# Patient Record
Sex: MALE | Race: ASIAN | NOT HISPANIC OR LATINO | Employment: FULL TIME | URBAN - METROPOLITAN AREA
[De-identification: names, ages, dates, MRNs, and addresses within clinical notes are randomized per-mention and may not be internally consistent; named-entity substitution may affect disease eponyms.]

---

## 2017-02-02 ENCOUNTER — OFFICE VISIT - HEALTHEAST (OUTPATIENT)
Dept: ALLERGY | Facility: CLINIC | Age: 27
End: 2017-02-02

## 2017-02-02 DIAGNOSIS — L50.8 CHRONIC URTICARIA: ICD-10-CM

## 2017-02-03 LAB — ANA SER QL: 0.8 U

## 2017-02-06 ENCOUNTER — COMMUNICATION - HEALTHEAST (OUTPATIENT)
Dept: ALLERGY | Facility: CLINIC | Age: 27
End: 2017-02-06

## 2020-08-12 ENCOUNTER — OFFICE VISIT - HEALTHEAST (OUTPATIENT)
Dept: FAMILY MEDICINE | Facility: CLINIC | Age: 30
End: 2020-08-12

## 2020-08-12 DIAGNOSIS — Z00.00 HEALTHCARE MAINTENANCE: ICD-10-CM

## 2020-08-12 DIAGNOSIS — Z71.6 ENCOUNTER FOR SMOKING CESSATION COUNSELING: ICD-10-CM

## 2020-08-12 LAB
CHOLEST SERPL-MCNC: 207 MG/DL
FASTING STATUS PATIENT QL REPORTED: NO
FASTING STATUS PATIENT QL REPORTED: NO
GLUCOSE BLD-MCNC: 90 MG/DL (ref 74–125)
HDLC SERPL-MCNC: 61 MG/DL

## 2020-08-12 ASSESSMENT — MIFFLIN-ST. JEOR: SCORE: 1501.29

## 2020-08-13 ENCOUNTER — COMMUNICATION - HEALTHEAST (OUTPATIENT)
Dept: FAMILY MEDICINE | Facility: CLINIC | Age: 30
End: 2020-08-13

## 2020-10-09 ENCOUNTER — OFFICE VISIT - HEALTHEAST (OUTPATIENT)
Dept: FAMILY MEDICINE | Facility: CLINIC | Age: 30
End: 2020-10-09

## 2020-10-09 DIAGNOSIS — L72.3 SEBACEOUS CYST: ICD-10-CM

## 2020-11-17 ENCOUNTER — OFFICE VISIT - HEALTHEAST (OUTPATIENT)
Dept: FAMILY MEDICINE | Facility: CLINIC | Age: 30
End: 2020-11-17

## 2020-11-17 DIAGNOSIS — R41.840 INATTENTION: ICD-10-CM

## 2020-11-17 ASSESSMENT — PATIENT HEALTH QUESTIONNAIRE - PHQ9: SUM OF ALL RESPONSES TO PHQ QUESTIONS 1-9: 8

## 2020-12-04 ENCOUNTER — OFFICE VISIT - HEALTHEAST (OUTPATIENT)
Dept: FAMILY MEDICINE | Facility: CLINIC | Age: 30
End: 2020-12-04

## 2020-12-04 DIAGNOSIS — Z71.6 ENCOUNTER FOR SMOKING CESSATION COUNSELING: ICD-10-CM

## 2020-12-04 DIAGNOSIS — R41.840 INATTENTION: ICD-10-CM

## 2020-12-04 DIAGNOSIS — J30.2 SEASONAL ALLERGIC RHINITIS, UNSPECIFIED TRIGGER: ICD-10-CM

## 2020-12-23 ENCOUNTER — OFFICE VISIT - HEALTHEAST (OUTPATIENT)
Dept: FAMILY MEDICINE | Facility: CLINIC | Age: 30
End: 2020-12-23

## 2020-12-23 DIAGNOSIS — R41.840 INATTENTION: ICD-10-CM

## 2020-12-23 DIAGNOSIS — F41.9 ANXIETY: ICD-10-CM

## 2020-12-23 DIAGNOSIS — Z00.00 HEALTHCARE MAINTENANCE: ICD-10-CM

## 2020-12-23 DIAGNOSIS — Z71.6 ENCOUNTER FOR SMOKING CESSATION COUNSELING: ICD-10-CM

## 2020-12-23 DIAGNOSIS — F32.A DEPRESSION, UNSPECIFIED DEPRESSION TYPE: ICD-10-CM

## 2020-12-23 ASSESSMENT — ANXIETY QUESTIONNAIRES
4. TROUBLE RELAXING: NEARLY EVERY DAY
3. WORRYING TOO MUCH ABOUT DIFFERENT THINGS: MORE THAN HALF THE DAYS
IF YOU CHECKED OFF ANY PROBLEMS ON THIS QUESTIONNAIRE, HOW DIFFICULT HAVE THESE PROBLEMS MADE IT FOR YOU TO DO YOUR WORK, TAKE CARE OF THINGS AT HOME, OR GET ALONG WITH OTHER PEOPLE: VERY DIFFICULT
GAD7 TOTAL SCORE: 14
5. BEING SO RESTLESS THAT IT IS HARD TO SIT STILL: MORE THAN HALF THE DAYS
2. NOT BEING ABLE TO STOP OR CONTROL WORRYING: NEARLY EVERY DAY
1. FEELING NERVOUS, ANXIOUS, OR ON EDGE: MORE THAN HALF THE DAYS
6. BECOMING EASILY ANNOYED OR IRRITABLE: SEVERAL DAYS
7. FEELING AFRAID AS IF SOMETHING AWFUL MIGHT HAPPEN: SEVERAL DAYS

## 2020-12-23 ASSESSMENT — PATIENT HEALTH QUESTIONNAIRE - PHQ9: SUM OF ALL RESPONSES TO PHQ QUESTIONS 1-9: 22

## 2020-12-23 ASSESSMENT — MIFFLIN-ST. JEOR: SCORE: 1534.85

## 2021-01-14 ENCOUNTER — COMMUNICATION - HEALTHEAST (OUTPATIENT)
Dept: FAMILY MEDICINE | Facility: CLINIC | Age: 31
End: 2021-01-14

## 2021-01-14 ENCOUNTER — OFFICE VISIT - HEALTHEAST (OUTPATIENT)
Dept: FAMILY MEDICINE | Facility: CLINIC | Age: 31
End: 2021-01-14

## 2021-01-14 DIAGNOSIS — G47.00 INSOMNIA, UNSPECIFIED TYPE: ICD-10-CM

## 2021-01-14 DIAGNOSIS — L64.9 MALE PATTERN BALDNESS: ICD-10-CM

## 2021-01-14 DIAGNOSIS — R35.0 URINARY FREQUENCY: ICD-10-CM

## 2021-01-14 DIAGNOSIS — J30.2 SEASONAL ALLERGIC RHINITIS, UNSPECIFIED TRIGGER: ICD-10-CM

## 2021-01-14 DIAGNOSIS — R41.840 INATTENTION: ICD-10-CM

## 2021-01-14 ASSESSMENT — MIFFLIN-ST. JEOR: SCORE: 1584.74

## 2021-02-08 ENCOUNTER — COMMUNICATION - HEALTHEAST (OUTPATIENT)
Dept: FAMILY MEDICINE | Facility: CLINIC | Age: 31
End: 2021-02-08

## 2021-02-08 DIAGNOSIS — G47.00 INSOMNIA, UNSPECIFIED TYPE: ICD-10-CM

## 2021-02-17 ENCOUNTER — OFFICE VISIT - HEALTHEAST (OUTPATIENT)
Dept: BEHAVIORAL HEALTH | Facility: CLINIC | Age: 31
End: 2021-02-17

## 2021-02-17 DIAGNOSIS — F98.8 ADD (ATTENTION DEFICIT DISORDER) WITHOUT HYPERACTIVITY: ICD-10-CM

## 2021-02-17 ASSESSMENT — ANXIETY QUESTIONNAIRES
1. FEELING NERVOUS, ANXIOUS, OR ON EDGE: MORE THAN HALF THE DAYS
2. NOT BEING ABLE TO STOP OR CONTROL WORRYING: NEARLY EVERY DAY
6. BECOMING EASILY ANNOYED OR IRRITABLE: MORE THAN HALF THE DAYS
3. WORRYING TOO MUCH ABOUT DIFFERENT THINGS: NEARLY EVERY DAY
GAD7 TOTAL SCORE: 19
IF YOU CHECKED OFF ANY PROBLEMS ON THIS QUESTIONNAIRE, HOW DIFFICULT HAVE THESE PROBLEMS MADE IT FOR YOU TO DO YOUR WORK, TAKE CARE OF THINGS AT HOME, OR GET ALONG WITH OTHER PEOPLE: VERY DIFFICULT
5. BEING SO RESTLESS THAT IT IS HARD TO SIT STILL: NEARLY EVERY DAY
4. TROUBLE RELAXING: NEARLY EVERY DAY
7. FEELING AFRAID AS IF SOMETHING AWFUL MIGHT HAPPEN: NEARLY EVERY DAY

## 2021-02-17 ASSESSMENT — PATIENT HEALTH QUESTIONNAIRE - PHQ9: SUM OF ALL RESPONSES TO PHQ QUESTIONS 1-9: 20

## 2021-03-01 ENCOUNTER — COMMUNICATION - HEALTHEAST (OUTPATIENT)
Dept: BEHAVIORAL HEALTH | Facility: CLINIC | Age: 31
End: 2021-03-01

## 2021-03-02 ENCOUNTER — COMMUNICATION - HEALTHEAST (OUTPATIENT)
Dept: BEHAVIORAL HEALTH | Facility: CLINIC | Age: 31
End: 2021-03-02

## 2021-03-03 ENCOUNTER — OFFICE VISIT - HEALTHEAST (OUTPATIENT)
Dept: BEHAVIORAL HEALTH | Facility: CLINIC | Age: 31
End: 2021-03-03

## 2021-03-03 DIAGNOSIS — R41.840 INATTENTION: ICD-10-CM

## 2021-03-03 DIAGNOSIS — F33.1 MODERATE EPISODE OF RECURRENT MAJOR DEPRESSIVE DISORDER (H): ICD-10-CM

## 2021-03-03 DIAGNOSIS — G47.00 INSOMNIA, UNSPECIFIED TYPE: ICD-10-CM

## 2021-03-03 DIAGNOSIS — Z71.6 ENCOUNTER FOR SMOKING CESSATION COUNSELING: ICD-10-CM

## 2021-03-09 ENCOUNTER — COMMUNICATION - HEALTHEAST (OUTPATIENT)
Dept: SCHEDULING | Facility: CLINIC | Age: 31
End: 2021-03-09

## 2021-03-10 ENCOUNTER — OFFICE VISIT - HEALTHEAST (OUTPATIENT)
Dept: FAMILY MEDICINE | Facility: CLINIC | Age: 31
End: 2021-03-10

## 2021-03-10 DIAGNOSIS — J30.9 ALLERGIC RHINITIS, UNSPECIFIED SEASONALITY, UNSPECIFIED TRIGGER: ICD-10-CM

## 2021-03-10 DIAGNOSIS — R35.0 URINE FREQUENCY: ICD-10-CM

## 2021-03-10 DIAGNOSIS — M54.9 BACK PAIN: ICD-10-CM

## 2021-03-10 DIAGNOSIS — F39 MOOD DISORDER (H): ICD-10-CM

## 2021-03-10 LAB
ALBUMIN UR-MCNC: NEGATIVE G/DL
APPEARANCE UR: CLEAR
BILIRUB UR QL STRIP: NEGATIVE
CHOLEST SERPL-MCNC: 263 MG/DL
COLOR UR AUTO: YELLOW
FASTING STATUS PATIENT QL REPORTED: YES
FASTING STATUS PATIENT QL REPORTED: YES
GLUCOSE BLD-MCNC: 99 MG/DL (ref 70–99)
GLUCOSE UR STRIP-MCNC: NEGATIVE MG/DL
HDLC SERPL-MCNC: 76 MG/DL
HGB UR QL STRIP: NEGATIVE
HIV 1+2 AB+HIV1 P24 AG SERPL QL IA: NEGATIVE
KETONES UR STRIP-MCNC: NEGATIVE MG/DL
LEUKOCYTE ESTERASE UR QL STRIP: NEGATIVE
NITRATE UR QL: NEGATIVE
PH UR STRIP: 6 [PH] (ref 5–8)
SP GR UR STRIP: 1.02 (ref 1–1.03)
UROBILINOGEN UR STRIP-ACNC: NORMAL

## 2021-03-10 RX ORDER — CETIRIZINE HYDROCHLORIDE 10 MG/1
10 TABLET ORAL DAILY
Qty: 30 TABLET | Refills: 2 | Status: SHIPPED | OUTPATIENT
Start: 2021-03-10 | End: 2021-11-04

## 2021-03-11 ENCOUNTER — COMMUNICATION - HEALTHEAST (OUTPATIENT)
Dept: FAMILY MEDICINE | Facility: CLINIC | Age: 31
End: 2021-03-11

## 2021-03-11 LAB
BACTERIA SPEC CULT: NO GROWTH
HCV AB SERPL QL IA: NEGATIVE

## 2021-03-25 ENCOUNTER — COMMUNICATION - HEALTHEAST (OUTPATIENT)
Dept: SCHEDULING | Facility: CLINIC | Age: 31
End: 2021-03-25

## 2021-03-25 DIAGNOSIS — Z71.6 ENCOUNTER FOR SMOKING CESSATION COUNSELING: ICD-10-CM

## 2021-03-28 ENCOUNTER — COMMUNICATION - HEALTHEAST (OUTPATIENT)
Dept: BEHAVIORAL HEALTH | Facility: CLINIC | Age: 31
End: 2021-03-28

## 2021-03-28 DIAGNOSIS — R41.840 INATTENTION: ICD-10-CM

## 2021-03-28 DIAGNOSIS — F33.1 MODERATE EPISODE OF RECURRENT MAJOR DEPRESSIVE DISORDER (H): ICD-10-CM

## 2021-03-28 DIAGNOSIS — G47.00 INSOMNIA, UNSPECIFIED TYPE: ICD-10-CM

## 2021-03-31 ENCOUNTER — OFFICE VISIT - HEALTHEAST (OUTPATIENT)
Dept: BEHAVIORAL HEALTH | Facility: CLINIC | Age: 31
End: 2021-03-31

## 2021-03-31 ENCOUNTER — COMMUNICATION - HEALTHEAST (OUTPATIENT)
Dept: BEHAVIORAL HEALTH | Facility: CLINIC | Age: 31
End: 2021-03-31

## 2021-03-31 DIAGNOSIS — R41.840 INATTENTION: ICD-10-CM

## 2021-03-31 DIAGNOSIS — G47.00 INSOMNIA, UNSPECIFIED TYPE: ICD-10-CM

## 2021-03-31 DIAGNOSIS — F33.1 MODERATE EPISODE OF RECURRENT MAJOR DEPRESSIVE DISORDER (H): ICD-10-CM

## 2021-04-05 ENCOUNTER — OFFICE VISIT - HEALTHEAST (OUTPATIENT)
Dept: BEHAVIORAL HEALTH | Facility: CLINIC | Age: 31
End: 2021-04-05

## 2021-04-05 ENCOUNTER — COMMUNICATION - HEALTHEAST (OUTPATIENT)
Dept: FAMILY MEDICINE | Facility: CLINIC | Age: 31
End: 2021-04-05

## 2021-04-05 ENCOUNTER — COMMUNICATION - HEALTHEAST (OUTPATIENT)
Dept: BEHAVIORAL HEALTH | Facility: CLINIC | Age: 31
End: 2021-04-05

## 2021-04-05 DIAGNOSIS — G47.00 INSOMNIA, UNSPECIFIED TYPE: ICD-10-CM

## 2021-04-05 DIAGNOSIS — F90.0 ATTENTION DEFICIT HYPERACTIVITY DISORDER (ADHD), PREDOMINANTLY INATTENTIVE TYPE: ICD-10-CM

## 2021-04-05 ASSESSMENT — ANXIETY QUESTIONNAIRES
2. NOT BEING ABLE TO STOP OR CONTROL WORRYING: NEARLY EVERY DAY
7. FEELING AFRAID AS IF SOMETHING AWFUL MIGHT HAPPEN: NEARLY EVERY DAY
6. BECOMING EASILY ANNOYED OR IRRITABLE: MORE THAN HALF THE DAYS
4. TROUBLE RELAXING: NEARLY EVERY DAY
5. BEING SO RESTLESS THAT IT IS HARD TO SIT STILL: NEARLY EVERY DAY
3. WORRYING TOO MUCH ABOUT DIFFERENT THINGS: NEARLY EVERY DAY
1. FEELING NERVOUS, ANXIOUS, OR ON EDGE: MORE THAN HALF THE DAYS
GAD7 TOTAL SCORE: 19
IF YOU CHECKED OFF ANY PROBLEMS ON THIS QUESTIONNAIRE, HOW DIFFICULT HAVE THESE PROBLEMS MADE IT FOR YOU TO DO YOUR WORK, TAKE CARE OF THINGS AT HOME, OR GET ALONG WITH OTHER PEOPLE: VERY DIFFICULT

## 2021-04-05 ASSESSMENT — PATIENT HEALTH QUESTIONNAIRE - PHQ9: SUM OF ALL RESPONSES TO PHQ QUESTIONS 1-9: 19

## 2021-04-07 ENCOUNTER — COMMUNICATION - HEALTHEAST (OUTPATIENT)
Dept: FAMILY MEDICINE | Facility: CLINIC | Age: 31
End: 2021-04-07

## 2021-04-07 DIAGNOSIS — J30.9 ALLERGIC RHINITIS, UNSPECIFIED SEASONALITY, UNSPECIFIED TRIGGER: ICD-10-CM

## 2021-04-07 RX ORDER — TRAZODONE HYDROCHLORIDE 50 MG/1
50 TABLET, FILM COATED ORAL
Qty: 30 TABLET | Refills: 0 | Status: SHIPPED | OUTPATIENT
Start: 2021-04-07 | End: 2021-09-28

## 2021-04-19 ENCOUNTER — OFFICE VISIT - HEALTHEAST (OUTPATIENT)
Dept: BEHAVIORAL HEALTH | Facility: CLINIC | Age: 31
End: 2021-04-19

## 2021-04-19 DIAGNOSIS — F33.1 MODERATE EPISODE OF RECURRENT MAJOR DEPRESSIVE DISORDER (H): ICD-10-CM

## 2021-04-19 DIAGNOSIS — R41.840 INATTENTION: ICD-10-CM

## 2021-04-19 DIAGNOSIS — F90.0 ATTENTION DEFICIT HYPERACTIVITY DISORDER (ADHD), PREDOMINANTLY INATTENTIVE TYPE: ICD-10-CM

## 2021-04-19 RX ORDER — BUPROPION HYDROCHLORIDE 450 MG/1
450 TABLET, FILM COATED, EXTENDED RELEASE ORAL EVERY MORNING
Qty: 300 TABLET | Refills: 0 | Status: SHIPPED | OUTPATIENT
Start: 2021-04-19 | End: 2021-08-19

## 2021-04-23 ENCOUNTER — COMMUNICATION - HEALTHEAST (OUTPATIENT)
Dept: FAMILY MEDICINE | Facility: CLINIC | Age: 31
End: 2021-04-23

## 2021-04-23 DIAGNOSIS — L64.9 MALE PATTERN BALDNESS: ICD-10-CM

## 2021-04-23 RX ORDER — FINASTERIDE 5 MG/1
TABLET, FILM COATED ORAL
Qty: 23 TABLET | Refills: 1 | Status: SHIPPED | OUTPATIENT
Start: 2021-04-23 | End: 2021-10-13

## 2021-04-25 ENCOUNTER — COMMUNICATION - HEALTHEAST (OUTPATIENT)
Dept: BEHAVIORAL HEALTH | Facility: CLINIC | Age: 31
End: 2021-04-25

## 2021-04-25 DIAGNOSIS — R41.840 INATTENTION: ICD-10-CM

## 2021-04-25 DIAGNOSIS — F33.1 MODERATE EPISODE OF RECURRENT MAJOR DEPRESSIVE DISORDER (H): ICD-10-CM

## 2021-04-26 RX ORDER — CLONIDINE HYDROCHLORIDE 0.1 MG/1
TABLET ORAL
Qty: 30 TABLET | Refills: 0 | Status: SHIPPED | OUTPATIENT
Start: 2021-04-26 | End: 2021-06-16

## 2021-04-30 ENCOUNTER — COMMUNICATION - HEALTHEAST (OUTPATIENT)
Dept: BEHAVIORAL HEALTH | Facility: CLINIC | Age: 31
End: 2021-04-30

## 2021-04-30 DIAGNOSIS — R41.840 INATTENTION: ICD-10-CM

## 2021-05-05 ENCOUNTER — COMMUNICATION - HEALTHEAST (OUTPATIENT)
Dept: BEHAVIORAL HEALTH | Facility: CLINIC | Age: 31
End: 2021-05-05

## 2021-05-05 ENCOUNTER — OFFICE VISIT - HEALTHEAST (OUTPATIENT)
Dept: BEHAVIORAL HEALTH | Facility: CLINIC | Age: 31
End: 2021-05-05

## 2021-05-05 DIAGNOSIS — F39 MOOD DISORDER (H): ICD-10-CM

## 2021-05-05 DIAGNOSIS — R41.840 INATTENTION: ICD-10-CM

## 2021-05-05 DIAGNOSIS — F90.0 ATTENTION DEFICIT HYPERACTIVITY DISORDER (ADHD), PREDOMINANTLY INATTENTIVE TYPE: ICD-10-CM

## 2021-05-17 ENCOUNTER — OFFICE VISIT - HEALTHEAST (OUTPATIENT)
Dept: BEHAVIORAL HEALTH | Facility: CLINIC | Age: 31
End: 2021-05-17

## 2021-05-17 DIAGNOSIS — F98.8 ATTENTION DEFICIT DISORDER (ADD) WITHOUT HYPERACTIVITY: ICD-10-CM

## 2021-05-19 ENCOUNTER — OFFICE VISIT - HEALTHEAST (OUTPATIENT)
Dept: BEHAVIORAL HEALTH | Facility: CLINIC | Age: 31
End: 2021-05-19

## 2021-05-19 ENCOUNTER — AMBULATORY - HEALTHEAST (OUTPATIENT)
Dept: BEHAVIORAL HEALTH | Facility: CLINIC | Age: 31
End: 2021-05-19

## 2021-05-19 DIAGNOSIS — F90.0 ATTENTION DEFICIT HYPERACTIVITY DISORDER (ADHD), PREDOMINANTLY INATTENTIVE TYPE: ICD-10-CM

## 2021-05-19 DIAGNOSIS — F39 MOOD DISORDER (H): ICD-10-CM

## 2021-05-27 ASSESSMENT — PATIENT HEALTH QUESTIONNAIRE - PHQ9
SUM OF ALL RESPONSES TO PHQ QUESTIONS 1-9: 20
SUM OF ALL RESPONSES TO PHQ QUESTIONS 1-9: 19
SUM OF ALL RESPONSES TO PHQ QUESTIONS 1-9: 8
SUM OF ALL RESPONSES TO PHQ QUESTIONS 1-9: 22

## 2021-05-28 ASSESSMENT — ANXIETY QUESTIONNAIRES
GAD7 TOTAL SCORE: 19
GAD7 TOTAL SCORE: 14
GAD7 TOTAL SCORE: 19

## 2021-05-29 ENCOUNTER — RECORDS - HEALTHEAST (OUTPATIENT)
Dept: ADMINISTRATIVE | Facility: CLINIC | Age: 31
End: 2021-05-29

## 2021-05-30 ENCOUNTER — RECORDS - HEALTHEAST (OUTPATIENT)
Dept: ADMINISTRATIVE | Facility: CLINIC | Age: 31
End: 2021-05-30

## 2021-06-04 ENCOUNTER — AMBULATORY - HEALTHEAST (OUTPATIENT)
Dept: NURSING | Facility: CLINIC | Age: 31
End: 2021-06-04

## 2021-06-04 VITALS
HEIGHT: 63 IN | WEIGHT: 140 LBS | BODY MASS INDEX: 24.8 KG/M2 | SYSTOLIC BLOOD PRESSURE: 115 MMHG | HEART RATE: 80 BPM | DIASTOLIC BLOOD PRESSURE: 76 MMHG | RESPIRATION RATE: 16 BRPM

## 2021-06-04 VITALS
HEART RATE: 83 BPM | TEMPERATURE: 99.2 F | RESPIRATION RATE: 16 BRPM | DIASTOLIC BLOOD PRESSURE: 57 MMHG | BODY MASS INDEX: 24.5 KG/M2 | SYSTOLIC BLOOD PRESSURE: 118 MMHG | WEIGHT: 140 LBS

## 2021-06-05 VITALS
HEART RATE: 98 BPM | SYSTOLIC BLOOD PRESSURE: 116 MMHG | DIASTOLIC BLOOD PRESSURE: 72 MMHG | WEIGHT: 147 LBS | BODY MASS INDEX: 25.1 KG/M2 | RESPIRATION RATE: 16 BRPM | HEIGHT: 64 IN

## 2021-06-05 VITALS
HEIGHT: 64 IN | HEART RATE: 99 BPM | TEMPERATURE: 98 F | SYSTOLIC BLOOD PRESSURE: 123 MMHG | DIASTOLIC BLOOD PRESSURE: 75 MMHG | BODY MASS INDEX: 26.98 KG/M2 | WEIGHT: 158 LBS | RESPIRATION RATE: 18 BRPM

## 2021-06-05 VITALS
HEART RATE: 79 BPM | WEIGHT: 160 LBS | DIASTOLIC BLOOD PRESSURE: 76 MMHG | BODY MASS INDEX: 27.9 KG/M2 | SYSTOLIC BLOOD PRESSURE: 118 MMHG

## 2021-06-08 ENCOUNTER — COMMUNICATION - HEALTHEAST (OUTPATIENT)
Dept: BEHAVIORAL HEALTH | Facility: CLINIC | Age: 31
End: 2021-06-08

## 2021-06-08 DIAGNOSIS — F98.8 ATTENTION DEFICIT DISORDER (ADD) WITHOUT HYPERACTIVITY: ICD-10-CM

## 2021-06-08 NOTE — PROGRESS NOTES
Assessment:   Chronic recurrent autoimmune urticaria. I do not feel that there is a specific allergy trigger.   Plan:   Discussed the etiology and prognosis of chronic recurrent urticaria.   Allegra daily.  If worsening, Cetirizine 10 mg a.m. and p.m. If needed a noon dose can be given.   Benadryl 1-2 tablets every 6 hours on an as-needed basis.   Discussed common triggering factors such as heat, Nsaids, pressure, alcohol, stress etc...  Follow-up in 1-2 weeks in allergy clinic if hives are not well-controlled.  Screening lab tests:  ALICIA, TSH, ESR, CBC, tryptase  ____________________________________________________________________________                                                             Link is here for chronic rash.  He he describes urticarial rashes with raised itchy centers.  He describes linear hives with scratching.  These rashes come and go in less than 24 hours with no resultant bruising.  No associated wheezing, vomiting, diarrhea, angioedema, symptoms of hypotension.  He's had this rash for at least 2 years.  He uses Allegra daily.  While on Allegra he has minimal symptoms.  He has stopped that over the past 4 days and now is having daily hives again.  He identifies nighttime and showering as triggers.  If hives seem to be better when he is working and they seem to be worse in the evening at home.  No food association.  No medication association.  No cold association.  Contact pressure can bring it out.  No illness or stress association.    Review of symptoms:  as above otherwise negative.    Past medical history: No other chronic medical conditions noted.    Allergies: No known allergies to medications, latex , foods or hymenoptera venom.    Family History:  No known member of the family with allergy or asthma.  Patient's father with a history of skin problems.  He is using topical medications regularly.    Social history: Currently has lived in the same apartment for 6 months.  It has a  basement.  No visible water seepage or mold.  No pets in the home.  Patient was a previous cigarette smoker stopping 3 years ago.    Medications: None    Physical Exam:  General:  Alert and oriented.  Eyes:  Sclera clear.  Ears: TMs translucent grey with bony landmarks visible. Nose: Pale, boggy mucosal membranes.  Throat: Pink, mosit.  No lesions.  Neck: Supple.  No lymphadenopathy.  Lungs: CTA.  CV: Regular rate and rhythm. Extremities: Well perfused.  No clubbing or cyanosis. Skin: Diffuse urticarial lesions.    This transcription uses voice recognition software, which may contain typographical errors.  45 min spent in direct contact with the patient.  More than 50% in counseling and coordination of care.

## 2021-06-09 RX ORDER — ATOMOXETINE 40 MG/1
CAPSULE ORAL
Qty: 60 CAPSULE | Refills: 0 | Status: SHIPPED | OUTPATIENT
Start: 2021-06-09 | End: 2021-08-19 | Stop reason: DRUGHIGH

## 2021-06-10 NOTE — PROGRESS NOTES
I spent over 15 minutes spent, greater than 50% of this counseling regarding following issues:    1. Encounter for smoking cessation counseling  Sent in planning stage of quitting.  Previously quit with her insulin, requesting this again.  Discussed potential side effects of this medication.  Patient agreed it would be a good idea.  Will set a quit date shortly after starting medication  - varenicline (CHANTIX STARTING MONTH BOX) 0.5 mg (11)- 1 mg (42) tablet; one 0.5mg tablet  mouth daily  for 3 days, then one 0.5mg tablet bid for 3 days, then one 1mg tab two times a day indef  Dispense: 60 tablet; Refill: 2    2. Healthcare maintenance  STD screening through [facility]  - Glucose  - Cholesterol, Total  - HDL Cholesterol    Pt presented for:  Chief Complaint   Patient presents with     stop smoking     Quit before, started smoking again during early COVID-19 pandemic.  Wishes to quit can be an example to the rest of his family who continues to smoke

## 2021-06-12 NOTE — PROGRESS NOTES
ASSESSMENT/PLAN:  1. Sebaceous cyst  lidocaine 10 mg/mL (1 %) injection 3 mL       This is a 29 yo male with a lump posterior to left ear lobe.  He has tried to kaufman this with a needle at home - and notes only bloody discharge.  Now, this fluctuant lump has a bluish hue (likely due to blood).  After discussion with the patient, he agreed to have I&D of this lesion.  See procedure note.  I was able to extract thick sebaceous material.  Discussed further wound care with warm packing and direct pressure to allow for further drainage of this wound.      Return in about 2 weeks (around 10/23/2020) for if not getting better.  Administrations This Visit     lidocaine 10 mg/mL (1 %) injection 3 mL     Admin Date  10/09/2020 Action  Given by Other Dose  3 mL Route  Other Administered By  Nidhi Lora MA                There are no discontinued medications.  There are no Patient Instructions on file for this visit.    Chief Complaint:  Chief Complaint   Patient presents with     Mass     behind left ear     discuss test results       HPI:   Nikolay Lora is a 30 y.o. male c/o  Has lump behind left ear lobe  X months   Getting bigger and more tender recently  A few days ago, poked at it with a needle          PMH:   There are no active problems to display for this patient.    No past medical history on file.  No past surgical history on file.  Social History     Socioeconomic History     Marital status: Single     Spouse name: Not on file     Number of children: Not on file     Years of education: Not on file     Highest education level: Not on file   Occupational History     Not on file   Social Needs     Financial resource strain: Not on file     Food insecurity     Worry: Not on file     Inability: Not on file     Transportation needs     Medical: Not on file     Non-medical: Not on file   Tobacco Use     Smoking status: Former Smoker     Smokeless tobacco: Never Used   Substance and Sexual Activity     Alcohol use: Not on  file     Drug use: Not on file     Sexual activity: Not on file   Lifestyle     Physical activity     Days per week: Not on file     Minutes per session: Not on file     Stress: Not on file   Relationships     Social connections     Talks on phone: Not on file     Gets together: Not on file     Attends Gnosticism service: Not on file     Active member of club or organization: Not on file     Attends meetings of clubs or organizations: Not on file     Relationship status: Not on file     Intimate partner violence     Fear of current or ex partner: Not on file     Emotionally abused: Not on file     Physically abused: Not on file     Forced sexual activity: Not on file   Other Topics Concern     Not on file   Social History Narrative     Not on file     No family history on file.    Meds:    Current Outpatient Medications:      fexofenadine (ALLEGRA) 180 MG tablet, Take 180 mg by mouth daily., Disp: , Rfl:      varenicline (CHANTIX STARTING MONTH BOX) 0.5 mg (11)- 1 mg (42) tablet, one 0.5mg tablet  mouth daily  for 3 days, then one 0.5mg tablet bid for 3 days, then one 1mg tab two times a day indef, Disp: 60 tablet, Rfl: 2    Allergies:  No Known Allergies    ROS:  Pertinent positives as noted in HPI; otherwise 12 point ROS negative.      Physical Exam:  EXAM:  /57 (Patient Site: Right Arm, Patient Position: Sitting, Cuff Size: Adult Regular)   Pulse 83   Temp 99.2  F (37.3  C) (Tympanic)   Resp 16   Wt 140 lb (63.5 kg)   BMI 24.50 kg/m     Gen:  NAD, appears well, well-hydrated  HEENT:  TMs nl, oropharynx benign, nasal mucosa nl, conjunctiva clear  Neck:  Supple, no adenopathy, no thyromegaly, no carotid bruits, no JVD  Lungs:  Clear to auscultation bilaterally  Cor:  RRR no murmur  Abd:  Soft, nontender, BS+, no masses, no guarding or rebound, no HSM  Extr:  Neg.  Neuro:  No asymmetry  Skin:  Warm/dry, fluctuant lump measuring 5 mm below left ear lobe        Procedure:  I&D of skin - sebaceous cyst -  posterior to left ear lobe    Indication:  Growing cyst , increasing tenderness    Consent:  Verbal    Procedure:  Cyst identified by palpation; skin cleaned x 3 with Betadine  1% Lidocaine injected ,, < 0.2 ml  With good anesthetic effect, #11 blade was used to make a stab incision in central location  Thick sebaceous material was extracted from the wound -   Pressure allowed for hemostasis    Wound covered with antibiotic ointment and a bandaid.   Wound care discussed with patient.    Blood loss < 0.1 ml

## 2021-06-13 NOTE — PROGRESS NOTES
"Nikolay Lora is a 30 y.o. male who is being evaluated via a billable video visit.      The patient has been notified of following:     \"This video visit will be conducted via a call between you and your physician/provider. We have found that certain health care needs can be provided without the need for an in-person physical exam.  This service lets us provide the care you need with a video conversation.  If a prescription is necessary we can send it directly to your pharmacy.  If lab work is needed we can place an order for that and you can then stop by our lab to have the test done at a later time.    Video visits are billed at different rates depending on your insurance coverage. Please reach out to your insurance provider with any questions.    If during the course of the call the physician/provider feels a video visit is not appropriate, you will not be charged for this service.\"    Patient has given verbal consent to a Video visit? Yes  How would you like to obtain your AVS? AVS Preference: Mail a copy.  If dropped by the video visit, the video invitation should be sent to: Text to cell phone: [phone]   Will anyone else be joining your video visit? No        Video Start Time: 11:24 AM  Trouble focusing  Work cited- drinks lots of coffee- but doesn't work well in the end- gets anxious, tremor and urinates all the time  Still makes mistakes/ has trouble organizing    Denies mental health issues- except self esteem  Began in school- from [place] age 7  Lots or recommendations for assessment / special needs- never followied  Not sure about rec for add eval    Does well socially  Smoker- on chantix and trying to quit    PHQ=8- 3 points for tired    1. Inattention  AMB REFERRAL TO MENTAL HEALTH AND ADDICTION  - Adult (18+); Assessment and Testing; ADHD; [facility] [phone]; We will contact you to schedule the appointment or please call with any questions; External Referral "     Suspect ADD  Discussed treatment options  Begin with eval  Then F/u in office with me 2-4 weeks later                        Video-Visit Details    Type of service:  Video Visit    Video End Time (time video stopped): 11:40 AM  Originating Location (pt. Location): Home    Distant Location (provider location):  [facility]     Platform used for Video Visit: Allan Ybarra MD

## 2021-06-13 NOTE — PROGRESS NOTES
"Nikolay Lora is a 30 y.o. male who is being evaluated via a billable video visit.      The patient has been notified of following:     \"This video visit will be conducted via a call between you and your physician/provider. We have found that certain health care needs can be provided without the need for an in-person physical exam.  This service lets us provide the care you need with a video conversation.  If a prescription is necessary we can send it directly to your pharmacy.  If lab work is needed we can place an order for that and you can then stop by our lab to have the test done at a later time.    Video visits are billed at different rates depending on your insurance coverage. Please reach out to your insurance provider with any questions.    If during the course of the call the physician/provider feels a video visit is not appropriate, you will not be charged for this service.\"    Patient has given verbal consent to a Video visit? Yes  How would you like to obtain your AVS? AVS Preference: MyChart.  If dropped by the video visit, the video invitation should be sent to: Text to cell phone: [phone]   Will anyone else be joining your video visit? No        Video Start Time: 8:05 AM    1. Inattention  No one has contacted him re referral for ADD assessment- will try to arrange and follow-up  In the Meantime, will use bupropion for dual purpose of smoking cessation and possibly helping with inattention.  - buPROPion (WELLBUTRIN SR) 150 MG 12 hr tablet; Take one pill daily for 3 days.  Then 1 pill twice daily for 3 months.  Set quit date in about 2 week.  Dispense: 60 tablet; Refill: 2    2. Encounter for smoking cessation counseling  Discussed quit plan  Discussed setting a quit date. Patient quit for four years in the past using Chantix. Could go back to this.  - buPROPion (WELLBUTRIN SR) 150 MG 12 hr tablet; Take one pill daily for 3 days.  Then 1 pill twice daily for 3 months.  Set quit date in about 2 week. "  Dispense: 60 tablet; Refill: 2    3. Seasonal allergic rhinitis, unspecified trigger    - fexofenadine (ALLEGRA) 180 MG tablet; Take 1 tablet (180 mg total) by mouth daily.  Dispense: 30 tablet; Refill: 3    Video-Visit Details    Type of service:  Video Visit    Video End Time (time video stopped): 8:29 AM  Originating Location (pt. Location): Home    Distant Location (provider location):  [facility]     Platform used for Video Visit: Doximity    Note this visit was completed on phone due to poor audio connections  Mich Ybarra MD

## 2021-06-14 ENCOUNTER — OFFICE VISIT - HEALTHEAST (OUTPATIENT)
Dept: BEHAVIORAL HEALTH | Facility: CLINIC | Age: 31
End: 2021-06-14

## 2021-06-14 ENCOUNTER — AMBULATORY - HEALTHEAST (OUTPATIENT)
Dept: LAB | Facility: HOSPITAL | Age: 31
End: 2021-06-14

## 2021-06-14 DIAGNOSIS — R53.83 FATIGUE, UNSPECIFIED TYPE: ICD-10-CM

## 2021-06-14 DIAGNOSIS — R41.840 INATTENTION: ICD-10-CM

## 2021-06-14 LAB — TSH SERPL DL<=0.005 MIU/L-ACNC: 0.92 UIU/ML (ref 0.3–5)

## 2021-06-14 NOTE — PATIENT INSTRUCTIONS - HE
I had originally made your evaluation in January for inattention/ADD.  I now also think you deserve therapy for depression and anxiety.  But lets keep the appointment on January 14 and see what the psychologist thinks and recommends.    I want to start bupropion/Wellbutrin, a medication that can help some with depression and smoking and inattention.  It is not my first choice for depression and inattention but might help a little bit with both.    I made a referral to psychiatry to help with choice of the right medication.    Want to encourage you that I think we are going to find something that helps but it might take a little bit of time to figure out who is the right team of people to see.

## 2021-06-14 NOTE — PROGRESS NOTES
I spent over 30 minutes spent, greater than 50% was counseling regarding following issues:  1. Insomnia, unspecified type  Discussed at length.  Patient requested Xanax by name.  Had tried melatonin, NyQuil, other antihistamines.  Discussed sleep hygiene, trial trazodone, following up with psychiatrist in mid February  - traZODone (DESYREL) 50 MG tablet; Take 1 tablet (50 mg total) by mouth at bedtime.  Dispense: 30 tablet; Refill: 1    2. Inattention  Recent diagnostic assessment with therapist, results pending.  Has appointment with psychiatry in mid February.  Decided not to continue to take bupropion after reviewing literature on it, talking to friends.  Request Adderall by name-has tried friends before..  Defer to psychiatry  3. Male pattern baldness  Discussed potential side effects, mild problem at this point but early age.  Prescribed finasteride  - finasteride (PROSCAR) 5 mg tablet; Take 1/4 tab po qd  Dispense: 15 tablet; Refill: 2    4. Seasonal allergic rhinitis, unspecified trigger  Renewal  - fexofenadine (ALLEGRA) 180 MG tablet; Take 1 tablet (180 mg total) by mouth daily.  Dispense: 30 tablet; Refill: 3    Pt presented for:  Chief Complaint   Patient presents with     Follow-up     Alopecia     discuss posible ADHA       Patient here for follow-up.  See previous note.  Diagnostics assessment underway.  Has appointment to follow-up with therapist also appointment next month with psychiatrist.    Difficulty sleeping.  In bed about 9 hours, difficulty falling asleep, up and down through the night, exercising regularly.  Thinks he has pretty good sleep hygiene.  Hold off on caffeine after noon.    Frequent urination affecting job.  Did not mention this above.  Has discussed this in the past.  Drinks a lot of coffee, urinates a lot, needs to get up to use the restroom.

## 2021-06-15 ENCOUNTER — COMMUNICATION - HEALTHEAST (OUTPATIENT)
Dept: BEHAVIORAL HEALTH | Facility: CLINIC | Age: 31
End: 2021-06-15

## 2021-06-15 DIAGNOSIS — R41.840 INATTENTION: ICD-10-CM

## 2021-06-15 LAB — 25(OH)D3 SERPL-MCNC: 46.5 NG/ML (ref 30–80)

## 2021-06-15 NOTE — TELEPHONE ENCOUNTER
Patient would like to email Dr. Moura some test results he just got back before their appointment on 3/3.

## 2021-06-15 NOTE — PATIENT INSTRUCTIONS - HE
Continue medications as prescribed  Have your pharmacy contact us for a refill if you are running low on medications (We may ask you to come into clinic to get a refill from the nurse  No Alcohol or drug use  No driving if sedated  Call the clinic with any questions or concerns   Reach out for help if you feel like hurting yourself or others ([facility] [phone]: [address], [address] or [facility] Suicide Hotline [phone] , call 911 or go to nearest Emergency room    Follow up as directed, for your appointments, per your After Visit Summary Form.

## 2021-06-15 NOTE — PROGRESS NOTES
This video/telephone visit will be conducted via a call between you and your physician/provider. We have found that certain health care needs can be provided without the need for an in-person physical exam. This service lets us provide the care you need with a video /telephone conversation. If a prescription is necessary we can send it directly to your pharmacy. If lab work is needed we can place an order for that and you can then stop by our lab to have the test done at a later time.    Just as we bill insurance for in-person visits, we also bill insurance for video/telephone visits. If you have questions about your insurance coverage, we recommend that you speak with your insurance company.    Patient has given verbal consent for video/Telephone visit? Yes   Patient would like the video visit invitation sent by: Lavonne bell dropped [phone]  BRY/JAGRUTI HERNANDEZ CMA   AVS-Mail today please   Referral from Dr. Mich Ybarra   Inattention, Anxiety and Depression   Not able to find results from ADHD testing done with [facility] in [facility] last week results are not ready yet per Pt.   Patient verified allergies, medications and pharmacy via phone. PHQ: 20; LICO: 19 and Mood-Current: 7 done verbally with writer. Patient states he is ready for visit.

## 2021-06-15 NOTE — TELEPHONE ENCOUNTER
Patient was able to send his test result to [facility] for appt with provider tomorrow.

## 2021-06-15 NOTE — PROGRESS NOTES
________________________________________  Medications Phoned  to Pharmacy [] yes [x]no  Name of Pharmacist:  List Medications, including dose, quantity and instructions    Medications ordered this visit were e-scribed.  Verified by order class [x] yes  [] no  Bupropion 300 mg  Trazodone 50 mg    Medication changes or discontinuations were communicated to patient's pharmacy: [] yes  [x] no    Dictation completed at time of chart check: [x] yes  [] no    I have checked the documentation for today s encounters and the above information has been reviewed and completed.

## 2021-06-15 NOTE — TELEPHONE ENCOUNTER
Reason for Call:  Other call back      Detailed comments: asking for call back ASAP Re: test results  He is having difficulty doing this through My cart     Phone Number Patient can be reached at: Home number on file [phone] (home)    Best Time: ASAP     Can we leave a detailed message on this number?: Yes    Call taken on 3/2/2021 at 2:17 PM by Philip Andersen

## 2021-06-15 NOTE — PROGRESS NOTES
Subjective:    Nikolay Lora is a 30 y.o. male who presents with several concerns:    1.  Back pain.  He has pain in the mid right back.  It started about 4 to 5 days ago.  Off and on, at times very sharp.  Tylenol does seem to help, but he says at times the pain is sharp and he wants to make sure there is nothing more serious going on.  He says today it actually feels a lot better than it did yesterday.  No dysuria.  No known falls or injuries prior to pain starting.  No constipation.  No fevers.  He does feel like he has had some sweating at nighttime over the past few nights.    2.  Change in allergy medicine.  He is taking Allegra.  He finds the pill too large to swallow.  He has tried Claritin in the past.  He is wondering if there are any other options for him.    3.  Urinary frequency.  This is been a problem for him for a long time.  He drinks 3 to 5 cups of coffee a day.  He has to urinate frequently at work.  He is supposed to be at his desk or answering phones.  He is concerned that his work will get angry that he has to go to the bathroom so often.  This was discussed with his PCP on 1/14/2021.  I reviewed that note today.  His PCP also wrote him a note for work covering this concern.  Patient is still concerned about this.  I asked him if work told him that he needed more documentation, patient said no.  He is just wondering if maybe they will need this in the future.    4.  Mental health medication.  He says he was diagnosed with ADHD and is being treated with Wellbutrin.  I reviewed his last mental health note with MELLISSA Moura from 3/3/2021.  He has been taking Wellbutrin, prescribed by this provider.  He is very concerned today because he feels like Wellbutrin is not helping.  He also thinks that the Wellbutrin was not the appropriate medicine to be prescribed for his symptoms.  He says he has an appointment with mental health provider at the end of this month.  He wants me to prescribe him a new mental  health medication today.    Patient Active Problem List   Diagnosis     Allergic rhinitis     Chronic dermatitis     Hemorrhoids     Pityriasis versicolor     Pruritus ani     Verruca vulgaris       Current Outpatient Medications:      buPROPion (WELLBUTRIN XL) 300 MG 24 hr tablet, Take 1 tablet (300 mg total) by mouth daily., Disp: 30 tablet, Rfl: 0     finasteride (PROSCAR) 5 mg tablet, Take 1/4 tab po qd, Disp: 15 tablet, Rfl: 2     traZODone (DESYREL) 50 MG tablet, Take 1 tablet (50 mg total) by mouth at bedtime as needed for sleep., Disp: 30 tablet, Rfl: 0     varenicline (CHANTIX STARTING MONTH BOX) 0.5 mg (11)- 1 mg (42) tablet, one 0.5mg tablet  mouth daily  for 3 days, then one 0.5mg tablet bid for 3 days, then one 1mg tab two times a day indef, Disp: 60 tablet, Rfl: 2     cetirizine (ZYRTEC) 10 MG tablet, Take 1 tablet (10 mg total) by mouth daily., Disp: 30 tablet, Rfl: 2     Objective:   Allergies:  Patient has no known allergies.    Vitals:  Vitals:    03/10/21 0741   BP: 118/76   Patient Site: Left Arm   Patient Position: Sitting   Cuff Size: Adult Regular   Pulse: 79   Weight: 160 lb (72.6 kg)     Body mass index is 27.9 kg/m .    Vital signs reviewed.  General: Patient is alert and oriented x 3, in no apparent distress, appropriately groomed with normal affect  Cardiac: regular rate and rhythm, no murmurs  Pulmonary: lungs clear to auscultation bilaterally, no crackles, rales, rhonchi, or wheezing noted  Musculoskeletal: Mild pain in right mid back, no significant pain with direct palpation there, skin above the affected area is healthy and normal    Results for orders placed or performed in visit on 03/10/21   Urinalysis   Result Value Ref Range    Color, UA Yellow Colorless, Yellow, Straw, Light Yellow    Clarity, UA Clear Clear    Glucose, UA Negative Negative    Bilirubin, UA Negative Negative    Ketones, UA Negative Negative    Specific Gravity, UA 1.020 1.005 - 1.030    Blood, UA Negative  Negative    pH, UA 6.0 5.0 - 8.0    Protein, UA Negative Negative    Urobilinogen, UA 0.2 E.U./dL 0.2 E.U./dL, 1.0 E.U./dL    Nitrite, UA Negative Negative    Leukocytes, UA Negative Negative   Urine culture pending.    Assessment and Plan:   1. Back pain  Differential includes most likely musculoskeletal strain, less likely urine or kidney issue.  Exam grossly normal today.  No red flags on exam or history.  Urinalysis normal.  I will follow-up with urine culture.  We discussed symptomatic treatment for muscle pain.  He will monitor his symptoms and follow-up with PCP as needed.  - Urinalysis    2. Urine frequency  This is apparently an ongoing problem for him.  He seems to be concerned that even though Dr. Ybarra wrote him a note for work, that eventually work may become dissatisfied with this.  Referral made to urology for further investigation and follow-up.  I also discussed decreasing his caffeine intake.  - Culture, Urine  - Ambulatory referral to Urology    3. Allergic rhinitis, unspecified seasonality, unspecified trigger  He feels Allegra pill is too large.  We discussed other options including liquid Allegra medicine versus Claritin or Zyrtec.  He has tried Claritin before.  Prescription sent for Zyrtec.  Follow-up with PCP as needed.  - cetirizine (ZYRTEC) 10 MG tablet; Take 1 tablet (10 mg total) by mouth daily.  Dispense: 30 tablet; Refill: 2    4. Mood disorder (H)  Has been following with one of our mental health providers.  I reviewed their last note today.  Patient is upset because he was prescribed Wellbutrin by this provider.  He feels not only is Wellbutrin not working, but it was not the appropriate medicine for his symptoms.  He says he has an appointment with mental health provider at the end of the month.  He wanted me to prescribe him a new medication today.  I reviewed with him that he needs to follow-up with his mental health provider for these concerns.    If he feels Wellbutrin is  making him feel worse, he should stop it now.  But either way, I encouraged him to call the mental health provider to let them know what his concerns are.    40 minutes spent on the date of the encounter doing chart review, history and exam, and documentation.    This dictation uses voice recognition software, which may contain typographical errors.

## 2021-06-15 NOTE — PROGRESS NOTES
Nikolay Lora is a 30 y.o. male who is being evaluated via a billable video visit.      How would you like to obtain your AVS? Mail a copy.  If dropped from the video visit, the video invitation should be resent by: Send to e-mail at: [email or web address]  Will anyone else be joining your video visit? No      Video Start Time: 09: 30 AM       Video-Visit Details    Type of service:  Video Visit    Video End Time (time video stopped): 10: 10 AM   Originating Location (pt. Location): Home    Distant Location (provider location):  [facility]     Platform used for Video Visit: [employer]          Psychiatric  Out- Patient  Follow Up Progress Note  Date of visit:         Discussion of Care and Treatment Recommendations:   This is a 30 y.o. male with past medical history significant of depression anxiety, ADD and possibly personality disorder.  Patient presents to the clinic today for medication management       Last visit  02/17/2021 Recommendation at last visit .  1.  No changes will be made to medications today.  Await findings from diagnostic assessment after which we will discuss treatment plan with patient  2.  Return to the clinic in approximately 2 weeks call in between visits any questions or concerns  3.  Patient to call clinic where he had his diagnostic assessment completed and asked them to fax findings to our clinic     Patient and I reviewed diagnosis and treatment plan and patient agrees with following recommendations:  Ongoing education given regarding diagnostic and treatment options with adequate verbalization of understanding.  Plan   1- Start Wellbutrin X 300 mg daily - MDD/Inttentivenss  Changes Trazone 50 mg to pRN   2. RTC : 4 weeks anabel in between visit with nay questions or concerns            DIagnoses:     ADHD-inattentive type   Persistent Depressive Disorder with Anxious Distress   Features of Schizotypal Personality Disorder                "Chief Complaint / Subjective:    Chief complaint: \" I am still depressed and anxious\"     History of Present Illness:  Results of recent diagnostic assessment indicate that patient struggles with ADHD-inattentive type ,Persistent Depressive Disorder with Anxious Distress  Ad has Features of Schizotypal Personality Disorder .  Today patient continues to report depression including lack of initiative, feelings of sadness isolation which are triggered mainly by the pandemic but have always been present even though the pandemic.  He also reports lack of focus at work.  He also continues to report increased anxiety, racing thoughts and restlessness.  Patient has been on a trial of Wellbutrin 150 mg daily.  He did take it inconsistently but reports that initially when he started taking it he was feeling less depressed and more focused.  Today we discussed treatment for ADD major depressive disorder and anxiety.  We also discussed that symptoms of the aforementioned illnesses may overlap that sometimes managing 1 symptoms may decrease symptoms of the other.  We also discussed treatment options for ADD to include stimulants which have the risk of dependency tolerance and withdrawal.  We also discussed the stimulants may also further exacerbate his anxiety..  Since is tried Wellbutrin in the past my first trial attempt will be to resume Wellbutrin extended release at 300 mg and if this does not work we will try Strattera before attempting stimulants.  I also did recommend that patient uses trazodone as needed as he was complaining of feeling fatigued in the morning as trazodone may cause some grogginess in the morning.    Patient denies all other psychiatric issues and offers no other concerns.  He denies suicidal homicidal ideation states he feels safe verbally contracts for safety and is consenting to this treatment plan.      Mental Status Examination:   Appearance: unable to assess  Orientation: Patient alert and " "oriented to person, place, time, and situation  Reliability:  Patient appears to be an adequate historian.    Behavior: unable to assess  Speech: Speech is spontaneous and coherent, with a normal rate, rhythm and tone.    Language:There are no difficulties with expressive or receptive language as observed throughout the interview.    Mood: Described as \"ok\".    Affect: unable to assess  Judgement: Able to make basic decision regarding safety.  Insight: Good awareness of physical and mental health conditions and aware of needs around care for these.  Gait and station: unable to assess  Thought process: Logical   Thought content: No evidence of delusions or paranoia.    Hallucinations : No evidence of any hallucination  Thought content: No evidence of delusions or paranoia.   Suicidal /Homical Ideations:  No thoughts of self harm or suicide. No thoughts of harming others.  Associations: Connected  Fund of knowledge: Average  Attention / Concentration: Able to remain focused during the interview with minimal distractibility or need for redirection.  Short Term Memory: Grossly intact as evidence by client recalling themes and ideas discussed.  Long Term Memory: Intact  Motor Status: unable to asse    Drug/treatment history and current pattern of use:   Cigarettes : 3-5 cigarettes per day   Hx of alcohol abuse - 2 years ago   Alcohol : once every other week - last alcoholic drink - 2 week  Denies use any other mood altering substances     Medication changes: See Above   Medication adherence: compliant  Medication side effects: absent  Information about medications: Side effects, benefits and alternative treatments discussed and patient agrees .    Psychotherapy: Supportive therapy day-to-day living    Education: Diet, exercise, abstinence from drugs and alcohol, patient will not drive if sedated and medications or  under influence of any substance    Lab Results:   Personally reviewed and discussed with the " patient    Lab Results   Component Value Date    WBC 9.5 02/02/2017    HGB 15.9 02/02/2017    HCT 47.0 02/02/2017     02/02/2017    CHOL 207 (H) 08/12/2020    HDL 61 08/12/2020    TSH 2.00 02/02/2017       Vital signs:  There were no vitals taken for this visit.  Telemedicine visit-no vital signs completed  Allergies: Patient has no known allergies.         Medications:     Current Outpatient Medications on File Prior to Visit   Medication Sig Dispense Refill     fexofenadine (ALLEGRA) 180 MG tablet Take 1 tablet (180 mg total) by mouth daily. 30 tablet 3     finasteride (PROSCAR) 5 mg tablet Take 1/4 tab po qd 15 tablet 2     traZODone (DESYREL) 50 MG tablet Take 1 tablet (50 mg total) by mouth at bedtime. 30 tablet 1     varenicline (CHANTIX STARTING MONTH BOX) 0.5 mg (11)- 1 mg (42) tablet one 0.5mg tablet  mouth daily  for 3 days, then one 0.5mg tablet bid for 3 days, then one 1mg tab two times a day indef 60 tablet 2     No current facility-administered medications on file prior to visit.                     Review of Systems:      ROS:    Subjective Data Only- Tele-Health Visit    10 point ROS was negative except for the items listed in HPI.      Coordination of Care:   More than 30 minutes spent on this visit  with more than 50% of time spent on coordination of care including: Educating patient about diagnosis, prognosis, side effects and benefits of medications, diet, exercise.  Time also spent providing supportive therapy regarding above issues.  Time spent also reviewing diagnostic assessment and discussed medications benefits and side effects profile in detail    This note was created using a dictation system. All typing errors or contextual distortion is unintentional and software inherent.

## 2021-06-15 NOTE — PROGRESS NOTES
This video/telephone visit will be conducted via a call between you and your physician/provider. We have found that certain health care needs can be provided without the need for an in-person physical exam. This service lets us provide the care you need with a video /telephone conversation. If a prescription is necessary we can send it directly to your pharmacy. If lab work is needed we can place an order for that and you can then stop by our lab to have the test done at a later time.    Just as we bill insurance for in-person visits, we also bill insurance for video/telephone visits. If you have questions about your insurance coverage, we recommend that you speak with your insurance company.    Patient has given verbal consent for video/Telephone visit? Yes  Patient would like the video visit invitation sent by: Lavonne, if connection issues, please call:  [phone]   BRY/JAGRUTI DEL CID CMA    Patient verified allergies, medications and pharmacy via phone. Patient states he is ready for visit.

## 2021-06-15 NOTE — PROGRESS NOTES
Nikolay Lora is a 30 y.o. male who is being evaluated via a billable video visit.      How would you like to obtain your AVS? Mail a copy.  If dropped from the video visit, the video invitation should be resent by: Send to e-mail at: [email or web address]  Will anyone else be joining your video visit? No      Video Start Time: 10: 00 Am         Date of Service:  2021    Name:  Nikolay Lora  :  1990  MRN:  [id number]    HPI:   Nikolay Lora is a 30 y.o. male referred by his primary care provider to establish psychiatric care for medication management of possible ADD, anxiety/depression.    Patient recently met with primary care provider in the last couple months with overlapping symptoms of depression anxiety and ADD.  PCP did refer patient for an ADHD diagnostic assessment.  Patient has completed the assessment and results are pending.  Not able to find results from ADHD testing done with [facility] in [facility] last week results are not ready yet per Pt.- Per pt diagnostic assesement finding still pending .  Patient will call and request that findings be faxed to our clinic.    Past psychiatric medications include  None per patient statement  Current psychiatric medications include  Prescribed bupropion for smoking cessation/attention and depression- Takes 150 Mg XR taken inconsisently for less than 2 months     Trazodone 50 mg at bedtime: Prescribed as needed  for sleep . Taken for less than one month -           Current psychiatric symptoms include  Inattentiveness--PCP referred patient for ADHD testing  Depression : Fatigue drinks plenty of coffee to stay awanke , hoplesness , Makes horrible and reckless decision , still lives art home,   Sleep: at least 10 hours of sleep each night   SI/HI  - Occasional fleeting thought, no plan of intent to act . Currently denies any SI/HI     Anxiety : Works out to relax - 1.5 hours           Psychiatric History:  Current psychiatrist: Denies   Current  "psychotherapist: Yes in college - 10 years ago .  Current : Denies   Hospitalizations: Denies   Suicide attempts: Denies .  Electroconvulsive therapy: Denies   Judicial commitments: Denies   OCD: Hx of gambling   Eduarda/hypomania: Denies   PTSD: Denies   Hallucinations : Denies   Eating disorder:Denies   ADHD: Hx of inattentiveness   Personality disorder including history of oppositional defiant disorder or conduct disorder in childhood: Denies        Decision Making Capacity   Patient has the capacity to make independent decisions regarding medical and psychiatric care.    Chemical use History:             Cigarettes : 3-5 cigarettes per day   Hx of alcohol abuse - 2 years ago   Alcohol : once every other week - last alcoholic drink - 2 week  Denies use any other mood altering substances       Past Medical History:              No past medical history on file.    No past surgical history on file.     Family Psychiatric History:      Mental illness: Unknown   Addiction:  Denies   Suicide:Denies       Social History:       Marital Status : Single   Number of children: None   Current living circumstances: Lives with Parents   Current sources of financial support: works full time as a  for [employer]     Obstetric History:  Last menstrual period: N/A  Pregnancy history:N/A     History:  Denied  service.    Access to weapons  Denies access to weapons.            Trauma & Abuse History:  Major accidents and injuries:Hx of MCA and sustained concussion .  Concussion or traumatic brain injury: x of MCA and sustained concussion .  Abuse:  Denies     Spiritual History:  Sources of hope, meaning, comfort, strength, peace and love: \" Family , mum \"   Part of an organized Hinduism:    Birth & Development History:  City and state of birth:  Born in [facility] in as a refugee - [place] to [place] in [place]. Moved to [place] - 2001  Highest education achieved: Bachelors degree     Legal History:  DWI : " Had a DUI in 2020   Probation/parole status: yes - unsupervised probation        [place] Prescription Monitoring Program:  No worrisome pharmacy activity.  Not indicated for this patient.    Medications:         Current Outpatient Medications on File Prior to Visit   Medication Sig Dispense Refill     fexofenadine (ALLEGRA) 180 MG tablet Take 1 tablet (180 mg total) by mouth daily. 30 tablet 3     finasteride (PROSCAR) 5 mg tablet Take 1/4 tab po qd 15 tablet 2     traZODone (DESYREL) 50 MG tablet Take 1 tablet (50 mg total) by mouth at bedtime. 30 tablet 1     varenicline (CHANTIX STARTING MONTH BOX) 0.5 mg (11)- 1 mg (42) tablet one 0.5mg tablet  mouth daily  for 3 days, then one 0.5mg tablet bid for 3 days, then one 1mg tab two times a day indef 60 tablet 2     No current facility-administered medications on file prior to visit.            Lab Results:   Personally reviewed and discussed with the patient    Lab Results   Component Value Date    WBC 9.5 02/02/2017    HGB 15.9 02/02/2017    HCT 47.0 02/02/2017     02/02/2017    CHOL 207 (H) 08/12/2020    HDL 61 08/12/2020    TSH 2.00 02/02/2017     No results found for: PHENYTOIN, PHENOBARB, VALPROATE, CBMZ        Vital signs:  There were no vitals taken for this visit.    Allergies:   Patient has no known allergies.        Associated Clinical Documents:       Notes reviewed in EPIC and [facility] including: medication reconciliation, progress notes, recent labs, PMH, and OSH records.    ROS:       10 point ROS was negative except for the items listed in HPI.  No Medication s/e's                MSE:        Appearance: unable to assess  Orientation: Patient alert and oriented to person, place, time, and situation  Reliability:  Patient appears to be an adequate historian.    Behavior: unable to assess  Speech: Speech is spontaneous and coherent, with a normal rate, rhythm and tone.    Language:There are no difficulties with expressive or receptive language as  "observed throughout the interview.    Mood: Described as \"ok\".    Affect: unable to assess  Judgement: Able to make basic decision regarding safety.  Insight: Good awareness of physical and mental health conditions and aware of needs around care for these.  Gait and station: unable to assess  Thought process: Logical   Thought content: No evidence of delusions or paranoia.    Hallucinations : No evidence of any hallucination  Thought content: No evidence of delusions or paranoia.   Suicidal /Homical Ideations:  No thoughts of self harm or suicide. No thoughts of harming others.  Associations: Connected  Fund of knowledge: Average  Attention / Concentration: Able to remain focused during the interview with minimal distractibility or need for redirection.  Short Term Memory: Grossly intact as evidence by client recalling themes and ideas discussed.  Long Term Memory: Intact  Motor Status: unable to asse            Clinical Outcome Measures:  1. PHQ-9: Total score 20  functional impairment.  2. MDQ: Total score 7  functional impairment.  3. LICO-7: Total score 19  functional impairment.    Impression:        R/O Depression  R/O Anxiety   R/O ADD-diagnostic assessment has already been completed , awaiting results to be sent to us for review  Hx of Gambling - stopped gambling jan 1st 2019       Plan:         Patient and I reviewed diagnosis and treatment plan.   Reviewed risks/benefits of medication with patient.  Ongoing education given regarding diagnostic and treatment options with adequate verbalization of understanding  Patient agrees with following recommendations:    1.  No changes will be made to medications today.  Await findings from diagnostic assessment after which we will discuss treatment plan with patient  2.  Return to the clinic in approximately 2 weeks call in between visits any questions or concerns  3.  Patient to call clinic where he had his diagnostic assessment completed and asked them to fax " findings to our clinic      Total Time:      More than 60 Minutes spent on this visit with >50% time spent on  dscussing and educating patient about diagnosis, treatment options, risks, benefits ,side effects of medications and instructions for follow up.  Time also spent on reviewing  Past  EHR from the providers  For better transition of care    This dictation was completed with speech recognition software and there may be unintended word substitutions.          Video-Visit Details    Type of service:  Video Visit    Video End Time (time video stopped): 11: 26 PM   Originating Location (pt. Location): Home    Distant Location (provider location):  [facility] MENT[facility] HE[facility] & [facility]DI[facility]     Platform used for Video Visit: [employer]

## 2021-06-15 NOTE — TELEPHONE ENCOUNTER
Message left for patient regarding his request to email lab result to provider before tomorrow's appt. Informed pt that providers don't have email but that he can use [employer] to send results to the providers. Encouraged him to call back if he has any questions.

## 2021-06-15 NOTE — PATIENT INSTRUCTIONS - HE
Continue medications as prescribed  Have your pharmacy contact us for a refill if you are running low on medications (We may ask you to come into clinic to get a refill from the nurse  No Alcohol or drug use  No driving if sedated  Call the clinic with any questions or concerns   Reach out for help if you feel like hurting yourself or others ([facility] [phone]: [address] [facility], [address] or [facility] Suicide Hotline [phone] , call 911 or go to nearest Emergency room    Follow up as directed, for your appointments, per your After Visit Summary Form.

## 2021-06-15 NOTE — TELEPHONE ENCOUNTER
Triage Call:    -Patient is calling stating he has been having back pain.  -Back pain started 2-3 days ago.   -Denies any injury.  -Pain is located on the R. Side, middle back.   -Moderate back pain present that is constant per patient statement.   -Patient has not taken any pain medicine for this yet.  -Per protocol, recommendations are for patient to schedule visit with PCP within 3 days. Patient was transferred to , which call dropped. RN tried calling patient back, but no answer. RN left non detailed voicemail for patient to call back and speak with  per protocol, recommendation.     Leny Paul RN, BSN Nurse Triage Advisor 1:09 PM 3/9/2021     Reason for Disposition    MODERATE back pain (e.g., interferes with normal activities) and present > 3 days    Additional Information    Negative: Passed out (i.e., fainted, collapsed and was not responding)    Negative: Shock suspected (e.g., cold/pale/clammy skin, too weak to stand, low BP, rapid pulse)    Negative: Sounds like a life-threatening emergency to the triager    Negative: Major injury to the back (e.g., MVA, fall > 10 feet or 3 meters, penetrating injury, etc.)    Negative: Pain in the upper back over the ribs (rib cage) that radiates (travels) into the chest    Negative: Pain in the upper back over the ribs (rib cage) and worsened by coughing (or clearly increases with breathing)    Negative: SEVERE back pain of sudden onset and age > 60    Negative: SEVERE abdominal pain (e.g., excruciating)    Negative: Abdominal pain and age > 60    Negative: Unable to urinate (or only a few drops) and bladder feels very full    Negative: Loss of bladder or bowel control (urine or bowel incontinence; wetting self, leaking stool) of new onset    Negative: Numbness (loss of sensation) in groin or rectal area    Negative: Pain radiates into groin, scrotum    Negative: Blood in urine (red, pink, or tea-colored)    Negative: Vomiting and pain over  lower ribs of back (i.e., flank - kidney area)    Negative: Weakness of a leg or foot (e.g., unable to bear weight, dragging foot)    Negative: Patient sounds very sick or weak to the triager    Negative: Fever > 100.5 F (38.1 C) and flank pain    Negative: Pain or burning with passing urine (urination)    Negative: Numbness in an arm or hand (i.e., loss of sensation) and upper back pain    Negative: Numbness in a leg or foot (i.e., loss of sensation)    Negative: SEVERE back pain (e.g., excruciating, unable to do any normal activities) and not improved after pain medicine and CARE ADVICE    Negative: High-risk adult (e.g., history of cancer, history of HIV, or history of IV drug abuse)    Negative: Painful rash with multiple small blisters grouped together (i.e., dermatomal distribution or 'band' or 'stripe')    Negative: Pain radiates into the thigh or further down the leg, and in both legs    Negative: Age > 50 and no history of prior similar back pain    Protocols used: BACK PAIN-A-[place]    COVID 19 Nurse Triage Plan/Patient Instructions    Please be aware that novel coronavirus (COVID-19) may be circulating in the community. If you develop symptoms such as fever, cough, or SOB or if you have concerns about the presence of another infection including coronavirus (COVID-19), please contact your health care provider or visit  [email or web address].    Disposition/Instructions    In-Person Visit with provider recommended. Reference Visit Selection Guide.    Thank you for taking steps to prevent the spread of this virus.  o Limit your contact with others.  o Wear a simple mask to cover your cough.  o Wash your hands well and often.    Resources    [facility]: About COVID-19: [email or web address]    CDC: What to Do If You're Sick: www.cdc.gov/coronavirus/2019-ncov/about/steps-when-sick.html    CDC: Ending Home Isolation: www.cdc.gov/coronavirus/2019-ncov/hcp/disposition-in-home-patients.html      CDC: Caring for Someone: www.cdc.gov/coronavirus/2019-ncov/if-you-are-sick/care-for-someone.html     [facility]: Interim Guidance for Hospital Discharge to Home: www.health.[facility].[place]/diseases/coronavirus/hcp/hospdischarge.pdf    [facility] clinical trials (COVID-19 research studies): clinicalaffairs.[facility]-clinical-trials     Below are the COVID-19 hotlines at the [place] Department of Health ([facility]). Interpreters are available.   o For health questions: Call 061-858-9672 or 1-358.763.5244 (7 a.m. to 7 p.m.)  o For questions about schools and childcare: Call 048-380-8466 or 1-762.992.3203 (7 a.m. to 7 p.m.)

## 2021-06-16 PROBLEM — F39 MOOD DISORDER (H): Status: ACTIVE | Noted: 2021-03-10

## 2021-06-16 PROBLEM — K64.9 HEMORRHOIDS: Status: ACTIVE | Noted: 2017-06-02

## 2021-06-16 PROBLEM — L29.0 PRURITUS ANI: Status: ACTIVE | Noted: 2018-04-18

## 2021-06-16 NOTE — PROGRESS NOTES
"Mental Health Visit Note    Patient: Nikolay Lora    : 1990 MRN: [id number]    2021    Start time: 1800    Stop Time: 185   Session # 2    Session Type: Patient is presenting for an Individual session.    Nikolay Lora is a 30 y.o. male (ong) who is being seen for a Psychotherapy follow-up visit..  Patient indicated that he is not certain what to do next in regard to the management of his ADHD symptoms and how that impacts his anxiety and depression.  He is hoping to learn skills and obtain tools for better management of the symptoms.  And is not certain what to do now in regard to learning skills and tools for management of those symptoms.  Currently, patient works at a [employer] center 5 days/week and is having difficulty staying focused, alert and engaged in his job.  His lifelong help is to become a  although, he is uncertain if that is possible because of ongoing issues with focusing, concentration, organization and mood irregularities.    Telemedicine Visit: The patient's condition can be safely assessed and treated via synchronous audio and visual telemedicine encounter.      Reason for Telemedicine Visit: Patient has requested telehealth visit    Originating Site (Patient Location): Patient's home    Distant Site (Provider Location): Provider Remote Setting- Home Office    Consent:  The patient/guardian has verbally consented to: the potential risks and benefits of telemedicine (video visit) versus in person care; bill my insurance or make self-payment for services provided; and responsibility for payment of non-covered services.     Mode of Communication:  Video Conference via [employer]    As the provider I attest to compliance with applicable laws and regulations related to telemedicine.    Those present for this visit     Follow up Patient indicated that it has been very difficult for him functioning doing his job particularly in the afternoon hours being that he is \"feeling more tired and " "slowed down.\"  Patient stated, \"this is emotionally for me because I know   I can do better.\"     New symptoms or complaints: Slowed speech, increased fatigue, trouble concentrating, easily distracted and sad.     Functional Impairment:   Personal: 4  Family: 2  Work: 4  Social:4          ASSESSMENT: Current Emotional / Mental Status (status of significant symptoms):              Risk status (Self / Other harm or suicidal ideation)              Patient any personal safety issues              Patient denies current or recent suicidal ideation or behaviors.              Patient denies current or recent homicidal ideation or behaviors.              Patient denies current or recent self injurious behavior or ideation.              Patient denies other safety concerns.              Patient denies any risk issues              Patient indicated family is concerned involved              Recommended that patient call 911 or go to the local ED should there be a change in any of these risk factors.                Attitude:                                   Cooperative               Orientation:                             Oriented x3              Speech                                    Clear              Rate / Production:                   Slow  Normal               Volume:                                    Soft               Mood:                                       Sad               Thought Content:                     Clear               Thought Form:                         Coherent  Logical               Insight:                                      Good               Affect                                        Flat    Patient's impression of their current status: Patient stated, \"it bothers me every day to sit here for an 8-hour shift I feel so trapped.\"    Therapist impression of patients current state: During today's visit we discussed those circumstances and ways in which he manage and/or improve his symptoms " in order to function at the level he wants to achieve of his current job.  He is in agreement to contact his HR department at his employment in order to explore the process of applying for official accommodations.  Patient appears to have difficulty expressing himself then noted previously.  He denies any suicidal thoughts or ideations although, appears sad and defeated.     Type of psychotherapeutic technique provided: Insight oriented and Solution-focused ADHD education    Progress toward short term goals: Not yet established    Review of long term goals: Not yet established     Diagnosis:  1.  ADHD inattentive  2.  Mood disorder (H)    Plan and Follow up:   1.  Writer will contact providers updating them regarding patient's current symptoms and feasibility of patient requesting                  accommodations through his employer  2.   Patient will begin educate himself regarding the tools that can be helpful in managing his ADHD symptoms  3.   Develop therapeutic relationship  4.  Patient will continue current medication regime per NP  5.  Complete DA  6.  Next appointment in 1 week.    Discharge Criteria/Planning: Patient will continue with follow-up until therapy can be discontinued without return of signs and symptoms.      I have reviewed the note as documented above.  This accurately captures the substance of my conversation with the patient.  As the provider I attest to compliance with applicable laws and regulations related to telemedicine.  Performed and documented by   Amy Montano [facility]  4/19/21

## 2021-06-16 NOTE — PATIENT INSTRUCTIONS - HE
Continue medications as prescribed  Have your pharmacy contact us for a refill if you are running low on medications (We may ask you to come into clinic to get a refill from the nurse  No Alcohol or drug use  No driving if sedated  Call the clinic with any questions or concerns   Reach out for help if you feel like hurting yourself or others ([facility] [phone]: [address] [facility], [address] or [facility] Suicide Hotline [phone] , call 911 or go to nearest Emergency room    Follow up as directed, for your appointments, per your After Visit Summary Form.

## 2021-06-16 NOTE — PROGRESS NOTES
This video/telephone visit will be conducted via a call between you and your physician/provider. We have found that certain health care needs can be provided without the need for an in-person physical exam. This service lets us provide the care you need with a video /telephone conversation. If a prescription is necessary we can send it directly to your pharmacy. If lab work is needed we can place an order for that and you can then stop by our lab to have the test done at a later time.    Just as we bill insurance for in-person visits, we also bill insurance for video/telephone visits. If you have questions about your insurance coverage, we recommend that you speak with your insurance company.    Patient has given verbal consent for video/Telephone visit? Yes  Patient would like telephone visit, please call:  [phone]  BRY/JAGRUTI DEL CID CMA    Patient verified allergies, medications and pharmacy via phone. Patient states he is ready for visit.

## 2021-06-16 NOTE — PROGRESS NOTES
This video/telephone visit will be conducted via a call between you and your physician/provider. We have found that certain health care needs can be provided without the need for an in-person physical exam. This service lets us provide the care you need with a video /telephone conversation. If a prescription is necessary we can send it directly to your pharmacy. If lab work is needed we can place an order for that and you can then stop by our lab to have the test done at a later time.    Just as we bill insurance for in-person visits, we also bill insurance for video/telephone visits. If you have questions about your insurance coverage, we recommend that you speak with your insurance company.    Patient has given verbal consent for video/Telephone visit? Yes  Patient would like telephone visit, please call:  [phone]  BRY/JAGRUTI DEL CID CMA    Patient verified allergies, medications and pharmacy via phone.  Patient states he is ready for visit.

## 2021-06-16 NOTE — PROGRESS NOTES
"Nikolay Lora is a 30 y.o. male who is being evaluated via a billable telephone visit.      The patient has been notified of following:     \"This telephone visit will be conducted via a call between you and your physician/provider. We have found that certain health care needs can be provided without the need for a physical exam.  This service lets us provide the care you need with a short phone conversation.  If a prescription is necessary we can send it directly to your pharmacy.  If lab work is needed we can place an order for that and you can then stop by our lab to have the test done at a later time.    Telephone visits are billed at different rates depending on your insurance coverage. During this emergency period, for some insurers they may be billed the same as an in-person visit.  Please reach out to your insurance provider with any questions.    If during the course of the call the physician/provider feels a telephone visit is not appropriate, you will not be charged for this service.\"    Patient has given verbal consent to a Telephone visit? Yes        Patient would like to receive their AVS by AVS Preference: Mail a copy.    Psychiatric  Out patient Follow Up Progress Note  Date of visit:3/31/2021         Discussion of Care and Treatment Recommendations:   This is a 30 y.o. male with past medical history significant of depression anxiety, ADD and possibly personality disorder.  Patient presents to the clinic today for medication management          Last visit 03/03/2021 Recommendation at last visit .  1- Start Wellbutrin X 300 mg daily - MDD/Inttentivenss  Changes Trazone 50 mg to pRN   2. RTC : 4 weeks anabel in between visit with nay questions or concerns   Patient and I reviewed diagnosis and treatment plan and patient agrees with following recommendations:  Ongoing education given regarding diagnostic and treatment options with adequate verbalization of understanding.  Plan   1- Continue Wellbutrin X 300 mg " daily - MDD/Inttentivenss,  Trazone 50 mg to pRN  Start Clonidine 0.1 mg daily - ADD   2. RTC : 2 weeks anabel in between visit with nay questions or concerns             DIagnoses:     ADHD-inattentive type   Persistent Depressive Disorder with Anxious Distress   Features of Schizotypal Personality Disorder     Patient Active Problem List   Diagnosis     Allergic rhinitis     Chronic dermatitis     Hemorrhoids     Pityriasis versicolor     Pruritus ani     Verruca vulgaris     Mood disorder (H)             Chief Complaint / Subjective:    Chief complaint: Inattentiveness, lack of energy, depression, anxiety       History of Present Illness:   Per patient's statement : He continues to endorse inattentiveness, lack of energy and fatigue, depression and anxiety.  During our last visit Wellbutrin was increased to 300 mg.  Target was to address depression and inattentiveness both of ADD.  Patient continues.  Complains of inattentiveness and continued anxiety.  Is requesting Something to work with energy level.  He endorses some depression and anxiety denies suicidal homicidal ideations.  Denies psychoses denies all other psychiatric issues .  Today we discussed his current symptoms and medication management.  We did discuss that Wellbutrin and also ADD treatment agents have potential to trigger anxiety.  He is also taking cetrizine every morning for allergies.  We discussed that this may be making him more tired during the day.  To address inattentiveness we discussed adding clonidine 0.1 mg daily with potential to titrate up in the next 2 weeks.  Medications and benefits side effects profile extensively discussed he endorsed understanding and is consenting to this plan.      Mental Status Examination:     Appearance: unable to assess  Orientation: Patient alert and oriented to person, place, time, and situation  Reliability:  Patient appears to be an adequate historian.    Behavior: unable to assess  Speech: Speech is  "spontaneous and coherent, with a normal rate, rhythm and tone.    Language:There are no difficulties with expressive or receptive language as observed throughout the interview.    Mood: Described as \"ok\".    Affect: unable to assess  Judgement: Able to make basic decision regarding safety.  Insight: Good awareness of physical and mental health conditions and aware of needs around care for these.  Gait and station: unable to assess  Thought process: Logical   Hallucinations : No evidence of any hallucination  Thought content: No evidence of delusions or paranoia.   Suicidal /Homical Ideations:  No thoughts of self harm or suicide. No thoughts of harming others.  Associations: Connected  Fund of knowledge: Average  Attention / Concentration: Able to remain focused during the interview with minimal distractibility or need for redirection.  Short Term Memory: Grossly intact as evidence by client recalling themes and ideas discussed.  Long Term Memory: Intact  Motor Status: unable to asse      Drug/treatment history and current pattern of use:   Cigarettes : 3-5 cigarettes per day   Hx of alcohol abuse - 2 years ago   Alcohol : once every other week - last alcoholic drink - 2 week  Denies use any other mood altering substances     Medication changes: See Above   Medication adherence: compliant  Medication side effects: absent  Information about medications: Side effects, benefits and alternative treatments discussed and patient agrees .    Psychotherapy: Supportive therapy day-to-day living    Education: Diet, exercise, abstinence from drugs and alcohol, patient will not drive if sedated and medications or  under influence of any substance    Lab Results:   Personally reviewed and discussed with the patient    Lab Results   Component Value Date    WBC 9.5 02/02/2017    HGB 15.9 02/02/2017    HCT 47.0 02/02/2017     02/02/2017    CHOL 263 (H) 03/10/2021    HDL 76 03/10/2021    TSH 2.00 02/02/2017       Vital " signs:  There were no vitals taken for this visit.  Unable to assess telephone visit  Allergies: Patient has no known allergies.           Review of Systems:      ROS:  Subjective data only - Tele-Health  Visit   10 point ROS was negative except for the items listed in HPI-              Medications:     Current Outpatient Medications on File Prior to Visit   Medication Sig Dispense Refill     buPROPion (WELLBUTRIN XL) 300 MG 24 hr tablet Take 1 tablet (300 mg total) by mouth daily. 30 tablet 0     cetirizine (ZYRTEC) 10 MG tablet Take 1 tablet (10 mg total) by mouth daily. 30 tablet 2     finasteride (PROSCAR) 5 mg tablet Take 1/4 tab po qd 15 tablet 2     traZODone (DESYREL) 50 MG tablet Take 1 tablet (50 mg total) by mouth at bedtime as needed for sleep. 30 tablet 0     varenicline (CHANTIX STARTING MONTH BOX) 0.5 mg (11)- 1 mg (42) tablet one 0.5mg tablet  mouth daily  for 3 days, then one 0.5mg tablet bid for 3 days, then one 1mg tab two times a day indef 60 tablet 2     No current facility-administered medications on file prior to visit.                Coordination of Care:   More than 30 minutes spent on this visit  with more than 50% of time spent on coordination of care including: Educating patient about diagnosis, prognosis, side effects and benefits of medications, diet, exercise.  Time also spent providing supportive therapy regarding above issues.    This note was created using a dictation system. All typing errors or contextual distortion is unintentional and software inherent.    Phone call duration: 23 minutes    Beryl Moura NP     Clear bilaterally, pupils equal, round and reactive to light.

## 2021-06-16 NOTE — PROGRESS NOTES
________________________________________  Medications Phoned  to Pharmacy [] yes [x]no  Name of Pharmacist:  List Medications, including dose, quantity and instructions    Medications ordered this visit were e-scribed.  Verified by order class [x] yes  [] no  Bupropion 450 mg  Clonidine 0.1 mg    Medication changes or discontinuations were communicated to patient's pharmacy: [] yes  [x] no    Dictation completed at time of chart check: [x] yes  [] no    I have checked the documentation for today s encounters and the above information has been reviewed and completed.

## 2021-06-16 NOTE — PROGRESS NOTES
"Nikolay Lora is a 30 y.o. male who is being evaluated via a billable telephone visit.      The patient has been notified of following:     \"This telephone visit will be conducted via a call between you and your physician/provider. We have found that certain health care needs can be provided without the need for a physical exam.  This service lets us provide the care you need with a short phone conversation.  If a prescription is necessary we can send it directly to your pharmacy.  If lab work is needed we can place an order for that and you can then stop by our lab to have the test done at a later time.    Telephone visits are billed at different rates depending on your insurance coverage. During this emergency period, for some insurers they may be billed the same as an in-person visit.  Please reach out to your insurance provider with any questions.    If during the course of the call the physician/provider feels a telephone visit is not appropriate, you will not be charged for this service.\"    Patient has given verbal consent to a Telephone visit? Yes        Patient would like to receive their AVS by AVS Preference: Mail a copy.  Psychiatric  Out patient Follow Up Progress Note  Date of visit:4/19/2021         Discussion of Care and Treatment Recommendations:   This is a 30 y.o. male with past medical history significant of depression anxiety, ADD and possibly personality disorder.  Patient presents to the clinic today for medication management       Last visit  03/31/2021Recommendation at last visit   1- Continue Wellbutrin X 300 mg daily - MDD/Inttentivenss,  Trazone 50 mg to pRN  Start Clonidine 0.1 mg daily - ADD   2. RTC : 2 weeks anabel in between visit with nay questions or concerns      Patient and I reviewed diagnosis and treatment plan and patient agrees with following recommendations:  Ongoing education given regarding diagnostic and treatment options with adequate verbalization of understanding.  Plan   1- " Increase  Wellbutrin to 450 mg daily - MDD/Inttentivenss,    Increase Clonidine 0.2mg daily - ADD , Continue Trazone 50 mg to pRN  2. RTC : 2 weeks anabel in between visit with nay questions or concerns             DIagnoses:     ADHD-inattentive type   Persistent Depressive Disorder with Anxious Distress   Features of Schizotypal Personality Disorder     Patient Active Problem List   Diagnosis     Allergic rhinitis     Chronic dermatitis     Hemorrhoids     Pityriasis versicolor     Pruritus ani     Verruca vulgaris     Mood disorder (H)             Chief Complaint / Subjective:    Chief complaint: I am extremely tired during the day making it hard for me to focus    History of Present Illness:  Per patient's statement : Patient reports that he has been feeling generally tired during the day wondering whether medication continues to make him tired.  We had this discussion during the last visit.  Patient does take medications for allergies with the potential for making drowsy.  We did discuss the Wellbutrin most likely would make him more anxious and drowsy.  He also continues to complain of inattentiveness and is looking for medication that can help him for a week.  He continues to insist that the current medications are making him more sleepy during the day.  We discussed symptoms of ADD which include more inattentiveness rather than drowsiness and sleepiness.  We did discuss switching the time he takes his allergy medication to late in the evening or at bedtime but he insists that he needs it during the day.  We also discussed that the symptoms he was reporting including additional interest or pleasure in doing things, feeling depressed tired and overeating as symptoms of depression which needed to be treated however he feels that his problem is mainly ADD.  We discussed that symptoms of depression ADD and anxiety may overlap each other and it is important to treat symptoms of depression at the same time.    Mental  "Status Examination:     Appearance: unable to assess  Orientation: Patient alert and oriented to person, place, time, and situation  Reliability:  Patient appears to be an adequate historian.    Behavior: unable to assess  Speech: Speech is spontaneous and coherent, with a normal rate, rhythm and tone.    Language:There are no difficulties with expressive or receptive language as observed throughout the interview.    Mood: Described as \"ok\".    Affect: unable to assess  Judgement: Able to make basic decision regarding safety.  Insight: Good awareness of physical and mental health conditions and aware of needs around care for these.  Gait and station: unable to assess  Thought process: Logical   Hallucinations : No evidence of any hallucination  Thought content: No evidence of delusions or paranoia.   Suicidal /Homical Ideations:  No thoughts of self harm or suicide. No thoughts of harming others.  Associations: Connected  Fund of knowledge: Average  Attention / Concentration: Able to remain focused during the interview with minimal distractibility or need for redirection.  Short Term Memory: Grossly intact as evidence by client recalling themes and ideas discussed.  Long Term Memory: Intact  Motor Status: unable to asse      Drug/treatment history and current pattern of use:   Denies  Medication changes: See Above   Medication adherence: compliant  Medication side effects: absent  Information about medications: Side effects, benefits and alternative treatments discussed and patient agrees .    Psychotherapy: Supportive therapy day-to-day living    Education: Diet, exercise, abstinence from drugs and alcohol, patient will not drive if sedated and medications or  under influence of any substance    Lab Results:   Personally reviewed and discussed with the patient    Lab Results   Component Value Date    WBC 9.5 02/02/2017    HGB 15.9 02/02/2017    HCT 47.0 02/02/2017     02/02/2017    CHOL 263 (H) 03/10/2021    " HDL 76 03/10/2021    TSH 2.00 02/02/2017       Vital signs:  There were no vitals taken for this visit.  Unable to assess telephone visit  Allergies: Patient has no known allergies.           Review of Systems:      ROS:  Subjective data only - Tele-Health  Visit   10 point ROS was negative except for the items listed in HPI-              Medications:       Current Outpatient Medications on File Prior to Visit   Medication Sig Dispense Refill     buPROPion (WELLBUTRIN XL) 300 MG 24 hr tablet Take 1 tablet (300 mg total) by mouth daily. 30 tablet 0     cetirizine (ZYRTEC) 10 MG tablet Take 1 tablet (10 mg total) by mouth daily. 30 tablet 2     cloNIDine HCL (CATAPRES) 0.1 MG tablet Take 1 tablet (0.1 mg total) by mouth daily. 30 tablet 0     finasteride (PROSCAR) 5 mg tablet Take 1/4 tab po qd 15 tablet 2     traZODone (DESYREL) 50 MG tablet Take 1 tablet (50 mg total) by mouth at bedtime as needed for sleep. 30 tablet 0     varenicline (CHANTIX STARTING MONTH BOX) 0.5 mg (11)- 1 mg (42) tablet one 0.5mg tablet  mouth daily  for 3 days, then one 0.5mg tablet bid for 3 days, then one 1mg tab two times a day indef 60 tablet 2     No current facility-administered medications on file prior to visit.              Coordination of Care:   More than 30 minutes spent on this visit  with more than 50% of time spent on coordination of care including: Educating patient about diagnosis, prognosis, side effects and benefits of medications, diet, exercise.  Time also spent providing supportive therapy regarding above issues.    This note was created using a dictation system. All typing errors or contextual distortion is unintentional and software inherent.  Phone call duration: 30 minutes    Beryl Moura NP

## 2021-06-16 NOTE — PATIENT INSTRUCTIONS - HE
Continue medications as prescribed  Have your pharmacy contact us for a refill if you are running low on medications (We may ask you to come into clinic to get a refill from the nurse  No Alcohol or drug use  No driving if sedated  Call the clinic with any questions or concerns   Reach out for help if you feel like hurting yourself or others ([facility] [phone]: [address] [facility], [address] or [facility] Suicide Hotline [phone] , call 911 or go to nearest Emergency room    Follow up as directed, for your appointments, per your After Visit Summary Form.

## 2021-06-16 NOTE — TELEPHONE ENCOUNTER
Pt called wondering if his appointment today was still going to happen. Pt ended up wanting to cancel the appointment today and scheduled again for April 19th at 8am. Pt is hoping for a sooner appointment if Beryl Moura can accommodate that.

## 2021-06-16 NOTE — PROGRESS NOTES
________________________________________  Medications Phoned  to Pharmacy [] yes [x]no  Name of Pharmacist:  List Medications, including dose, quantity and instructions    Medications ordered this visit were e-scribed.  Verified by order class [x] yes  [] no  Bupropion 300 mg  Trazodone 50 mg  Clonidine 0.1 mg    Medication changes or discontinuations were communicated to patient's pharmacy: [] yes  [x] no    Dictation completed at time of chart check: [x] yes  [] no    I have checked the documentation for today s encounters and the above information has been reviewed and completed.

## 2021-06-16 NOTE — TELEPHONE ENCOUNTER
Requested Prescriptions     Pending Prescriptions Disp Refills     traZODone (DESYREL) 50 MG tablet 30 tablet 0     Sig: Take 1 tablet (50 mg total) by mouth at bedtime as needed for sleep.   Per [facility] Must be 90 days for insurance TO COVER.

## 2021-06-16 NOTE — TELEPHONE ENCOUNTER
RN cannot approve Refill Request    RN can NOT refill this medication med is not covered by policy/route to provider. Last office visit: Visit date not found Last Physical: Visit date not found Last MTM visit: Visit date not found Last visit same specialty: Visit date not found.  Next visit within 3 mo: Visit date not found  Next physical within 3 mo: Visit date not found      Arabella Portillo, [facility] Triage/Med Refill 3/25/2021    Requested Prescriptions   Pending Prescriptions Disp Refills     varenicline (CHANTIX STARTING MONTH BOX) 0.5 mg (11)- 1 mg (42) tablet 60 tablet 2     Sig: one 0.5mg tablet  mouth daily  for 3 days, then one 0.5mg tablet bid for 3 days, then one 1mg tab two times a day indef       There is no refill protocol information for this order

## 2021-06-16 NOTE — PROGRESS NOTES
Mental Health Visit Note    Patient: Nikolay Lora    : 1990 MRN: [id number]    2021    Start time: 1100    Stop Time: 1200   Session # 1    Session Type: Patient is presenting for an Individual session.    Nikolay Lora is a 30 y.o. male (ong) who is being seen today for initial visit per referral from Dr. Mich Ybarra.  Patient indicated that he was recently diagnosed with ADHD  (Inattentive) by Rene De La Torre MA (Licensed Psychologist) who completed a neurological assessment on 21.  Which consisted of the Wechsler, MMPI-3, TOMAL-2 and the ADHD diagnostic criteria rating scale.  Patient indicated that he is not certain what to do next in regard to the management of his ADHD symptoms and how that impacts his anxiety and depression.  He is hoping to learn skills and obtain tools for better management of the symptoms.  And is not certain what to do now in regard to learning skills and tools for management of those symptoms.  Currently, patient works at a [employer] center 5 days/week and is having difficulty staying focused, alert and engaged in his job.  His lifelong help is to become a  although, he is uncertain if that is possible because of ongoing issues with focusing, concentration, organization and mood irregularities.      Telemedicine Visit: The patient's condition can be safely assessed and treated via synchronous audio and visual telemedicine encounter.      Reason for Telemedicine Visit: Patient has requested telehealth visit    Originating Site (Patient Location): Patient's home    Distant Site (Provider Location): Provider Remote Setting- Home Office    Consent:  The patient/guardian has verbally consented to: the potential risks and benefits of telemedicine (video visit) versus in person care; bill my insurance or make self-payment for services provided; and responsibility for payment of non-covered services.     Mode of Communication:  Video Conference via [employer]    As the provider I  attest to compliance with applicable laws and regulations related to telemedicine.    Those present for this visit patient and therapist  PHQ 9 scoring 19 indicating severe depression  LICO-7 score 19 indicating severe anxiety  C- SSRS did not indicate any past or current suicidal ideations or intentions    New symptoms or complaints: Little interest and pleasure in doing things, feeling down depressed, trouble falling asleep, feeling tired and having little energy, overeating, feeling bad about self, trouble concentrating on things, moving or speaking more slowly that other people could have noticed, feeling nervous and anxious and on edge, not being able to control worry, worrying too much about different things, trouble relaxing, being restless hard to sit still, becoming easily annoyed, afraid as if something awful might happen, difficulty focusing, difficulty following through, self-esteem issues, and guilt.    Functional Impairment:   Personal: 4  Family: 2  Work: 4  Social:4          ASSESSMENT: Current Emotional / Mental Status (status of significant symptoms):              Risk status (Self / Other harm or suicidal ideation)              Patient safety issues              Patient denies current or recent suicidal ideation or behaviors.              Patient denies current or recent homicidal ideation or behaviors.              Patient denies current or recent self injurious behavior or ideation.              Patient denies other safety concerns.              Patient denies any risk issues              Patient indicated that he has friends that are supportive and involved               Recommended that patient call 911 or go to the local ED should there be a change in any of these risk factors.                Attitude:                                   Cooperative               Orientation:                             Oriented x3              Speech                                    clear                       "    Rate / Production:       Normal/ Responsive Normal                           Volume:                       Normal               Mood:                                      Sad               Thought Content:                    Clear               Thought Form:                        Coherent  Logical               Insight:                                     Good       Patient's impression of their current status: Patient stated, \"I am just tired of making such bad decisions in my life and I need to make some changes.\"    Therapist impression of patients current state: The patient presented for an initial session with provider.  Patient is a 30-year-old male of ong culture who was referred by Dr. Mich Ybarra to engage in individual psychotherapy to address symptoms of depression, anxiety , ADHD symptoms and psychosocial stressors.  Patient appeared cooperative and open-minded throughout the intake and easily engaged in dialogue.  Therapist and patient verbally reviewed consent to privacy policy.  Patient reported understanding and provided verbal consent.  The patient has not engaged in outpatient psychotherapy services in the community so there is no diagnostic assessment on file available.  The patient completed the following questionnaires today PHQ-9, LICO-7 and C-SS RS.  No concerns about patient safety or the safety of others per assessment today.  Therapist will review patient's further history, mail out intake form and follow-up in order to complete a standard diagnostic assessment.  Goals for treatment will be established after the second or third follow-up session with this provider.    Type of psychotherapeutic technique provided: Insight oriented and Solution-focused ADHD education and support    Progress toward short term goals: Not yet established    Review of long term goals: Not yet established     Diagnosis:  ADHD (inattentive)    Plan and Follow up:   1.  Patient will track times that he is " having more difficulty concentrating and focusing and discuss that with the NP at his next visit.  2.  Patient will begin educating himself regarding the tools that can help manage his ADHD symptoms.  3.  Begin developing a therapeutic relationship  4.  Continue current medication regime per NP  5.  Next appointment on 4/19/2021    Discharge Criteria/Planning: Client has chronic symptoms and ongoing therapy for maintenance stability recommended.    I have reviewed the note as documented above.  This accurately captures the substance of my conversation with the patient.  As the provider I attest to compliance with applicable laws and regulations related to telemedicine.  Performed and documented by  Amy Montano [facility]  4/5/21

## 2021-06-17 NOTE — TELEPHONE ENCOUNTER
Date of Last Office Visit: 4/19/21  Date of Next Office Visit: 5/3/21  No shows since last visit: none  Cancellations since last visit: none    Medication requested: clonidine 0.1mg Date last ordered: 4/19/21 Qty: 30 Refills: 0     Lapse in medication adherence greater than 5 days?: no  If yes, call patient and gather details: n/a  Medication refill request verified as identical to current order?: yes  Result of Last DAM, VPA, Li+ Level, CBC, or Carbamazepine Level (at or since last visit): N/A    []Medication refilled per  Medication Refill in Ambulatory Care  policy.  [x]Medication unable to be refilled by RN due to criteria not met as indicated below:    []Eligibility - not seen in the last year   []Supervision - no future appointment   []Compliance - no shows, cancellations or lapse in therapy   []Verification - order discrepancy   []Controlled medication   []Medication not included in policy   []90-day supply request   [x]Other - requesting early

## 2021-06-17 NOTE — PROGRESS NOTES
"Nikolay Lora is a 30 y.o. male who is being evaluated via a billable telephone visit.      The patient has been notified of following:     \"This telephone visit will be conducted via a call between you and your physician/provider. We have found that certain health care needs can be provided without the need for a physical exam.  This service lets us provide the care you need with a short phone conversation.  If a prescription is necessary we can send it directly to your pharmacy.  If lab work is needed we can place an order for that and you can then stop by our lab to have the test done at a later time.    Telephone visits are billed at different rates depending on your insurance coverage. During this emergency period, for some insurers they may be billed the same as an in-person visit.  Please reach out to your insurance provider with any questions.    If during the course of the call the physician/provider feels a telephone visit is not appropriate, you will not be charged for this service.\"    Patient has given verbal consent to a Telephone visit? Yes        Patient would like to receive their AVS by AVS Preference: Mail a copy.    Psychiatric  Out patient Follow Up Progress Note  Date of visit:5/17/2021         Discussion of Care and Treatment Recommendations:   This is a 30 y.o. male with referred by his primary care provider to establish psychiatric care for medication management of possible ADD, anxiety/depression      Last visit  03/03/2021 Recommendation at last visit .  - Continue Wellbutrin X 300 mg daily - MDD/Inttentivenss,  Trazone 50 mg to pRN  Start Clonidine 0.1 mg daily - ADD   2. RTC : 2 weeks anabel in between visit with nay questions or concerns   Patient and I reviewed diagnosis and treatment plan and patient agrees with following recommendations:  Ongoing education given regarding diagnostic and treatment options with adequate verbalization of understanding.  Plan   1- Continue Wellbutrin X 300 mg " daily - MDD/Inttentivenss,  Trazodone 50 mg to pRN   Clonidine 0.2 mg daily - ADD   Start  Strattera 40 mg  for 3 days then increase dose to 40 two times a day - ADD  2. RTC : 4 weeks anabel in between visit with any  questions or concerns          DIagnoses:      ADHD-inattentive type   Persistent Depressive Disorder with Anxious Distress   Features of Schizotypal Personality Disorder       Patient Active Problem List   Diagnosis     Allergic rhinitis     Chronic dermatitis     Hemorrhoids     Pityriasis versicolor     Pruritus ani     Verruca vulgaris     Mood disorder (H)             Chief Complaint / Subjective:    Chief complaint: Fatigue , inattentiveness     History of Present Illness:   Per patient's statement : Continues to endorse fatigue and inattentiveness.still smoking and using coffee throughout the day.  Stated that he stopped using his allergy medications in the morning and has switched to bedtime.  Utilizing trazodone only as needed at bedtime.  Currently on Wellbutrin 300 mg and clonidine 0.2 mg to target inattentiveness.  In addition today we discussed adding Strattera at 40 mg daily x3 days and then increasing the dose to 80 mg to target inattentiveness.  We discussed that medication may take time to taking therefore patient should continue utilizing nonpharmacological strategies including psychotherapy and also work on weaning off smoking as it may also be triggering inattentiveness and poor focus.  Patient also endorses some anxiety -we discussed potentially medications with ADD may induce some anxiety.  I have asked potential benefits and side effects profile of Strattera were discussed in detail patient endorsed understanding.  We also discussed that potentially would need to wean him off clonidine but would have him continue taking Wellbutrin to target depression and smoking cessation.  Mental Status Examination:     Appearance: unable to assess  Orientation: Patient alert and oriented to  "person, place, time, and situation  Reliability:  Patient appears to be an adequate historian.    Behavior: unable to assess  Speech: Speech is spontaneous and coherent, with a normal rate, rhythm and tone.    Language:There are no difficulties with expressive or receptive language as observed throughout the interview.    Mood: Described as \"ok\".    Affect: unable to assess  Judgement: Able to make basic decision regarding safety.  Insight: Good awareness of physical and mental health conditions and aware of needs around care for these.  Gait and station: unable to assess  Thought process: Logical   Hallucinations : No evidence of any hallucination  Thought content: No evidence of delusions or paranoia.   Suicidal /Homical Ideations:  No thoughts of self harm or suicide. No thoughts of harming others.  Associations: Connected  Fund of knowledge: Average  Attention / Concentration: Able to remain focused during the interview with minimal distractibility or need for redirection.  Short Term Memory: Grossly intact as evidence by client recalling themes and ideas discussed.  Long Term Memory: Intact  Motor Status: unable to asse      Drug/treatment history and current pattern of use:   Cigarettes : Smoking more frequently more than 5 cigarettes/day   Hx of alcohol abuse - 2 years ago   Alcohol : once every other week - last alcoholic drink - 1  week  Denies use any other mood altering substances     Medication changes: See Above   Medication adherence: compliant  Medication side effects: absent  Information about medications: Side effects, benefits and alternative treatments discussed and patient agrees .    Psychotherapy: Supportive therapy day-to-day living    Education: Diet, exercise, abstinence from drugs and alcohol, patient will not drive if sedated and medications or  under influence of any substance    Lab Results:   Personally reviewed and discussed with the patient    Lab Results   Component Value Date    WBC " 9.5 02/02/2017    HGB 15.9 02/02/2017    HCT 47.0 02/02/2017     02/02/2017    CHOL 263 (H) 03/10/2021    HDL 76 03/10/2021    TSH 2.00 02/02/2017       Vital signs:  There were no vitals taken for this visit.  Unable to assess telephone visit  Allergies: Patient has no known allergies.           Review of Systems:      ROS:  Subjective data only - Tele-Health  Visit   10 point ROS was negative except for the items listed in HPI-              Medications:     Current Outpatient Medications on File Prior to Visit   Medication Sig Dispense Refill     buPROPion 450 mg Tb24 Take 450 mg by mouth every morning. 300 tablet 0     cetirizine (ZYRTEC) 10 MG tablet Take 1 tablet (10 mg total) by mouth daily. 30 tablet 2     cloNIDine HCL (CATAPRES) 0.1 MG tablet TAKE 1 TABLET (0.1 MG TOTAL) BY MOUTH DAILY. 30 tablet 0     finasteride (PROSCAR) 5 mg tablet TAKE 1/4 TABLET BY MOUTH EVERY DAY 23 tablet 1     traZODone (DESYREL) 50 MG tablet Take 1 tablet (50 mg total) by mouth at bedtime as needed for sleep. 30 tablet 0     varenicline (CHANTIX STARTING MONTH BOX) 0.5 mg (11)- 1 mg (42) tablet one 0.5mg tablet  mouth daily  for 3 days, then one 0.5mg tablet bid for 3 days, then one 1mg tab two times a day indef 60 tablet 2     No current facility-administered medications on file prior to visit.                Coordination of Care:   More than 30 minutes spent on this visit  with more than 50% of time spent on coordination of care including: Educating patient about diagnosis, prognosis, side effects and benefits of medications, diet, exercise.  Time also spent providing supportive therapy regarding above issues.    This note was created using a dictation system. All typing errors or contextual distortion is unintentional and software inherent.  Start Time : 12: 30 pm  End time : 1: 15 pm        Beryl Moura NP

## 2021-06-17 NOTE — PROGRESS NOTES
Outpatient Mental Health Psychotherapy Treatment Plan    Name:  Nikolay Lora  :  1990  MRN:  [id number]    Treatment Plan:  Initial Treatment Plan  Date Treatment Plan Initiated:  21  Treatment Plan: Updated Treatment Plan R  Intake/initial treatment plan date: 21  Benefit and risks and alternatives have been discussed: yes    Plan:      ? Depression    Goal:     Alleviate depressed mood and develop healthy cognitive patterns and beliefs about self in the world that led to the depressive symptoms.   Strategies:    ?[x] Decrease social isolation     [x] Increase involvement in meaningful activities     ?[] Discuss sleep hygiene     ?[x] Explore thoughts and expectations about self and others     ?[x] Process grief (loss of significant person, independence, role,  etc.)     ?[x] Assess for suicide risk     ?[x] Implement physical activity routine (with physician approval)     [x] Consider introduction of bibliotherapy and/or videos     [x] Continue compliance with medical treatment plan (or explore barriers)    Degree to which this is a problem (1-4): 4  Degree to which goal is met (1-4)1  Date of Review:21     ?  Cognitive impairments  Goal:  Increase use of compensatory strategies from letter to better manage ADHD symptoms.  Strategies: ? [x]  Continue current medication regime      ?[x]  Explore barriers to use of compensatory strategies     ? [x] consider obtaining accommodations from employer     ? [x] Explore factors which may exacerbate cognitive difficulties      ?     Education regarding the management of ADHD symptomology and the comorbidity of anxiety and                                                         depression    Degree to which this is a problem (1-4): 4  Degree to which goal is met (1-4)1  Date of Review:21    PHQ-9 scoring 19 indicating moderately severe depression  LICO-7 scoring 19 indicating moderately severe anxiety   ?        Functional Impairment: 1=Not at  all/Rarely  2=Some days  3=Most Days  4=Every Day   Personal: 4  Family: 1  Social: 3  Work: 4    Diagnosis:  ADHD inattentive  Mood disorder  (H)    Strengths: Strong work ethic  Limitations: Lack of support  Cultural Considerations: Stigma of any mental health issues  Family involvement no, per patient request    Anticipated intensity of services:  Every 3-4 weeks    Estimated duration of treatment:  4-6 months  Necessity for frequency: This frequency is needed to establish therapeutic goals and for continuity of care in order to monitor progress.   Necessity for treatment: To address cognitive, behavioral, and/or emotional barriers in order to work toward goals and to improve quality of life.   Is this treatment appropriate with minimal intrusion/restrictions:     Persons responsible for this plan:  ? [x] Patient ? [] Provider ? [] Other: __________________      Provider: Amy Montano LICSW  Date:  5/19/2021

## 2021-06-17 NOTE — PROGRESS NOTES
[facility]     STANDARD ADULT PSYCHOTHERAPY DIAGNOSTIC ASSESSMENT          Evaluator Name:  Amy Montano LICSW    PATIENT'S NAME: Nikolay Lora    Completed 2 point identification verification: Patient verbally provided full name and date of birth.    PREFERRED NAME: Nikolay  PREFERRED PRONOUNS:       MRN:   [id number]  :   1990   ACCT. NUMBER: [id number]  DATE OF SERVICE: 2021  START TIME: 0800  END TIME: 0852  PREFERRED PHONE: [phone]  May we leave a program related message: Yes    Telemedicine Visit: The patient's condition can be safely assessed and treated via synchronous audio and visual telemedicine encounter.     Reason for Telemedicine Visit: Patient has requested telehealth visit    Originating Site (Patient Location): Patient's home    Distant Site (Provider Location): Provider Remote Setting- Home Office    Consent:  The patient/guardian has verbally consented to: the potential risks and benefits of telemedicine (video visit) versus in person care; bill my insurance or make self-payment for services provided; and responsibility for payment of non-covered services.     Mode of Communication:  Video Conference via [employer]    As the provider I attest to compliance with applicable laws and regulations related to telemedicine.    Identifying Information:  Patient is a 30 y.o., Hmong.  The pronoun use throughout this assessment reflects the patient's chosen pronoun.  Patient was referred for an assessment by primary care provider.  Patient attended the session alone.     Chief Complaint:   Nikolay Lora is a 30 y.o. male (Hmong) who is being seen today for initial visit per referral from Dr. Mich Ybarra.  Patient indicated that he was recently diagnosed with ADHD  (Inattentive) by Rene De La Torre MA (Licensed Psychologist) who completed a neurological assessment on 21.  Which consisted of the Wechsler, MMPI-3, TOMAL-2 and the ADHD diagnostic criteria rating scale.  Patient  indicated that he is not certain what to do next in regard to the management of his ADHD symptoms and how that impacts his anxiety and depression.  He is hoping to learn skills and obtain tools for better management of the symptoms.  And is not certain what to do now in regard to learning skills and tools for management of those symptoms.  Currently, patient works at a [employer] center 5 days/week and is having difficulty staying focused, alert and engaged in his job.  His lifelong help is to become a  although, he is uncertain if that is possible because of ongoing issues with focusing, concentration, organization and mood irregularities.  Patient reports fatigue, indicated he has a hard time processing information difficulty with follow-through and concentration.      Does the client have any condition that is currently presenting as a potential to harm themselves or others (severe withdrawal, serious medical condition, severe emotional/behavioral problem)? No.  Proceed with assessment.    New symptoms or complaints: Little interest and pleasure in doing things, feeling down depressed, trouble falling asleep, feeling tired and having little energy, overeating, feeling bad about self, trouble concentrating on things, moving or speaking more slowly that other people could have noticed, feeling nervous and anxious and on edge, not being able to control worry, worrying too much about different things, trouble relaxing, being restless hard to sit still, becoming easily annoyed, afraid as if something awful might happen, difficulty focusing, difficulty following through, self-esteem issues, and guilt.     Social/Family History:  Patient reported he grew up in a refugee camp in [place].  Parents had 10 children.  Patient came to the [place] when he was approximately 4 to 5 years old.  Patient currently lives with mother and 2 siblings.  Patient's split up a few years ago.       Cultural influences and impact on  patient's life structure, values, norms, and healthcare: Patient indicated that in his culture mental health issues were not readily endorsed or supported. It is a common practice to seek assistance for physical complaints rather than for mental health issues. Contextual influences on patient's health include: These factors will be addressed in the Preliminary Treatment plan.  Patient identified their preferred language to be English, Hmong. Patient reported they does not need the assistance of an  or other support involved in therapy.     Patient reported had no significant delays in developmental tasks.   Patient's highest education level was some college. Patient identified the following learning problems: attention and concentration.  Modifications will not be used to assist communication in therapy. Patient reports they are  able to understand written materials.    Patient reported the following relationship history   Patient's current relationship status is single.   Patient identified their sexual orientation as heterosexual.  Patient reported having zero child(maria fernanda).     They live with mother and siblings and they report that housing is stable. Patient identified mother as part of their support system.  Patient identified the quality of these relationships as fair.      Patient is currently employed full time and reports they are not able to function appropriately at work..  Patient reports their finances are obtained through employment .  Patient does identify finances as a current stressor.      Patient reported that they have not been involved with the legal system.    PHQ 9 scoring 19 indicating severe depression  LICO-7 score 19 indicating severe anxiety  C- SSRS did not indicate any past or current suicidal ideations or intentions     Functional Impairment:   Personal: 4  Family: 2  Work: 4  Social:4       Patient's Strengths and Limitations:  Patient identified the following strengths or  resources that will help them succeed in treatment: family support and work ethic. Things that may interfere with the patient's success in treatment include: lack of social support.   _______________________________________________  Personal and Family Medical History:   Patient does not report a family history of mental health concerns.  Patient reports family history is not on file..    Patient does report Mental Health Diagnosis and/or Treatment.  Patient Patient reported the following previous diagnoses which include(s): ADHD and Depression.  Patient reported symptoms began as far back as he can remember.   Patient was recently evaluated by Alberto De La Torre MA PhD  [facility].  Patient has not had any psychiatric hospitalizations.    Patient has not had a physical exam to rule out medical causes for current symptoms.  Date of last physical exam was within the past year. Symptoms have developed since last physical exam and client was encouraged to follow up with PCP.. The patient has a non-[facility] Primary Care Provider. Their PCP is Dr. Mich Ybarra.  Patient denies any issues with pain. Patient does not report a history of head injury / trauma / cognitive impairment.      Patient reports current meds as:   Current Outpatient Medications   Medication Sig Dispense Refill     buPROPion 450 mg Tb24 Take 450 mg by mouth every morning. 300 tablet 0     cetirizine (ZYRTEC) 10 MG tablet Take 1 tablet (10 mg total) by mouth daily. 30 tablet 2     cloNIDine HCL (CATAPRES) 0.1 MG tablet TAKE 1 TABLET (0.1 MG TOTAL) BY MOUTH DAILY. 30 tablet 0     finasteride (PROSCAR) 5 mg tablet TAKE 1/4 TABLET BY MOUTH EVERY DAY 23 tablet 1     traZODone (DESYREL) 50 MG tablet Take 1 tablet (50 mg total) by mouth at bedtime as needed for sleep. 30 tablet 0     varenicline (CHANTIX STARTING MONTH BOX) 0.5 mg (11)- 1 mg (42) tablet one 0.5mg tablet  mouth daily  for 3 days, then one 0.5mg tablet bid for 3 days, then  one 1mg tab two times a day indef 60 tablet 2     No current facility-administered medications for this visit.        Medication Adherence:  Patient reports taking prescribed medications as prescribed.    Patient Allergies:  No Known Allergies    Medical History:  No past medical history on file.      Current Mental Status Exam:   Appearance:  Appropriate    Eye Contact:  Good   Psychomotor:  Normal       Gait / station:  no problem  Attitude / Demeanor: Cooperative   Speech      Rate / Production: Normal/ Responsive      Volume:  Soft  volume      Language:  English  Mood:   Depressed   Affect:   Flat    Thought Content: Clear   Thought Process: Coherent       Associations: No loosening of associations  Insight:   Good   Judgment:  Intact   Orientation:  All  Attention/concentration: Good    Rating Scales:    PHQ9:    PHQ-9 SCORE 12/23/2020 2/17/2021 4/5/2021   PHQ-9 Total Score 22 20 19   ;    GAD7:    LICO-7 Total Score 12/23/2020 2/17/2021 4/5/2021   LICO-7 Total Score 14 19 19     CGI:     First:Considering your total clinical experience with this particular patient population, how severe are the patient's symptoms at this time?: 5 - Markedly ill (4/19/2021  9:00 PM)  ;    Most recent:Compared to the patient's condition at the START of treatment, this patient's condition is:: 5 - Minimally worse (4/19/2021  9:00 PM)      Substance Use:  No substance use    Significant Losses / Trauma / Abuse / Neglect Issues:   Patient did not serve in the .  There are indications or report of significant loss, trauma, abuse or neglect issues related to: Resided in a refugee camp  Concerns for possible neglect are not present.     Safety Assessment:   Current Safety Concerns:  [place] Suicide Severity Rating Scale (Lifetime/Recent)  [place] Suicide Severity Rating (Lifetime/Recent) 4/5/2021   1. Wish to be Dead (Lifetime) No   1. Wish to be Dead (Past Month) No   2. Non-Specific Active Suicidal Thoughts (Lifetime) No    2. Non-Specific Active Suicidal Thoughts (Past Month) No   3. Active Suicidal Ideation with any Methods (Not Plan) Without Intent to Act (Lifetime) No   3. Active Sucidal Ideation with any Methods (Not Plan) Without Intent to Act (Past Month) No   4. Active Suicidal Ideation with Some Intent to Act, Without Specific Plan (Lifetime) No   4. Active Suicidal Ideation with Some Intent to Act, Without Specific Plan (Past Month) No   5. Active Suicidal Ideation with Specific Plan and Intent (Lifetime) No   5. Active Suicidal Ideation with Specific Plan and Intent (Past Month) No     Patient denies current homicidal ideation and behaviors.  Patient denies current self-injurious ideation and behaviors.    Patient denied risk behaviors associated with substance use.  Patient denies any high risk behaviors associated with mental health symptoms.  Patient reports the following current concerns for their personal safety: None.  Patient reports there are firearms in the house. None     History of Safety Concerns:  Patient denied a history of homicidal ideation.    Patient denied a history of personal safety concerns.    Patient denied a history of assaultive behaviors.   Patient denied a history of assaultive behaviors.     Patient denied a history of risk behaviors associated with substance use.  Patient denies any history of high risk behaviors associated with mental health symptoms.  Patient reports the following protective factors: Family and friends    Risk Plan:  See Preliminary Treatment Plan for Safety and Risk Management Plan    Review of Symptoms per patient report:  Depression: Change in sleep, Change in energy level, Difficulties concentrating, Change in appetite, Feelings of hopelessness, Irritability, Feeling sad, down, or depressed and Withdrawn  Eduarda:  No Symptoms  Psychosis: No Symptoms  Anxiety: Excessive worry, Social anxiety, Sleep disturbance, Ruminations, Poor concentration and Irritability  Panic:  No  symptoms  Post Traumatic Stress Disorder:  Experienced traumatic event Residing in a refugee camp   Eating Disorder: Binging  ADD / ADHD:  Inattentive, Difficulties listening, Poor task completion, Poor organizational skills, Distractibility, Forgetful, Interrupts, Impulsive and Restlessness/fidgety  Conduct Disorder: No symptoms  Autism Spectrum Disorder: No symptoms  Obsessive Compulsive Disorder: No Symptoms    Patient reports the following compulsive behaviors and treatment history: Gambling - has not had treatment..    No longer gambles    Functional Status:  Patient reports the following functional impairments: academic performance, money management, social interactions and work / vocational responsibilities.     WHODAS: No flowsheet data found.    Clinical Summary:   Nikolay Lora is a 30 y.o. male (Hmong) who is being seen today for initial visit per referral from Dr. Mich Ybarra.  Patient indicated that he was recently diagnosed with ADHD  (Inattentive) by Rene De La Torre MA (Licensed Psychologist) who completed a neurological assessment on 2-22-21.  Which consisted of the Wechsler, MMPI-3, TOMAL-2 and the ADHD diagnostic criteria rating scale.  Patient indicated that he is not certain what to do next in regard to the management of his ADHD symptoms and how that impacts his anxiety and depression.  He is hoping to learn skills and obtain tools for better management of the symptoms.  And is not certain what to do now in regard to learning skills and tools for management of those symptoms.  Currently, patient works at a [employer] center 5 days/week and is having difficulty staying focused, alert and engaged in his job.  His lifelong help is to become a  although, he is uncertain if that is possible because of ongoing issues with focusing, concentration, organization and mood irregularities.  Patient reports fatigue, indicated he has a hard time processing information difficulty with follow-through and  concentration.      New symptoms or complaints: Little interest and pleasure in doing things, feeling down depressed, trouble falling asleep, feeling tired and having little energy, overeating, feeling bad about self, trouble concentrating on things, moving or speaking more slowly that other people could have noticed, feeling nervous and anxious and on edge, not being able to control worry, worrying too much about different things, trouble relaxing, being restless hard to sit still, becoming easily annoyed, afraid as if something awful might happen, difficulty focusing, difficulty following through, self-esteem issues, and guilt.     Patient continues to have medications managed by Beryl Moura CNP.  Patient is hoping to gain skills in order to manage ADHD symptoms anxiety and depression.  He continues to experience difficulty focusing, concentrating follow-through, difficulty sitting still for long periods of time and focusing. Patient plans on discussing these continued concerns with Beryl Moura CNP as well as, with his PCP.  Patient also willing to consider obtaining accommodations through his employer in order for him to be more efficient and successful at his job.     Diagnosis  1.  ADHD (inattentive)  2.  Mood disorder    Recommendations:     1. Plan for Safety and Risk Management:Recommended that patient call 911 or go to the local ED should there be a change in any of these risk factors..  Report to child / adult protection services was NA.     2. Patient's identified mehul / Orthodoxy / spiritual influences will be incorporated into care by patient  and ethnic concerns will be incorporated into care by Therapist.     3. Initial Treatment will focus on: ADHD symptomology, accommodations, anxiety and depression     4.  ADHD education and symptom management    5. Resources/Service Plan:       services are not indicated.     Modifications to assist communication are not indicated.     Additional  disability accommodations are not indicated.      6. Collaboration / coordination of treatment will be initiated with the following support professionals: primary care physician.      7. Records were reviewed at time of assessment.       Information in this assessment was obtained from the medical record and provided by patient who is a good historian.   Patient will have open access to their mental health medical record..    8. Next Scheduled Appointment: 5/19/21        Eval type:  Mental Health    Staff Name/Credentials:  Amy JANSEN 5/5/2021

## 2021-06-17 NOTE — PROGRESS NOTES
Mental Health Visit Note    Patient: Nikolay Lora    : 1990 MRN: [id number]    2021    Start time: 0800    Stop Time: 08   Session # 4    Session Type: Patient is presenting for an Individual session.    Session Type: Patient is presenting for an Individual session.     Nikolay Lora is a 30 y.o. male (Hmong) who is being seen for a Psychotherapy follow-up visit..  Patient indicated that he is not certain what to do next in regard to the management of his ADHD symptoms and how that impacts his anxiety and depression.  He is hoping to learn skills and obtain tools for better management of the symptoms.  And is not certain what to do now in regard to learning skills and tools for management of those symptoms.  Currently, patient works at a [employer] center 5 days/week and is having difficulty staying focused, alert and engaged in his job.  His lifelong help is to become a  although, he is uncertain if that is possible because of ongoing issues with focusing, concentration, organization and mood irregularities.     Telemedicine Visit: The patient's condition can be safely assessed and treated via synchronous audio and visual telemedicine encounter.       Reason for Telemedicine Visit: Patient has requested telehealth visit     Originating Site (Patient Location): Patient's home     Distant Site (Provider Location): Provider Remote Setting- Home Office     Consent:  The patient/guardian has verbally consented to: the potential risks and benefits of telemedicine (video visit) versus in person care; bill my insurance or make self-payment for services provided; and responsibility for payment of non-covered services.      Mode of Communication:  Video Conference via [employer]     As the provider I attest to compliance with applicable laws and regulations related to telemedicine.     Those present for this visit (Patient and Therapist)     Follow up  Patient indicated that he has started Strattera this week per  "NP.  He reports taking his Wellbutrin clonidine and allergy medication at night.  Continues to smoke cigarettes and drink 3 to 4 cups of coffee per day although, he reports he is trying to decrease both of those habits.  Patient indicated he is feeling frustrated but knows is going to take a bit to get things figured out.    New symptoms or complaints:  Feeling nauseous, fatigue, difficulty staying focused.     Functional Impairment:   Personal: 4  Family: 2  Work: 4  Social:4           ASSESSMENT: Current Emotional / Mental Status (status of significant symptoms):              Risk status (Self / Other harm or suicidal ideation)              Patient any personal safety issues              Patient denies current or recent suicidal ideation or behaviors.              Patient denies current or recent homicidal ideation or behaviors.              Patient denies current or recent self injurious behavior or ideation.              Patient denies other safety concerns.              Patient denies any risk issues              Patient indicated family is concerned involved              Recommended that patient call 911 or go to the local ED should there be a change in any of these risk factors.                 Attitude:                                   Cooperative               Orientation:                             Oriented x3              Speech                                    Clear              Rate / Production:                   Slow  Normal               Volume:                                    Soft               Mood:                                       Sad               Thought Content:                     Clear               Thought Form:                         Coherent  Logical               Insight:                                      Good               Affect                                        Flat     Patient's impression of their current status: Patient stated, \"I am just feeling so frustrated " "upon trying to stay hopeful     Therapist impression of patients current state: During today's visit we discussed those circumstances and ways in which he manage and/or improve his symptoms in order to function at the level he wants to achieve of his current job. He denies any suicidal thoughts or ideations.  Indicated that he is feeling nauseous after taking the Strattera but was told these are common side effects at least initially.  He has not yet contacted his employer for any accommodation request at this time.  Patient stated, \"I don't want them to think that I'm not willing to work hard because I am.\"  Discussed the concept of accommodations as it relates to job performance.  In regard to fatigue diet (high-protein) and exercise were encouraged as well as, decreasing cigarette and caffeine consumption.  Patient will consider contacting his PCP regarding any other metabolic issues that are contributing to his fatigue and/or ongoing depression.     Type of psychotherapeutic technique provided: Insight oriented and Solution-focused ADHD education     Progress toward short term goals: Not yet established     Review of long term goals: Not yet established      Diagnosis:  1.  ADHD inattentive  2.  Mood disorder (H)     Plan and Follow up:   1.    Patient will consider contacting his employer regarding accommodation request  2.    Patient will attempt to decrease and/or eliminate smoking and decrease caffeine level.  3.    Patient will contact PCP to discuss his ongoing fatigue and whether or not this is related to any         metabolic reasons  ie. Thyroid, Vit. D.   4.   Patient will begin educate himself regarding the tools that can be helpful in managing his ADHD symptoms  5.   Develop therapeutic relationship  6.  Patient will continue current medication regime per NP  7.  Next appointment on  6/24/21.     Discharge Criteria/Planning: Patient will continue with follow-up until therapy can be discontinued " without return of signs and symptoms.       I have reviewed the note as documented above.  This accurately captures the substance of my conversation with the patient.  As the provider I attest to compliance with applicable laws and regulations related to telemedicine    Amy Montano [facility]  5/19/21

## 2021-06-17 NOTE — TELEPHONE ENCOUNTER
RF request for Clonidine 0.2 mg every day , 90 day supply received from [facility] via fax.    Reviewed EMR.   Last evaluated on 4/19/21 and his dose was increased to 0.2 mg daily. On 4/26/21Clonidine was filled again but for only 0.1 mg every day.   Please review and correct if appropriate.

## 2021-06-17 NOTE — PATIENT INSTRUCTIONS - HE
Continue medications as prescribed    1- Continue Wellbutrin X 300 mg daily - MDD/Inttentivenss,  Trazodone 50 mg to pRN   Clonidine 0.2 mg daily - ADD   Start  Strattera 40 mg  for 3 days then increase dose to 40 two times a day - ADD  2. RTC : 4 weeks anabel in between visit with any  questions or concerns    Have your pharmacy contact us for a refill if you are running low on medications (We may ask you to come into clinic to get a refill from the nurse  No Alcohol or drug use  No driving if sedated  Call the clinic with any questions or concerns   Reach out for help if you feel like hurting yourself or others ([facility] [phone]: [address] [facility], [address] or [facility] Suicide Hotline [phone] , call 911 or go to nearest Emergency room    Follow up as directed, for your appointments, per your After Visit Summary Form.

## 2021-06-20 NOTE — LETTER
Letter by Mich Ybarra MD at      Author: Mich Ybarra MD Service: -- Author Type: --    Filed:  Encounter Date: 8/13/2020 Status: (Other)         Nikolay Lora  [address] [place] [address]             August 13, 2020         Dear Mr. Lora,    Below are the results from your recent visit:    Resulted Orders   Glucose   Result Value Ref Range    Glucose 90 74 - 125 mg/dL    Patient Fasting > 8hrs? No     Narrative    Fasting Glucose reference range is 70-99 mg/dL per  American Diabetes Association (ADA) guidelines.   Cholesterol, Total   Result Value Ref Range    Cholesterol 207 (H) <=199 mg/dL   HDL Cholesterol   Result Value Ref Range    HDL Cholesterol 61 >=40 mg/dL    Patient Fasting > 8hrs? No        Link, the results of your recent blood test are normal.     .  Your total cholesterol is high but the fraction of that that is good cholesterol is also very high.    Please call with questions or contact us using [employer].    Sincerely,        Electronically signed by Mich Ybarra MD

## 2021-06-21 NOTE — LETTER
Letter by Mich Ybarra MD at      Author: Mich Ybarra MD Service: -- Author Type: --    Filed:  Encounter Date: 1/14/2021 Status: (Other)       1/14/2021  Nikolay Lora      To Whom It May Concern,    Nikolay Lora  who is an established patient at our clinic suffers from overactive bladder and daytime frequency of urination.  He should have access and be allowed to use the bathroom at [facility] during the day at work.    Feel free to contact me with questions.    Sincerely,          Mich Ybarra MD

## 2021-06-21 NOTE — LETTER
Letter by Marlen Bonilla PA-C at      Author: Marlen Bonilla PA-C Service: -- Author Type: --    Filed:  Encounter Date: 3/10/2021 Status: (Other)         March 10, 2021     Patient: Nikolay Lora   YOB: 1990   Date of Visit: 3/10/2021       To Whom it May Concern:    Nikolay Lora was seen in my clinic on 3/10/2021.  Please excuse him from work on 3/9/21 and 3/10/21.    If you have any questions or concerns, please don't hesitate to call.    Sincerely,         Electronically signed by Marlen Bonilla PA-C

## 2021-06-25 ENCOUNTER — AMBULATORY - HEALTHEAST (OUTPATIENT)
Dept: NURSING | Facility: CLINIC | Age: 31
End: 2021-06-25

## 2021-06-25 NOTE — TELEPHONE ENCOUNTER
Date of Last Office Visit: 5/17/2021  Date of Next Office Visit: 6/14/2021  No shows since last visit: none  Cancellations since last visit: none    Medication requested: Atomoxetine 40 mg capsule Date last ordered: 5/17/2021 Qty: 60 Refills: 0     Review of MN ?: n/a    Lapse in medication adherence greater than 5 days?: no  If yes, call patient and gather details: no  Medication refill request verified as identical to current order?: yes  Result of Last DAM, VPA, Li+ Level, CBC, or Carbamazepine Level (at or since last visit): N/A    []Medication refilled per  Medication Refill in Ambulatory Care  policy.  [x]Medication unable to be refilled by RN due to criteria not met as indicated below:    []Eligibility - not seen in the last year   []Supervision - no future appointment   []Compliance - no shows, cancellations or lapse in therapy   []Verification - order discrepancy   []Controlled medication   [x]Medication not included in policy   []90-day supply request   []Other

## 2021-06-25 NOTE — TELEPHONE ENCOUNTER
Date of Last Office Visit: 6/14/2021  Date of Next Office Visit: 7/8/2021  No shows since last visit: none  Cancellations since last visit: none    Medication requested: Clonidine 0.1 mg tablet Date last ordered: 4/26/2021 Qty: 30 Refills: 0     Review of [place] ?: no  info found    Lapse in medication adherence greater than 5 days?: no  If yes, call patient and gather details: no  Medication refill request verified as identical to current order?: yes  Result of Last DAM, VPA, Li+ Level, CBC, or Carbamazepine Level (at or since last visit): N/A    []Medication refilled per  Medication Refill in Ambulatory Care  policy.  [x]Medication unable to be refilled by RN due to criteria not met as indicated below:    []Eligibility - not seen in the last year   []Supervision - no future appointment   []Compliance - no shows, cancellations or lapse in therapy   []Verification - order discrepancy   [x]Controlled medication   [x]Medication not included in policy   []90-day supply request   []Other

## 2021-06-26 ENCOUNTER — COMMUNICATION - HEALTHEAST (OUTPATIENT)
Dept: BEHAVIORAL HEALTH | Facility: CLINIC | Age: 31
End: 2021-06-26

## 2021-06-26 DIAGNOSIS — R41.840 INATTENTION: ICD-10-CM

## 2021-06-26 NOTE — PROGRESS NOTES
Nikolay Lora is a 30 y.o. male who is being evaluated via a billable video visit.      How would you like to obtain your AVS? Mail a copy.  If dropped from the video visit, the video invitation should be resent by: Send to e-mail at: [email or web address]  Will anyone else be joining your video visit? No        .  Psychiatric  Out- Patient  Follow Up Progress Note  Date of visit:6/14/2021           Discussion of Care and Treatment Recommendations:   This is a 30 y.o. male with his primary care provider to establish psychiatric care for medication management of possible ADD, anxiety/depression      Last visit 05/17/2021  Recommendation at last visit .  1- Continue Wellbutrin X 300 mg daily - MDD/Inttentivenss,  Trazodone 50 mg to pRN   Clonidine 0.2 mg daily - ADD   Start  Strattera 40 mg  for 3 days then increase dose to 40 two times a day - ADD  2. RTC : 4 weeks anabel in between visit with any  questions or concerns   Patient and I reviewed diagnosis and treatment plan and patient agrees with following recommendations:  Ongoing education given regarding diagnostic and treatment options with adequate verbalization of understanding.  Plan   1- Continue Wellbutrin X 300 mg daily - MDD/Inttentivenss,  Trazodone 50 mg to pRN   Clonidine 0.2 mg daily - ADD   Start  Strattera 40 mg  for 3 days then increase dose to 40 two times a day - ADD  2. RTC : 4 weeks anabel in between visit with any  questions or concerns   5- Excessive day time  Fatigue/sleepiness - Labs - Vit D , TSH , ambulatory referral to sleep medicine           DIagnoses:     ADHD-inattentive type   Persistent Depressive Disorder with Anxious Distress   Features of Schizotypal Personality Disorder     Patient Active Problem List   Diagnosis     Allergic rhinitis     Chronic dermatitis     Hemorrhoids     Pityriasis versicolor     Pruritus ani     Verruca vulgaris     Mood disorder (H)             Chief Complaint / Subjective:    Chief complaint: Extreme fatigue,  "day time sleepiness     History of Present Illness:     Patient statement-today he is complaining of extreme fatigue and sleepiness during the day.  Currently on a regimen of clonidine Wellbutrin and Strattera for ADD.  He reports being unable to focus at his job due to the extreme fatigue and sleepiness during the day.  We did discuss checking vitamin D and TSH levels as deficiency in either work could result into extreme fatigue during the day.  We also discussed a referral to sleep medicine to help us rule out whether he has narcolepsy or with any underlying sleep disorder going on.  He is agreeable to both recommendations aforementioned above.  He denies side effects from current medications.  At this point I will hold off making any medication adjustments pending sleep study and lab results.  I did call patient later and informed him of the referral to sleep medicine.  Patient is agreeable to this plan of care.  He will return to the clinic in approximately 4 weeks hopefully by this time a sleep study will have taken place.    Mental Status Examination:   Appearance: unable to assess  Orientation: Patient alert and oriented to person, place, time, and situation  Reliability:  Patient appears to be an adequate historian.    Behavior: unable to assess  Speech: Speech is spontaneous and coherent, with a normal rate, rhythm and tone.    Language:There are no difficulties with expressive or receptive language as observed throughout the interview.    Mood: Described as \"ok\".    Affect: unable to assess  Judgement: Able to make basic decision regarding safety.  Insight: Good awareness of physical and mental health conditions and aware of needs around care for these.  Gait and station: unable to assess  Thought process: Logical   Thought content: No evidence of delusions or paranoia.    Hallucinations : No evidence of any hallucination  Thought content: No evidence of delusions or paranoia.   Suicidal /Homical " Ideations:  No thoughts of self harm or suicide. No thoughts of harming others.  Associations: Connected  Fund of knowledge: Average  Attention / Concentration: Able to remain focused during the interview with minimal distractibility or need for redirection.  Short Term Memory: Grossly intact as evidence by client recalling themes and ideas discussed.  Long Term Memory: Intact  Motor Status: unable to asse    Drug/treatment history and current pattern of use:   Cigarettes : Smoking more frequently more than 5 cigarettes/day   Hx of alcohol abuse - 2 years ago   Alcohol : once every other week - last alcoholic drink - 1  week  Denies use any other mood altering substances     Medication changes: See Above   Medication adherence: compliant  Medication side effects: absent  Information about medications: Side effects, benefits and alternative treatments discussed and patient agrees .    Psychotherapy: Supportive therapy day-to-day living    Education: Diet, exercise, abstinence from drugs and alcohol, patient will not drive if sedated and medications or  under influence of any substance    Lab Results:   Personally reviewed and discussed with the patient    Lab Results   Component Value Date    WBC 9.5 02/02/2017    HGB 15.9 02/02/2017    HCT 47.0 02/02/2017     02/02/2017    CHOL 263 (H) 03/10/2021    HDL 76 03/10/2021    TSH 2.00 02/02/2017       Vital signs:  There were no vitals taken for this visit.  Telemedicine visit-no vital signs completed  Allergies: Patient has no known allergies.         Medications:     Current Outpatient Medications on File Prior to Visit   Medication Sig Dispense Refill     atomoxetine (STRATTERA) 40 MG capsule TAKE 1 CAPSULE BY MOUTH DAILT FOR 3 DAYS. THEN INCREASE TO 1 CAPSULE TWICE A DAY 60 capsule 0     buPROPion 450 mg Tb24 Take 450 mg by mouth every morning. 300 tablet 0     cloNIDine HCL (CATAPRES) 0.1 MG tablet TAKE 1 TABLET (0.1 MG TOTAL) BY MOUTH DAILY. 30 tablet 0      finasteride (PROSCAR) 5 mg tablet TAKE 1/4 TABLET BY MOUTH EVERY DAY 23 tablet 1     varenicline (CHANTIX STARTING MONTH BOX) 0.5 mg (11)- 1 mg (42) tablet one 0.5mg tablet  mouth daily  for 3 days, then one 0.5mg tablet bid for 3 days, then one 1mg tab two times a day indef 60 tablet 2     cetirizine (ZYRTEC) 10 MG tablet Take 1 tablet (10 mg total) by mouth daily. 30 tablet 2     traZODone (DESYREL) 50 MG tablet Take 1 tablet (50 mg total) by mouth at bedtime as needed for sleep. 30 tablet 0     No current facility-administered medications on file prior to visit.                   Review of Systems:      ROS:    Subjective Data Only- Tele-Health Visit    10 point ROS was negative except for the items listed in HPI.      Coordination of Care:   More than 30 minutes spent on this visit  with more than 50% of time spent on coordination of care including: Educating patient about diagnosis, prognosis, side effects and benefits of medications, diet, exercise.  Time also spent providing supportive therapy regarding above issues.    This note was created using a dictation system. All typing errors or contextual distortion is unintentional and software inherent.  Start Time   End time :             Video-Visit Details    Type of service:  Video Visit    Originating Location (pt. Location): Home    Distant Location (provider location):  [facility]     Platform used for Video Visit: Astech

## 2021-06-26 NOTE — PATIENT INSTRUCTIONS - HE
Continue medications as prescribed  Have your pharmacy contact us for a refill if you are running low on medications (We may ask you to come into clinic to get a refill from the nurse  No Alcohol or drug use  No driving if sedated  Call the clinic with any questions or concerns   Reach out for help if you feel like hurting yourself or others ([facility] [phone]: [address] [place], [address] or [facility] Suicide Hotline [phone] , call 911 or go to nearest Emergency room    Follow up as directed, for your appointments, per your After Visit Summary Form.

## 2021-06-26 NOTE — PROGRESS NOTES
This video/telephone visit will be conducted via a call between you and your physician/provider. We have found that certain health care needs can be provided without the need for an in-person physical exam. This service lets us provide the care you need with a video /telephone conversation. If a prescription is necessary we can send it directly to your pharmacy. If lab work is needed we can place an order for that and you can then stop by a lab to have the test done at a later time.    Just as we bill insurance for in-person visits, we also bill insurance for video/telephone visits. If you have questions about your insurance coverage, we recommend that you speak with your insurance company.    Patient has given verbal consent for video visit? Yes  Patient would like the video visit invitation sent by: Lavonne if dropped [phone]  BRY/BRY Borja-Lavonne   Pt is unsure if he needs refills at this time.   Patient verified allergies, medications and pharmacy via phone.  Patient states he is ready for visit.  MN  to be reviewed by provider.

## 2021-06-27 ENCOUNTER — HEALTH MAINTENANCE LETTER (OUTPATIENT)
Age: 31
End: 2021-06-27

## 2021-06-28 ENCOUNTER — COMMUNICATION - HEALTHEAST (OUTPATIENT)
Dept: BEHAVIORAL HEALTH | Facility: CLINIC | Age: 31
End: 2021-06-28

## 2021-06-28 DIAGNOSIS — F33.1 MODERATE EPISODE OF RECURRENT MAJOR DEPRESSIVE DISORDER (H): ICD-10-CM

## 2021-06-28 DIAGNOSIS — R41.840 INATTENTION: ICD-10-CM

## 2021-06-28 RX ORDER — CLONIDINE HYDROCHLORIDE 0.1 MG/1
TABLET ORAL
Qty: 30 TABLET | Refills: 0 | Status: SHIPPED | OUTPATIENT
Start: 2021-06-28 | End: 2021-07-12

## 2021-06-28 RX ORDER — BUPROPION HYDROCHLORIDE 150 MG/1
150 TABLET, EXTENDED RELEASE ORAL DAILY
Qty: 90 TABLET | Refills: 0 | Status: SHIPPED | OUTPATIENT
Start: 2021-06-28 | End: 2021-12-14

## 2021-06-28 RX ORDER — BUPROPION HYDROCHLORIDE 300 MG/1
300 TABLET ORAL DAILY
Qty: 90 TABLET | Refills: 0 | Status: SHIPPED | OUTPATIENT
Start: 2021-06-28 | End: 2021-12-14

## 2021-06-30 NOTE — PROGRESS NOTES
Progress Notes by Mich Ybarra MD at 12/23/2020 10:20 AM     Author: Mich Ybarra MD Service: -- Author Type: Physician    Filed: 12/23/2020  7:46 PM Encounter Date: 12/23/2020 Status: Signed    : Mihc Ybarra MD (Physician)       I spent over  25 minutes spent, greater than 50% was counseling regarding following issues:  1. Inattention    - buPROPion (WELLBUTRIN SR) 150 MG 12 hr tablet; Take one pill daily for 3 days.  Then 1 pill twice daily for 3 months.  Set quit date in about 2 week.  Dispense: 60 tablet; Refill: 2    2. Depression, unspecified depression type    - Ambulatory referral to Psychiatry    3. Anxiety      4. Encounter for smoking cessation counseling    - buPROPion (WELLBUTRIN SR) 150 MG 12 hr tablet; Take one pill daily for 3 days.  Then 1 pill twice daily for 3 months.  Set quit date in about 2 week.  Dispense: 60 tablet; Refill: 2  ------------------------------------------  Patient has had 2 virtual visits for inattention and smoking cessation.  The action taken with these has been prescription of bupropion, apparently changed to Chantix as patient did not receive bupropion and referral for ADD evaluation with psychology.    Patient has this appointment on the 14th    It became clear today that he has a lot going on  Admitted to depressed mood and anxiety, longstanding.  Feels exhausted by trying to do the right thing for his family which has high expectations and often failing.  Had a DUI earlier this year  Has had gambling problems, now resolved  Smokes cigarettes    PHQ 9= 22    5 to 9: mild   10 to 14: moderate   15 to 19: moderately severe   ?20: severe     End patient scores a 2 on question 9.  Discussed suicidal ideation and is never had a plan and indicates that he would never hurt himself as this would hurt his parents.  Sounds like it has been more than completely passive however.  Assures me that he is safe.  Patient does not have guns in the home,  has never had a plan.    LICO 7= 14    Continues to drink alcohol.  Drinks more heavily when he is out with friends so has stopped doing this, drinks a small amount frequently on his own.  Denies other drugs except tobacco.  Would like to quit smoking.    ----------------------------------------  Given the fact that he already has an appointment on January 14 with a nonprescriber  -We will follow through with this diagnostic assessment  -Prescribed bupropion for smoking cessation/attention and depression  Discussed potential side effects of this medication.  Referral to psychiatry, thinking that this will be a fair ways out and I am challenged to determine what the best mix of medication to treat attention issues, anxiety, depression and addiction are.  That of course unless bupropion is effective.  Asked him to follow-up with me in about 3 weeks which will be around January 14      5. Healthcare maintenance  Inadequate time to go ahead with routine physical the patient had planned.  Labs ordered, will consider next steps with follow-up  - HIV Antigen/Antibody Screening Cascade  - Hepatitis C Antibody (Anti-HCV)  - Cholesterol, Total  - HDL Cholesterol  - Glucose    Pt presented for:  Chief Complaint   Patient presents with   ? Establish Care       See discussion above

## 2021-07-05 ENCOUNTER — COMMUNICATION - HEALTHEAST (OUTPATIENT)
Dept: BEHAVIORAL HEALTH | Facility: CLINIC | Age: 31
End: 2021-07-05

## 2021-07-05 DIAGNOSIS — F98.8 ATTENTION DEFICIT DISORDER (ADD) WITHOUT HYPERACTIVITY: ICD-10-CM

## 2021-07-05 NOTE — TELEPHONE ENCOUNTER
Telephone Encounter by Sara Avalos LPN at 7/5/2021 12:44 PM     Author: Sara Avalos LPN Service: -- Author Type: Licensed Nurse    Filed: 7/5/2021 12:49 PM Encounter Date: 7/5/2021 Status: Signed    : Sara Avalos LPN (Licensed Nurse)       Date of Last Office Visit: 6/14/21  Date of Next Office Visit: 7/8/21  No shows since last visit: 0  Cancellations since last visit: 0    Medication requested: strattera 40 mg Date last ordered: 6/9/21 Qty: 60 Refills: o     atomoxetine (STRATTERA) 40 MG capsule 60 capsule 0 6/9/2021  No   Sig: TAKE 1 CAPSULE BY MOUTH DAILT FOR 3 DAYS. THEN INCREASE TO 1 CAPSULE TWICE A DAY         Review of [place] ?: NA      Lapse in medication adherence greater than 5 days?: no  If yes, call patient and gather details: NA  Medication refill request verified as identical to current order?: yes except asking for a 90 day supply  Result of Last DAM, VPA, Li+ Level, CBC, or Carbamazepine Level (at or since last visit): See labs from 6/14/21    []Medication refilled per Medication Refill in Ambulatory Care policy.  [x]Medication unable to be refilled by RN due to criteria not met as indicated below:    []Eligibility - not seen in the last year   []Supervision - no future appointment   []Compliance - no shows, cancellations or lapse in therapy   []Verification - order discrepancy   []Controlled medication   []Medication not included in policy   [x]90-day supply request   [x]Other : Should have enough meds until next appt.  LPN is processing request

## 2021-07-07 NOTE — TELEPHONE ENCOUNTER
Received fax from pharmacy, alternative requested for bupropion 450mg tablets. Pharmacist said that insurance will not cover this but will cover a 300mg tablet with a 150mg tablet.

## 2021-07-07 NOTE — TELEPHONE ENCOUNTER
Date of Last Office Visit: 6/14/21  Date of Next Office Visit: 7/8/21  No shows since last visit: none  Cancellations since last visit: none    Medication requested: clonidine 0.1mg Date last ordered: 6/16/21 Qty: 30 Refills: 0     Lapse in medication adherence greater than 5 days?: no  If yes, call patient and gather details:   Medication refill request verified as identical to current order?: yes  Result of Last DAM, VPA, Li+ Level, CBC, or Carbamazepine Level (at or since last visit): N/A    []Medication refilled per  Medication Refill in Ambulatory Care  policy.  [x]Medication unable to be refilled by RN due to criteria not met as indicated below:    []Eligibility - not seen in the last year   []Supervision - no future appointment   []Compliance - no shows, cancellations or lapse in therapy   []Verification - order discrepancy   []Controlled medication   []Medication not included in policy   []90-day supply request   [x]Other - patient given a 2 week supply, unsure if provider will continue

## 2021-07-08 ENCOUNTER — VIRTUAL VISIT (OUTPATIENT)
Dept: SLEEP MEDICINE | Facility: CLINIC | Age: 31
End: 2021-07-08
Payer: COMMERCIAL

## 2021-07-08 VITALS — HEIGHT: 65 IN | BODY MASS INDEX: 27.49 KG/M2 | WEIGHT: 165 LBS

## 2021-07-08 DIAGNOSIS — F39 MOOD DISORDER (H): ICD-10-CM

## 2021-07-08 DIAGNOSIS — G47.19 EXCESSIVE DAYTIME SLEEPINESS: Primary | ICD-10-CM

## 2021-07-08 PROCEDURE — 99205 OFFICE O/P NEW HI 60 MIN: CPT | Mod: GT | Performed by: PHYSICIAN ASSISTANT

## 2021-07-08 ASSESSMENT — MIFFLIN-ST. JEOR: SCORE: 1640.32

## 2021-07-08 NOTE — PROGRESS NOTES
Nikolay is a 30 year old who is being evaluated via a billable video visit.      How would you like to obtain your AVS? MyChart  If the video visit is dropped, the invitation should be resent by: Text to cell phone: [phone]  Will anyone else be joining your video visit? No    Gianna Stock CMA  Does patient have any form of state insurance? no    Do you have wifi? yes  Do you have a smart phone? yes  Can you download an nicole on your phone comfortably with out assistance? yes  Can you watch a Yorn video? yes    Video Start Time: 3:06PM     [facility]   Outpatient Sleep Medicine Consultation  Jul 8, 2021       Name: Nikolay Lora MRN# [id number]   Age: 30 year old YOB: 1990     Date of Consultation: Jul 8, 2021   Consultation is requested by: No referring provider defined for this encounter. No ref. provider found  Primary care provider: Mich Ybarra         Assessment and Plan:   1. Excessive daytime sleepiness  2. Mood disorder (H)    Patient presents to clinic today for evaluation of excessive daytime sleepiness. His [place] Sleepiness Scale score is significantly elevated today at 22/24 today. He describes what sound like sleep attacks during the day though does not have any history of cataplexy, hallucinations, or sleep paralysis that is generally associated with narcolepsy. He does not endorse any sleep disordered breathing symptoms. Denies any abnormal movements in his sleep. Given his significant sleepiness we have agreed to pursue hypersomnolence work-up to evaluate for central cause of sleepiness.  This will entail 2 weeks of actigraphy followed by PSG and MSLT. Urine tox screen to be completed on day of PSG.   - Comprehensive Sleep Study; Future  - Drug abuse screen 8 urine (UR); Future    Discussed that in order for us to get valid testing results, we would ideally need patient to stop all REM suppressing medications - this would include his Wellburtin, Strattera,  "and trazodone (though isn't currently taking trazodone). He does not think this will be a problem - \"I feel the same whether I take them or not\". I asked that he first consult his psychiatrist/prescribing provider before discontinuation of these medications and ask them if this is safe to taper off of for our testing period. He will let me know if there is any problems with discontinuation for testing.     Given his significant sleepiness patient was counseled on the importance of driving while alert, to pull over if drowsy, or nap before getting into the vehicle if sleepy. He knows he should only drive if fully alert.     He will follow-up 1-2 weeks following testing above to review results, or sooner as needed.        Chief Complaint / Reason for Sleep Consult:     Chief Complaint   Patient presents with     Sleep Problem     Extreme fatigue, hard time focusing          History of Present Illness:     Nikolay Lora is a 30 year old male with anxiety, depression, and ADD who presents to the clinic for evaluation of \"extreme fatigue\".     Reports inattentive symptoms and daytime sleepiness (he calls it fatigue) has been present for \"practically my whole life\". Has significant difficulty staying awake during the day despite adequate sleep time at night. His sleep schedule includes bedtime between 9-10:00PM and wakes for the day at 7:00AM. Same on all days of the week. Falls asleep in 5-10 minutes, denies any difficulty falling asleep. Typically sleeps through the night without waking but may wake once to use the restroom if he drinks water too close to bedtime. Has trazodone prescribed for sleep but no longer taking, feels that it \"carries over\" into the day and worsens his sleepiness.     He had a total Riley Sleepiness Scale of 22 (07/08/21 1300), with scores of 10 or higher indicating abnormal levels of sleepiness. He works in a call center, currently working from home which allows him to take daily naps. Reports " "he will take a 5-10 minute nap every couple hours during the day. When he was working at the office would go to the restroom to nap in the stall for a few minutes. Napping is a little bit refreshing, states \"I will be good for the next hour\". Without his planned daytime short naps he will inadvertently doze.     No hypnagogic/hypnopompic hallucinations. No sleep paralysis. No cataplexy.    Denies any history of MVA or falling asleep while driving but does admit that he becomes very sleepy behind the wheel at times. If driving >30 minutes will have to pull over to nap in car. Patient was counseled on the importance of driving while alert, to pull over if drowsy, or nap before getting into the vehicle if sleepy.    Denies snoring. No witnessed apneas, but no current bed partner, never been told in the past. Denies gasp/choking episodes. Rare/occasional nocturia.  Denies nocturnal GERD. Denies morning headaches. Denies morning dry mouth.     Patient denies any abnormal movements or behaviors in their sleep. States he has been told he is a \"really peaceful\" sleeper. Denies typical restless legs syndrome symptoms. Denies nocturnal leg movements. No dream enactment behavior. No somniloquy.  No somnambulism.  No recurring/frequent nightmares. Does report bruxism.     SCALES       SLEEP APNEA: Stopbang score  2/8       INSOMNIA:  Insomnia severity score: 15/28   absence of insomnia (0-7); sub-threshold insomnia (8-14); moderate insomnia (15-21); and severe insomnia (22-28)       SLEEPINESS: [place] sleepiness scale: 22/24 [normal < 11]          Medications:     Current Outpatient Medications   Medication Sig     atomoxetine (STRATTERA) 40 MG capsule [ATOMOXETINE (STRATTERA) 40 MG CAPSULE] TAKE 1 CAPSULE BY MOUTH DAILT FOR 3 DAYS. THEN INCREASE TO 1 CAPSULE TWICE A DAY     buPROPion 450 mg Tb24 [BUPROPION 450 MG TB24] Take 450 mg by mouth every morning.     cetirizine (ZYRTEC) 10 MG tablet [CETIRIZINE (ZYRTEC) 10 MG TABLET] " "Take 1 tablet (10 mg total) by mouth daily.     finasteride (PROSCAR) 5 mg tablet [FINASTERIDE (PROSCAR) 5 MG TABLET] TAKE 1/4 TABLET BY MOUTH EVERY DAY     varenicline (CHANTIX STARTING MONTH BOX) 0.5 mg (11)- 1 mg (42) tablet [VARENICLINE (CHANTIX STARTING MONTH BOX) 0.5 MG (11)- 1 MG (42) TABLET] one 0.5mg tablet  mouth daily  for 3 days, then one 0.5mg tablet bid for 3 days, then one 1mg tab two times a day indef     traZODone (DESYREL) 50 MG tablet [TRAZODONE (DESYREL) 50 MG TABLET] Take 1 tablet (50 mg total) by mouth at bedtime as needed for sleep. (Patient not taking: Reported on 7/8/2021)     No current facility-administered medications for this visit.              Allergies:     No Known Allergies         Past Medical History:     Patient Active Problem List   Diagnosis     Allergic rhinitis     Chronic dermatitis     Hemorrhoids     Pityriasis versicolor     Pruritus ani     Verruca vulgaris     Mood disorder (H)          Past Surgical History:    Previous upper airway surgery: denies   No past surgical history on file.         Social History:     Social History     Tobacco Use     Smoking status: Current Every Day Smoker     Smokeless tobacco: Never Used     Tobacco comment: Cutting down, using Chantix   Substance Use Topics     Alcohol use: Not on file     Chemical History:  Alcohol use: Recent DUI, now drinking once every 2 weeks   Tobacco use: Currently in process of quitting, 2 cig/day   Illicit substances: Denies  Caffeine intake: Drinks 3-5 cups/day of coffee. Last caffeine intake is usually before 5:30PM         Family History:     No family history on file.     Sleep Family Hx: Patient denies any known family history of sleep apnea, restless legs syndrome, narcolepsy or other sleep disorders.          Physical Examination:   Ht 1.651 m (5' 5\")   Wt 74.8 kg (165 lb)   BMI 27.46 kg/m    General appearance: Awake, alert, cooperative. Well groomed. Sitting comfortably in chair. In no apparent " distress.  HEENT: Head: Normocephalic, atraumatic. Eyes:Conjunctiva clear. Sclera normal. Nose: External appearance without deformity.   Neck: No visible thyroid enlargement.   Cardiovascular: No JVD  Pulmonary:  Able to speak easily in full sentences. No cough or wheeze.   Skin:  No rashes or significant lesions on visible skin.   Neurologic: Alert, oriented x3.   Psychiatric: Mood euthymic. Affect congruent with full range and intensity.          Data: All pertinent previous laboratory data reviewed     No results found for: PH, PHARTERIAL, PO2, [id number], SAT, PCO2, HCO3, BASEEXCESS, KAYLEE, BEB  No results found for: TSH  No results found for: GLC  No results found for: HGB  No results found for: BUN, CR  No results found for: AST, ALT, GGT, ALKPHOS, BILITOTAL, BILICONJ, BILIDIRECT, JB  No results found for: UAMP, UBARB, BENZODIAZEUR, UCANN, UCOC, OPIT, UPCP      Copy to: Mich Ybarra PA-C  Jul 8, 2021     [facility]  [address] [place] [address]     [facility]  [address] [place] [address]    Chart documentation was completed, in part, with [employer] voice-recognition software. Even though reviewed, some grammatical, spelling, and word errors may remain.    Video-Visit Details    Type of service:  Video Visit    Video End Time:3:37PM    Originating Location (pt. Location): Home    Distant Location (provider location):  [facility]     Platform used for Video Visit: Campaign Monitor   60 minutes spent on day of encounter doing chart review, history and exam, documentation, and further activities as noted above

## 2021-07-08 NOTE — PATIENT INSTRUCTIONS
Your BMI is Body mass index is 27.46 kg/m .  Weight management is a personal decision.  If you are interested in exploring weight loss strategies, the following discussion covers the approaches that may be successful. Body mass index (BMI) is one way to tell whether you are at a healthy weight, overweight, or obese. It measures your weight in relation to your height.  A BMI of 18.5 to 24.9 is in the healthy range. A person with a BMI of 25 to 29.9 is considered overweight, and someone with a BMI of 30 or greater is considered obese. More than two-thirds of American adults are considered overweight or obese.  Being overweight or obese increases the risk for further weight gain. Excess weight may lead to heart disease and diabetes.  Creating and following plans for healthy eating and physical activity may help you improve your health.  Weight control is part of healthy lifestyle and includes exercise, emotional health, and healthy eating habits. Careful eating habits lifelong are the mainstay of weight control. Though there are significant health benefits from weight loss, long-term weight loss with diet alone may be very difficult to achieve- studies show long-term success with dietary management in less than 10% of people. Attaining a healthy weight may be especially difficult to achieve in those with severe obesity. In some cases, medications, devices and surgical management might be considered.  What can you do?  If you are overweight or obese and are interested in methods for weight loss, you should discuss this with your provider.     Consider reducing daily calorie intake by 500 calories.     Keep a food journal.     Avoiding skipping meals, consider cutting portions instead.    Diet combined with exercise helps maintain muscle while optimizing fat loss. Strength training is particularly important for building and maintaining muscle mass. Exercise helps reduce stress, increase energy, and improves fitness.  Increasing exercise without diet control, however, may not burn enough calories to loose weight.       Start walking three days a week 10-20 minutes at a time    Work towards walking thirty minutes five days a week     Eventually, increase the speed of your walking for 1-2 minutes at time    In addition, we recommend that you review healthy lifestyles and methods for weight loss available through the National Institutes of Health patient information sites:  http://win.niddk.nih.gov/publications/index.htm    And look into health and wellness programs that may be available through your health insurance provider, employer, local community center, or carmen club.    Weight management plan: Patient was referred to their PCP to discuss a diet and exercise plan.

## 2021-07-09 ENCOUNTER — TELEPHONE (OUTPATIENT)
Dept: SLEEP MEDICINE | Facility: CLINIC | Age: 31
End: 2021-07-09

## 2021-07-09 ENCOUNTER — COMMUNICATION - HEALTHEAST (OUTPATIENT)
Dept: SCHEDULING | Facility: CLINIC | Age: 31
End: 2021-07-09

## 2021-07-09 NOTE — TELEPHONE ENCOUNTER
Telephone Encounter by Barb Hargrove at 7/9/2021  8:57 AM     Author: Barb Hargrove Service: -- Author Type: Patient Access    Filed: 7/9/2021  8:59 AM Encounter Date: 7/9/2021 Status: Signed    : Barb Hargrove (Patient Access)       Reason for call:  Other   Patient called regarding (reason for call): call back  Additional comments: Patient is calling to inform behavioral health provider Beryl Moura that he will be off his medication for 1-2 weeks prior to his sleep study. Sleep study is not scheduled at this time. Once that is scheduled, he will determine the exact timeframe he will be off the Strattera and Bupropion medications.     Phone number to reach patient:  Home number on file [phone] (home)    Best Time:  Any time    Can we leave a detailed message on this number?  YES    Travel screening: Not Applicable

## 2021-07-09 NOTE — TELEPHONE ENCOUNTER
Reason for Call:  Other appointment    Detailed comments: patient calling to schedule sleep study.      Phone Number Patient can be reached at: Cell number on file:    Telephone Information:   Mobile [phone]       Best Time: any    Can we leave a detailed message on this number? YES    Call taken on 7/9/2021 at 10:26 AM by Nicole Akins

## 2021-07-12 DIAGNOSIS — F98.8 ATTENTION DEFICIT DISORDER (ADD) WITHOUT HYPERACTIVITY: ICD-10-CM

## 2021-07-12 DIAGNOSIS — R41.840 INATTENTION: ICD-10-CM

## 2021-07-12 NOTE — TELEPHONE ENCOUNTER
Date of Last Office Visit: 7/8/21  Date of Next Office Visit: 8/19/21  No shows since last visit: 0  Cancellations since last visit: 0    Medication requested: Clonidine 0.2 mg/day Date last ordered: 6/28/21 Qty: 30 Refills: 0     cloNIDine HCL (CATAPRES) 0.1 MG tablet 30 tablet 0 6/28/2021  No   Sig: [CLONIDINE HCL (CATAPRES) 0.1 MG TABLET] TAKE 2 TABLETS (0.2 MG TOTAL) BY MOUTH DAILY.         Review of [place] ?: NA      Lapse in medication adherence greater than 5 days?: No  If yes, call patient and gather details: NA  Medication refill request verified as identical to current order?: Yes except asking for a 90 day supply instead of 15   Result of Last DAM, VPA, Li+ Level, CBC, or Carbamazepine Level (at or since last visit): N/A    []Medication refilled per  Medication Refill in Ambulatory Care  policy.  [x]Medication unable to be refilled by RN due to criteria not met as indicated below:    []Eligibility - not seen in the last year   []Supervision - no future appointment   []Compliance - no shows, cancellations or lapse in therapy   []Verification - order discrepancy   []Controlled medication   []Medication not included in policy   [x]90-day supply request   [x]Other: LPN is processing request

## 2021-07-13 RX ORDER — ATOMOXETINE 40 MG/1
40 CAPSULE ORAL 2 TIMES DAILY
COMMUNITY
Start: 2021-07-08 | End: 2021-08-19

## 2021-07-13 NOTE — TELEPHONE ENCOUNTER
Alternative request for Strattera: ins only covers 90 day supply. Please send new request if appropriate    See last prescription below    atomoxetine (STRATTERA) 40 MG capsule 60 capsule 0 7/8/2021  No   Sig: TAKE 1 CAPSULE BY MOUTH DAILT FOR 3 DAYS. THEN INCREASE TO 1 CAPSULE TWICE A DAY   Sent to pharmacy as: atomoxetine 40 mg capsule (STRATTERA)

## 2021-07-13 NOTE — TELEPHONE ENCOUNTER
PT REQUESTS NEW RX: PT WOULD LIKE TO REACH OUT TO THE INS TO DO A PA FOR BUPROPION  MG     Currently prescribed as :  Bupropion ( Wellbutrin  mg) PO every day and Bupropion ( Wellbutrin XL ) 300 mg PO every day    Please replace if appropriate

## 2021-07-14 ENCOUNTER — TELEPHONE (OUTPATIENT)
Dept: SLEEP MEDICINE | Facility: CLINIC | Age: 31
End: 2021-07-14

## 2021-07-14 RX ORDER — CLONIDINE HYDROCHLORIDE 0.1 MG/1
TABLET ORAL
Qty: 60 TABLET | Refills: 0 | Status: SHIPPED | OUTPATIENT
Start: 2021-07-14 | End: 2021-08-19 | Stop reason: ALTCHOICE

## 2021-07-14 RX ORDER — ATOMOXETINE 40 MG/1
CAPSULE ORAL
Qty: 180 CAPSULE | Refills: 0 | Status: SHIPPED | OUTPATIENT
Start: 2021-07-14 | End: 2021-12-14

## 2021-07-14 NOTE — TELEPHONE ENCOUNTER
Left a message for the patient to call back and let us know what location he prefers to have his study at, so we can start the process to get him scheduled.

## 2021-07-14 NOTE — TELEPHONE ENCOUNTER
Reason for Call:  Other appointment    Detailed comments: patient needs to [facility].  Needs ASAP or he will lose his job.  Please contact him today to schedule.  Thank you.    Phone Number Patient can be reached at: Home number on file [phone] (home)    Best Time: any    Can we leave a detailed message on this number? YES    Call taken on 7/14/2021 at 8:07 AM by Zena Nguyen

## 2021-07-14 NOTE — TELEPHONE ENCOUNTER
Another request for Clonidine and Wellbutrin  mg.  Given to the  to initiate PA for Wellbutrin  mg

## 2021-07-15 NOTE — TELEPHONE ENCOUNTER
Patient had called back stating he would like to do HST. He does not care when he has to  the equipment - He would like a date and time scheduled asap and would like someone to [facility] to let him know what day and time he needs to get everything.    Tel: [phone]

## 2021-07-16 ENCOUNTER — TELEPHONE (OUTPATIENT)
Dept: SLEEP MEDICINE | Facility: CLINIC | Age: 31
End: 2021-07-16

## 2021-07-16 NOTE — PROGRESS NOTES
Patient's information has been sent to the [facility] to schedule hypersomnia workup. Appointment note sent to psychiatrist:   Beryl Moura NP (Attending)   [phone] (Work)   [phone] (Fax)   [address] [place] [id number]   Nurse Practitioner    Kimmie Teixeira MA

## 2021-07-16 NOTE — TELEPHONE ENCOUNTER
Reason for call:  Other   Patient called regarding (reason for call): call back  Additional comments: Please call patient for sleep study scheduling. Patient prefers before 9am, over lunch, or after 2pm    Phone number to reach patient:  Home number on file [phone] (home)    Best Time:  Any time, but Patient prefers before 9am, over lunch, or after 2pm    Can we leave a detailed message on this number?  YES    Travel screening: Not Applicable

## 2021-07-28 ENCOUNTER — VIRTUAL VISIT (OUTPATIENT)
Dept: BEHAVIORAL HEALTH | Facility: CLINIC | Age: 31
End: 2021-07-28
Payer: COMMERCIAL

## 2021-07-28 DIAGNOSIS — F90.0 ATTENTION DEFICIT HYPERACTIVITY DISORDER (ADHD), PREDOMINANTLY INATTENTIVE TYPE: Primary | ICD-10-CM

## 2021-07-28 PROCEDURE — 90834 PSYTX W PT 45 MINUTES: CPT | Mod: 95 | Performed by: SOCIAL WORKER

## 2021-07-29 NOTE — PROGRESS NOTES
Progress Note    Patient Name: Nikolay Lora  Date: 7/28/21         Service Type: Individual      Session Start Time: 1200 Session End Time: 1252     Session Length: 52    Session #: 6    Attendees: Client attended alone    Service Modality:  Video Visit:      Provider verified identity through the following two step process.  Patient provided:  Patient is known previously to provider    Telemedicine Visit: The patient's condition can be safely assessed and treated via synchronous audio and visual telemedicine encounter.      Reason for Telemedicine Visit: Patient has requested telehealth visit    Originating Site (Patient Location): Patient's home    Distant Site (Provider Location): Provider Remote Setting- Home Office    Consent:  The patient/guardian has verbally consented to: the potential risks and benefits of telemedicine (video visit) versus in person care; bill my insurance or make self-payment for services provided; and responsibility for payment of non-covered services.     Patient would like the video invitation sent by:  My Chart    Mode of Communication:  Video Conference via Patient Conversation Media    As the provider I attest to compliance with applicable laws and regulations related to telemedicine.     Treatment Plan Last Reviewed: 5/19/21  PHQ-9 / LICO-7 :  LICO-7 SCORE 12/23/2020 2/17/2021 4/5/2021   Total Score 14 19 19       PHQ 12/23/2020 2/17/2021 4/5/2021   PHQ-9 Total Score 22 20 19   Q9: Thoughts of better off dead/self-harm past 2 weeks More than half the days Several days Not at all     Initial reason for visit  Nikolay Lora is a 30 y.o. male (ong) who is being seen for a Psychotherapy follow-up visit..  Patient indicated that he is not certain what to do next in regard to the management of his ADHD symptoms and how that impacts his anxiety and depression.  He is hoping to learn skills and obtain tools for better management of the symptoms.  And is not certain what  to do now in regard to learning skills and tools for management of those symptoms.  Currently, patient works at a [employer] center 5 days/week and is having difficulty staying focused, alert and engaged in his job.  His lifelong help is to become a  although, he is uncertain if that is possible because of ongoing issues with focusing, concentration, organization and mood irregularities.     DATA  Interactive Complexity: No  Crisis: No       Progress Since Last Session (Related to Symptoms / Goals / Homework):   Symptoms: Continues to feel fatigue, difficulty staying focused, difficulty concentrating anxiety and feeling overwhelmed.      Episode of Care Goals: Satisfactory progress - PREPARATION (Decided to change - considering how); Intervened by negotiating a change plan and determining options / strategies for behavior change, identifying triggers, exploring social supports, and working towards setting a date to begin behavior change     Current / Ongoing Stressors and Concerns:   Ongoing stressors with managing the current workload and needing to take more intermittent breaks throughout the day rather than a strict  scheduled time away from the computer.       Treatment Objective(s) Addressed in This Session:  During today's session the job accommodation form from his place of employment was completed.      Intervention:   CBT, solution focused and ADD education strategies and techniques        ASSESSMENT: Current Emotional / Mental Status (status of significant symptoms):   Risk status (Self / Other harm or suicidal ideation)              Patient any personal safety issues              Patient denies current or recent suicidal ideation or behaviors.              Patient denies current or recent homicidal ideation or behaviors.              Patient denies current or recent self injurious behavior or ideation.              Patient denies other safety concerns.              Patient denies any risk  issues              Patient indicated family is concerned involved              Recommended that patient call 911 or go to the local ED should there be a change in any of these risk factors.                 Attitude:                                   Cooperative               Orientation:                             Oriented x3              Speech                                    Clear              Rate / Production:                   Slow  Normal               Volume:                                    Soft               Mood:                                       Sad               Thought Content:                     Clear               Thought Form:                         Coherent  Logical               Insight:                                      Good               Affect                                         Pleasent                  Medication Review:   No changes to current psychiatric medication(s)     Medication Compliance:   Yes     Changes in Health Issues:   None reported     Chemical Use Review:   Substance Use: Chemical use reviewed, no active concerns identified      Tobacco Use: No current tobacco use.      Diagnosis:  1.  ADHD inattentive  2.  Mood disorder (H)    Collateral Reports Completed:   Routed note to PCP    PLAN: (Patient Tasks / Therapist Tasks / Other)  1.    Patient will consider contacting his employer regarding accommodations if         needed once he returns back into the office setting.   2.    Patient will attempt to decrease and/or eliminate smoking and decrease              caffeine level.  3.    Patient will begin educate himself regarding the tools that can be helpful in         managing his ADHD symptoms.  5.    Develop therapeutic relationship  6.    Patient will continue current medication regime per NP  7.   Next appointment in 3 weeks.        Amy Montano [facility]  7/28/21                                                          ______________________________________________________________________  Outpatient Mental Health Psychotherapy Treatment Plan     Name:  Nikolay Lora  :  1990  MRN:  [id number]     Treatment Plan:  Initial Treatment Plan  Date Treatment Plan Initiated:  21  Treatment Plan: Updated Treatment Plan R  Intake/initial treatment plan date: 21  Benefit and risks and alternatives have been discussed: yes     Plan:                 ? Depression                 Goal:       Alleviate depressed mood and develop healthy cognitive patterns and beliefs about self in the world that led to the depressive symptoms.              Strategies:       ?[x]? Decrease social isolation                                      [x]? Increase involvement in meaningful activities                                      ?[]? Discuss sleep hygiene                                      ?[x]? Explore thoughts and expectations about self and others                                      ?[x]? Process grief (loss of significant person, independence, role,  etc.)                                      ?[x]? Assess for suicide risk                                      ?[x]? Implement physical activity routine (with physician approval)                                      [x]? Consider introduction of bibliotherapy and/or videos                                      [x]? Continue compliance with medical treatment plan (or explore barriers)     Degree to which this is a problem (1-4): 4  Degree to which goal is met (1-4)1  Date of Review:21                                      ?  Cognitive impairments  Goal:    Increase use of compensatory strategies from letter to better manage ADHD symptoms.  Strategies:       ? [x]?  Continue current medication regime                                       ?[x]?  Explore barriers to use of compensatory strategies                                      ? [x]? consider obtaining accommodations from employer                                       ? [x]? Explore factors which may exacerbate cognitive difficulties                                       ?     Education regarding the management of ADHD symptomology and the comorbidity of anxiety and                                                         depression     Degree to which this is a problem (1-4): 4  Degree to which goal is met (1-4)1  Date of Review:8/19/21     PHQ-9 scoring 19 indicating moderately severe depression  LICO-7 scoring 19 indicating moderately severe anxiety                              ?          Functional Impairment: 1=Not at all/Rarely  2=Some days  3=Most Days  4=Every Day   Personal: 4  Family: 1  Social: 3  Work: 4     Diagnosis:  ADHD inattentive  Mood disorder  (H)     Strengths: Strong work ethic  Limitations: Lack of support  Cultural Considerations: Stigma of any mental health issues  Family involvement no, per patient request     Anticipated intensity of services:  Every 3-4 weeks    Estimated duration of treatment:  4-6 months  Necessity for frequency: This frequency is needed to establish therapeutic goals and for continuity of care in order to monitor progress.   Necessity for treatment: To address cognitive, behavioral, and/or emotional barriers in order to work toward goals and to improve quality of life.   Is this treatment appropriate with minimal intrusion/restrictions:      Persons responsible for this plan:  ? [x]? Patient       ? []? Provider     ? []? Other: __________________        Provider: Amy Montano LICSW  Date:  5/19/21

## 2021-08-12 ENCOUNTER — APPOINTMENT (OUTPATIENT)
Dept: LAB | Facility: CLINIC | Age: 31
End: 2021-08-12
Payer: COMMERCIAL

## 2021-08-12 ENCOUNTER — OFFICE VISIT (OUTPATIENT)
Dept: ALLERGY | Facility: CLINIC | Age: 31
End: 2021-08-12
Payer: COMMERCIAL

## 2021-08-12 VITALS — OXYGEN SATURATION: 99 % | WEIGHT: 178.8 LBS | HEART RATE: 78 BPM | BODY MASS INDEX: 29.79 KG/M2 | HEIGHT: 65 IN

## 2021-08-12 DIAGNOSIS — J30.1 SEASONAL ALLERGIC RHINITIS DUE TO POLLEN: ICD-10-CM

## 2021-08-12 DIAGNOSIS — L50.1 CHRONIC IDIOPATHIC URTICARIA: Primary | ICD-10-CM

## 2021-08-12 LAB
ALT SERPL W P-5'-P-CCNC: 44 U/L (ref 0–45)
AST SERPL W P-5'-P-CCNC: 32 U/L (ref 0–40)
BASOPHILS # BLD AUTO: 0 10E3/UL (ref 0–0.2)
BASOPHILS NFR BLD AUTO: 0 %
CREAT SERPL-MCNC: 0.91 MG/DL (ref 0.7–1.3)
EOSINOPHIL # BLD AUTO: 0.1 10E3/UL (ref 0–0.7)
EOSINOPHIL NFR BLD AUTO: 1 %
ERYTHROCYTE [DISTWIDTH] IN BLOOD BY AUTOMATED COUNT: 11.4 % (ref 10–15)
GFR SERPL CREATININE-BSD FRML MDRD: >90 ML/MIN/1.73M2
HCT VFR BLD AUTO: 45.6 % (ref 40–53)
HGB BLD-MCNC: 15.5 G/DL (ref 13.3–17.7)
IMM GRANULOCYTES # BLD: 0 10E3/UL
IMM GRANULOCYTES NFR BLD: 0 %
LYMPHOCYTES # BLD AUTO: 2.6 10E3/UL (ref 0.8–5.3)
LYMPHOCYTES NFR BLD AUTO: 33 %
MCH RBC QN AUTO: 30.6 PG (ref 26.5–33)
MCHC RBC AUTO-ENTMCNC: 34 G/DL (ref 31.5–36.5)
MCV RBC AUTO: 90 FL (ref 78–100)
MONOCYTES # BLD AUTO: 0.7 10E3/UL (ref 0–1.3)
MONOCYTES NFR BLD AUTO: 9 %
NEUTROPHILS # BLD AUTO: 4.4 10E3/UL (ref 1.6–8.3)
NEUTROPHILS NFR BLD AUTO: 57 %
PLATELET # BLD AUTO: 256 10E3/UL (ref 150–450)
RBC # BLD AUTO: 5.06 10E6/UL (ref 4.4–5.9)
WBC # BLD AUTO: 7.8 10E3/UL (ref 4–11)

## 2021-08-12 PROCEDURE — 82565 ASSAY OF CREATININE: CPT | Performed by: ALLERGY & IMMUNOLOGY

## 2021-08-12 PROCEDURE — 82785 ASSAY OF IGE: CPT | Performed by: ALLERGY & IMMUNOLOGY

## 2021-08-12 PROCEDURE — 86038 ANTINUCLEAR ANTIBODIES: CPT | Performed by: ALLERGY & IMMUNOLOGY

## 2021-08-12 PROCEDURE — 84450 TRANSFERASE (AST) (SGOT): CPT | Performed by: ALLERGY & IMMUNOLOGY

## 2021-08-12 PROCEDURE — 99204 OFFICE O/P NEW MOD 45 MIN: CPT | Performed by: ALLERGY & IMMUNOLOGY

## 2021-08-12 PROCEDURE — 83520 IMMUNOASSAY QUANT NOS NONAB: CPT | Performed by: ALLERGY & IMMUNOLOGY

## 2021-08-12 PROCEDURE — 36415 COLL VENOUS BLD VENIPUNCTURE: CPT | Performed by: ALLERGY & IMMUNOLOGY

## 2021-08-12 PROCEDURE — 83516 IMMUNOASSAY NONANTIBODY: CPT | Performed by: ALLERGY & IMMUNOLOGY

## 2021-08-12 PROCEDURE — 85025 COMPLETE CBC W/AUTO DIFF WBC: CPT | Performed by: ALLERGY & IMMUNOLOGY

## 2021-08-12 PROCEDURE — 86003 ALLG SPEC IGE CRUDE XTRC EA: CPT | Performed by: ALLERGY & IMMUNOLOGY

## 2021-08-12 PROCEDURE — 84460 ALANINE AMINO (ALT) (SGPT): CPT | Performed by: ALLERGY & IMMUNOLOGY

## 2021-08-12 ASSESSMENT — MIFFLIN-ST. JEOR: SCORE: 1697.91

## 2021-08-12 NOTE — PATIENT INSTRUCTIONS
Allegra (Fexofenadine) 180 mg 2 tabs twice daily     Contact next week     If not improving, Pepcid and Xolair

## 2021-08-12 NOTE — PROGRESS NOTES
"        Subjective       HPI chief complaint: Hives      History of present illness: This is a pleasant 31-year-old gentleman I was asked to see for evaluation by Dr. Ybarra in regards to hives.  Patient states he has had this his whole life.  He shows me pictures today that show large urticaria on his arms.  He states he has them daily unless he takes an allergy medication.  He reports the allergy medication does somewhat control his symptoms but sometimes he has to take it twice a day.  It does make him rather sleepy.  He is currently on Zyrtec and being evaluated for sleep disorder.  He believes that Claritin and Allegra also cause him to be sleepy.  He states that he saw an allergist 4 years ago.  I do have this record to review.  He had a systemic work-up that was negative at that time.  He does note symptoms of itchy eyes sneezing and congestion and wonders if he also has environmental allergies.  No bruising or hives.  No belly pain no joint pain.  He does not take any over-the-counter supplements except for vitamins.  He does not use any nonsteroidal anti-inflammatory drugs.  He states at nighttime makes them worse.  The symptoms worsen after showers as well.    Past medical history: Mood disorder    Social history: No changes at home, non-smoker    Family history: Sister with chronic urticaria treated with Xolair        Review of Systems   Constitutional, HEENT, cardiovascular, pulmonary, gi and gu systems are negative, except as otherwise noted.      Objective    Pulse 78   Ht 1.651 m (5' 5\")   Wt 81.1 kg (178 lb 12.8 oz)   SpO2 99%   BMI 29.75 kg/m    Body mass index is 29.75 kg/m .  Physical Exam     Gen: Pleasant male not in acute distress  HEENT: Eyes no erythema of the bulbar or palpebral conjunctiva, no edema. Ears: No deformities or lesions. Nose: No congestion, Mouth: Throat clear,   Neck: No masses lesions or swelling  Respiratory: No coughing with breathing, no retractions  Lymph: No visible " supraclavicular or cervical lymphadenopathy  Skin: No rashes or lesions  Psych: Alert and oriented times 3    Impression report and plan:  1.  Chronic urticaria    I recommended doing a systemic work-up including a CBC with differential, AST, ALT, creatinine.  Recently had a TSH and vitamin D which were normal.  Check a TSH, ALICIA and a celiac disease panel.  Recommended increasing Allegra to 2 tablets twice a day.  If not improving next week, add Pepcid.  He is not a good candidate for montelukast given his mood disorder.  One of the risk and benefits of Xolair.  I think we are headed in that direction.  Urticaria activity score today is 42.  I will contact patient once testing returns.    2.  Allergic rhinitis    Explained to patient that chronic urticaria is not typically caused by a specific allergen.  I will check [place] respiratory disease panel given his allergic rhinitis symptoms.

## 2021-08-12 NOTE — LETTER
"    8/12/2021         RE: Nikolay Lora  [address] [place] [address]        Dear Colleague,    Thank you for referring your patient, Nikolay Lora, to the [facility]. Please see a copy of my visit note below.            Subjective       HPI chief complaint: Hives      History of present illness: This is a pleasant 31-year-old gentleman I was asked to see for evaluation by Dr. Ybarra in regards to hives.  Patient states he has had this his whole life.  He shows me pictures today that show large urticaria on his arms.  He states he has them daily unless he takes an allergy medication.  He reports the allergy medication does somewhat control his symptoms but sometimes he has to take it twice a day.  It does make him rather sleepy.  He is currently on Zyrtec and being evaluated for sleep disorder.  He believes that Claritin and Allegra also cause him to be sleepy.  He states that he saw an allergist 4 years ago.  I do have this record to review.  He had a systemic work-up that was negative at that time.  He does note symptoms of itchy eyes sneezing and congestion and wonders if he also has environmental allergies.  No bruising or hives.  No belly pain no joint pain.  He does not take any over-the-counter supplements except for vitamins.  He does not use any nonsteroidal anti-inflammatory drugs.  He states at nighttime makes them worse.  The symptoms worsen after showers as well.    Past medical history: Mood disorder    Social history: No changes at home, non-smoker    Family history: Sister with chronic urticaria treated with Xolair        Review of Systems   Constitutional, HEENT, cardiovascular, pulmonary, gi and gu systems are negative, except as otherwise noted.      Objective    Pulse 78   Ht 1.651 m (5' 5\")   Wt 81.1 kg (178 lb 12.8 oz)   SpO2 99%   BMI 29.75 kg/m    Body mass index is 29.75 kg/m .  Physical Exam     Gen: Pleasant male not in acute distress  HEENT: Eyes no erythema of " the bulbar or palpebral conjunctiva, no edema. Ears: No deformities or lesions. Nose: No congestion, Mouth: Throat clear,   Neck: No masses lesions or swelling  Respiratory: No coughing with breathing, no retractions  Lymph: No visible supraclavicular or cervical lymphadenopathy  Skin: No rashes or lesions  Psych: Alert and oriented times 3    Impression report and plan:  1.  Chronic urticaria    I recommended doing a systemic work-up including a CBC with differential, AST, ALT, creatinine.  Recently had a TSH and vitamin D which were normal.  Check a TSH, ALICIA and a celiac disease panel.  Recommended increasing Allegra to 2 tablets twice a day.  If not improving next week, add Pepcid.  He is not a good candidate for montelukast given his mood disorder.  One of the risk and benefits of Xolair.  I think we are headed in that direction.  Urticaria activity score today is 42.  I will contact patient once testing returns.    2.  Allergic rhinitis    Explained to patient that chronic urticaria is not typically caused by a specific allergen.  I will check [place] respiratory disease panel given his allergic rhinitis symptoms.      Again, thank you for allowing me to participate in the care of your patient.        Sincerely,        Mayte CARTER MD

## 2021-08-16 LAB
A ALTERNATA IGE QN: <0.1 KU(A)/L
A FUMIGATUS IGE QN: <0.1 KU(A)/L
ANA SER QL IF: NEGATIVE
BERMUDA GRASS IGE QN: 0.16 KU(A)/L
C HERBARUM IGE QN: <0.1 KU(A)/L
CAT DANDER IGG QN: 2.33 KU(A)/L
CEDAR IGE QN: 0.4 KU(A)/L
COMMON RAGWEED IGE QN: 0.92 KU(A)/L
COTTONWOOD IGE QN: 0.2 KU(A)/L
D FARINAE IGE QN: 7.58 KU(A)/L
D PTERONYSS IGE QN: 14.1 KU(A)/L
DOG DANDER+EPITH IGE QN: 0.53 KU(A)/L
IGE SERPL-ACNC: 1249 KU/L (ref 0–114)
MAPLE IGE QN: 0.13 KU(A)/L
MARSH ELDER IGE QN: 0.35 KU(A)/L
MOUSE URINE PROT IGE QN: <0.1 KU(A)/L
NETTLE IGE QN: 0.23 KU(A)/L
P NOTATUM IGE QN: <0.1 KU(A)/L
ROACH IGE QN: 2.62 KU(A)/L
SALTWORT IGE QN: 0.43 KU(A)/L
SILVER BIRCH IGE QN: <0.1 KU(A)/L
TIMOTHY IGE QN: 0.15 KU(A)/L
TTG IGA SER-ACNC: 0.6 U/ML
TTG IGG SER-ACNC: 0.9 U/ML
WHITE ASH IGE QN: 0.29 KU(A)/L
WHITE ELM IGE QN: 0.12 KU(A)/L
WHITE MULBERRY IGE QN: <0.1 KU(A)/L
WHITE OAK IGE QN: 0.16 KU(A)/L

## 2021-08-17 LAB — TRYPTASE SERPL-MCNC: 1.8 UG/L

## 2021-08-19 ENCOUNTER — VIRTUAL VISIT (OUTPATIENT)
Dept: BEHAVIORAL HEALTH | Facility: CLINIC | Age: 31
End: 2021-08-19
Payer: COMMERCIAL

## 2021-08-19 DIAGNOSIS — F98.8 ATTENTION DEFICIT DISORDER (ADD) WITHOUT HYPERACTIVITY: ICD-10-CM

## 2021-08-19 PROCEDURE — 99214 OFFICE O/P EST MOD 30 MIN: CPT | Mod: 95 | Performed by: NURSE PRACTITIONER

## 2021-08-19 RX ORDER — ATOMOXETINE 40 MG/1
CAPSULE ORAL
Qty: 180 CAPSULE | Refills: 0 | Status: CANCELLED | OUTPATIENT
Start: 2021-08-19

## 2021-08-19 NOTE — PATIENT INSTRUCTIONS
Continue medications as prescribed  Have your pharmacy contact us for a refill if you are running low on medications (We may ask you to come into clinic to get a refill from the nurse  No Alcohol or drug use  No driving if sedated  Call the clinic with any questions or concerns   Reach out for help if you feel like hurting yourself or others ([facility] [phone]: [address], [address] or [facility] Suicide Hotline [phone] , call 911 or go to nearest Emergency room    Follow up as directed, for your appointments, per your After Visit Summary Form.

## 2021-08-19 NOTE — PROGRESS NOTES
Tavares short would you like to obtain your AVS? Mail a copy  If the video visit is dropped, the invitation should be resent by: Send to e-mail at: [email or web address]  Will anyone else be joining your video visit? No            Psychiatric  Out- Patient  Follow Up Progress Note  Date of visit:8/19/2021           Discussion of Care and Treatment Recommendations:   This is a 31 year old male with history of anxiety and depression, excessive daytime sleepiness and fatigue and possible ADD.  Patient presents to the clinic today for follow-up appointment for medication management.         Last visit  7/08/2021  Recommendation at last visit .  1-  MDD/ADDContinue Wellbutrin X 300 mg daily - MDD/Inttentivenss,  Trazodone 50 mg to pRN   Clonidine 0.2 mg daily - ADD   ADD: Continue  Strattera 40 mg  BID  2. RTC : 6 weeks anabel in between visit with any  questions or concerns   5- Excessive day time  Fatigue/sleepiness - Labs - Vit D , TSH- All WNL -  Pt has an appointment with sleep medicine today   Patient and I reviewed diagnosis and treatment plan and patient agrees with following recommendations:  Ongoing education given regarding diagnostic and treatment options with adequate verbalization of understanding.  Plan   1-okay to stop Strattera without titrating down, start weaning of Wellbutrin and clonidine by taking every other day for 5 days then stop-sleep medicine recommendations before patient goes in for sleep study.  2. RTC : 8 weeks weeks anabel in between visit with any  questions or concerns            DIagnoses:   ADHD-inattentive type   Persistent Depressive Disorder with Anxious Distress   Features of Schizotypal Personality Disorder   Excessive daytime sleepiness and  fatigue     Patient Active Problem List   Diagnosis     Allergic rhinitis     Chronic dermatitis     Hemorrhoids     Pityriasis versicolor     Pruritus ani     Verruca vulgaris     Mood disorder (H)             Chief Complaint / Subjective:    Chief  "complaint:  Extreme Fatigue, excessive daytime sleepiness      History of Present Illness:   Per patient statement-he had a consultation with sleep medicine and has a sleep study scheduled on 9/13/2021.  It was recommended that he weans off all his medications in the next 2 weeks before his sleep study.  I I reviewed sleep study consultation note.  We did discuss weaning off strategy for his medications patient endorsed understanding.  No tapering down of Strattera really recommended so he can stop that that he would need to wean off Wellbutrin and clonidine in the next 1 week.  Patient return to the clinic in approximately 8 weeks for follow-up appointment after he has had his sleep study and consultation after his sleep study with recommendation from his sleep medicine.  Mental Status Examination:   Appearance: unable to assess  Orientation: Patient alert and oriented to person, place, time, and situation  Reliability:  Patient appears to be an adequate historian.    Behavior: unable to assess  Speech: Speech is spontaneous and coherent, with a normal rate, rhythm and tone.    Language:There are no difficulties with expressive or receptive language as observed throughout the interview.    Mood: Described as \"ok\".    Affect: unable to assess  Judgement: Able to make basic decision regarding safety.  Insight: Good awareness of physical and mental health conditions and aware of needs around care for these.  Gait and station: unable to assess  Thought process: Logical   Thought content: No evidence of delusions or paranoia.    Hallucinations : No evidence of any hallucination  Thought content: No evidence of delusions or paranoia.   Suicidal /Homical Ideations:  No thoughts of self harm or suicide. No thoughts of harming others.  Associations: Connected  Fund of knowledge: Average  Attention / Concentration: Able to remain focused during the interview with minimal distractibility or need for redirection.  Short Term " Memory: Grossly intact as evidence by client recalling themes and ideas discussed.  Long Term Memory: Intact  Motor Status: unable to asse    Drug/treatment history and current pattern of use:   Cigarettes : Smoking more frequently more than 5 cigarettes/day   Hx of alcohol abuse - 2 years ago   Alcohol : once every other week - last alcoholic drink - 1  week  Denies use any other mood altering substances     Medication changes: See Above   Medication adherence: compliant  Medication side effects: absent  Information about medications: Side effects, benefits and alternative treatments discussed and patient agrees .    Psychotherapy: Supportive therapy day-to-day living    Education: Diet, exercise, abstinence from drugs and alcohol, patient will not drive if sedated and medications or  under influence of any substance    Lab Results:   Personally reviewed and discussed with the patient    Lab Results   Component Value Date    WBC 7.8 08/12/2021    HGB 15.5 08/12/2021    HCT 45.6 08/12/2021     08/12/2021    CHOL 263 (H) 03/10/2021    HDL 76 03/10/2021    ALT 44 08/12/2021    AST 32 08/12/2021    TSH 0.92 06/14/2021       Vital signs:  There were no vitals taken for this visit.  Telemedicine visit-no vital signs completed  Allergies: Patient has no known allergies.         Medications:         [START ON 8/26/2021] omalizumab  300 mg Subcutaneous Q28 Days           Medication adherence: Reviewed risk/benefits of medication , Patient able to verbalize understanding of side effects and Patient verbally consents to taking medications           Review of Systems:      ROS:    Subjective Data Only- Tele-Health Visit    10 point ROS was negative except for the items listed in HPI.      Coordination of Care:   More than 30 minutes spent on this visit  with more than 50% of time spent on coordination of care including: Educating patient about diagnosis, prognosis, side effects and benefits of medications, diet,  exercise.  Time also spent providing supportive therapy regarding above issues.      Video-Visit Details    Type of service:  Video Visit    Originating Location (pt. Location): Home    Distant Location (provider location):  [facility]     Platform used for Video Visit: [employer]      This note was created using a dictation system. All typing errors or contextual distortion is unintentional and software inherent.  Start Time : 0800  End time : 0830

## 2021-08-19 NOTE — PROGRESS NOTES
This video/telephone visit will be conducted via a call between you and your physician/provider. We have found that certain health care needs can be provided without the need for an in-person physical exam. This service lets us provide the care you need with a video /telephone conversation. If a prescription is necessary we can send it directly to your pharmacy. If lab work is needed we can place an order for that and you can then stop by our lab to have the test done at a later time.    Just as we bill insurance for in-person visits, we also bill insurance for video/telephone visits. If you have questions about your insurance coverage, we recommend that you speak with your insurance company.    Patient has given verbal consent for video/Telephone visit? Yes   Patient would like the video visit invitation sent by: Lavonne if connection issue: [phone]  BRY/JAGRUTI BONNER-L[place]onne   Pt would like to discuss his upcoming sleep study and the suggestions/request of the provider to stop his Wellbutrin during the 2 weeks period of the ongoing study.     Patient verified allergies, medications and pharmacy via phone. Patient states he is ready for visit.  [place]  to be reviewed by provider.

## 2021-08-24 ENCOUNTER — TELEPHONE (OUTPATIENT)
Dept: ALLERGY | Facility: CLINIC | Age: 31
End: 2021-08-24

## 2021-08-24 NOTE — TELEPHONE ENCOUNTER
Denied please please see this information under media tab in patients chart as to why it was denied,

## 2021-08-31 ENCOUNTER — OFFICE VISIT (OUTPATIENT)
Dept: SLEEP MEDICINE | Facility: CLINIC | Age: 31
End: 2021-08-31
Payer: COMMERCIAL

## 2021-08-31 ENCOUNTER — TELEPHONE (OUTPATIENT)
Dept: ALLERGY | Facility: CLINIC | Age: 31
End: 2021-08-31

## 2021-08-31 DIAGNOSIS — G47.19 EXCESSIVE DAYTIME SLEEPINESS: Primary | ICD-10-CM

## 2021-08-31 NOTE — TELEPHONE ENCOUNTER
Spoke to patient and his hives are not better. He has been using double dose of antihistamines and they are not getting better. He is quite miserable.    ----- Message from Angelina Mendosa RN sent at 8/24/2021  1:37 PM CDT -----  Regarding: RE: Xolair  Called patient and LVM     ANGELINA MENDOSA RN   ----- Message -----  From: Mayte Bullock MD  Sent: 8/24/2021   1:21 PM CDT  To: Allergy Care Support Pool  Subject: FW: Xolair                                       Can we check with patient and see how his hives are doing before I write this letter? If hives still present and not controlled on antihistamines, let me know  ----- Message -----  From: Sharon Barron  Sent: 8/24/2021  10:16 AM CDT  To: Mayte Bullock MD, Karlos Vences, #  Subject: FW: Xolair                                       Ok so we no longer look under referral to find the information about approval?    Or this is just specific to this patient?     Adding dr bullock to this message thread as well.   ----- Message -----  From: Karlos Vences  Sent: 8/24/2021  10:12 AM CDT  To: Fannie Abbasi  Subject: Xolair                                           Hello,    PA was denied for patient's Xolair injection.  Denial is on the patient's media tab which shows why the PA was denied.  If the provider wishes to appeal, please have the provider write a letter of medical necessity and our team can submit an appeal.    Thank you,    Karlos Rangel

## 2021-09-13 ENCOUNTER — THERAPY VISIT (OUTPATIENT)
Dept: SLEEP MEDICINE | Facility: CLINIC | Age: 31
End: 2021-09-13
Payer: COMMERCIAL

## 2021-09-13 DIAGNOSIS — G47.19 EXCESSIVE DAYTIME SLEEPINESS: ICD-10-CM

## 2021-09-13 DIAGNOSIS — F39 MOOD DISORDER (H): ICD-10-CM

## 2021-09-13 PROCEDURE — 95810 POLYSOM 6/> YRS 4/> PARAM: CPT | Performed by: PSYCHIATRY & NEUROLOGY

## 2021-09-14 ENCOUNTER — APPOINTMENT (OUTPATIENT)
Dept: LAB | Facility: CLINIC | Age: 31
End: 2021-09-14
Payer: COMMERCIAL

## 2021-09-14 ENCOUNTER — THERAPY VISIT (OUTPATIENT)
Dept: SLEEP MEDICINE | Facility: CLINIC | Age: 31
End: 2021-09-14
Payer: COMMERCIAL

## 2021-09-14 ENCOUNTER — DOCUMENTATION ONLY (OUTPATIENT)
Dept: SLEEP MEDICINE | Facility: CLINIC | Age: 31
End: 2021-09-14
Payer: COMMERCIAL

## 2021-09-14 DIAGNOSIS — G47.19 EXCESSIVE DAYTIME SLEEPINESS: ICD-10-CM

## 2021-09-14 DIAGNOSIS — F39 MOOD DISORDER (H): ICD-10-CM

## 2021-09-14 PROCEDURE — 80307 DRUG TEST PRSMV CHEM ANLYZR: CPT

## 2021-09-14 PROCEDURE — 80320 DRUG SCREEN QUANTALCOHOLS: CPT

## 2021-09-14 PROCEDURE — 95805 MULTIPLE SLEEP LATENCY TEST: CPT | Performed by: PSYCHIATRY & NEUROLOGY

## 2021-09-14 NOTE — PROGRESS NOTES
Four nap MSLT was performed.    Nap 1: Pt was not tired and id not feel that he slept.  Nap 2: Pt was drowsy and thought that he, maybe, slept.  Nap 3: Pt was sleepy and felt that he slept and dreamt.  Nap 4: Pt was tired and felt that he did not sleep.

## 2021-09-14 NOTE — PATIENT INSTRUCTIONS
[facility] [place] [facility]    1. Your sleep study will be reviewed by a sleep physician within the next few days.     2. Please follow up in the sleep clinic as scheduled, or, make an appointment with your sleep provider to be seen within two weeks to discuss the results of the sleep study.    3. If you have any questions or problems with your treatment plan, please contact your sleep clinic provider at [phone] to further manage your condition.    4. Please review your attached medication list, and, at your follow-up appointment advise your sleep clinic provider about any changes.    5. Go to [email or web address] for more information about your sleep problems.    Tarsha Cobb, RPSGT  September 14, 2021

## 2021-09-14 NOTE — PROCEDURES
" SLEEP STUDY INTERPRETATION  DIAGNOSTIC POLYSOMNOGRAPHY REPORT      Patient: JOEY MOSS  YOB: 1990  Study Date: 9/13/2021  MRN: [id number]  Referring Provider: Self  Ordering Provider: VANNESSA Stokes Amber    Indications for Polysomnography: The patient is a 31 year old Male who is 5' 5\" and weighs 165.0 lbs. His BMI is 27.5, [place] sleepiness scale 22 and neck circumference is 40.5 cm. Relevant medical history includes excessive daytime sleepiness. A diagnostic polysomnogram was performed to evaluate for sleep apnea/PLMS/prior to proceeding to MSLT.    Polysomnogram Data: A full night polysomnogram recorded the standard physiologic parameters including EEG, EOG, EMG, ECG, nasal and oral airflow. Respiratory parameters of chest and abdominal movements were recorded with respiratory inductance plethysmography. Oxygen saturation was recorded by pulse oximetry. Hypopnea scoring rule used: 1B 4%.    Sleep Architecture: Sleep fragmentation  The total recording time of the polysomnogram was 539.5 minutes. The total sleep time was 449.0 minutes. Sleep latency was 12.0 minutes. REM latency was 268.5 minutes. Arousal index was 23.7 arousals per hour. Sleep efficiency was 83.2%. Wake after sleep onset was 77.0 minutes. The patient spent 10.9% of total sleep time in Stage N1, 46.2% in Stage N2, 26.6% in Stage N3, and 16.3% in REM. Time in REM supine was 73.0 minutes.    Respiration: The patient did not demonstrate clinically significant sleep disordered breathing.  Of note, this was a good study that included REM supine.      Events ? The polysomnogram revealed a presence of 2 obstructive, 4 central, and - mixed apneas resulting in an apnea index of 0.8 events per hour. There were 7 obstructive hypopneas and 1 central hypopneas resulting in an obstructive hypopnea index of 0.9 and central hypopnea index of 0.1 events per hour. The combined apnea/hypopnea index was 1.9 events per hour (central apnea/hypopnea " index was 0.7 events per hour). The REM AHI was 7.4 events per hour. The supine AHI was 2.2 events per hour. The RERA index was 3.5 events per hour.  The RDI was 5.3 events per hour.    Snoring - was reported as moderate-loud.    Respiratory rate and pattern - was notable for normal respiratory rate and pattern.    Sustained Sleep Associated Hypoventilation - Transcutaneous carbon dioxide monitoring was not used.    Sleep Associated Hypoxemia - (Greater than 5 minutes O2 sat at or below 88%) was not present. Baseline oxygen saturation was 95.3%. Lowest oxygen saturation was 88.8%. Time spent less than or equal to 88% was 0 minutes. Time spent less than or equal to 89% was 0.1 minutes.    Movement Activity:     Periodic Limb Activity - There were 9 PLMs during the entire study. The PLM index was 1.2 movements per hour. The PLM Arousal Index was - per hour.    REM EMG Activity - Excessive muscle activity was not present.    Nocturnal Behavior - Abnormal sleep related behaviors were not.    Bruxism - None apparent.    Cardiac Summary: Sinus, intermittent tachycardia  The average pulse rate was 70.9 bpm. The minimum pulse rate was 53.0 bpm while the maximum pulse rate was 109.0 bpm.      Assessment:     The patient did not demonstrate clinically significant sleep disordered breathing.  Of note, this was a good study that included REM supine.      Recommendations:    Proceed to MSLT.  Of note this study did demonstrate sleep fragmentation but it did not demonstrate sleep onset REM nor REM motor activity.      Advice regarding the risks of drowsy driving.    Suggest optimizing sleep schedule and avoiding sleep deprivation.    Diagnostic Codes: F51.11, G47.9      Luis Santillan MD 9-

## 2021-09-15 LAB
AMPHETAMINES UR QL SCN: NORMAL
BARBITURATES UR QL: NORMAL
BENZODIAZ UR QL: NORMAL
CANNABINOIDS UR QL SCN: NORMAL
COCAINE UR QL: NORMAL
ETHANOL UR QL SCN: NORMAL
OPIATES UR QL SCN: NORMAL
PCP UR QL SCN: NORMAL

## 2021-09-27 LAB — SLPCOMP: NORMAL

## 2021-09-27 NOTE — PROCEDURES
" MSLT REPORT          Patient Name: JOEY MOSS Study Date: 9/14/2021   YOB: 1990 Study Type: MSLT   Age:  31 year MRN: [id number]   Sex: Male Interp Physician: jonathan   BMI:  27.5 Ordering Physician: Megha Stokes Amber   Height: 5' 5\" Referring Physician: -   Weight: 165.0 lbs Recording Tech: JAMI Borden   [place]: - Neck Size (cm): - Scoring Tech: JAMI Borden   Nap Summary     Nap 1 Nap 2 Nap 3 Nap 4 Nap 5 Average   Lights Off 08:03:30 AM 10:00:31 AM 12:00:00 PM 01:58:01 PM - -   Lights On 08:33:30 AM 10:17:30 AM 12:18:00 PM 02:19:00 PM - -   Time In Bed 30.0 17.0 18.0 21.0 - 21.5   Sleep Time 12.0 6.0 13.0 4.5 - 8.9   Sleep Efficiency 40.0% 35.3% 72.2% 21.4% - 41.3%   Sleep Onset 08:18:30 AM 10:02:30 AM 12:04:00 PM 02:04:00 PM - -   Sleep Latency 15.0 2.0 4.0 6.0 - 6.8   REM Onset - - - - - -   REM Sleep Onset Latency - - - - - -         Recording / Sleep Tech Comments    This MSLT/MWT was/was not recorded after overnight polysomnography -- Lights On at 06:36 (TRT = 539.5min, TST = 449 min).  Patient was monitored all day and demonstrated compliance with all technologist s instructions.  Sleep onset detected in study all periods.  SOREMPs were not observed.  Actiwatch was collected and downloaded.  Random UA was collected.    Physician Interpretation  This study demonstrate short sleep latencies suggestive of hypersomnia.  Did not demonstrate sleep onset REM periods.  Clincical correlation required.  If deemed appropriate could consider stimulant therapy for hypersomnia along with optimizing the duration and circadian timing of sleep.      Diagnostic Code: G47.30      Luis Santillan MD 9-27-21    "

## 2021-09-27 NOTE — PROGRESS NOTES
Nikolay is a 31 year old who is being evaluated via a billable video visit.       How would you like to obtain your AVS? MyChart  If the video visit is dropped, the invitation should be resent by: Send to e-mail at: [email or web address]  Will anyone else be joining your video visit? No     Viviana Kwan MA    Video-Visit Details     Type of service:  Video Visit    Video Start Time: 9:03AM    Video End Time:9:31AM    Originating Location (pt. Location): Home     Distant Location (provider location):  [facility]      Platform used for Video Visit: [facility] [place]   Outpatient Sleep Medicine  Sep 28, 2021       Name: Nikolay Lora MRN# [id number]   Age: 31 year old YOB: 1990            Assessment and Plan:   1. Idiopathic hypersomnia  During this visit, we reviewed his hypersomnolence testing including 2 weeks of actigraphy, PSG, MSLT, and urine tox screen results. Two weeks of actigraphy and sleep logs leading up to sleep study showed sufficient sleep time with bedtime ranging between 9-11:00 PM and wake time between 6:35-7:30 AM. Of note, actigraphy estimated average total sleep time at night to being 6.2 hours and average total time in bed 9 hours/night but sleep logs report 8-9 hours sleep per night.    Summarized PSG results: Sleep architecture revealed all sleep stages present with sleep latency 12 minutes. REM latency prolonged at 268.5 minutes.  There was snoring but no evidence of sleep disordered breathing (AHI 1.9).  No sleep associated hypoxemia.  No abnormal movement activity.  Cardiac monitoring revealed sinus rhythm with intermittent tachycardia.     MSLT the following day showed average sleep latency was short at 6.8 minutes. Fell asleep in 4/4 nap opportunities. No sleep onset REM periods. Urine drug screen was negative, drawn 9/14/2021.     Testing is non-diagnostic of narcolepsy given no SOREMPs but does meet criteria for  idiopathic hypersomnolence. Discussed treating with stimulant therapy and patient agreeable. Prescription sent to pharmacy for modafinil 200mg to be taken once daily in the morning. May consider increasing dose or changing to alternative stimulant based on response. Will plan to follow-up in 1 month to see how he is doing on the medication. Ideally will schedule this for in person visit to sign controlled substance agreement if continuing with stimulant therapy.        Chief Complaint      Chief Complaint   Patient presents with     Study Results          History of Present Illness:   Nikolay Lora is a 31 year old male who presents to the clinic for results of recent sleep testing completed on 9/13/2021 and 9/14/2021. Relevant medical history includes excessive daytime sleepiness. A diagnostic polysomnogram was performed to evaluate for sleep apnea/PLMS/prior to proceeding to MSLT. MSLT was performed to assess for pathologic sleepiness.     Reviewed results of sleep studies with patient as follows:  PSG:   Sleep Architecture: Sleep fragmentation  The total recording time of the polysomnogram was 539.5 minutes. The total sleep time was 449.0 minutes. Sleep latency was 12.0 minutes. REM latency was 268.5 minutes. Arousal index was 23.7 arousals per hour. Sleep efficiency was 83.2%. Wake after sleep onset was 77.0 minutes. The patient spent 10.9% of total sleep time in Stage N1, 46.2% in Stage N2, 26.6% in Stage N3, and 16.3% in REM. Time in REM supine was 73.0 minutes.     Respiration: The patient did not demonstrate clinically significant sleep disordered breathing.  Of note, this was a good study that included REM supine.      Events ? The polysomnogram revealed a presence of 2 obstructive, 4 central, and - mixed apneas resulting in an apnea index of 0.8 events per hour. There were 7 obstructive hypopneas and 1 central hypopneas resulting in an obstructive hypopnea index of 0.9 and central hypopnea index of 0.1 events  per hour. The combined apnea/hypopnea index was 1.9 events per hour (central apnea/hypopnea index was 0.7 events per hour). The REM AHI was 7.4 events per hour. The supine AHI was 2.2 events per hour. The RERA index was 3.5 events per hour.  The RDI was 5.3 events per hour.    Snoring - was reported as moderate-loud.    Respiratory rate and pattern - was notable for normal respiratory rate and pattern.    Sustained Sleep Associated Hypoventilation - Transcutaneous carbon dioxide monitoring was not used.    Sleep Associated Hypoxemia - (Greater than 5 minutes O2 sat at or below 88%) was not present. Baseline oxygen saturation was 95.3%. Lowest oxygen saturation was 88.8%. Time spent less than or equal to 88% was 0 minutes. Time spent less than or equal to 89% was 0.1 minutes.     Movement Activity:     Periodic Limb Activity - There were 9 PLMs during the entire study. The PLM index was 1.2 movements per hour. The PLM Arousal Index was - per hour.    REM EMG Activity - Excessive muscle activity was not present.    Nocturnal Behavior - Abnormal sleep related behaviors were not.    Bruxism - None apparent.     Cardiac Summary: Sinus, intermittent tachycardia  The average pulse rate was 70.9 bpm. The minimum pulse rate was 53.0 bpm while the maximum pulse rate was 109.0 bpm.      MSLT:   Nap Summary     Nap 1 Nap 2 Nap 3 Nap 4 Nap 5 Average   Lights Off 08:03:30 AM 10:00:31 AM 12:00:00 PM 01:58:01 PM - -   Lights On 08:33:30 AM 10:17:30 AM 12:18:00 PM 02:19:00 PM - -   Time In Bed 30.0 17.0 18.0 21.0 - 21.5   Sleep Time 12.0 6.0 13.0 4.5 - 8.9   Sleep Efficiency 40.0% 35.3% 72.2% 21.4% - 41.3%   Sleep Onset 08:18:30 AM 10:02:30 AM 12:04:00 PM 02:04:00 PM - -   Sleep Latency 15.0 2.0 4.0 6.0 - 6.8   REM Onset - - - - - -   REM Sleep Onset Latency - - - - - -           Physician Interpretation  This study demonstrate short sleep latencies suggestive of hypersomnia.  Did not demonstrate sleep onset REM periods.  Clincical  correlation required.  If deemed appropriate could consider stimulant therapy for hypersomnia along with optimizing the duration and circadian timing of sleep.      Past medical/surgical history, family history, social history, medications and allergies were reviewed.           Physical Examination:   There were no vitals taken for this visit.  General appearance: Awake, alert, cooperative. Well groomed. Sitting comfortably in chair. In no apparent distress.  HEENT: Head: Normocephalic, atraumatic. Eyes:Conjunctiva clear. Sclera normal. Nose: External appearance without deformity.   Pulmonary:  Able to speak easily in full sentences. No cough or wheeze.   Skin:  No rashes or significant lesions on visible skin.   Neurologic: Alert, oriented x3.   Psychiatric: Mood euthymic. Affect congruent with full range and intensity.      CC:  Mich Ybarra PA-C  Sep 28, 2021     [facility]  [address] [place] [address]     [facility] [place]  [address] [address]    Chart documentation was completed, in part, with [employer] voice-recognition software. Even though reviewed, some grammatical, spelling, and word errors may remain.    42 minutes spent on day of encounter doing chart review, history and exam, documentation, and further activities as noted above

## 2021-09-28 ENCOUNTER — VIRTUAL VISIT (OUTPATIENT)
Dept: SLEEP MEDICINE | Facility: CLINIC | Age: 31
End: 2021-09-28
Payer: COMMERCIAL

## 2021-09-28 VITALS — BODY MASS INDEX: 27.49 KG/M2 | HEIGHT: 65 IN | WEIGHT: 165 LBS

## 2021-09-28 DIAGNOSIS — G47.11 IDIOPATHIC HYPERSOMNIA: Primary | ICD-10-CM

## 2021-09-28 PROCEDURE — 99215 OFFICE O/P EST HI 40 MIN: CPT | Mod: GT | Performed by: PHYSICIAN ASSISTANT

## 2021-09-28 RX ORDER — MODAFINIL 200 MG/1
200 TABLET ORAL DAILY
Qty: 30 TABLET | Refills: 0 | Status: SHIPPED | OUTPATIENT
Start: 2021-09-28 | End: 2021-10-11

## 2021-09-28 ASSESSMENT — MIFFLIN-ST. JEOR: SCORE: 1635.32

## 2021-09-28 NOTE — PROGRESS NOTES
"Nikolay is a 31 year old who is being evaluated via a billable video visit.      How would you like to obtain your AVS? MyChart  If the video visit is dropped, the invitation should be resent by: Send to e-mail at: [email or web address]  Will anyone else be joining your video visit? No  {If patient encounters technical issues they should call [phone] :[id number]}    Video Start Time: {video visit start/end time for provider to select:152948}  Video-Visit Details    Type of service:  Video Visit    Video End Time:{video visit start/end time for provider to select:152948}    Originating Location (pt. Location): {video visit patient location:214022::\"Home\"}    Distant Location (provider location):  [facility]     Platform used for Video Visit: {Virtual Visit Platforms:859014::\"GlySens\"}    "

## 2021-10-01 DIAGNOSIS — F33.1 MODERATE EPISODE OF RECURRENT MAJOR DEPRESSIVE DISORDER (H): ICD-10-CM

## 2021-10-01 DIAGNOSIS — R41.840 INATTENTION: ICD-10-CM

## 2021-10-01 NOTE — TELEPHONE ENCOUNTER
Date of Last Office Visit: 8/19/21  Date of Next Office Visit: 10/6/21  No shows since last visit: none  Cancellations since last visit: none    Medication requested: bupropion 150mg Date last ordered: 6/28/21 Qty: 90 Refills: 0     Lapse in medication adherence greater than 5 days?: no  If yes, call patient and gather details:   Medication refill request verified as identical to current order?: Unsure, last visit note says patient is weaning from Bupropion  Result of Last DAM, VPA, Li+ Level, CBC, or Carbamazepine Level (at or since last visit): N/A    Last visit treatment plan:   Plan   1-okay to stop Strattera without titrating down, start weaning of Wellbutrin and clonidine by taking every other day for 5 days then stop-sleep medicine recommendations before patient goes in for sleep study.  2. RTC : 8 weeks weeks anabel in between visit with any  questions or concerns     []Medication refilled per  Medication Refill in Ambulatory Care  policy.  [x]Medication unable to be refilled by RN due to criteria not met as indicated below:    []Eligibility - not seen in the last year   []Supervision - no future appointment   []Compliance - no shows, cancellations or lapse in therapy   [x]Verification - order discrepancy   []Controlled medication   []Medication not included in policy   []90-day supply request   [x]Other - patient weaning from bupropion per last visit note

## 2021-10-04 RX ORDER — BUPROPION HYDROCHLORIDE 300 MG/1
TABLET ORAL
Qty: 90 TABLET | Refills: 0 | OUTPATIENT
Start: 2021-10-04

## 2021-10-05 ENCOUNTER — TELEPHONE (OUTPATIENT)
Dept: SLEEP MEDICINE | Facility: CLINIC | Age: 31
End: 2021-10-05

## 2021-10-05 NOTE — TELEPHONE ENCOUNTER
Reason for Call:  Other appointment    Detailed comments: patient is currently scheduled with Barbara OTOOLE at  [facility] on December 7.  Patient was suggested by provider to be seen around October 28 for a 1 month f/u.  Document needed to sign.  Patient wondering if needs appointment or can provider sign without appointment?  Please contact patient.  Needs wait list as well.  Thank you.    Phone Number Patient can be reached at: Home number on file [phone] (home)    Best Time: any    Can we leave a detailed message on this number? YES    Call taken on 10/5/2021 at 1:07 PM by Zena Nguyen

## 2021-10-06 ENCOUNTER — VIRTUAL VISIT (OUTPATIENT)
Dept: BEHAVIORAL HEALTH | Facility: CLINIC | Age: 31
End: 2021-10-06
Payer: COMMERCIAL

## 2021-10-06 ENCOUNTER — TELEPHONE (OUTPATIENT)
Dept: SLEEP MEDICINE | Facility: CLINIC | Age: 31
End: 2021-10-06

## 2021-10-06 DIAGNOSIS — F39 MOOD DISORDER (H): ICD-10-CM

## 2021-10-06 DIAGNOSIS — G47.10 HYPERSOMNIA: Primary | ICD-10-CM

## 2021-10-06 DIAGNOSIS — G47.11 IDIOPATHIC HYPERSOMNIA: Primary | ICD-10-CM

## 2021-10-06 DIAGNOSIS — G47.19 EXCESSIVE DAYTIME SLEEPINESS: ICD-10-CM

## 2021-10-06 PROCEDURE — 99214 OFFICE O/P EST MOD 30 MIN: CPT | Mod: TEL | Performed by: NURSE PRACTITIONER

## 2021-10-06 NOTE — PROGRESS NOTES
"  The patient has been notified of following:      \"This telephone visit will be conducted via a call between you and your physician/provider. We have found that certain health care needs can be provided without the need for a physical exam.  This service lets us provide the care you need with a short phone conversation.  If a prescription is necessary we can send it directly to your pharmacy.  If lab work is needed we can place an order for that and you can then stop by our lab to have the test done at a later time.     Telephone visits are billed at different rates depending on your insurance coverage. During this emergency period, for some insurers they may be billed the same as an in-person visit.  Please reach out to your insurance provider with any questions.     If during the course of the call the physician/provider feels a telephone visit is not appropriate, you will not be charged for this service.\"     Patient has given verbal consent to a Telephone visit? Yes        Patient would like to receive their AVS by AVS P/reference: Mail a copy          Psychiatric  Out patient Follow Up Progress Note  Date of visit:10/6/2021         Discussion of Care and Treatment Recommendations:   This is a 31 year old male with a history of anxiety and depression, excessive daytime sleepiness and fatigue and possible ADD.  Patient presents to the clinic today for follow-up appointment for medication management.     Modafinil prescribed  By sleep medicine     Last visit  09/30/2021 Recommendation at last visit .  1-okay to stop Strattera without titrating down, start weaning of Wellbutrin and clonidine by taking every other day for 5 days then stop-sleep medicine recommendations before patient goes in for sleep study.  2. RTC : 8 weeks weeks anabel in between visit with any  questions or concerns         Patient and I reviewed diagnosis and treatment plan and patient agrees with following recommendations:  Ongoing education " given regarding diagnostic and treatment options with adequate verbalization of understanding.  Plan   1-Sleep Issues : Defer to sleep medicine Last appointment was 9/28/2021  next appointment is 12/07/2021  2- RTC - 4 months or sooner if needed -patient major issues currently is sleeping and is managing medication patient is not on any psychiatric medication at this time         DIagnoses:     Hypersomnia    Patient Active Problem List   Diagnosis     Allergic rhinitis     Chronic dermatitis     Hemorrhoids     Pityriasis versicolor     Pruritus ani     Verruca vulgaris     Mood disorder (H)             Chief Complaint / Subjective:    Chief complaint: Hypersomnia and fatigue    History of Present Illness:   Per patient's statement : Patient did meet have a sleep study which did not completely sleep apnea or narcolepsy.  Next with sleep medicine on 9/28/2021 and was started on modafinil 200 only.  He is feeling frustrated because he is still sleeping poorly during the day appointment for her support.  I did review sleep medicine's note and there were recommendations to potentially increase the change treatment if things did not improve.  I did let patient know on this and recommended that he call sleep medicine to address his sleep issues.  Currently depression or anxiety and not a major issue for patient and he is currently not on any medications.  We will therefore defer sleep issues to sleep medicine.  We agreed that patient should return to the clinic in approximately 4 months or sooner if needed if any psychiatric issues to be addressed.  For right now focus is to address excessive daytime sleepiness and be able to focus at work patient denies all other psychiatric issues offers no other concerns.  Mental Status Examination:     Appearance: unable to assess  Orientation: Patient alert and oriented to person, place, time, and situation  Reliability:  Patient appears to be an adequate historian.    Behavior:  "calm and coperative   Speech: Speech is spontaneous and coherent, with a normal rate, rhythm and tone.    Language:There are no difficulties with expressive or receptive language as observed throughout the interview.    Mood: Described as \"ok\".    Affect: unable to assess  Judgement: Able to make basic decision regarding safety.  Insight: Good awareness of physical and mental health conditions and aware of needs around care for these.  Gait and station: unable to assess  Thought process: Logical   Hallucinations : No evidence of any hallucination  Thought content: No evidence of delusions or paranoia.   Suicidal /Homical Ideations:  No thoughts of self harm or suicide. No thoughts of harming others.  Associations: Connected  Fund of knowledge: Average  Attention / Concentration: Able to remain focused during the interview with minimal distractibility or need for redirection.  Short Term Memory: Grossly intact as evidence by client recalling themes and ideas discussed.  Long Term Memory: Intact  Motor Status: unable to asses      Drug/treatment history and current pattern of use:   Drug of choice  Age of first use: **  Date of last use: **  Blackouts: **  Seizures/DTs/hallucinations: **  : ; **  Relapse/Triggers : **    Medication changes: See Above   Medication adherence: compliant  Medication side effects: absent  Information about medications: Side effects, benefits and alternative treatments discussed and patient agrees .    Psychotherapy: Supportive therapy day-to-day living    Education: Diet, exercise, abstinence from drugs and alcohol, patient will not drive if sedated and medications or  under influence of any substance    Lab Results:   Personally reviewed and discussed with the patient    Lab Results   Component Value Date    WBC 7.8 08/12/2021    HGB 15.5 08/12/2021    HCT 45.6 08/12/2021     08/12/2021    CHOL 263 (H) 03/10/2021    HDL 76 03/10/2021    ALT 44 08/12/2021    AST 32 08/12/2021    TSH " 0.92 06/14/2021       Vital signs:  There were no vitals taken for this visit.  Unable to assess telephone visit  Allergies: Patient has no known allergies.           Review of Systems:      ROS:  Subjective data only - Tele-Health  Visit   10 point ROS was negative except for the items listed in HPI-              Medications:       omalizumab  300 mg Subcutaneous Q28 Days     Current Outpatient Medications   Medication     atomoxetine (STRATTERA) 40 MG capsule     buPROPion (WELLBUTRIN SR) 150 MG 12 hr tablet     buPROPion (WELLBUTRIN XL) 300 MG 24 hr tablet     cetirizine (ZYRTEC) 10 MG tablet     finasteride (PROSCAR) 5 mg tablet     modafinil (PROVIGIL) 200 MG tablet     triamcinolone (KENALOG) 0.1 % cream     varenicline (CHANTIX STARTING MONTH BOX) 0.5 mg (11)- 1 mg (42) tablet     Current Facility-Administered Medications   Medication     omalizumab (XOLAIR) injection 300 mg         Medication adherence: Reviewed risk/benefits of medication , Patient able to verbalize understanding of side effects and Patient verbally consents to taking medications    Coordination of Care:   More than 30 minutes spent on this visit  with more than 50% of time spent on coordination of care including: Educating patient about diagnosis, prognosis, side effects and benefits of medications, diet, exercise.  Time also spent providing supportive therapy regarding above issues.    This note was created using a dictation system. All typing errors or contextual distortion is unintentional and software inherent.  Start Time : 0830  End time : 0900

## 2021-10-06 NOTE — TELEPHONE ENCOUNTER
Reach out to patient to see what kind of form that he needed. Patient states he needed the controlled substance agreement. Form e-mail to patient at [email or web address] with instruction to fax back.       Farzana Larsen [facility]

## 2021-10-06 NOTE — TELEPHONE ENCOUNTER
Finished submitting PA with diagnosis code G47.11 from provider below      Central Prior Authorization Team   Phone: [phone]    PA Initiation    Medication: rec'd faxed prior auth medication request MODAFINIL 200 mg tabs  Insurance Company: [employer] - Phone [phone] Fax [phone]  [id number] [place], [place] [facility] [address]  Filling Pharmacy Phone: [phone]  Filling Pharmacy Fax:    Start Date: 10/6/2021

## 2021-10-06 NOTE — PATIENT INSTRUCTIONS
Continue medications as prescribed  Have your pharmacy contact us for a refill if you are running low on medications (We may ask you to come into clinic to get a refill from the nurse  No Alcohol or drug use  No driving if sedated  Call the clinic with any questions or concerns   Reach out for help if you feel like hurting yourself or others ([facility] [phone]: [address], [address] or [facility] Suicide Hotline [phone] , call 911 or go to nearest Emergency room    Follow up as directed, for your appointments, per your After Visit Summary Form.

## 2021-10-06 NOTE — TELEPHONE ENCOUNTER
Please advise Rx does not have diagnosis listed.  Chart notes state idiopathic hypersomnia although this diagnosis could be G47.10-G47.19.     Please provide diagnosis with ICD-10 code for PA team to initiate request for Modafinil.

## 2021-10-06 NOTE — PROGRESS NOTES
This video/telephone visit will be conducted via a call between you and your physician/provider. We have found that certain health care needs can be provided without the need for an in-person physical exam. This service lets us provide the care you need with a video /telephone conversation. If a prescription is necessary we can send it directly to your pharmacy. If lab work is needed we can place an order for that and you can then stop by our lab to have the test done at a later time.    Just as we bill insurance for in-person visits, we also bill insurance for video/telephone visits. If you have questions about your insurance coverage, we recommend that you speak with your insurance company.    Patient has given verbal consent for Telephone visit? Yes   Patient would like telephone visit please call: [phone]  BRY/JAGRUTI MOSSS-Lavonne   Pt has not restarted his medications after completing the sleep study. Pt would like to discuss today.   Patient verified allergies, medications and pharmacy via phone.  Patient states he is ready for visit.  [place]  to be reviewed by provider.

## 2021-10-06 NOTE — TELEPHONE ENCOUNTER
Prior Authorization Specialty Medication Request    Medication/Dose: rec'd faxed prior auth medication request MODAFINIL 200 mg tabs  [facility] pharmacy [place],    Third party information:  RAMIREZ KERN [id number]  CARDHOLDER ID:  [id number]  GROUP NUMBER:  [id number]  RELATIONSHIP:  [id number]  TP HELP DESK #.  [phone]    ICD code (if different than what is on RX):    Previously Tried and Failed:   Important Lab Values: Rationale:     Insurance Name: [employer]   Insurance ID: [id number]  Insurance Phone Number:   Pharmacy Information (if different than what is on RX)  Name: [facility]  Phone:  [phone]    ROUTED TO PA MEDICATION TEAM, ABIGAIL ZELAYA PA-C, FOR REVIEW.

## 2021-10-07 RX ORDER — ARMODAFINIL 150 MG/1
150 TABLET ORAL EVERY MORNING
Qty: 30 TABLET | Refills: 0 | Status: SHIPPED | OUTPATIENT
Start: 2021-10-07 | End: 2021-10-11

## 2021-10-07 NOTE — TELEPHONE ENCOUNTER
Modafinil denied by insurance, prescription written for armodafinil instead to see coverage. If not covered will plan to switch to Adderall/Ritalin. Barbara Stokes PA-C on 10/7/2021 at 4:30 PM

## 2021-10-07 NOTE — TELEPHONE ENCOUNTER
PRIOR AUTHORIZATION DENIED    Medication: rec'd faxed prior auth medication request MODAFINIL 200 mg tabs    Denial Date: 10/7/2021    Denial Rational: Needs diagnosis of narcolepsy confirmed by sleep test, shift work sleep disorder, KARLA, or MS related fatigue        Appeal Information: This medication was denied. If physician would like to appeal because patient has contraindication or allergy to covered medication please write letter of medical necessity and route back to PA team to initiate.  If no further action is needed please close encounter thank you.

## 2021-10-08 RX ORDER — ARMODAFINIL 150 MG/1
150 TABLET ORAL EVERY MORNING
Qty: 30 TABLET | Refills: 0 | Status: CANCELLED | OUTPATIENT
Start: 2021-10-08

## 2021-10-08 NOTE — TELEPHONE ENCOUNTER
armodafinil (NUVIGIL) 150 MG TABS tablet  150 mg, EVERY MORNING 0 ordered  EditCancel Reorder       Summary: Take 1 tablet (150 mg) by mouth every morning, Disp-30 tablet, R-0, E-Prescribe     Dose, Route, Frequency: 150 mg, Oral, EVERY MORNING    Start: 10/7/2021    Ord/Sold: 10/7/2021 (O)      Report    Adh:     Taking:     Long-term:       Pharmacy: [id number] [place], [place] - [address] Dose History         Patient Sig: Take 1 tablet (150 mg) by mouth every morning       Ordered on: 10/7/2021       Authorized by: ABIGAIL ZELAYA       Dispense: 30 tablet       Prior Authorization: Pending        Medication needs Prior Auth.   Sent to XIANG Teixeira MA

## 2021-10-11 RX ORDER — DEXTROAMPHETAMINE SACCHARATE, AMPHETAMINE ASPARTATE MONOHYDRATE, DEXTROAMPHETAMINE SULFATE AND AMPHETAMINE SULFATE 2.5; 2.5; 2.5; 2.5 MG/1; MG/1; MG/1; MG/1
10 CAPSULE, EXTENDED RELEASE ORAL DAILY
Qty: 30 CAPSULE | Refills: 0 | Status: SHIPPED | OUTPATIENT
Start: 2021-10-11 | End: 2021-10-21 | Stop reason: DRUGHIGH

## 2021-10-11 NOTE — PROGRESS NOTES
Both modafinil and armodafinil not covered by patient's insurance. Called patient to discuss these insurance coverage issues and we agreed to change to Adderall XR 10mg. New prescription sent to pharmacy today. Will follow-up in December as planned but encouraged him to keep me updated how he is doing in meantime.   Barbara Stokes PA-C on 10/11/2021 at 5:31 PM

## 2021-10-12 ENCOUNTER — TELEPHONE (OUTPATIENT)
Dept: ALLERGY | Facility: CLINIC | Age: 31
End: 2021-10-12

## 2021-10-12 NOTE — TELEPHONE ENCOUNTER
PA Initiation    Medication: Xolair 150mg syringes (self-adm) - pending   Insurance Company: [facility] [employer] - Phone [phone] Fax [phone]  [facility] [place] [address]  Filling Pharmacy Phone: [phone]  Filling Pharmacy Fax: [phone]  Start Date: 10/12/2021

## 2021-10-13 DIAGNOSIS — L64.9 MALE PATTERN BALDNESS: ICD-10-CM

## 2021-10-13 NOTE — TELEPHONE ENCOUNTER
Prior Authorization Approval    Authorization Effective Date: 10/12/2021  Authorization Expiration Date: 4/12/2022  Medication: Xolair 150mg syringes (self-adm) - approved  Approved Dose/Quantity: 2 syringes for 28 days  Reference #: [id number]   Insurance Company: [facility] [employer] - Phone [phone] Fax [phone]  Expected CoPay: not covered at this location      CoPay Card Available: No Comment:  PMAP secondary insurance - will try to get approved. If not approved will enroll  Foundation Assistance Needed: No  Which Pharmacy is filling the prescription (Not needed for infusion/clinic administered): [facility]  Pharmacy Notified: Yes  Patient Notified: Yes

## 2021-10-14 RX ORDER — FINASTERIDE 5 MG/1
5 TABLET, FILM COATED ORAL DAILY
Qty: 90 TABLET | Refills: 2 | Status: ON HOLD | OUTPATIENT
Start: 2021-10-14 | End: 2023-04-11

## 2021-10-14 NOTE — TELEPHONE ENCOUNTER
"Last Written Prescription Date:  4/23/21  Last Fill Quantity: 23,  # refills: 1   Last office visit provider:  6/23/21     Requested Prescriptions   Pending Prescriptions Disp Refills     finasteride (PROSCAR) 5 MG tablet 23 tablet 1     Sig: Take 1 tablet (5 mg) by mouth daily       BPH Agents Passed - 10/13/2021  2:42 PM        Passed - Recent (12 mo) or future (30 days) visit within the authorizing provider's department     Patient has had an office visit with the authorizing provider or a provider within the authorizing providers department within the previous 12 mos or has a future within next 30 days. See \"Patient Info\" tab in inbasket, or \"Choose Columns\" in Meds & Orders section of the refill encounter.              Passed - Medication is active on med list        Passed - Patient is 18 years of age or older             Jen Catalan RN 10/14/21 11:03 AM  "

## 2021-10-17 ENCOUNTER — HEALTH MAINTENANCE LETTER (OUTPATIENT)
Age: 31
End: 2021-10-17

## 2021-11-02 ENCOUNTER — MYC MEDICAL ADVICE (OUTPATIENT)
Dept: SLEEP MEDICINE | Facility: CLINIC | Age: 31
End: 2021-11-02

## 2021-11-02 ENCOUNTER — TELEPHONE (OUTPATIENT)
Dept: SLEEP MEDICINE | Facility: CLINIC | Age: 31
End: 2021-11-02

## 2021-11-02 DIAGNOSIS — J30.9 ALLERGIC RHINITIS, UNSPECIFIED SEASONALITY, UNSPECIFIED TRIGGER: ICD-10-CM

## 2021-11-02 NOTE — TELEPHONE ENCOUNTER
Reason for call:  Other   Patient called regarding (reason for call): Medication update  Additional comments: Pt wanted to let provider know that with the current dosage of medication,  he is still feeling tired  Phone number to reach patient:  Cell number on file:    Telephone Information:   Mobile [phone]       Best Time:  all    Can we leave a detailed message on this number?  YES    Travel screening: Not Applicable

## 2021-11-03 RX ORDER — DEXTROAMPHETAMINE SACCHARATE, AMPHETAMINE ASPARTATE MONOHYDRATE, DEXTROAMPHETAMINE SULFATE AND AMPHETAMINE SULFATE 5; 5; 5; 5 MG/1; MG/1; MG/1; MG/1
20 CAPSULE, EXTENDED RELEASE ORAL DAILY
Qty: 30 CAPSULE | Refills: 0 | Status: SHIPPED | OUTPATIENT
Start: 2021-11-03 | End: 2021-12-02

## 2021-11-04 RX ORDER — CETIRIZINE HYDROCHLORIDE 10 MG/1
10 TABLET ORAL DAILY
Qty: 30 TABLET | Refills: 11 | Status: SHIPPED | OUTPATIENT
Start: 2021-11-04 | End: 2022-05-17

## 2021-11-04 NOTE — TELEPHONE ENCOUNTER
"Routing refill request to provider for review/approval because:  A break in medication    Last Written Prescription Date:  3/10/2021  Last Fill Quantity: 30,  # refills: 2   Last office visit provider:  6/23/2021 Dr. Ybarra     cetirizine (ZYRTEC) 10 MG tablet 30 tablet 2 3/10/2021  No   Sig - Route: Take 1 tablet (10 mg total) by mouth daily. - Oral   Sent to pharmacy as: cetirizine 10 mg tablet (ZyrTEC)   E-Prescribing Status: Receipt confirmed by pharmacy (3/10/2021  8:21 AM CST         Requested Prescriptions   Pending Prescriptions Disp Refills     cetirizine (ZYRTEC) 10 MG tablet 30 tablet 2     Sig: Take 1 tablet (10 mg) by mouth daily       Antihistamines Protocol Passed - 11/2/2021  2:52 PM        Passed - Patient is 3-64 years of age     Apply weight-based dosing for peds patients age 3 - 12 years of age.    Forward request to provider for patients under the age of 3 or over the age of 64.          Passed - Recent (12 mo) or future (30 days) visit within the authorizing provider's specialty     Patient has had an office visit with the authorizing provider or a provider within the authorizing providers department within the previous 12 mos or has a future within next 30 days. See \"Patient Info\" tab in inbasket, or \"Choose Columns\" in Meds & Orders section of the refill encounter.              Passed - Medication is active on med list             Paulina Rinaldi RN 11/03/21 11:00 PM  "

## 2021-11-08 ENCOUNTER — VIRTUAL VISIT (OUTPATIENT)
Dept: BEHAVIORAL HEALTH | Facility: CLINIC | Age: 31
End: 2021-11-08
Payer: COMMERCIAL

## 2021-11-08 DIAGNOSIS — F90.0 ATTENTION DEFICIT HYPERACTIVITY DISORDER (ADHD), PREDOMINANTLY INATTENTIVE TYPE: Primary | ICD-10-CM

## 2021-11-08 PROCEDURE — 90834 PSYTX W PT 45 MINUTES: CPT | Mod: 95 | Performed by: SOCIAL WORKER

## 2021-11-17 ENCOUNTER — VIRTUAL VISIT (OUTPATIENT)
Dept: SLEEP MEDICINE | Facility: CLINIC | Age: 31
End: 2021-11-17
Payer: COMMERCIAL

## 2021-11-17 DIAGNOSIS — G47.11 IDIOPATHIC HYPERSOMNIA: Primary | ICD-10-CM

## 2021-11-17 PROCEDURE — 99214 OFFICE O/P EST MOD 30 MIN: CPT | Mod: GT | Performed by: PHYSICIAN ASSISTANT

## 2021-11-17 RX ORDER — DEXTROAMPHETAMINE SACCHARATE, AMPHETAMINE ASPARTATE, DEXTROAMPHETAMINE SULFATE AND AMPHETAMINE SULFATE 5; 5; 5; 5 MG/1; MG/1; MG/1; MG/1
20 TABLET ORAL
Qty: 30 TABLET | Refills: 0 | Status: SHIPPED | OUTPATIENT
Start: 2021-11-17 | End: 2021-12-07

## 2021-11-17 NOTE — PROGRESS NOTES
Nikolay is a 31 year old who is being evaluated via a billable video visit.      How would you like to obtain your AVS? MyChart  If the video visit is dropped, the invitation should be resent by: Text to cell phone: [phone]  Will anyone else be joining your video visit? No      Video-Visit Details    Type of service:  Video Visit    Video Start Time: 9:35AM    Video End Time:9:54AM    Originating Location (pt. Location): Home    Distant Location (provider location):  [facility]     Platform used for Video Visit: Well      [facility] [place]   Outpatient Sleep Medicine  Nov 17, 2021       Name: Nikolay Lora MRN# [id number]   Age: 31 year old YOB: 1990            Assessment and Plan:   1. Idiopathic hypersomnia  Patient presents to clinic today for follow-up of his idiopathic hypersomnolence treated with Adderall XR 20 mg daily in the morning.  Patient continues to struggle significantly with daytime sleepiness at his current dose.  Tolerating Adderall well without side effects just is simply not lasting long enough, feels it wears off around lunchtime.  We discussed options today including increasing his morning XR dose versus split dosing with additional immediate release tablet at lunchtime versus changing to alternative stimulant medication such as Concerta to see if a different formulation would be more beneficial.  After discussion of options we agreed to keep morning dose of 20 mg XR the same but will add an additional 20 mg of IR at lunchtime to help him get through the day.  Patient was somewhat upset today that I did not increase his morning dose to 40mg XR in addition to the 20mg IR tablet in afternoon, but discussed that 60mg per day is maximum dose and better to increase in stepwise fashion to avoid side effects and may not require maximum dose for management. He was agreeable to 20mg BID dosing as above but we can discuss further increase if  needed pending progress. Also discussed importance of daily exercise to help with his energy levels and suspect mental health is playing a role in residual sleepiness as well given that he admits to sedentary monotonous job. He will continue to work with his therapist.     Follow-up on December 7th for medication check as previously scheduled.        Chief Complaint      Chief Complaint   Patient presents with     RECHECK     Follow up medication            History of Present Illness:   Nikolay Lora is a 31 year old male who presents to the clinic for follow-up of his idiopathic hypersomnolence treated with Adderall.     Summary of hypersomnolence work-up findings:   Two weeks of actigraphy and sleep logs leading up to sleep study showed sufficient sleep time with bedtime ranging between 9-11:00 PM and wake time between 6:35-7:30 AM. Of note, actigraphy estimated average total sleep time at night to being 6.2 hours and average total time in bed 9 hours/night but sleep logs report 8-9 hours sleep per night.     PSG results: Sleep architecture revealed all sleep stages present with sleep latency 12 minutes. REM latency prolonged at 268.5 minutes.  There was snoring but no evidence of sleep disordered breathing (AHI 1.9).  No sleep associated hypoxemia.  No abnormal movement activity.  Cardiac monitoring revealed sinus rhythm with intermittent tachycardia.     MSLT the following day showed average sleep latency was short at 6.8 minutes. Fell asleep in 4/4 nap opportunities. No sleep onset REM periods. Urine drug screen was negative, drawn 9/14/2021.      Initially planned to treat with modafinil but not covered by insurance     Prescribed modafinil/armodafinil but not covered bu insurance. Initially started on Adderall XR 10mg, later increased to 15mg and again to 20mg. Returns to clinic today on Adderall 20mg XR in morning to discuss progress. Patient reports that the Adderall is initially helpful but wears off by  "11-12:00PM. Reports significant sleepiness after lunch. Patient states \"after a few hours I am just still very tired and I am having a hard time with my ADHD symptoms and it affecting my work\".     Sleep schedule includes bedtime of 10:00PM and wakes at 7:30AM. Falls asleep quickly and denies any nighttime awakenings. Takes his Adderall at 7:45AM and starts work at 9:00AM.     Still takes short naps during the day. Gets two 15 minute breaks and will often nap then and also takes one 5 minute break every hour which he will sometimes nap in. Does feel that these short naps are helpful.     Denies any side effects from the Adderall such as headache, palpitations, diaphoresis, mood changes etc.     Does not drink caffeine since starting the Adderall because it makes him feel jittery and increased frequency of urination.     [place] Sleepiness Scale is still very elevated today at 23.    Past medical/surgical history, family history, social history, medications and allergies were reviewed.         Physical Examination:   There were no vitals taken for this visit.  General appearance: Awake, alert, cooperative. Well groomed. Sitting comfortably in chair. In no apparent distress.  Pulmonary:  Able to speak easily in full sentences. No cough or wheeze.   Neurologic: Alert, oriented x3.   Psychiatric: Mood euthymic. Affect congruent with full range and intensity.      Barbara Stokes PA-C  Nov 17, 2021     [facility] [place]  [address]  [address] , [place] [address]     [facility] [place]  [address], [place] [address]    38 minutes spent on day of encounter doing chart review, history and exam, documentation, and further activities as noted above    "

## 2021-11-18 ENCOUNTER — TELEPHONE (OUTPATIENT)
Dept: SLEEP MEDICINE | Facility: CLINIC | Age: 31
End: 2021-11-18
Payer: COMMERCIAL

## 2021-11-18 NOTE — TELEPHONE ENCOUNTER
Chart reviewed and patient should have picked up the XR 20 mg on 11/3 only short acting would have been needed. [facility].     Adderall Xr 20mg picked up on 11/03/2021  Adderall 20mg picked up on 11/17/2021    Pharmacy also stated patient thought addition Adderall Xr would be sent.     Reviewed provider note and this was not the plan and patient was informed by provider am dosing will stay the same and not increase to 40 mg.     Called patient to inform him of this, VM left asking for call back. I also replied to his [place] message.     Sheree Bailey CMA on 11/18/2021 at 11:33 AM

## 2021-11-18 NOTE — TELEPHONE ENCOUNTER
Reason for Call:  Other prescription    Detailed comments: Patient would like a call back regarding medication clarification - states that at appointment yesterday provider had discussed that he will be prescribed adderal XR + the adderral that was sent in yesterday    Phone Number Patient can be reached at: Home number on file [phone] (home)    Best Time: Anytime    Can we leave a detailed message on this number? YES    Call taken on 11/18/2021 at 8:09 AM by Nilam Christine

## 2021-11-23 ENCOUNTER — TELEPHONE (OUTPATIENT)
Dept: ALLERGY | Facility: CLINIC | Age: 31
End: 2021-11-23

## 2021-11-23 NOTE — TELEPHONE ENCOUNTER
Dr. Bullock   This person called and is requesting a call back:    Name Of Person Who Called: Link     Why Did The Person Call: patient states he has been approved for Xolair and wants to know what his next step would be     Best Phone Number To Call Back: [phone]    Okay To Leave A Detailed Voicemail? Yes     Thank you.

## 2021-12-01 ENCOUNTER — VIRTUAL VISIT (OUTPATIENT)
Dept: BEHAVIORAL HEALTH | Facility: CLINIC | Age: 31
End: 2021-12-01
Payer: COMMERCIAL

## 2021-12-01 DIAGNOSIS — F90.0 ATTENTION DEFICIT HYPERACTIVITY DISORDER (ADHD), PREDOMINANTLY INATTENTIVE TYPE: Primary | ICD-10-CM

## 2021-12-01 PROCEDURE — 90834 PSYTX W PT 45 MINUTES: CPT | Mod: 95 | Performed by: SOCIAL WORKER

## 2021-12-01 ASSESSMENT — ANXIETY QUESTIONNAIRES
6. BECOMING EASILY ANNOYED OR IRRITABLE: SEVERAL DAYS
2. NOT BEING ABLE TO STOP OR CONTROL WORRYING: NEARLY EVERY DAY
7. FEELING AFRAID AS IF SOMETHING AWFUL MIGHT HAPPEN: NEARLY EVERY DAY
IF YOU CHECKED OFF ANY PROBLEMS ON THIS QUESTIONNAIRE, HOW DIFFICULT HAVE THESE PROBLEMS MADE IT FOR YOU TO DO YOUR WORK, TAKE CARE OF THINGS AT HOME, OR GET ALONG WITH OTHER PEOPLE: VERY DIFFICULT
1. FEELING NERVOUS, ANXIOUS, OR ON EDGE: NEARLY EVERY DAY
4. TROUBLE RELAXING: NEARLY EVERY DAY
5. BEING SO RESTLESS THAT IT IS HARD TO SIT STILL: NEARLY EVERY DAY
GAD7 TOTAL SCORE: 19
3. WORRYING TOO MUCH ABOUT DIFFERENT THINGS: NEARLY EVERY DAY

## 2021-12-01 NOTE — PROGRESS NOTES
Progress Note    Patient Name: Nikolay Lora  Date: 12/1/21         Service Type: Individual      Session Start Time: 0900  Session End Time: 0952     Session Length: 52    Session #: 8    Attendees: Client attended alone    Service Modality:  Video Visit:      Provider verified identity through the following two step process.  Patient provided:  Patient is known previously to provider    Telemedicine Visit: The patient's condition can be safely assessed and treated via synchronous audio and visual telemedicine encounter.      Reason for Telemedicine Visit: Patient has requested telehealth visit    Originating Site (Patient Location): Patient's home    Distant Site (Provider Location): Provider Remote Setting- Home Office    Consent:  The patient/guardian has verbally consented to: the potential risks and benefits of telemedicine (video visit) versus in person care; bill my insurance or make self-payment for services provided; and responsibility for payment of non-covered services.     Patient would like the video invitation sent by:  Send to e-mail at: [email or web address]    Mode of Communication:  Video Conference via Amwell    As the provider I attest to compliance with applicable laws and regulations related to telemedicine.     Treatment Plan Last Reviewed: 11/8/21  PHQ-9 / LICO-7 :   LICO-7 SCORE 12/23/2020 2/17/2021 4/5/2021   Total Score 14 19 19     PHQ 2/17/2021 4/5/2021 12/1/2021   PHQ-9 Total Score 20 19 20   Q9: Thoughts of better off dead/self-harm past 2 weeks Several days Not at all Not at all     PHQ-9 given on 12-1--1-21 scoring 20 indicating severe depression  LICO-7  given on 12-1-21 scoring 19 indicating moderate to severe anxiety    DATA  Interactive Complexity:     Crisis: No       Progress Since Last Session (Related to Symptoms / Goals / Homework):   Symptoms: Patient indicated that he continues to have difficulties staying awake during the day.   "Patient stated, \"I don't think the medication is really working.\"  Denies any issues falling asleep at night.. Patient state,d he is feeling more depressed and anxious.Dry mouth.  Increased appetite. Patient stated, my mother thinks I might have a tapeworm because I am eating every hour.\"     Homework: Achieved / completed to satisfaction Patient indicated he met with sleep specialist nurse practitioner discussed medication regime.      Episode of Care Goals: Minimal progress - CONTEMPLATION (Considering change and yet undecided); Intervened by assessing the negative and positive thinking (ambivalence) about behavior change     Current / Ongoing Stressors and Concerns:   Patient stated, that he worries about possibly losing his job if he cannot maintain focus and energy during the day. He continues to utilize the job accommodations.  consisting of taking small breaks throughout the day.  Believes that he needs to do very well in his current job in order to seek any other type of position within the organization.  Patient does not feel that the mundaness or high volume of calls (approx. 150-200 per day) he handles has anything to do with his tiredness throughout the day.      Treatment Objective(s) Addressed in This Session:   During this session discussion was held regarding continuity of care related to medication management of his co-ocurring (comorbidity) (ADHD, Depression and Anxiety) Discussed communicating with his providers regarding plan going forward with his concern about fatigue as he feels is related to his medication.          Intervention:     CBT, solution focused and ADD education strategies and techniques        ASSESSMENT: Current Emotional / Mental Status (status of significant symptoms):   Risk status (Self / Other harm or suicidal ideation)   Patient denies current fears or concerns for personal safety.   Patient denies current or recent suicidal ideation or behaviors.   Patient denies current " or recent homicidal ideation or behaviors.   Patient denies current or recent self injurious behavior or ideation.   Patient denies other safety concerns.   Patient reports there has been no change in risk factors since their last session.     Patient reports there has been no change in protective factors since their last session.     Recommended that patient call 911 or go to the local ED should there be a change in any of these risk factors.     Appearance:   Appropriate    Eye Contact:   Good    Psychomotor Behavior: Normal    Attitude:   Cooperative    Orientation:   All   Speech    Rate / Production: Normal/ Responsive Normal     Volume:  Normal    Mood:    Sad    Affect:    Blunted    Thought Content:  Clear    Thought Form:  Coherent  Logical    Insight:    Good  and Fair      Medication Review:   As prescribed by Barbara OTOOLE currently on Adderall 20 mg XR and the Adderall 20 mg tablet daily.  Patient indicated he takes Adderall 20 mg XR approximately  7:30- 8:00 AM and the Adderall 20 mg tablet approximately 1:00 PM                        As prescribed by Beryl Moura NP currently taking 150 mg of Wellbutrin SR.Patient indicated he takes that medication before bedtime. Patient indicated that he is not taking  the  Strattera until he has his follow-up appointment with Beryl Moura NP     Medication Compliance:   Yes     Changes in Health Issues:   Yes patient indicates that he is hungry all the time and has dry mouth     Chemical Use Review:   Substance Use: Chemical use reviewed, no active concerns identified      Tobacco Use: No current tobacco use.      Diagnosis:  1.  ADHD inattentive  2.  Mood disorder (H)       Collateral Reports Completed:   Routed note to PCP    PLAN: (Patient Tasks / Therapist Tasks / Other)  1.   Patient will continue utilizing work accommodations as permitted  2.   Patient will begin educate himself regarding the tools that can be helpful in  managing his ADHD  "symptoms.  3    Patient will continue current medication regime until he meets with his providers  4 .  Next appointment in 2 weeks.        Amy Montano LICSW  12/1/21                                                                 ______________________________________________________________________    Treatment Plan     Patient's Name: Nikolay Lora                        YOB: 1990     Date: 11/8/21     DSM5 Diagnoses: Attention-Deficit/Hyperactivity Disorder  314.00 (F90.0) Predominantly inattentive presentation  Psychosocial / Contextual Factors: Work stress, working remote     Referral / Collaboration:  Referral to another professional/service is not indicated at this time..     Anticipated number of session or this episode of care: 10-15        MeasurableTreatment Goal(s) related to diagnosis / functional impairment(s)  Goal 1: Patient will increase his complete compensatory strategies to better manage ADHD symptoms    I will know I've met my goal when .  I feel more control of my life.\"     Objective #A (Patient Action)                          Patient will:  Continue current medication regime  Implement physical activity and routine  Continue educating self regarding ADHD symptom management  Status: New - Date: 11/8/21     Intervention(s)  Therapist will assist in developing an understanding of ADHD symptoms and how to manage common stressors                    Goal 2: Patient will establish the ability to effectively channel impulses.    I will know I've met my goal when \"I am not feeling as stresses.\"        Objective # A (Patient Action)    1. Develop compensatory strategies               2. Explore barriers to use of compensatory strategies               3. ADHD education               4. Explore factors which may exacerbate cognitive difficulties               5. Identify ways that ADHD causes difficulty in getting along with others.  Status New- Date(s): 11/8/21                 Patient has " reviewed and agreed to the above     Amy Montano [facility]  11/8/21

## 2021-12-02 RX ORDER — DEXTROAMPHETAMINE SACCHARATE, AMPHETAMINE ASPARTATE MONOHYDRATE, DEXTROAMPHETAMINE SULFATE AND AMPHETAMINE SULFATE 5; 5; 5; 5 MG/1; MG/1; MG/1; MG/1
20 CAPSULE, EXTENDED RELEASE ORAL DAILY
Qty: 30 CAPSULE | Refills: 0 | Status: SHIPPED | OUTPATIENT
Start: 2021-12-02 | End: 2021-12-07

## 2021-12-02 ASSESSMENT — PATIENT HEALTH QUESTIONNAIRE - PHQ9: SUM OF ALL RESPONSES TO PHQ QUESTIONS 1-9: 20

## 2021-12-02 NOTE — TELEPHONE ENCOUNTER
Pending Prescriptions:                       Disp   Refills    amphetamine-dextroamphetamine (ADDERALL X*30 cap*0            Sig: Take 1 capsule (20 mg) by mouth daily        Last Written Prescription Date:  11/03/2021  Last Fill Quantity: 30,   # refills: 0  Last Office Visit: 11/17/2021  Future Office visit:        Routing refill request to provider for review/approval because:  Drug not on the [facility] or [facility] refill protocol or controlled substance

## 2021-12-02 NOTE — TELEPHONE ENCOUNTER
Reason for Call:  Other prescription    Detailed comments: patient called and is out of medication Amphetamine that Barbara OTOOLE at  [facility] prescribes.  Would like to pickup today at [facility].  Please contact patient.  Thank you.    Phone Number Patient can be reached at: Home number on file [phone] (home)    Best Time: any    Can we leave a detailed message on this number? YES    Call taken on 12/2/2021 at 8:16 AM by Zena Nguyen

## 2021-12-03 ASSESSMENT — SLEEP AND FATIGUE QUESTIONNAIRES
HOW LIKELY ARE YOU TO NOD OFF OR FALL ASLEEP WHILE SITTING QUIETLY AFTER LUNCH WITHOUT ALCOHOL: HIGH CHANCE OF DOZING
HOW LIKELY ARE YOU TO NOD OFF OR FALL ASLEEP IN A CAR, WHILE STOPPED FOR A FEW MINUTES IN TRAFFIC: HIGH CHANCE OF DOZING
HOW LIKELY ARE YOU TO NOD OFF OR FALL ASLEEP WHILE LYING DOWN TO REST IN THE AFTERNOON WHEN CIRCUMSTANCES PERMIT: HIGH CHANCE OF DOZING
HOW LIKELY ARE YOU TO NOD OFF OR FALL ASLEEP WHILE SITTING INACTIVE IN A PUBLIC PLACE: HIGH CHANCE OF DOZING
HOW LIKELY ARE YOU TO NOD OFF OR FALL ASLEEP WHILE WATCHING TV: HIGH CHANCE OF DOZING
HOW LIKELY ARE YOU TO NOD OFF OR FALL ASLEEP WHILE SITTING AND READING: HIGH CHANCE OF DOZING
HOW LIKELY ARE YOU TO NOD OFF OR FALL ASLEEP WHILE SITTING AND TALKING TO SOMEONE: MODERATE CHANCE OF DOZING
HOW LIKELY ARE YOU TO NOD OFF OR FALL ASLEEP WHEN YOU ARE A PASSENGER IN A CAR FOR AN HOUR WITHOUT A BREAK: HIGH CHANCE OF DOZING

## 2021-12-07 ENCOUNTER — VIRTUAL VISIT (OUTPATIENT)
Dept: SLEEP MEDICINE | Facility: CLINIC | Age: 31
End: 2021-12-07
Payer: COMMERCIAL

## 2021-12-07 DIAGNOSIS — G47.11 IDIOPATHIC HYPERSOMNIA: Primary | ICD-10-CM

## 2021-12-07 PROCEDURE — 99214 OFFICE O/P EST MOD 30 MIN: CPT | Mod: GT | Performed by: PHYSICIAN ASSISTANT

## 2021-12-07 RX ORDER — METHYLPHENIDATE HYDROCHLORIDE 36 MG/1
36 TABLET ORAL EVERY MORNING
Qty: 30 TABLET | Refills: 0 | Status: SHIPPED | OUTPATIENT
Start: 2021-12-07 | End: 2021-12-14

## 2021-12-07 NOTE — PROGRESS NOTES
"Nikolay is a 31 year old who is being evaluated via a billable video visit.      How would you like to obtain your AVS? MyChart  If the video visit is dropped, the invitation should be resent by: Send to e-mail at: [email or web address]  Will anyone else be joining your video visit? No      Video-Visit Details    Type of service:  Video Visit    Video Start Time: 3:36PM    Video End Time:3:54PM    Originating Location (pt. Location): Home    Distant Location (provider location):  [facility]     Platform used for Video Visit: [facility] [place]   Outpatient Sleep Medicine  Dec 7, 2021       Name: Nikolay Lora MRN# [id number]   Age: 31 year old YOB: 1990          Assessment and Plan:   1. Idiopathic hypersomnia  Returns to clinic today with no improvement in his sleepiness level despite treatment with Adderall 20mg XR in AM and Adderall 20mg IR at lunchtime. Reports that stimulant therapy thus far has benefited him \"zero\" which is atypical. I suspect his mental health is playing a much larger role in his daytime energy level than he cares to admit. Most recent PHQ9 on 12/1/21 was 20 indicating severe depression and GAD7 was 19 indicating moderate to severe anxiety. Reports he has follow-up soon with his psychiatrist and continues to follow therapist.     Since Adderall is not helpful will discontinue. Instead we agreed to change to Concerta to see if different formulation of stimulant may be more beneficial for him. New prescription to start Concerta 36mg CR sent to pharmacy today. Will plan to see him back in another month on new medication to check progress. We discussed the option of patient getting a second option possibly with one of my physician colleagues if continues to be non-responsive to my stimulant therapy plan of care.        Chief Complaint      Chief Complaint   Patient presents with     Video Visit     return visit            History of " "Present Illness:   Nikolay Lora is a 31 year old male who presents to the clinic for follow-up of his idiopathic hypersomnolence treated with Adderall.      Summary of hypersomnolence work-up findings:   Two weeks of actigraphy and sleep logs leading up to sleep study showed sufficient sleep time with bedtime ranging between 9-11:00 PM and wake time between 6:35-7:30 AM. Of note, actigraphy estimated average total sleep time at night to being 6.2 hours and average total time in bed 9 hours/night but sleep logs report 8-9 hours sleep per night.     PSG results: Sleep architecture revealed all sleep stages present with sleep latency 12 minutes. REM latency prolonged at 268.5 minutes.  There was snoring but no evidence of sleep disordered breathing (AHI 1.9).  No sleep associated hypoxemia.  No abnormal movement activity.  Cardiac monitoring revealed sinus rhythm with intermittent tachycardia.     MSLT the following day showed average sleep latency was short at 6.8 minutes. Fell asleep in 4/4 nap opportunities. No sleep onset REM periods. Urine drug screen was negative, drawn 9/14/2021.      First prescribed modafinil and armodafinil but neither covered by insurance. Switched to Adderall. Initially started on Adderall XR 10mg, later increased to 15mg and again to 20mg. At his last visit we increased dose to include Adderall XR 20mg in AM with additional 20mg IR in afternoon as the Adderall felt initially helpful but felt that it wore off by lunchtime.     Returns to clinic today to discuss progress on current 40mg dose. He is unhappy with his current management. States \"it's not doing anything\". Very concerned about his functioning at work even with his short scheduled breaks/napping. No change in sleep schedule since last visit. No side effects from increased dose. His [place] Sleepiness Scale remains unchanged and is still very elevated at 23.     Past medical/surgical history, family history, social history, " medications and allergies were reviewed.           Physical Examination:   There were no vitals taken for this visit.  General appearance: Awake, alert, cooperative. Well groomed. Sitting comfortably in chair. In no apparent distress.  HEENT: Head: Normocephalic, atraumatic. Eyes:Conjunctiva clear. Sclera normal. Nose: External appearance without deformity.   Pulmonary:  Able to speak easily in full sentences. No cough or wheeze.   Skin:  No rashes or significant lesions on visible skin.   Neurologic: Alert, oriented x3.   Psychiatric: Mood euthymic. Affect congruent with full range and intensity.      Barbara Stokes PA-C  Dec 7, 2021     [facility]  [address]rd [address]  [address] , [place] [address]     [facility]  [address] [address]    35 minutes spent on day of encounter doing chart review, history and exam, documentation, and further activities as noted above     7

## 2021-12-09 ENCOUNTER — VIRTUAL VISIT (OUTPATIENT)
Dept: BEHAVIORAL HEALTH | Facility: CLINIC | Age: 31
End: 2021-12-09
Payer: COMMERCIAL

## 2021-12-09 DIAGNOSIS — F90.0 ATTENTION DEFICIT HYPERACTIVITY DISORDER (ADHD), PREDOMINANTLY INATTENTIVE TYPE: Primary | ICD-10-CM

## 2021-12-09 PROCEDURE — 90834 PSYTX W PT 45 MINUTES: CPT | Mod: 95 | Performed by: SOCIAL WORKER

## 2021-12-09 ASSESSMENT — ANXIETY QUESTIONNAIRES
5. BEING SO RESTLESS THAT IT IS HARD TO SIT STILL: NEARLY EVERY DAY
1. FEELING NERVOUS, ANXIOUS, OR ON EDGE: NEARLY EVERY DAY
6. BECOMING EASILY ANNOYED OR IRRITABLE: MORE THAN HALF THE DAYS
4. TROUBLE RELAXING: NEARLY EVERY DAY
3. WORRYING TOO MUCH ABOUT DIFFERENT THINGS: NEARLY EVERY DAY
GAD7 TOTAL SCORE: 19
7. FEELING AFRAID AS IF SOMETHING AWFUL MIGHT HAPPEN: MORE THAN HALF THE DAYS
7. FEELING AFRAID AS IF SOMETHING AWFUL MIGHT HAPPEN: MORE THAN HALF THE DAYS
2. NOT BEING ABLE TO STOP OR CONTROL WORRYING: NEARLY EVERY DAY
GAD7 TOTAL SCORE: 19
GAD7 TOTAL SCORE: 19

## 2021-12-09 NOTE — PROGRESS NOTES
Progress Note    Patient Name: Nikolay Lora  Date: 12/9/21         Service Type: Individual      Session Start Time: 1400  Session End Time: 1452      Session Length: 52    Session #: 9    Attendees: Client attended alone    Service Modality:  Video Visit:      Provider verified identity through the following two step process.  Patient provided:  Patient is known previously to provider    Telemedicine Visit: The patient's condition can be safely assessed and treated via synchronous audio and visual telemedicine encounter.      Reason for Telemedicine Visit: Patient has requested telehealth visit    Originating Site (Patient Location): Patient's home    Distant Site (Provider Location): Provider Remote Setting- Home Office    Consent:  The patient/guardian has verbally consented to: the potential risks and benefits of telemedicine (video visit) versus in person care; bill my insurance or make self-payment for services provided; and responsibility for payment of non-covered services.     Patient would like the video invitation sent by:  Send to e-mail at: [email or web address]    Mode of Communication:  Video Conference via Amwell    As the provider I attest to compliance with applicable laws and regulations related to telemedicine.     Treatment Plan Last Reviewed:   PHQ-9 / LICO-7 :   LICO-7 SCORE 4/5/2021 12/9/2021 12/9/2021   Total Score - - 19 (severe anxiety)   Total Score 19 19 19     PHQ 2/17/2021 4/5/2021 12/1/2021   PHQ-9 Total Score 20 19 20   Q9: Thoughts of better off dead/self-harm past 2 weeks Several days Not at all Not at all     DATA  Interactive Complexity: No  Crisis: No       Progress Since Last Session (Related to Symptoms / Goals / Homework):   Symptoms: Patient indicated that his symptoms of fatigue continue.  He stated, that he started Concerta today although being that he was too tired he did not work today.    Homework: Partially  "completed      Episode of Care Goals: Minimal progress - CONTEMPLATION (Considering change and yet undecided); Intervened by assessing the negative and positive thinking (ambivalence) about behavior change     Current / Ongoing Stressors and Concerns:  Patient continues to believe that his perpetual fatigue is interfering with his ability to do his job effectively. Patient stated, \"I think I might have some other problems that are causing my difficulties and  maybe I have a learning disability or autism?\"  Patient plans on further discussion with his primary MD at his visit next week     Treatment Objective(s) Addressed in This Session:   Discussed patient's ongoing concerns as well as, providing education with respect to ADHD and his comorbidities of anxiety and depressed mood.  Continue working collaboratively with his other healthcare providers as it relates to the findings on the patient's Psychological evaluation on 3/2/2021 administered by Alberto De La Torre MA ([facility]) whereas, it addresses the possible targets for treatment based on obsessive compulsive thoughts and behaviors and poor impulse control.       Intervention:   Reviewed psych eval results from 3-2-21.              Solution focused strategies and ADHD education  Provided patient with medication log      ASSESSMENT: Current Emotional / Mental Status (status of significant symptoms):      Risk status (Self / Other harm or suicidal ideation)              Patient denies current fears or concerns for personal safety.              Patient denies current or recent suicidal ideation or behaviors.              Patient denies current or recent homicidal ideation or behaviors.              Patient denies current or recent self injurious behavior or ideation.              Patient denies other safety concerns.              Patient reports there has been no change in risk factors since their last session.                Patient reports there " has been no change in protective factors since their last session.                Recommended that patient call 911 or go to the local ED should there be a change in any of these risk factors.                Appearance:   Appropriate    Eye Contact:   Good    Psychomotor Behavior: Normal    Attitude:   Cooperative    Orientation:   All   Speech    Rate / Production: Normal/ Responsive Normal     Volume:  Normal    Mood:    Sad    Affect:    Flat    Thought Content:  Clear    Thought Form:  Coherent  Logical    Insight:    Good  and Fair      Medication Review:   Changes to psychiatric medications, see updated Medication List in EPIC.      Medication Compliance:   Yes     Changes in Health Issues:   None reported     Chemical Use Review:   Substance Use: Chemical use reviewed, no active concerns identified      Tobacco Use: No current tobacco use.      Diagnosis:  1.  ADHD inattentive  2.  Mood disorder (H)       Collateral Reports Completed:   Routed note to PCP    PLAN: (Patient Tasks / Therapist Tasks / Other)  1.   Patient will continue utilizing work accommodations as permitted  2.   Patient will begin educate himself regarding the tools that can be helpful in  managing his ADHD symptoms.  3    Patient will continue current medication regime recently  prescribed and track progress through tracking log  4 .  Next appointment in 2 weeks.      Leandra Montano [facility]     12/9/21                                                                    ______________________________________________________________________    Treatment Plan     Patient's Name: Nikolay Lora                        YOB: 1990     Date: 11/8/21     DSM5 Diagnoses: Attention-Deficit/Hyperactivity Disorder  314.00 (F90.0) Predominantly inattentive presentation  Psychosocial / Contextual Factors: Work stress, working remote     Referral / Collaboration:  Referral to another professional/service is not indicated at this  "time..     Anticipated number of session or this episode of care: 10-15        MeasurableTreatment Goal(s) related to diagnosis / functional impairment(s)  Goal 1: Patient will increase his complete compensatory strategies to better manage ADHD symptoms    I will know I've met my goal when .  I feel more control of my life.\"     Objective #A (Patient Action)                          Patient will:  Continue current medication regime  Implement physical activity and routine  Continue educating self regarding ADHD symptom management  Status: New - Date: 11/8/21     Intervention(s)  Therapist will assist in developing an understanding of ADHD symptoms and how to manage common stressors                    Goal 2: Patient will establish the ability to effectively channel impulses.    I will know I've met my goal when \"I am not feeling as stresses.\"        Objective # A (Patient Action)    1. Develop compensatory strategies               2. Explore barriers to use of compensatory strategies               3. ADHD education               4. Explore factors which may exacerbate cognitive difficulties               5. Identify ways that ADHD causes difficulty in getting along with others.  Status New- Date(s): 11/8/21                 Patient has reviewed and agreed to the above     Amy JANSEN  11/8/21        "

## 2021-12-10 ASSESSMENT — ANXIETY QUESTIONNAIRES: GAD7 TOTAL SCORE: 19

## 2021-12-13 ENCOUNTER — TELEPHONE (OUTPATIENT)
Dept: BEHAVIORAL HEALTH | Facility: HOSPITAL | Age: 31
End: 2021-12-13
Payer: COMMERCIAL

## 2021-12-13 NOTE — TELEPHONE ENCOUNTER
"This RN called pt and explored concerns, educated on TC services, explored if he has enough medications until February. He reported he's unsure and \"there's a few moving parts with a few different providers\" and was unclear of the purpose of the call. He declined to answer question about whether he needs refills. Wtr explored if he would be able to call pharmacy when he needs refills. He reported he would communicate privately to his provider.    Pt agreed to be seen in the TC if they're able to collaborate w/ MELLISSA Moura. Pt reported he would schedule a video appt tomorrow at 12:30pm.    This RN routed to [facility] for scheduling.    Luisana Lopez RN on 12/13/2021 at 3:04 PM      "

## 2021-12-13 NOTE — TELEPHONE ENCOUNTER
Reason for Call:  Other appointment    Detailed comments: Pt called requesting to see Dr. Moura sooner than his scheduled appointment in February. He stated that he recently just got out of the hospital, does not think he can wait that long to see the provider.     Phone Number Patient can be reached at: Home number on file [phone] (home)    Best Time: ASAP    Can we leave a detailed message on this number? YES    Call taken on 12/13/2021 at 1:49 PM by Bentley Merritt

## 2021-12-14 ENCOUNTER — DOCUMENTATION ONLY (OUTPATIENT)
Dept: BEHAVIORAL HEALTH | Facility: CLINIC | Age: 31
End: 2021-12-14

## 2021-12-14 ENCOUNTER — VIRTUAL VISIT (OUTPATIENT)
Dept: BEHAVIORAL HEALTH | Facility: CLINIC | Age: 31
End: 2021-12-14
Attending: NURSE PRACTITIONER
Payer: COMMERCIAL

## 2021-12-14 ENCOUNTER — OFFICE VISIT (OUTPATIENT)
Dept: FAMILY MEDICINE | Facility: CLINIC | Age: 31
End: 2021-12-14
Payer: COMMERCIAL

## 2021-12-14 VITALS
WEIGHT: 173 LBS | HEART RATE: 86 BPM | SYSTOLIC BLOOD PRESSURE: 145 MMHG | RESPIRATION RATE: 20 BRPM | BODY MASS INDEX: 28.79 KG/M2 | DIASTOLIC BLOOD PRESSURE: 88 MMHG

## 2021-12-14 DIAGNOSIS — F90.0 ATTENTION DEFICIT HYPERACTIVITY DISORDER (ADHD), PREDOMINANTLY INATTENTIVE TYPE: ICD-10-CM

## 2021-12-14 DIAGNOSIS — F34.1 PERSISTENT DEPRESSIVE DISORDER WITH ANXIOUS DISTRESS, CURRENTLY MODERATE: Primary | ICD-10-CM

## 2021-12-14 DIAGNOSIS — F29 PSYCHOSIS, UNSPECIFIED PSYCHOSIS TYPE (H): Primary | ICD-10-CM

## 2021-12-14 PROCEDURE — 91300 PR COVID VAC PFIZER DIL RECON 30 MCG/0.3 ML IM: CPT | Performed by: FAMILY MEDICINE

## 2021-12-14 PROCEDURE — 99213 OFFICE O/P EST LOW 20 MIN: CPT | Mod: 25 | Performed by: FAMILY MEDICINE

## 2021-12-14 PROCEDURE — 90686 IIV4 VACC NO PRSV 0.5 ML IM: CPT | Performed by: FAMILY MEDICINE

## 2021-12-14 PROCEDURE — 99205 OFFICE O/P NEW HI 60 MIN: CPT | Mod: GT | Performed by: NURSE PRACTITIONER

## 2021-12-14 PROCEDURE — 90471 IMMUNIZATION ADMIN: CPT | Performed by: FAMILY MEDICINE

## 2021-12-14 PROCEDURE — 0004A PR COVID VAC PFIZER DIL RECON 30 MCG/0.3 ML IM: CPT | Performed by: FAMILY MEDICINE

## 2021-12-14 RX ORDER — DULOXETIN HYDROCHLORIDE 20 MG/1
40 CAPSULE, DELAYED RELEASE ORAL DAILY
Qty: 60 CAPSULE | Refills: 0 | Status: SHIPPED | OUTPATIENT
Start: 2021-12-14 | End: 2022-01-11

## 2021-12-14 ASSESSMENT — ANXIETY QUESTIONNAIRES
6. BECOMING EASILY ANNOYED OR IRRITABLE: MORE THAN HALF THE DAYS
5. BEING SO RESTLESS THAT IT IS HARD TO SIT STILL: NEARLY EVERY DAY
4. TROUBLE RELAXING: NEARLY EVERY DAY
IF YOU CHECKED OFF ANY PROBLEMS ON THIS QUESTIONNAIRE, HOW DIFFICULT HAVE THESE PROBLEMS MADE IT FOR YOU TO DO YOUR WORK, TAKE CARE OF THINGS AT HOME, OR GET ALONG WITH OTHER PEOPLE: EXTREMELY DIFFICULT
2. NOT BEING ABLE TO STOP OR CONTROL WORRYING: NEARLY EVERY DAY
GAD7 TOTAL SCORE: 20
3. WORRYING TOO MUCH ABOUT DIFFERENT THINGS: NEARLY EVERY DAY
7. FEELING AFRAID AS IF SOMETHING AWFUL MIGHT HAPPEN: NEARLY EVERY DAY
1. FEELING NERVOUS, ANXIOUS, OR ON EDGE: NEARLY EVERY DAY

## 2021-12-14 ASSESSMENT — PATIENT HEALTH QUESTIONNAIRE - PHQ9: SUM OF ALL RESPONSES TO PHQ QUESTIONS 1-9: 22
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
## 2021-12-14 NOTE — LETTER
December 14, 2021      Nikolay Lora  [address] [place] [address]        Dear Nikolay and family,    Concerta (and other stimulants including adderral)  should be discontinued at this time as they are causing significant problems with your executive function and emotional/behavioral regulation.          Sincerely,        Mich Ybarra MD

## 2021-12-14 NOTE — PROGRESS NOTES
Patient was scheduled for a 5:00 PM follow-up therapy appointment on 12/13/2021.  Provider attempted to contact patient via [employer] and by phone.  Message left on patient's voicemail in regard to missed appointment and the option to connect with provider prior to 6:00 or to reschedule at his convenience.  Message was also sent via [employer].    Amy FRAZIER  12/13/21

## 2021-12-14 NOTE — Clinical Note
Vamsi HERNANDEZ.    Please let me know if there are any questions related to the progress notes.    Sincerely,    Cortney Serrano

## 2021-12-14 NOTE — PATIENT INSTRUCTIONS
Start:  Cymbalta 20 mg for 7 days then 40 mg (2 caps) daily thereafter    Continue:  Concerta 36 mg in AM: prescribed by sleep medicine

## 2021-12-14 NOTE — PROGRESS NOTES
" This video/telephone visit will be conducted virtually between you and your provider. We have found that certain health care needs can be provided without the need for an in-person physical exam. This service lets us provide the care you need with a video /telephone conversation. If a prescription is necessary we can send it directly to your pharmacy. If lab work is needed we can place an order for that and you can then stop by our lab to have the test done at a later time.\"   Just as we bill insurance for in-person visits, we also bill insurance for video/telephone visits. If you have questions about your insurance coverage, we recommend that you speak with your insurance company.      Patient has given verbal consent for video/Telephone visit? yes    Patient would like the video visit invitation sent by: [employer]    Patient verified allergies, medications and pharmacy via phone. Patient states they are ready for visit.    Luisana Lopez RN on December 14, 2021 at 12:25 PM     Mental Health &Addiction (MH&A)Transition Clinic (TC):     Provides Patient Support While Waiting to Access Programmatic and Outpatient MH&A Care and Provides [facility] Crisis Assessment Services     NURSING INTAKE  ____________________________________________________    \"The Transition Clinic is a temporary psychiatry service that helps to bridge the time to your next appointment. It is not intended to be a long-term service and you are expected to attend your scheduled appointment with your next provider.\"  [x] Patient verbalized understanding    If you need support between appointments, please call [phone] and let them know you're seen in the Transition Clinic.    General-     Most pressing MH needs at this time: recently started working w/ new provider, is waiting to see if there are interactions, reports \"my life is a mess\"       Any physical health conditions or diagnoses we should be aware of or that are impacting you: n/a "          Medications-     Injectable medications currently prescribed: no   If yes, do you need an appointment for the next injection: n/a    Any Controlled Substances that you are prescribed: yes  Concerta  Wellbutrin 450mg       Primary care provider: Mich Ybarra MD        LICO-7 scores:  TODAY: 20  LICO-7 SCORE 12/9/2021 12/9/2021   Total Score - 19 (severe anxiety)   Total Score 19 19       PHQ-9 scores: TODAY: 22  PHQ-9 SCORE 4/5/2021 12/1/2021   PHQ-9 Total Score 19 20           Anything the provider should be aware of for today's appointment: pt reports anxiety & depression are the worst, that he can't think straight, declined to do med-rec    New (awaiting) Mental health provider or next programming: established w/ MELLISSA Moura    Date of scheduled apt: 02/02/22   Days until this apt: ~51        Luisana Lopez RN on December 14, 2021 at 12:25 PM

## 2021-12-15 ENCOUNTER — TELEPHONE (OUTPATIENT)
Dept: SLEEP MEDICINE | Facility: CLINIC | Age: 31
End: 2021-12-15
Payer: COMMERCIAL

## 2021-12-15 ASSESSMENT — ANXIETY QUESTIONNAIRES: GAD7 TOTAL SCORE: 20

## 2021-12-15 NOTE — PROGRESS NOTES
Late entry:  Writer was not aware of patient's recent hospitalization at [facility] due to the fact that information was not in the chart on 12/13/2021  the day that the patient was scheduled for a follow-up visit.  Plan: Follow-up with patient to reschedule appointment.    Amy Montano LICSW  12/14/21

## 2021-12-15 NOTE — TELEPHONE ENCOUNTER
Reason for Call:  Other call back    Detailed comments: pt calling because he discontinue  a medication, but pt was unable to give me name of medication.     Phone Number Patient can be reached at: Cell number on file:    Telephone Information:   Mobile [phone]       Best Time: any    Can we leave a detailed message on this number? YES    Call taken on 12/15/2021 at 9:53 AM by Nicole Akins

## 2021-12-15 NOTE — PROGRESS NOTES
MH&A TC   NURSING Post-Appointment Chart-check:    Correct pharmacy verified with patient and updated in chart? [x] yes []no    Charge captured ? [x] yes  [] no    Medications ordered this visit were e-scribed.  Verified by order class [x] yes  [] no    List Medications:  DULoxetine (CYMBALTA) 20 MG   E-Prescribing Status: Receipt confirmed by pharmacy (12/14/2021  1:42 PM CST)    Medication changes or discontinuations were communicated to patient's pharmacy: [] yes  [x] no    UA collected [] yes  [] no  [x] n/a-virtual     Future appointment was made: [x] yes  [] no  [] n/a   1/4/2022    Dictation completed at time of chart check: [x] yes  [] no    I have checked the documentation for today s encounters and the above information has been reviewed and completed.      Luisana Lopez RN on December 15, 2021 at 11:20 AM

## 2021-12-15 NOTE — ASSESSMENT & PLAN NOTE
Restarted Concerta after discharge 3 days ago, has had increasing irritability, disorganized thoughts.  We will hold Concerta  Virtual visit earlier today with psychiatric nurse practitioner  Cymbalta started at that time  I think that is okay.  Possible neuropsychological testing in the future?    Inter[employer] messages sent to providers regarding patient care.
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
## 2021-12-16 ENCOUNTER — TELEPHONE (OUTPATIENT)
Dept: FAMILY MEDICINE | Facility: CLINIC | Age: 31
End: 2021-12-16
Payer: COMMERCIAL

## 2021-12-16 ENCOUNTER — NURSE TRIAGE (OUTPATIENT)
Dept: NURSING | Facility: CLINIC | Age: 31
End: 2021-12-16
Payer: COMMERCIAL

## 2021-12-16 ENCOUNTER — TELEPHONE (OUTPATIENT)
Dept: ALLERGY | Facility: CLINIC | Age: 31
End: 2021-12-16
Payer: COMMERCIAL

## 2021-12-16 DIAGNOSIS — F17.200 SMOKING: Primary | ICD-10-CM

## 2021-12-16 RX ORDER — NICOTINE 21 MG/24HR
1 PATCH, TRANSDERMAL 24 HOURS TRANSDERMAL EVERY 24 HOURS
Qty: 28 PATCH | Refills: 3 | Status: SHIPPED | OUTPATIENT
Start: 2021-12-16 | End: 2022-07-11

## 2021-12-16 RX ORDER — NICOTINE 21 MG/24HR
1 PATCH, TRANSDERMAL 24 HOURS TRANSDERMAL EVERY 24 HOURS
Status: CANCELLED
Start: 2021-12-16

## 2021-12-16 RX ORDER — ACETAMINOPHEN 325 MG/1
325-650 TABLET ORAL
COMMUNITY
End: 2022-02-17

## 2021-12-16 NOTE — TELEPHONE ENCOUNTER
Closing encounter, as there is already a Nurse Triage encounter addressing this issue/concern.    Joe Harris, KIERRAN, RN   [facility]

## 2021-12-16 NOTE — TELEPHONE ENCOUNTER
Reason for Call:  Other prescription    Detailed comments: saw dr coy  pt forgot to ask for a patch to help him to quit smoking. Does he need to be seen again or is  willing to send RX to pharmacy?Zander uses [facility] in [place]  Phone Number Patient can be reached at: Home number on file [phone] (home)    Best Time: anytime  Can we leave a detailed message on this number? YES    Call taken on 12/16/2021 at 8:18 AM by Tita Foster

## 2021-12-16 NOTE — TELEPHONE ENCOUNTER
RN spoke to pt regarding Dr. Ybarra's message below. Per pt, he is currently smoking every hour.     Pt states when he was hospitalized, he thinks they were giving him the max strength dosage for the nicotine patch.    Pt states he smokes about half a pack of cigarettes a day. This is equivalent to 10 cigarettes a day, since a pack of cigarettes has 20 in there.    Routing back to PCP to review and advise.    RANDALL Collier, RN   [facility]

## 2021-12-16 NOTE — TELEPHONE ENCOUNTER
Patient wants to talk to staff about plan of action for allergy shots.  He also made appt with Dr. Bullock for virtual appt on 2-1.  Wants to talk to staff today.

## 2021-12-16 NOTE — TELEPHONE ENCOUNTER
Please ask how much he is smoking each day in order to determine strength of the patches and I will plan on calling them in

## 2021-12-16 NOTE — TELEPHONE ENCOUNTER
See triage message below.    Patient requesting nicotine patch.  Pended medication and sent to PCP, Dr Ybarra for review / plan    Marta Brown RN  [facility]      See message below from triage RN  Pt states he was on 72 hr hold at [facility] over the weekend -- brought there by paramedics -- -and got nicotine patch there and it helped  Pt wants to quit smoking and Chantix not working     Pt requesting prescription for nicotine patches -- [facility] pharmacy in [place]     Pt states he already called this AM -- and wants this requests to be high priority     Pt declined to be triaged for cough and sore throat and rash symptoms

## 2021-12-16 NOTE — TELEPHONE ENCOUNTER
Please ask how much he is smoking each day in order to determine strength of the patches and I will plan on calling them in         Documentation       Marta Brown RN Kleven, Thomas, MD 51 minutes ago (10:14 AM)     LL    Patient requesting nicotine patch.   Pended medication and sent to PCP, Dr Ybarra for review / plan    Routing comment      Marta Brown RN 56 minutes ago (10:09 AM)     LL       See triage message below.     Patient requesting nicotine patch.  Pended medication and sent to PCP, Dr Ybarra for review / plan     Marta Brown RN  [facility]        See message below from triage RN  Pt states he was on 72 hr hold at [facility] over the weekend -- brought there by paramedics -- -and got nicotine patch there and it helped  Pt wants to quit smoking and Chantix not working      Pt requesting prescription for nicotine patches -- [facility] pharmacy in [place]      Pt states he already called this AM -- and wants this requests to be high priority      Pt declined to be triaged for cough and sore throat and rash symptoms             Documentation       Nica Servin RN Kle[facility] Team [place] 1 hour ago (10:01 AM)     LYDIA    RN triage -- action needed     Routing comment      Nica Servin RN 1 hour ago (10:01 AM)     LYDIA       RN triage   Call from pt   Pt states he has chronic cough / not worse  -- and now sore throat -- pt attributes to smoking -- pt states he also has rash   Pt states he was on 72 hr hold at [facility] over the weekend -- brought there by paramedics -- -and got nicotine patch there and it helped  Pt wants to quit smoking and Chantix not working      Pt requesting prescription for nicotine patches -- [facility] pharmacy in [place]      Pt states he already called this AM -- and wants this requests to be high priority      Pt declined to be triaged for cough and sore throat and rash symptoms      Please advise      BELGICA Avery  Triage Nurse Advisor     COVID  19 Nurse Triage Plan/Patient Instructions     Please be aware that novel coronavirus (COVID-19) may be circulating in the community. If you develop symptoms such as fever, cough, or SOB or if you have concerns about the presence of another infection including coronavirus (COVID-19), please contact your health care provider or visit [email or web address].      Disposition/Instructions     Home care recommended. Follow home care protocol based instructions.     Thank you for taking steps to prevent the spread of this virus.    Limit your contact with others.    Wear a simple mask to cover your cough.    Wash your hands well and often.     Resources    [facility]: About COVID-19: [email or web address]    CDC: What to Do If You're Sick: www.cdc.gov/coronavirus/2019-ncov/about/steps-when-sick.html    CDC: Ending Home Isolation: www.cdc.gov/coronavirus/2019-ncov/hcp/disposition-in-home-patients.html     CDC: Caring for Someone: www.cdc.gov/coronavirus/2019-ncov/if-you-are-sick/care-for-someone.html     [facility]: Interim Guidance for Hospital Discharge to Home: www.health.[facility].[place]/diseases/coronavirus/hcp/hospdischarge.pdf    [facility] clinical trials (COVID-19 research studies): clinicalaffairs.[facility]-clinical-trials     Below are the COVID-19 hotlines at the [place] Department of [facility] ([facility]). Interpreters are available.     For health questions: Call 916-966-7739 or 1-403.677.9947 (7 a.m. to 7 p.m.)    For questions about schools and childcare: Call 411-537-5419 or 1-708.996.6136 (7 a.m. to 7 p.m.)                              Reason for Disposition    Caller requesting a NON-URGENT new prescription or refill and triager unable to refill per department policy    Protocols used: MEDICATION QUESTION CALL-A-[place]                 Documentation      Nikolay Lora [phone]  Nica Servin RN 1 hour ago (9:48 AM)

## 2021-12-16 NOTE — TELEPHONE ENCOUNTER
RN triage   Call from pt   Pt states he has chronic cough / not worse  -- and now sore throat -- pt attributes to smoking -- pt states he also has rash   Pt states he was on 72 hr hold at [facility] over the weekend -- brought there by paramedics -- -and got nicotine patch there and it helped  Pt wants to quit smoking and Chantix not working     Pt requesting prescription for nicotine patches -- [facility] pharmacy in [place]     Pt states he already called this AM -- and wants this requests to be high priority     Pt declined to be triaged for cough and sore throat and rash symptoms     Please advise     Nica Servin RN  [facility]  Triage Nurse Advisor    COVID 19 Nurse Triage Plan/Patient Instructions    Please be aware that novel coronavirus (COVID-19) may be circulating in the community. If you develop symptoms such as fever, cough, or SOB or if you have concerns about the presence of another infection including coronavirus (COVID-19), please contact your health care provider or visit [email or web address].     Disposition/Instructions    Home care recommended. Follow home care protocol based instructions.    Thank you for taking steps to prevent the spread of this virus.  o Limit your contact with others.  o Wear a simple mask to cover your cough.  o Wash your hands well and often.    Resources    [facility]: About COVID-19: [email or web address]    CDC: What to Do If You're Sick: www.cdc.gov/coronavirus/2019-ncov/about/steps-when-sick.html    CDC: Ending Home Isolation: www.cdc.gov/coronavirus/2019-ncov/hcp/disposition-in-home-patients.html     CDC: Caring for Someone: www.cdc.gov/coronavirus/2019-ncov/if-you-are-sick/care-for-someone.html     [facility]: Interim Guidance for Hospital Discharge to Home: www.health.[facility].mn.us/diseases/coronavirus/hcp/hospdischarge.pdf    [facility] clinical trials (COVID-19 research studies): clinicalaffairs.[facility]/umn-clinical-trials     Below are the  COVID-19 hotlines at the [facility] of Health ([facility]). Interpreters are available.   o For health questions: Call 383-165-5436 or 1-774.329.7715 (7 a.m. to 7 p.m.)  o For questions about schools and childcare: Call 261-514-9854 or 1-939.776.6488 (7 a.m. to 7 p.m.)                     Reason for Disposition    Caller requesting a NON-URGENT new prescription or refill and triager unable to refill per department policy    Protocols used: MEDICATION QUESTION CALL-A-OH

## 2021-12-17 NOTE — TELEPHONE ENCOUNTER
This patient called regarding the appeal from his insurance according to him it's been approve.   Thank you.

## 2021-12-17 NOTE — TELEPHONE ENCOUNTER
Spoke with patient he is very upset and wanting to come in for xolair Im seeing an at home approval for him but not clinic. Fannie notified us that a bouchra is back in on Monday and will be taking care of this.

## 2021-12-19 RX ORDER — NICOTINE 21 MG/24HR
1 PATCH, TRANSDERMAL 24 HOURS TRANSDERMAL EVERY 24 HOURS
OUTPATIENT
Start: 2021-12-19

## 2021-12-19 NOTE — TELEPHONE ENCOUNTER
Refilled 12/16/21 with 3 refills.    Outpatient Medication Detail     Disp Refills Start End GILL   nicotine (NICODERM CQ) 21 MG/24HR 24 hr patch 28 patch 3 12/16/2021  --   Sig - Route: Place 1 patch onto the skin every 24 hours - Transdermal   Sent to pharmacy as: Nicotine 21 MG/24HR Transdermal Patch 24 Hour (NICODERM CQ)   Class: E-Prescribe   Order: [id number]   E-Prescribing Status: Receipt confirmed by pharmacy (12/16/2021  2:36 PM CST)

## 2021-12-20 NOTE — TELEPHONE ENCOUNTER
Reached Nikolay today to discuss his progress and concerns. Agreed that he should not re-start the Concerta. May be best to avoid stimulant therapy all together going forward since could contribute to psychosis and not obviously helpful. I do not have a good plan for treatment going forward at this time, but will plan to discuss his case at an upcoming case conference with my colleagues that occur Thursday mornings. Unfortunately the next one might be in January after the holidays, but will keep him updated on their ideas for treatment after I get a chance to discuss with the group. I did get to discuss his case briefly with Dr. Morse already and after our discussion agreed that mental health optimization is best next step. Nikolay states that he has follow-up scheduled with a new psychiatry provider and also planned follow-up with his PCP. Of note, voiced more concern today about possible autism diagnosis than sleepiness concern.  Barbara Stokes PA-C on 12/20/2021 at 5:58 PM

## 2021-12-21 ENCOUNTER — VIRTUAL VISIT (OUTPATIENT)
Dept: BEHAVIORAL HEALTH | Facility: CLINIC | Age: 31
End: 2021-12-21
Payer: COMMERCIAL

## 2021-12-21 DIAGNOSIS — F90.0 ATTENTION DEFICIT HYPERACTIVITY DISORDER (ADHD), PREDOMINANTLY INATTENTIVE TYPE: Primary | ICD-10-CM

## 2021-12-21 PROCEDURE — 90834 PSYTX W PT 45 MINUTES: CPT | Mod: 95 | Performed by: SOCIAL WORKER
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
## 2021-12-23 ENCOUNTER — MYC MEDICAL ADVICE (OUTPATIENT)
Dept: FAMILY MEDICINE | Facility: CLINIC | Age: 31
End: 2021-12-23
Payer: COMMERCIAL

## 2021-12-23 NOTE — TELEPHONE ENCOUNTER
Patient called to let provider know of the ARACELY signed and for provider to look out for correspondence from [place].

## 2021-12-28 ENCOUNTER — OFFICE VISIT (OUTPATIENT)
Dept: ALLERGY | Facility: CLINIC | Age: 31
End: 2021-12-28
Payer: COMMERCIAL

## 2021-12-28 ENCOUNTER — TELEPHONE (OUTPATIENT)
Dept: ALLERGY | Facility: CLINIC | Age: 31
End: 2021-12-28

## 2021-12-28 ENCOUNTER — TELEPHONE (OUTPATIENT)
Dept: FAMILY MEDICINE | Facility: CLINIC | Age: 31
End: 2021-12-28

## 2021-12-28 VITALS — OXYGEN SATURATION: 99 % | HEART RATE: 95 BPM | RESPIRATION RATE: 16 BRPM

## 2021-12-28 DIAGNOSIS — F29 PSYCHOSIS, UNSPECIFIED PSYCHOSIS TYPE (H): Primary | ICD-10-CM

## 2021-12-28 DIAGNOSIS — L50.1 CHRONIC IDIOPATHIC URTICARIA: Primary | ICD-10-CM

## 2021-12-28 DIAGNOSIS — F39 MOOD DISORDER (H): ICD-10-CM

## 2021-12-28 LAB
ALBUMIN SERPL-MCNC: 3.9 G/DL (ref 3.5–5)
ALP SERPL-CCNC: 91 U/L (ref 45–120)
ALT SERPL W P-5'-P-CCNC: 40 U/L (ref 0–45)
ANION GAP SERPL CALCULATED.3IONS-SCNC: 9 MMOL/L (ref 5–18)
AST SERPL W P-5'-P-CCNC: 34 U/L (ref 0–40)
BASOPHILS # BLD AUTO: 0 10E3/UL (ref 0–0.2)
BASOPHILS NFR BLD AUTO: 0 %
BILIRUB SERPL-MCNC: 0.4 MG/DL (ref 0–1)
BUN SERPL-MCNC: 12 MG/DL (ref 8–22)
CALCIUM SERPL-MCNC: 9.4 MG/DL (ref 8.5–10.5)
CHLORIDE BLD-SCNC: 105 MMOL/L (ref 98–107)
CO2 SERPL-SCNC: 27 MMOL/L (ref 22–31)
CREAT SERPL-MCNC: 0.85 MG/DL (ref 0.7–1.3)
EOSINOPHIL # BLD AUTO: 0.1 10E3/UL (ref 0–0.7)
EOSINOPHIL NFR BLD AUTO: 2 %
ERYTHROCYTE [DISTWIDTH] IN BLOOD BY AUTOMATED COUNT: 11.8 % (ref 10–15)
GFR SERPL CREATININE-BSD FRML MDRD: >90 ML/MIN/1.73M2
GLUCOSE BLD-MCNC: 102 MG/DL (ref 70–125)
HCT VFR BLD AUTO: 47.6 % (ref 40–53)
HGB BLD-MCNC: 16.3 G/DL (ref 13.3–17.7)
IMM GRANULOCYTES # BLD: 0 10E3/UL
IMM GRANULOCYTES NFR BLD: 0 %
LYMPHOCYTES # BLD AUTO: 2.4 10E3/UL (ref 0.8–5.3)
LYMPHOCYTES NFR BLD AUTO: 37 %
MCH RBC QN AUTO: 30.9 PG (ref 26.5–33)
MCHC RBC AUTO-ENTMCNC: 34.2 G/DL (ref 31.5–36.5)
MCV RBC AUTO: 90 FL (ref 78–100)
MONOCYTES # BLD AUTO: 0.6 10E3/UL (ref 0–1.3)
MONOCYTES NFR BLD AUTO: 9 %
NEUTROPHILS # BLD AUTO: 3.5 10E3/UL (ref 1.6–8.3)
NEUTROPHILS NFR BLD AUTO: 52 %
PLATELET # BLD AUTO: 289 10E3/UL (ref 150–450)
POTASSIUM BLD-SCNC: 3.8 MMOL/L (ref 3.5–5)
PROT SERPL-MCNC: 7.2 G/DL (ref 6–8)
RBC # BLD AUTO: 5.28 10E6/UL (ref 4.4–5.9)
SODIUM SERPL-SCNC: 141 MMOL/L (ref 136–145)
WBC # BLD AUTO: 6.6 10E3/UL (ref 4–11)

## 2021-12-28 PROCEDURE — 85025 COMPLETE CBC W/AUTO DIFF WBC: CPT | Performed by: ALLERGY & IMMUNOLOGY

## 2021-12-28 PROCEDURE — 99000 SPECIMEN HANDLING OFFICE-LAB: CPT | Performed by: ALLERGY & IMMUNOLOGY

## 2021-12-28 PROCEDURE — 36415 COLL VENOUS BLD VENIPUNCTURE: CPT | Performed by: ALLERGY & IMMUNOLOGY

## 2021-12-28 PROCEDURE — 99214 OFFICE O/P EST MOD 30 MIN: CPT | Performed by: ALLERGY & IMMUNOLOGY

## 2021-12-28 PROCEDURE — 82955 ASSAY OF G6PD ENZYME: CPT | Mod: 90 | Performed by: ALLERGY & IMMUNOLOGY

## 2021-12-28 PROCEDURE — 80053 COMPREHEN METABOLIC PANEL: CPT | Performed by: ALLERGY & IMMUNOLOGY

## 2021-12-28 RX ORDER — COLCHICINE 0.6 MG/1
TABLET ORAL
Qty: 60 TABLET | Refills: 0 | Status: SHIPPED | OUTPATIENT
Start: 2021-12-28 | End: 2022-02-17

## 2021-12-28 RX ORDER — HYDROCORTISONE 2.5 %
CREAM (GRAM) TOPICAL 2 TIMES DAILY
Qty: 30 G | Refills: 1 | Status: SHIPPED | OUTPATIENT
Start: 2021-12-28 | End: 2022-07-11

## 2021-12-28 NOTE — PROGRESS NOTES
Subjective       HPI     Chief complaint: Follow-up hives    History of present illness: This is a pleasant 31-year-old gentleman I saw in August for hives.  We have been struggling to get his Xolair approved with his insurance.  It was approved as an outpatient but not as an inpatient medication.  He has never received Xolair before and requires receiving at least 3 Xolair doses in the office prior to receiving the Xolair at home.  He reports the hives continue on a regular basis especially if something brushes up against his skin.  He has pictures that show large urticaria, dermatographia as well.  He has been using some hydrocortisone cream and thinks that that does help.  He like to discuss other options.  He tried high-dose antihistamines as well as Pepcid.  He is not a candidate for montelukast as he has a history of psychiatric disease.  He has noted a few bruises with the hives but thinks it secondary to itching.    Review of Systems   Constitutional, HEENT, cardiovascular, pulmonary, gi and gu systems are negative, except as otherwise noted.      Objective    Pulse 95   Resp 16   SpO2 99%   There is no height or weight on file to calculate BMI.  Physical Exam      Gen: Pleasant male not in acute distress  HEENT: Eyes no erythema of the bulbar or palpebral conjunctiva, no edema.   Skin: No hives but a bruise on his chest  Psych: Alert and oriented times 3    Impression report and plan:  1.  Chronic dermatographia urticaria    Systemic work-up previously was negative except for a total IgE that was greater than 1000.  He does have some allergic rhinitis symptoms.  I would like to check a CBC with differential, CMP and G6PD as I talked with him about other options for control of his chronic hives given that we had difficulty with his insurance company.  I do think Xolair is the least side effect option for him, however, going forward.  He has tried prednisone previously but this does affect his  psychiatric disease.  I went over the risk and benefits of trying colchicine as well as dapsone.  We agreed to start with colchicine.  Consider derm referral for biopsy if bruising continues He will take this for the next 2 to 4 weeks as we continue to work on approval for his Xolair.  I would like him to follow in a month.

## 2021-12-28 NOTE — TELEPHONE ENCOUNTER
Reason for Call:  Other     Detailed comments: patient sister is calling asking for a refferal to see a mental health therapist, she said her brother has anxiety and depression and in the last month has had 2 episodes of phycosis  Can we call and let him know if the refferal is in  hed like to see cristino chavarria in [facility] on [address]    Phone Number Patient can be reached at: Other phone number:  [phone]    Best Time: asap    Can we leave a detailed message on this number? YES    Call taken on 12/28/2021 at 1:43 PM by Zara Forte

## 2021-12-28 NOTE — LETTER
12/28/2021         RE: Nikolay Lora  [address] [place] [address]        Dear Colleague,    Thank you for referring your patient, Nikolay Lora, to the [facility]. Please see a copy of my visit note below.          Subjective       HPI     Chief complaint: Follow-up hives    History of present illness: This is a pleasant 31-year-old gentleman I saw in August for hives.  We have been struggling to get his Xolair approved with his insurance.  It was approved as an outpatient but not as an inpatient medication.  He has never received Xolair before and requires receiving at least 3 Xolair doses in the office prior to receiving the Xolair at home.  He reports the hives continue on a regular basis especially if something brushes up against his skin.  He has pictures that show large urticaria, dermatographia as well.  He has been using some hydrocortisone cream and thinks that that does help.  He like to discuss other options.  He tried high-dose antihistamines as well as Pepcid.  He is not a candidate for montelukast as he has a history of psychiatric disease.  He has noted a few bruises with the hives but thinks it secondary to itching.    Review of Systems   Constitutional, HEENT, cardiovascular, pulmonary, gi and gu systems are negative, except as otherwise noted.      Objective    Pulse 95   Resp 16   SpO2 99%   There is no height or weight on file to calculate BMI.  Physical Exam      Gen: Pleasant male not in acute distress  HEENT: Eyes no erythema of the bulbar or palpebral conjunctiva, no edema.   Skin: No hives but a bruise on his chest  Psych: Alert and oriented times 3    Impression report and plan:  1.  Chronic dermatographia urticaria    Systemic work-up previously was negative except for a total IgE that was greater than 1000.  He does have some allergic rhinitis symptoms.  I would like to check a CBC with differential, CMP and G6PD as I talked with him about other  options for control of his chronic hives given that we had difficulty with his insurance company.  I do think Xolair is the least side effect option for him, however, going forward.  He has tried prednisone previously but this does affect his psychiatric disease.  I went over the risk and benefits of trying colchicine as well as dapsone.  We agreed to start with colchicine.  He will take this for the next 2 to 4 weeks as we continue to work on approval for his Xolair.  I would like him to follow in a month.      Again, thank you for allowing me to participate in the care of your patient.        Sincerely,        Mayte CARTER MD

## 2021-12-28 NOTE — TELEPHONE ENCOUNTER
I messaged Fannie Dacosta to ask for clarification on this. Will get back to patient as soon as we hear from Fannie.    ----- Message from Mayte Bullock MD sent at 12/28/2021  9:59 AM CST -----  Just an FYI, I submitted a letter of medical necessity for this patient.  Can we check with Fannie Dacosta weekly on this?

## 2021-12-28 NOTE — TELEPHONE ENCOUNTER
He is already seeing a therapist and the psychiatric nurse practitioner  I do not think that there is a release to talk to her.  I spoke with his brother in the office when he was here.  She should talk with other family members as well as the patient.

## 2021-12-28 NOTE — TELEPHONE ENCOUNTER
Chart reviewed. Please review findings below.     The psychotic mental health concerns were addressed by Dr. Ybarra in-clinic this month. Provider noted the potential for future neuropsychiatric testing and behavioral health referral.     CMA queued and pended order for provider to review.

## 2021-12-29 ENCOUNTER — TELEPHONE (OUTPATIENT)
Dept: ALLERGY | Facility: CLINIC | Age: 31
End: 2021-12-29

## 2021-12-29 ENCOUNTER — VIRTUAL VISIT (OUTPATIENT)
Dept: BEHAVIORAL HEALTH | Facility: CLINIC | Age: 31
End: 2021-12-29
Payer: COMMERCIAL

## 2021-12-29 DIAGNOSIS — F90.0 ATTENTION DEFICIT HYPERACTIVITY DISORDER (ADHD), PREDOMINANTLY INATTENTIVE TYPE: Primary | ICD-10-CM

## 2021-12-29 PROCEDURE — 90785 PSYTX COMPLEX INTERACTIVE: CPT | Mod: GT | Performed by: SOCIAL WORKER

## 2021-12-29 PROCEDURE — 90837 PSYTX W PT 60 MINUTES: CPT | Mod: GT | Performed by: SOCIAL WORKER

## 2021-12-29 PROCEDURE — 99207 PR CDG-CODE INCORRECT PER BILLING BASED ON TIME: CPT | Performed by: SOCIAL WORKER

## 2021-12-29 NOTE — TELEPHONE ENCOUNTER
Dr. Bullock   This person called and is requesting a call back:    Name Of Person Who Called: Nikolay     Why Did The Person Call: patient wants call back about about insurance requirement     Best Phone Number To Call Back:     Okay To Leave A Detailed Voicemail?     Thank you.

## 2021-12-30 ENCOUNTER — VIRTUAL VISIT (OUTPATIENT)
Dept: BEHAVIORAL HEALTH | Facility: CLINIC | Age: 31
End: 2021-12-30
Attending: NURSE PRACTITIONER
Payer: COMMERCIAL

## 2021-12-30 ENCOUNTER — TELEPHONE (OUTPATIENT)
Dept: BEHAVIORAL HEALTH | Facility: CLINIC | Age: 31
End: 2021-12-30
Payer: COMMERCIAL

## 2021-12-30 DIAGNOSIS — R41.89 COGNITIVE CHANGES: Primary | ICD-10-CM

## 2021-12-30 DIAGNOSIS — F39 MOOD DISORDER (H): ICD-10-CM

## 2021-12-30 LAB — G6PD RBC-CCNT: 8.9 U/G HB

## 2021-12-30 PROCEDURE — 99215 OFFICE O/P EST HI 40 MIN: CPT | Mod: TEL | Performed by: NURSE PRACTITIONER

## 2021-12-30 RX ORDER — ARIPIPRAZOLE 2 MG/1
2 TABLET ORAL DAILY
Qty: 30 TABLET | Refills: 1 | Status: SHIPPED | OUTPATIENT
Start: 2021-12-30 | End: 2022-02-23

## 2021-12-30 NOTE — PROGRESS NOTES
This video/telephone visit will be conducted via a call between you and your physician/provider. We have found that certain health care needs can be provided without the need for an in-person physical exam. This service lets us provide the care you need with a video /telephone conversation. If a prescription is necessary we can send it directly to your pharmacy. If lab work is needed we can place an order for that and you can then stop by our lab to have the test done at a later time.    Just as we bill insurance for in-person visits, we also bill insurance for video/telephone visits. If you have questions about your insurance coverage, we recommend that you speak with your insurance company.    Patient has given verbal consent for video/Telephone visit? Yes    Patient would like video visit, please connect : [id number], if no connection text [phone].  Reason for visit: Medication follow up    Patient verified allergies, medications and pharmacy via verbally by telephone    Patient states he  is ready for visit.  Medication refill is pended for review and approval by provider.  MN  to be reviewed by provider.       Zena Verma CMA December 30, 2021 10:07 AM
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
## 2021-12-30 NOTE — PATIENT INSTRUCTIONS
Continue medications as prescribed  Have your pharmacy contact us for a refill if you are running low on medications (We may ask you to come into clinic to get a refill from the nurse  No Alcohol or drug use  No driving if sedated  Call the clinic with any questions or concerns   Reach out for help if you feel like hurting yourself or others ([facility] [phone]: [address] [facility], [address] or [facility] Suicide Hotline [phone] , call 911 or go to nearest Emergency room    Follow up as directed, for your appointments, per your After Visit Summary Form.

## 2021-12-30 NOTE — TELEPHONE ENCOUNTER
OBC consulted w/ this RN about rescheduling pt for sooner (today) w/ long-term provider, MELLISSA Moura (from 2/2/22). This RN oriented that TC return appt (scheduled 1/4/22) can be cancelled if pt is able to be seen sooner by long-term provider. OBC rescheduled pt for today. OBC indicated pt did not want TC appt cancelled until after they saw MELLISSA Moura today. This RN reviewed TC services are dc'ed when a pt is able to access long-term providers. Pt was informed of this. OBC will plan to cancel TC appt after NP Zeny appt today.    Routing to TC provider as FYI. Pt will be dc'ed from TC services to long-term provider for care.    Luisana Lopez RN on 12/30/2021 at 9:39 AM

## 2021-12-30 NOTE — PROGRESS NOTES
Medications Phoned  to Pharmacy [] yes [x]no  Name of Pharmacist:  List Medications, including dose, quantity and instructions     Medications ordered this visit were e-scribed.  Verified by order class [x] yes  [] no  Abilify 2mg    Medication changes or discontinuations were communicated to patient's pharmacy: [] yes  [x] no     Dictation completed at time of chart check: [x] yes  [] no     I have checked the documentation for today s encounters and the above information has been reviewed and completed.        Zena Verma, BRY December 30, 2021 2:34 PM

## 2022-01-03 ENCOUNTER — TELEPHONE (OUTPATIENT)
Dept: SLEEP MEDICINE | Facility: CLINIC | Age: 32
End: 2022-01-03
Payer: COMMERCIAL

## 2022-01-03 NOTE — TELEPHONE ENCOUNTER
Patient was calling to notify sleep center that the paper work he sent via [employer] is just an FYI and that there is nothing he needs from his sleep provider at this time. Patient will keep the appointment he has with Barbara Stokes scheduled on 1/13/2022, 1/14/2022 visit with Bennett Goltz has been cancelled.   Kimmie Teixeira MA

## 2022-01-03 NOTE — TELEPHONE ENCOUNTER
Reason for Call:  Other call back    Detailed comments: pt would like a call back regarding a form his sent asia and to get a sooner appt.    Phone Number Patient can be reached at: Cell number on file:    Telephone Information:   Mobile [phone]       Best Time: any    Can we leave a detailed message on this number? YES    Call taken on 1/3/2022 at 8:15 AM by Nicole Akins

## 2022-01-04 DIAGNOSIS — L50.1 CHRONIC IDIOPATHIC URTICARIA: Primary | ICD-10-CM

## 2022-01-04 RX ORDER — EPINEPHRINE 0.3 MG/.3ML
INJECTION SUBCUTANEOUS
Qty: 2 EACH | Refills: 0 | Status: SHIPPED | OUTPATIENT
Start: 2022-01-04 | End: 2024-05-14

## 2022-01-05 ENCOUNTER — VIRTUAL VISIT (OUTPATIENT)
Dept: BEHAVIORAL HEALTH | Facility: CLINIC | Age: 32
End: 2022-01-05
Payer: COMMERCIAL

## 2022-01-05 DIAGNOSIS — F90.0 ATTENTION DEFICIT HYPERACTIVITY DISORDER (ADHD), PREDOMINANTLY INATTENTIVE TYPE: Primary | ICD-10-CM

## 2022-01-05 PROCEDURE — 90834 PSYTX W PT 45 MINUTES: CPT | Mod: 95 | Performed by: SOCIAL WORKER

## 2022-01-05 NOTE — PROGRESS NOTES
Progress Note    Patient Name: Nikolay Lora  Date: 1/5/21         Service Type: Individual      Session Start Time: 0900  Session End Time: 0952     Session Length: 52    Session #: 12    Attendees: Patient and therapist and patient sister    Service Modality:  Video Visit:      Provider verified identity through the following two step process.  Patient provided:  Patient is known previously to provider    Telemedicine Visit: The patient's condition can be safely assessed and treated via synchronous audio and visual telemedicine encounter.      Reason for Telemedicine Visit: Patient has requested telehealth visit    Originating Site (Patient Location): Patient's home    Distant Site (Provider Location): Provider Remote Setting- Home Office    Consent:  The patient/guardian has verbally consented to: the potential risks and benefits of telemedicine (video visit) versus in person care; bill my insurance or make self-payment for services provided; and responsibility for payment of non-covered services.     Patient would like the video invitation sent by:  Send to e-mail at: [email or web address]    Mode of Communication:  Video Conference via [employer]    As the provider I attest to compliance with applicable laws and regulations related to telemedicine.     Treatment Plan Last Reviewed: 11/8/21  PHQ-9 / LICO-7 :   LICO-7 SCORE 12/9/2021 12/9/2021 12/14/2021   Total Score - 19 (severe anxiety) -   Total Score 19 19 20       PHQ 4/5/2021 12/1/2021 12/14/2021   PHQ-9 Total Score 19 20 22   Q9: Thoughts of better off dead/self-harm past 2 weeks Not at all Not at all Not at all     DATA  Interactive Complexity: No  Crisis: No       Progress Since Last Session (Related to Symptoms / Goals / Homework):   Symptoms: Worsening.  States that he continues to have problems with his memory, fatigue and impulsivity.  Patient is in the process of applying for short-term disability through his  "employer. Patient continues to pursue his request for additional neuropsych testing.              Homework: Partially completed. Patient indicated he is in the process of requesting a referral for further neuropsychometric testing from   Beryl Moura CNP or at the [facility] for Psych and wellness [phone].  Reviewed with them the difference between the role of a Psychologist versus a Psychiatrist.      Episode of Care Goals: Minimal progress - PRECONTEMPLATION (Not seeing need for change); Intervened by educating the patient about the effects of current behavior on health.  Evoked information about reasons to continue behavior, express concern / recommendations, and explored any change talk      Current / Ongoing Stressors and Concerns:  Patient stated, that he is feeling very frustrated. Difficulty expressing what it is that he is ultimately wanting to achieve through additional Neuropsych testing, although continues to believe he has undiagnosed Autism. Patients sister stated, \"Link, you don't have Autism.\"         Treatment Objective(s) Addressed in This Session:    Referenced again they Psychological evaluation report given to patient on 2/22/2021 by Alberto De La Torre MA at [facility] (in media section of chart) including the WAIS-IV, VERONICA Callahan-2 in the MMPI-3 and summarized the diagnosis and treatment plan.  Would also recommend that patient consider re-contacting [facility] to discuss the need for either further clarification of diagnosis or need for additional testing.  Sensitivity to patient expressing his sexual identity during today's session. Patient and family cognizant of the stigma and challenges associated within their culture and the options available to patient in regard to patient working with a provider with that specialty.                 Intervention:               Thought processing techniques and strategies               Deep breathing " strategies and techniques               CBT         Impulse Control Methods              Daily strategies to reduce intrusive thoughts                ASSESSMENT: Current Emotional / Mental Status (status of significant symptoms):              Risk status (Self / Other harm or suicidal ideation)              Patient denies current fears or concerns for personal safety.              Patient denies current or recent suicidal ideation or behaviors.              Patient denies current or recent homicidal ideation or behaviors.              Patient denies current or recent self injurious behavior or ideation.              Patient denies other safety concerns.              Patient reports there has been no change in risk factors since their last session.                Patient reports there has been no change in protective factors since their last session.                Recommended that patient call 911 or go to the local ED should there be a change in any of these risk factors.                 Appearance:                            Appropriate               Eye Contact:                           Good               Psychomotor Behavior:          Normal               Attitude:                                   Cooperative               Orientation:                             All              Speech                          Rate / Production:       Normal/ Responsive Normal                           Volume:                       Normal               Mood:                                      Anxious               Affect:                                      Appropriate               Thought Content:                    Clear               Thought Form:                        Coherent  Logical               Insight:                                     Fair                  Medication Review:              No changes to current psychiatric medication(s)                 Medication Compliance:              Yes                  Changes in Health Issues:              None reported                 Chemical Use Review:              Substance Use: Chemical use reviewed, no active concerns identified                   Tobacco Use: No current tobacco use.       Diagnosis:  Attention deficit disorder predominantly inattentive type     Collateral Reports Completed:              Routed note to PCP     PLAN: (Patient Tasks / Therapist Tasks / Other)  1.   Patient to discuss plan with Beryl Moura CNP at his appointment on 1/6/2022  2.  Would also recommend that patient consider re-contacting [facility] to discuss the need for either further                  clarification of diagnosis or need for additional testing.  3.   Patient will begin educate himself regarding the tools that can be helpful in  managing his ADHD symptoms.  4    Patient will continue current medication regime   5.   We will consult with PhD regarding treatment options and/or transfer of care to PhD in order to fully treat all aspects of patient's care needs.  6.   Next appointment in 1 week.          Amy Montano [facility]  1/5/22                                                        ______________________________________________________________________     Treatment Plan     Patient's Name: Nikolay Lora                        YOB: 1990     Date: 11/8/21     DSM5 Diagnoses: Attention-Deficit/Hyperactivity Disorder  314.00 (F90.0) Predominantly inattentive presentation  Psychosocial / Contextual Factors: Work stress, working remote     Referral / Collaboration:  Referral to another professional/service is not indicated at this time..     Anticipated number of session or this episode of care: 10-15        MeasurableTreatment Goal(s) related to diagnosis / functional impairment(s)  Goal 1: Patient will increase his complete compensatory strategies to better manage ADHD symptoms    I will know I've met my goal when .  I feel more control of my  "life.\"     Objective #A (Patient Action)                          Patient will:  Continue current medication regime  Implement physical activity and routine  Continue educating self regarding ADHD symptom management  Status: New - Date: 11/8/21     Intervention(s)  Therapist will assist in developing an understanding of ADHD symptoms and how to manage common stressors                    Goal 2: Patient will establish the ability to effectively channel impulses.    I will know I've met my goal when \"I am not feeling as stresses.\"        Objective # A (Patient Action)    1. Develop compensatory strategies               2. Explore barriers to use of compensatory strategies               3. ADHD education               4. Explore factors which may exacerbate cognitive difficulties               5. Identify ways that ADHD causes difficulty in getting along with others.  Status New- Date(s): 11/8/21                 Patient has reviewed and agreed to the above     Amy JANSEN  11/8/21           "

## 2022-01-06 ENCOUNTER — VIRTUAL VISIT (OUTPATIENT)
Dept: BEHAVIORAL HEALTH | Facility: CLINIC | Age: 32
End: 2022-01-06
Payer: COMMERCIAL

## 2022-01-06 ENCOUNTER — ALLIED HEALTH/NURSE VISIT (OUTPATIENT)
Dept: ALLERGY | Facility: CLINIC | Age: 32
End: 2022-01-06
Payer: COMMERCIAL

## 2022-01-06 DIAGNOSIS — F41.1 GENERALIZED ANXIETY DISORDER: Primary | ICD-10-CM

## 2022-01-06 DIAGNOSIS — L50.1 CHRONIC IDIOPATHIC URTICARIA: Primary | ICD-10-CM

## 2022-01-06 DIAGNOSIS — F33.1 MODERATE EPISODE OF RECURRENT MAJOR DEPRESSIVE DISORDER (H): ICD-10-CM

## 2022-01-06 DIAGNOSIS — F90.0 ATTENTION DEFICIT HYPERACTIVITY DISORDER (ADHD), PREDOMINANTLY INATTENTIVE TYPE: ICD-10-CM

## 2022-01-06 PROCEDURE — 96372 THER/PROPH/DIAG INJ SC/IM: CPT | Performed by: ALLERGY & IMMUNOLOGY

## 2022-01-06 PROCEDURE — 99215 OFFICE O/P EST HI 40 MIN: CPT | Mod: GT | Performed by: NURSE PRACTITIONER

## 2022-01-06 PROCEDURE — 99417 PROLNG OP E/M EACH 15 MIN: CPT | Mod: GT | Performed by: NURSE PRACTITIONER

## 2022-01-06 PROCEDURE — 99207 PR DROP WITH A PROCEDURE: CPT
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
## 2022-01-06 NOTE — PROGRESS NOTES
This video/telephone visit will be conducted via a call between you and your physician/provider. We have found that certain health care needs can be provided without the need for an in-person physical exam. This service lets us provide the care you need with a video /telephone conversation. If a prescription is necessary we can send it directly to your pharmacy. If lab work is needed we can place an order for that and you can then stop by our lab to have the test done at a later time.    Just as we bill insurance for in-person visits, we also bill insurance for video/telephone visits. If you have questions about your insurance coverage, we recommend that you speak with your insurance company.    Patient has given verbal consent for video/Telephone visit? Yes     Patient would like video visit, please connect: Send SMS invite:  [phone]  Reason for visit: 2 wk follow up after starting Abilify 2 mg   AVS-MyChart  Pt is not having a good day. Did not share details with writer.  Patient verified allergies, medications and pharmacy via telephone verbally with writer.   Per chart Cymbalta was increased to 40 mg daily   Patient states he  is ready for visit.  MN  to be reviewed by provider.   Breonna Pimentel January 6, 2022 1:39 PM

## 2022-01-06 NOTE — PROGRESS NOTES
Medications Phoned  to Pharmacy [] yes [x]no  Name of Pharmacist:  List Medications, including dose, quantity and instructions     Medications ordered this visit were e-scribed.  Verified by order class [] yes  [x] no    Medication changes or discontinuations were communicated to patient's pharmacy: [] yes  [x] no     Dictation completed at time of chart check: [x] yes  [] no     I have checked the documentation for today s encounters and the above information has been reviewed and completed.        Breonna Pimentel January 6, 2022 3:43 PM

## 2022-01-06 NOTE — PATIENT INSTRUCTIONS
Continue medications as prescribed  Have your pharmacy contact us for a refill if you are running low on medications (We may ask you to come into clinic to get a refill from the nurse  No Alcohol or drug use  No driving if sedated  Call the clinic with any questions or concerns   Reach out for help if you feel like hurting yourself or others ([facility] [phone]: [address] [facility], [address] or [facility] Suicide Hotline [phone] , call 911 or go to nearest Emergency room    Follow up as directed, for your appointments, per your After Visit Summary Form.

## 2022-01-10 ENCOUNTER — TELEPHONE (OUTPATIENT)
Dept: ALLERGY | Facility: CLINIC | Age: 32
End: 2022-01-10
Payer: COMMERCIAL

## 2022-01-11 DIAGNOSIS — F34.1 PERSISTENT DEPRESSIVE DISORDER WITH ANXIOUS DISTRESS, CURRENTLY MODERATE: ICD-10-CM

## 2022-01-11 NOTE — TELEPHONE ENCOUNTER
Date of Last Office Visit: 1/6/22  Date of Next Office Visit: 2/23/21  No shows since last visit: 0  Cancellations since last visit: 2/2/22    Medication requested: Cymbalta 20 mg Date last ordered: 12/14/21 Qty: 60 Refills: 0    DULoxetine (CYMBALTA) 20 MG capsule 60 capsule 0 12/14/2021  --   Sig - Route: Take 2 capsules (40 mg) by mouth daily - Oral   Sent to pharmacy as: DULoxetine HCl 20 MG Oral Capsule Delayed Release Particles (CYMBALTA)   Class: E-Prescribe          Review of MN ?: NA      Lapse in medication adherence greater than 5 days?: No  If yes, call patient and gather details: NA  Medication refill request verified as identical to current order?: yes  Result of Last DAM, VPA, Li+ Level, CBC, or Carbamazepine Level (at or since last visit): See labs from 12/28/21    Last visit treatment plan:   Plan   1. Ambulatory referral to neuropsychology - cognitive  changes  , R/O developmental disorders   2. MD : Continue Duloxetine 40 mg    3. MDD/ Mood disorder :Continue Abilify 2 mg daily   4. Continue with psychotherapy   5. RTC : 8 weeks call in between visit with any  questions or concerns      []Medication refilled per  Medication Refill in Ambulatory Care  policy.  [x]Medication unable to be refilled by RN due to criteria not met as indicated below:    []Eligibility - not seen in the last year   []Supervision - no future appointment   [x]Compliance - no shows, cancellations or lapse in therapy   []Verification - order discrepancy   []Controlled medication   [x]Medication not included in policy   []90-day supply request   [x]Other : LPN is processing request

## 2022-01-12 RX ORDER — DULOXETIN HYDROCHLORIDE 20 MG/1
40 CAPSULE, DELAYED RELEASE ORAL DAILY
Qty: 60 CAPSULE | Refills: 1 | Status: SHIPPED | OUTPATIENT
Start: 2022-01-12 | End: 2022-02-23

## 2022-01-13 ENCOUNTER — VIRTUAL VISIT (OUTPATIENT)
Dept: SLEEP MEDICINE | Facility: CLINIC | Age: 32
End: 2022-01-13
Payer: COMMERCIAL

## 2022-01-13 VITALS — WEIGHT: 160 LBS | HEIGHT: 65 IN | BODY MASS INDEX: 26.66 KG/M2

## 2022-01-13 DIAGNOSIS — F33.9 RECURRENT MAJOR DEPRESSION RESISTANT TO TREATMENT (H): ICD-10-CM

## 2022-01-13 DIAGNOSIS — G47.11 IDIOPATHIC HYPERSOMNIA: Primary | ICD-10-CM

## 2022-01-13 PROCEDURE — 99215 OFFICE O/P EST HI 40 MIN: CPT | Mod: GT | Performed by: PHYSICIAN ASSISTANT

## 2022-01-13 ASSESSMENT — MIFFLIN-ST. JEOR: SCORE: 1612.64

## 2022-01-13 ASSESSMENT — SLEEP AND FATIGUE QUESTIONNAIRES
HOW LIKELY ARE YOU TO NOD OFF OR FALL ASLEEP WHILE SITTING INACTIVE IN A PUBLIC PLACE: HIGH CHANCE OF DOZING
HOW LIKELY ARE YOU TO NOD OFF OR FALL ASLEEP WHEN YOU ARE A PASSENGER IN A CAR FOR AN HOUR WITHOUT A BREAK: HIGH CHANCE OF DOZING
HOW LIKELY ARE YOU TO NOD OFF OR FALL ASLEEP WHILE WATCHING TV: HIGH CHANCE OF DOZING
HOW LIKELY ARE YOU TO NOD OFF OR FALL ASLEEP IN A CAR, WHILE STOPPED FOR A FEW MINUTES IN TRAFFIC: HIGH CHANCE OF DOZING
HOW LIKELY ARE YOU TO NOD OFF OR FALL ASLEEP WHILE SITTING QUIETLY AFTER LUNCH WITHOUT ALCOHOL: HIGH CHANCE OF DOZING
HOW LIKELY ARE YOU TO NOD OFF OR FALL ASLEEP WHILE LYING DOWN TO REST IN THE AFTERNOON WHEN CIRCUMSTANCES PERMIT: HIGH CHANCE OF DOZING
HOW LIKELY ARE YOU TO NOD OFF OR FALL ASLEEP WHILE SITTING AND TALKING TO SOMEONE: SLIGHT CHANCE OF DOZING
HOW LIKELY ARE YOU TO NOD OFF OR FALL ASLEEP WHILE SITTING AND READING: HIGH CHANCE OF DOZING

## 2022-01-13 ASSESSMENT — PAIN SCALES - GENERAL: PAINLEVEL: SEVERE PAIN (6)

## 2022-01-13 NOTE — PATIENT INSTRUCTIONS
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
## 2022-01-18 ENCOUNTER — OFFICE VISIT (OUTPATIENT)
Dept: FAMILY MEDICINE | Facility: CLINIC | Age: 32
End: 2022-01-18
Payer: COMMERCIAL

## 2022-01-18 VITALS
WEIGHT: 183 LBS | RESPIRATION RATE: 14 BRPM | DIASTOLIC BLOOD PRESSURE: 81 MMHG | SYSTOLIC BLOOD PRESSURE: 126 MMHG | HEART RATE: 99 BPM | BODY MASS INDEX: 30.45 KG/M2

## 2022-01-18 DIAGNOSIS — F17.200 SMOKING: ICD-10-CM

## 2022-01-18 DIAGNOSIS — Z00.00 HEALTHCARE MAINTENANCE: ICD-10-CM

## 2022-01-18 DIAGNOSIS — L85.8 KERATOSIS PILARIS: ICD-10-CM

## 2022-01-18 DIAGNOSIS — L30.9 CHRONIC DERMATITIS: ICD-10-CM

## 2022-01-18 DIAGNOSIS — F29 PSYCHOSIS, UNSPECIFIED PSYCHOSIS TYPE (H): ICD-10-CM

## 2022-01-18 DIAGNOSIS — R21 RASH: Primary | ICD-10-CM

## 2022-01-18 PROBLEM — Z71.6 ENCOUNTER FOR SMOKING CESSATION COUNSELING: Status: ACTIVE | Noted: 2022-01-18

## 2022-01-18 PROCEDURE — 87389 HIV-1 AG W/HIV-1&-2 AB AG IA: CPT | Performed by: FAMILY MEDICINE

## 2022-01-18 PROCEDURE — 86780 TREPONEMA PALLIDUM: CPT | Performed by: FAMILY MEDICINE

## 2022-01-18 PROCEDURE — 99214 OFFICE O/P EST MOD 30 MIN: CPT | Performed by: FAMILY MEDICINE

## 2022-01-18 PROCEDURE — 87591 N.GONORRHOEAE DNA AMP PROB: CPT | Performed by: FAMILY MEDICINE

## 2022-01-18 PROCEDURE — 86803 HEPATITIS C AB TEST: CPT | Performed by: FAMILY MEDICINE

## 2022-01-18 PROCEDURE — 87491 CHLMYD TRACH DNA AMP PROBE: CPT | Performed by: FAMILY MEDICINE

## 2022-01-18 PROCEDURE — 36415 COLL VENOUS BLD VENIPUNCTURE: CPT | Performed by: FAMILY MEDICINE

## 2022-01-18 RX ORDER — TRIAMCINOLONE ACETONIDE 1 MG/G
CREAM TOPICAL 2 TIMES DAILY
Qty: 45 G | Refills: 1 | Status: ON HOLD | OUTPATIENT
Start: 2022-01-18 | End: 2023-04-11

## 2022-01-18 RX ORDER — AMMONIUM LACTATE 12 G/100G
LOTION TOPICAL 2 TIMES DAILY
Qty: 225 ML | Refills: 1 | Status: ON HOLD | OUTPATIENT
Start: 2022-01-18 | End: 2023-04-11

## 2022-01-18 ASSESSMENT — PATIENT HEALTH QUESTIONNAIRE - PHQ9
5. POOR APPETITE OR OVEREATING: NEARLY EVERY DAY
SUM OF ALL RESPONSES TO PHQ QUESTIONS 1-9: 16

## 2022-01-18 ASSESSMENT — ANXIETY QUESTIONNAIRES
2. NOT BEING ABLE TO STOP OR CONTROL WORRYING: NEARLY EVERY DAY
3. WORRYING TOO MUCH ABOUT DIFFERENT THINGS: NEARLY EVERY DAY
1. FEELING NERVOUS, ANXIOUS, OR ON EDGE: NEARLY EVERY DAY
7. FEELING AFRAID AS IF SOMETHING AWFUL MIGHT HAPPEN: NEARLY EVERY DAY
GAD7 TOTAL SCORE: 21
5. BEING SO RESTLESS THAT IT IS HARD TO SIT STILL: NEARLY EVERY DAY
IF YOU CHECKED OFF ANY PROBLEMS ON THIS QUESTIONNAIRE, HOW DIFFICULT HAVE THESE PROBLEMS MADE IT FOR YOU TO DO YOUR WORK, TAKE CARE OF THINGS AT HOME, OR GET ALONG WITH OTHER PEOPLE: VERY DIFFICULT
6. BECOMING EASILY ANNOYED OR IRRITABLE: NEARLY EVERY DAY

## 2022-01-19 ENCOUNTER — VIRTUAL VISIT (OUTPATIENT)
Dept: BEHAVIORAL HEALTH | Facility: CLINIC | Age: 32
End: 2022-01-19
Payer: COMMERCIAL

## 2022-01-19 DIAGNOSIS — F90.0 ATTENTION DEFICIT HYPERACTIVITY DISORDER (ADHD), PREDOMINANTLY INATTENTIVE TYPE: Primary | ICD-10-CM

## 2022-01-19 DIAGNOSIS — F41.1 GENERALIZED ANXIETY DISORDER: ICD-10-CM

## 2022-01-19 LAB — HIV 1+2 AB+HIV1 P24 AG SERPL QL IA: NEGATIVE

## 2022-01-19 PROCEDURE — 90785 PSYTX COMPLEX INTERACTIVE: CPT | Mod: GT | Performed by: SOCIAL WORKER

## 2022-01-19 PROCEDURE — 90834 PSYTX W PT 45 MINUTES: CPT | Mod: GT | Performed by: SOCIAL WORKER

## 2022-01-19 ASSESSMENT — PATIENT HEALTH QUESTIONNAIRE - PHQ9
SUM OF ALL RESPONSES TO PHQ QUESTIONS 1-9: 24
SUM OF ALL RESPONSES TO PHQ QUESTIONS 1-9: 24
10. IF YOU CHECKED OFF ANY PROBLEMS, HOW DIFFICULT HAVE THESE PROBLEMS MADE IT FOR YOU TO DO YOUR WORK, TAKE CARE OF THINGS AT HOME, OR GET ALONG WITH OTHER PEOPLE: EXTREMELY DIFFICULT

## 2022-01-19 ASSESSMENT — ANXIETY QUESTIONNAIRES: GAD7 TOTAL SCORE: 21

## 2022-01-19 NOTE — ASSESSMENT & PLAN NOTE
Eczematous dry rash, forearms  Background of very dry skin.  Attempted to convey that first step in treating eczema is skin moisturizing.  I think this was somewhat lost on patient.  Prescribed Eucerin  Prescribed triamcinolone
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
## 2022-01-19 NOTE — ASSESSMENT & PLAN NOTE
Extensive time discussing work disability form.  Wants to make sure her other providers get a say in this as well as me.  I indicated that I certainly support this.  Reviewed what I wrote which was that he should be disabled through 3/31/2022 and then reevaluated.
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
## 2022-01-19 NOTE — ASSESSMENT & PLAN NOTE
"Tried Chantix which helped for a while-then \"stopped working\"  Previously on bupropion 1 something else.  He suggested Commit lozenges.  Will prescribe  "

## 2022-01-20 LAB
C TRACH DNA SPEC QL PROBE+SIG AMP: NEGATIVE
HCV AB SERPL QL IA: NEGATIVE
N GONORRHOEA DNA SPEC QL NAA+PROBE: NEGATIVE
T PALLIDUM AB SER QL: NONREACTIVE

## 2022-01-20 ASSESSMENT — PATIENT HEALTH QUESTIONNAIRE - PHQ9: SUM OF ALL RESPONSES TO PHQ QUESTIONS 1-9: 24

## 2022-01-24 ENCOUNTER — HOSPITAL ENCOUNTER (EMERGENCY)
Facility: HOSPITAL | Age: 32
Discharge: HOME | End: 2022-01-24
Attending: EMERGENCY MEDICINE | Admitting: EMERGENCY MEDICINE
Payer: COMMERCIAL

## 2022-01-24 ENCOUNTER — VIRTUAL VISIT (OUTPATIENT)
Dept: SLEEP MEDICINE | Facility: CLINIC | Age: 32
End: 2022-01-24
Payer: COMMERCIAL

## 2022-01-24 VITALS
RESPIRATION RATE: 18 BRPM | OXYGEN SATURATION: 97 % | DIASTOLIC BLOOD PRESSURE: 89 MMHG | TEMPERATURE: 98.8 F | HEART RATE: 94 BPM | HEIGHT: 65 IN | SYSTOLIC BLOOD PRESSURE: 131 MMHG | WEIGHT: 183 LBS | BODY MASS INDEX: 30.49 KG/M2

## 2022-01-24 VITALS — WEIGHT: 183 LBS | HEIGHT: 65 IN | BODY MASS INDEX: 30.49 KG/M2

## 2022-01-24 DIAGNOSIS — U07.1 COVID-19: ICD-10-CM

## 2022-01-24 DIAGNOSIS — F32.89 OTHER DEPRESSION: Primary | ICD-10-CM

## 2022-01-24 PROCEDURE — 99282 EMERGENCY DEPT VISIT SF MDM: CPT

## 2022-01-24 PROCEDURE — 99281 EMR DPT VST MAYX REQ PHY/QHP: CPT

## 2022-01-24 PROCEDURE — 99214 OFFICE O/P EST MOD 30 MIN: CPT | Mod: GT | Performed by: PSYCHIATRY & NEUROLOGY

## 2022-01-24 ASSESSMENT — SLEEP AND FATIGUE QUESTIONNAIRES
HOW LIKELY ARE YOU TO NOD OFF OR FALL ASLEEP WHILE SITTING QUIETLY AFTER LUNCH WITHOUT ALCOHOL: HIGH CHANCE OF DOZING
HOW LIKELY ARE YOU TO NOD OFF OR FALL ASLEEP WHILE SITTING AND TALKING TO SOMEONE: HIGH CHANCE OF DOZING
HOW LIKELY ARE YOU TO NOD OFF OR FALL ASLEEP WHILE LYING DOWN TO REST IN THE AFTERNOON WHEN CIRCUMSTANCES PERMIT: HIGH CHANCE OF DOZING
HOW LIKELY ARE YOU TO NOD OFF OR FALL ASLEEP IN A CAR, WHILE STOPPED FOR A FEW MINUTES IN TRAFFIC: HIGH CHANCE OF DOZING
HOW LIKELY ARE YOU TO NOD OFF OR FALL ASLEEP WHILE WATCHING TV: HIGH CHANCE OF DOZING
HOW LIKELY ARE YOU TO NOD OFF OR FALL ASLEEP WHILE SITTING INACTIVE IN A PUBLIC PLACE: HIGH CHANCE OF DOZING
HOW LIKELY ARE YOU TO NOD OFF OR FALL ASLEEP WHILE SITTING AND READING: HIGH CHANCE OF DOZING
HOW LIKELY ARE YOU TO NOD OFF OR FALL ASLEEP WHEN YOU ARE A PASSENGER IN A CAR FOR AN HOUR WITHOUT A BREAK: HIGH CHANCE OF DOZING

## 2022-01-24 ASSESSMENT — MIFFLIN-ST. JEOR
SCORE: 1716.96
SCORE: 1716.96

## 2022-01-24 NOTE — ED TRIAGE NOTES
Patient was tested for covid last week and positive. Does have a cough. Patient would like to be re tested

## 2022-01-24 NOTE — NURSING NOTE
Pt stated allergies & meds were reviewed on Jan 18, 2022 and no changes.    Pt stated he is a current smoker and trying to quit. Currently using white mint but hurts his mouth.    Pt stated he is experiencing physical pain through his whole body.    Pt asked that Dr. Stokes and Dr. Santillan be notified that pt's referral for TMS was denied as they require 4 different types of stimulant medications.    Pt stated he has daytime excessive sleepiness.

## 2022-01-24 NOTE — ED PROVIDER NOTES
EMERGENCY DEPARTMENT ENCOUNTER      NAME: Nikolay Lora  AGE: 31 year old male  YOB: 1990  MRN: [id number]  EVALUATION DATE & TIME: No admission date for patient encounter.    PCP: Mich Ybarra    ED PROVIDER: Prakash Loco M.D.      Chief Complaint   Patient presents with     Covid Concern       FINAL IMPRESSION:  1. COVID-19        ED COURSE & MEDICAL DECISION MAKIN year old male presents to the Emergency Department for evaluation of concerns related to COVID-19.  He has a positive test from 10 days ago.  Has been dealing with some intermittent symptoms.  Says he would like to get retested to see if he is cleared to return to clinic and other activities in public.  Patient is afebrile and vitally stable here.  He appears well.  No other concerns at this time.  Discussed our labs policy against retesting Covid patients within 90 days.  Since he is having symptoms which are essentially resolved, I think that once he has no further upper respiratory symptoms or fever he should be stable to return to activities in public with appropriate usual precautions.  With this reassurance, patient was comfortable with the plan to discharge home.    At the conclusion of the encounter I discussed the results of all of the tests and the disposition. The questions were answered. The patient or family acknowledged understanding and was agreeable with the care plan.     1:30 PM I introduced myself to the patient, performed my physical exam, and discussed plan for ED workup.  1:51 PM Patient to be discharged by ED RN.      MEDICATIONS GIVEN IN THE EMERGENCY:  Medications - No data to display    NEW PRESCRIPTIONS STARTED AT TODAY'S ER VISIT  Discharge Medication List as of 2022  1:59 PM             =================================================================    HPI    Patient information was obtained from: Patient    Use of : N/A       Nikolay Lora is a 31 year old male with a pertinent  history of COVID positive (1/14/22) who presents to this ED by private car for evaluation of COVID concern.    The patient tested positive for COVID 10 days ago on a saliva PCR test. he currently lives with his mother who tested positive the same day he did. He experienced symptoms including fever, chills, and body aches. His symptoms have since resolved other than occasional chills. He has some upcoming medical appointments scheduled and is presenting for a repeat COVID test. Denies chest pain, shortness of breath, and any other complaints at this time.    REVIEW OF SYSTEMS   All systems reviewed and negative except as noted in HPI.    PAST MEDICAL HISTORY:  No past medical history on file.    PAST SURGICAL HISTORY:  No past surgical history on file.        CURRENT MEDICATIONS:    Current Facility-Administered Medications   Medication     omalizumab (XOLAIR) injection 300 mg     Current Outpatient Medications   Medication     acetaminophen (TYLENOL) 325 MG tablet     ammonium lactate (LAC-HYDRIN) 12 % external lotion     ARIPiprazole (ABILIFY) 2 MG tablet     cetirizine (ZYRTEC) 10 MG tablet     cholecalciferol 25 MCG (1000 UT) TABS     colchicine (COLCYRS) 0.6 MG tablet     DULoxetine (CYMBALTA) 20 MG capsule     EPINEPHrine (ANY BX GENERIC EQUIV) 0.3 MG/0.3ML injection 2-pack     finasteride (PROSCAR) 5 MG tablet     hydrocortisone 2.5 % cream     nicotine (NICODERM CQ) 21 MG/24HR 24 hr patch     nicotine (NICORETTE) 4 MG lozenge     Skin Protectants, Misc. (EUCERIN) cream     triamcinolone (KENALOG) 0.1 % external cream     varenicline (CHANTIX STARTING MONTH BOX) 0.5 mg (11)- 1 mg (42) tablet         ALLERGIES:  No Known Allergies    FAMILY HISTORY:  No family history on file.    SOCIAL HISTORY:   Social History     Socioeconomic History     Marital status: Single     Spouse name: Not on file     Number of children: Not on file     Years of education: Not on file     Highest education level: Not on file  "  Occupational History     Not on file   Tobacco Use     Smoking status: Current Some Day Smoker     Smokeless tobacco: Never Used     Tobacco comment: 1-2 cigs per day   Substance and Sexual Activity     Alcohol use: Yes     Drug use: Not Currently     Sexual activity: Not on file   Other Topics Concern     Parent/sibling w/ CABG, MI or angioplasty before 65F 55M? Not Asked   Social History Narrative     Not on file     Social Determinants of Health     Financial Resource Strain: Not on file   Food Insecurity: Not on file   Transportation Needs: Not on file   Physical Activity: Not on file   Stress: Not on file   Social Connections: Not on file   Intimate Partner Violence: Not on file   Housing Stability: Not on file       VITALS:  /89   Pulse 94   Temp 98.8  F (37.1  C)   Resp 18   Ht 1.651 m (5' 5\")   Wt 83 kg (183 lb)   SpO2 97%   BMI 30.45 kg/m      PHYSICAL EXAM    Constitutional: Well developed, Well nourished, NAD.  HENT: Normocephalic, Atraumatic. Neck Supple.  Eyes: EOMI, Conjunctiva normal.  Respiratory: Breathing comfortably on room air. Speaks full sentences easily. Lungs clear to ascultation.  Cardiovascular: Normal heart rate, Regular rhythm. No peripheral edema.  Abdomen: Soft  Musculoskeletal: Good range of motion in all major joints. No major deformities noted.  Integument: Warm, Dry.  Neurologic: Alert & awake, Normal motor function, Normal sensory function, No focal deficits noted.   Psychiatric: Cooperative. Affect appropriate.          I, Mikey Vargas, am serving as a scribe to document services personally performed by Dr. Prakash Loco, based on my observation and the provider's statements to me. I, Prakash Loco MD attest that Mikey Vargas is acting in a scribe capacity, has observed my performance of the services and has documented them in accordance with my direction.    Prakash Loco M.D.  Emergency Medicine  [facility] EMERGENCY " DEPARTMENT  [address] [place] [id number]  [phone]  Dept: [phone]     Prakash Loco MD  01/24/22 1535

## 2022-01-24 NOTE — DISCHARGE INSTRUCTIONS
You were seen today in the emergency department at [facility] due to concerns related to COVID-19 illness.  Your evaluation is stable and reassuring.  At this point our lab criteria would not indicate retesting you for COVID-19 since you were +10 days ago and would likely still have a positive result regardless of symptoms or transmissibility.  Once you are having no further respiratory problems or fever and have been asymptomatic for 24 hours, you can return to usual activities in public.  This includes doctor visits as needed.

## 2022-01-24 NOTE — PATIENT INSTRUCTIONS
Your BMI is Body mass index is 30.45 kg/m .  Weight management is a personal decision.  If you are interested in exploring weight loss strategies, the following discussion covers the approaches that may be successful. Body mass index (BMI) is one way to tell whether you are at a healthy weight, overweight, or obese. It measures your weight in relation to your height.  A BMI of 18.5 to 24.9 is in the healthy range. A person with a BMI of 25 to 29.9 is considered overweight, and someone with a BMI of 30 or greater is considered obese. More than two-thirds of American adults are considered overweight or obese.  Being overweight or obese increases the risk for further weight gain. Excess weight may lead to heart disease and diabetes.  Creating and following plans for healthy eating and physical activity may help you improve your health.  Weight control is part of healthy lifestyle and includes exercise, emotional health, and healthy eating habits. Careful eating habits lifelong are the mainstay of weight control. Though there are significant health benefits from weight loss, long-term weight loss with diet alone may be very difficult to achieve- studies show long-term success with dietary management in less than 10% of people. Attaining a healthy weight may be especially difficult to achieve in those with severe obesity. In some cases, medications, devices and surgical management might be considered.  What can you do?  If you are overweight or obese and are interested in methods for weight loss, you should discuss this with your provider.     Consider reducing daily calorie intake by 500 calories.     Keep a food journal.     Avoiding skipping meals, consider cutting portions instead.    Diet combined with exercise helps maintain muscle while optimizing fat loss. Strength training is particularly important for building and maintaining muscle mass. Exercise helps reduce stress, increase energy, and improves fitness.  Increasing exercise without diet control, however, may not burn enough calories to loose weight.       Start walking three days a week 10-20 minutes at a time    Work towards walking thirty minutes five days a week     Eventually, increase the speed of your walking for 1-2 minutes at time    And look into health and wellness programs that may be available through your health insurance provider, employer, local community center, or [employer]men club.

## 2022-01-25 ENCOUNTER — OFFICE VISIT (OUTPATIENT)
Dept: ALLERGY | Facility: CLINIC | Age: 32
End: 2022-01-25
Payer: COMMERCIAL

## 2022-01-25 ENCOUNTER — NURSE TRIAGE (OUTPATIENT)
Dept: NURSING | Facility: CLINIC | Age: 32
End: 2022-01-25
Payer: COMMERCIAL

## 2022-01-25 VITALS — OXYGEN SATURATION: 97 % | HEART RATE: 95 BPM | RESPIRATION RATE: 16 BRPM

## 2022-01-25 DIAGNOSIS — J30.1 NON-SEASONAL ALLERGIC RHINITIS DUE TO POLLEN: ICD-10-CM

## 2022-01-25 DIAGNOSIS — L50.1 CHRONIC IDIOPATHIC URTICARIA: Primary | ICD-10-CM

## 2022-01-25 PROCEDURE — 99214 OFFICE O/P EST MOD 30 MIN: CPT | Performed by: ALLERGY & IMMUNOLOGY

## 2022-01-25 NOTE — PROGRESS NOTES
Subjective       HPI     Chief complaint: Follow-up hives, allergies    History of present illness: This is a 31-year-old gentleman recently started on Xolair for chronic urticaria.  He received his first injection on January 6.  Reports he still having a lot of itching hands and feet and has several questions regarding allergies which is why he wanted to be seen today.  He is wondering if he can start allergy shots.  He had specific IgE testing positive for multiple aeroallergens previously and notes a lot of sneezing and nasal congestion.  He also has itchy eyes and itchy hands and feet.  He is worried he might have food allergy causing the symptoms but no immediate hives, swelling or shortness of breath after eating foods except for shrimp.  He states that shellfish sometimes he notices mouth feels funny.  He states this happens with spicy food sometimes as well but he has never had to present to the emergency room for an allergic reaction to food.  He states he has not noted much improvement from Xolair but has only received 1 dose and it was only about 2 weeks ago.    Review of Systems         Objective    Pulse 95   Resp 16   SpO2 97%   There is no height or weight on file to calculate BMI.  Physical Exam      Gen: Pleasant male not in acute distress  HEENT: Eyes no erythema of the bulbar or palpebral conjunctiva, no edema.   Skin: No rashes or lesions  Psych: Alert and oriented times 3      Impression report and plan:  1.  Chronic urticaria    I stated that we do need to give him a few doses to see if Xolair will improve his symptoms.  He should continue on 300 mg monthly of Xolair for now.  He has a current epinephrine device.  He is not due for Xolair today and he was scheduled to come back in February and received this dose.  Stop colchicine.    2.  Concern for food allergy    He is very worried about food allergy.  History is not consistent for this so I stated it is unlikely he needs further  testing.  He could have oral allergy syndrome with shrimp.  I agreed to have him tested for shellfish but stated I would binh a few more months to do this.  He must be off antihistamines for 5 days prior to do skin testing.  I stated we do not do phone testing with our clinical history.    3.  Allergic rhinitis    We could consider allergy shots in the future, however, I need his hives to be under better control.  I stated that he could not have active urticaria while receiving allergy shots.  I did recommend Flonase nasal spray regularly plus Astelin as needed.  Recommend using Zyrtec up to 2 tablets twice daily.    I would like to have him follow in 3 months at that time consider skin testing.      Time spent with patient, chart review and documentation, 30 minutes on date of service.

## 2022-01-25 NOTE — PATIENT INSTRUCTIONS
Zyrtec up to two tabs twice daily     Fluticasone 2 sprays each nostril daily --aim out and back    Astelin 2 sprays each nostril twice daily --aim out and back    Xolair up to 3 months---retest at that time

## 2022-01-25 NOTE — TELEPHONE ENCOUNTER
Sister Farheen calling consent to communicate is on file.    Reporting patient tested positive for COVID 19 10 days ago.   (See ED visit 1/24/22).    Farheen is requesting to know if patient can go to allergy appointment today as scheduled?   Patient is not present. Farheen attempted to contact patient x 2 and no answer.     Reviewed COVID 19 positive isolation information below.  Triage not completed as patient is not present.    How can I protect others?    If you have symptoms: stay home and away from others (self-isolate) until:    You've had no fever--and no medicine that reduces fever--for 1 full day (24 hours). And       Your other symptoms have gotten better. For example, your cough or breathing has improved. And     At least 10 days have passed since your symptoms started. (If you've been told by a doctor that you have a weak immune system, wait 20 days.)     Farheen requesting triage nurse attempt to call patient after 830 a.m. today.    Reviewed with caller option for patient to call clinic directly.     832 a.m. attempted to reach patient at /no answer.       Additional Information    Negative: Nursing judgment    Negative: Nursing judgment    Negative: Nursing judgment    Negative: Nursing judgment    Information only question and nurse able to answer    Protocols used: INFORMATION ONLY CALL - NO TRIAGE-A-[place]

## 2022-01-25 NOTE — LETTER
1/25/2022         RE: Nikolay Lora  [address] [place] [address]        Dear Colleague,    Thank you for referring your patient, Nikolay Lora, to the [facility]. Please see a copy of my visit note below.        Subjective       HPI     Chief complaint: Follow-up hives, allergies    History of present illness: This is a 31-year-old gentleman recently started on Xolair for chronic urticaria.  He received his first injection on January 6.  Reports he still having a lot of itching hands and feet and has several questions regarding allergies which is why he wanted to be seen today.  He is wondering if he can start allergy shots.  He had specific IgE testing positive for multiple aeroallergens previously and notes a lot of sneezing and nasal congestion.  He also has itchy eyes and itchy hands and feet.  He is worried he might have food allergy causing the symptoms but no immediate hives, swelling or shortness of breath after eating foods except for shrimp.  He states that shellfish sometimes he notices mouth feels funny.  He states this happens with spicy food sometimes as well but he has never had to present to the emergency room for an allergic reaction to food.  He states he has not noted much improvement from Xolair but has only received 1 dose and it was only about 2 weeks ago.    Review of Systems         Objective    Pulse 95   Resp 16   SpO2 97%   There is no height or weight on file to calculate BMI.  Physical Exam      Gen: Pleasant male not in acute distress  HEENT: Eyes no erythema of the bulbar or palpebral conjunctiva, no edema.   Skin: No rashes or lesions  Psych: Alert and oriented times 3      Impression report and plan:  1.  Chronic urticaria    I stated that we do need to give him a few doses to see if Xolair will improve his symptoms.  He should continue on 300 mg monthly of Xolair for now.  He has a current epinephrine device.  He is not due for Xolair today and he  was scheduled to come back in February and received this dose.  Stop colchicine.    2.  Concern for food allergy    He is very worried about food allergy.  History is not consistent for this so I stated it is unlikely he needs further testing.  He could have oral allergy syndrome with shrimp.  I agreed to have him tested for shellfish but stated I would binh a few more months to do this.  He must be off antihistamines for 5 days prior to do skin testing.  I stated we do not do phone testing with our clinical history.    3.  Allergic rhinitis    We could consider allergy shots in the future, however, I need his hives to be under better control.  I stated that he could not have active urticaria while receiving allergy shots.  I did recommend Flonase nasal spray regularly plus Astelin as needed.  Recommend using Zyrtec up to 2 tablets twice daily.    I would like to have him follow in 3 months at that time consider skin testing.      Time spent with patient, chart review and documentation, 30 minutes on date of service.          Again, thank you for allowing me to participate in the care of your patient.        Sincerely,        Mayte CARTER MD

## 2022-01-26 ENCOUNTER — VIRTUAL VISIT (OUTPATIENT)
Dept: BEHAVIORAL HEALTH | Facility: CLINIC | Age: 32
End: 2022-01-26
Payer: COMMERCIAL

## 2022-01-26 DIAGNOSIS — F90.0 ATTENTION DEFICIT HYPERACTIVITY DISORDER (ADHD), PREDOMINANTLY INATTENTIVE TYPE: Primary | ICD-10-CM

## 2022-01-26 PROCEDURE — 90834 PSYTX W PT 45 MINUTES: CPT | Mod: 95 | Performed by: SOCIAL WORKER

## 2022-01-26 NOTE — PROGRESS NOTES
"                                           Progress Note    Patient Name: Nikolay Lora  Date: 1/26/22         Service Type: Individual      Session Start Time: 1100  Session End Time: 1152     Session Length: 52    Session #: 14    Attendees: Patient and sister     Service Modality:  Video Visit:      Provider verified identity through the following two step process.  Patient provided:  Patient is known previously to provider    Telemedicine Visit: The patient's condition can be safely assessed and treated via synchronous audio and visual telemedicine encounter.      Reason for Telemedicine Visit: Patient has requested telehealth visit    Originating Site (Patient Location): Patient's home    Distant Site (Provider Location): Provider Remote Setting- Home Office    Consent:  The patient/guardian has verbally consented to: the potential risks and benefits of telemedicine (video visit) versus in person care; bill my insurance or make self-payment for services provided; and responsibility for payment of non-covered services.     Patient would like the video invitation sent by:  Send to e-mail at: [email or web address]    Mode of Communication:  Video Conference via Amwell    As the provider I attest to compliance with applicable laws and regulations related to telemedicine.     Treatment Plan Last Reviewed: 11/8/21  PHQ-9 / LICO-7 :   LICO-7 SCORE 12/9/2021 12/14/2021 1/18/2022   Total Score 19 (severe anxiety) - -   Total Score 19 20 21       PHQ 12/14/2021 1/18/2022 1/19/2022   PHQ-9 Total Score 22 16 24   Q9: Thoughts of better off dead/self-harm past 2 weeks Not at all Not at all Not at all     DATA  Interactive Complexity: No  Crisis: No       Progress Since Last Session (Related to Symptoms / Goals / Homework):   Symptoms: Worsening per patient report. Patient stated, \"I am being sent from one provider to the next I do not know what this is happening?\" Reports continued fatigue, \"my body hurts all over I am in " "pain, and I cannot go back to work this way.\"    Homework: Did not complete      Episode of Care Goals: Minimal progress - RELAPSE (Returned to unhealthy behavior); Intervened by reassessing readiness to change and identifying appropriate stage.  Identified reasons for relapse, successes, and change talk     Current / Ongoing Stressors and Concerns:   Continues to be out on short-term disability. Indicated that he is very frustrated that it is taking so long to figure out his current issues.                             Treatment Objective(s) Addressed in This Session:   Patient stated, that he isn't feeling as though his concerns are being addressed in regards to why he continues to feel the way he is feeling and why his medications are not working.  During today's session discussion was held addressing how his impulsiveness and impatience appears to be hindering his progress particularly when he decides to contact various providers/agencies expecting that they can provide him with the answers to his questions quicker. Patient agreed that he would refrain from impulsively contacting other providers/agencies without first having conversation with his sister.                              Intervention:   Thought processing techniques and strategies               Deep breathing strategies and techniques               CBT        Impulse Control Methods             Daily strategies to reduce intrusive thoughts                  ASSESSMENT: Current Emotional / Mental Status (status of significant symptoms):   Risk status (Self / Other harm or suicidal ideation)   Patient denies current fears or concerns for personal safety.   Patient denies current or recent suicidal ideation or behaviors.   Patient denies current or recent homicidal ideation or behaviors.   Patient denies current or recent self injurious behavior or ideation.   Patient denies other safety concerns.   Patient reports there has been no change in risk factors " "since their last session.     Patient reports there has been no change in protective factors since their last session.     Recommended that patient call 911 or go to the local ED should there be a change in any of these risk factors.     Appearance:   Appropriate    Eye Contact:   Good    Psychomotor Behavior: Normal    Attitude:   Cooperative    Orientation:   All   Speech    Rate / Production: Normal/ Responsive Normal     Volume:  Normal    Mood:    Anxious  Irritable    Affect:    Appropriate    Thought Content:  Clear    Thought Form:  Coherent  Tangential    Insight:    Fair      Medication Review:   No changes to current psychiatric medication(s)     Medication Compliance:   Yes,  Patient stated, \"I am taking my medication although I do not believe that it is helping me at all.\"     Changes in Health Issues:   Yes: Pain, Associated Psychological Distress and complaints regarding physical pain.      Chemical Use Review:   Substance Use: Chemical use reviewed, no active concerns identified      Tobacco Use: No current tobacco use.      Diagnosis:  Attention deficit disorder predominantly inattentive type     Collateral Reports Completed:              Routed note to PCP       PLAN: (Patient Tasks / Therapist Tasks / Other)  1. Patient sister will explore the need for patient to go to the appointment at [facility] scheduled for Friday 01/28/2022      Which was set up through [facility] office.     2. Patient's sister will contact [facility] in regard to continuing the process of setting up an appointment for patient to be seen by Dr. Yancey Psychiatry.    3. Agreement made between patient and sister that patient will not make any other appointments with any other providers unless he consults with his sister first.       Amy Montano LICSW  1/26/22                                  ______________________________________________________________________  Treatment Plan     Patient's " "Name: Nikolay Lora                        YOB: 1990     Date: 11/8/21     DSM5 Diagnoses: Attention-Deficit/Hyperactivity Disorder  314.00 (F90.0) Predominantly inattentive presentation  Psychosocial / Contextual Factors: Work stress, working remote     Referral / Collaboration:  Referral to another professional/service is not indicated at this time..     Anticipated number of session or this episode of care: 10-15        MeasurableTreatment Goal(s) related to diagnosis / functional impairment(s)  Goal 1: Patient will increase his complete compensatory strategies to better manage ADHD symptoms    I will know I've met my goal when .  I feel more control of my life.\"     Objective #A (Patient Action)                          Patient will:  Continue current medication regime  Implement physical activity and routine  Continue educating self regarding ADHD symptom management  Status: New - Date: 11/8/21     Intervention(s)  Therapist will assist in developing an understanding of ADHD symptoms and how to manage common stressors                    Goal 2: Patient will establish the ability to effectively channel impulses.    I will know I've met my goal when \"I am not feeling as stresses.\"        Objective # A (Patient Action)    1. Develop compensatory strategies               2. Explore barriers to use of compensatory strategies               3. ADHD education               4. Explore factors which may exacerbate cognitive difficulties               5. Identify ways that ADHD causes difficulty in getting along with others.  Status New- Date(s): 11/8/21                 Patient has reviewed and agreed to the above     Amy JANSEN  11/8/21                     "

## 2022-02-02 ENCOUNTER — TELEPHONE (OUTPATIENT)
Dept: ALLERGY | Facility: CLINIC | Age: 32
End: 2022-02-02
Payer: COMMERCIAL

## 2022-02-02 NOTE — TELEPHONE ENCOUNTER
Dr. Bullock   This person called and is requesting a call back:    Name Of Person Who Called: Nikolay    Why Did The Person Call: that last appt he though he would get prescription medication, [facility] has no order     Best Phone Number To Call Back: [phone]    Okay To Leave A Detailed Voicemail? yes    Thank you.

## 2022-02-03 ENCOUNTER — DOCUMENTATION ONLY (OUTPATIENT)
Dept: BEHAVIORAL HEALTH | Facility: CLINIC | Age: 32
End: 2022-02-03
Payer: COMMERCIAL

## 2022-02-03 DIAGNOSIS — J30.1 SEASONAL ALLERGIC RHINITIS DUE TO POLLEN: Primary | ICD-10-CM

## 2022-02-03 RX ORDER — FLUTICASONE PROPIONATE 50 MCG
2 SPRAY, SUSPENSION (ML) NASAL DAILY
Qty: 16 G | Refills: 1 | Status: SHIPPED | OUTPATIENT
Start: 2022-02-03 | End: 2022-03-07

## 2022-02-03 RX ORDER — AZELASTINE 1 MG/ML
2 SPRAY, METERED NASAL 2 TIMES DAILY
Qty: 30 ML | Refills: 1 | Status: SHIPPED | OUTPATIENT
Start: 2022-02-03 | End: 2022-03-04

## 2022-02-03 NOTE — PROGRESS NOTES
Patient was scheduled for a 10:00 follow-up appointment.  Did not respond via Amwell or by telephone.  Message left for patient to reschedule at his convenience.    Amy Montano [facility]  2/3/22

## 2022-02-04 ENCOUNTER — ALLIED HEALTH/NURSE VISIT (OUTPATIENT)
Dept: ALLERGY | Facility: CLINIC | Age: 32
End: 2022-02-04
Payer: COMMERCIAL

## 2022-02-04 DIAGNOSIS — J30.1 SEASONAL ALLERGIC RHINITIS DUE TO POLLEN: ICD-10-CM

## 2022-02-04 DIAGNOSIS — L50.1 CHRONIC IDIOPATHIC URTICARIA: Primary | ICD-10-CM

## 2022-02-04 PROCEDURE — 99207 PR DROP WITH A PROCEDURE: CPT

## 2022-02-04 PROCEDURE — 96372 THER/PROPH/DIAG INJ SC/IM: CPT | Performed by: ALLERGY & IMMUNOLOGY

## 2022-02-04 NOTE — NURSING NOTE
Clinic Administered Medication Documentation    Administrations This Visit     omalizumab (XOLAIR) injection 300 mg     Admin Date  02/04/2022 Action  Given Dose  300 mg Route  Subcutaneous Site   Administered By  Angelina Diaz RN    Ordering Provider: Mayte Bullock MD    Patient Supplied?: No    Comments: Larm 150mg  R arm 150mg

## 2022-02-07 ENCOUNTER — VIRTUAL VISIT (OUTPATIENT)
Dept: BEHAVIORAL HEALTH | Facility: CLINIC | Age: 32
End: 2022-02-07
Payer: COMMERCIAL

## 2022-02-07 DIAGNOSIS — F90.0 ATTENTION DEFICIT HYPERACTIVITY DISORDER (ADHD), PREDOMINANTLY INATTENTIVE TYPE: Primary | ICD-10-CM

## 2022-02-07 PROCEDURE — 90834 PSYTX W PT 45 MINUTES: CPT | Mod: 95 | Performed by: SOCIAL WORKER
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
## 2022-02-09 ENCOUNTER — VIRTUAL VISIT (OUTPATIENT)
Dept: BEHAVIORAL HEALTH | Facility: CLINIC | Age: 32
End: 2022-02-09
Payer: COMMERCIAL

## 2022-02-09 DIAGNOSIS — F90.0 ATTENTION DEFICIT HYPERACTIVITY DISORDER (ADHD), PREDOMINANTLY INATTENTIVE TYPE: Primary | ICD-10-CM

## 2022-02-09 PROCEDURE — 90834 PSYTX W PT 45 MINUTES: CPT | Mod: 95 | Performed by: SOCIAL WORKER

## 2022-02-17 ENCOUNTER — VIRTUAL VISIT (OUTPATIENT)
Dept: BEHAVIORAL HEALTH | Facility: CLINIC | Age: 32
End: 2022-02-17
Payer: COMMERCIAL

## 2022-02-17 ENCOUNTER — OFFICE VISIT (OUTPATIENT)
Dept: FAMILY MEDICINE | Facility: CLINIC | Age: 32
End: 2022-02-17
Payer: COMMERCIAL

## 2022-02-17 VITALS
OXYGEN SATURATION: 97 % | HEART RATE: 91 BPM | HEIGHT: 63 IN | TEMPERATURE: 98.5 F | DIASTOLIC BLOOD PRESSURE: 72 MMHG | SYSTOLIC BLOOD PRESSURE: 114 MMHG | BODY MASS INDEX: 31.1 KG/M2 | WEIGHT: 175.5 LBS

## 2022-02-17 DIAGNOSIS — F39 MOOD DISORDER (H): ICD-10-CM

## 2022-02-17 DIAGNOSIS — F29 PSYCHOSIS, UNSPECIFIED PSYCHOSIS TYPE (H): ICD-10-CM

## 2022-02-17 DIAGNOSIS — Z71.6 ENCOUNTER FOR SMOKING CESSATION COUNSELING: ICD-10-CM

## 2022-02-17 DIAGNOSIS — F90.0 ATTENTION DEFICIT HYPERACTIVITY DISORDER (ADHD), PREDOMINANTLY INATTENTIVE TYPE: Primary | ICD-10-CM

## 2022-02-17 DIAGNOSIS — F10.20 ALCOHOL USE DISORDER, MODERATE, DEPENDENCE (H): ICD-10-CM

## 2022-02-17 DIAGNOSIS — Z76.0 ENCOUNTER FOR MEDICATION REFILL: Primary | ICD-10-CM

## 2022-02-17 DIAGNOSIS — K29.50 CHRONIC GASTRITIS WITHOUT BLEEDING, UNSPECIFIED GASTRITIS TYPE: ICD-10-CM

## 2022-02-17 DIAGNOSIS — K29.50 CHRONIC GASTRITIS WITHOUT BLEEDING, UNSPECIFIED GASTRITIS TYPE: Primary | ICD-10-CM

## 2022-02-17 PROCEDURE — 90834 PSYTX W PT 45 MINUTES: CPT | Mod: 95 | Performed by: SOCIAL WORKER

## 2022-02-17 PROCEDURE — 99214 OFFICE O/P EST MOD 30 MIN: CPT | Performed by: STUDENT IN AN ORGANIZED HEALTH CARE EDUCATION/TRAINING PROGRAM

## 2022-02-17 ASSESSMENT — PATIENT HEALTH QUESTIONNAIRE - PHQ9
SUM OF ALL RESPONSES TO PHQ QUESTIONS 1-9: 22
10. IF YOU CHECKED OFF ANY PROBLEMS, HOW DIFFICULT HAVE THESE PROBLEMS MADE IT FOR YOU TO DO YOUR WORK, TAKE CARE OF THINGS AT HOME, OR GET ALONG WITH OTHER PEOPLE: EXTREMELY DIFFICULT
SUM OF ALL RESPONSES TO PHQ QUESTIONS 1-9: 22
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
## 2022-02-18 ASSESSMENT — PATIENT HEALTH QUESTIONNAIRE - PHQ9: SUM OF ALL RESPONSES TO PHQ QUESTIONS 1-9: 22

## 2022-02-20 NOTE — PROGRESS NOTES
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 Patient's identified mehul / Roman Catholic / spiritual influences will be incorporated into care by patient  and ethnic concerns will be incorporated into care by Therapist.      3.  [facility]                4.  ADHD education and symptom management     5. Resources/Service Plan:                             services are not indicated.                 Modifications to assist communication are not indicated.                 Additional disability accommodations are not indicated.                 6. Collaboration / coordination of treatment will be initiated with the following support professionals: primary care physician.      7. Records were reviewed at time of assessment.       Information in this assessment was obtained from the medical record and provided by patient who is a good historian.   Patient will have open access to their mental health medical record..     8. Next Scheduled Appointment: In 1 week      9.  [facility] mental health case management services     Eval type:  Mental Health     Staff Name/Credentials:  Amy JANSEN           2/17/22

## 2022-02-21 NOTE — TELEPHONE ENCOUNTER
"Routing refill request to provider for review/approval because:  Please see note from pharmacy    Last Written Prescription Date:  2/17/22  Last Fill Quantity: 400ml,  # refills: 1   Last office visit provider:  2/17/22     Requested Prescriptions   Pending Prescriptions Disp Refills     omeprazole (PRILOSEC) 20 MG DR capsule [Pharmacy Med Name: OMEPRAZOLE DR 20 MG CAPSULE]  0       PPI Protocol Passed - 2/17/2022  5:52 PM        Passed - Not on Clopidogrel (unless Pantoprazole ordered)        Passed - No diagnosis of osteoporosis on record        Passed - Recent (12 mo) or future (30 days) visit within the authorizing provider's specialty     Patient has had an office visit with the authorizing provider or a provider within the authorizing providers department within the previous 12 mos or has a future within next 30 days. See \"Patient Info\" tab in inbasket, or \"Choose Columns\" in Meds & Orders section of the refill encounter.              Passed - Medication is active on med list        Passed - Patient is age 18 or older             Isabel Johnson RN 02/21/22 6:57 AM  "

## 2022-02-22 ENCOUNTER — TELEPHONE (OUTPATIENT)
Dept: FAMILY MEDICINE | Facility: CLINIC | Age: 32
End: 2022-02-22
Payer: COMMERCIAL

## 2022-02-22 DIAGNOSIS — K29.50 CHRONIC GASTRITIS WITHOUT BLEEDING, UNSPECIFIED GASTRITIS TYPE: Primary | ICD-10-CM

## 2022-02-22 RX ORDER — OMEPRAZOLE 20 MG/1
20 TABLET, DELAYED RELEASE ORAL DAILY
Qty: 30 TABLET | Refills: 1 | Status: SHIPPED | OUTPATIENT
Start: 2022-02-22 | End: 2022-07-11

## 2022-02-22 NOTE — TELEPHONE ENCOUNTER
Reason for Call: prescription not covered    Detailed comments: Recvd call from pt/Nikolay, he was seen by Dr Prosper aRngel and was prescribed nausea medication. Nikolay recvd a call from the pharmacy and the nausea medication liquid form is not covered by his insurance and would like something else called in.    Any questions, pls reach out to precious/Nikolay at [phone]    Phone Number Patient can be reached at: Home number on file [phone] (home)    Best Time: anytime    Can we leave a detailed message on this number? YES    Call taken on 2/22/2022 at 8:52 AM by Barbara Wallace

## 2022-02-22 NOTE — TELEPHONE ENCOUNTER
Reached out to pt/Nikolay, informed him that Dr Prosper Rangel has sent over a pill form nausea medication to his pharmacy.    Nikolay was thankful and did not have any questions.

## 2022-02-22 NOTE — TELEPHONE ENCOUNTER
Please contact patient.  I think there is a reason that the provider ordered a liquid rather than capsules so I do not think I should just changed to capsules.  Can you contact patient and confirm this.  May just need to do prior authorization for the liquid.

## 2022-02-23 ENCOUNTER — VIRTUAL VISIT (OUTPATIENT)
Dept: BEHAVIORAL HEALTH | Facility: CLINIC | Age: 32
End: 2022-02-23
Payer: COMMERCIAL

## 2022-02-23 DIAGNOSIS — F34.1 PERSISTENT DEPRESSIVE DISORDER WITH ANXIOUS DISTRESS, CURRENTLY MODERATE: ICD-10-CM

## 2022-02-23 DIAGNOSIS — F39 MOOD DISORDER (H): ICD-10-CM

## 2022-02-23 PROCEDURE — 99214 OFFICE O/P EST MOD 30 MIN: CPT | Mod: GT | Performed by: NURSE PRACTITIONER

## 2022-02-23 RX ORDER — ARIPIPRAZOLE 2 MG/1
2 TABLET ORAL DAILY
Qty: 30 TABLET | Refills: 1 | Status: SHIPPED | OUTPATIENT
Start: 2022-02-23 | End: 2022-03-21

## 2022-02-23 RX ORDER — DULOXETIN HYDROCHLORIDE 20 MG/1
40 CAPSULE, DELAYED RELEASE ORAL DAILY
Qty: 60 CAPSULE | Refills: 1 | Status: SHIPPED | OUTPATIENT
Start: 2022-02-23 | End: 2022-07-11

## 2022-02-23 NOTE — PROGRESS NOTES
This video/telephone visit will be conducted via a call between you and your physician/provider. We have found that certain health care needs can be provided without the need for an in-person physical exam. This service lets us provide the care you need with a video /telephone conversation. If a prescription is necessary we can send it directly to your pharmacy. If lab work is needed we can place an order for that and you can then stop by our lab to have the test done at a later time.    Just as we bill insurance for in-person visits, we also bill insurance for video/telephone visits. If you have questions about your insurance coverage, we recommend that you speak with your insurance company.    Patient has given verbal consent for video/Telephone visit? Yes    Patient would like video visit, please connect : Text link to [phone]  Reason for visit: Medication   Patient verified allergies, medications and pharmacy via telephone.     LICO-7 scores:    LICO-7 SCORE 12/14/2021 1/18/2022   Total Score - -   Total Score 20 21       PHQ-9 scores:   PHQ-9 SCORE 1/19/2022 2/17/2022   PHQ-9 Total Score MyChart 24 (Severe depression) 22 (Severe depression)   PHQ-9 Total Score 24 22     Patient states he  is ready for visit.  Medication refill is pended for review and approval by provider.  MN  to be reviewed by provider.     Zena Verma CMA February 23, 2022 7:47 AM

## 2022-02-23 NOTE — PATIENT INSTRUCTIONS
Continue medications as prescribed  Have your pharmacy contact us for a refill if you are running low on medications (We may ask you to come into clinic to get a refill from the nurse  No Alcohol or drug use  No driving if sedated  Call the clinic with any questions or concerns   Reach out for help if you feel like hurting yourself or others ([facility] [phone]: [address] [facility] [facility], [address] or [facility] Suicide Hotline [phone] , call 911 or go to nearest Emergency room    Follow up as directed, for your appointments, per your After Visit Summary Form.
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
## 2022-02-24 ENCOUNTER — VIRTUAL VISIT (OUTPATIENT)
Dept: BEHAVIORAL HEALTH | Facility: CLINIC | Age: 32
End: 2022-02-24
Payer: COMMERCIAL

## 2022-02-24 DIAGNOSIS — F90.0 ATTENTION DEFICIT HYPERACTIVITY DISORDER (ADHD), PREDOMINANTLY INATTENTIVE TYPE: Primary | ICD-10-CM

## 2022-02-24 DIAGNOSIS — F39 MOOD DISORDER (H): ICD-10-CM

## 2022-02-24 PROCEDURE — 90834 PSYTX W PT 45 MINUTES: CPT | Mod: 95 | Performed by: SOCIAL WORKER

## 2022-02-24 NOTE — PROGRESS NOTES
"                                          Progress Note    Patient Name: Nikolay Lora  Date: 2/24/22         Service Type: Individual      Session Start Time: 0800  Session End Time: 0852     Session Length: 52    Session #: 18    Attendees: Patient and sister    Service Modality:  Video Visit:      Provider verified identity through the following two step process.  Patient provided:  Patient is known previously to provider    Telemedicine Visit: The patient's condition can be safely assessed and treated via synchronous audio and visual telemedicine encounter.      Reason for Telemedicine Visit: Patient has requested telehealth visit    Originating Site (Patient Location): Patient's home    Distant Site (Provider Location): Provider Remote Setting- Home Office    Consent:  The patient/guardian has verbally consented to: the potential risks and benefits of telemedicine (video visit) versus in person care; bill my insurance or make self-payment for services provided; and responsibility for payment of non-covered services.     Patient would like the video invitation sent by:  Send to e-mail at: [email or web address]    Mode of Communication:  Video Conference via [employer]well    As the provider I attest to compliance with applicable laws and regulations related to telemedicine.    DATA  Interactive Complexity: No  Crisis: No        Progress Since Last Session (Related to Symptoms / Goals / Homework):   Symptoms: Worsening patient stated, \"I do not know what is wrong with me why can't that be figured out?\"  Continues to feel frustrated, anxious, impulsive, hyper focused, binge drinking.     Homework: Partially completed      Episode of Care Goals: No improvement - CONTEMPLATION (Considering change and yet undecided); Intervened by assessing the negative and positive thinking (ambivalence) about behavior change     Current / Ongoing Stressors and Concerns:   Complaints regarding stomach issues, emotional irregularity, " "anxiety, depressed mood, intermittent crying, impulsiveness, hyper focusing.     Treatment Objective(s) Addressed in This Session:   Patient indicated that he continues to pursue the need for additional psychological testing \"to find out what is really wrong.\"  Used to be hyper focused on needing additional psychological testing.  Stating, \"nothing was really done.\"  Having difficulty waiting to meet with Dr. Pimentel (psychiatrist) through [facility] on March 9.  Meeting with [facility] team on 2/25/2022 regarding additional support that can assist with his mental health issues.                 Intervention:               Thought processing techniques and strategies               Deep breathing strategies and techniques               CBT        Impulse Control Methods                         Daily strategies to reduce intrusive thoughts                 .  LICO-7 SCORE 12/9/2021 12/14/2021 1/18/2022   Total Score 19 (severe anxiety) - -   Total Score 19 20 21       PHQ 1/18/2022 1/19/2022 2/17/2022   PHQ-9 Total Score 16 24 22   Q9: Thoughts of better off dead/self-harm past 2 weeks Not at all Not at all Not at all        ASSESSMENT: Current Emotional / Mental Status (status of significant symptoms):   Risk status (Self / Other harm or suicidal ideation)   Patient denies current fears or concerns for personal safety.   Patient denies current or recent suicidal ideation or behaviors.   Patient denies current or recent homicidal ideation or behaviors.   Patient denies current or recent self injurious behavior or ideation.   Patient denies other safety concerns.   Patient reports there has been no change in risk factors since their last session.     Patient reports there has been no change in protective factors since their last session.     Recommended that patient call 911 or go to the local ED should there be a change in any of these risk factors.     Appearance:   Appropriate    Eye Contact:   Good " "   Psychomotor Behavior: Normal    Attitude:   Cooperative    Orientation:   All   Speech    Rate / Production: Normal/ Responsive Normal     Volume:  Normal    Mood:    Anxious    Affect:    Appropriate    Thought Content:  Clear    Thought Form:  Tangential    Insight:    Good  and Fair      Medication Review:   No changes to current psychiatric medication(s) denies any allergic reactions to medications.       Patient reports current meds as:        Nicotine  Polacrilex       Prilosec       Chantix       Flonase       Cymbalta       Abilify        Medication Compliance:   Inconsistent compliance.  Patient indicated yes although patient sister questions his consistency of medication usage.                   Changes in Health Issues:   Complaints of stomach issues                 Chemical Use Review:   Substance Use: Continues to binge drink.  Patient stated \"that is the only time I can get my emotions out\"       Tobacco Use: Yes, increase.  Patient reports frequency of use Daily. Provided encouragement to quit     Diagnosis:    1. Attention deficit disorder predominantly inattentive type  2. Mood Disorder (H)  3. Alcohol use disorder, moderate, dependence (H)     Collateral Reports Completed:   Routed note to PCP    PLAN: (Patient Tasks / Therapist Tasks / Other)               1. Abstain from alcohol usage               2. Continue current medication regime per PCP                      3  Patient has an appointment with Dr. Darian Pimentel psychiatrist from primary care on March 9               4. Continued agreement made between patient and sister that patient will not make any other appointments with any other providers                   unless he consults with his sister first.  .            5  Maintain current medication regime per PCP               6. Awareness of emergency contact information and continue communication with crisis stabilization team.  .            7.  Psychiatric stabilization team will be " "meeting with patient tomorrow regarding additional support services        Amy Montano LICSW                                                         ______________________________________________________________________    Treatment Plan     Patient's Name: Nikolay Lora                        YOB: 1990     Date: 11/8/21     DSM5 Diagnoses: Attention-Deficit/Hyperactivity Disorder  314.00 (F90.0) Predominantly inattentive presentation  Psychosocial / Contextual Factors: Work stress, working remote     Referral / Collaboration:  Referral to another professional/service is not indicated at this time..     Anticipated number of session or this episode of care: 10-15        MeasurableTreatment Goal(s) related to diagnosis / functional impairment(s)  Goal 1: Patient will increase his complete compensatory strategies to better manage ADHD symptoms    I will know I've met my goal when .  I feel more control of my life.\"     Objective #A (Patient Action)                          Patient will:  Continue current medication regime  Implement physical activity and routine  Continue educating self regarding ADHD symptom management  Status: New - Date: 11/8/21     Intervention(s)  Therapist will assist in developing an understanding of ADHD symptoms and how to manage common stressors                    Goal 2: Patient will establish the ability to effectively channel impulses.    I will know I've met my goal when \"I am not feeling as stresses.\"        Objective # A (Patient Action)    1. Develop compensatory strategies               2. Explore barriers to use of compensatory strategies               3. ADHD education               4. Explore factors which may exacerbate cognitive difficulties               5. Identify ways that ADHD causes difficulty in getting along with others.  Status New- Date(s): 11/8/21                 Patient has reviewed and agreed to the above     Amy FRAZIER  11/8/21     "

## 2022-02-24 NOTE — TELEPHONE ENCOUNTER
Per pt, he often get sore throat from smoking and it's difficult for pt to swallow tablet. Pt is ok to try out the tablet first and go from there as pt is aware that his insurance does not covered the liquid or caps. Pt will address with  at his 3/8/22 visit if he's ok to swallow tablet or not. I advise pt that omeprazole tab is order by Dr. Rangel on 2/22/22 and sent to his pharmacy to be filled, pt to call pharmacy to  med. Pt understand and will do so. Completing task.

## 2022-03-03 ENCOUNTER — VIRTUAL VISIT (OUTPATIENT)
Dept: BEHAVIORAL HEALTH | Facility: CLINIC | Age: 32
End: 2022-03-03
Payer: COMMERCIAL

## 2022-03-03 DIAGNOSIS — F39 MOOD DISORDER (H): Primary | ICD-10-CM

## 2022-03-03 PROCEDURE — 99215 OFFICE O/P EST HI 40 MIN: CPT | Mod: TEL | Performed by: NURSE PRACTITIONER

## 2022-03-03 ASSESSMENT — PATIENT HEALTH QUESTIONNAIRE - PHQ9
SUM OF ALL RESPONSES TO PHQ QUESTIONS 1-9: 24
10. IF YOU CHECKED OFF ANY PROBLEMS, HOW DIFFICULT HAVE THESE PROBLEMS MADE IT FOR YOU TO DO YOUR WORK, TAKE CARE OF THINGS AT HOME, OR GET ALONG WITH OTHER PEOPLE: EXTREMELY DIFFICULT
SUM OF ALL RESPONSES TO PHQ QUESTIONS 1-9: 24

## 2022-03-03 NOTE — PROGRESS NOTES
This video/telephone visit will be conducted via a call between you and your physician/provider. We have found that certain health care needs can be provided without the need for an in-person physical exam. This service lets us provide the care you need with a video /telephone conversation. If a prescription is necessary we can send it directly to your pharmacy. If lab work is needed we can place an order for that and you can then stop by our lab to have the test done at a later time.    Just as we bill insurance for in-person visits, we also bill insurance for video/telephone visits. If you have questions about your insurance coverage, we recommend that you speak with your insurance company.    Patient has given verbal consent for video/Telephone visit? Yes    Patient would like video visit, please connect : TEXT LINK to [phone]  Reason for visit: Medication follow up  Patient verified allergies, medications and pharmacy via telephone.     LICO-7 scores:    LICO-7 SCORE 12/14/2021 1/18/2022   Total Score - -   Total Score 20 21       PHQ-9 scores:   PHQ-9 SCORE 2/17/2022 3/3/2022   PHQ-9 Total Score MyChart 22 (Severe depression) 24 (Severe depression)   PHQ-9 Total Score 22 24     No refills needed at this time.  Patient states he  is ready for visit.    Zena Verma CMA March 3, 2022 8:26 AM

## 2022-03-03 NOTE — PATIENT INSTRUCTIONS
Continue medications as prescribed  Have your pharmacy contact us for a refill if you are running low on medications (We may ask you to come into clinic to get a refill from the nurse  No Alcohol or drug use  No driving if sedated  Call the clinic with any questions or concerns   Reach out for help if you feel like hurting yourself or others ([facility] [phone]: [address], [address] or [facility] Suicide Hotline [phone] , call 911 or go to nearest Emergency room    Follow up as directed, for your appointments, per your After Visit Summary Form.
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
## 2022-03-04 ENCOUNTER — ALLIED HEALTH/NURSE VISIT (OUTPATIENT)
Dept: ALLERGY | Facility: CLINIC | Age: 32
End: 2022-03-04
Payer: COMMERCIAL

## 2022-03-04 DIAGNOSIS — J30.1 SEASONAL ALLERGIC RHINITIS DUE TO POLLEN: ICD-10-CM

## 2022-03-04 DIAGNOSIS — L50.1 CHRONIC IDIOPATHIC URTICARIA: Primary | ICD-10-CM

## 2022-03-04 PROCEDURE — 99207 PR DROP WITH A PROCEDURE: CPT

## 2022-03-04 PROCEDURE — 96372 THER/PROPH/DIAG INJ SC/IM: CPT | Performed by: ALLERGY & IMMUNOLOGY

## 2022-03-04 RX ORDER — AZELASTINE 1 MG/ML
2 SPRAY, METERED NASAL 2 TIMES DAILY
Qty: 30 ML | Refills: 5 | Status: ON HOLD | OUTPATIENT
Start: 2022-03-04 | End: 2023-04-11

## 2022-03-04 ASSESSMENT — PATIENT HEALTH QUESTIONNAIRE - PHQ9: SUM OF ALL RESPONSES TO PHQ QUESTIONS 1-9: 24

## 2022-03-07 RX ORDER — FLUTICASONE PROPIONATE 50 MCG
SPRAY, SUSPENSION (ML) NASAL
Qty: 16 ML | Refills: 3 | Status: ON HOLD | OUTPATIENT
Start: 2022-03-07 | End: 2023-04-11

## 2022-03-09 ENCOUNTER — DOCUMENTATION ONLY (OUTPATIENT)
Dept: LAB | Facility: CLINIC | Age: 32
End: 2022-03-09
Payer: COMMERCIAL

## 2022-03-09 NOTE — TELEPHONE ENCOUNTER
Not really sure what he needs.  He is seeing me later this week.  I will consider orders at that time

## 2022-03-09 NOTE — PROGRESS NOTES
Nikolay Lora has an upcoming lab appointment:    Future Appointments   Date Time Provider Department Center          3/24/2022 10:20 AM Mich Ybarra MD [facility]   3/24/2022 11:00 AM [facility] LAB [facility]                   Patient is scheduled for the following lab(s): Annual labs     There is no order available. Please review and place either future orders or HMPO (Review of Health Maintenance Protocol Orders), as appropriate.    Health Maintenance Due   Topic     ANNUAL REVIEW OF HM ORDERS      Min Vences

## 2022-03-14 NOTE — PROGRESS NOTES
Clinic Administered Medication Documentation    Administrations This Visit     omalizumab (XOLAIR) injection 300 mg     Admin Date  03/04/2022 Action  Given Dose  300 mg Route  Subcutaneous Site  Left Arm Administered By  Vilma Dorman    Ordering Provider: Mayte Bullock MD    Patient Supplied?: No    Comments: right arm

## 2022-03-15 ENCOUNTER — TELEPHONE (OUTPATIENT)
Dept: BEHAVIORAL HEALTH | Facility: CLINIC | Age: 32
End: 2022-03-15
Payer: COMMERCIAL

## 2022-03-15 DIAGNOSIS — Z71.6 ENCOUNTER FOR SMOKING CESSATION COUNSELING: ICD-10-CM

## 2022-03-15 RX ORDER — VARENICLINE TARTRATE 0.5 MG/1
TABLET, FILM COATED ORAL
Qty: 53 TABLET | Refills: 0 | Status: SHIPPED | OUTPATIENT
Start: 2022-03-15 | End: 2022-04-07

## 2022-03-15 NOTE — TELEPHONE ENCOUNTER
First attempt at reaching patient. Unable to leave message due to mailbox being full.     Cari Crow  Transition Clinic Coordinator  Date and Time: 03/15/22 12:40 PM      ----- Message from Anusha King sent at 3/15/2022 10:50 AM CDT -----  Transition Clinic Referral   [place] Only   Limited [place] Availability    Type of Referral:      X       Therapy    _____Therapy & Medication (Psychiatry next level of care appointment needs to be scheduled)  _____Medication Only (Psychiatry next level of care appointment needs to be scheduled)  _____Diagnostic Assessment Only      Referring Provider Name: Pt request    Clinician completing the assessment. Pt request    Referring Provider: Pt request    If known, referring provider contact name: Pt request    Reason for Transition Clinic Referral: Pt sees ODALYS GOLDEN for therapy, but called stating that he needs to see someone urgently and asked to meet with someone until he can get in to see her next week. Says he doesn't need to go to the ER, but does need to talk with someone. Pt was aware of the transition clinic.    Next Level of Care Patient Will Be Transitioned To: Ongoing therapy appt on 3/23/22 and F/U scheduled today.    Start Date for Next Level of Care Therapy (Required): Ongoing therapy  Provider: ODALYS GOLDEN  Location : Virtual    Start Date for Next Level of Care Medication (Required): Ongoing therapy  Provider: ODALYS GOLDEN  Location : Virtual   Psychiatry cannot see patients who do not have active medical insurance    What Would Be Helpful from the Transition Clinic: see reason for referral     Needs: NO    Does Patient Have Access to Technology: YES    Patient E-mail Address: [email or web address]    Current Patient Phone Number: [phone]    Clinician Gender Preference (if applicable): NO    Anusha King

## 2022-03-16 ENCOUNTER — TELEPHONE (OUTPATIENT)
Dept: BEHAVIORAL HEALTH | Facility: CLINIC | Age: 32
End: 2022-03-16
Payer: COMMERCIAL

## 2022-03-16 NOTE — TELEPHONE ENCOUNTER
First attempt at reaching patient. Unable to leave message due to mailbox being full. [employer] message also sent to schedule TC therapy.    Ashley Tannerrera  03/16/22  836  ----- Message from Anusha King sent at 3/15/2022 10:50 AM CDT -----  Transition Clinic Referral   [place] Only   Limited [place] Availability    Type of Referral:      X       Therapy    _____Therapy & Medication (Psychiatry next level of care appointment needs to be scheduled)  _____Medication Only (Psychiatry next level of care appointment needs to be scheduled)  _____Diagnostic Assessment Only      Referring Provider Name: Pt request    Clinician completing the assessment. Pt request    Referring Provider: Pt request    If known, referring provider contact name: Pt request    Reason for Transition Clinic Referral: Pt sees ODALYS GOLDEN for therapy, but called stating that he needs to see someone urgently and asked to meet with someone until he can get in to see her next week. Says he doesn't need to go to the ER, but does need to talk with someone. Pt was aware of the transition clinic.    Next Level of Care Patient Will Be Transitioned To: Ongoing therapy appt on 3/23/22 and F/U scheduled today.    Start Date for Next Level of Care Therapy (Required): Ongoing therapy  Provider: ODALYS GOLDEN  Location : Virtual    Start Date for Next Level of Care Medication (Required): Ongoing therapy  Provider: ODALYS GOLDEN  Location : Virtual  TC Psychiatry cannot see patients who do not have active medical insurance    What Would Be Helpful from the Transition Clinic: see reason for referral     Needs: NO    Does Patient Have Access to Technology: YES    Patient E-mail Address: [email or web address]    Current Patient Phone Number: [phone]    Clinician Gender Preference (if applicable): NO    Anusha King

## 2022-03-17 ENCOUNTER — TELEPHONE (OUTPATIENT)
Dept: BEHAVIORAL HEALTH | Facility: CLINIC | Age: 32
End: 2022-03-17
Payer: COMMERCIAL

## 2022-03-17 NOTE — TELEPHONE ENCOUNTER
Third attempt at reaching patient. Unable to leave message. Coordinator will joaquin referral as complete and will inform referral source that no appointments were scheduled with patient due to no contact.     Cari Crow  Transition Clinic Coordinator  Date and Time: 03/17/22 7:48 AM

## 2022-03-17 NOTE — TELEPHONE ENCOUNTER
----- Message from Anusha King sent at 3/15/2022 10:50 AM CDT -----  Transition Clinic Referral   [place] Only   Limited [place] Availability    Type of Referral:      X       Therapy    _____Therapy & Medication (Psychiatry next level of care appointment needs to be scheduled)  _____Medication Only (Psychiatry next level of care appointment needs to be scheduled)  _____Diagnostic Assessment Only      Referring Provider Name: Pt request    Clinician completing the assessment. Pt request    Referring Provider: Pt request    If known, referring provider contact name: Pt request    Reason for Transition Clinic Referral: Pt sees ODALYS GOLDEN for therapy, but called stating that he needs to see someone urgently and asked to meet with someone until he can get in to see her next week. Says he doesn't need to go to the ER, but does need to talk with someone. Pt was aware of the transition clinic.    Next Level of Care Patient Will Be Transitioned To: Ongoing therapy appt on 3/23/22 and F/U scheduled today.    Start Date for Next Level of Care Therapy (Required): Ongoing therapy  Provider: ODALYS GOLDEN  Location : Virtual    Start Date for Next Level of Care Medication (Required): Ongoing therapy  Provider: ODALYS GOLDEN  Location : Virtual   Psychiatry cannot see patients who do not have active medical insurance    What Would Be Helpful from the Transition Clinic: see reason for referral     Needs: NO    Does Patient Have Access to Technology: YES    Patient E-mail Address: [email or web address]    Current Patient Phone Number: [phone]    Clinician Gender Preference (if applicable): NO    Anusha King      12/13/2021  =================  Colonoscopy--none--get at 52 yo or if have bowel changes  Mammogram--9/25/21  Pap smear--had a hysterectomy and oopherectomy in 7/2017  Bone density--8/3/20    Immunizations--covid vaccine Y     Blood pressure --125/90   Check labs    Diagnosed 2017 --mammogram diagnosed--sees Dr Tyson--lumpectomy and chemo and xrt and herceptin for a year   No family history   brca negative    Dry eyes and sees an eye doctor --dr delgado's office   Headaches started in the last 1-2 years ago --had bell's palsy July 2019 and the headaches started about in the fall 2019 and worse in 2020 and started to get imaging  Had spinal surgery in 2020 also   Spinal tap and then blood patch  Had an MRI of the brain and neck and showed intracranial hypotension so did the blood patch     Her neurologist left --Dr Moon--he sees her through zoom and wants her to see a neurologist at [facility] in February 2022--dr hollis and to see dr eason in 5/2022    Headaches are starting to come back--uses advil and tylenol combined product--now is alternating tylenol and advil --has no problems with lights or noise, no loss of vision or aura or blurry vision --she gets nausea occ --gets pain in the back of the neck --feels better if lays down and ices her neck    Right side    Skin lesions--under the arms and skin tags --Dr Gina Dillig--Dr Nabeel Hernández-Dermatology---[phone]   gradual onset

## 2022-03-20 DIAGNOSIS — Z76.0 ENCOUNTER FOR MEDICATION REFILL: ICD-10-CM

## 2022-03-20 DIAGNOSIS — K29.50 CHRONIC GASTRITIS WITHOUT BLEEDING, UNSPECIFIED GASTRITIS TYPE: ICD-10-CM

## 2022-03-21 RX ORDER — LANOLIN ALCOHOL/MO/W.PET/CERES
CREAM (GRAM) TOPICAL
COMMUNITY
Start: 2022-01-18 | End: 2022-03-24

## 2022-03-23 ENCOUNTER — VIRTUAL VISIT (OUTPATIENT)
Dept: BEHAVIORAL HEALTH | Facility: CLINIC | Age: 32
End: 2022-03-23
Payer: COMMERCIAL

## 2022-03-23 DIAGNOSIS — F39 MOOD DISORDER (H): ICD-10-CM

## 2022-03-23 DIAGNOSIS — F90.0 ATTENTION DEFICIT HYPERACTIVITY DISORDER (ADHD), PREDOMINANTLY INATTENTIVE TYPE: Primary | ICD-10-CM

## 2022-03-23 PROCEDURE — 90834 PSYTX W PT 45 MINUTES: CPT | Mod: GT | Performed by: SOCIAL WORKER

## 2022-03-23 NOTE — PROGRESS NOTES
"[facility]                                     Progress Note  Patient Name: Nikolay Lora  Date: 3/23/22         Service Type: Individual      Session Start Time: 1100  Session End Time: 1152     Session Length: 52    Session #: 19    Attendees: Patient and sister    Service Modality:  Video Visit:      Provider verified identity through the following two step process.  Patient provided:  Patient is known previously to provider    Telemedicine Visit: The patient's condition can be safely assessed and treated via synchronous audio and visual telemedicine encounter.      Reason for Telemedicine Visit: Patient has requested telehealth visit    Originating Site (Patient Location): Patient's home    Distant Site (Provider Location): Provider Remote Setting- Home Office    Consent:  The patient/guardian has verbally consented to: the potential risks and benefits of telemedicine (video visit) versus in person care; bill my insurance or make self-payment for services provided; and responsibility for payment of non-covered services.     Patient would like the video invitation sent by:  Send to e-mail at: [email or web address]    Mode of Communication:  Video Conference via Amwell    As the provider I attest to compliance with applicable laws and regulations related to telemedicine.    DATA  Interactive Complexity: No  Crisis: No        Progress Since Last Session (Related to Symptoms / Goals / Homework):   Symptoms: worsening. Indicated he is feeling fatigue, smoking minimum 10 cigarettes/day, patient stated, \"I am eating so much.\"  States he is not doing well worries why they cannot figure out what is wrong with him.  Indicates that he is feeling overwhelmed.    Homework: Did not complete      Episode of Care Goals: Minimal progress - RELAPSE (Returned to unhealthy behavior); Intervened by reassessing readiness to change and identifying appropriate stage.  Identified reasons for relapse, successes, and " "change talk     Current / Ongoing Stressors and Concerns:   Continues to be on short-term disability leave, his mother has recently moved out of the home for the time being.  Patient sister indicated that their mother is not able to handle the current stressors in the home.  Patient worries about finances, states that he is \"eating too much, not exercising as much as he should, not sleeping, continual worry, impulsive.    Follow-up  1.    Patient stated, that he saw Dr. Pimentel (psychiatrist) through [facility].  Patient sister was not              able to attend that session with him.  Patient indicated that he has a follow-up appointment although,          it needs to send over to Dr. Pimentel office for review before making any  Recommendations.  2.      [facility] will reestablish care and case       management after April 1.   3.     Patient indicated he had an initial visit with [facility] which he believes was to R/o  Autism             (per patient's request)       Treatment Objective(s) Addressed in This Session:   During today's session patient was very tense gentle and improved to be difficult to give him focused on specifics.  Continue to make reference to the fact that he believes that none of the medication he has been prescribed is working although, both his sister and writer attempted to point out that the vast majority of the time he impulsively made decisions to go off of certain medications  because he was expecting the medication to do something was different than what was occurring.  Would not recommend that patient yet return back to work full-time until mental health is stable.     Intervention:    Thought processing techniques and strategies               Deep breathing strategies and techniques               CBT        Impulse Control Methods                         Daily strategies to reduce intrusive thoughts                 .Assessments completed prior to " "visit:  The following assessments were completed by patient for this visit:  PHQ9:   PHQ-9 SCORE 4/5/2021 12/1/2021 12/14/2021 1/18/2022 1/19/2022 2/17/2022 3/3/2022   PHQ-9 Total Score MyChart - - - - 24 (Severe depression) 22 (Severe depression) 24 (Severe depression)   PHQ-9 Total Score 19 20 22 16 24 22 24     GAD7:   LICO-7 SCORE 12/23/2020 2/17/2021 4/5/2021 12/9/2021 12/9/2021 12/14/2021 1/18/2022   Total Score - - - - 19 (severe anxiety) - -   Total Score 14 19 19 19 19 20 21     PROMIS 10-Global Health (only subscores and total score):   PROMIS-10 Scores Only 12/9/2021 12/9/2021   Global Mental Health Score 5 5   Global Physical Health Score 8 8   PROMIS TOTAL - SUBSCORES 13 13         ASSESSMENT: Current Emotional / Mental Status (status of significant symptoms):   Risk status (Self / Other harm or suicidal ideation)   Patient denies current fears or concerns for personal safety.   Patient denies current or recent suicidal ideation or behaviors.   Patient denies current or recent homicidal ideation or behaviors.   Patient denies current or recent self injurious behavior or ideation.   Patient denies other safety concerns.   Patient reports there has been no change in risk factors since their last session.     Patient reports there has been no change in protective factors since their last session.     Recommended that patient call 911 or go to the local ED should there be a change in any of these risk factors.     Appearance:   Appropriate    Eye Contact:   Good    Psychomotor Behavior: Restless    Attitude:   Cooperative    Orientation:   All   Speech    Rate / Production: Pressured     Volume:  Normal    Mood:    Anxious    Affect:    Appropriate    Thought Content:  Rumination    Thought Form:  Obsessive  Tangential    Insight:    Fair      Medication Review:   No changes to current psychiatric medication(s)     Medication Compliance:  Periodic compliance. Patient stated, \"nothing seems to be working they " "just cannot figure out what is wrong I am feeling so frustrated.\"     Changes in Health Issues:   Indicated that he has stomach issues, fatigue and indicates heis eating all of       Chemical Use Review:  Substance Use: Yes.  Patient indicated that he is using alcohol every other weekend.     Tobacco Use: Yes.  Patient indicated that he is smoking approximately 10 cigarettes/day.  As well as, using either the Nicorette gum or the loss injures    Diagnosis:  1. Attention deficit disorder predominantly inattentive type  2. Mood Disorder (H)  3. Alcohol use disorder, moderate, dependence (H)       Collateral Reports Completed:   Routed note to PCP    PLAN: (Patient Tasks / Therapist Tasks / Other)  1. Abstain from alcohol usage               2. Continue current medication regime per PCP                                 3  Patient has a follow-up appointment with Dr. Darian Pimentel psychiatrist from [facility].                      Patient's sister to attend appointment with patient.                          4. Continued agreement made between patient and sister that patient will not make any other                        appointments with any other providers                   unless he consults with his sister first.  .            5  Maintain current medication regime per PCP               6. Awareness of emergency contact information and continue communication with crisis                              stabilization team.  .            7. [facility] stabilization team will be meeting with patient tomorrow regarding additional                    support services               8. Would not recommend that patient return back to work at this time until mental health is                           stable.      [facility] Montano [facility]  3/23/22                                                         ______________________________________________________________________    Individual Treatment Plan    Patient's Name: Nikolay " "Geno  YOB: 1990    Date of Creation 11/18/21  Date Treatment Plan Last Reviewed/Revised: 3/23/22    DSM5 Diagnoses: 1. Attention deficit disorder predominantly inattentive type  2. Mood Disorder (H)  3. Alcohol use disorder, moderate, dependence (H)     Psychosocial / Contextual Factors: On short-term disability, mother moved out of the home.   PROMIS (reviewed every 90 days):   PROMIS-10 Scores Only 12/9/2021 12/9/2021   Global Mental Health Score 5 5   Global Physical Health Score 8 8   PROMIS TOTAL - SUBSCORES 13 13     LICO-7 SCORE 12/9/2021 12/14/2021 1/18/2022   Total Score 19 (severe anxiety) - -   Total Score 19 20 21     PHQ 2/17/2022 3/3/2022 3/24/2022   PHQ-9 Total Score 22 24 22   Q9: Thoughts of better off dead/self-harm past 2 weeks Not at all Not at all Not at all     Referral / Collaboration:  In the process of obtaining psych eval    Anticipated number of session for this episode of care: 20-25  Anticipation frequency of session: Weekly  Anticipated Duration of each session: 38-52 minutes  Treatment plan will be reviewed in 90 days or when goals have been changed.       MeasurableTreatment Goal(s) related to diagnosis / functional impairment(s)  Goal 1: Patient will increase his complete compensatory strategies to better manage ADHD symptoms    I will know I've met my goal when .  I feel more control of my life.\"     Objective #A (Patient Action)                          Patient will:  Continue current medication regime  Implement physical activity and routine  Continue educating self regarding ADHD symptom management  Status: Updated- Date: 3/23/22     Intervention(s)  Therapist will assist in developing an understanding of ADHD symptoms and how to manage common stressors                    Goal 2: Patient will establish the ability to effectively channel impulses.    I will know I've met my goal when \"I am not feeling as stresses.\"        Objective # A (Patient Action)    1. " Develop compensatory strategies               2. Explore barriers to use of compensatory strategies               3. ADHD education               4. Explore factors which may exacerbate cognitive difficulties               5. Identify ways that ADHD causes difficulty in getting along with others.  Status continued 3/23/22                Patient has reviewed and agreed to the above     Amy JANSEN  3/23/22

## 2022-03-24 ENCOUNTER — OFFICE VISIT (OUTPATIENT)
Dept: FAMILY MEDICINE | Facility: CLINIC | Age: 32
End: 2022-03-24
Payer: COMMERCIAL

## 2022-03-24 VITALS
OXYGEN SATURATION: 98 % | RESPIRATION RATE: 18 BRPM | SYSTOLIC BLOOD PRESSURE: 110 MMHG | DIASTOLIC BLOOD PRESSURE: 74 MMHG | BODY MASS INDEX: 29.71 KG/M2 | WEIGHT: 174 LBS | HEART RATE: 96 BPM | TEMPERATURE: 99 F | HEIGHT: 64 IN

## 2022-03-24 DIAGNOSIS — R21 RASH: ICD-10-CM

## 2022-03-24 DIAGNOSIS — E78.5 HYPERLIPIDEMIA, UNSPECIFIED HYPERLIPIDEMIA TYPE: Primary | ICD-10-CM

## 2022-03-24 DIAGNOSIS — Z00.00 HEALTHCARE MAINTENANCE: ICD-10-CM

## 2022-03-24 DIAGNOSIS — F17.200 SMOKING: ICD-10-CM

## 2022-03-24 DIAGNOSIS — F39 MOOD DISORDER (H): ICD-10-CM

## 2022-03-24 DIAGNOSIS — R46.89 COMPULSIVE BEHAVIORS: ICD-10-CM

## 2022-03-24 LAB
CHOLEST SERPL-MCNC: 222 MG/DL
FASTING STATUS PATIENT QL REPORTED: NO
GLUCOSE BLD-MCNC: 88 MG/DL (ref 60–99)
HDLC SERPL-MCNC: 67 MG/DL
LDLC SERPL CALC-MCNC: 86 MG/DL
TRIGL SERPL-MCNC: 343 MG/DL

## 2022-03-24 PROCEDURE — 80061 LIPID PANEL: CPT | Performed by: FAMILY MEDICINE

## 2022-03-24 PROCEDURE — 99395 PREV VISIT EST AGE 18-39: CPT | Performed by: FAMILY MEDICINE

## 2022-03-24 PROCEDURE — 82947 ASSAY GLUCOSE BLOOD QUANT: CPT | Performed by: FAMILY MEDICINE

## 2022-03-24 PROCEDURE — 36415 COLL VENOUS BLD VENIPUNCTURE: CPT | Performed by: FAMILY MEDICINE

## 2022-03-24 PROCEDURE — 99213 OFFICE O/P EST LOW 20 MIN: CPT | Mod: 25 | Performed by: FAMILY MEDICINE

## 2022-03-24 RX ORDER — LANOLIN ALCOHOL/MO/W.PET/CERES
CREAM (GRAM) TOPICAL
Qty: 454 G | Refills: 1 | Status: ON HOLD | OUTPATIENT
Start: 2022-03-24 | End: 2023-04-11

## 2022-03-24 ASSESSMENT — PATIENT HEALTH QUESTIONNAIRE - PHQ9
10. IF YOU CHECKED OFF ANY PROBLEMS, HOW DIFFICULT HAVE THESE PROBLEMS MADE IT FOR YOU TO DO YOUR WORK, TAKE CARE OF THINGS AT HOME, OR GET ALONG WITH OTHER PEOPLE: EXTREMELY DIFFICULT
SUM OF ALL RESPONSES TO PHQ QUESTIONS 1-9: 22
SUM OF ALL RESPONSES TO PHQ QUESTIONS 1-9: 22

## 2022-03-24 NOTE — ASSESSMENT & PLAN NOTE
Patient with background of dry skin, compulsive handwashing with chronic redness.  Using triamcinolone and barrier ointment.  Seems to be helping some.  Needs refill on barrier which was given today  Patient is using moisturizing soap.  Advised cooler water, less frequent handwashing if possible.

## 2022-03-24 NOTE — ASSESSMENT & PLAN NOTE
Follows with Kathleen Montano, -therapist in our system  Currently [facility]-but may be changing to [facility][note 3]-has seen them once  Possibly as had first neuropsychological evaluation    Temporary disability.  Is up at the end of the month.  I do not think he is ready to go back to work at this point and I would think all caregivers agree.  Apparently  is going to contact on this matter
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
## 2022-03-25 ASSESSMENT — PATIENT HEALTH QUESTIONNAIRE - PHQ9: SUM OF ALL RESPONSES TO PHQ QUESTIONS 1-9: 22

## 2022-03-30 ENCOUNTER — VIRTUAL VISIT (OUTPATIENT)
Dept: BEHAVIORAL HEALTH | Facility: CLINIC | Age: 32
End: 2022-03-30
Payer: COMMERCIAL

## 2022-03-30 DIAGNOSIS — F39 MOOD DISORDER (H): ICD-10-CM

## 2022-03-30 DIAGNOSIS — F90.0 ATTENTION DEFICIT HYPERACTIVITY DISORDER (ADHD), PREDOMINANTLY INATTENTIVE TYPE: Primary | ICD-10-CM

## 2022-03-30 PROCEDURE — 90834 PSYTX W PT 45 MINUTES: CPT | Mod: GT | Performed by: SOCIAL WORKER

## 2022-03-30 NOTE — PROGRESS NOTES
"[facility]                                     Progress Note    Patient Name: Nikolay Lora  Date: 3/30/22         Service Type: Individual      Session Start Time: 1100  Session End Time: 1152     Session Length: 52    Session #: 20    Attendees:Patient and sister     Service Modality:  Video Visit:      Provider verified identity through the following two step process.  Patient provided:  Patient is known previously to provider    Telemedicine Visit: The patient's condition can be safely assessed and treated via synchronous audio and visual telemedicine encounter.      Reason for Telemedicine Visit: Patient has requested telehealth visit    Originating Site (Patient Location): Patient's home    Distant Site (Provider Location): Provider Remote Setting- Home Office    Consent:  The patient/guardian has verbally consented to: the potential risks and benefits of telemedicine (video visit) versus in person care; bill my insurance or make self-payment for services provided; and responsibility for payment of non-covered services.     Patient would like the video invitation sent by:  Send to e-mail at: [email or web address]    Mode of Communication:  Video Conference via Amwell    As the provider I attest to compliance with applicable laws and regulations related to telemedicine.    DATA  Interactive Complexity: No  Crisis: No        Progress Since Last Session (Related to Symptoms / Goals / Homework):   Symptoms: Patient believes that he is not getting better. Patient stated, \"I need to get my mental carmen under control.\"     Homework: Did not complete      Episode of Care Goals: Minimal progress - PREPARATION (Decided to change - considering how); Intervened by negotiating a change plan and determining options / strategies for behavior change, identifying triggers, exploring social supports, and working towards setting a date to begin behavior change     Current / Ongoing Stressors and " "Concerns:   Pt. stated, I need to have my mental health issues dealt with.\" Feeling anxious, can't sleep, increased worry, impatience, impulsive.  Patient remains out on short-term disability at this time.     Treatment Objective(s) Addressed in This Session:   Pt. Indicated that he was initially seen by Dr. Pimentel but stated, that he cancelled he follow-up appointment because he wanted to talk to his [employer] CM although, they closed the case until pt. agrees that he is willing to accept help. Pt. is very tangential today. One topic after another. Initially had agreed to have sister be present at all appt. and that he would not make any mental health appointments with any providers without his sister's input although, pt. stated, \"I am 31 yrs old and I can make my own decisions.\" \"I am going to wait to hear from [facility] and seek treatment from them and not continue with seeing you right now.\"  Writer recommended that patient partake in a day treatment program although, patient has declined at this time.       Intervention:   Thought processing techniques and strategies               Deep breathing strategies and techniques               CBT        Impulse Control Methods                         Daily strategies to reduce intrusive thoughts                   Assessments completed prior to visit:  The following assessments were completed by patient for this visit:  PHQ9:   PHQ-9 SCORE 12/1/2021 12/14/2021 1/18/2022 1/19/2022 2/17/2022 3/3/2022 3/24/2022   PHQ-9 Total Score MyChart - - - 24 (Severe depression) 22 (Severe depression) 24 (Severe depression) 22 (Severe depression)   PHQ-9 Total Score 20 22 16 24 22 24 22     GAD7:   LICO-7 SCORE 12/23/2020 2/17/2021 4/5/2021 12/9/2021 12/9/2021 12/14/2021 1/18/2022   Total Score - - - - 19 (severe anxiety) - -   Total Score 14 19 19 19 19 20 21     PROMIS 10-Global Health (only subscores and total score):   PROMIS-10 Scores Only 12/9/2021 12/9/2021   Global Mental " Health Score 5 5   Global Physical Health Score 8 8   PROMIS TOTAL - SUBSCORES 13 13         ASSESSMENT: Current Emotional / Mental Status (status of significant symptoms):   Risk status (Self / Other harm or suicidal ideation)   Patient denies current fears or concerns for personal safety.   Patient denies current or recent suicidal ideation or behaviors.   Patient denies current or recent homicidal ideation or behaviors.   Patient denies current or recent self injurious behavior or ideation.   Patient denies other safety concerns.   Patient reports there has been no change in risk factors since their last session.     Patient reports there has been no change in protective factors since their last session.     Recommended that patient call 911 or go to the local ED should there be a change in any of these risk factors.     Appearance:   Appropriate    Eye Contact:   Good    Psychomotor Behavior: Normal  Restless    Attitude:   Cooperative    Orientation:   All   Speech    Rate / Production: Talkative Normal     Volume:  Normal    Mood:    Anxious    Affect:    Appropriate    Thought Content:  Clear  Rumination    Thought Form:  Coherent  Tangential    Insight:    Fair      Medication Review:   No changes to current psychiatric medication(s)     Medication Compliance:   Inconsistant     Changes in Health Issues:   Indicated that he is urinating frequently, difficulty sleeping, always hungry.     Chemical Use Review:   Substance Use: Yes.m patient indicated that he is using alcohol every other weekend     Tobacco Use: Continues to smoke minimum 10 cigarettes/day    Diagnosis:  1. Attention deficit disorder predominantly inattentive type  2. Mood Disorder (H)  3. Alcohol use disorder, moderate, dependence (H)       Collateral Reports Completed:   Routed note to PCP    PLAN: (Patient Tasks / Therapist Tasks / Other)  1. Patient has decided to proceed with obtaining his therapy through [facility] at this time.   2.   Patient indicated he is following up with Dr. Darian Pimentel psychiatrist from primary care.  Patient uncertain regarding the date and time.  3.  Patient indicated he will contact [facility] if he feels the need to do so.  4.  We will continue to be followed by his PCP with respect to his short-term disability updates.  5.  Patient aware of resources available to him if suicidal thoughts or intentions occur.    Amy Montano LICSW  3/30/22                                                       ______________________________________________________________________       [facility]                                       Nikolay Lora     SAFETY PLAN:  Step 1: Warning signs / cues (Thoughts, images, mood, situation, behavior) that a crisis may be developing:    Thoughts: Patient indicated no particular thoughts    Images: flashbacks    Thinking Processes: racing thoughts    Mood: Anxiety    Behaviors: using alcohol and isolating    Situations: relationship problems   Step 2: Coping strategies - Things I can do to take my mind off of my problems without contacting another person (relaxation technique, physical activity):    Distress Tolerance Strategies: Call family    Physical Activities: Exercise    Focus on helpful thoughts:  remind myself of what is important to me: Family  Step 3: People and social settings that provide distraction:   Name: Cammy Lora  Phone: [phone]     gym    Step 4: Remind myself of people and things that are important to me and worth living for: My family  Step 5: When I am in crisis, I can ask these people to help me use my safety plan:   Name: Cammy Lora  Phone: [phone]  Step 6: Making the environment safe:   Step 7: Professionals or agencies I can contact during a crisis:    Suicide Prevention Lifeline: 1-243-459-MRXD (1718)    Crisis Text Line Service (available 24 hours a day, 7 days a week): Text [place] to 856346  Local Crisis Services: A crisis  connection    Call 911 or go to my nearest emergency department.   I helped develop this safety plan and agree to use it when needed.  I have been given a copy of this plan.      Client signature _verbal authorization from patient ________________________________________________________________  Today s date:  3/30/2022  Completed by Provider Name/ Credentials:  Amy Montano LIC  March 30, 2022  Adapted from Safety Plan Template 2008 Rufina Chambers and Philip De La Garza is reprinted with the express permission of the authors.  No portion of the Safety Plan Template may be reproduced without the express, written permission.  You can contact the authors at [email or web address] or [email or web address].          Individual Treatment Plan     Patient's Name: Nikolay Lora                        YOB: 1990     Date of Creation 11/18/21  Date Treatment Plan Last Reviewed/Revised: 3/23/22     DSM5 Diagnoses: 1. Attention deficit disorder predominantly inattentive type  2. Mood Disorder (H)  3. Alcohol use disorder, moderate, dependence (H)     Psychosocial / Contextual Factors: On short-term disability, mother moved out of the home.   PROMIS (reviewed every 90 days):   PROMIS-10 Scores Only 12/9/2021 12/9/2021   Global Mental Health Score 5 5   Global Physical Health Score 8 8   PROMIS TOTAL - SUBSCORES 13 13      LICO-7 SCORE 12/9/2021 12/14/2021 1/18/2022   Total Score 19 (severe anxiety) - -   Total Score 19 20 21      PHQ 2/17/2022 3/3/2022 3/24/2022   PHQ-9 Total Score 22 24 22   Q9: Thoughts of better off dead/self-harm past 2 weeks Not at all Not at all Not at all      Referral / Collaboration:  In the process of obtaining psych eval     Anticipated number of session for this episode of care: 20-25  Anticipation frequency of session: Weekly  Anticipated Duration of each session: 38-52 minutes  Treatment plan will be reviewed in 90 days or when goals have been changed.         MeasurableTreatment  "Goal(s) related to diagnosis / functional impairment(s)  Goal 1: Patient will increase his complete compensatory strategies to better manage ADHD symptoms    I will know I've met my goal when .  I feel more control of my life.\"     Objective #A (Patient Action)                          Patient will:  Continue current medication regime  Implement physical activity and routine  Continue educating self regarding ADHD symptom management  Status: Updated- Date: 3/23/22     Intervention(s)  Therapist will assist in developing an understanding of ADHD symptoms and how to manage common stressors                    Goal 2: Patient will establish the ability to effectively channel impulses.    I will know I've met my goal when \"I am not feeling as stresses.\"        Objective # A (Patient Action)    1. Develop compensatory strategies               2. Explore barriers to use of compensatory strategies               3. ADHD education               4. Explore factors which may exacerbate cognitive difficulties               5. Identify ways that ADHD causes difficulty in getting along with others.  Status continued 3/23/22                Patient has reviewed and agreed to the above     Amy Montano [facility]  3/23/22    "

## 2022-04-01 ENCOUNTER — OFFICE VISIT (OUTPATIENT)
Dept: ALLERGY | Facility: CLINIC | Age: 32
End: 2022-04-01
Payer: COMMERCIAL

## 2022-04-01 VITALS — WEIGHT: 174 LBS | RESPIRATION RATE: 16 BRPM | HEIGHT: 64 IN | BODY MASS INDEX: 29.71 KG/M2

## 2022-04-01 DIAGNOSIS — L50.1 CHRONIC IDIOPATHIC URTICARIA: Primary | ICD-10-CM

## 2022-04-01 DIAGNOSIS — J30.1 SEASONAL ALLERGIC RHINITIS DUE TO POLLEN: ICD-10-CM

## 2022-04-01 PROCEDURE — 96372 THER/PROPH/DIAG INJ SC/IM: CPT | Performed by: ALLERGY & IMMUNOLOGY

## 2022-04-01 PROCEDURE — 99214 OFFICE O/P EST MOD 30 MIN: CPT | Mod: 25 | Performed by: ALLERGY & IMMUNOLOGY

## 2022-04-01 NOTE — PROGRESS NOTES
Clinic Administered Medication Documentation    Administrations This Visit     omalizumab (XOLAIR) injection 300 mg     Admin Date  04/01/2022 Action  Given Dose  300 mg Route  Subcutaneous Site   Administered By  Sharon Barron    Ordering Provider: Mayte Bullock MD    NDC: 54627-187-36    Lot#: [id number]    : "[employer]    Patient Supplied?: No    Comments: left arm 150 right arm 150 total 300 mg given

## 2022-04-01 NOTE — PROGRESS NOTES
"      Subjective     Chief complaint:  Xolair, hives follow-up    HPI     History of present illness: This is a pleasant 31-year-old gentleman with a history of allergic rhinitis and chronic urticaria here today for follow-up visit.  He is receiving his fourth dose of Xolair 300 mg monthly today.  He had no difficulty with his Xolair shots and he has a current epinephrine device.  He would like to talk about his environmental allergies.  Previous specific IgE testing was positive for multiple aeroallergens but he did have elevated total IgE.  He would like to consider allergy shots.  He continues to have nasal congestion and drainage.  He tried Astelin nasal spray previously.  Reports his older shots do seem to be controlling his hives but he still has to use cetirizine as he still becomes itchy and this does seem to help with the Xolair.  The hives are much more manageable since being on Xolair.    Review of Systems         Objective    Resp 16   Ht 1.63 m (5' 4.17\")   Wt 78.9 kg (174 lb)   BMI 29.71 kg/m    Body mass index is 29.71 kg/m .  Physical Exam      Gen: Pleasant male not in acute distress  HEENT: Eyes no erythema of the bulbar or palpebral conjunctiva, no edema.   Skin: No rashes or lesions  Psych: Alert and oriented times 3      Impression report and plan:  1.  Chronic urticaria   2.  Allergic rhinitis    Continue Xolair 300 mg monthly.  Continue to carry epinephrine device on the day of his Xolair shot.  For his environmental allergens, I recommend skin testing prior to his seventh injection.  He must be on antihistamines for 5 days.  If this is difficult, we talked about 10 mg 1 dose prednisone daily.  Could consider allergy no therapy but I did state that we do not know the efficacy of allergy shots while on Xolair.  I stated this would treat his allergic rhinitis and not his chronic urticaria.    Time spent with patient, chart review and documentation, 30 minutes on date of service.    "

## 2022-04-01 NOTE — PATIENT INSTRUCTIONS
Xolair 300 mg monthly    With July injection, we will do skin testing---set up early June, late May--off cetirizine for 5 days prior    If difficult to stop cetirizine, low prednisone 10 mg     ?Allergy shots for nasal congestion, itchy eyes, sneezing    Xolair for hives

## 2022-04-01 NOTE — LETTER
"    4/1/2022         RE: Nikolay Lora  [address] [place] [address]        Dear Colleague,    Thank you for referring your patient, Nikolay Lora, to the [facility]. Please see a copy of my visit note below.    Clinic Administered Medication Documentation    Administrations This Visit     omalizumab (XOLAIR) injection 300 mg     Admin Date  04/01/2022 Action  Given Dose  300 mg Route  Subcutaneous Site   Administered By  Sharon Barron    Ordering Provider: Mayte Bullock MD    NDC: 35805-365-42    Lot#: [id number]    : [employer]    Patient Supplied?: No    Comments: left arm 150 right arm 150 total 300 mg given                          Subjective     Chief complaint:  Xolair, hives follow-up    HPI     History of present illness: This is a pleasant 31-year-old gentleman with a history of allergic rhinitis and chronic urticaria here today for follow-up visit.  He is receiving his fourth dose of Xolair 300 mg monthly today.  He had no difficulty with his Xolair shots and he has a current epinephrine device.  He would like to talk about his environmental allergies.  Previous specific IgE testing was positive for multiple aeroallergens but he did have elevated total IgE.  He would like to consider allergy shots.  He continues to have nasal congestion and drainage.  He tried Astelin nasal spray previously.  Reports his older shots do seem to be controlling his hives but he still has to use cetirizine as he still becomes itchy and this does seem to help with the Xolair.  The hives are much more manageable since being on Xolair.    Review of Systems         Objective    Resp 16   Ht 1.63 m (5' 4.17\")   Wt 78.9 kg (174 lb)   BMI 29.71 kg/m    Body mass index is 29.71 kg/m .  Physical Exam      Gen: Pleasant male not in acute distress  HEENT: Eyes no erythema of the bulbar or palpebral conjunctiva, no edema.   Skin: No rashes or lesions  Psych: Alert and oriented times " 3      Impression report and plan:  1.  Chronic urticaria   2.  Allergic rhinitis    Continue Xolair 300 mg monthly.  Continue to carry epinephrine device on the day of his Xolair shot.  For his environmental allergens, I recommend skin testing prior to his seventh injection.  He must be on antihistamines for 5 days.  If this is difficult, we talked about 10 mg 1 dose prednisone daily.  Could consider allergy no therapy but I did state that we do not know the efficacy of allergy shots while on Xolair.  I stated this would treat his allergic rhinitis and not his chronic urticaria.    Time spent with patient, chart review and documentation, 30 minutes on date of service.        Again, thank you for allowing me to participate in the care of your patient.        Sincerely,        Mayte CARTER MD

## 2022-04-06 DIAGNOSIS — Z71.6 ENCOUNTER FOR SMOKING CESSATION COUNSELING: ICD-10-CM

## 2022-04-07 RX ORDER — VARENICLINE TARTRATE 0.5 MG/1
TABLET, FILM COATED ORAL
Qty: 53 TABLET | Refills: 0 | Status: SHIPPED | OUTPATIENT
Start: 2022-04-07 | End: 2022-05-10

## 2022-04-07 NOTE — TELEPHONE ENCOUNTER
"Routing refill request to provider for review/approval because:  Provider input to determine length of treatment.     Last Written Prescription Date:  3/15/22  Last Fill Quantity: 53,  # refills: 0   Last office visit provider:  3/24/22    Requested Prescriptions   Pending Prescriptions Disp Refills     varenicline (CHANTIX) 0.5 MG tablet [Pharmacy Med Name: VARENICLINE 0.5 MG TABLET] 53 tablet 0     Sig: TAKE 1 TABLET BY MOUTH DAILY FOR 3 DAYS THEN 1 TABLET 2X/DAY X 4 DAYS THEN 2 TABLETS TWICE DAILY       Partial Cholinergic Nicotinic Agonist Agents Passed - 4/6/2022  1:31 PM        Passed - Blood pressure under 140/90 in past 12 months     BP Readings from Last 3 Encounters:   03/24/22 110/74   02/17/22 114/72   01/24/22 131/89                 Passed - Recent (12 mo) or future (30 days) visit within the authorizing provider's specialty     Patient has had an office visit with the authorizing provider or a provider within the authorizing providers department within the previous 12 mos or has a future within next 30 days. See \"Patient Info\" tab in inbasket, or \"Choose Columns\" in Meds & Orders section of the refill encounter.              Passed - Medication is active on med list        Passed - Patient is 18 years of age or older             Leny Paul RN 04/07/22 3:09 PM  "

## 2022-04-13 DIAGNOSIS — F39 MOOD DISORDER (H): ICD-10-CM

## 2022-04-13 NOTE — TELEPHONE ENCOUNTER
Behavioral Access, please schedule this patient for a future visit with  Zeny . Patient is requesting a refill.     Needs to schedule prior to sending this to provider    Date of Last Office Visit: 3/3/22 RTN in 8 weeks  Date of Next Office Visit: None. Scheduling attempting to contact pt  No shows since last visit: 0  Cancellations since last visit: 0    Medication requested: Abilify 2 mg Date last ordered: 3/21/22 Qty: 30 Refills: 0     ARIPiprazole (ABILIFY) 2 MG tablet 30 tablet 0 3/21/2022  No   Sig: TAKE 1 TABLET BY MOUTH EVERY DAY   Sent to pharmacy as: ARIPiprazole 2 MG Oral Tablet (ABILIFY)   Class: E-Prescribe         Review of [place] ?: NA      Lapse in medication adherence greater than 5 days?: no  If yes, call patient and gather details: NA  Medication refill request verified as identical to current order?: yes  Result of Last DAM, VPA, Li+ Level, CBC, or Carbamazepine Level (at or since last visit): see labs from March    Last visit treatment plan:     Last visit  02/23/2022Recommendation at last visit .  1. Ambulatory referral to neuropsychology - cognitive  changes  , R/O developmental disorders - Per statement - his appointment os today   2. MD : Continue Duloxetine 40 mg   3. MDD/ Mood disorder :Continue Abilify 2 mg daily   4. Continue with psychotherapy   5. RTC : 8 weeks call in between visit with any  questions or concerns   6. Consider Neurology referral      Patient and I reviewed diagnosis and treatment plan and patient agrees with following recommendations:  Ongoing education given regarding diagnostic and treatment options with adequate verbalization of understanding.  Plan   Hypersomnia- Primary issues    Persistent Depressive Disorder with Anxious Distress   Features of Schizotypal Personality Disorder   R/O mood disorder         []Medication refilled per  Medication Refill in Ambulatory Care  policy.  [x]Medication unable to be refilled by RN due to criteria not met as indicated below:     []Eligibility - not seen in the last year   [x]Supervision - no future appointment   []Compliance - no shows, cancellations or lapse in therapy   []Verification - order discrepancy   []Controlled medication   [x]Medication not included in policy   []90-day supply request   [x]Other: LPN is processing request

## 2022-04-14 ENCOUNTER — OFFICE VISIT (OUTPATIENT)
Dept: FAMILY MEDICINE | Facility: CLINIC | Age: 32
End: 2022-04-14
Payer: COMMERCIAL

## 2022-04-14 ENCOUNTER — TELEPHONE (OUTPATIENT)
Dept: FAMILY MEDICINE | Facility: CLINIC | Age: 32
End: 2022-04-14

## 2022-04-14 VITALS
DIASTOLIC BLOOD PRESSURE: 72 MMHG | WEIGHT: 173.31 LBS | SYSTOLIC BLOOD PRESSURE: 110 MMHG | HEIGHT: 65 IN | RESPIRATION RATE: 20 BRPM | BODY MASS INDEX: 28.87 KG/M2 | OXYGEN SATURATION: 100 % | HEART RATE: 79 BPM

## 2022-04-14 DIAGNOSIS — F39 MOOD DISORDER (H): ICD-10-CM

## 2022-04-14 DIAGNOSIS — F29 PSYCHOSIS, UNSPECIFIED PSYCHOSIS TYPE (H): Primary | ICD-10-CM

## 2022-04-14 PROCEDURE — 99213 OFFICE O/P EST LOW 20 MIN: CPT | Performed by: FAMILY MEDICINE

## 2022-04-14 RX ORDER — ARIPIPRAZOLE 2 MG/1
TABLET ORAL
Qty: 30 TABLET | Refills: 0 | OUTPATIENT
Start: 2022-04-14

## 2022-04-14 NOTE — PROGRESS NOTES
I spent over 15 minutes spent, greater than 50% of this counseling regarding following issues:    1. Psychosis, unspecified psychosis type (H)  Currently has appointments with 2 different mental health providers.  Advised to choose 1.  Apparently this will be [facility].  Needs to get records transferred to them, discussed this process    Finally, needs disability papers filled out.  Has short-term disability, I believe through the [facility], through June.  I filled out insurance papers through the end of March.  Currently there are more data being sent to me.  Offered to fill Kasi.      Patient indicates that he really has not had a lot in terms of treatment to this point but appears to be doing somewhat better.

## 2022-04-14 NOTE — TELEPHONE ENCOUNTER
Pt now has appt scheduled and should have enough medication to get him to appt with Beryl Moura on 4/18/2022. Medication refill request refused and will be addressed during appt on 4/18.

## 2022-04-14 NOTE — TELEPHONE ENCOUNTER
Reason for Call:  Form, our goal is to have forms completed with 72 hours, however, some forms may require a visit or additional information.    Type of letter, form or note:  Per Pt, not sure if it's FMLA but it is for Medical leave, from [employer].    Who is the form from?: [employer] (if other please explain)    Where did the form come from: form was faxed in    What clinic location was the form placed at?: [facility]    Where the form was placed: N/A    What number is listed as a contact on the form?:        Additional comments: Per pt, was just faxed over, please complete and fax back ASAP.     Call taken on 4/14/2022 at 4:03 PM by Marcia Diana

## 2022-04-15 NOTE — TELEPHONE ENCOUNTER
Called and left detailed message letting pt know we have not received the form and to have the form faxed to [phone]

## 2022-05-03 ENCOUNTER — ALLIED HEALTH/NURSE VISIT (OUTPATIENT)
Dept: ALLERGY | Facility: CLINIC | Age: 32
End: 2022-05-03
Payer: COMMERCIAL

## 2022-05-03 DIAGNOSIS — L50.1 CHRONIC IDIOPATHIC URTICARIA: Primary | ICD-10-CM

## 2022-05-03 PROCEDURE — 96372 THER/PROPH/DIAG INJ SC/IM: CPT | Performed by: ALLERGY & IMMUNOLOGY

## 2022-05-06 DIAGNOSIS — Z71.6 ENCOUNTER FOR SMOKING CESSATION COUNSELING: ICD-10-CM

## 2022-05-09 NOTE — TELEPHONE ENCOUNTER
"Routing refill request to provider for review/approval because:  Please clarify SIG for longer-term dosing    Last Written Prescription Date:  4/7/22  Last Fill Quantity: 53,  # refills: 0   Last office visit provider:  4/14/22     Requested Prescriptions   Pending Prescriptions Disp Refills     varenicline (CHANTIX) 0.5 MG tablet [Pharmacy Med Name: VARENICLINE 0.5 MG TABLET] 53 tablet 0     Sig: TAKE 1 TABLET BY MOUTH DAILY FOR 3 DAYS THEN 1 TABLET 2X/DAY X 4 DAYS THEN 2 TABLETS TWICE DAILY       Partial Cholinergic Nicotinic Agonist Agents Passed - 5/9/2022  1:45 PM        Passed - Blood pressure under 140/90 in past 12 months     BP Readings from Last 3 Encounters:   04/14/22 110/72   03/24/22 110/74   02/17/22 114/72                 Passed - Recent (12 mo) or future (30 days) visit within the authorizing provider's specialty     Patient has had an office visit with the authorizing provider or a provider within the authorizing providers department within the previous 12 mos or has a future within next 30 days. See \"Patient Info\" tab in inbasket, or \"Choose Columns\" in Meds & Orders section of the refill encounter.              Passed - Medication is active on med list        Passed - Patient is 18 years of age or older             Babatunde Cantu RN 05/09/22 1:45 PM    "

## 2022-05-10 RX ORDER — VARENICLINE TARTRATE 0.5 MG/1
TABLET, FILM COATED ORAL
Qty: 53 TABLET | Refills: 0 | Status: SHIPPED | OUTPATIENT
Start: 2022-05-10 | End: 2022-07-11

## 2022-05-12 DIAGNOSIS — J30.9 ALLERGIC RHINITIS, UNSPECIFIED SEASONALITY, UNSPECIFIED TRIGGER: ICD-10-CM

## 2022-05-12 RX ORDER — SERTRALINE HYDROCHLORIDE 150 MG/1
150 CAPSULE ORAL DAILY
Status: ON HOLD | COMMUNITY
Start: 2022-05-06 | End: 2023-04-11

## 2022-05-12 NOTE — TELEPHONE ENCOUNTER
Reason for Call:  Medication or medication refill: cetirizine (ZYRTEC) 10 MG tablet    Do you use a [facility]?  Name of the pharmacy and phone number for the current request[facility]    Name of the medication requested: cetirizine (ZYRTEC) 10 MG tablet    Other request: Patient needs refill of cetirizine (ZYRTEC) 10 MG tablet. Patient called pharmacy and there is no refills left. Patient is also wondering the status of his disability form. Patient stated that staff would know what he is referring too.  Please reach out to patient to update,    Can we leave a detailed message on this number? YES    Phone number patient can be reached at: Home number on file [phone] (home)    Best Time: any    Call taken on 5/12/2022 at 8:04 AM by Susy Murphy

## 2022-05-12 NOTE — TELEPHONE ENCOUNTER
Called and informed patient that we have not received his form from [employer]. Gave him fax number [phone] to have this form faxed to us.

## 2022-05-14 NOTE — TELEPHONE ENCOUNTER
"Routing refill request to provider for review/approval because:  Drug not on the [facility] refill protocol     Last Written Prescription Date:  11/4/21  Last Fill Quantity: 30,  # refills: 11   Last office visit provider:  4/14/22     Requested Prescriptions   Pending Prescriptions Disp Refills     cetirizine (ZYRTEC) 10 MG tablet 30 tablet 11     Sig: Take 1 tablet (10 mg) by mouth daily       Antihistamines Protocol Passed - 5/12/2022  8:31 AM        Passed - Patient is 3-64 years of age     Apply weight-based dosing for peds patients age 3 - 12 years of age.    Forward request to provider for patients under the age of 3 or over the age of 64.          Passed - Recent (12 mo) or future (30 days) visit within the authorizing provider's specialty     Patient has had an office visit with the authorizing provider or a provider within the authorizing providers department within the previous 12 mos or has a future within next 30 days. See \"Patient Info\" tab in inbasket, or \"Choose Columns\" in Meds & Orders section of the refill encounter.              Passed - Medication is active on med list         Signed Prescriptions Disp Refills    sertraline (ZOLOFT) 50 MG tablet       Sig: TAKE 1/2 TAB BY MOUTH EVERY MORNING FOR 1 WEEK, INCREASE TO 1 TAB DAILY, THEN 2 TAB EVERY MORNING       There is no refill protocol information for this order          Ashley Kramer 05/14/22 1:52 PM  "

## 2022-05-17 RX ORDER — CETIRIZINE HYDROCHLORIDE 10 MG/1
10 TABLET ORAL DAILY
Qty: 30 TABLET | Refills: 11 | Status: ON HOLD | OUTPATIENT
Start: 2022-05-17 | End: 2023-04-11

## 2022-06-07 ENCOUNTER — ALLIED HEALTH/NURSE VISIT (OUTPATIENT)
Dept: ALLERGY | Facility: CLINIC | Age: 32
End: 2022-06-07
Payer: COMMERCIAL

## 2022-06-07 DIAGNOSIS — L50.1 CHRONIC IDIOPATHIC URTICARIA: Primary | ICD-10-CM

## 2022-06-07 PROCEDURE — 99207 PR DROP WITH A PROCEDURE: CPT

## 2022-06-07 PROCEDURE — 96372 THER/PROPH/DIAG INJ SC/IM: CPT | Performed by: ALLERGY & IMMUNOLOGY

## 2022-06-07 NOTE — PROGRESS NOTES
Clinic Administered Medication Documentation    Administrations This Visit     omalizumab (XOLAIR) injection 300 mg     Admin Date  06/07/2022 Action  Given Dose  300 mg Route  Subcutaneous Site   Administered By  Neville Chavez RN    Ordering Provider: Mayte Bullock MD    Patient Supplied?: No    Comments: 150mg left arm  150mg right arm                Neville Chavez RN

## 2022-06-23 ENCOUNTER — TELEPHONE (OUTPATIENT)
Dept: FAMILY MEDICINE | Facility: CLINIC | Age: 32
End: 2022-06-23

## 2022-06-23 NOTE — TELEPHONE ENCOUNTER
It seems like it might be best to see him in the office to discuss all of this?  I  I would prefer that at [facility] due to the [place] if his absence continues to be due to mental health-I am willing to consider doing this if they refuse  I currently do not know of any reason to see an endocrinologist.  I sure could see him in the office and discuss it.

## 2022-06-23 NOTE — TELEPHONE ENCOUNTER
Reason for Call: Request for an order or referral:    Order or referral being requested: Pt calling to say new therapist recommended he see an endocrinologist.  He already called [facility] and need a referral.     Date needed:   as soon as possible    Has the patient been seen by the PCP for this problem? unknown    Additional comments: also wishes to schedule a video visit  To discuss FMLA.  Papers were sent.  Pt is no longer being terminated.  Needs to file long term disability. Pt also now with [facility] to managing pt therapy, and meds.  Pt just wants PCP to be aware and on all same page with medical leave.      Pt scheduled on 6/28/22 at 5 pm virtual.  Thanks      Phone number Patient can be reached at:  Home number on file [phone] (home)    Best Time:  anytime    Can we leave a detailed message on this number?  YES    Call taken on 6/23/2022 at 2:37 PM by Autumn Manjarrez CMA, TC

## 2022-06-24 NOTE — TELEPHONE ENCOUNTER
"Attempted to call pt unable to leave voicemail#1 due to mailbox full.  \" Okay to relay message\"    "

## 2022-06-28 ENCOUNTER — VIRTUAL VISIT (OUTPATIENT)
Dept: FAMILY MEDICINE | Facility: CLINIC | Age: 32
End: 2022-06-28
Payer: COMMERCIAL

## 2022-06-28 ENCOUNTER — TELEPHONE (OUTPATIENT)
Dept: BEHAVIORAL HEALTH | Facility: CLINIC | Age: 32
End: 2022-06-28

## 2022-06-28 DIAGNOSIS — R63.5 WEIGHT GAIN: Primary | ICD-10-CM

## 2022-06-28 DIAGNOSIS — F29 PSYCHOSIS, UNSPECIFIED PSYCHOSIS TYPE (H): ICD-10-CM

## 2022-06-28 PROCEDURE — 99213 OFFICE O/P EST LOW 20 MIN: CPT | Mod: 95 | Performed by: FAMILY MEDICINE

## 2022-06-28 NOTE — TELEPHONE ENCOUNTER
RECEIVED REQUEST FOR FUCTIONAL STATUS EVALUATION AND MEDICAL RECORDS.  FAXED MED REC REQUEST TO MEDICAL RECORDS.  EMAILED CORRESPONDENCE TO PROVIDER AND PLACED IN CLINIC MAILBOX    7-19-22 RECEIVED SECOND REQUEST - EMAIL TO PROVIDER AND PLACE IN CLINIC  MAILBOX

## 2022-06-28 NOTE — PROGRESS NOTES
This is a video visit conducted through [place] started at 515 and ended at 525    Patient is to concerns today were getting a endocrinology referral as was recommended by his therapist.  Patient indicates that he is often very hungry-and he has been gaining weight lately.  He reports that he spoke with his therapist who recommended seeing an endocrinologist.  He is asking me for a referral.    Second, he needed some clarification is around work release.  Was under the impression that he had lost his job but then somehow my previous work release came through.  This is due to  .  He plans to work with his new mental health provider through [facility]    1. Weight gain  I asked to see him in person to have further discussion about this and have some clarity about what his therapist recommends.  I discussed that there are a few endocrine conditions that can cause weight gain such as hypothyroidism and Cushing syndrome.  Probably deserves evaluation for hyperthyroidism and does not seem to have Cushing syndrome and it seems more likely that medication may be causing weight gain.  We will discuss this further Op[facility] in person follow-up    2. Psychosis, unspecified psychosis type (H)  Admits to doing a little bit better, seeing [facility].  They will be managing his current work release.                  Answers for HPI/ROS submitted by the patient on 2022  If you checked off any problems, how difficult have these problems made it for you to do your work, take care of things at home, or get along with other people?: Extremely difficult  PHQ9 TOTAL SCORE: 24

## 2022-06-29 NOTE — TELEPHONE ENCOUNTER
Unable to LVM due to VM being full, if the pt calls back please have him schedule an appt with Dr. Ybarra to discuss referral and FMLA #2

## 2022-06-29 NOTE — TELEPHONE ENCOUNTER
Pt called back and I relayed message as indicated below. I have scheduled an appointment for the pt to see provider on 7/20/2022. Pt verbalized understanding and had no further questions or concerns at the moment.

## 2022-07-11 ENCOUNTER — OFFICE VISIT (OUTPATIENT)
Dept: ALLERGY | Facility: CLINIC | Age: 32
End: 2022-07-11
Payer: COMMERCIAL

## 2022-07-11 VITALS
HEART RATE: 67 BPM | RESPIRATION RATE: 16 BRPM | WEIGHT: 173 LBS | OXYGEN SATURATION: 97 % | HEIGHT: 65 IN | BODY MASS INDEX: 28.82 KG/M2

## 2022-07-11 DIAGNOSIS — L50.1 CHRONIC IDIOPATHIC URTICARIA: ICD-10-CM

## 2022-07-11 DIAGNOSIS — J30.89 ALLERGIC RHINITIS DUE TO MOLD: ICD-10-CM

## 2022-07-11 DIAGNOSIS — J30.89 ALLERGIC RHINITIS DUE TO DUST MITE: Primary | ICD-10-CM

## 2022-07-11 DIAGNOSIS — J30.81 ALLERGIC RHINITIS DUE TO ANIMALS: ICD-10-CM

## 2022-07-11 DIAGNOSIS — J30.1 SEASONAL ALLERGIC RHINITIS DUE TO POLLEN: ICD-10-CM

## 2022-07-11 PROCEDURE — 99214 OFFICE O/P EST MOD 30 MIN: CPT | Mod: 25 | Performed by: ALLERGY & IMMUNOLOGY

## 2022-07-11 PROCEDURE — 96372 THER/PROPH/DIAG INJ SC/IM: CPT | Performed by: ALLERGY & IMMUNOLOGY

## 2022-07-11 PROCEDURE — 95004 PERQ TESTS W/ALRGNC XTRCS: CPT | Performed by: ALLERGY & IMMUNOLOGY

## 2022-07-11 NOTE — PROGRESS NOTES
Clinic Administered Medication Documentation    Administrations This Visit     omalizumab (XOLAIR) injection 300 mg     Admin Date  2022 Action  Given Dose  300 mg Route  Subcutaneous Site   Administered By  Sharon Barron    Ordering Provider: Mayte Bullock MD    NDC: 92691-181-98    Lot#: [id number]    : MedTest DX    Patient Supplied?: No    Comments: left arm 150 right arm 150 total given 300                Patient had epi pen  2023

## 2022-07-11 NOTE — LETTER
"    2022         RE: Nikolay Lora  [address] [place] [address]        Dear Colleague,    Thank you for referring your patient, Nikolay Lora, to the [facility]. Please see a copy of my visit note below.    Clinic Administered Medication Documentation    Administrations This Visit     omalizumab (XOLAIR) injection 300 mg     Admin Date  2022 Action  Given Dose  300 mg Route  Subcutaneous Site   Administered By  Sharon Barron    Ordering Provider: Mayte Bullock MD    NDC: 41044-967-58    Lot#: [id number]    : [employer]    Patient Supplied?: No    Comments: left arm 150 right arm 150 total given 300                Patient had epi pen  2023           Drake Link is a 31 year old, presenting for the following health issues:  RECHECK (Follow up xolair, skin testing for environmental allergies) and Imm/Inj      HPI     Chief complaint: Follow-up chronic hives and allergies    History of present illness: This is a pleasant 31-year-old gentleman here today for evaluation of allergies.  He also has a history of chronic urticaria and has been receiving Xolair 300 mg monthly.  He notes some breakthrough symptoms a week prior to his allergy shot but with using antihistamine seems to adequately control symptoms.  He has been using a nasal spray, Flonase.  This has helped with his allergic rhinitis but he would like to consider allergy shots.  We talked about previously that efficacy of allergy shots is unknown while on Xolair but he would like to proceed.  He has a lot of nasal congestion and drainage.  He is allergic to animals and would like to have a dog in the future.    Review of Systems         Objective    Pulse 67   Resp 16   Ht 1.651 m (5' 5\")   Wt 78.5 kg (173 lb)   SpO2 97%   BMI 28.79 kg/m    Body mass index is 28.79 kg/m .  Physical Exam        Gen: Pleasant male not in acute distress  HEENT: Eyes no erythema of the bulbar or " palpebral conjunctiva, no edema.   Skin: No rashes or lesions  Psych: Alert and appropriate for age    31 percutaneous test taken today environmental skin test panel.  Positive histamine control with positive to tree pollen, dust mites, grass pollen, cockroach, penicillium, cat and dog.    Impression report and plan:  1.  Chronic urticaria  Continue Xolair.  If he is noting many breakthrough symptoms can consider 150 mg of Xolair every 2 weeks.  He has current epinephrine device.  Follow in 6 months.    2.  Allergic rhinitis    Reviewed environmental control.  I went over the risks and benefits of allergy shots.  I stated risks include hives, swelling, shortness of breath.  I did state that one in 2.5 million shot administrations can result in death.  I stated they must wait in the office for 30 minutes following the shot and carry an epinephrine device on the day of the shot.  I stated that shots are effective in about 90% of patients.  I stated that they should check with the insurance company prior to proceeding.  They understand the risks and benefits and agreed to proceed.  Both consent forms were scanned into the record and signed.    .  ..      Again, thank you for allowing me to participate in the care of your patient.        Sincerely,        Mayte CARTER MD

## 2022-07-11 NOTE — LETTER
ANAPHYLAXIS ALLERGY PLAN    Name: Nikolay Lora      :  1990    Allergy to:      Weight: 173 lbs 0 oz           Asthma:  No      Do not depend on antihistamines or inhalers (bronchodilators) to treat a severe reaction; USE EPINEPHRINE      MEDICATIONS/DOSES  Epinephrine:    Epinephrine dose:  0.3 mg IM  Antihistamine:  Zyrtec (Cetirizine)  Antihistamine dose:  10 mg        ANAPHYLAXIS ALLERGY PLAN (Page 2)  Patient:  Nikolay Lora  :  1990         Electronically signed on 2022 by:  Mayte CARTER MD  Parent/Guardian Authorization Signature:  ___________________________ Date:    FORM PROVIDED [employer] EDUC[employer] (FARE) ([email or web address]) 2017

## 2022-07-15 ENCOUNTER — TELEPHONE (OUTPATIENT)
Dept: ALLERGY | Facility: CLINIC | Age: 32
End: 2022-07-15

## 2022-07-15 NOTE — TELEPHONE ENCOUNTER
mailbox full, sending mychart message too.  Needs 6 shot only and then Follow-up with Dr Bullock. Do not start shot appts before August 25th.

## 2022-07-20 ENCOUNTER — OFFICE VISIT (OUTPATIENT)
Dept: FAMILY MEDICINE | Facility: CLINIC | Age: 32
End: 2022-07-20
Payer: COMMERCIAL

## 2022-07-20 VITALS
HEART RATE: 68 BPM | DIASTOLIC BLOOD PRESSURE: 79 MMHG | WEIGHT: 165 LBS | RESPIRATION RATE: 25 BRPM | BODY MASS INDEX: 27.46 KG/M2 | SYSTOLIC BLOOD PRESSURE: 112 MMHG

## 2022-07-20 DIAGNOSIS — F39 MOOD DISORDER (H): ICD-10-CM

## 2022-07-20 DIAGNOSIS — R63.5 WEIGHT GAIN: Primary | ICD-10-CM

## 2022-07-20 LAB — TSH SERPL DL<=0.005 MIU/L-ACNC: 1.89 UIU/ML (ref 0.3–4.2)

## 2022-07-20 PROCEDURE — 90677 PCV20 VACCINE IM: CPT | Performed by: FAMILY MEDICINE

## 2022-07-20 PROCEDURE — 90471 IMMUNIZATION ADMIN: CPT | Performed by: FAMILY MEDICINE

## 2022-07-20 PROCEDURE — 90746 HEPB VACCINE 3 DOSE ADULT IM: CPT | Performed by: FAMILY MEDICINE

## 2022-07-20 PROCEDURE — 36415 COLL VENOUS BLD VENIPUNCTURE: CPT | Performed by: FAMILY MEDICINE

## 2022-07-20 PROCEDURE — 90632 HEPA VACCINE ADULT IM: CPT | Performed by: FAMILY MEDICINE

## 2022-07-20 PROCEDURE — 84443 ASSAY THYROID STIM HORMONE: CPT | Performed by: FAMILY MEDICINE

## 2022-07-20 PROCEDURE — 90472 IMMUNIZATION ADMIN EACH ADD: CPT | Performed by: FAMILY MEDICINE

## 2022-07-20 PROCEDURE — 99214 OFFICE O/P EST MOD 30 MIN: CPT | Mod: 25 | Performed by: FAMILY MEDICINE
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
## 2022-07-22 ENCOUNTER — TELEPHONE (OUTPATIENT)
Dept: ALLERGY | Facility: CLINIC | Age: 32
End: 2022-07-22

## 2022-07-22 NOTE — TELEPHONE ENCOUNTER
Spoke with patient and let him know that xolair and allergy shots need to be 24 hours apart. We went ahead and made his allergy shot appointments over the phone. First appointment will be 8/26

## 2022-07-22 NOTE — TELEPHONE ENCOUNTER
Pt will need to be called, shots and Xolair. they have to be 24 hours apart, can not do same day.

## 2022-07-22 NOTE — TELEPHONE ENCOUNTER
Name Of Person Who Called: Link relationship (patient)    Reason for call: medication question can Xolair and allergy shots be given at same time     Best Phone Number To Call Back: Home number on file [phone] (home)    Okay To Leave A Detailed Voicemail? Yes

## 2022-07-26 ENCOUNTER — MEDICAL CORRESPONDENCE (OUTPATIENT)
Dept: HEALTH INFORMATION MANAGEMENT | Facility: CLINIC | Age: 32
End: 2022-07-26

## 2022-07-28 DIAGNOSIS — J30.89 ALLERGIC RHINITIS DUE TO DUST MITE: Primary | ICD-10-CM

## 2022-07-28 DIAGNOSIS — J30.89 ALLERGIC RHINITIS DUE TO MOLD: ICD-10-CM

## 2022-07-28 DIAGNOSIS — J30.1 SEASONAL ALLERGIC RHINITIS DUE TO POLLEN: ICD-10-CM

## 2022-07-28 DIAGNOSIS — J30.81 ALLERGIC RHINITIS DUE TO ANIMALS: ICD-10-CM

## 2022-07-28 PROCEDURE — 95165 ANTIGEN THERAPY SERVICES: CPT | Performed by: ALLERGY & IMMUNOLOGY

## 2022-07-28 NOTE — PROGRESS NOTES
MM/DFM/DPM/CR/AP DOG  1:1000 V/V EXP 9/26/2022  1:100 V/V EXP 11/26/2022  1:10 V/V EXP 7/26/2023  1:1 V/V EXP 7/26/2023    CHARGED 30 UNITS  CHECKED BY LS

## 2022-07-29 DIAGNOSIS — J30.89 ALLERGIC RHINITIS DUE TO MOLD: ICD-10-CM

## 2022-07-29 DIAGNOSIS — J30.89 ALLERGIC RHINITIS DUE TO DUST MITE: Primary | ICD-10-CM

## 2022-07-29 DIAGNOSIS — J30.1 SEASONAL ALLERGIC RHINITIS DUE TO POLLEN: ICD-10-CM

## 2022-07-29 DIAGNOSIS — J30.81 ALLERGIC RHINITIS DUE TO ANIMALS: ICD-10-CM

## 2022-07-29 PROCEDURE — 95165 ANTIGEN THERAPY SERVICES: CPT | Performed by: ALLERGY & IMMUNOLOGY

## 2022-07-29 NOTE — PROGRESS NOTES
GRASS/CAT  1:1000 V/V EXP 9/26/2022  1:100 V/V EXP 11/26/2022  1:10 V/V EXP 7/26/2023  1:1 V/V EXP 7/26/2023    CHECKED BY LS  CHARGED 30 UNITS

## 2022-08-01 ENCOUNTER — DOCUMENTATION ONLY (OUTPATIENT)
Dept: BEHAVIORAL HEALTH | Facility: CLINIC | Age: 32
End: 2022-08-01

## 2022-08-01 DIAGNOSIS — J30.89 ALLERGIC RHINITIS DUE TO MOLD: ICD-10-CM

## 2022-08-01 DIAGNOSIS — J30.1 SEASONAL ALLERGIC RHINITIS DUE TO POLLEN: ICD-10-CM

## 2022-08-01 DIAGNOSIS — J30.89 ALLERGIC RHINITIS DUE TO DUST MITE: Primary | ICD-10-CM

## 2022-08-01 DIAGNOSIS — J30.81 ALLERGIC RHINITIS DUE TO ANIMALS: ICD-10-CM

## 2022-08-01 PROCEDURE — 95165 ANTIGEN THERAPY SERVICES: CPT | Performed by: ALLERGY & IMMUNOLOGY

## 2022-08-01 NOTE — PROGRESS NOTES
TREES  1:1000 V/V EXP 9/28/2022  1:100 V/V EXP 11/28/2022  1:10 V/V EXP 7/28/2023  1:1 V/V EXP 7/28/2023    CHECKED BY LS  CHARGED 30 UNITS

## 2022-08-01 NOTE — PROGRESS NOTES
Discharge Summary  Multiple Sessions    Client Name: Nikolay Lora MRN#: [id number] YOB: 1990      Intake / Discharge Date: Patient was initially seen on 4/5/2021 last seen on 3/30/2022.  Provider had been out on medical leave upon return on 6/2/2022 message sent to patient to reschedule at his convenience.  Writer has not heard back from patient.  at this time chart will be closed unless patient request to again be seen.      DSM5 Diagnoses:  Diagnoses: Attention deficit disorder predominantly inattentive type  Psychosocial & Contextual Factors: Stress at work within family  Presenting Concern:    The patient presented for an initial session with provider.  Patient is a 30-year-old male of Hmong culture who was referred by Dr. Mich Ybarra to engage in individual psychotherapy to address symptoms of depression, anxiety , ADHD symptoms and psychosocial stressors.  Patient appeared cooperative and open-minded throughout the intake and easily engaged in dialogue.  Therapist and patient verbally reviewed consent to privacy policy.  Patient reported understanding and provided verbal consent.  The patient has not engaged in outpatient psychotherapy services in the community so there is no diagnostic assessment on file available.  The patient completed the following questionnaires today PHQ-9, LICO-7 and C-SS RS.  No concerns about patient safety or the safety of others per assessment today.  Therapist will review patient's further history, mail out intake form and follow-up in order to complete a standard diagnostic assessment.  Goals for treatment will be established after the second or third follow-up session with this provider.    Reason for Discharge:  Was seen for 20 session.   Referred to [facility] per pt. Request.      Disposition at Time of Last Encounter:   Comments: Continued on short-term disability through his employer.  Patient reported continued difficulties with sleeping,  eyes hungry, urinating frequently and mood irregularity.       Risk Management:   Client has had a history of suicidal ideation: Suicidal thoughts with no specific intentions or plans.  Safety plan had been implemented.        Referred To:  Breann JANSEN  8/1/2022

## 2022-08-18 ENCOUNTER — TELEPHONE (OUTPATIENT)
Dept: ALLERGY | Facility: CLINIC | Age: 32
End: 2022-08-18

## 2022-08-18 DIAGNOSIS — L50.1 CHRONIC IDIOPATHIC URTICARIA: Primary | ICD-10-CM

## 2022-08-18 NOTE — TELEPHONE ENCOUNTER
Name Of Person Who Called: Nikolay relationship (patient)    Reason for call: medication question question about missed Xioliar    Best Phone Number To Call Back: Cell number on file:    Telephone Information:   Mobile [phone]       Okay To Leave A Detailed Voicemail? Yes         Consent 1/Introductory Paragraph: The rationale for Mohs was explained to the patient and consent was obtained. The risks, benefits and alternatives to therapy were discussed in detail. Specifically, the risks of infection, scarring, bleeding, prolonged wound healing, incomplete removal, allergy to anesthesia, nerve injury and recurrence were addressed. Prior to the procedure, the treatment site was clearly identified and confirmed by the patient. All components of Universal Protocol/PAUSE Rule completed.

## 2022-08-19 NOTE — TELEPHONE ENCOUNTER
Called and mailbox is full, couldn't leave a message. We will fit him in for Xolair as he just missed his appt.

## 2022-10-01 ENCOUNTER — MEDICAL CORRESPONDENCE (OUTPATIENT)
Dept: HEALTH INFORMATION MANAGEMENT | Facility: CLINIC | Age: 32
End: 2022-10-01

## 2022-10-02 ENCOUNTER — HEALTH MAINTENANCE LETTER (OUTPATIENT)
Age: 32
End: 2022-10-02

## 2022-10-17 ENCOUNTER — TELEPHONE (OUTPATIENT)
Dept: ALLERGY | Facility: CLINIC | Age: 32
End: 2022-10-17

## 2022-10-17 NOTE — TELEPHONE ENCOUNTER
Called and couldn't leave a message as the mailbox is full. Sent mychart to ask him to CB and schedule      Name Of Person Who Called: Link relationship (patient)    Reason for call: general allergy question transportation has really been an issue wants to discuss rescheduling     Best Phone Number To Call Back: Home number on file [phone] (home)    Okay To Leave A Detailed Voicemail? Yes

## 2022-11-14 NOTE — PROGRESS NOTES
Clinic Administered Medication Documentation    Administrations This Visit     omalizumab (XOLAIR) injection 300 mg     Admin Date  05/03/2022 Action  Given Dose  300 mg Route  Subcutaneous Site   Administered By  Barber Link MA    Ordering Provider: Mayte Bullock MD    NDC: 37600-057-89    Lot#: [id number]    : [employer]    Patient Supplied?: No    Comments: left arm 150 mg   Right arm 150 mg   total 300 mg Xolair                Pt had epi 08/2023     Detail Level: Zone Render In Strict Bullet Format?: No

## 2022-12-13 NOTE — PROGRESS NOTES
Progress Note    Patient Name: Nikolay Lora  Date: 11/8/21         Service Type: Individual      Session Start Time: 1600  Session End Time: 1652     Session Length: 52  Session #: 7    Attendees: Client attended alone    Service Modality:  Video Visit:      Provider verified identity through the following two step process.  Patient provided:  Patient is known previously to provider    Telemedicine Visit: The patient's condition can be safely assessed and treated via synchronous audio and visual telemedicine encounter.      Reason for Telemedicine Visit: Patient has requested telehealth visit    Originating Site (Patient Location): Patient's home    Distant Site (Provider Location): Provider Remote Setting- Home Office    Consent:  The patient/guardian has verbally consented to: the potential risks and benefits of telemedicine (video visit) versus in person care; bill my insurance or make self-payment for services provided; and responsibility for payment of non-covered services.     Patient would like the video invitation sent by:  Send to e-mail at: [email or web address]    Mode of Communication:  Video Conference via [employer]well    As the provider I attest to compliance with applicable laws and regulations related to telemedicine.     Treatment Plan Last Reviewed: Updated today  PHQ-9 / LICO-7 :   LICO-7 SCORE 12/23/2020 2/17/2021 4/5/2021   Total Score 14 19 19       PHQ 12/23/2020 2/17/2021 4/5/2021   PHQ-9 Total Score 22 20 19   Q9: Thoughts of better off dead/self-harm past 2 weeks More than half the days Several days Not at all     DATA  Interactive Complexity: No  Crisis: No       Progress Since Last Session (Related to Symptoms / Goals / Homework):   Symptoms:some change.  Currently, nonstimulant medication.  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    Homework: Completed in  session      Episode of Care Goals: Satisfactory progress - PREPARATION (Decided to change - considering how); Intervened by negotiating a change plan and determining options / strategies for behavior change, identifying triggers, exploring social supports, and working towards setting a date to begin behavior change     Current / Ongoing Stressors and Concerns:   Ongoing concerns regarding sleep hygiene.  Patient wonders if he is needing increased dose of stimulant medication in order to maintain focus concentration to stay awake   while working.  At this time patient is concerned that he needs to have better control over his daily tiredness before exploring a different type of job or furthering his education.     Treatment Objective(s) Addressed in This Session:   ADHD symptoms: Although, his employer has made accommodations for intermittent breaks throughout his work day, explored  other factors that could be impacting his continued difficulty maintaining focus and feeling tired during that time.  The possibility that the job could be mundane and could have difficulty maintaining interest as well as, the possibility that the current Adderall dose may not be effective throughout the afternoon.  He will be following up with Barbara Stokes PA-C who prescribed the stimulant medication.  It was recommended the patient consult with his pharmacist regarding any potential concerns related to his current medication as usage of caffeine, anti-histamines and citric juices.                 Intervention:   CBT, solution focused and ADD education strategies and techniques                 ASSESSMENT: Current Emotional / Mental Status (status of significant symptoms):   Risk status (Self / Other harm or suicidal ideation)   Patient denies current fears or concerns for personal safety.   Patient denies current or recent suicidal ideation or behaviors.   Patient denies current or recent homicidal ideation or behaviors.   Patient  denies current or recent self injurious behavior or ideation.   Patient denies other safety concerns.   Patient reports there has been no change in risk factors since their last session.     Patient reports there has been no change in protective factors since their last session.     Recommended that patient call 911 or go to the local ED should there be a change in any of these risk factors.     Appearance:   Appropriate    Eye Contact:   Good    Psychomotor Behavior: Normal    Attitude:   Cooperative    Orientation:   All   Speech    Rate / Production: Normal/ Responsive Normal     Volume:  Normal    Mood:    Normal Looking tired   Affect:    Appropriate    Thought Content:  Clear    Thought Form:  Coherent  Logical    Insight:    Good  and Fair      Medication Review:   Changes to psychiatric medications, see updated Medication List in EPIC.  Adderall 20 mg extended release daily       Medication Compliance:   Yes     Changes in Health Issues:   None reported     Chemical Use Review:   Substance Use: Chemical use reviewed, no active concerns identified      Tobacco Use: No current tobacco use.      Diagnosis:  1.ADHD inattentive  2.  Mood disorder (H)    Collateral Reports Completed:   Routed note to PCP    PLAN: (Patient Tasks / Therapist Tasks / Other)  1.    Patient will continue utilizing work accommodations as permitted  2.    Patient will attempt to decrease and/or eliminate smoking and decrease caffeine level.  3.    Patient will begin educate himself regarding the tools that can be helpful in  managing his ADHD symptoms.  5.    Patient will continue current medication regime per NP plans on follow-up visit next month.  6 .   Next appointment in 2 weeks.      Amy FRAZIERSW  11/8/21                                                         ______________________________________________________________________    Treatment Plan    Patient's Name: Nikolay Lora  YOB: 1990    Date:  "11/8/21    DSM5 Diagnoses: Attention-Deficit/Hyperactivity Disorder  314.00 (F90.0) Predominantly inattentive presentation  Psychosocial / Contextual Factors: Work stress, working remote    Referral / Collaboration:  Referral to another professional/service is not indicated at this time..    Anticipated number of session or this episode of care: 10-15      MeasurableTreatment Goal(s) related to diagnosis / functional impairment(s)  Goal 1: Patient will increase his complete compensatory strategies to better manage ADHD symptoms    I will know I've met my goal when .  I feel more control of my life.\"    Objective #A (Patient Action)    Patient will:  Continue current medication regime  Implement physical activity and routine  Continue educating self regarding ADHD symptom management  Status: New - Date: 11/8/21    Intervention(s)  Therapist will assist in developing an understanding of ADHD symptoms and how to manage common stressors                   Goal 2: Patient will establish the ability to effectively channel impulses.    I will know I've met my goal when \"I am not feeling as stresses.\"        Objective # A (Patient Action)    1. Develop compensatory strategies               2. Explore barriers to use of compensatory strategies               3. ADHD education               4. Explore factors which may exacerbate cognitive difficulties               5. Identify ways that ADHD causes difficulty in getting along with others.  Status New- Date(s): 11/8/21                Patient has reviewed and agreed to the above    Amy JANSEN  11/8/21    " 4

## 2023-01-26 ENCOUNTER — HOSPITAL ENCOUNTER (EMERGENCY)
Facility: HOSPITAL | Age: 33
Discharge: PSYCHIATRIC OR SUBSTANCE USE FACILITY | End: 2023-01-27
Attending: EMERGENCY MEDICINE | Admitting: EMERGENCY MEDICINE
Payer: COMMERCIAL

## 2023-01-26 ENCOUNTER — TELEPHONE (OUTPATIENT)
Dept: BEHAVIORAL HEALTH | Facility: CLINIC | Age: 33
End: 2023-01-26

## 2023-01-26 DIAGNOSIS — R45.851 SUICIDAL IDEATION: ICD-10-CM

## 2023-01-26 LAB — SARS-COV-2 RNA RESP QL NAA+PROBE: NEGATIVE

## 2023-01-26 PROCEDURE — U0005 INFEC AGEN DETEC AMPLI PROBE: HCPCS | Performed by: EMERGENCY MEDICINE

## 2023-01-26 PROCEDURE — 99285 EMERGENCY DEPT VISIT HI MDM: CPT | Mod: 25

## 2023-01-26 PROCEDURE — 250N000013 HC RX MED GY IP 250 OP 250 PS 637: Performed by: EMERGENCY MEDICINE

## 2023-01-26 PROCEDURE — C9803 HOPD COVID-19 SPEC COLLECT: HCPCS

## 2023-01-26 PROCEDURE — 90791 PSYCH DIAGNOSTIC EVALUATION: CPT

## 2023-01-26 PROCEDURE — 93005 ELECTROCARDIOGRAM TRACING: CPT | Performed by: EMERGENCY MEDICINE

## 2023-01-26 RX ORDER — LORAZEPAM 0.5 MG/1
1 TABLET ORAL ONCE
Status: COMPLETED | OUTPATIENT
Start: 2023-01-26 | End: 2023-01-26

## 2023-01-26 RX ADMIN — LORAZEPAM 1 MG: 0.5 TABLET ORAL at 18:58

## 2023-01-26 ASSESSMENT — COLUMBIA-SUICIDE SEVERITY RATING SCALE - C-SSRS
4. HAVE YOU HAD THESE THOUGHTS AND HAD SOME INTENTION OF ACTING ON THEM?: YES
4. HAVE YOU HAD THESE THOUGHTS AND HAD SOME INTENTION OF ACTING ON THEM?: NO
ATTEMPT LIFETIME: NO
2. HAVE YOU ACTUALLY HAD ANY THOUGHTS OF KILLING YOURSELF?: NO
1. HAVE YOU WISHED YOU WERE DEAD OR WISHED YOU COULD GO TO SLEEP AND NOT WAKE UP?: NO
5. HAVE YOU STARTED TO WORK OUT OR WORKED OUT THE DETAILS OF HOW TO KILL YOURSELF? DO YOU INTEND TO CARRY OUT THIS PLAN?: NO
TOTAL  NUMBER OF INTERRUPTED ATTEMPTS LIFETIME: NO
3. HAVE YOU BEEN THINKING ABOUT HOW YOU MIGHT KILL YOURSELF?: NO
6. HAVE YOU EVER DONE ANYTHING, STARTED TO DO ANYTHING, OR PREPARED TO DO ANYTHING TO END YOUR LIFE?: NO
5. HAVE YOU STARTED TO WORK OUT OR WORKED OUT THE DETAILS OF HOW TO KILL YOURSELF? DO YOU INTEND TO CARRY OUT THIS PLAN?: NO
REASONS FOR IDEATION LIFETIME: DOES NOT APPLY
REASONS FOR IDEATION PAST MONTH: EQUALLY TO GET ATTENTION, REVENGE, OR A REACTION FROM OTHERS AND TO END/STOP THE PAIN
TOTAL  NUMBER OF ABORTED OR SELF INTERRUPTED ATTEMPTS LIFETIME: NO
2. HAVE YOU ACTUALLY HAD ANY THOUGHTS OF KILLING YOURSELF?: YES

## 2023-01-26 ASSESSMENT — ACTIVITIES OF DAILY LIVING (ADL)
ADLS_ACUITY_SCORE: 35

## 2023-01-26 NOTE — ED NOTES
ED Provider In Triage Note  [facility]  Encounter Date: Jan 26, 2023    Chief Complaint   Patient presents with     Suicidal       Brief HPI:   Nikolay Lora is a 32 year old male presenting to the Emergency Department with a chief complaint of suicidal  Admitted last night from [facility]  Meth abuse  Last night did meth    Brief Physical Exam:  There were no vitals taken for this visit.  General: Non-toxic appearing  HEENT: Atraumatic  Resp: No respiratory distress  Abdomen: Non-peritoneal  Neuro: Alert, oriented, answers questions appropriately  Psych: Behavior appropriate    Plan to shot himself with cross bow  Or walk into traffic    Plan Initiated in Triage:  No orders of the defined types were placed in this encounter.      PIT Dispo:   Take directly back to main ED    Carol Moseley MD on 1/26/2023 at 3:35 PM    Patient was evaluated by the Physician in Triage due to a limitation of available rooms in the Emergency Department. A plan of care was discussed based on the information obtained on the initial evaluation and patient was consuled to return back to the Emergency Department lobby after this initial evalutaiton until results were obtained or a room became available in the Emergency Department. Patient was counseled not to leave prior to receiving the results of their workup.     Carol Moseley MD  [facility] EMERGENCY DEPARTMENT  [address] [place] [address] [id number] [phone]     Carol Moseley MD  01/26/23 8320

## 2023-01-26 NOTE — ED PROVIDER NOTES
EMERGENCY DEPARTMENT ENCOUnter      NAME: Nikolay Lora  AGE: 32 year old male  YOB: 1990  MRN: [id number]  EVALUATION DATE & TIME: 2023  3:58 PM    PCP: Mich Ybarra    ED PROVIDER: Simone Epps DO      Chief Complaint   Patient presents with     Suicidal         FINAL IMPRESSION:  1. Suicidal ideation          ED COURSE & MEDICAL DECISION MAKIN:00 PM I introduced myself to the patient, obtained patient history, performed a physical exam, and discussed plan for ED workup including potential diagnostic laboratory/imaging studies and interventions. Patient is in ED-7.   8:30 PM I spoke with DEC assessment regarding patient.       The patient presented to the emergency department today stating that he.his struggling without his mental health meds and states that if he does not get his medications or more methamphetamine, he will kill himself.  He stated several times that he wanted to go to [place], buy a gun, and shoot himself.  He was assessed by the DEC  who feels that he should be kept for inpatient psychiatric placement which I agree with.  The patient is currently voluntary but holdable.  He will be signed out to the oncoming provider pending psychiatric bed placement.      Medical Decision Making    History:    Supplemental history from: Documented in chart, if applicable    External Record(s) reviewed: Documented in chart, if applicable.    Work Up:    Chart documentation includes differential considered and any EKGs or imaging independently interpreted by provider, where specified.    In additional to work up documented, I considered the following work up: Documented in chart, if applicable.    External consultation:    Discussion of management with another provider: Documented in chart, if applicable    Complicating factors:    Care impacted by chronic illness: Hyperlipidemia and Mental Health    Care affected by social determinants of health: Alcohol Abuse and/or  "Recreational Drug Use    Disposition considerations: Admit.        At the conclusion of the encounter I discussed the results of all of the tests and the disposition. The questions were answered. The patient or family acknowledged understanding and was agreeable with the care plan.         MEDICATIONS GIVEN IN THE EMERGENCY:  Medications   LORazepam (ATIVAN) tablet 1 mg (1 mg Oral Given 1/26/23 4634)         =================================================================    HPI        Nikolay Lora is a 32 year old male with a pertinent history of compulsive behaviors, psychosis, mood disorder, hyperlipidemia, and self reported anxiety, depression and ADHD who presents to this ED via walk in for evaluation of suicidal.     The patient was recently kicked out of his outpatient rehab facility [facility]  after relapsing and testing positive for fentanyl a few days ago. He reports that he hasn't felt the same since and started having suicidal ideations this morning. He says that he wants to \"buy a gun and shoot myself in the head in [place]\" if he doesn't receive any mental health help and medications. He also reports that he just wants to \"feel something\" and says that \"my anxiety and depression are killing me\". He notes that he's \"homeless again\" as well. He endorses mid chest pain and says that his \"lungs hurt\". He also endorses shortness of breath and reports that he feels like he's \"suffocating from the inside out\". He's been treated with adderall and methylphenidate for his ADHD in the past. His  is Daisy from [facility].     REVIEW OF SYSTEMS     Constitutional:  Denies fever or chills  HENT:  Denies sore throat   Respiratory:  Denies cough. Positive for shortness of breath.   Cardiovascular:  Denies palpitations. Positive for chest pain.   GI:  Denies abdominal pain, nausea, or vomiting  Musculoskeletal:  Denies any new extremity pain   Skin:  Denies rash   Neurologic:  Denies " "headache, focal weakness or sensory changes    Psych: Positive for suicidal ideation.   All other systems reviewed and are negative      PAST MEDICAL HISTORY:  History reviewed. No pertinent past medical history.    PAST SURGICAL HISTORY:  History reviewed. No pertinent surgical history.        CURRENT MEDICATIONS:    ammonium lactate (LAC-HYDRIN) 12 % external lotion  azelastine (ASTELIN) 0.1 % nasal spray  cetirizine (ZYRTEC) 10 MG tablet  Emollient ([employer] MOISTURIZING EXTRA DRY) CREA  EPINEPHrine (ANY BX GENERIC EQUIV) 0.3 MG/0.3ML injection 2-pack  finasteride (PROSCAR) 5 MG tablet  fluticasone (FLONASE) 50 MCG/ACT nasal spray  nicotine (NICORETTE) 4 MG lozenge  ORDER FOR ALLERGEN IMMUNOTHERAPY  sertraline (ZOLOFT) 50 MG tablet  Skin Protectants, Misc. (EUCERIN) cream  triamcinolone (KENALOG) 0.1 % external cream        ALLERGIES:  No Known Allergies    FAMILY HISTORY:  No family history on file.    SOCIAL HISTORY:   Social History     Socioeconomic History     Marital status: Single     Spouse name: None     Number of children: None     Years of education: None     Highest education level: None   Tobacco Use     Smoking status: Former     Smokeless tobacco: Never     Tobacco comments:     1-2 cigs per day   Substance and Sexual Activity     Alcohol use: Yes     Drug use: Not Currently       VITALS:  Patient Vitals for the past 24 hrs:   BP Temp Temp src Pulse Resp SpO2 Height Weight   01/26/23 1929 (!) 140/93 -- -- 88 -- 98 % -- --   01/26/23 1541 (!) 153/92 98.6  F (37  C) Oral 79 18 96 % 1.613 m (5' 3.5\") 81.6 kg (180 lb)       PHYSICAL EXAM    VITAL SIGNS: BP (!) 140/93   Pulse 88   Temp 98.6  F (37  C) (Oral)   Resp 18   Ht 1.613 m (5' 3.5\")   Wt 81.6 kg (180 lb)   SpO2 98%   BMI 31.39 kg/m     Constitutional:  Well developed, Well nourished  HENT:  Normocephalic, Atraumatic, Oropharynx moist, Nose normal.   Neck:  Normal range of motion, Supple, No stridor.   Eyes:  EOMI, Conjunctiva normal, No " discharge.   Respiratory:  Breathing comfortably, No respiratory distress,    Cardiovascular:  Normal pulses   Musculoskeletal:  No edema or cyanosis, no deformities  Integument:  Dry, No erythema, No rash.  Neurologic:  Alert & oriented x 3, No obvious focal deficits noted.   Psychiatric:  Affect normal, Judgment normal, Mood normal.       LAB:  All pertinent labs reviewed and interpreted.  Results for orders placed or performed during the hospital encounter of 01/26/23   Asymptomatic COVID-19 Virus (Coronavirus) by PCR Nasopharyngeal    Specimen: Nasopharyngeal; Swab   Result Value Ref Range    SARS CoV2 PCR Negative Negative         I, Veena Harris, am serving as a scribe to document services personally performed by Dr. Epps based on my observation and the provider's statements to me. I, Simone Epps, DO attest that Veena Harris is acting in a scribe capacity, has observed my performance of the services and has documented them in accordance with my direction.    Simone Epps DO  Emergency Medicine  [facility] EMERGENCY DEPARTMENT  [facility] [facility] [id number] [phone]  Dept: [phone]     Simone Epps MD  01/26/23 7564

## 2023-01-26 NOTE — ED NOTES
Pt frequently coming to door saying there has been a misunderstanding and he wants to leave.   Pt redirected and explained that we are awaiting DEC assessment.

## 2023-01-26 NOTE — ED TRIAGE NOTES
"Presents to triage with staff members of rehab facility [facility] for c/o SI that started recently.  Has a plan: walk into front of semi truck or use a gun, \"it's rodolfo catastrophic.\"  Chest pain for the past 45 days..  \"lungs are inflamed.\"  Wants to kill self because of withdrawal from meth, last usage was last night.     [facility].       Triage Assessment     Row Name 01/26/23 1535       Triage Assessment (Adult)    Airway WDL WDL       Respiratory WDL    Respiratory WDL WDL       Skin Circulation/Temperature WDL    Skin Circulation/Temperature WDL WDL              "

## 2023-01-26 NOTE — ED NOTES
"Pt went to the bathroom, no urine sample given.     Pt asked this writer \"Do you have any crystal meth for me? I would like to put it in my rectum\". Explained to patient he is in the hospital and we are trying to help him.   "

## 2023-01-27 ENCOUNTER — HOSPITAL ENCOUNTER (INPATIENT)
Facility: CLINIC | Age: 33
LOS: 75 days | Discharge: PSYCHIATRIC OR SUBSTANCE USE FACILITY | End: 2023-04-12
Attending: PSYCHIATRY & NEUROLOGY | Admitting: PSYCHIATRY & NEUROLOGY
Payer: COMMERCIAL

## 2023-01-27 ENCOUNTER — TELEPHONE (OUTPATIENT)
Dept: BEHAVIORAL HEALTH | Facility: CLINIC | Age: 33
End: 2023-01-27
Payer: COMMERCIAL

## 2023-01-27 VITALS
WEIGHT: 180 LBS | BODY MASS INDEX: 30.73 KG/M2 | OXYGEN SATURATION: 94 % | RESPIRATION RATE: 16 BRPM | HEIGHT: 64 IN | SYSTOLIC BLOOD PRESSURE: 118 MMHG | TEMPERATURE: 98.2 F | HEART RATE: 81 BPM | DIASTOLIC BLOOD PRESSURE: 74 MMHG

## 2023-01-27 DIAGNOSIS — I10 HYPERTENSION, UNSPECIFIED TYPE: ICD-10-CM

## 2023-01-27 DIAGNOSIS — F17.200 NICOTINE USE DISORDER: ICD-10-CM

## 2023-01-27 DIAGNOSIS — L30.9 CHRONIC DERMATITIS: ICD-10-CM

## 2023-01-27 DIAGNOSIS — E11.8 TYPE 2 DIABETES MELLITUS WITH UNSPECIFIED COMPLICATIONS (H): ICD-10-CM

## 2023-01-27 DIAGNOSIS — L85.3 DRY SKIN: ICD-10-CM

## 2023-01-27 DIAGNOSIS — F25.1 SCHIZOAFFECTIVE DISORDER, DEPRESSIVE TYPE (H): ICD-10-CM

## 2023-01-27 DIAGNOSIS — J30.1 ALLERGIC RHINITIS DUE TO POLLEN, UNSPECIFIED SEASONALITY: ICD-10-CM

## 2023-01-27 DIAGNOSIS — R12 HEARTBURN: Primary | ICD-10-CM

## 2023-01-27 DIAGNOSIS — F51.05 INSOMNIA DUE TO OTHER MENTAL DISORDER: ICD-10-CM

## 2023-01-27 DIAGNOSIS — R14.2 FLATULENCE, ERUCTATION AND GAS PAIN: ICD-10-CM

## 2023-01-27 DIAGNOSIS — K59.09 OTHER CONSTIPATION: ICD-10-CM

## 2023-01-27 DIAGNOSIS — R14.1 FLATULENCE, ERUCTATION AND GAS PAIN: ICD-10-CM

## 2023-01-27 DIAGNOSIS — F99 INSOMNIA DUE TO OTHER MENTAL DISORDER: ICD-10-CM

## 2023-01-27 DIAGNOSIS — F41.9 ANXIETY: ICD-10-CM

## 2023-01-27 DIAGNOSIS — F29 PSYCHOSIS, UNSPECIFIED PSYCHOSIS TYPE (H): ICD-10-CM

## 2023-01-27 DIAGNOSIS — R14.3 FLATULENCE, ERUCTATION AND GAS PAIN: ICD-10-CM

## 2023-01-27 DIAGNOSIS — J30.1 SEASONAL ALLERGIC RHINITIS DUE TO POLLEN: ICD-10-CM

## 2023-01-27 LAB
AMPHETAMINES UR QL SCN: NORMAL
BARBITURATES UR QL SCN: NORMAL
BENZODIAZ UR QL SCN: NORMAL
BZE UR QL SCN: NORMAL
CANNABINOIDS UR QL SCN: NORMAL
OPIATES UR QL SCN: NORMAL
PCP QUAL URINE (ROCHE): NORMAL

## 2023-01-27 PROCEDURE — 80307 DRUG TEST PRSMV CHEM ANLYZR: CPT | Performed by: EMERGENCY MEDICINE

## 2023-01-27 PROCEDURE — 250N000013 HC RX MED GY IP 250 OP 250 PS 637: Performed by: PSYCHIATRY & NEUROLOGY

## 2023-01-27 PROCEDURE — 250N000013 HC RX MED GY IP 250 OP 250 PS 637: Performed by: EMERGENCY MEDICINE

## 2023-01-27 PROCEDURE — 124N000002 HC R&B MH UMMC

## 2023-01-27 RX ORDER — NICOTINE 21 MG/24HR
1 PATCH, TRANSDERMAL 24 HOURS TRANSDERMAL DAILY
Status: DISCONTINUED | OUTPATIENT
Start: 2023-01-27 | End: 2023-04-12 | Stop reason: HOSPADM

## 2023-01-27 RX ORDER — CLONIDINE HYDROCHLORIDE 0.1 MG/1
0.1 TABLET ORAL EVERY MORNING
Status: DISCONTINUED | OUTPATIENT
Start: 2023-01-27 | End: 2023-01-27 | Stop reason: HOSPADM

## 2023-01-27 RX ORDER — OLANZAPINE 10 MG/1
10 TABLET ORAL 3 TIMES DAILY PRN
Status: DISCONTINUED | OUTPATIENT
Start: 2023-01-27 | End: 2023-04-12 | Stop reason: HOSPADM

## 2023-01-27 RX ORDER — HYDROXYZINE PAMOATE 100 MG
100 CAPSULE ORAL 3 TIMES DAILY
Status: ON HOLD | COMMUNITY
End: 2023-04-11

## 2023-01-27 RX ORDER — ACETAMINOPHEN 325 MG/1
650 TABLET ORAL EVERY 4 HOURS PRN
Status: DISCONTINUED | OUTPATIENT
Start: 2023-01-27 | End: 2023-04-12 | Stop reason: HOSPADM

## 2023-01-27 RX ORDER — TRAZODONE HYDROCHLORIDE 50 MG/1
50 TABLET, FILM COATED ORAL
Status: DISCONTINUED | OUTPATIENT
Start: 2023-01-27 | End: 2023-04-12 | Stop reason: HOSPADM

## 2023-01-27 RX ORDER — FINASTERIDE 5 MG/1
5 TABLET, FILM COATED ORAL DAILY
Status: DISCONTINUED | OUTPATIENT
Start: 2023-01-28 | End: 2023-02-01

## 2023-01-27 RX ORDER — NICOTINE 21 MG/24HR
1 PATCH, TRANSDERMAL 24 HOURS TRANSDERMAL ONCE
Status: DISCONTINUED | OUTPATIENT
Start: 2023-01-27 | End: 2023-01-27 | Stop reason: HOSPADM

## 2023-01-27 RX ORDER — HYDROXYZINE HYDROCHLORIDE 50 MG/1
100 TABLET, FILM COATED ORAL 3 TIMES DAILY PRN
Status: DISCONTINUED | OUTPATIENT
Start: 2023-01-27 | End: 2023-02-15

## 2023-01-27 RX ORDER — TRIAMCINOLONE ACETONIDE 1 MG/G
CREAM TOPICAL 2 TIMES DAILY PRN
Status: DISCONTINUED | OUTPATIENT
Start: 2023-01-27 | End: 2023-04-12 | Stop reason: HOSPADM

## 2023-01-27 RX ORDER — CETIRIZINE HYDROCHLORIDE 5 MG/1
10 TABLET ORAL DAILY PRN
Status: DISCONTINUED | OUTPATIENT
Start: 2023-01-27 | End: 2023-02-06

## 2023-01-27 RX ORDER — AMMONIUM LACTATE 12 G/100G
LOTION TOPICAL DAILY PRN
Status: DISCONTINUED | OUTPATIENT
Start: 2023-01-27 | End: 2023-04-12 | Stop reason: HOSPADM

## 2023-01-27 RX ORDER — HYDROXYZINE HYDROCHLORIDE 50 MG/1
100 TABLET, FILM COATED ORAL 3 TIMES DAILY
Status: DISCONTINUED | OUTPATIENT
Start: 2023-01-27 | End: 2023-01-27 | Stop reason: HOSPADM

## 2023-01-27 RX ORDER — OLANZAPINE 10 MG/2ML
10 INJECTION, POWDER, FOR SOLUTION INTRAMUSCULAR 3 TIMES DAILY PRN
Status: DISCONTINUED | OUTPATIENT
Start: 2023-01-27 | End: 2023-04-12 | Stop reason: HOSPADM

## 2023-01-27 RX ORDER — CLONIDINE HYDROCHLORIDE 0.2 MG/1
0.2 TABLET ORAL EVERY EVENING
Status: ON HOLD | COMMUNITY
End: 2023-04-11

## 2023-01-27 RX ORDER — FLUTICASONE PROPIONATE 50 MCG
2 SPRAY, SUSPENSION (ML) NASAL DAILY PRN
Status: DISCONTINUED | OUTPATIENT
Start: 2023-01-27 | End: 2023-02-06

## 2023-01-27 RX ORDER — LORAZEPAM 0.5 MG/1
0.5 TABLET ORAL ONCE
Status: COMPLETED | OUTPATIENT
Start: 2023-01-27 | End: 2023-01-27

## 2023-01-27 RX ORDER — CLONIDINE HYDROCHLORIDE 0.1 MG/1
0.1 TABLET ORAL EVERY MORNING
Status: ON HOLD | COMMUNITY
End: 2023-04-11

## 2023-01-27 RX ORDER — GABAPENTIN 300 MG/1
300 CAPSULE ORAL 3 TIMES DAILY
Status: ON HOLD | COMMUNITY
End: 2023-04-11

## 2023-01-27 RX ORDER — GABAPENTIN 300 MG/1
300 CAPSULE ORAL 3 TIMES DAILY
Status: DISCONTINUED | OUTPATIENT
Start: 2023-01-27 | End: 2023-04-12 | Stop reason: HOSPADM

## 2023-01-27 RX ORDER — CLONIDINE HYDROCHLORIDE 0.1 MG/1
0.1 TABLET ORAL EVERY MORNING
Status: DISCONTINUED | OUTPATIENT
Start: 2023-01-28 | End: 2023-02-08

## 2023-01-27 RX ORDER — CLONIDINE HYDROCHLORIDE 0.1 MG/1
0.2 TABLET ORAL EVERY EVENING
Status: DISCONTINUED | OUTPATIENT
Start: 2023-01-27 | End: 2023-01-27 | Stop reason: HOSPADM

## 2023-01-27 RX ORDER — MAGNESIUM HYDROXIDE/ALUMINUM HYDROXICE/SIMETHICONE 120; 1200; 1200 MG/30ML; MG/30ML; MG/30ML
30 SUSPENSION ORAL EVERY 4 HOURS PRN
Status: DISCONTINUED | OUTPATIENT
Start: 2023-01-27 | End: 2023-04-12 | Stop reason: HOSPADM

## 2023-01-27 RX ORDER — AZELASTINE 1 MG/ML
2 SPRAY, METERED NASAL 2 TIMES DAILY PRN
Status: DISCONTINUED | OUTPATIENT
Start: 2023-01-27 | End: 2023-04-12 | Stop reason: HOSPADM

## 2023-01-27 RX ORDER — EPINEPHRINE 0.3 MG/.3ML
0.3 INJECTION SUBCUTANEOUS DAILY PRN
Status: DISCONTINUED | OUTPATIENT
Start: 2023-01-27 | End: 2023-02-20

## 2023-01-27 RX ORDER — CLONIDINE HYDROCHLORIDE 0.2 MG/1
0.2 TABLET ORAL EVERY EVENING
Status: DISCONTINUED | OUTPATIENT
Start: 2023-01-27 | End: 2023-02-03

## 2023-01-27 RX ORDER — AMOXICILLIN 250 MG
1 CAPSULE ORAL 2 TIMES DAILY PRN
Status: DISCONTINUED | OUTPATIENT
Start: 2023-01-27 | End: 2023-02-14

## 2023-01-27 RX ORDER — GABAPENTIN 300 MG/1
300 CAPSULE ORAL 3 TIMES DAILY
Status: DISCONTINUED | OUTPATIENT
Start: 2023-01-27 | End: 2023-01-27 | Stop reason: HOSPADM

## 2023-01-27 RX ADMIN — GABAPENTIN 300 MG: 300 CAPSULE ORAL at 12:46

## 2023-01-27 RX ADMIN — HYDROXYZINE HYDROCHLORIDE 100 MG: 50 TABLET ORAL at 12:47

## 2023-01-27 RX ADMIN — CLONIDINE HYDROCHLORIDE 0.2 MG: 0.2 TABLET ORAL at 21:36

## 2023-01-27 RX ADMIN — CLONIDINE HYDROCHLORIDE 0.1 MG: 0.1 TABLET ORAL at 12:47

## 2023-01-27 RX ADMIN — SERTRALINE HYDROCHLORIDE 150 MG: 50 TABLET ORAL at 12:47

## 2023-01-27 RX ADMIN — GABAPENTIN 300 MG: 300 CAPSULE ORAL at 21:36

## 2023-01-27 RX ADMIN — NICOTINE 1 PATCH: 21 PATCH, EXTENDED RELEASE TRANSDERMAL at 09:07

## 2023-01-27 RX ADMIN — LORAZEPAM 0.5 MG: 0.5 TABLET ORAL at 09:07

## 2023-01-27 ASSESSMENT — ACTIVITIES OF DAILY LIVING (ADL)
DRESS: INDEPENDENT
ADLS_ACUITY_SCORE: 28
ADLS_ACUITY_SCORE: 35
LAUNDRY: UNABLE TO COMPLETE
ADLS_ACUITY_SCORE: 35
ADLS_ACUITY_SCORE: 45
ORAL_HYGIENE: INDEPENDENT
ADLS_ACUITY_SCORE: 35
HYGIENE/GROOMING: INDEPENDENT
ADLS_ACUITY_SCORE: 28

## 2023-01-27 ASSESSMENT — LIFESTYLE VARIABLES
AUDIT-C TOTAL SCORE: 0
SKIP TO QUESTIONS 9-10: 1

## 2023-01-27 ASSESSMENT — PATIENT HEALTH QUESTIONNAIRE - PHQ9: SUM OF ALL RESPONSES TO PHQ9 QUESTIONS 1 & 2: 4

## 2023-01-27 NOTE — ED NOTES
..Nioklay Lora  January 26, 2023  Plan of Care Hand-off Note     Patient Care Path: Inpatient Mental Health    Plan for Care:         Nikolay arrived at the ED with suicidal thoughts and a plan.  He has recently tested positive for amphetamines and Fentanyl and do to this was kicked out of sober housing.  At ED patient is perseverating about another resident in sober housing.  His thoughts and speech are disorganized.  Patient has had 2 other hospitalizations for psychosis     Critical Safety Issues: Patient reported to hospital staff that he was going to kill himself with use of gun or by jumping in front of at truck.     Overview:  This patient is a child/adolescent: No    This patient has additional special visitor precautions: No    Legal Status: 72 HH expiring TBD    Contacts:   No contacts listed, n/a: Contact n/a    Psychiatry Consult:  Psychiatry Consult not requested because patient needs admission    Updated RN regarding plan of care.    Denise Still, FREDDIESW

## 2023-01-27 NOTE — TELEPHONE ENCOUNTER
Potential bed avail on 12   1022am - CRN on 12 will review for appropriateness   1136am - CRN on 12 reports they reviewed and reports the pt is appropriate for the unit, intake can present to Kevin   1137am - Kevin paged   1225pm - Kevin called, inquiring about his legal status, pt has attempted to leave a couple times. Intake has documented that pt is on a 72 HH. No 72 HH signed in the chart at this time   1231pm - Intake called the ED, spoke to the RN, reports the pt is voluntary and that the pt wants to get help   1232pm - Kevin paged   101pm - Kevin called, requests to speak w the provider in the ED to ensure the pt is on board with the plan to admit voluntarily. Doc to doc initiated w Dr Story in the [facility] ED. Dr Story will place the pt on a 72 HH. Kevin reports the pt can be placed in queue before signed in chart. Intake to pass off to nursing staff that hold needs to be signed in the chart before admission    R: 12/Kevin     Pt placed in queue   110pm - unit charge notified, ready for report 230pm   114pm - ED notified     [facility] ED # [phone]  Station 12 # [phone]

## 2023-01-27 NOTE — ED NOTES
Patient completed call  with DEC . Patient served with 72 hour hold, Patient rights given to patient. Hold served at 14:45 on Jan 27, and will go until Feb 1 at 14:45.

## 2023-01-27 NOTE — TELEPHONE ENCOUNTER
No appropriate beds are currently available within the  system. Bed search update @ 12AM:       [facility]: @ cap per website. Per Radha @ 00:04 they do not have beds available  [facility]: Per Marylou @ 00:05 they are at capacity and won t have an update until 4PM  [facility]: @ cap per website. Per Atu @ 00:07 they are full but suggests calling back later this AM  [facility]: @ cap per website. Per [facility] @ 00:07 they are at full capacity  [facility]: @ cap per website  [facility]: @ cap per website  [facility]: Posting 2 beds. Mixed unit/Low acuity only.  [facility]: @ cap per website  Critical access hospital: Does not review after 10PM.    [facility]: @ cap per website  [facility]: @ cap per website  [facility]: Posting 2 beds. Called @ 00:38; however, call was not able to go through. Called again @ 01:31 and encountered same issue as previous call.   [facility]: @ cap per website  [facility]: Posting 6 beds. Low acuity only. Must have the cognitive ability to do programming. No aggressive or violent behavior or recent HX in the last 2 yrs. MH must be primary.  [facility]: @ cap per website  [facility]: @ cap per website. Low acuity, Neg Covid. Per call @ 02:39 they are on [facility]: @ cap per website. Covid neg. Voluntary only. Mixed unit. No aggressive or violent behavior. No registered sex offenders. Per Gianna @ 02:40 they are full   [facility]: Posting 3 beds. Mixed unit. Per Lis @ 02:41 they do not have an appropriate bed available d/t psychosis    Pt remains on work list until appropriate placement is available

## 2023-01-27 NOTE — ED NOTES
"Triage & Transition Services, Extended Care     Therapy Progress Note    Patient: Nikolay goes by \"Nikolay,\" uses he/him pronouns  Date of Service: January 27, 2023  Site of Service: [facility] ED  Patient was seen virtually ([employer] cart or other teleconferencing device).     Presenting problem:   Nikolay is followed related to Placement delay: 23+ hours at the time of this note. Please see initial DEC/[facility] Crisis Assessment completed by Denise Still on 1/26/22 for complete assessment information. Notable concerns include Suicidal thoughts with plan and intent.     Individuals Present: Link & Jere Last    Session start: 1:51 PM  Session end: 2:19 PM  Session duration in minutes: 28  Session number: 1  Anticipated number of sessions or this episode of care: 1-4  CPT utilized: 87442 - Psychotherapy (with patient) - 30 (16-37*) min    Current Presentation:   Patient and writer met virtually. Throughout the session patient is laying in bed and staring at the ceiling. Patient does state feeling confused and hopeless in regards to his recent relapse. Additionally patient endorses the only reason he is alive is that he did not have money to buy a gun to shoot himself in the head. He is vague in reference to how long he has had suicidal ideation, and focuses instead on feelings of isolation, and his plan for suicide. Patient appears despondent and it is unclear if he is responding to internal stimuli or not. Patient is able to state what skills he could use to feel better, but appears overwhelmed when writer asks if he uses these skills. He does report recently going from [facility] to a sober living, to [facility], to [facility] then to the hospital, but appears quiet disorganized when discussing recent placements. Patient additionally reports he feels \"like someone else\" since using meth recently.     Mental Status Exam:   Appearance: awake, alert  Behavior: Cooperative, Reduced eye contact and Guarded  Mood: " Apathetic  Affect: intensity is blunted  Speech: slowed  Psychomotor Behavior: Underactive  Thought Process:  illogical and circumstantial  Associations: no loose associations  Thought Content: plan for suicide present and patient appears to be responding to internal stimuli  Insight: limited  Judgement: variable  Oriented to: time, person, and place  Attention Span and Concentration: fair  Recent and Remote Memory: limited    Diagnosis:   296.30 (F33.9) Major Depressive Disorder, Recurrent Episode, Unspecified _  Substance-Related & Addictive Disorders Stimulant Use Disorder:  The ICD-10-CM code indicates the comorbid presence of a moderate or severe substance use disorder, which must be present in ordre to apply the code for substance wtihdrawal x, Specify current severity:  Moderate  304.40 (F15.20) Moderate, Amphetamine type substance - by history   300.00 (F41.9) Unspecified Anxiety Disorder - by history     Therapeutic Intervention(s):   Provided active listening, unconditional positive regard, and validation. Coached on coping techniques/relaxation skills to help improve distress tolerance and managing intense emotions.    Treatment Objective(s) Addressed:   The focus of this session was on rapport building, orienting the patient to therapy and assessing safety.     Progress Towards Goals:   Patient reports worsening symptoms. Patient openly discusses his mental health at times, but reports difficulty engaging in skills    General Recommendations:   Continue to monitor for harm. Consider: Use clear and concise directions, too many words can be overwhelming    Plan:   Inpatient Mental Health: Patient continues to endorse suicidal ideation with a plan and continues to express hopelessness.      Plan for Care reviewed with Assigned Medical Provider? Yes. Provider, BELGICA Reynolds, response: kayla Last   Licensed Mental Health Professional (LMHP), [facility]  [phone]

## 2023-01-27 NOTE — TELEPHONE ENCOUNTER
S: [facility] ED , DEC  Denise calling at 8:38pm  about a 32 year old/Male presenting with SI and psychosis.         B: Pt arrived via Self . Presenting problem, stressors: Pt made a SI threat to shoot himself.  He has been showing signs of psychosis.  He has significant hx of drug and alcohol abuse.  He was most recently in tx and moved into sober housing over a month ago.  He says he is 86 days clean.  However, tested positive for fentanyl, amphetamine, and cannabis.  Pt said that maybe his sister slipped to them to him.  Pt is very tangential but does not make any sense.  He has some obsessive thought of a particular person, who lives at a sober housing.  He kept going back to the person, saying he wants to be her sponsor, be in a relationship, etc. The person does not feel the same about him.  Pt is persevering about the topic. Pt appears to be responding to internal stimuli.  He denies it to .  He also stated he is hearing and seeing things.    He reports he has not been showering.  He has not been sleeping.  He smokes a lot.  Told staff his lungs hurt.    He is asking staff for meth; it s the only thing that makes him feel good.  He has a therapist and is prescribed hydroxyzine, clonidine, gabapentin.  Pt does not have them with him. Pt made some suicidal statements.  Does not appear to be taking his meds.    Pt has no chronic medical illness.  Pt ambulates independently.  Does own ADLS.   [facility]HH.  He seems anxious.  Does not stop talking.  No aggression.  Calm for now.     Pt affect in ED: Anxious   Pt Dx: Major Depressive Disorder  Previous IPMH hx? Yes    Pt endorses SI with a plan to shoot himself.   Hx of suicide attempt? No -unknown  Pt denies SIB  Pt denies HI   Pt denies hallucinations .     Hx of aggression/violence, sexual offences, legal concerns, or Epic care plan? describe: No  Current concerns for aggression this visit? No  Does pt have a history of Civil Commitment? No  Is Pt  their own guardian? Yes    Pt is prescribed medication. Is patient medication compliant? No  Pt endorses OP services: Therapist  CD concerns: Actively using/consuming Fentanyl, amphetamine, and cannabis.   Acute or chronic medical concerns: No  Does Pt present with specific needs, assistive devices, or exclusionary criteria? None      Pt is ambulatory  Pt is able to perform ADLs independently      A: Pt to be reviewed for Replaced by [facility] admission. Pt is Voluntary  Preferred placement: [facility]    COVID:Negative  Utox: Ordered, not collected.  CMP: Not ordered, intake to request lab  CBC: Not ordered, intake to request lab  HCG: N/A    R: Patient cleared and ready for behavioral bed placement: Yes  Pt placed on IP worklist? Yes    No approp beds.

## 2023-01-27 NOTE — PHARMACY-ADMISSION MEDICATION HISTORY
Pharmacy Note - Admission Medication History    Pertinent Provider Information:      ______________________________________________________________________    Prior To Admission (PTA) med list completed and updated in EMR.       Current Facility-Administered Medications for the 1/26/23 encounter (Hospital Encounter)   Medication     omalizumab (XOLAIR) injection 300 mg     omalizumab (XOLAIR) injection 300 mg     PTA Med List   Medication Sig Last Dose     ammonium lactate (LAC-HYDRIN) 12 % external lotion Apply topically 2 times daily (Patient taking differently: Apply topically daily as needed for dry skin) More than a month     azelastine (ASTELIN) 0.1 % nasal spray SPRAY 2 SPRAYS INTO BOTH NOSTRILS 2 TIMES DAILY (Patient taking differently: Spray 2 sprays into both nostrils 2 times daily as needed for allergies or rhinitis) Past Month     cetirizine (ZYRTEC) 10 MG tablet Take 1 tablet (10 mg) by mouth daily (Patient taking differently: Take 10 mg by mouth daily as needed for allergies) Past Month     cloNIDine (CATAPRES) 0.1 MG tablet Take 0.1 mg by mouth every morning      cloNIDine (CATAPRES) 0.2 MG tablet Take 0.2 mg by mouth every evening      Emollient ([employer] MOISTURIZING EXTRA DRY) CREA Apply to skin at liberty prn dryness (Patient taking differently: Apply topically daily as needed (dryness) Apply to skin at liberty prn dryness) 1/26/2023     EPINEPHrine (ANY BX GENERIC EQUIV) 0.3 MG/0.3ML injection 2-pack Inject into outer thigh for allergic reaction      finasteride (PROSCAR) 5 MG tablet Take 1 tablet (5 mg) by mouth daily (Patient taking differently: Take 1 mg by mouth daily) 1/26/2023     fluticasone (FLONASE) 50 MCG/ACT nasal spray INSTILL 2 SPRAYS INTO BOTH NOSTRILS DAILY (Patient taking differently: Spray 2 sprays into both nostrils daily as needed for rhinitis or allergies)      gabapentin (NEURONTIN) 300 MG capsule Take 300 mg by mouth 3 times daily 1/26/2023     hydrOXYzine (VISTARIL) 100 MG  capsule Take 100 mg by mouth 3 times daily      nicotine (NICORETTE) 4 MG lozenge Place 1 lozenge (4 mg) inside cheek every hour as needed for smoking cessation Past Week     ORDER FOR ALLERGEN IMMUNOTHERAPY Vaccine A MOLD/ INDOORALLERGENS/ RAGWEED  MM Mold Mix Alternaria, Aspergilus, Cladosporium, Penicillium 1:10 w/v, 1.0 ml  DFM Df Mite D farinae 10,000 AU, 0.5 ml  DPM Dp Mite D pteronyssinus. 10,000 AU, 0.5 ml  CR Cockroach Mix P. americana, B. germanica 1:10 w/v, 0.5 ml  DOG AP Dog hair & dander, mixed breeds 1:10 w/v, 0.5 ml  Vaccine B POLLENS/ CAT  G GRASS MIX [place] Blue, Orchard, Redtop, Kiran 100, 000 BAU 0.3 ml  CAT Cat Hair 10,000 BAU 2.0 ml  Vaccine C: OTHER  POP [place] common Populus deltoides 1:20 w/v, 0.5 ml  HIC [place]ory, Shagbark Carya Ovata 1:20 w/v, 0.5 ml  MAP Maple, Hard/Sugar Acer saccharum 1:20 w/v, 0.5 ml  OAK Oak Red Quercus Ana 1:20 w/v, 0.5 ml  KEITH Keith, White Fraxinus Americana 1:20 w/v, 0.5 ml  MUL [place] Mix RW (Red, White) 1:20 w/v, 0.5 ml  Dilutions: None (red) Frequency: weekly  1/10 (yellow) Frequency: weekly  1/100 (blue) Frequency: weekly  1/1000 (green) Frequency: weekly      Diluent: HSA qs to 5ml      Sertraline HCl 150 MG CAPS Take 150 mg by mouth daily 1/26/2023     Skin Protectants, Misc. (EUCERIN) cream Apply topically every hour as needed for dry skin      triamcinolone (KENALOG) 0.1 % external cream Apply topically 2 times daily (Patient taking differently: Apply topically 2 times daily as needed)        Information source(s): Patient and CareEverywhere/SureScripts  Method of interview communication: in-person    Summary of Changes to PTA Med List  New: gabapentin, clonidine, hydroxyzine  Discontinued: none  Changed: none    Patient was asked about OTC/herbal products specifically.  PTA med list reflects this.    In the past week, patient estimated taking medication this percent of the time:  greater than 90%.    Medication Affordability:  Not including over the  counter (OTC) medications, was there a time in the past 12 months when you did not take your medications as prescribed because of cost?: Yes  Has this happened in the past 3 months?: Yes    Allergies were reviewed, assessed, and updated with the patient.      Patient does not use any multi-dose medications prior to admission.    The information provided in this note is only as accurate as the sources available at the time of the update(s).    Thank you for the opportunity to participate in the care of this patient.    Magaly Eisenberg [facility]  1/27/2023 9:40 AM

## 2023-01-27 NOTE — ED NOTES
Pt remains on the bed. Pt is alert and cooperative. Pt requesting food, nicotine patch and anxiety medication. Pt still on 1:1 monitor.

## 2023-01-27 NOTE — CONSULTS
".Diagnostic Evaluation Consultation  Crisis Assessment    Patient was assessed: Ruben  Patient location: [place] [facility]  Was a release of information signed: No. Reason: patient experiencing psychosis      Referral Data and Chief Complaint  Nikolay is a 32 year old, who uses he/him pronouns, and presents to the ED with staff from a rehab faclity. Patient is referred to the ED by rehab staff. Patient is presenting to the ED for the following concerns: patient arrived at ED stating he was suicidal and planned to go to [place] and shoot himself or jump in front of a semi.      Informed Consent and Assessment Methods     Patient is his own guardian. Writer met with patient and explained the crisis assessment process, including applicable information disclosures and limits to confidentiality, assessed understanding of the process, and obtained consent to proceed with the assessment. Patient was observed to be able to participate in the assessment as evidenced by answered questions. Assessment methods included conducting a formal interview with patient, review of medical records, collaboration with medical staff, and obtaining relevant collateral information from family and community providers when available..     Over the course of this crisis assessment provided reassurance, offered validation and engaged patient in problem solving and disposition planning.     Summary of Patient Situation       Nikolay was brought to the ED by treatment staff for bizarre, obsessive behavior and suicidal thoughts.  The patient presented at ED stating he was going to go to [place] and shoot himself or jump in front of a semi truck.   At the ED patient asked staff for \"crystal meth\" and stated he would have a \"chaotic death\".   He also asked to leave multiple times.   During the assessment patient appeared anxious as well as responding to external stimuli.   The patient was tangential and disorganized in his thoughts.  He perseverated on a man named " "\"Emil\" who lives at the sober house he was staying at.  He repeated numerous times that he wanted this person to be his roommate, significant other and sponsor.   He reported he was going to leave the hospital and walk several miles to where this person is in extreme cold weather.  He stated that he has trouble communicating and understanding what others are saying.  Link was pleasant and calm during assessment.  He denied feeling suicidal to this  and stated he tested positive for meth but had not used any.  Later he stated, he believed his sister drugged him or staff at the treatment center.  Later, he stated, \"amphetimines are the only thing that make me happy\"  The patient reports he hasn't been sleeping or showering.      Brief Psychosocial History       The patient reports he is homeless but later said he lives at [facility]. It is unclear if he has been asked to leave the sob house due to drug use.  Prior to that patient was in [facility] and records indicate prior to that he may have lived with family.  Patient reports he has 5 brothers and sisters. HE reports he is unemployed due to mental health and drug use.      Significant Clinical History       Patient reports he is diagnosed with depression, anxiety and ADHD.  Records indicate he was hospitalized for psychosis in December 2021.  He reports he was receiving outpatient care at [facility] in the past and reports he has seen a therapist, Dr. Moser at [facility].  He reports he has been using alcohol since he was 18 and has also used heroin, cocaine, meth, Ketamine.        Collateral Information  Attempted to call treatment center that brought him to ED with no answer.        Risk Assessment  .[place] Suicide Severity Rating Scale Full Clinical Version:  Suicidal Ideation  1. Wish to be Dead (Lifetime): No  2. Non-Specific Active Suicidal Thoughts (Lifetime): No  2. Non-Specific Active Suicidal Thoughts (Past 1 Month): " Yes  3. Active Suicidal Ideation with any Methods (Not Plan) Without Intent to Act (Lifetime): No  3. Active Suicidal Ideation with any Methods (Not Plan) Without Intent to Act (Past 1 Month): Yes  4. Active Suicidal Ideation with Some Intent to Act, Without Specific Plan (Lifetime): No  4. Active Suicidal Ideation with Some Intent to Act, Without Specific Plan (Past 1 Month): Yes  5. Active Suicidal Ideation with Specific Plan and Intent (Lifetime): No  5. Active Suicidal Ideation with Specific Plan and Intent (Past 1 Month): No  Intensity of Ideation  Most Severe Ideation Rating (Lifetime): 5  Most Severe Ideation Rating (Past 1 Month): 5  Frequency (Lifetime): Less than once a week  Frequency (Past 1 Month): Many times each day  Duration (Lifetime): Fleeting, few seconds or minutes  Duration (Past 1 Month): Fleeting, few seconds or minutes  Controllability (Lifetime): Easily able to control thoughts  Controllability (Past 1 Month): Unable to control thoughts  Deterrents (Lifetime): Does not apply  Deterrents (Past 1 Month): Does not apply  Reasons for Ideation (Lifetime): Does not apply  Reasons for Ideation (Past 1 Month): Equally to get attention, revenge, or a reaction from others and to end/stop the pain  Suicidal Behavior  Actual Attempt (Lifetime): No  Has subject engaged in non-suicidal self-injurious behavior? (Lifetime): No  Interrupted Attempts (Lifetime): No  Aborted or Self-Interrupted Attempt (Lifetime): No  Preparatory Acts or Behavior (Lifetime): No  C-SSRS Risk (Lifetime/Recent)  Calculated C-SSRS Risk Score (Lifetime/Recent): High Risk    [place] Suicide Severity Rating Scale Since Last Contact:                 Validity of evaluation is impacted by presenting factors during interview patient appears to be experiencing psychosis and has recently used substances.   Comments regarding subjective versus objective responses to [place] tool:   Environmental or Psychosocial Events: challenging  interpersonal relationships, unemployment/underemployment, unstable housing, homelessness, ongoing abuse of substances and neither working nor attending school  Chronic Risk Factors: history of psychiatric hospitalization and chronic and ongoing sleep difficulties   Warning Signs: talking or writing about death, dying, or suicide, hopelessness, anxiety, agitation, unable to sleep, sleeping all the time, dramatic changes in mood and recent losses (physical, financial, personal)  Protective Factors: supportive ongoing medical and mental health care relationships  Interpretation of Risk Scoring, Risk Mitigation Interventions and Safety Plan:  The patient arrived at the ED stating he was suicidal and was going to shot himself or jump in front of a semi truck.  He has a history of psychosis and has been hospitalized in the past. He is also using substances.  At assessment he denies feeling suicidal.        Does the patient have thoughts of harming others? No     Is the patient engaging in sexually inappropriate behavior?  no        Current Substance Abuse     Is there recent substance abuse? Substance type(s): meth Frequency: unknown Quantity: unknown Method: unknown Duration: unknown Last use: last night     Was a urine drug screen or blood alcohol level obtained: Yes results aren't in yet       Mental Status Exam     Affect: Labile   Appearance: Disheveled    Attention Span/Concentration: Attentive  Eye Contact: Variable   Fund of Knowledge: Appropriate    Language /Speech Content: Fluent   Language /Speech Volume: Normal    Language /Speech Rate/Productions: Hyperverbal    Recent Memory: Poor   Remote Memory: Poor   Mood: Anxious    Orientation to Person: Yes    Orientation to Place: Yes   Orientation to Time of Day: yes  Orientation to Date: Yes    Situation (Do they understand why they are here?): No    Psychomotor Behavior: Normal    Thought Content: Delusions and Hallucinations   Thought Form:  Obsessive/Perseverative and Tangential      History of commitment: No           Medication    Psychotropic medications: Yes. Pt is currently taking hydroxozine, clondine, gabapentin. Medication compliant: No: says he doesn't have his meds. Recent medication changes: No  Medication changes made in the last two weeks: No       Current Care Team    Primary Care Provider: No  Psychiatrist: Yes. Name: Dr Retana. Location: [facility]. Date of last visit: unknown. Frequency: Unknown. Perceived helpfulness: Unknown.  Therapist: No  : No     CTSS or ARMHS: No  ACT Team: No  Other: No      Diagnosis      296.30 (F33.9) Major Depressive Disorder, Recurrent Episode, Unspecified _  Substance-Related & Addictive Disorders Stimulant Use Disorder:  The ICD-10-CM code indicates the comorbid presence of a moderate or severe substance use disorder, which must be present in ordre to apply the code for substance wtihdrawal x, Specify current severity:  Moderate  304.40 (F15.20) Moderate, Amphetamine type substance - by history   300.00 (F41.9) Unspecified Anxiety Disorder - by history     Clinical Summary and Substantiation of Recommendations    Nikolay arrived at ED stating he was suicidal with a plan.   He appears to be experiencing psychosis and is perseverating about an individual who lives in the sober house.  He appeared to be experiencing external stimuli.   Nikolay also has recently tested positive for amphetamines and fentalyn.  It is recommended the patient be admitted for stabilization.     Admission to Inpatient Level of Care is indicated due to:    1. Patient risk of severity of behavioral health disorder is appropriate to proposed level of care as indicated by:    Imminent Risk of Harm: Current plan for suicide or serious harm to self is present  And/or:  Behavioral health disorder is present and appropriate for inpatient care with both of the following:     Severe psychiatric, behavioral or other comorbid  conditions are appropriate for management at inpatient mental health as indicated by at least one of the following:   o Comorbid substance use disorder    Severe dysfunction in daily living is present as indicated by at least one of the following:   o Extreme deterioration in social interactions and Complete neglect of self care with associated impairment in physical status    2. Inpatient mental health services are necessary to meet patient needs and at least one of the following:  Specific condition related to admission diagnosis is present and judged likely to deteriorate in absence of treatment at proposed level of care    3. Situation and expectations are appropriate for inpatient care, as indicated by one of the following:   Patient is unwilling to participate in treatment voluntarily and requires treatment    Disposition    Recommended disposition: Inpatient Mental Health       Reviewed case and recommendations with attending provider. Attending Name: Dr. Epps       Attending concurs with disposition: Yes       Patient concurs with disposition: No: patient is on a hold       Guardian concurs with disposition: NA      Final disposition: Inpatient mental health .     Inpatient Details (if applicable):   Is patient admitted voluntarily:No, 72 hr hold. Hold start date/time: unknown      Patient aware of potential for transfer if there is not appropriate placement? Yes       Patient is willing to travel outside of the [facility] for placement? NA      Behavioral Intake Notified? Yes: Date: 1/26/23       Assessment Details    Patient interview started at: 7:49 and completed at: 8:26.     Total duration spent on the patient case in minutes: .75 hrs      CPT code(s) utilized: 69457 - Psychotherapy for Crisis - 60 (30-74*) min       Denise Still, JUSTYNA, MSW, LICSW, Psychotherapist  [facility] - Triage & Transition Services  Callback: [phone]

## 2023-01-27 NOTE — PLAN OF CARE
01/27/23 1719   Patient Belongings   Did you bring any home meds/supplements to the hospital?  No   Patient Belongings locker   Belongings Search Yes   Clothing Search Yes   Second Staff Viraj Zhong     Items in Locker are :-    Shoe  Jacket  Apple airpod  Wild hat  2 shirt  1 pant  A               Admission:  I am responsible for any personal items that are not sent to the safe or pharmacy.  [facility] is not responsible for loss, theft or damage of any property in my possession.    Signature:  _________________________________ Date: _______  Time: _____                                              Staff Signature:  ____________________________ Date: ________  Time: _____      2nd Staff person, if patient is unable/unwilling to sign:    Signature: ________________________________ Date: ________  Time: _____     Discharge:  [facility] has returned all of my personal belongings:    Signature: _________________________________ Date: ________  Time: _____                                          Staff Signature:  ____________________________ Date: ________  Time: _____

## 2023-01-27 NOTE — ED NOTES
[facility] ED Mental Health Handoff Note:     Assuming care from: Dr Simone Epps    Brief HPI: 32 year old male signed out to me by the above provider. See initial ED Provider note for full details of the presentation.   In brief, patient patient initially presented with with suicidal ideation with a plan, anxiety depression. Patient reports he is homeless. Patient was allegedly kicked out from his outpatient rehab facility for testing positive for fentanyl.    Home meds reviewed and ordered/administered: No  Medically stable for inpatient mental health admission: Yes.  Evaluated by mental health: Yes. The recommendation is for inpatient mental health treatment. Bed search in process  Safety concerns: At the time I received sign out, there were no safety concerns.    Hold Status:  Active Orders   N/A         Labs/Imaging:   Results for orders placed or performed during the hospital encounter of 01/26/23 (from the past 24 hour(s))   Asymptomatic COVID-19 Virus (Coronavirus) by PCR Nasopharyngeal     Status: Normal    Collection Time: 01/26/23  5:47 PM    Specimen: Nasopharyngeal; Swab   Result Value Ref Range    SARS CoV2 PCR Negative Negative    Narrative    Testing was performed using the Xpert Xpress SARS-CoV-2 Assay on the Cepheid Gene-Xpert Instrument Systems. Additional information about this Emergency Use Authorization (EUA) assay can be found via the Lab Guide. This test should be ordered for the detection of SARS-CoV-2 in individuals who meet SARS-CoV-2 clinical and/or epidemiological criteria as well as from individuals without symptoms or other reasons to suspect COVID-19. Test performance for asymptomatic patients has only been established in anterior nasal swab specimens. This test is for in vitro diagnostic use under the FDA EUA for laboratories certified under CLIA to perform high complexity testing. This test has not been FDA cleared or approved. A negative result does not rule out the  presence of PCR inhibitors in the specimen or target RNA concentration below the limit of detection for the assay. The possibility of a false negative should be considered if the patient's recent exposure or clinical presentation suggests COVID-19.. This test was validated by the [facility]. This laboratory is certified under the Clinical Laboratory Improvement Amendments (CLIA) as qualified to perform high complexity laboratory testing.     Urine Drugs of Abuse Screen     Status: Normal    Collection Time: 01/27/23  1:53 AM    Narrative    The following orders were created for panel order Urine Drugs of Abuse Screen.  Procedure                               Abnormality         Status                     ---------                               -----------         ------                     Drug abuse screen 77 uri...[[id number]]  Normal              Final result                 Please view results for these tests on the individual orders.   Drug abuse screen 77 urine (FL, RH, SH)     Status: Normal    Collection Time: 01/27/23  1:53 AM   Result Value Ref Range    Amphetamines Urine Screen Negative Screen Negative    Barbituates Urine Screen Negative Screen Negative    Benzodiazepine Urine Screen Negative Screen Negative    Cannabinoids Urine Screen Negative Screen Negative    Opiates Urine Screen Negative Screen Negative    PCP Urine Screen Negative Screen Negative    Cocaine Urine Screen Negative Screen Negative         ED Meds:  Medications   LORazepam (ATIVAN) tablet 1 mg (1 mg Oral Given 1/26/23 3321)     No orders to display       ED Course:       There were no significant events during my shift.    Patient was signed out to the oncoming provider, Dr. Antoine Story at shift change    Impression:    ICD-10-CM    1. Suicidal ideation  R45.851           Plan:    1. Awaiting inpatient mental health admission/transfer.      IShaun, am serving as a scribe to  document services personally performed by Dr. Maritza Lee, based on my observations and the provider's statements to me.  I, Maritza Lee MD, attest that Shaun Tucker is acting in a scribe capacity, has observed my performance of the services and has documented them in accordance with my direction.     Maritza Lee MD  [facility] EMERGENCY DEPARTMENT  [address] [place] [id number]  [phone]                   Maritza Lee MD  01/27/23 0658

## 2023-01-27 NOTE — ED NOTES
Pt stated that has been taking gabapentin and hydroxyzine. Since those meds are not available, pt stated that he is shaky.

## 2023-01-27 NOTE — ED NOTES
"ED Behavioral Health Patient Transition of Care Note    Assuming care from:  Maritza Lee MD    Brief history: Nikolay Lora is a 32 year old male who presents to this ED for evaluation of suicidal ideations.      The patient was recently kicked out of his outpatient rehab facility [facility]  after relapsing and testing positive for fentanyl a few days ago. He reports that he hasn't felt the same since and started having suicidal ideations with a plan this morning. Patient reports he is homeless. He endorses associated depression and anxiety. He's been treated with adderall and methylphenidate for his ADHD in the past. His  is Daisy from [facility].     Any outstanding medical concerns:  No, medically stable for  admission    Has SW seen the patient:  yes    Is the patient on a hold:  Holdable    Current plan for disposition:  SW attempting to find a bed    Safety concerns:  No, pt has been cooperative    Dietary restrictions:  None, pt can eat and drink ad amara    Have daily medications been ordered:  yes, these include ones listed.    Significant events during shift:    11:34 AM Home meds reordered for pt after pharmacy rec.  1:43 PM I spoke with psychiatrist at [facility], who will ultimately take pt later today.  Requested we place pt on 72 hour hold as they fear he may come to locked unit and request to leave based on previous notes.  Pt meets hold criteria and will place 72 hour hold as a result.  Nursing updated.  3:15 PM Pt accepted by Dr. Card with psychiatry, EMTALA completed and pt to be transferred to [facility] for  admission.  72 hour hold in place per psychiatry.         1. Suicidal ideation        I, Gayatri Muir, am serving as a scribe to document services personally performed by Dr. Simnoe Story, based on my observations and the provider's statements to me. I, Simone Story, DO attest that Gayatri Muir is acting in a scribe capacity, has observed my " performance of the services and has documented them in accordance with my direction.        Simone Story DO  Emergency Medicine  [facility] EMERGENCY DEPARTMENT  1/27/2023  7:12 AM           Simone Story MD  01/27/23 1519

## 2023-01-27 NOTE — ED NOTES
Patient darted from room across the mendoza to the bathroom. A nursing assistance caught patient and was able to obtain a urine sample.

## 2023-01-28 LAB
ALBUMIN SERPL-MCNC: 3.7 G/DL (ref 3.4–5)
ALP SERPL-CCNC: 69 U/L (ref 40–150)
ALT SERPL W P-5'-P-CCNC: 33 U/L (ref 0–70)
ANION GAP SERPL CALCULATED.3IONS-SCNC: 3 MMOL/L (ref 3–14)
AST SERPL W P-5'-P-CCNC: 18 U/L (ref 0–45)
BILIRUB SERPL-MCNC: 0.9 MG/DL (ref 0.2–1.3)
BUN SERPL-MCNC: 26 MG/DL (ref 7–30)
CALCIUM SERPL-MCNC: 8.9 MG/DL (ref 8.5–10.1)
CHLORIDE BLD-SCNC: 107 MMOL/L (ref 94–109)
CHOLEST SERPL-MCNC: 259 MG/DL
CO2 SERPL-SCNC: 29 MMOL/L (ref 20–32)
CREAT SERPL-MCNC: 0.99 MG/DL (ref 0.66–1.25)
GFR SERPL CREATININE-BSD FRML MDRD: >90 ML/MIN/1.73M2
GLUCOSE BLD-MCNC: 101 MG/DL (ref 70–99)
HBA1C MFR BLD: 5.4 % (ref 0–5.6)
HDLC SERPL-MCNC: 41 MG/DL
LDLC SERPL CALC-MCNC: 180 MG/DL
NONHDLC SERPL-MCNC: 218 MG/DL
POTASSIUM BLD-SCNC: 4.2 MMOL/L (ref 3.4–5.3)
PROT SERPL-MCNC: 7.1 G/DL (ref 6.8–8.8)
SODIUM SERPL-SCNC: 139 MMOL/L (ref 133–144)
TRIGL SERPL-MCNC: 192 MG/DL
TSH SERPL DL<=0.005 MIU/L-ACNC: 1.09 MU/L (ref 0.4–4)

## 2023-01-28 PROCEDURE — 83036 HEMOGLOBIN GLYCOSYLATED A1C: CPT | Performed by: PSYCHIATRY & NEUROLOGY

## 2023-01-28 PROCEDURE — 36415 COLL VENOUS BLD VENIPUNCTURE: CPT | Performed by: PSYCHIATRY & NEUROLOGY

## 2023-01-28 PROCEDURE — 85004 AUTOMATED DIFF WBC COUNT: CPT | Performed by: PSYCHIATRY & NEUROLOGY

## 2023-01-28 PROCEDURE — 124N000002 HC R&B MH UMMC

## 2023-01-28 PROCEDURE — 80061 LIPID PANEL: CPT | Performed by: PSYCHIATRY & NEUROLOGY

## 2023-01-28 PROCEDURE — 80053 COMPREHEN METABOLIC PANEL: CPT | Performed by: PSYCHIATRY & NEUROLOGY

## 2023-01-28 PROCEDURE — 84443 ASSAY THYROID STIM HORMONE: CPT | Performed by: PSYCHIATRY & NEUROLOGY

## 2023-01-28 PROCEDURE — 250N000013 HC RX MED GY IP 250 OP 250 PS 637: Performed by: PSYCHIATRY & NEUROLOGY

## 2023-01-28 PROCEDURE — 99223 1ST HOSP IP/OBS HIGH 75: CPT | Mod: AI | Performed by: PSYCHIATRY & NEUROLOGY

## 2023-01-28 RX ADMIN — CLONIDINE HYDROCHLORIDE 0.1 MG: 0.1 TABLET ORAL at 09:02

## 2023-01-28 RX ADMIN — HYDROXYZINE HYDROCHLORIDE 100 MG: 50 TABLET ORAL at 06:24

## 2023-01-28 RX ADMIN — SERTRALINE HYDROCHLORIDE 150 MG: 50 TABLET ORAL at 09:02

## 2023-01-28 RX ADMIN — NICOTINE 1 PATCH: 21 PATCH, EXTENDED RELEASE TRANSDERMAL at 09:02

## 2023-01-28 RX ADMIN — GABAPENTIN 300 MG: 300 CAPSULE ORAL at 14:18

## 2023-01-28 RX ADMIN — GABAPENTIN 300 MG: 300 CAPSULE ORAL at 09:02

## 2023-01-28 RX ADMIN — OLANZAPINE 10 MG: 10 TABLET, FILM COATED ORAL at 06:24

## 2023-01-28 RX ADMIN — FINASTERIDE 5 MG: 5 TABLET, FILM COATED ORAL at 09:02

## 2023-01-28 RX ADMIN — GABAPENTIN 300 MG: 300 CAPSULE ORAL at 19:44

## 2023-01-28 RX ADMIN — CLONIDINE HYDROCHLORIDE 0.2 MG: 0.2 TABLET ORAL at 19:44

## 2023-01-28 RX ADMIN — ACETAMINOPHEN 325MG 650 MG: 325 TABLET ORAL at 06:24

## 2023-01-28 ASSESSMENT — ACTIVITIES OF DAILY LIVING (ADL)
DRESS: INDEPENDENT
ADLS_ACUITY_SCORE: 28
HYGIENE/GROOMING: INDEPENDENT
DRESS: INDEPENDENT
ORAL_HYGIENE: INDEPENDENT
HYGIENE/GROOMING: INDEPENDENT
ADLS_ACUITY_SCORE: 28
ADLS_ACUITY_SCORE: 28
ORAL_HYGIENE: INDEPENDENT
ADLS_ACUITY_SCORE: 28
LAUNDRY: UNABLE TO COMPLETE
ADLS_ACUITY_SCORE: 28
LAUNDRY: UNABLE TO COMPLETE
ADLS_ACUITY_SCORE: 28

## 2023-01-28 NOTE — PLAN OF CARE
"He slept 6.75 hours during the shift.  He requested an orange juice, which was given to him by staff.  Writer called lab and got verbal permission to change fasting blood draws to 1/29/23 at 0630.  Writer did speak with him and he is delayed in speech, cognition.  He complained of, \" very high,\" anxiety, visual and auditory hallucinations, and back pain.  He also states he has had withdrawal symptoms in the past from previous hospitalizations.  PRN tylenol, atarax, and zyprexa was given at 0620.  COWS score was 5.  Stated he wanted to watch tv in his room and rest near end of the shift, some irritability, suspicion of writer in discussions.  Charge RN informed of findings.     "
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
## 2023-01-28 NOTE — PLAN OF CARE
"Pt had an uneventful shift this day, spending most of the day in his room napping. Pt chose to eat in his room, consuming 100% of his breakfast and lunch. Pt mood was calm, cooperative and pt was med compliant with the exception of finasteride, pt stated the dose is larger than what he usually takes. MD notified, NEW ORDER to hold medication for further assessment.    Pt denies depression, anxiety, SIB, HI and thoughts of hurting others. Pt endorses continuing thoughts of suicide with the plan of shooting himself in the head, pt denies access to a gun on the outside. Pt also endorses auditory hallucinations, pt hears multiple voices that tell him to hold his breath. Pt also endorses visual hallucinations of seeing a bright light and he walks to it.    VS reviewed: BP 98/66 (BP Location: Left arm, Patient Position: Supine)   Pulse 61   Temp 97.7  F (36.5  C) (Temporal)   Resp 16   Ht 1.6 m (5' 3\")   Wt 75.1 kg (165 lb 8 oz)   SpO2 95%   BMI 29.32 kg/m   . Patient denies pain.    Length of stay: 1  "

## 2023-01-29 LAB
BASOPHILS # BLD AUTO: 0.1 10E3/UL (ref 0–0.2)
BASOPHILS NFR BLD AUTO: 1 %
EOSINOPHIL # BLD AUTO: 0.2 10E3/UL (ref 0–0.7)
EOSINOPHIL NFR BLD AUTO: 2 %
ERYTHROCYTE [DISTWIDTH] IN BLOOD BY AUTOMATED COUNT: 11.9 % (ref 10–15)
HCT VFR BLD AUTO: 49.2 % (ref 40–53)
HGB BLD-MCNC: 15.5 G/DL (ref 13.3–17.7)
IMM GRANULOCYTES # BLD: 0 10E3/UL
IMM GRANULOCYTES NFR BLD: 0 %
LYMPHOCYTES # BLD AUTO: 2.7 10E3/UL (ref 0.8–5.3)
LYMPHOCYTES NFR BLD AUTO: 37 %
MCH RBC QN AUTO: 27.4 PG (ref 26.5–33)
MCHC RBC AUTO-ENTMCNC: 31.5 G/DL (ref 31.5–36.5)
MCV RBC AUTO: 87 FL (ref 78–100)
MONOCYTES # BLD AUTO: 0.5 10E3/UL (ref 0–1.3)
MONOCYTES NFR BLD AUTO: 7 %
NEUTROPHILS # BLD AUTO: 3.8 10E3/UL (ref 1.6–8.3)
NEUTROPHILS NFR BLD AUTO: 53 %
NRBC # BLD AUTO: 0 10E3/UL
NRBC BLD AUTO-RTO: 0 /100
PLATELET # BLD AUTO: 246 10E3/UL (ref 150–450)
RBC # BLD AUTO: 5.66 10E6/UL (ref 4.4–5.9)
WBC # BLD AUTO: 7.3 10E3/UL (ref 4–11)

## 2023-01-29 PROCEDURE — 124N000002 HC R&B MH UMMC

## 2023-01-29 PROCEDURE — 250N000013 HC RX MED GY IP 250 OP 250 PS 637: Performed by: PSYCHIATRY & NEUROLOGY

## 2023-01-29 RX ADMIN — SERTRALINE HYDROCHLORIDE 150 MG: 50 TABLET ORAL at 08:26

## 2023-01-29 RX ADMIN — GABAPENTIN 300 MG: 300 CAPSULE ORAL at 13:56

## 2023-01-29 RX ADMIN — GABAPENTIN 300 MG: 300 CAPSULE ORAL at 19:21

## 2023-01-29 RX ADMIN — CLONIDINE HYDROCHLORIDE 0.2 MG: 0.2 TABLET ORAL at 19:21

## 2023-01-29 RX ADMIN — NICOTINE 1 PATCH: 21 PATCH, EXTENDED RELEASE TRANSDERMAL at 08:25

## 2023-01-29 RX ADMIN — GABAPENTIN 300 MG: 300 CAPSULE ORAL at 08:26

## 2023-01-29 RX ADMIN — CLONIDINE HYDROCHLORIDE 0.1 MG: 0.1 TABLET ORAL at 08:26

## 2023-01-29 ASSESSMENT — ACTIVITIES OF DAILY LIVING (ADL)
ADLS_ACUITY_SCORE: 28

## 2023-01-29 NOTE — PLAN OF CARE
Continuing complaints of auditory hallucinations: voices screaming in fear in the evening, soft and unintelligible in an unknown language in the morning. Denied imperatives. Said he could not remember if he had ever taken any medication for hallucinations before.  No latency of speech: low volume, slow rate, clear and coherent.    Neutral to bright affect.   Endorsees continuing thoughts of suicide with the plan to shoot himself with a gun (does not own a gun.) Unchanged future orientation with goal to go to [place] for inpatient CD treatment. Denied cravings to use today (in contrast to yesterday) said this was because he had just eaten.   Mostly in his room in bed, did not complain of excessive sleepiness or any somatic symptoms he c/o at admission, but simply said he likes to meditate.    Pupil size continues WDL. UDS was negative on admission ( but Ecstasy,LSD,MDMA,Ketamine,Mescaline could have caused the dilation and Link has been an inconsistent historian. )  Denied experiencing cravings today.  COWS scores 0 and 2. MSSA scores: 1 and 7 due to elevated heart rate at 18:30: 119. (Denied experiencing any anxiety, pain, any kind of distress at the time.) Prior dx of Brugada syndrome. HR recheck: 88.   No occupational goals but considers his large financial debts a significant stressor.    Abnormal laboratory results: elevated Cholesterol, non HDL cholesterol, triglycerides) see 1/28/23 note. Wants to resume his monthly allergy injections ( Said he had been getting them for 8 months and then began to experience transportation issues.)      Encouraged hygiene (still unkempt and no hygiene since admission.) Answered that he did not know there were showers and he would do it in the morning. He declined the offer of assistance with at least fresh linen and clothing, especially because of prior dx of dermatologic diagnoses.     Plan: Monitor and document mood and behaviour, thought process and content. Establish and  maintain therapeutic relationship. Educate about diagnoses, medications, treatment, legal status, plan of care. Address preexisting and concurrent medical concerns.      P: Unstable mood  G: Stable mood  O: Progressing

## 2023-01-29 NOTE — PLAN OF CARE
He slept 6.75 hours during the shift.  No concerns reported or observed during the shift.  Contacted lab to add additional labs from previous lab draw, as he was drawn yesterday morning.  Charge RN notified of findings.

## 2023-01-29 NOTE — PLAN OF CARE
"Still requesting to be called Haseeb, just for the duration of his hospitalisation. Consistently future oriented in stating the desire to go to [place] for inpatient CD treatment. Acknowledged thinking frequently of the object of his desire: \"Slavo\", a man who lives at the same soberhouse there Link was.     Preoccupied, polite, intermittent eye contact. Eyes did appear to be following something unseen in the room and he reported seeing \"floating shadows and lights\". Also endorsed auditory hallucinations but said they consisted of \"just sounds like rain\". Denied imperatives.    Stated he is still thinking of suicide \"like I should shoot myself with a gun.\" But denied all intent of self harm while hospitalised.    Said he is experiencing cravings for cocaine. Pupils appear to have returned to normal size since yesterday.  COWS scores 0, 0. MSSA scores 2 and 1.     Isolative in his room and seems to have slept much of the day. Disinterested in ADLs: refused hygiene and is disheveled. Calm demeanor.  No somatic concerns.    Abnormal laboratory results today:     Reference Range& Units 01/28/23 08:54   Cholesterol <200 mg/dL 259 (H)   HDL Cholesterol >=40 mg/dL 41        LDL Cholesterol Calculated <=100 mg/dL 180 (H)   Non HDL Cholesterol <130 mg/dL 218 (H)   Triglycerides <150 mg/dL 192 (H)       Plan: Monitor and document mood and behaviour, thought process and content. Establish and maintain therapeutic relationship. Educate about diagnoses, medications, treatment, legal status, plan of care. Address preexisting and concurrent medical concerns.              "

## 2023-01-29 NOTE — PLAN OF CARE
"Nursing Assessment    Recent Vitals: B/P: 101/64, T: 97.8, P: 68    Sleep:  Hours of sleep at night: 6.75    General Shift Summary  Patient has primarily isolated to his room, coming out for meals. He presents with a flat affect but is calm and cooperative. Upon writer checking in with patient he stated that he was having hallucinations. The hallucinations come and go but are worse in the evening. He denied visual hallucinations but voiced auditory that are voices speaking another language. Patient said he doesn't understand the language but the voices are not yelling, they are speaking softly. He voiced having suicidal thoughts with a plan to shoot himself with a gun. Patient said that he is trying to get access to a gun and is \"pretty sure\" he would go through with the plan if he was discharged. He stated the SI primarily occurs in the evening. Patient said that he feels safe at the hospital and has no plans to hurt himself here. Writer discussed coping skills and he identified writing in a journal and watching t.v. as activities that help him. Patient said that he experiences a lot of anxiety and depression due to his hallucinations and SI.    Hygiene is fair, showering was encouraged. He is eating well at over 80% of his meals.    Patient is medication complaint with no voiced side effects. He denied pain.    Plan is to continue to monitor patient status q 15 mins, assess response to medications, and maintain the patients safety.    Nena Nevarez RN MSN    "

## 2023-01-30 PROCEDURE — 250N000013 HC RX MED GY IP 250 OP 250 PS 637: Performed by: PSYCHIATRY & NEUROLOGY

## 2023-01-30 PROCEDURE — 99233 SBSQ HOSP IP/OBS HIGH 50: CPT | Performed by: PSYCHIATRY & NEUROLOGY

## 2023-01-30 PROCEDURE — 124N000002 HC R&B MH UMMC

## 2023-01-30 RX ORDER — RISPERIDONE 1 MG/1
1 TABLET ORAL AT BEDTIME
Status: DISCONTINUED | OUTPATIENT
Start: 2023-01-30 | End: 2023-02-01

## 2023-01-30 RX ADMIN — SERTRALINE HYDROCHLORIDE 150 MG: 50 TABLET ORAL at 08:15

## 2023-01-30 RX ADMIN — GABAPENTIN 300 MG: 300 CAPSULE ORAL at 13:38

## 2023-01-30 RX ADMIN — GABAPENTIN 300 MG: 300 CAPSULE ORAL at 08:15

## 2023-01-30 RX ADMIN — RISPERIDONE 1 MG: 1 TABLET ORAL at 20:46

## 2023-01-30 RX ADMIN — CLONIDINE HYDROCHLORIDE 0.1 MG: 0.1 TABLET ORAL at 08:15

## 2023-01-30 RX ADMIN — CLONIDINE HYDROCHLORIDE 0.2 MG: 0.2 TABLET ORAL at 20:45

## 2023-01-30 RX ADMIN — GABAPENTIN 300 MG: 300 CAPSULE ORAL at 20:46

## 2023-01-30 RX ADMIN — NICOTINE 1 PATCH: 21 PATCH, EXTENDED RELEASE TRANSDERMAL at 08:20

## 2023-01-30 ASSESSMENT — ACTIVITIES OF DAILY LIVING (ADL)
LAUNDRY: WITH SUPERVISION
HYGIENE/GROOMING: INDEPENDENT
ADLS_ACUITY_SCORE: 28
ADLS_ACUITY_SCORE: 28
HYGIENE/GROOMING: HANDWASHING;INDEPENDENT
ADLS_ACUITY_SCORE: 28
DRESS: INDEPENDENT
DRESS: SCRUBS (BEHAVIORAL HEALTH);INDEPENDENT
ADLS_ACUITY_SCORE: 28
ORAL_HYGIENE: INDEPENDENT
ORAL_HYGIENE: INDEPENDENT
ADLS_ACUITY_SCORE: 28
ADLS_ACUITY_SCORE: 28

## 2023-01-30 NOTE — PLAN OF CARE
Pt asleep at start of shift.     Breathing quiet and unlabored when sleeping.     Pt had no c/o pain or discomfort during the HS.     Appears to have slept 6.75 hours.     Goal Outcome Evaluation:    Problem: Sleep Disturbance  Goal: Adequate Sleep/Rest  Outcome: Progressing

## 2023-01-30 NOTE — PLAN OF CARE
CTC Daily Note    Assessment/Intervention/Current Symtoms and Care Coordination:  Participated in team meeting. Completed chart review.   Called [facility] to check on pt's belongs.  They said that they would keep them for at least 30 days post discharge from their facility.  Writer let pt know this.     Discharge Plan or Goal:  TBD, possibly back to [facility]    Barriers to Discharge:    Stabilization of mental health symptoms    Referral Status:  No referrals made yet this hospitalization.     Legal Status:   Voluntary       staff Surgery consult resident and PA

## 2023-01-30 NOTE — PLAN OF CARE
Goal Outcome Evaluation:    Plan of Care Reviewed With: patient      Problem: Adult Behavioral Health Plan of Care  Goal: Plan of Care Review  Outcome: Progressing  Flowsheets (Taken 1/30/2023 1100)  Patient Agreement with Plan of Care: agrees    Pt was isolative to his room watching TV. He was cam and cooperative, medication compliant and had adequate intake. His vital signs were within defined limits. Pt continues to endorse both visual and auditory hallucinations but stated that the voices are not command. He also endorses having suicidal thoughts with a plan to shoot himself with a gun on the head. Pt reports that he has no access to a gun, but would go buy it and complete the plan. Pt encouraged to discuss with provider and was agreeing. He does contract for safety while here in the hospital. Will continue to encourage pt to verbalize feelings and thoughts. Monitor and document.

## 2023-01-30 NOTE — PROGRESS NOTES
"[facility], [place]   Psychiatric Progress Note  Hospital Day: 3        Interim History:   The patient's care was discussed with the treatment team during the daily team meeting and/or staff's chart notes were reviewed.  Staff report patient continues to report active SI with plan to shoot himself. Reporting visual and auditory hallucinations. Patient did not report any acute medical concerns or side effects. No behavioral issues overnight, including violent or aggressive behaviors. Patient did not require seclusion or restraints. Patient is exhibiting signs/sx of psychosis or jovanny. Patient is medication adherent. Patient is not attending groups. Patient is sleeping well. Patient is eating adequately. Patient is not attending to ADLs.    Upon interview, the patient was sitting in his bed watching TV. Appears somewhat tense and anxious. He is disheveled. He tells me that he has been hearing \"whispering voices\" telling him to obtain a gun and shoot himself. He acknowledges that these voices are scary and that he has never experienced them before. He also admits that he ran out of Zoloft about one week ago and may have had a withdrawal syndrome related to that. He was able to contract for safety in the hospital. He is future oriented with plan to return to [facility] when stable. He asked staff to reach out to [facility] to notify them of patient's hospitalization and to keep his belongings secure.     We discussed R/B/A of risperidone. He was amenable to a trial. Also agreed to sign into hospital on voluntary basis.     Suicidal ideation: Active SI with plan to shoot himself. Denies plan in the hospital or current intent. Denies access to firearms.     Homicidal ideation: denies current or recent homicidal ideation or behaviors.    Psychotic symptoms: Reports paranoia, AH, VH.    Medication side effects reported: No significant side effects.    Acute medical concerns: none    Other issues " "reported by patient: Patient had no further questions or concerns.           Medications:       cloNIDine  0.1 mg Oral QAM     cloNIDine  0.2 mg Oral QPM     [Held by provider] finasteride  5 mg Oral Daily     gabapentin  300 mg Oral TID     nicotine  1 patch Transdermal Daily     nicotine   Transdermal Q8H     sertraline  150 mg Oral Daily          Allergies:   No Known Allergies       Labs:   No results found for this or any previous visit (from the past 24 hour(s)).       Psychiatric Examination:     /78 (BP Location: Left arm, Patient Position: Sitting, Cuff Size: Adult Regular)   Pulse 66   Temp 97.6  F (36.4  C) (Oral)   Resp 16   Ht 1.6 m (5' 3\")   Wt 75.1 kg (165 lb 8 oz)   SpO2 96%   BMI 29.32 kg/m    Weight is 165 lbs 8 oz  Body mass index is 29.32 kg/m .    Weight over time:  Vitals:    01/27/23 1718   Weight: 75.1 kg (165 lb 8 oz)       Orthostatic Vitals     None            Cardiometabolic risk assessment. 01/30/23      Reviewed patient profile for cardiometabolic risk factors    Date taken /Value  REFERENCE RANGE   Abdominal Obesity  (Waist Circumference)   See nursing flowsheet Women ?35 in (88 cm)   Men ?40 in (102 cm)      Triglycerides  Triglycerides   Date Value Ref Range Status   01/28/2023 192 (H) <150 mg/dL Final       ?150 mg/dL (1.7 mmol/L) or current treatment for elevated triglycerides   HDL cholesterol  Direct Measure HDL   Date Value Ref Range Status   01/28/2023 41 >=40 mg/dL Final   ]   Women <50 mg/dL (1.3 mmol/L) in women or current treatment for low HDL cholesterol  Men <40 mg/dL (1 mmol/L) in men or current treatment for low HDL cholesterol     Fasting plasma glucose (FPG) Lab Results   Component Value Date     01/28/2023      FPG ?100 mg/dL (5.6 mmol/L) or treatment for elevated blood glucose   Blood pressure  BP Readings from Last 3 Encounters:   01/30/23 113/78   01/27/23 118/74   07/20/22 112/79    Blood pressure ?130/85 mmHg or treatment for elevated blood " pressure   Family History  See family history     Appearance: awake, alert, dressed in hospital scrubs and disheveled   Attitude:  cooperative  Eye Contact:  good  Mood:  depressed  Affect:  mood congruent and intensity is blunted  Speech:  clear, coherent  Language: fluent and intact in English  Psychomotor, Gait, Musculoskeletal:  no evidence of tardive dyskinesia, dystonia, or tics  Throught Process:  linear, goal oriented and illogical  Associations:  no loose associations  Thought Content:  active suicidal ideation present, auditory hallucinations present and visual hallucinations present  Insight:  fair  Judgement:  fair  Oriented to:  time, person, and place  Attention Span and Concentration:  fair  Recent and Remote Memory:  fair  Fund of Knowledge:  appropriate    Clinical Global Impressions  First:  Considering your total clinical experience with this particular patient population, how severe are the patient's symptoms at this time?: 7 (01/28/23 1341)  Compared to the patient's condition at the START of treatment, this patient's condition is: 4 (01/28/23 1341)  Most recent:  Considering your total clinical experience with this particular patient population, how severe are the patient's symptoms at this time?: 7 (01/28/23 1341)  Compared to the patient's condition at the START of treatment, this patient's condition is: 4 (01/28/23 1341)           Precautions:     Behavioral Orders   Procedures     Code 1 - Restrict to Unit     Routine Programming     As clinically indicated     Status 15     Every 15 minutes.     Suicide precautions     Patients on Suicide Precautions should have a Combination Diet ordered that includes a Diet selection(s) AND a Behavioral Tray selection for Safe Tray - with utensils, or Safe Tray - NO utensils       Withdrawal precautions          Diagnoses:     Suicidal ideation   Unspecified psychosis (substance induced likely)  Polysubstance use  Unspecified mood disorder  ADHD,  "inattentive type per chart  History of idiopathic hypersomnolence per chart  Nicotine use disorder, dependence and withdrawal   Allergies- chronic uticaria and rhinitis per chart  HLD per chart          Assessment & Plan:     Assessment and hospital summary:  This patient is a 32 year old male with history of mood disorder, ADHD and substance use who presented to [facility] ED with SI on 1/26 in context of reported methamphetamine use and being kicked out of sober living. Medically cleared in ED, placed on 72HH and admitted to [facility]. Limited historian, giving inconsistent reports about substance use, UDS negative, very somnolent, endorsing ongoing SI and some psychosis symptoms. PTA medications continued with exception of finasteride as patient states he takes \"1/4 a pill\" and declined ordered dose, will defer to primary team to address. Will continue 72HH and evaluate safety and stabilization further as patient more able to engage in assessments.      Inpatient psychiatric hospitalization is warranted at this time for safety, stabilization, and possible adjustment in medications.    Patient reports that he abruptly stopped Zoloft one week prior to his admission. He developed discontinuation syndrome symptoms following that. He reports that he does not have a history of psychosis but is experiencing psychotic symptoms. He is amenable with plan to trial risperidone after R/B/A were discussed.     Psychiatric treatment/inteventions:  Medications:   -Add risperidone 1 mg QHS  -continue PTA sertraline 150mg daily for mood  -continue PTA clonidine 0.1mg in AM and 0.2mg HS for anxiety/agitation   -continue PTA gabapentin 300mg TID for anxiety      -PRN hydroxyzine 25mg every 4 hour for anxiety  -PRN trazodone 50mg at bedtime for sleep   -PRN olanzapine 10mg PO or IM TID for agitation/psychosis      Laboratory/Imaging: reviewed: Covid negative; UDS negative; CMP, CBC, TSH, Lipid panel, HgbA1c ordered to complete admission " labs. Reviewed.      Patient will be treated in therapeutic milieu with appropriate individual and group therapies as described.     Medical treatment/interventions:  Medical concerns:   Nicotine replacement ordered, educate patient on benefits of cessation, pt unclear about finasteride dose, will hold until further information is obtained. PTA PRN zyrtec, azelastine, fluticasone, triamcinolone and epipen ordered for allergies and PRN ammoniaum lactate, Eucerin for  dry skin.  Dyslipidemia: Will arrange follow up with PCP to address. Discussed importance of healthy eating habits and regular exercise. Will consider nutrition consult if patient amenable.      Legal Status: 72 hour hold discontinued. Pt agreed to sign in on voluntary basis and discharge directly to treatment once stable.      Safety Assessment:        Behavioral Orders   Procedures     Code 1 - Restrict to Unit     Routine Programming       As clinically indicated     Status 15       Every 15 minutes.     Suicide precautions       Patients on Suicide Precautions should have a Combination Diet ordered that includes a Diet selection(s) AND a Behavioral Tray selection for Safe Tray - with utensils, or Safe Tray - NO utensils        Withdrawal precautions      Pt has not required locked seclusion or restraints in the past 24 hours to maintain safety, please refer to RN documentation for further details.    The risks, benefits, alternatives and side effects have been discussed and are understood by the patient.     Disposition: Pending clinical stabilization. Will likely discharge to [facility] treatment when stable, pt reports interest in [facility].      This note was created by undersigned using a Dragon dictation system. All typing errors or contextual distortion are unintentional and software inherent.     Entered by: Nelly Brown MD on 1/30/2023 at 5:49 PM

## 2023-01-31 PROCEDURE — 250N000013 HC RX MED GY IP 250 OP 250 PS 637: Performed by: PSYCHIATRY & NEUROLOGY

## 2023-01-31 PROCEDURE — 124N000002 HC R&B MH UMMC

## 2023-01-31 RX ADMIN — HYDROXYZINE HYDROCHLORIDE 100 MG: 50 TABLET ORAL at 16:48

## 2023-01-31 RX ADMIN — ACETAMINOPHEN 325MG 650 MG: 325 TABLET ORAL at 08:53

## 2023-01-31 RX ADMIN — CLONIDINE HYDROCHLORIDE 0.1 MG: 0.1 TABLET ORAL at 09:39

## 2023-01-31 RX ADMIN — RISPERIDONE 1 MG: 1 TABLET ORAL at 21:04

## 2023-01-31 RX ADMIN — NICOTINE 1 PATCH: 21 PATCH, EXTENDED RELEASE TRANSDERMAL at 08:53

## 2023-01-31 RX ADMIN — SERTRALINE HYDROCHLORIDE 150 MG: 50 TABLET ORAL at 08:53

## 2023-01-31 RX ADMIN — CLONIDINE HYDROCHLORIDE 0.2 MG: 0.2 TABLET ORAL at 21:04

## 2023-01-31 RX ADMIN — GABAPENTIN 300 MG: 300 CAPSULE ORAL at 08:53

## 2023-01-31 RX ADMIN — GABAPENTIN 300 MG: 300 CAPSULE ORAL at 13:16

## 2023-01-31 RX ADMIN — GABAPENTIN 300 MG: 300 CAPSULE ORAL at 21:04

## 2023-01-31 ASSESSMENT — ACTIVITIES OF DAILY LIVING (ADL)
ADLS_ACUITY_SCORE: 28
ADLS_ACUITY_SCORE: 28
ORAL_HYGIENE: INDEPENDENT
ADLS_ACUITY_SCORE: 28
HYGIENE/GROOMING: INDEPENDENT
ADLS_ACUITY_SCORE: 28
DRESS: SCRUBS (BEHAVIORAL HEALTH)
ADLS_ACUITY_SCORE: 28
ADLS_ACUITY_SCORE: 28
LAUNDRY: WITH SUPERVISION
ADLS_ACUITY_SCORE: 28

## 2023-01-31 NOTE — PLAN OF CARE
"RN Assessment   Isolative and withdrawn to self and room. Mood reported to be \"depressed and anxious.\"   Endorses AH in form of \"whispers and sirens.\" Command content to \"get a gun and shoot myself\", which he finds \"scary\" and disturbing.   Feels safe in the hospital, and has no plan or intent to kill self or engage in self injury but stated that would likely act on command content if he was in the community.   He also reports visual hallucinations in form of \"floating lantern\" and \"two swans\"   No overt delusional or paranoid statement. Speech clear and coherent and organized.   Accepted HS medications. Denies physical health concerns.  /78   Pulse 66   Temp 97.6  F (36.4  C) (Oral)   Resp 16   Ht 1.6 m (5' 3\")   Wt 75.1 kg (165 lb 8 oz)   SpO2 96%   BMI 29.32 kg/m        "

## 2023-01-31 NOTE — PLAN OF CARE
"Patient Endorses AVH. Commanding voices in nature, telling him to get a gun and shoot himself on the head. Describes visual hallucinations as doves and  black crawl birds. Patient he reports feeling safe and contacts for safety on the unit.Endorsed high depression, anxiety is low today.  Patient is medication compliant. Patient reported body aches, tylenol was given with good relief. Patient reported good sleep last night. He ate most of of his meals. He is medication compliant. Denied any side effects. Grooming is neglected, appearance disheveled. /75 (BP Location: Left arm, Patient Position: Sitting, Cuff Size: Adult Regular)   Pulse 81   Temp 97.8  F (36.6  C) (Oral)   Resp 16   Ht 1.6 m (5' 3\")   Wt 75.1 kg (165 lb 8 oz)   SpO2 95%   BMI 29.32 kg/m                              "

## 2023-01-31 NOTE — PLAN OF CARE
01/31/23 1103   Individualization/Patient Specific Goals   Patient Personal Strengths expressive of needs;positive educational history   Patient Vulnerabilities history of unsuccessful treatment;limited support system;substance abuse/addiction   Interprofessional Rounds   Summary Pt admitted due to suicidal thoughts with a plan to shoot himself as well as hallucinations. Pt signed in voluntarily.   Participants CTC;psychiatrist;nursing   Behavioral Team Discussion   Participants Doctor Kevin; Yuliana Chao LICSW; Delisa LINDO   Progress Initial assessment   Anticipated length of stay 5-7 days   Continued Stay Criteria/Rationale Pt needs stabilization of mental health symptoms.   Medical/Physical no acute issues   Precautions See below   Plan Psychiatric Assessment. Medication Management. Therapeutic Mileu. Individual and group support.   Rationale for change in precautions or plan No change.    Safety Plan See below   Anticipated Discharge Disposition substance use treatment     PRECAUTIONS AND SAFETY    Behavioral Orders   Procedures     Code 1 - Restrict to Unit     Routine Programming     As clinically indicated     Status 15     Every 15 minutes.     Suicide precautions     Patients on Suicide Precautions should have a Combination Diet ordered that includes a Diet selection(s) AND a Behavioral Tray selection for Safe Tray - with utensils, or Safe Tray - NO utensils       Withdrawal precautions       Safety  Safety WDL: WDL  Patient Location: patient room, own  Observed Behavior: sleeping  Safety Measures: safety rounds completed  Suicidality: Status 15, Behavioral scrubs (pajamas), Minimal furniture in room  Withdrawal: monitor withdrawal process  Assault: status 15, private room  Elopement Interventions: status 15  Sexual: status 15, private room

## 2023-01-31 NOTE — PLAN OF CARE
CTC Daily Note     Assessment/Intervention/Current Symtoms and Care Coordination:  Participated in team meeting. Completed chart review.   Spoke to Erlin from [facility] who stated that pt is welcome to come back once he is stable. He can be reached at [phone].     Discharge Plan or Goal:  Return to [facility]     Barriers to Discharge:    Stabilization of mental health symptoms     Referral Status:  No referrals made yet this hospitalization.      Legal Status:   Voluntary

## 2023-01-31 NOTE — DISCHARGE INSTRUCTIONS
Behavioral Discharge Planning and Instructions    Summary: You were admitted voluntarily on 2/9/23 due to Suicidal Ideation. You were treated by Nelly Brown MD and discharged on 4/12/23 from [facility].    Main Diagnosis:   MDD, recurrent, severe with psychotic features vs Schizoaffective Disorder, depressive type  Polysubstance use    Health Care Follow-up:   [facility]  [address] [place] [address]  Phone: [phone]  Fax: [phone]    Medication Management Follow-up Appointment:  In Person:  4/20/23 @ 1:45 pm:  Provider:  Lainey Morin MD:  Address:  [facility], [address] [place] [address]  Phone: [phone]    Targeted Mental Health : Anuradha Silva at [phone]    Attend all scheduled appointments with your outpatient providers. Call at least 24 hours in advance if you need to reschedule an appointment to ensure continued access to your outpatient providers.     Major Treatments, Procedures and Findings:  You were provided with: a psychiatric assessment, assessed for medical stability, medication evaluation and/or management, and milieu management.  You completed 15 ECT treatment sessions.     Symptoms to Report: feeling more aggressive, increased confusion, losing more sleep, mood getting worse, or thoughts of suicide    Early warning signs can include: increased depression or anxiety sleep disturbances increased thoughts or behaviors of suicide or self-harm  increased unusual thinking, such as paranoia or hearing voices    Safety and Wellness:  Take all medicines as directed.  Make no changes unless your doctor suggests them.      Follow treatment recommendations.  Refrain from alcohol and non-prescribed drugs.  If there is a concern for safety, call 911.    Resources:   Crisis Intervention: 250.170.8479 or 030-494-8650 (TTY: 649.847.8096).  Call anytime for help.  [place] on  "Mental Illness (www.[place].cherie.org): 651-612-5621 or 382-010-9476.  Suicide Awareness Voices of Education (SAVE) (www.save.org): 098-712-DTZT (5497)  National Suicide Prevention Line (www.mentalhealthmn.org): 788-085-YMFN (4128)  Mental Health Association of [place] (www.mentalhealth.org): 570.388.7942 or 375-625-0067  Self- Management and Recovery Training., SMART-- Toll free: 378.971.3562  www.[employer]  [facility] Crisis Response - Adult 872 161-8440  Text 4 Life: txt \"LIFE\" to 48030 for immediate support and crisis intervention  Crisis text line: Text \"[place]\" to 957899. Free, confidential, 24/7.  Crisis Intervention: 799.934.3513 or 725-510-4136. Call anytime for help.     General Medication Instructions:   See your medication sheet(s) for instructions.   Take all medicines as directed.  Make no changes unless your doctor suggests them.   Go to all your doctor visits.  Be sure to have all your required lab tests. This way, your medicines can be refilled on time.  Do not use any drugs not prescribed by your doctor.  Avoid alcohol.    Advance Directives:   Scanned document on file with [place]? No scanned doc  Is document scanned? Pt states no documents  Honoring Choices Your Rights Handout: Informed and given  Was more information offered? Pt declined    The Treatment team has appreciated the opportunity to work with you. If you have any questions or concerns about your recent admission, you can contact the unit which can receive your call 24 hours a day, 7 days a week. They will be able to get in touch with a Provider if needed. The unit number is [phone].  "

## 2023-02-01 PROCEDURE — 124N000002 HC R&B MH UMMC

## 2023-02-01 PROCEDURE — 250N000013 HC RX MED GY IP 250 OP 250 PS 637: Performed by: PSYCHIATRY & NEUROLOGY

## 2023-02-01 PROCEDURE — 99233 SBSQ HOSP IP/OBS HIGH 50: CPT | Performed by: PSYCHIATRY & NEUROLOGY

## 2023-02-01 RX ORDER — RISPERIDONE 2 MG/1
2 TABLET ORAL AT BEDTIME
Status: DISCONTINUED | OUTPATIENT
Start: 2023-02-01 | End: 2023-02-24

## 2023-02-01 RX ORDER — RISPERIDONE 1 MG/1
1 TABLET ORAL DAILY
Status: DISCONTINUED | OUTPATIENT
Start: 2023-02-01 | End: 2023-02-27

## 2023-02-01 RX ADMIN — RISPERIDONE 2 MG: 2 TABLET ORAL at 21:14

## 2023-02-01 RX ADMIN — CLONIDINE HYDROCHLORIDE 0.2 MG: 0.2 TABLET ORAL at 19:13

## 2023-02-01 RX ADMIN — GABAPENTIN 300 MG: 300 CAPSULE ORAL at 08:22

## 2023-02-01 RX ADMIN — GABAPENTIN 300 MG: 300 CAPSULE ORAL at 19:13

## 2023-02-01 RX ADMIN — SERTRALINE HYDROCHLORIDE 150 MG: 50 TABLET ORAL at 08:22

## 2023-02-01 RX ADMIN — NICOTINE 1 PATCH: 21 PATCH, EXTENDED RELEASE TRANSDERMAL at 08:23

## 2023-02-01 RX ADMIN — GABAPENTIN 300 MG: 300 CAPSULE ORAL at 13:05

## 2023-02-01 RX ADMIN — ACETAMINOPHEN 325MG 650 MG: 325 TABLET ORAL at 08:23

## 2023-02-01 RX ADMIN — CLONIDINE HYDROCHLORIDE 0.1 MG: 0.1 TABLET ORAL at 08:22

## 2023-02-01 RX ADMIN — RISPERIDONE 1 MG: 1 TABLET ORAL at 14:02

## 2023-02-01 RX ADMIN — OLANZAPINE 10 MG: 10 TABLET, FILM COATED ORAL at 08:22

## 2023-02-01 ASSESSMENT — ACTIVITIES OF DAILY LIVING (ADL)
ADLS_ACUITY_SCORE: 28
LAUNDRY: UNABLE TO COMPLETE
ADLS_ACUITY_SCORE: 28
ADLS_ACUITY_SCORE: 28
LAUNDRY: WITH SUPERVISION
ORAL_HYGIENE: INDEPENDENT
ADLS_ACUITY_SCORE: 28
ADLS_ACUITY_SCORE: 28
HYGIENE/GROOMING: INDEPENDENT
ADLS_ACUITY_SCORE: 28
DRESS: INDEPENDENT
ORAL_HYGIENE: INDEPENDENT
ADLS_ACUITY_SCORE: 28
HYGIENE/GROOMING: INDEPENDENT
DRESS: SCRUBS (BEHAVIORAL HEALTH)
ADLS_ACUITY_SCORE: 28

## 2023-02-01 NOTE — PLAN OF CARE
02/01/23 1629   Group Therapy Session   Group Attendance other (see comments)   Patient Participation Detail Pt was invited to group as he sat in the lounge staring straight ahead most of the time, though stated his arm hurt.  When asked if he injured it, pt didn't give a direct answer, though commented his leg and back hurt as well, and that he already reported this to his nurse and doctors.     Goal Outcome Evaluation:

## 2023-02-01 NOTE — PLAN OF CARE
Problem: Sleep Disturbance  Goal: Adequate Sleep/Rest  Outcome: Progressing   Goal Outcome Evaluation:    Pt appeared to sleep 6.25 hours overnight with no problems observed during safety checks. No concerns reported. No prns administered. Will continue to monitor and assess pt safety, mental status, and any medical concerns.
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
## 2023-02-01 NOTE — PLAN OF CARE
CTC Daily Note     Assessment/Intervention/Current Symtoms and Care Coordination:  Participated in team meeting. Completed chart review.      Discharge Plan or Goal:  Return to [facility] when stable.     Barriers to Discharge:    Stabilization of mental health symptoms     Referral Status:  No referrals made yet this hospitalization.      Legal Status:   Voluntary

## 2023-02-01 NOTE — PLAN OF CARE
"He is isolative and has some delay in speech, cognition in interactions.  Continues to state suicidal ideation and said, \" I'll buy a gun and shoot myself in the head,\" in discussions.  States that his auditory and visual hallucinations are reduced since coming into the hospital, yet still experiencing them.  He intensely stares in his room appearing to be internally preoccupied.  He complained of back, arm pain and PRN zyprexa, tylenol was given with morning medication.  He agrees to come to staff if feeling unsafe and for concerns.  Cooperative with changing rooms.        "

## 2023-02-02 PROCEDURE — 250N000013 HC RX MED GY IP 250 OP 250 PS 637: Performed by: PSYCHIATRY & NEUROLOGY

## 2023-02-02 PROCEDURE — 124N000002 HC R&B MH UMMC

## 2023-02-02 PROCEDURE — 99232 SBSQ HOSP IP/OBS MODERATE 35: CPT | Performed by: PSYCHIATRY & NEUROLOGY

## 2023-02-02 RX ADMIN — GABAPENTIN 300 MG: 300 CAPSULE ORAL at 13:00

## 2023-02-02 RX ADMIN — CLONIDINE HYDROCHLORIDE 0.1 MG: 0.1 TABLET ORAL at 08:23

## 2023-02-02 RX ADMIN — GABAPENTIN 300 MG: 300 CAPSULE ORAL at 08:23

## 2023-02-02 RX ADMIN — CLONIDINE HYDROCHLORIDE 0.2 MG: 0.2 TABLET ORAL at 19:53

## 2023-02-02 RX ADMIN — RISPERIDONE 1 MG: 1 TABLET ORAL at 08:24

## 2023-02-02 RX ADMIN — GABAPENTIN 300 MG: 300 CAPSULE ORAL at 19:54

## 2023-02-02 RX ADMIN — SERTRALINE HYDROCHLORIDE 150 MG: 50 TABLET ORAL at 08:24

## 2023-02-02 RX ADMIN — ACETAMINOPHEN 325MG 650 MG: 325 TABLET ORAL at 08:23

## 2023-02-02 RX ADMIN — NICOTINE 1 PATCH: 21 PATCH, EXTENDED RELEASE TRANSDERMAL at 08:25

## 2023-02-02 RX ADMIN — RISPERIDONE 2 MG: 2 TABLET ORAL at 19:53

## 2023-02-02 RX ADMIN — ACETAMINOPHEN 325MG 650 MG: 325 TABLET ORAL at 17:16

## 2023-02-02 ASSESSMENT — ACTIVITIES OF DAILY LIVING (ADL)
ORAL_HYGIENE: INDEPENDENT
ADLS_ACUITY_SCORE: 28
ADLS_ACUITY_SCORE: 39
ADLS_ACUITY_SCORE: 28
ADLS_ACUITY_SCORE: 28
DRESS: SCRUBS (BEHAVIORAL HEALTH);INDEPENDENT
ADLS_ACUITY_SCORE: 28
DRESS: SCRUBS (BEHAVIORAL HEALTH)
HYGIENE/GROOMING: PROMPTS
ADLS_ACUITY_SCORE: 28
ADLS_ACUITY_SCORE: 39
ADLS_ACUITY_SCORE: 28
HYGIENE/GROOMING: INDEPENDENT
LAUNDRY: WITH SUPERVISION
ADLS_ACUITY_SCORE: 28
ORAL_HYGIENE: INDEPENDENT
ADLS_ACUITY_SCORE: 39

## 2023-02-02 NOTE — PLAN OF CARE
"Reciting slowly in a monotone voice: \"I still have auditory hallucinations. It is whispering in front of me and off to the side. It tells me I should be shooting myself in the head.\"- \"no , it doesn't tell me to do it, just that I should.\"  \"I  see floating lanterns, blackbirds, and doves flying around the room.\"    \"I still have thoughts of shooting myself with a gun.\"\" No I wouldn't do anything here.    Talked about latitudes and what he likes about it. Future orientation continues.(Did not want to talk about Emil.)    Initially denied cravings, then said he was craving meth. No c/o generalized body aches ( which he experienced this morning.)  MSSA 1 and 1, COWS 0 and 0.     Started Risperidone: reporting fatigue as the only discernible effect.     Isolative behaviour continues along with preoccupation, speech latency, restricted affect, intermittent eye contact only. Some visual scanning. Does not appear in any manner alarmed or uncomfortable because of the reported hallucinations. Speech is slow, clear, coherent. Able to remain focused in conversation. Very inactive with unchanged neglected ADLs.    (Hyperlipidemia to be followed up outpatient and nutrition consult may be considered per MD note. )      Plan: Monitor and document mood and behaviour, thought process and content. Establish and maintain therapeutic relationship. Educate about diagnoses, medications, treatment, legal status, plan of care. Address preexisting and concurrent medical concerns.      P: Unstable mood  G: Stable mood  O: Progressing         "

## 2023-02-02 NOTE — PLAN OF CARE
Assessment/Intervention/Current Symtoms and Care Coordination:  Participated in team meeting. Completed chart review.      Discharge Plan or Goal:  Return to [facility] when stable.     Barriers to Discharge:    Stabilization of mental health symptoms     Referral Status:  No referrals made yet this hospitalization.      Legal Status:   Voluntary

## 2023-02-02 NOTE — PLAN OF CARE
Problem: Suicidal Behavior  Goal: Suicidal Behavior is Absent or Managed  Outcome: Progressing  Note: Patient manifests no suicidal behavior     Problem: Psychotic Signs/Symptoms  Goal: Improved Behavioral Control (Psychotic Signs/Symptoms)  Outcome: Progressing  Note: Patient demonstrates no behavioral dyscontrol  Goal: Improved Sleep (Psychotic Signs/Symptoms)  Outcome: Progressing  Note: Patient has been sleeping since the start of the shift     Problem: Psychotic Signs/Symptoms  Goal: Improved Sleep (Psychotic Signs/Symptoms)  Outcome: Progressing  Note: Patient has been sleeping since the start of the shift   Goal Outcome Evaluation:        Patient had uneventful night, observed sleeping during safety checks, cooperative with no negative behavioral manifestation. Patient reports no pain, anxiety, insomnia or any somatic issues. No outbursts of anger, paranoia, delusional thoughts or physical/verbal aggression targeted at staff or peers. Patient endorses no SI/HI, A/VH or SIB. Patient was not observed responding to internal stimuli. Patient had a total of 6.75 hours of sleep. Will continue to monitor and follow plan of care.                    Melvin Pimentel DNP, RN,

## 2023-02-02 NOTE — PROGRESS NOTES
[facility], [place]   Psychiatric Progress Note  Hospital Day: 6        Interim History:   The patient's care was discussed with the treatment team during the daily team meeting and/or staff's chart notes were reviewed.  Staff report patient continues to report active SI with plan to shoot himself. Reporting visual and command auditory hallucinations. He appears paranoid and internally preoccupied. Patient did not report any acute medical concerns or side effects with the exception of fatigue. No behavioral issues overnight, including violent or aggressive behaviors. Patient did not require seclusion or restraints. Patient is exhibiting signs/sx of psychosis or jovanny. Patient is medication adherent. Patient is not attending groups. Patient is sleeping well. Patient is eating adequately. Patient is not attending to ADLs. Appears disheveled.     Upon interview, the patient reported that he continues to experience AH and VH. He feels safe in the hospital and will notify staff if feeling unsafe. He feels depressed and anxious. He has low energy, low motivation, anhedonia, and feelings of hopelessness. He has not showered or brushed his teeth since admission. He was encouraged to engage in self care.     Suicidal ideation: Active SI with plan to shoot himself. Denies plan in the hospital or current intent. Denies access to firearms.     Homicidal ideation: denies current or recent homicidal ideation or behaviors.    Psychotic symptoms: Reports paranoia, AH, VH.    Medication side effects reported: No significant side effects reported to this writer but he later reported lightheadedness to RN. He has been asked to drink more fluids.     Acute medical concerns: none    Other issues reported by patient: Patient had no further questions or concerns.           Medications:       cloNIDine  0.1 mg Oral QAM     cloNIDine  0.2 mg Oral QPM     gabapentin  300 mg Oral TID     nicotine  1 patch  "Transdermal Daily     nicotine   Transdermal Q8H     risperiDONE  1 mg Oral Daily     risperiDONE  2 mg Oral At Bedtime     sertraline  150 mg Oral Daily          Allergies:   No Known Allergies       Labs:   No results found for this or any previous visit (from the past 24 hour(s)).       Psychiatric Examination:     /72   Pulse 77   Temp 98.2  F (36.8  C) (Temporal)   Resp 16   Ht 1.6 m (5' 3\")   Wt 75.1 kg (165 lb 8 oz)   SpO2 97%   BMI 29.32 kg/m    Weight is 165 lbs 8 oz  Body mass index is 29.32 kg/m .    Weight over time:  Vitals:    01/27/23 1718   Weight: 75.1 kg (165 lb 8 oz)       Orthostatic Vitals     None            Cardiometabolic risk assessment. 01/30/23      Reviewed patient profile for cardiometabolic risk factors    Date taken /Value  REFERENCE RANGE   Abdominal Obesity  (Waist Circumference)   See nursing flowsheet Women ?35 in (88 cm)   Men ?40 in (102 cm)      Triglycerides  Triglycerides   Date Value Ref Range Status   01/28/2023 192 (H) <150 mg/dL Final       ?150 mg/dL (1.7 mmol/L) or current treatment for elevated triglycerides   HDL cholesterol  Direct Measure HDL   Date Value Ref Range Status   01/28/2023 41 >=40 mg/dL Final   ]   Women <50 mg/dL (1.3 mmol/L) in women or current treatment for low HDL cholesterol  Men <40 mg/dL (1 mmol/L) in men or current treatment for low HDL cholesterol     Fasting plasma glucose (FPG) Lab Results   Component Value Date     01/28/2023      FPG ?100 mg/dL (5.6 mmol/L) or treatment for elevated blood glucose   Blood pressure  BP Readings from Last 3 Encounters:   02/02/23 104/72   01/27/23 118/74   07/20/22 112/79    Blood pressure ?130/85 mmHg or treatment for elevated blood pressure   Family History  See family history     Appearance: awake, alert, dressed in hospital scrubs and disheveled   Attitude:  cooperative  Eye Contact:  good  Mood:  depressed  Affect:  mood congruent and intensity is blunted  Speech:  clear, coherent. " Delayed responses.   Language: fluent and intact in English  Psychomotor, Gait, Musculoskeletal:  no evidence of tardive dyskinesia, dystonia, or tics  Throught Process:  linear, goal oriented and illogical  Associations:  no loose associations  Thought Content:  active suicidal ideation present, auditory hallucinations present and visual hallucinations present  Insight:  fair  Judgement:  fair  Oriented to:  time, person, and place  Attention Span and Concentration:  fair  Recent and Remote Memory:  fair  Fund of Knowledge:  appropriate    Clinical Global Impressions  First:  Considering your total clinical experience with this particular patient population, how severe are the patient's symptoms at this time?: 7 (01/28/23 1341)  Compared to the patient's condition at the START of treatment, this patient's condition is: 4 (01/28/23 1341)  Most recent:  Considering your total clinical experience with this particular patient population, how severe are the patient's symptoms at this time?: 7 (01/28/23 1341)  Compared to the patient's condition at the START of treatment, this patient's condition is: 4 (01/28/23 1341)           Precautions:     Behavioral Orders   Procedures     Code 1 - Restrict to Unit     Routine Programming     As clinically indicated     Status 15     Every 15 minutes.     Suicide precautions     Patients on Suicide Precautions should have a Combination Diet ordered that includes a Diet selection(s) AND a Behavioral Tray selection for Safe Tray - with utensils, or Safe Tray - NO utensils       Withdrawal precautions          Diagnoses:     Suicidal ideation   Unspecified psychosis (substance induced likely)  Polysubstance use  Unspecified mood disorder  ADHD, inattentive type per chart  History of idiopathic hypersomnolence per chart  Nicotine use disorder, dependence and withdrawal   Allergies- chronic uticaria and rhinitis per chart  HLD per chart          Assessment & Plan:     Assessment and  "hospital summary:  This patient is a 32 year old male with history of mood disorder, ADHD and substance use who presented to [facility] with SI on 1/26 in context of reported methamphetamine use and being kicked out of sober living. Medically cleared in ED, placed on 72HH and admitted to [facility]. Limited historian, giving inconsistent reports about substance use, UDS negative, very somnolent, endorsing ongoing SI and some psychosis symptoms. PTA medications continued with exception of finasteride as patient states he takes \"1/4 a pill\" and declined ordered dose, will defer to primary team to address. Will continue 72HH and evaluate safety and stabilization further as patient more able to engage in assessments.      Inpatient psychiatric hospitalization is warranted at this time for safety, stabilization, and possible adjustment in medications.    Patient reports that he abruptly stopped Zoloft one week prior to his admission. He developed discontinuation syndrome symptoms following that. He reports that he does not have a history of psychosis but is experiencing psychotic symptoms. He is amenable with plan to trial risperidone after R/B/A were discussed.     Psychiatric treatment/inteventions:  Medications:   No medication changes will be made today  -Continue risperidone to 1 mg daily and 2 mg at bedtime (initiated on 1/30 and increased on 2/1)  -continue PTA sertraline 150mg daily for mood  -continue PTA clonidine 0.1mg in AM and 0.2mg HS for anxiety/agitation   -continue PTA gabapentin 300mg TID for anxiety      -PRN hydroxyzine 25mg every 4 hour for anxiety  -PRN trazodone 50mg at bedtime for sleep   -PRN olanzapine 10mg PO or IM TID for agitation/psychosis      Laboratory/Imaging: reviewed: Covid negative; UDS negative; CMP, CBC, TSH, Lipid panel, HgbA1c ordered to complete admission labs. Reviewed.      Patient will be treated in therapeutic milieu with appropriate individual and group therapies as " described.     Medical treatment/interventions:  Medical concerns:   Nicotine replacement ordered, educate patient on benefits of cessation, pt unclear about finasteride dose, will hold until further information is obtained. PTA PRN zyrtec, azelastine, fluticasone, triamcinolone and epipen ordered for allergies and PRN ammoniaum lactate, Eucerin for  dry skin.  Dyslipidemia: Will arrange follow up with PCP to address. Discussed importance of healthy eating habits and regular exercise. Will consider nutrition consult if patient amenable.   Lightheadedness: Pt is not hypotensive and orthostatics wnl. He was asked to increase fluid intake.   - Continue to monitor vital signs closely  - Encourage fluids     Legal Status: 72 hour hold discontinued. Pt agreed to sign in on voluntary basis and discharge directly to treatment once stable.      Safety Assessment:        Behavioral Orders   Procedures     Code 1 - Restrict to Unit     Routine Programming       As clinically indicated     Status 15       Every 15 minutes.     Suicide precautions       Patients on Suicide Precautions should have a Combination Diet ordered that includes a Diet selection(s) AND a Behavioral Tray selection for Safe Tray - with utensils, or Safe Tray - NO utensils        Withdrawal precautions      Pt has not required locked seclusion or restraints in the past 24 hours to maintain safety, please refer to RN documentation for further details.    The risks, benefits, alternatives and side effects have been discussed and are understood by the patient.     Disposition: Pending clinical stabilization. Will likely discharge to  treatment when stable, pt reports interest in [facility].      This note was created by undersigned using a Dragon dictation system. All typing errors or contextual distortion are unintentional and software inherent.     Entered by: Nelly Brown MD on 2/2/2023 at 8:43 AM

## 2023-02-02 NOTE — PLAN OF CARE
"He is isolative and complained of body aches and PRN tylenol was given with morning medications.  He continues to complain of auditory and visual hallucinations that have no change since starting the risperdal yesterday.  He continues to state having suicidal ideation by wanting to get a gun a shot himself.  He agrees to come to staff for concerns and questions.  He continues to be delayed in speech, cognition, yet he did appear to have a faster response time compared to previous interactions.  He complained of feeling, \" tired, light headed, feel like I\"m going to black out since starting risperdal.\"  He was encouraged to drink more fluids, get up from laying position slowly and to inform staff if having these feelings.  He verbalized understanding to come to staff for these concerns.  He mostly resting in his room for most of morning shifts.  VS:  Temp 97.6, respirations 16, 02 95 on RA, Pulse 87, BP 98/65.  The findings were reported to MD and informed to continue to monitor and provide education to patient to improve fluid intake, get up slowly when transferring himself.  The findings were also reported to charge RN as well.  Writer placed juices, cold water for him to drink consume at the end of the shift.           "

## 2023-02-03 PROCEDURE — 250N000013 HC RX MED GY IP 250 OP 250 PS 637: Performed by: PSYCHIATRY & NEUROLOGY

## 2023-02-03 PROCEDURE — 99233 SBSQ HOSP IP/OBS HIGH 50: CPT | Performed by: PSYCHIATRY & NEUROLOGY

## 2023-02-03 PROCEDURE — 124N000002 HC R&B MH UMMC

## 2023-02-03 RX ORDER — OLANZAPINE 5 MG/1
5-10 TABLET, ORALLY DISINTEGRATING ORAL 2 TIMES DAILY PRN
Status: DISCONTINUED | OUTPATIENT
Start: 2023-02-03 | End: 2023-04-12 | Stop reason: HOSPADM

## 2023-02-03 RX ORDER — CLONIDINE HYDROCHLORIDE 0.1 MG/1
0.1 TABLET ORAL EVERY EVENING
Status: DISCONTINUED | OUTPATIENT
Start: 2023-02-03 | End: 2023-02-03

## 2023-02-03 RX ORDER — CLONIDINE HYDROCHLORIDE 0.1 MG/1
0.1 TABLET ORAL AT BEDTIME
Status: DISCONTINUED | OUTPATIENT
Start: 2023-02-03 | End: 2023-02-08

## 2023-02-03 RX ADMIN — RISPERIDONE 2 MG: 2 TABLET ORAL at 19:44

## 2023-02-03 RX ADMIN — OLANZAPINE 10 MG: 5 TABLET, ORALLY DISINTEGRATING ORAL at 16:46

## 2023-02-03 RX ADMIN — CLONIDINE HYDROCHLORIDE 0.1 MG: 0.1 TABLET ORAL at 08:25

## 2023-02-03 RX ADMIN — GABAPENTIN 300 MG: 300 CAPSULE ORAL at 13:22

## 2023-02-03 RX ADMIN — GABAPENTIN 300 MG: 300 CAPSULE ORAL at 19:44

## 2023-02-03 RX ADMIN — ACETAMINOPHEN 325MG 650 MG: 325 TABLET ORAL at 17:31

## 2023-02-03 RX ADMIN — ACETAMINOPHEN 325MG 650 MG: 325 TABLET ORAL at 13:22

## 2023-02-03 RX ADMIN — GABAPENTIN 300 MG: 300 CAPSULE ORAL at 08:25

## 2023-02-03 RX ADMIN — NICOTINE 1 PATCH: 21 PATCH, EXTENDED RELEASE TRANSDERMAL at 08:25

## 2023-02-03 RX ADMIN — SERTRALINE HYDROCHLORIDE 150 MG: 50 TABLET ORAL at 08:25

## 2023-02-03 RX ADMIN — RISPERIDONE 1 MG: 1 TABLET ORAL at 08:24

## 2023-02-03 RX ADMIN — CLONIDINE HYDROCHLORIDE 0.1 MG: 0.1 TABLET ORAL at 19:44

## 2023-02-03 ASSESSMENT — ACTIVITIES OF DAILY LIVING (ADL)
DRESS: INDEPENDENT
HYGIENE/GROOMING: INDEPENDENT
ADLS_ACUITY_SCORE: 29
ADLS_ACUITY_SCORE: 39
LAUNDRY: UNABLE TO COMPLETE
ADLS_ACUITY_SCORE: 29
ADLS_ACUITY_SCORE: 39
ADLS_ACUITY_SCORE: 29
ORAL_HYGIENE: INDEPENDENT
ADLS_ACUITY_SCORE: 29
ADLS_ACUITY_SCORE: 29
HYGIENE/GROOMING: INDEPENDENT
ADLS_ACUITY_SCORE: 39
ADLS_ACUITY_SCORE: 29
ORAL_HYGIENE: INDEPENDENT
ADLS_ACUITY_SCORE: 39
ADLS_ACUITY_SCORE: 39
ADLS_ACUITY_SCORE: 29
DRESS: INDEPENDENT

## 2023-02-03 NOTE — PLAN OF CARE
Assessment/Intervention/Current Symtoms and Care Coordination:   Chart Review  Team Meeting - nurse reports Haseeb is isolative to his room has calm flat/blunted affect, endorse 9/10 depression and anxiety. Reports having suicidal ideation with plan to shoot himself, A/V hallucinations, states he feels safe here, is medication compliant. Scored MSSA 5 and COWs O. Provider reports clonidine will be decreased, risperidone has been titrated up and will hold at current dose.       Discharge Plan or Goal:  Return to [facility] when stable.     Barriers to Discharge:    Stabilization of mental health symptoms     Referral Status:  No referrals made yet this hospitalization.      Legal Status:   Voluntary

## 2023-02-03 NOTE — PROGRESS NOTES
"[facility], [place]   Psychiatric Progress Note  Hospital Day: 7        Interim History:   The patient's care was discussed with the treatment team during the daily team meeting and/or staff's chart notes were reviewed.  Staff report patient continues to report active SI with plan to shoot himself. Pt denied any access to weapon but verbalized that they will go to [place] to buy a gun. Reporting visual and command auditory hallucinations. He appears paranoid and internally preoccupied. Patient did not report any acute medical concerns or side effects. No behavioral issues overnight, including violent or aggressive behaviors. Patient did not require seclusion or restraints. Patient is exhibiting signs/sx of psychosis or jovanny. Isolative to his room. Patient is medication adherent. Patient is not attending groups. Patient is sleeping well. Patient is eating adequately. Patient is not attending to ADLs. Appears disheveled.     Upon interview, the patient was staring at his ceiling as writer entered his room. When asked what he was looking at, he responded \"lanterns.\" He also is hearing whispers. Noted that they are unchanged since admission and he believes someone poisoned him with methamphetamines. He added that the whispers are telling him that he can no longer get a gun \"so they are telling me to cut my throat with a knife. That's what I will do instead.\" He added that he wants to die. When asked why, he replied \"I am jobless, homeless, and my ex boyfriend is dead.\" Mentioned that his ex-boyfriend  from \"drug addiction\" in . He is feeling very hopeless. However, he again contracted for safety. He said that he will not harm himself in the hospital, and if he develops a plan to harm himself while hospitalized, he would notify staff immediately, and prior to acting on any thoughts of self harm. He mentioned that he was referred for TMS (confirmed in chart review), but that he was " "not eligible. He is not sure why.     Suicidal ideation: Active SI with plan to shoot himself. Denies plan in the hospital or current intent. Denies access to firearms.     Homicidal ideation: denies current or recent homicidal ideation or behaviors.    Psychotic symptoms: Reports paranoia, AH, VH.    Medication side effects reported: Reports lightheadedness in the AM. Amenable to a dose reduction in clonidine. Encouraged fluids.     Acute medical concerns: as above    Other issues reported by patient: Patient had no further questions or concerns.           Medications:       cloNIDine  0.1 mg Oral QAM     cloNIDine  0.2 mg Oral QPM     gabapentin  300 mg Oral TID     nicotine  1 patch Transdermal Daily     nicotine   Transdermal Q8H     risperiDONE  1 mg Oral Daily     risperiDONE  2 mg Oral At Bedtime     sertraline  150 mg Oral Daily          Allergies:   No Known Allergies       Labs:   No results found for this or any previous visit (from the past 24 hour(s)).       Psychiatric Examination:     /69 (BP Location: Right arm, Patient Position: Supine, Cuff Size: Adult Regular)   Pulse 59   Temp 97.3  F (36.3  C) (Temporal)   Resp 16   Ht 1.6 m (5' 3\")   Wt 75.1 kg (165 lb 8 oz)   SpO2 98%   BMI 29.32 kg/m    Weight is 165 lbs 8 oz  Body mass index is 29.32 kg/m .    Weight over time:  Vitals:    01/27/23 1718   Weight: 75.1 kg (165 lb 8 oz)       Orthostatic Vitals     None            Cardiometabolic risk assessment. 01/30/23      Reviewed patient profile for cardiometabolic risk factors    Date taken /Value  REFERENCE RANGE   Abdominal Obesity  (Waist Circumference)   See nursing flowsheet Women ?35 in (88 cm)   Men ?40 in (102 cm)      Triglycerides  Triglycerides   Date Value Ref Range Status   01/28/2023 192 (H) <150 mg/dL Final       ?150 mg/dL (1.7 mmol/L) or current treatment for elevated triglycerides   HDL cholesterol  Direct Measure HDL   Date Value Ref Range Status   01/28/2023 41 >=40 " mg/dL Final   ]   Women <50 mg/dL (1.3 mmol/L) in women or current treatment for low HDL cholesterol  Men <40 mg/dL (1 mmol/L) in men or current treatment for low HDL cholesterol     Fasting plasma glucose (FPG) Lab Results   Component Value Date     01/28/2023      FPG ?100 mg/dL (5.6 mmol/L) or treatment for elevated blood glucose   Blood pressure  BP Readings from Last 3 Encounters:   02/03/23 103/69   01/27/23 118/74   07/20/22 112/79    Blood pressure ?130/85 mmHg or treatment for elevated blood pressure   Family History  See family history     Appearance: awake, alert, dressed in hospital scrubs and disheveled   Attitude:  cooperative  Eye Contact:  good  Mood:  depressed  Affect:  mood congruent and intensity is blunted  Speech:  clear, coherent. Delayed responses.   Language: fluent and intact in English  Psychomotor, Gait, Musculoskeletal:  no evidence of tardive dyskinesia, dystonia, or tics  Throught Process:  linear, goal oriented and illogical  Associations:  no loose associations  Thought Content:  active suicidal ideation present, auditory hallucinations present and visual hallucinations present  Insight:  fair  Judgement:  fair  Oriented to:  time, person, and place  Attention Span and Concentration:  fair  Recent and Remote Memory:  fair  Fund of Knowledge:  appropriate    Clinical Global Impressions  First:  Considering your total clinical experience with this particular patient population, how severe are the patient's symptoms at this time?: 7 (01/28/23 1341)  Compared to the patient's condition at the START of treatment, this patient's condition is: 4 (01/28/23 1341)  Most recent:  Considering your total clinical experience with this particular patient population, how severe are the patient's symptoms at this time?: 7 (01/28/23 1341)  Compared to the patient's condition at the START of treatment, this patient's condition is: 4 (01/28/23 1341)           Precautions:     Behavioral Orders  "  Procedures     Code 1 - Restrict to Unit     Routine Programming     As clinically indicated     Status 15     Every 15 minutes.     Suicide precautions     Patients on Suicide Precautions should have a Combination Diet ordered that includes a Diet selection(s) AND a Behavioral Tray selection for Safe Tray - with utensils, or Safe Tray - NO utensils       Withdrawal precautions          Diagnoses:     Suicidal ideation   Unspecified psychosis (substance induced likely)  Polysubstance use  Unspecified mood disorder  ADHD, inattentive type per chart  History of idiopathic hypersomnolence per chart  Nicotine use disorder, dependence and withdrawal   Allergies- chronic uticaria and rhinitis per chart  HLD per chart          Assessment & Plan:     Assessment and hospital summary:  This patient is a 32 year old male with history of mood disorder, ADHD and substance use who presented to [facility] ED with SI on 1/26 in context of reported methamphetamine use and being kicked out of sober living. Medically cleared in ED, placed on 72HH and admitted to 12N. Limited historian, giving inconsistent reports about substance use, UDS negative, very somnolent, endorsing ongoing SI and some psychosis symptoms. PTA medications continued with exception of finasteride as patient states he takes \"1/4 a pill\" and declined ordered dose, will defer to primary team to address. Will continue 72HH and evaluate safety and stabilization further as patient more able to engage in assessments.      Inpatient psychiatric hospitalization is warranted at this time for safety, stabilization, and possible adjustment in medications.    Patient reports that he abruptly stopped Zoloft one week prior to his admission. He developed discontinuation syndrome symptoms following that. He reports that he does not have a history of psychosis but is experiencing psychotic symptoms. He is amenable with plan to trial risperidone after R/B/A were discussed. "     Psychiatric treatment/inteventions:  Medications:   -Continue risperidone to 1 mg daily and 2 mg at bedtime (initiated on 1/30 and increased on 2/1)  -continue PTA sertraline 150mg daily for mood  -continue PTA clonidine 0.1mg in AM and reduce clonidine to 0.1mg HS  -continue PTA gabapentin 300mg TID for anxiety      -PRN hydroxyzine 25mg every 4 hour for anxiety  -PRN trazodone 50mg at bedtime for sleep   -PRN olanzapine 10mg PO or IM TID for agitation/psychosis      Laboratory/Imaging: reviewed: Covid negative; UDS negative; CMP, CBC, TSH, Lipid panel, HgbA1c ordered to complete admission labs. Reviewed.      Patient will be treated in therapeutic milieu with appropriate individual and group therapies as described.     Medical treatment/interventions:  Medical concerns:   Nicotine replacement ordered, educate patient on benefits of cessation, pt unclear about finasteride dose, will hold until further information is obtained. PTA PRN zyrtec, azelastine, fluticasone, triamcinolone and epipen ordered for allergies and PRN ammoniaum lactate, Eucerin for  dry skin.  Dyslipidemia: Will arrange follow up with PCP to address. Discussed importance of healthy eating habits and regular exercise. Will consider nutrition consult if patient amenable.   Lightheadedness: Pt is not hypotensive and orthostatics wnl. He was asked to increase fluid intake.   - Continue to monitor vital signs closely  - Encourage fluids  - Reducing dose of clonidine on 2/3     Legal Status: 72 hour hold discontinued. Pt agreed to sign in on voluntary basis and discharge directly to treatment once stable.      Safety Assessment:        Behavioral Orders   Procedures     Code 1 - Restrict to Unit     Routine Programming       As clinically indicated     Status 15       Every 15 minutes.     Suicide precautions       Patients on Suicide Precautions should have a Combination Diet ordered that includes a Diet selection(s) AND a Behavioral Tray  selection for Safe Tray - with utensils, or Safe Tray - NO utensils        Withdrawal precautions      Pt has not required locked seclusion or restraints in the past 24 hours to maintain safety, please refer to RN documentation for further details.    The risks, benefits, alternatives and side effects have been discussed and are understood by the patient.     Disposition: Pending clinical stabilization. Will likely discharge to [facility] treatment when stable, pt reports interest in [facility].      This note was created by undersigned using a Dragon dictation system. All typing errors or contextual distortion are unintentional and software inherent.     Entered by: Nelly Brown MD on 2/3/2023 at 9:15 AM

## 2023-02-03 NOTE — PLAN OF CARE
Problem: Adult Behavioral Health Plan of Care  Goal: Adheres to Safety Considerations for Self and Others  Outcome: Progressing     Problem: Suicide Risk  Goal: Absence of Self-Harm  Outcome: Progressing     Problem: Suicidal Behavior  Goal: Suicidal Behavior is Absent or Managed  Outcome: Progressing     Problem: Psychotic Signs/Symptoms  Goal: Improved Behavioral Control (Psychotic Signs/Symptoms)  Outcome: Progressing     Pt reported of having whole body pain rated 9/10; pt stated that this pain started he used Meth. Pt received PRN Tylenol at 1731. Pt rated his anxiety high at 9/10 and depression 10/10. Pt stated that he is feeling suicidal at a moment. Pt denies SIB or HI when asked by this writer. Pt reported of having AVH. Pt stated that he sees a shadow coming towards him, floating lanterns, and flying birds. Pt stated that he hears voices telling him to kill himself by gun or by slitting his own neck with a knife. Pt received PRN 10 mg of ODT Zyprexa at 1646 for his anxiety and AVH. Pt stated that he feels safe in a hospital and contracted for safety. Pt is calm and cooperative with cares. Pt stated that he has been eating well and his last BM was yesterday. Pt mostly stayed in his room. Pt ate in his room. This writer encouraged pt to drink more fluids. Pt is compliant with meds.     Goal Outcome Evaluation:     Plan of Care Reviewed With: patient

## 2023-02-03 NOTE — PLAN OF CARE
Problem: Psychotic Signs/Symptoms  Goal: Enhanced Social, Occupational or Functional Skills (Psychotic Signs/Symptoms)  Outcome: Not Progressing  Intervention: Promote Social, Occupational and Functional Ability  Recent Flowsheet Documentation  Taken 2/2/2023 1716 by Aniyah Naidu RN  Trust Relationship/Rapport:    care explained    choices provided    empathic listening provided    questions answered    questions encouraged    thoughts/feelings acknowledged   Goal Outcome Evaluation:    Plan of Care Reviewed With: patient          Pt has been isolative to the room and socially withdrawn to self, only out to pick his dinner, ate in the room and has been in the room all shift. Pt has slept almost all shift, only up to eat dinner and whenever writer go into the room to check-in with the pt. Pt presents with a flat/blunted affect, mood appears calm, pt endorsed anxiety and depression at 9/10 respectively, endorsed suicidal thought with a plan of shooting his head with a handgun. Pt denied any access to weapon but verbalized that they will go to [place] to buy a gun. Endorsed auditory and visual hallucinations, denies homicidal ideation. Pt reports that they feel safe at the hospital and contracts for safety.  Blood pressure reading for this shift is 98/66, pt encouraged to increase fluids intake and pt is in agreement with the plan. Writer has been following up with pt to ensure increased fluid intake. Pt has been cooperative with care, reports generalized body aches PRN Tylenol 650mg was given with effect. No concerns with appetite, pt was medication compliant, pt is on withdrawal precaution, MSSA=5, COWS=0.

## 2023-02-03 NOTE — PLAN OF CARE
Problem: Adult Behavioral Health Plan of Care  Goal: Plan of Care Review  Outcome: Progressing  Flowsheets (Taken 2/3/2023 0821)  Patient Agreement with Plan of Care: agrees     Problem: Suicide Risk  Goal: Absence of Self-Harm  Outcome: Progressing   Goal Outcome Evaluation:    Plan of Care Reviewed With: patient      During this shift, the patient spent most of his time in his room. He consumed meals in the room. He denied having HI, constipation, and an inadequate appetite. Nevertheless, he supported arm pain, AVHs (he sees floating lanterns and doves and hears voices instructing him to kill himself by shooting), anxiety, depression, and a disrupted sleep pattern due to nightmares. He also supported SI (he planned to shoot himself but agreed to remain safe in the hospital). Tylenol was one of the medications the patient used for arm/generalized discomfort; he experienced no side effects. Despite expressing anxiety, the patient maintained a calm demeanor, and his affect was flat, which is consistent with depression, hopelessness, and sadness. The Clonidine order has changed to 0.1mg every evening from 0.2mg because of the low BP readings yesterday evening.. The staff is to encourage fluid intake.

## 2023-02-03 NOTE — PLAN OF CARE
Problem: Psychotic Signs/Symptoms  Goal: Improved Psychomotor Symptoms (Psychotic Signs/Symptoms)  Outcome: Progressing  Note: Patient manifests no psychotic symptoms  Goal: Improved Sleep (Psychotic Signs/Symptoms)  Outcome: Progressing  Note: Patient appears sleeping most of the time as documented in the sleep-wake board     Problem: Psychotic Signs/Symptoms  Goal: Improved Sleep (Psychotic Signs/Symptoms)  Outcome: Progressing  Note: Patient appears sleeping most of the time as documented in the sleep-wake board     Problem: Pain Acute  Goal: Optimal Pain Control and Function  Outcome: Progressing  Note: Patient reports no pain this shift   Goal Outcome Evaluation:   Patient had uneventful night, slept for the most part of the night with no new complaints. Patient was cooperative with cares, safety checks successfully completed, no behavioral dysregulation, no outbursts, paranoia, physical or verbal aggression toward staff or peers. Patient endorses no SI/HI, A/VH or SIB. No evidence of withdrawal or respiratory distress. Overall, patient achieves 7 hours of good quality, uninterrupted sleep.                      Melvin Pimentel DNP, RN

## 2023-02-04 PROCEDURE — 250N000013 HC RX MED GY IP 250 OP 250 PS 637: Performed by: PSYCHIATRY & NEUROLOGY

## 2023-02-04 PROCEDURE — 124N000002 HC R&B MH UMMC

## 2023-02-04 RX ADMIN — RISPERIDONE 1 MG: 1 TABLET ORAL at 08:26

## 2023-02-04 RX ADMIN — CLONIDINE HYDROCHLORIDE 0.1 MG: 0.1 TABLET ORAL at 20:25

## 2023-02-04 RX ADMIN — NICOTINE 1 PATCH: 21 PATCH, EXTENDED RELEASE TRANSDERMAL at 08:26

## 2023-02-04 RX ADMIN — SERTRALINE HYDROCHLORIDE 150 MG: 50 TABLET ORAL at 08:25

## 2023-02-04 RX ADMIN — GABAPENTIN 300 MG: 300 CAPSULE ORAL at 08:25

## 2023-02-04 RX ADMIN — ACETAMINOPHEN 325MG 650 MG: 325 TABLET ORAL at 18:31

## 2023-02-04 RX ADMIN — GABAPENTIN 300 MG: 300 CAPSULE ORAL at 20:25

## 2023-02-04 RX ADMIN — GABAPENTIN 300 MG: 300 CAPSULE ORAL at 14:15

## 2023-02-04 RX ADMIN — CLONIDINE HYDROCHLORIDE 0.1 MG: 0.1 TABLET ORAL at 08:25

## 2023-02-04 RX ADMIN — RISPERIDONE 2 MG: 2 TABLET ORAL at 20:25

## 2023-02-04 ASSESSMENT — ACTIVITIES OF DAILY LIVING (ADL)
ADLS_ACUITY_SCORE: 29

## 2023-02-04 NOTE — PLAN OF CARE
Problem: Suicide Risk  Goal: Absence of Self-Harm  Outcome: Progressing  Note: Patient manifests no suicidal behavior      Problem: Sleep Disturbance  Goal: Adequate Sleep/Rest  Outcome: Progressing  Note: Patient observed sleeping during safety checks     Problem: Pain Acute  Goal: Optimal Pain Control and Function  Outcome: Progressing  Note: Patient reports no acute pain   Goal Outcome Evaluation:       Patient had unremarkable night, slept well without requesting for prn medications or food. Patient was cooperative with cares with no behavioral disturbance. Patient endorses no SI/HI, A/VH or SIB, Patient was not observed responding to IS, manifests no paranoia, outbursts, physical or verbal aggression toward staff or peers, no evidence of withdrawal or respiratory distress. Overall, patient attains no less than 7 hours of good quality sleep. Continue with monitoring           Melvin Pimentel DNP, RN

## 2023-02-04 NOTE — PLAN OF CARE
"Problem: Suicide Risk  Goal: Absence of Self-Harm  Outcome: Not Progressing  Intervention: Assess Risk to Self and Maintain Safety  Behavior Management:    behavioral plan reviewed      Patient able to contract for safety while hospitalized.     impulse control promoted  Self-Harm Prevention:    environmental self-harm risks assessed    suicidal thoughts assessed and monitored throughout this shift  Day Shift:    Patient is alert and oriented x3, calm and cooperative. Writer RN approached patient in his room staring at the ceiling while lying on bed and introduced. Patient reports feeling tired and unable to move. He c/o having generalized pain. Patient was able to get up from bed without assistance and sat up on his bed after several prompts. Patient denies lightheadedness, dizziness and feelings of imbalance. He sat on his bed and ate breakfast 100%.     Upon interaction, patient has low energy/mood, blunted affect, slow responses, soft/quiet speech and anxious & depressed mood. Patient continues to endorse command auditory hallucinations directing him to shoot himself/cut his throat with a knife. Patient denies having access to gun but stated that he has a knife at home. Patient is heriberto for safety and verbalized maintaining safety while hospitalized. Patient also endorses visual hallucinations stating that they are not \"scaring\". Patient reports feeling anxious and depressed and rated both 9/10. Patient reports agitation 9/10; no signs of irritability/agitation noted. No evidence of delusions. He is quiet, withdrawn and isolates to his room throughout the shift. Patient is sleeping, hydrating and eating adequately.     Patient is medication compliant with reminders. No med side effects reported/noted this shift. Patient reports generalized pain. No acute medical concern. VSS. Blood pressure looks better today than previously noted (111/78), pulse 76 and O2 98% temp 98.0.    Evening Shift:    Patient's " "presentation didn't change that much of the day shift. Patient was slightly more active than the day shift. He sat in a chair in his room and watched TV. Remains withdrawn and isolative to his room. Able to verbalized feeling safe and contracts for safety while in the hospital. He continues SI thoughts saying that he is going to shoot himself with a gun as previously noted. Patient continues endorsing high anxiety and depression. He reported visual hallucinations describing seeing \"lanterns, two white doves and a shadow that is creeping on me from the side telling to kill myself by shooting my head off.\" he added that he is fearful of the shadow he is seeing and it \"scares me\". Writer offered a headphones to him to help with the voices but patient declined. No acute behavioral concern. He is ate dinner 100% in his room and he is adequately hydrated.    Patient reports generalized pain and accepted prn Tylenol 650 mg with some relief reported. No acute medical concern. VSS /78   Pulse 64   Temp 97.6  F (36.4  C) (Temporal)   Resp 16   Ht 1.6 m (5' 3\")   Wt 75.1 kg (165 lb 8 oz)   SpO2 95%   BMI 29.32 kg/m        Plan is to continue to monitor patient status q 15 mins, assess response to medications, and maintain the patient's safety.    "

## 2023-02-05 PROCEDURE — 250N000013 HC RX MED GY IP 250 OP 250 PS 637: Performed by: PSYCHIATRY & NEUROLOGY

## 2023-02-05 PROCEDURE — 124N000002 HC R&B MH UMMC

## 2023-02-05 RX ORDER — DIPHENHYDRAMINE HCL 50 MG
50 CAPSULE ORAL EVERY 6 HOURS PRN
Status: DISCONTINUED | OUTPATIENT
Start: 2023-02-05 | End: 2023-04-12 | Stop reason: HOSPADM

## 2023-02-05 RX ADMIN — CLONIDINE HYDROCHLORIDE 0.1 MG: 0.1 TABLET ORAL at 08:13

## 2023-02-05 RX ADMIN — RISPERIDONE 2 MG: 2 TABLET ORAL at 20:09

## 2023-02-05 RX ADMIN — SERTRALINE HYDROCHLORIDE 150 MG: 50 TABLET ORAL at 08:13

## 2023-02-05 RX ADMIN — NICOTINE 1 PATCH: 21 PATCH, EXTENDED RELEASE TRANSDERMAL at 08:18

## 2023-02-05 RX ADMIN — CLONIDINE HYDROCHLORIDE 0.1 MG: 0.1 TABLET ORAL at 20:10

## 2023-02-05 RX ADMIN — GABAPENTIN 300 MG: 300 CAPSULE ORAL at 08:14

## 2023-02-05 RX ADMIN — GABAPENTIN 300 MG: 300 CAPSULE ORAL at 20:09

## 2023-02-05 RX ADMIN — RISPERIDONE 1 MG: 1 TABLET ORAL at 08:13

## 2023-02-05 RX ADMIN — GABAPENTIN 300 MG: 300 CAPSULE ORAL at 14:21

## 2023-02-05 ASSESSMENT — ACTIVITIES OF DAILY LIVING (ADL)
ADLS_ACUITY_SCORE: 29

## 2023-02-05 NOTE — PLAN OF CARE
Problem: Psychotic Signs/Symptoms  Goal: Improved Psychomotor Symptoms (Psychotic Signs/Symptoms)  Outcome: Progressing  Note: Patient manifests no psychotic symptoms  Goal: Improved Sleep (Psychotic Signs/Symptoms)  Outcome: Progressing  Note: Patient observed sleeping during safety checks     Problem: Psychotic Signs/Symptoms  Goal: Improved Sleep (Psychotic Signs/Symptoms)  Outcome: Progressing  Note: Patient observed sleeping during safety checks     Problem: Pain Acute  Goal: Optimal Pain Control and Function  Outcome: Progressing  Note: Patient reports no acute pain   Goal Outcome Evaluation:         Patient achieves 6 hours of uninterrupted sleep, reports no issues of immediate concerns, cooperative with safety checks which was conducted and successfully completed. All precautions enforced, none violated. Patient manifests no behavioral disturbance. Patient was in his room all night, did not request for prn medications or snacks. Patient endorses no SI/HI, A/VH or SIB. No symptoms of jovanny or psychosis reported or observed. Will continue to monitor and follow plan of care.                 Melvin Pimentel DNP, RN.

## 2023-02-06 LAB
BASOPHILS # BLD AUTO: 0.1 10E3/UL (ref 0–0.2)
BASOPHILS NFR BLD AUTO: 1 %
EOSINOPHIL # BLD AUTO: 0.2 10E3/UL (ref 0–0.7)
EOSINOPHIL NFR BLD AUTO: 2 %
IMM GRANULOCYTES # BLD: 0 10E3/UL
IMM GRANULOCYTES NFR BLD: 1 %
LYMPHOCYTES # BLD AUTO: 2.8 10E3/UL (ref 0.8–5.3)
LYMPHOCYTES NFR BLD AUTO: 36 %
MONOCYTES # BLD AUTO: 0.7 10E3/UL (ref 0–1.3)
MONOCYTES NFR BLD AUTO: 10 %
NEUTROPHILS # BLD AUTO: 4 10E3/UL (ref 1.6–8.3)
NEUTROPHILS NFR BLD AUTO: 50 %
NRBC # BLD AUTO: 0 10E3/UL
NRBC BLD AUTO-RTO: 0 /100
WBC # BLD AUTO: 7.7 10E3/UL (ref 4–11)

## 2023-02-06 PROCEDURE — 250N000013 HC RX MED GY IP 250 OP 250 PS 637: Performed by: PSYCHIATRY & NEUROLOGY

## 2023-02-06 PROCEDURE — 124N000002 HC R&B MH UMMC

## 2023-02-06 PROCEDURE — 99222 1ST HOSP IP/OBS MODERATE 55: CPT

## 2023-02-06 PROCEDURE — 36415 COLL VENOUS BLD VENIPUNCTURE: CPT | Performed by: PSYCHIATRY & NEUROLOGY

## 2023-02-06 PROCEDURE — 85048 AUTOMATED LEUKOCYTE COUNT: CPT | Performed by: PSYCHIATRY & NEUROLOGY

## 2023-02-06 PROCEDURE — 250N000013 HC RX MED GY IP 250 OP 250 PS 637

## 2023-02-06 PROCEDURE — 99233 SBSQ HOSP IP/OBS HIGH 50: CPT | Performed by: PSYCHIATRY & NEUROLOGY

## 2023-02-06 RX ORDER — FLUTICASONE PROPIONATE 50 MCG
2 SPRAY, SUSPENSION (ML) NASAL DAILY
Status: DISCONTINUED | OUTPATIENT
Start: 2023-02-06 | End: 2023-04-12 | Stop reason: HOSPADM

## 2023-02-06 RX ORDER — CLOZAPINE 50 MG/1
50 TABLET ORAL AT BEDTIME
Status: DISCONTINUED | OUTPATIENT
Start: 2023-02-07 | End: 2023-02-08

## 2023-02-06 RX ORDER — LORATADINE 10 MG/1
10 TABLET ORAL DAILY
Status: DISCONTINUED | OUTPATIENT
Start: 2023-02-06 | End: 2023-04-12 | Stop reason: HOSPADM

## 2023-02-06 RX ORDER — CLOZAPINE 25 MG/1
25 TABLET ORAL AT BEDTIME
Status: COMPLETED | OUTPATIENT
Start: 2023-02-06 | End: 2023-02-06

## 2023-02-06 RX ADMIN — GABAPENTIN 300 MG: 300 CAPSULE ORAL at 08:26

## 2023-02-06 RX ADMIN — FLUTICASONE PROPIONATE 2 SPRAY: 50 SPRAY, METERED NASAL at 14:37

## 2023-02-06 RX ADMIN — OLANZAPINE 5 MG: 5 TABLET, ORALLY DISINTEGRATING ORAL at 14:38

## 2023-02-06 RX ADMIN — HYDROXYZINE HYDROCHLORIDE 100 MG: 50 TABLET ORAL at 10:43

## 2023-02-06 RX ADMIN — GABAPENTIN 300 MG: 300 CAPSULE ORAL at 14:36

## 2023-02-06 RX ADMIN — SERTRALINE HYDROCHLORIDE 150 MG: 50 TABLET ORAL at 08:26

## 2023-02-06 RX ADMIN — RISPERIDONE 2 MG: 2 TABLET ORAL at 20:51

## 2023-02-06 RX ADMIN — LORATADINE 10 MG: 10 TABLET ORAL at 10:42

## 2023-02-06 RX ADMIN — DIPHENHYDRAMINE HYDROCHLORIDE 50 MG: 50 CAPSULE ORAL at 16:33

## 2023-02-06 RX ADMIN — Medication: at 18:31

## 2023-02-06 RX ADMIN — CLOZAPINE 25 MG: 25 TABLET ORAL at 22:18

## 2023-02-06 RX ADMIN — RISPERIDONE 1 MG: 1 TABLET ORAL at 08:26

## 2023-02-06 RX ADMIN — GABAPENTIN 300 MG: 300 CAPSULE ORAL at 20:51

## 2023-02-06 RX ADMIN — NICOTINE 1 PATCH: 21 PATCH, EXTENDED RELEASE TRANSDERMAL at 08:26

## 2023-02-06 RX ADMIN — CLONIDINE HYDROCHLORIDE 0.1 MG: 0.1 TABLET ORAL at 20:50

## 2023-02-06 RX ADMIN — CLONIDINE HYDROCHLORIDE 0.1 MG: 0.1 TABLET ORAL at 08:26

## 2023-02-06 ASSESSMENT — ACTIVITIES OF DAILY LIVING (ADL)
ADLS_ACUITY_SCORE: 29
ORAL_HYGIENE: INDEPENDENT
ADLS_ACUITY_SCORE: 29
DRESS: INDEPENDENT
ADLS_ACUITY_SCORE: 29
LAUNDRY: UNABLE TO COMPLETE
ADLS_ACUITY_SCORE: 29
HYGIENE/GROOMING: INDEPENDENT
ADLS_ACUITY_SCORE: 29

## 2023-02-06 NOTE — CONSULTS
"  [facility]  Internal Medicine Consult     Nikolay Lora MRN# [id number]   Age: 32 year old YOB: 1990     Date of Admission: 1/27/2023  Date of Consult: 2/6/2023    Primary Care Provider: Mich Ybarra    Requesting Service: Behavioral Health - Nelly Brown MD  Reason for Consult: General Medical Evaluation      SUBJECTIVE   CC:   \"I am seeing things\"   Assessment and Plan/Recommendations:     Nikolay Lora is a 32 year old year old man with a history of mood disorder, ADHD, substance abuse and seasonal allergies. Admitted to inpatient mental health 1/27/23 with SI. Medicine consulted 2/6/23 for allergies.     #Seasonal allergies  #Chronic idiopathic uticaria  Pt states, he has a long history of seasonal allergies. He has received xolair injections in the past. When not getting injections he uses claritin daily. Currently pt c/o sinus congestion. Denies sore throat, cough, headache, sinus pressure, ear pain, myalgias. Endorses recent itching and hives that have resolved.   -start claritin 10 mg daily  -change flonase to scheduled  -Benadryl PRN itching      Currently, medically stable and internal medicine will sign off. Please contact if future questions or concerns arise. Thank you for the opportunity to be a part of this patient's care.      LINDA Love CNP  Internal Medicine [facility] Hospitalist  Page job code [phone] (3B), [phone] (3A), or [phone] ([facility] and )  Text paging via Picsean is appreciated  February 6, 2023         HPI:   Nikolay Lora is a 32 year old year old man with a history of mood disorder, ADHD, substance abuse and seasonal allergies. Admitted to inpatient mental health 1/27/23 with SI. Medicine consulted 2/6/23 for allergies.  He has received Xolair injections in the past. When not getting injections he uses claritin daily. Currently pt c/o sinus congestion. Denies sore throat, cough, headache, sinus pressure, ear pain, myalgias. Endorses recent itching " "and hives that have resolved.   Pt also states he is still feeling suicidal and has several different types of visual hallucinations. He is talking to his nursing staff and psychiatrist about these symptoms and complaints.   Pt agrees to plan and will let staff know if he has any more needs from medicine.          Past Medical History:   No past medical history on file.     Reviewed and updated in Qnary.     Past Surgical History:    No past surgical history on file.      Social History:     Social History     Tobacco Use     Smoking status: Former     Smokeless tobacco: Never     Tobacco comments:     1-2 cigs per day   Substance Use Topics     Alcohol use: Yes     Drug use: Not Currently        Family History:   No family history on file.      Allergies:   No Known Allergies      Medications:   Reviewed. Please see MAR     Review of Systems:   10 point ROS of systems including Constitutional, Eyes, Respiratory, Cardiovascular, Gastroenterology, Genitourinary, Integumentary, Muscularskeletal, Psychiatric were all negative except for pertinent positives noted in my HPI.    OBJECTIVE   Physical Exam:   Vitals were reviewed  Blood pressure 118/77, pulse 79, temperature 97.5  F (36.4  C), temperature source Temporal, resp. rate 16, height 1.6 m (5' 3\"), weight 75.1 kg (165 lb 8 oz), SpO2 97 %.  General: Alert and oriented x3, mild distress  HEENT: Anicteric sclera, MMM  Cardiovascular: RRR, S1S2. No murmur noted  Lungs: CTAB without wheezing or crackles. Diminished  GI: Abdomen soft, non-tender with bowel sounds present. No guarding or rebound   Vascular: No peripheral edema, distal pulses palpable  Neurologic: No focal deficits, CN II-XII grossly intact  Skin: No jaundice, rashes, or lesions        Data:        Lab Results   Component Value Date     01/28/2023    Lab Results   Component Value Date    CHLORIDE 107 01/28/2023    Lab Results   Component Value Date    BUN 26 01/28/2023      Lab Results   Component " Value Date    POTASSIUM 4.2 01/28/2023    Lab Results   Component Value Date    CO2 29 01/28/2023    Lab Results   Component Value Date    CR 0.99 01/28/2023        Lab Results   Component Value Date    WBC 7.3 01/28/2023    HGB 15.5 01/28/2023    HCT 49.2 01/28/2023    MCV 87 01/28/2023     01/28/2023     Lab Results   Component Value Date    WBC 7.3 01/28/2023

## 2023-02-06 NOTE — PLAN OF CARE
"  \"I see 2 white doves, a floating lantern, and blackbirds. I also see a shadow to my right that dissolves when I turn to look at it. I hear a whispering voice telling me I should shoot myself.\"    \"Yes, I would kill myself right now if I had the opportunity. I know I can't because there's nothing here to do it with.\"  Response to being asked about the previously voiced thought of killing himself by stabbing his eye out. (I thought I might bleed out or get pain medicine.\"    (Response to being asked about the effect of the prn he took at 14:38. )\" Zyprexa? Is that what I took? It didn't do anything. What I need is meth.\"     Endorses continuing cravings.     Voices regrets about having a 4 year degree but no job, no housing, and large debts. ( will accept him back, per notes.)     Speaks softly slowly, coherently, calmly with expressionless face and almost monotone voice. Eye contact continues decreased. Polite manner, calm behaviour with no observable signs of distress.    Self-isolation and dishevelment unchanged.     C/o anxiety and itching at 16:30. Chose diphenhydramine when asked to choose between Benadryl and Atarax.     Started Clozaril after WBC with differential done 20:3- WNL.      Plan: Monitor and document mood and behaviour, thought process and content. Establish and maintain therapeutic relationship. Educate about diagnoses, medications, treatment, legal status, plan of care. Address preexisting and concurrent medical concerns.      P: Unstable mood  G: Stable mood  O: Progressing                 "

## 2023-02-06 NOTE — PLAN OF CARE
"Nursing assessment completed. Patient cooperative with taking scheduled am medications. He states he did not sleep well last night because he had a nightmare. He states he has been having nightmares since admission. He then told writer that he \"wants to shoot himself in the head with a gun\", \"or slit my throat\". He was able to tell writer that he feels safe in the hospital and contracted for safety. Patient met with internal medicine regarding itching/allergies.  After team meeting, patient took a shower. As he was combing his hair in the hallway mirror, he stopped writer and stated \"I need to talk to you. I need the doctor to prescribe methamphetamine. If I don't get it I'm going to kill myself\". Writer asked him if he had a specific plan, in which he stated \"yes, I am going to stab out my eye with a pen\". Writer notified provider and 1:1 SIO initiated for SI. Writer notified staff and searched pt's room for implements that could be used for self harm. Lounge cleared as well.   Patient accepted PRN hydroxyzine for anxiety along with his scheduled Claritin.    Patient rested during the afternoon. When he woke up, he was cooperative with taking his 1400 gabapentin. He asked writer if \"the doctor prescribed the methamphetamine for me yet\". He stated meth helps him with the audio and visual hallucinations, which cause him to want to kill himself. Writer offered him a PRN olanzapine to help with the A/V hallucinations, which he accepted.                           "

## 2023-02-06 NOTE — PLAN OF CARE
Assessment/Intervention/Current Symtoms and Care Coordination:   Chart Review  Team Meeting  Pt not eating much, not responding to questions, reports ongoing intent to suicide.     Discharge Plan or Goal:  Return to [facility] when stable.     Barriers to Discharge:    Stabilization of mental health symptoms     Referral Status:  No referrals made yet this hospitalization.      Legal Status:   Voluntary

## 2023-02-06 NOTE — PLAN OF CARE
Problem: Adult Behavioral Health Plan of Care  Goal: Absence of New-Onset Illness or Injury  Outcome: Progressing  Note: Patient reports no new-onset illness or injury     Problem: Suicidal Behavior  Goal: Suicidal Behavior is Absent or Managed  Outcome: Progressing  Note: Patient manifests no suicidal behavior     Problem: Psychotic Signs/Symptoms  Goal: Improved Behavioral Control (Psychotic Signs/Symptoms)  Outcome: Progressing  Note: Patient demonstrates no behavioral dyscontrol     Patient observed sleeping with deep non labored respiration at the start of the shift, made no complaints through out the night. All precautions in place, checks completed successfully. Pt seems to have slept for 5.5  Hours. No behavioral concerns. Endorses no SI/HI, A/VH or SIB.             Melvin Pimentel DNP, RN

## 2023-02-06 NOTE — PROGRESS NOTES
Spiritual care consult, for emotional support. Patient reports hallucinating.  reassured patient of safety. Patient indicates  to visit the next day

## 2023-02-06 NOTE — PROGRESS NOTES
"[facility], [place]   Psychiatric Progress Note  Hospital Day: 10        Interim History:   The patient's care was discussed with the treatment team during the daily team meeting and/or staff's chart notes were reviewed.  Staff report patient continues to report active SI with plan to shoot himself. Reporting visual and command auditory hallucinations. He appears paranoid and internally preoccupied. Patient did not report any acute medical concerns or side effects with exception of pruritic rash on right forearm. IM notified. No behavioral issues overnight, including violent or aggressive behaviors. Patient did not require seclusion or restraints. Patient is exhibiting signs/sx of psychosis or jovanny. Isolative to his room. Patient is medication adherent. Patient is not attending groups. Patient is sleeping well. Patient is eating adequately. Patient is not attending to ADLs. Appears disheveled. Patient reports feeling anxious and depressed and rated both 8/10 and 9/10.    Upon interview, the patient was staring at his ceiling as writer entered his room. He immediately asked writer to prescribe methamphetamine, and asked me to do so repeatedly despite attempts at education. He is convinced that methamphetamine is the only thing that will alleviate his hallucinations and suicidal thinking because they only arose when \"the methamphetamines were leaving my system.\" I asked him how he was using methamphetamine prior to his admission, and he replied \"I don't know. I think I got the pill from someone.\" He again said that he suspected he was under the influence of methamphetamines because his pupils were dilated. He said that if he did not get methamphetamine, he will act on suicidal thoughts, including \"stabbing myself in the eye with a pen if I have to. I want to be dead.\" We discussed further optimization of risperidone vs addition of clozapine at this time. He elected to start clozapine " "after R/B/A discussed. He does not feel risperidone is helping him and does not wish to increase the dose further. He said that since it was started, the whispers and VH of lanterns have worsened. He has been unable to sleep despite taking risperidone and is starting to have nightmares of people chasing him. He is very concerned that he will kill himself if discharged from the hospital.      Suicidal ideation: Active SI with plan to shoot himself, cut himself with a knife, or poke his eye out with a pen on the unit. Denies plan in the hospital or current intent. Denies access to firearms.     Homicidal ideation: denies current or recent homicidal ideation or behaviors.    Psychotic symptoms: Reports paranoia, AH, VH.    Medication side effects reported: None reported today other than a rash which was not visible on patient's arms during my assessment.     Acute medical concerns: as above    Other issues reported by patient: Patient had no further questions or concerns.           Medications:       cloNIDine  0.1 mg Oral At Bedtime     cloNIDine  0.1 mg Oral QAM     gabapentin  300 mg Oral TID     nicotine  1 patch Transdermal Daily     nicotine   Transdermal Q8H     risperiDONE  1 mg Oral Daily     risperiDONE  2 mg Oral At Bedtime     sertraline  150 mg Oral Daily          Allergies:   No Known Allergies       Labs:   No results found for this or any previous visit (from the past 24 hour(s)).       Psychiatric Examination:     /77   Pulse 79   Temp 97.5  F (36.4  C) (Temporal)   Resp 16   Ht 1.6 m (5' 3\")   Wt 75.1 kg (165 lb 8 oz)   SpO2 97%   BMI 29.32 kg/m    Weight is 165 lbs 8 oz  Body mass index is 29.32 kg/m .    Weight over time:  Vitals:    01/27/23 1718   Weight: 75.1 kg (165 lb 8 oz)       Orthostatic Vitals     None            Cardiometabolic risk assessment. 01/30/23      Reviewed patient profile for cardiometabolic risk factors    Date taken /Value  REFERENCE RANGE   Abdominal " Obesity  (Waist Circumference)   See nursing flowsheet Women ?35 in (88 cm)   Men ?40 in (102 cm)      Triglycerides  Triglycerides   Date Value Ref Range Status   01/28/2023 192 (H) <150 mg/dL Final       ?150 mg/dL (1.7 mmol/L) or current treatment for elevated triglycerides   HDL cholesterol  Direct Measure HDL   Date Value Ref Range Status   01/28/2023 41 >=40 mg/dL Final   ]   Women <50 mg/dL (1.3 mmol/L) in women or current treatment for low HDL cholesterol  Men <40 mg/dL (1 mmol/L) in men or current treatment for low HDL cholesterol     Fasting plasma glucose (FPG) Lab Results   Component Value Date     01/28/2023      FPG ?100 mg/dL (5.6 mmol/L) or treatment for elevated blood glucose   Blood pressure  BP Readings from Last 3 Encounters:   02/05/23 118/77   01/27/23 118/74   07/20/22 112/79    Blood pressure ?130/85 mmHg or treatment for elevated blood pressure   Family History  See family history     Appearance: awake, alert, dressed in hospital scrubs and disheveled   Attitude:  cooperative  Eye Contact:  good  Mood:  depressed  Affect:  mood congruent and intensity is blunted  Speech:  clear, coherent. Delayed responses.   Language: fluent and intact in English  Psychomotor, Gait, Musculoskeletal:  no evidence of tardive dyskinesia, dystonia, or tics  Throught Process:  linear, goal oriented and illogical. Perseverative on obtaining methamphetamine prescription.   Associations:  no loose associations  Thought Content:  active suicidal ideation present, auditory hallucinations present and visual hallucinations present  Insight:  fair  Judgement:  poor  Oriented to:  time, person, and place  Attention Span and Concentration:  fair  Recent and Remote Memory:  fair  Fund of Knowledge:  appropriate    Clinical Global Impressions  First:  Considering your total clinical experience with this particular patient population, how severe are the patient's symptoms at this time?: 7 (01/28/23 1341)  Compared to  "the patient's condition at the START of treatment, this patient's condition is: 4 (01/28/23 1341)  Most recent:  Considering your total clinical experience with this particular patient population, how severe are the patient's symptoms at this time?: 7 (01/28/23 1341)  Compared to the patient's condition at the START of treatment, this patient's condition is: 4 (01/28/23 1341)           Precautions:     Behavioral Orders   Procedures     Code 1 - Restrict to Unit     Routine Programming     As clinically indicated     Status 15     Every 15 minutes.     Suicide precautions     Patients on Suicide Precautions should have a Combination Diet ordered that includes a Diet selection(s) AND a Behavioral Tray selection for Safe Tray - with utensils, or Safe Tray - NO utensils       Withdrawal precautions          Diagnoses:     Active suicidal ideation with intent  Unspecified psychosis (substance induced likely)  Polysubstance use  Unspecified mood disorder  ADHD, inattentive type per chart  History of idiopathic hypersomnolence per chart  Nicotine use disorder, dependence and withdrawal   Allergies- chronic uticaria and rhinitis per chart  HLD per chart          Assessment & Plan:     Assessment and hospital summary:  This patient is a 32 year old male with history of mood disorder, ADHD and substance use who presented to [place] ED with SI on 1/26 in context of reported methamphetamine use and being kicked out of sober living. Medically cleared in ED, placed on 72HH and admitted to 12N. Limited historian, giving inconsistent reports about substance use, UDS negative, very somnolent, endorsing ongoing SI and some psychosis symptoms. PTA medications continued with exception of finasteride as patient states he takes \"1/4 a pill\" and declined ordered dose, will defer to primary team to address. Will continue 72HH and evaluate safety and stabilization further as patient more able to engage in assessments.      Inpatient " psychiatric hospitalization is warranted at this time for safety, stabilization, and possible adjustment in medications.    Patient reports that he abruptly stopped Zoloft one week prior to his admission. He developed discontinuation syndrome symptoms following that. He reports that he does not have a history of psychosis but is experiencing psychotic symptoms. He is amenable with plan to trial risperidone after R/B/A were discussed. Risperidone was initiated on 1/30 and titrated to a total of 3 mg daily on 2/1. Clonidine, used for ADHD, was reduced from a total of 0.3 mg daily to 0.2 mg daily on 2/3 due to soft pressures.     Psychiatric treatment/inteventions:  Medications:   -Add clozapine 25 mg at bedtime and titrate to lowest effective dose. Registered pt for REMS monitoring and ordered baseline and weekly WBC and diff, which will need to be drawn prior to starting clozapine this evening.   -Continue risperidone to 1 mg daily and 2 mg at bedtime  -continue PTA sertraline 150mg daily for mood  -continue PTA clonidine 0.1mg BID  -continue PTA gabapentin 300mg TID for anxiety      -PRN hydroxyzine 25mg every 4 hour for anxiety  -PRN trazodone 50mg at bedtime for sleep   -PRN olanzapine 10mg PO or IM TID for agitation/psychosis     Spiritual services consult placed. Pt is Voodoo. Appreciate assistance.      Laboratory/Imaging: reviewed: Covid negative; UDS negative; CMP, CBC, TSH, Lipid panel, HgbA1c ordered to complete admission labs. Reviewed.      Patient will be treated in therapeutic milieu with appropriate individual and group therapies as described.     Medical treatment/interventions:  Medical concerns:   Nicotine replacement ordered, educate patient on benefits of cessation, pt unclear about finasteride dose, will hold until further information is obtained. PTA PRN zyrtec, azelastine, fluticasone, triamcinolone and epipen ordered for allergies and PRN ammoniaum lactate, Eucerin for  dry  skin.  Dyslipidemia: Will arrange follow up with PCP to address. Discussed importance of healthy eating habits and regular exercise. Will consider nutrition consult if patient amenable.   Lightheadedness, improved: Pt is not hypotensive and orthostatics wnl. He was asked to increase fluid intake.   - Continue to monitor vital signs closely  - Encourage fluids  - Reduced dose of clonidine on 2/3     Legal Status: 72 hour hold discontinued. Pt agreed to sign in on voluntary basis and discharge directly to treatment once stable.      Safety Assessment:        Behavioral Orders   Procedures     Code 1 - Restrict to Unit     Routine Programming       As clinically indicated     Status 15       Every 15 minutes.     Suicide precautions       Patients on Suicide Precautions should have a Combination Diet ordered that includes a Diet selection(s) AND a Behavioral Tray selection for Safe Tray - with utensils, or Safe Tray - NO utensils        Withdrawal precautions      Pt has not required locked seclusion or restraints in the past 24 hours to maintain safety, please refer to RN documentation for further details.    Place patient on SIO due to active SI and intent to stab himself with a pen. Monitor SI very closely.     The risks, benefits, alternatives and side effects have been discussed and are understood by the patient.     Disposition: Pending clinical stabilization. Will likely discharge to [facility] treatment when stable, pt reports interest in [facility].      This note was created by undersigned using a Dragon dictation system. All typing errors or contextual distortion are unintentional and software inherent.     Entered by: Nelly Brown MD on 2/6/2023 at 8:10 AM

## 2023-02-07 PROCEDURE — 250N000013 HC RX MED GY IP 250 OP 250 PS 637: Performed by: PSYCHIATRY & NEUROLOGY

## 2023-02-07 PROCEDURE — 124N000002 HC R&B MH UMMC

## 2023-02-07 PROCEDURE — 250N000013 HC RX MED GY IP 250 OP 250 PS 637

## 2023-02-07 RX ADMIN — CLONIDINE HYDROCHLORIDE 0.1 MG: 0.1 TABLET ORAL at 08:50

## 2023-02-07 RX ADMIN — GABAPENTIN 300 MG: 300 CAPSULE ORAL at 08:46

## 2023-02-07 RX ADMIN — ACETAMINOPHEN 325MG 650 MG: 325 TABLET ORAL at 02:41

## 2023-02-07 RX ADMIN — FLUTICASONE PROPIONATE 2 SPRAY: 50 SPRAY, METERED NASAL at 08:53

## 2023-02-07 RX ADMIN — SERTRALINE HYDROCHLORIDE 150 MG: 50 TABLET ORAL at 08:45

## 2023-02-07 RX ADMIN — CLOZAPINE 50 MG: 50 TABLET ORAL at 20:27

## 2023-02-07 RX ADMIN — NICOTINE 1 PATCH: 21 PATCH, EXTENDED RELEASE TRANSDERMAL at 08:51

## 2023-02-07 RX ADMIN — RISPERIDONE 1 MG: 1 TABLET ORAL at 08:47

## 2023-02-07 RX ADMIN — LORATADINE 10 MG: 10 TABLET ORAL at 08:46

## 2023-02-07 RX ADMIN — HYDROXYZINE HYDROCHLORIDE 100 MG: 50 TABLET ORAL at 02:41

## 2023-02-07 RX ADMIN — GABAPENTIN 300 MG: 300 CAPSULE ORAL at 13:44

## 2023-02-07 RX ADMIN — ACETAMINOPHEN 325MG 650 MG: 325 TABLET ORAL at 08:46

## 2023-02-07 RX ADMIN — CLONIDINE HYDROCHLORIDE 0.1 MG: 0.1 TABLET ORAL at 20:27

## 2023-02-07 RX ADMIN — GABAPENTIN 300 MG: 300 CAPSULE ORAL at 20:27

## 2023-02-07 RX ADMIN — RISPERIDONE 2 MG: 2 TABLET ORAL at 20:27

## 2023-02-07 ASSESSMENT — ACTIVITIES OF DAILY LIVING (ADL)
ADLS_ACUITY_SCORE: 29
ORAL_HYGIENE: INDEPENDENT
ADLS_ACUITY_SCORE: 29
HYGIENE/GROOMING: INDEPENDENT
ADLS_ACUITY_SCORE: 29
DRESS: INDEPENDENT
DRESS: INDEPENDENT
HYGIENE/GROOMING: INDEPENDENT
ADLS_ACUITY_SCORE: 29
ADLS_ACUITY_SCORE: 29
ORAL_HYGIENE: INDEPENDENT

## 2023-02-07 NOTE — PHARMACY
Pharmacy Clozapine Initial Note    Patient's Name: Nikolay Lora  Patient's : 1990    Recent ANC Value(s) for last 7 days:  Recent labs: (last 7 days)     23   ANEUTAUTO 4.0       Is the patient enrolled in the cloZAPine REMS program? Yes  Ordering prescriber: Dr. Brown  Is this provider certified in the cloZAPine REMS program? Yes  A REMS Dispense Authorization was obtained from the clozapine REMS program? Yes  Is the ANC from within the last 7 days within recommended limits? Yes  Does the patient have any signs or symptoms of infection, including fever? No    Plan:  1. Initiate clozapine therapy at 50 mg PO QHS.  2. A WBC with differential will be ordered at least weekly, next due 2023. ANC values will be entered into the REMS program.    Denise Ortega [facility]  Phone: *[phone]

## 2023-02-07 NOTE — PLAN OF CARE
Pt continue on 1:1 SIO for Suicidal ideation. No harm to self this shift. Pt was withdrawn and Isolative in his room most of the shift. Affect is flat  with poor concentration. Upon assessment, Pt continue to endorsed  visual hallucination and SI. He verbalized that he saw doves in the ceiling who were talking to him about killing himself by getting a gun and shooting himself  in the head. However, Pt stated that he does not have a gun and contracted for safety while is in the hospital.   ~ 0846, Pt endorsed shoulder pain and utilized PRN Tylenol which was helpful.         Problem: Adult Behavioral Health Plan of Care  Goal: Absence of New-Onset Illness or Injury  Intervention: Identify and Manage Fall Risk  Recent Flowsheet Documentation  Taken 2/7/2023 1046 by Haley Leblanc RN  Safety Measures: safety rounds completed     Problem: Adult Behavioral Health Plan of Care  Goal: Adheres to Safety Considerations for Self and Others  Intervention: Develop and Maintain Individualized Safety Plan  Recent Flowsheet Documentation  Taken 2/7/2023 1046 by Haley Leblanc RN  Safety Measures: safety rounds completed   Goal Outcome Evaluation:    Plan of Care Reviewed With: patient

## 2023-02-07 NOTE — PLAN OF CARE
Assessment/Intervention/Current Symtoms and Care Coordination:   Chart Review  Team Meeting  Continues to report AH and SI with plan. Slept 4.5 hours.      Discharge Plan or Goal:  Return to [facility] when stable.     Barriers to Discharge:    Stabilization of mental health symptoms     Referral Status:  No referrals made yet this hospitalization.      Legal Status:   Voluntary

## 2023-02-07 NOTE — PROGRESS NOTES
Patient reports feeling anxious and having a difficult time falling to sleep tonight.  Also states he is having some acute shoulder right shoulder pain not related to any history of trauma.  Warm pack and Tylenol given for should discomfort and Vistaril prn given for anxiety.  Remains on 1:1 staffing at this time.  Anxious, but polite and engages in conversation about future plans.

## 2023-02-07 NOTE — PROGRESS NOTES
Issues of emotional and spiritual vulnerability involved, related to MI. Patient identified mehul in God, music, and guided relaxation as means to cope with symptoms of illness.  to follow up as needed.

## 2023-02-07 NOTE — PLAN OF CARE
Problem: Adult Behavioral Health Plan of Care  Goal: Absence of New-Onset Illness or Injury  Outcome: Progressing  Note: Patient reports no new-onset illness or injury     Problem: Suicidal Behavior  Goal: Suicidal Behavior is Absent or Managed  Outcome: Progressing  Note: Patient manifests no suicidal behavior     Problem: Sleep Disturbance  Goal: Adequate Sleep/Rest  Outcome: Progressing  Note: Patient seems to be sleeping well during rounds   Goal Outcome Evaluation:       Patient had uneventful night, observed sleeping at the start of the shift with occasional awakenings as the shift progressed. Patient remains calm with no complaints and hence no prn medications utilized. Patient was cooperative with safety checks, displays no negative behavioral dysregulation. All precautions in place, enforced, compliant. Patient endorses no SI/HI, A/VH or SIB. Patient remains a 1:1 SIO for safety, no safety compromised. Had a total of 4.75 hours of sleep. Will continue to monitor and follow plan of care.           Melvin Pimentel DNP, RN

## 2023-02-08 PROCEDURE — 124N000002 HC R&B MH UMMC

## 2023-02-08 PROCEDURE — H2032 ACTIVITY THERAPY, PER 15 MIN: HCPCS

## 2023-02-08 PROCEDURE — 250N000013 HC RX MED GY IP 250 OP 250 PS 637

## 2023-02-08 PROCEDURE — 250N000013 HC RX MED GY IP 250 OP 250 PS 637: Performed by: PSYCHIATRY & NEUROLOGY

## 2023-02-08 PROCEDURE — 99233 SBSQ HOSP IP/OBS HIGH 50: CPT | Performed by: PSYCHIATRY & NEUROLOGY

## 2023-02-08 RX ADMIN — FLUTICASONE PROPIONATE 2 SPRAY: 50 SPRAY, METERED NASAL at 08:26

## 2023-02-08 RX ADMIN — SERTRALINE HYDROCHLORIDE 150 MG: 50 TABLET ORAL at 08:27

## 2023-02-08 RX ADMIN — HYDROXYZINE HYDROCHLORIDE 100 MG: 50 TABLET ORAL at 23:57

## 2023-02-08 RX ADMIN — RISPERIDONE 2 MG: 2 TABLET ORAL at 19:24

## 2023-02-08 RX ADMIN — LORATADINE 10 MG: 10 TABLET ORAL at 08:26

## 2023-02-08 RX ADMIN — NICOTINE 1 PATCH: 21 PATCH, EXTENDED RELEASE TRANSDERMAL at 08:27

## 2023-02-08 RX ADMIN — GABAPENTIN 300 MG: 300 CAPSULE ORAL at 08:27

## 2023-02-08 RX ADMIN — GABAPENTIN 300 MG: 300 CAPSULE ORAL at 19:24

## 2023-02-08 RX ADMIN — GABAPENTIN 300 MG: 300 CAPSULE ORAL at 13:52

## 2023-02-08 RX ADMIN — CLONIDINE HYDROCHLORIDE 0.1 MG: 0.1 TABLET ORAL at 08:32

## 2023-02-08 RX ADMIN — RISPERIDONE 1 MG: 1 TABLET ORAL at 08:26

## 2023-02-08 RX ADMIN — CLOZAPINE 75 MG: 25 TABLET ORAL at 19:23

## 2023-02-08 RX ADMIN — OLANZAPINE 10 MG: 10 TABLET, FILM COATED ORAL at 10:20

## 2023-02-08 RX ADMIN — NICOTINE POLACRILEX 4 MG: 4 LOZENGE ORAL at 23:58

## 2023-02-08 ASSESSMENT — ACTIVITIES OF DAILY LIVING (ADL)
ADLS_ACUITY_SCORE: 29
ORAL_HYGIENE: INDEPENDENT
DRESS: INDEPENDENT
ADLS_ACUITY_SCORE: 29
HYGIENE/GROOMING: INDEPENDENT
ADLS_ACUITY_SCORE: 29

## 2023-02-08 NOTE — PLAN OF CARE
"\" I still see 2 doves, a floating lantern, blackbirds. Now I also see slivering objects, like snakes, when I look at the ceiling. And there is still the shadow behind me, on my right side.\"  \"The voices don't tell me to kill myself: they encourage me, tell me I should\".  \"Yes, I am still suicidal but I know I can't do anything here.\" \"I would shoot myself or cut my throat if I was not here.\"    States medication is helping because it helps him stay calm. \" I already know those are just hallucinations.\"     Does not know his medications. Asked again:\"Can you get me some meth? That's what I need.\"    Continuing cravings to use (denies using dreams.)  Does not engage with his 1:1 attendant. Does not leave his room to socialise.   Readily talkative, reciting the answers in an almost rote fashion. Slow rate, normal inflection, minimal latency of responses, clear and coherent.    Remains future oriented and wishes to return to [facility].    Plan: Monitor and document mood and behaviour, thought process and content. Establish and maintain therapeutic relationship. Educate about diagnoses, medications, treatment, legal status, plan of care. Address preexisting and concurrent medical concerns.      P: Unstable mood  G: Stable mood  O: Progressing  "

## 2023-02-08 NOTE — PROGRESS NOTES
"[facility], [place]   Psychiatric Progress Note  Hospital Day: 12        Interim History:   The patient's care was discussed with the treatment team during the daily team meeting and/or staff's chart notes were reviewed.  Staff report patient continues to report active SI with plan to shoot himself or cut himself. Reporting visual and command auditory hallucinations. He appears paranoid and internally preoccupied. Patient did not report any acute medical concerns or side effects. No behavioral issues overnight, including violent or aggressive behaviors. Patient did not require seclusion or restraints. Patient is exhibiting signs/sx of psychosis or jovanny. Isolative to his room. Patient is medication adherent. Patient is not attending groups. Patient is not sleeping well. Patient is eating adequately. Patient is not consistently attending to ADLs. Appears disheveled.     Upon interview, the patient reports that he continues to experience active SI with plan to shoot himself with a gun or cut himself in the throat with a knife. However, he said that while he is in the hospital, he will maintain safe behaviors, and \"accept the help being offered to me.\" He agreed to notify staff if SI thoughts worsen. He denies any intent or plan here in the hospital. Continues to believe that he needs methamphetamines to feel better, though less perseverative on this today. Pt amenable with plan to stop clonidine and increase clozapine.     Suicidal ideation: Active SI with plan to shoot himself, cut himself with a knife. Denies access to firearms.     Homicidal ideation: denies current or recent homicidal ideation or behaviors.    Psychotic symptoms: Reports paranoia, AH, VH (lanterns, doves, a figure of a man to his right that \"dissolves\" when he looks at him).    Medication side effects reported: Reporting lightheadedness upon standing. Agreed to stop clonidine due to limited effectiveness.     Acute " "medical concerns: as above    Other issues reported by patient: Patient had no further questions or concerns.           Medications:       cloNIDine  0.1 mg Oral At Bedtime     cloNIDine  0.1 mg Oral QAM     cloZAPine  50 mg Oral At Bedtime     fluticasone  2 spray Both Nostrils Daily     gabapentin  300 mg Oral TID     loratadine  10 mg Oral Daily     nicotine  1 patch Transdermal Daily     nicotine   Transdermal Q8H     risperiDONE  1 mg Oral Daily     risperiDONE  2 mg Oral At Bedtime     sertraline  150 mg Oral Daily          Allergies:   No Known Allergies       Labs:   No results found for this or any previous visit (from the past 24 hour(s)).       Psychiatric Examination:     /74   Pulse 83   Temp 97.9  F (36.6  C) (Oral)   Resp 16   Ht 1.6 m (5' 3\")   Wt 75.1 kg (165 lb 8 oz)   SpO2 97%   BMI 29.32 kg/m    Weight is 165 lbs 8 oz  Body mass index is 29.32 kg/m .    Weight over time:  Vitals:    01/27/23 1718   Weight: 75.1 kg (165 lb 8 oz)       Orthostatic Vitals     None            Cardiometabolic risk assessment. 01/30/23      Reviewed patient profile for cardiometabolic risk factors    Date taken /Value  REFERENCE RANGE   Abdominal Obesity  (Waist Circumference)   See nursing flowsheet Women ?35 in (88 cm)   Men ?40 in (102 cm)      Triglycerides  Triglycerides   Date Value Ref Range Status   01/28/2023 192 (H) <150 mg/dL Final       ?150 mg/dL (1.7 mmol/L) or current treatment for elevated triglycerides   HDL cholesterol  Direct Measure HDL   Date Value Ref Range Status   01/28/2023 41 >=40 mg/dL Final   ]   Women <50 mg/dL (1.3 mmol/L) in women or current treatment for low HDL cholesterol  Men <40 mg/dL (1 mmol/L) in men or current treatment for low HDL cholesterol     Fasting plasma glucose (FPG) Lab Results   Component Value Date     01/28/2023      FPG ?100 mg/dL (5.6 mmol/L) or treatment for elevated blood glucose   Blood pressure  BP Readings from Last 3 Encounters:   02/07/23 " 109/74   01/27/23 118/74   07/20/22 112/79    Blood pressure ?130/85 mmHg or treatment for elevated blood pressure   Family History  See family history     Appearance: awake, alert, dressed in hospital scrubs and disheveled   Attitude:  cooperative  Eye Contact: fair  Mood:  depressed  Affect:  mood congruent and intensity is blunted  Speech:  clear, coherent. Delayed responses.   Language: fluent and intact in English  Psychomotor, Gait, Musculoskeletal:  no evidence of tardive dyskinesia, dystonia, or tics  Throught Process:  linear, goal oriented and illogical. Disorganized.  Associations:  no loose associations  Thought Content:  active suicidal ideation present, auditory hallucinations present and visual hallucinations present  Insight:  fair  Judgement:  poor  Oriented to:  time, person, and place  Attention Span and Concentration:  fair  Recent and Remote Memory:  fair  Fund of Knowledge:  appropriate    Clinical Global Impressions  First:  Considering your total clinical experience with this particular patient population, how severe are the patient's symptoms at this time?: 7 (01/28/23 1341)  Compared to the patient's condition at the START of treatment, this patient's condition is: 4 (01/28/23 1341)  Most recent:  Considering your total clinical experience with this particular patient population, how severe are the patient's symptoms at this time?: 7 (01/28/23 1341)  Compared to the patient's condition at the START of treatment, this patient's condition is: 4 (01/28/23 1341)           Precautions:     Behavioral Orders   Procedures     Code 1 - Restrict to Unit     Routine Programming     As clinically indicated     Status 15     Every 15 minutes.     Status Individual Observation     Patient SIO status reviewed with team/RN.  Please also refer to RN/team documentation for add'l detail.    -SIO staff to monitor following which have contributed to patient being on SIO:  Active SI with plan to stab himself  "with a pen  -Possible interventions SIO staff could use to support patient's treatment progress:  Pharmacologic and nonpharmacologic coping strategies  -When following observed, team will review discontinuation of SIO:  Absence of active SI for > 24 hours     Order Specific Question:   CONTINUOUS 24 hours / day     Answer:   5 feet     Order Specific Question:   Indications for SIO     Answer:   Suicide risk     Suicide precautions     Patients on Suicide Precautions should have a Combination Diet ordered that includes a Diet selection(s) AND a Behavioral Tray selection for Safe Tray - with utensils, or Safe Tray - NO utensils       Withdrawal precautions          Diagnoses:     Active suicidal ideation with intent outside of hospital setting  Unspecified psychosis (substance induced likely)  Polysubstance use  Unspecified mood disorder  ADHD, inattentive type per chart  History of idiopathic hypersomnolence per chart  Nicotine use disorder, dependence and withdrawal   Allergies- chronic uticaria and rhinitis per chart  HLD per chart          Assessment & Plan:     Assessment and hospital summary:  This patient is a 32 year old male with history of mood disorder, ADHD and substance use who presented to [facility] with SI on 1/26 in context of reported methamphetamine use and being kicked out of sober living. Medically cleared in ED, placed on 72HH and admitted to 12[facility]. Limited historian, giving inconsistent reports about substance use, UDS negative, very somnolent, endorsing ongoing SI and some psychosis symptoms. PTA medications continued with exception of finasteride as patient states he takes \"1/4 a pill\" and declined ordered dose, will defer to primary team to address. Will continue 72HH and evaluate safety and stabilization further as patient more able to engage in assessments.      Inpatient psychiatric hospitalization is warranted at this time for safety, stabilization, and possible adjustment in " medications.    Patient reports that he abruptly stopped Zoloft one week prior to his admission. He developed discontinuation syndrome symptoms following that. He reports that he does not have a history of psychosis but is experiencing psychotic symptoms. He is amenable with plan to trial risperidone after R/B/A were discussed. Risperidone was initiated on 1/30 and titrated to a total of 3 mg daily on 2/1. Clonidine, used for ADHD, was reduced from a total of 0.3 mg daily to 0.2 mg daily on 2/3 due to soft pressures.     Psychiatric treatment/inteventions:  Medications:   -Continue clozapine titration and titrate to lowest effective dose. Registered pt for REMS monitoring and ordered baseline and weekly WBC and diff, which will need to be drawn next on Monday.   -Continue risperidone to 1 mg daily and 2 mg at bedtime for now  -continue PTA sertraline 150mg daily for mood  -Discontinue PTA clonidine 0.1mg BID  -continue PTA gabapentin 300mg TID for anxiety      -PRN hydroxyzine 25mg every 4 hour for anxiety  -PRN trazodone 50mg at bedtime for sleep   -PRN olanzapine 10mg PO or IM TID for agitation/psychosis     Spiritual services consult placed on 2/6. Pt is Gnosticist. Appreciate assistance.      Laboratory/Imaging: reviewed: Covid negative; UDS negative; CMP, CBC, TSH, Lipid panel, HgbA1c ordered to complete admission labs. Reviewed.      Patient will be treated in therapeutic milieu with appropriate individual and group therapies as described.     Medical treatment/interventions:  Medical concerns:   Nicotine replacement ordered, educate patient on benefits of cessation, pt unclear about finasteride dose, will hold until further information is obtained. PTA PRN zyrtec, azelastine, fluticasone, triamcinolone and epipen ordered for allergies and PRN ammoniaum lactate, Eucerin for  dry skin.  Dyslipidemia: Will arrange follow up with PCP to address. Discussed importance of healthy eating habits and regular exercise.  Will consider nutrition consult if patient amenable.   Lightheadedness, improved: Pt is not hypotensive and orthostatics wnl. He was asked to increase fluid intake.   - Continue to monitor vital signs closely  - Encourage fluids  - Reduced dose of clonidine on 2/3 and discontinuing altogether today     Legal Status: 72 hour hold discontinued. Pt agreed to sign in on voluntary basis and discharge directly to treatment once stable.      Safety Assessment:        Behavioral Orders   Procedures     Code 1 - Restrict to Unit     Routine Programming       As clinically indicated     Status 15       Every 15 minutes.     Suicide precautions       Patients on Suicide Precautions should have a Combination Diet ordered that includes a Diet selection(s) AND a Behavioral Tray selection for Safe Tray - with utensils, or Safe Tray - NO utensils        Withdrawal precautions      Pt has not required locked seclusion or restraints in the past 24 hours to maintain safety, please refer to RN documentation for further details.    Discontinue SIO today as patient is heriberto for safety. Monitor SI closely.     The risks, benefits, alternatives and side effects have been discussed and are understood by the patient.     Disposition: Pending clinical stabilization. Will likely discharge to [facility] treatment when stable, pt reports interest in [facility].      This note was created by domonique using a Dragon dictation system. All typing errors or contextual distortion are unintentional and software inherent.     Entered by: Nelly Brown MD on 2/8/2023 at 8:34 AM

## 2023-02-08 NOTE — PLAN OF CARE
"   02/08/23 1405   Group Therapy Session   Group Attendance attended group session   Time Session Began 1115   Time Session Ended 1200   Total Time (minutes) 20  (No charge)   Total # Attendees 2   Group Type Occupational Therapy   Group Topic Covered balanced lifestyle;coping skills/lifestyle management;emotions/expression;leisure exploration/use of leisure time;relaxation techniques;self-care activities;structured socialization   Group Session Detail General Health and Coping Group   Patient Participation Detail Intervention: General Health and Coping Group with 1 peer.    Patient Response: Pt participated in group focused on the use of positive affirmations as a healthy coping skill. Group involved discussion of positive affirmations and creating a personal affirmations project using personally selected meaningful affirmations. Patient joined for final 20 minutes of group, engaging in the affirmations project for the duration of time left in group. Was soft spoken, engaged in brief conversation when it was initiated by writer. Talked of having maybe participated in groups in a previous admission. Selected an affirmations project that stated \"always believe in yourself\". Was unable to finish and plans to continue to work on in group tomorrow.     Mood/Affect:  Pleasant       Plan: Patient encouraged to maintain attendance for continued ongoing support in working towards occupational therapy goals to support overall treatment/care.          "

## 2023-02-08 NOTE — PLAN OF CARE
Pt was withdrawn and isolative in room, presented with blunted and flat affect  with poor concentration. Mood is calm.   Pt  continue on withdrawal and suicide precautions.  No harm to self  observed. Pt  continue  on visual and Auditory hallucination of seeing lanterns  and doves floating inconjunction of a voice whispering to him to shot himself in the head or cut his throat with a knife. Pt however denies access to gun and knife while in the hospital and  continue to contract for safety    Pt is medication compliant and received PRN Zyprexa  which was effective. Pt is calm and cooperative. SIO sitter  discontinue due to patient continuously heriberto for safety. Will continue to monitor and assist  as needed.     Problem: Suicidal Behavior  Goal: Suicidal Behavior is Absent or Managed  Outcome: Progressing     Problem: Psychotic Signs/Symptoms  Goal: Improved Behavioral Control (Psychotic Signs/Symptoms)  Outcome: Progressing  Goal: Optimal Cognitive Function (Psychotic Signs/Symptoms)  Intervention: Support and Promote Cognitive Ability  Recent Flowsheet Documentation  Taken 2/8/2023 1408 by Haley Leblanc RN  Trust Relationship/Rapport:   care explained   questions encouraged   thoughts/feelings acknowledged  Goal: Enhanced Social, Occupational or Functional Skills (Psychotic Signs/Symptoms)  Intervention: Promote Social, Occupational and Functional Ability  Recent Flowsheet Documentation  Taken 2/8/2023 1408 by Haley Leblanc RN  Trust Relationship/Rapport:   care explained   questions encouraged   thoughts/feelings acknowledged   Goal Outcome Evaluation:    Plan of Care Reviewed With: patient
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
## 2023-02-08 NOTE — PLAN OF CARE
Problem: Adult Behavioral Health Plan of Care  Goal: Plan of Care Review  Outcome: Progressing     Problem: Sleep Disturbance  Goal: Adequate Sleep/Rest  Outcome: Progressing   Goal Outcome Evaluation:       Pt is noted to be occasionally awake, but he did not get out of his room or out of bed. Checks completed as ordered. He declined PRN offered to enable him to have a good restful night. He slept for 2.5 hours. No other concerns noted or verbalized. Will continue to monitor.

## 2023-02-09 LAB
ANION GAP SERPL CALCULATED.3IONS-SCNC: 11 MMOL/L (ref 7–15)
BASOPHILS # BLD AUTO: 0 10E3/UL (ref 0–0.2)
BASOPHILS NFR BLD AUTO: 0 %
BUN SERPL-MCNC: 12.5 MG/DL (ref 6–20)
CALCIUM SERPL-MCNC: 9.7 MG/DL (ref 8.6–10)
CHLORIDE SERPL-SCNC: 100 MMOL/L (ref 98–107)
CK SERPL-CCNC: 69 U/L (ref 39–308)
CREAT SERPL-MCNC: 0.81 MG/DL (ref 0.67–1.17)
CRP SERPL-MCNC: 38.18 MG/L
DEPRECATED HCO3 PLAS-SCNC: 27 MMOL/L (ref 22–29)
EOSINOPHIL # BLD AUTO: 0.1 10E3/UL (ref 0–0.7)
EOSINOPHIL NFR BLD AUTO: 1 %
ERYTHROCYTE [DISTWIDTH] IN BLOOD BY AUTOMATED COUNT: 12.1 % (ref 10–15)
GFR SERPL CREATININE-BSD FRML MDRD: >90 ML/MIN/1.73M2
GLUCOSE SERPL-MCNC: 131 MG/DL (ref 70–99)
HCT VFR BLD AUTO: 48.3 % (ref 40–53)
HGB BLD-MCNC: 15.8 G/DL (ref 13.3–17.7)
IMM GRANULOCYTES # BLD: 0.1 10E3/UL
IMM GRANULOCYTES NFR BLD: 1 %
LYMPHOCYTES # BLD AUTO: 1.9 10E3/UL (ref 0.8–5.3)
LYMPHOCYTES NFR BLD AUTO: 18 %
MCH RBC QN AUTO: 27.3 PG (ref 26.5–33)
MCHC RBC AUTO-ENTMCNC: 32.7 G/DL (ref 31.5–36.5)
MCV RBC AUTO: 84 FL (ref 78–100)
MONOCYTES # BLD AUTO: 0.7 10E3/UL (ref 0–1.3)
MONOCYTES NFR BLD AUTO: 6 %
NEUTROPHILS # BLD AUTO: 7.9 10E3/UL (ref 1.6–8.3)
NEUTROPHILS NFR BLD AUTO: 74 %
NRBC # BLD AUTO: 0 10E3/UL
NRBC BLD AUTO-RTO: 0 /100
PLATELET # BLD AUTO: 251 10E3/UL (ref 150–450)
POTASSIUM SERPL-SCNC: 4 MMOL/L (ref 3.4–5.3)
RBC # BLD AUTO: 5.78 10E6/UL (ref 4.4–5.9)
SARS-COV-2 RNA RESP QL NAA+PROBE: NEGATIVE
SODIUM SERPL-SCNC: 138 MMOL/L (ref 136–145)
TROPONIN T SERPL HS-MCNC: <6 NG/L
WBC # BLD AUTO: 10.7 10E3/UL (ref 4–11)

## 2023-02-09 PROCEDURE — 99233 SBSQ HOSP IP/OBS HIGH 50: CPT | Mod: GC | Performed by: PSYCHIATRY & NEUROLOGY

## 2023-02-09 PROCEDURE — 250N000013 HC RX MED GY IP 250 OP 250 PS 637

## 2023-02-09 PROCEDURE — 80048 BASIC METABOLIC PNL TOTAL CA: CPT | Performed by: STUDENT IN AN ORGANIZED HEALTH CARE EDUCATION/TRAINING PROGRAM

## 2023-02-09 PROCEDURE — 85025 COMPLETE CBC W/AUTO DIFF WBC: CPT | Performed by: STUDENT IN AN ORGANIZED HEALTH CARE EDUCATION/TRAINING PROGRAM

## 2023-02-09 PROCEDURE — 84484 ASSAY OF TROPONIN QUANT: CPT | Performed by: STUDENT IN AN ORGANIZED HEALTH CARE EDUCATION/TRAINING PROGRAM

## 2023-02-09 PROCEDURE — H2032 ACTIVITY THERAPY, PER 15 MIN: HCPCS

## 2023-02-09 PROCEDURE — 36415 COLL VENOUS BLD VENIPUNCTURE: CPT | Performed by: STUDENT IN AN ORGANIZED HEALTH CARE EDUCATION/TRAINING PROGRAM

## 2023-02-09 PROCEDURE — 82550 ASSAY OF CK (CPK): CPT | Performed by: STUDENT IN AN ORGANIZED HEALTH CARE EDUCATION/TRAINING PROGRAM

## 2023-02-09 PROCEDURE — 86140 C-REACTIVE PROTEIN: CPT | Performed by: STUDENT IN AN ORGANIZED HEALTH CARE EDUCATION/TRAINING PROGRAM

## 2023-02-09 PROCEDURE — 124N000002 HC R&B MH UMMC

## 2023-02-09 PROCEDURE — 93010 ELECTROCARDIOGRAM REPORT: CPT | Performed by: INTERNAL MEDICINE

## 2023-02-09 PROCEDURE — 93005 ELECTROCARDIOGRAM TRACING: CPT

## 2023-02-09 PROCEDURE — 250N000013 HC RX MED GY IP 250 OP 250 PS 637: Performed by: PSYCHIATRY & NEUROLOGY

## 2023-02-09 PROCEDURE — 99222 1ST HOSP IP/OBS MODERATE 55: CPT | Mod: GC | Performed by: INTERNAL MEDICINE

## 2023-02-09 PROCEDURE — U0005 INFEC AGEN DETEC AMPLI PROBE: HCPCS | Performed by: STUDENT IN AN ORGANIZED HEALTH CARE EDUCATION/TRAINING PROGRAM

## 2023-02-09 RX ADMIN — ALUMINUM HYDROXIDE, MAGNESIUM HYDROXIDE, AND SIMETHICONE 30 ML: 200; 200; 20 SUSPENSION ORAL at 01:49

## 2023-02-09 RX ADMIN — LORATADINE 10 MG: 10 TABLET ORAL at 08:46

## 2023-02-09 RX ADMIN — NICOTINE 1 PATCH: 21 PATCH, EXTENDED RELEASE TRANSDERMAL at 08:46

## 2023-02-09 RX ADMIN — FLUTICASONE PROPIONATE 2 SPRAY: 50 SPRAY, METERED NASAL at 08:48

## 2023-02-09 RX ADMIN — RISPERIDONE 1 MG: 1 TABLET ORAL at 08:46

## 2023-02-09 RX ADMIN — RISPERIDONE 2 MG: 2 TABLET ORAL at 19:37

## 2023-02-09 RX ADMIN — CLOZAPINE 75 MG: 25 TABLET ORAL at 19:37

## 2023-02-09 RX ADMIN — ALUMINUM HYDROXIDE, MAGNESIUM HYDROXIDE, AND SIMETHICONE 30 ML: 200; 200; 20 SUSPENSION ORAL at 23:07

## 2023-02-09 RX ADMIN — ACETAMINOPHEN 325MG 650 MG: 325 TABLET ORAL at 10:45

## 2023-02-09 RX ADMIN — GABAPENTIN 300 MG: 300 CAPSULE ORAL at 14:39

## 2023-02-09 RX ADMIN — GABAPENTIN 300 MG: 300 CAPSULE ORAL at 08:46

## 2023-02-09 RX ADMIN — SERTRALINE HYDROCHLORIDE 150 MG: 50 TABLET ORAL at 08:46

## 2023-02-09 RX ADMIN — GABAPENTIN 300 MG: 300 CAPSULE ORAL at 19:37

## 2023-02-09 ASSESSMENT — ACTIVITIES OF DAILY LIVING (ADL)
ADLS_ACUITY_SCORE: 29
ADLS_ACUITY_SCORE: 29
LAUNDRY: UNABLE TO COMPLETE
ADLS_ACUITY_SCORE: 29
ORAL_HYGIENE: INDEPENDENT
ADLS_ACUITY_SCORE: 29
DRESS: INDEPENDENT
HYGIENE/GROOMING: INDEPENDENT
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
## 2023-02-09 NOTE — PLAN OF CARE
"Haseeb was primarily in his room this shift. Out for meals. He reported this morning not feeling well, stated he felt painful all over. He attributed his pain to meth that he may have consumed related to fently he had that may have been laced.     He is still endorses anxiety, depression,  command AH and SI. He also attributed some of his AH related to meth that may inadvertently been consumed.     Morning vitals WNL but he had a fever a few hours later and a medical workup done (ekg, lab, assessment by multiple drs). Tylenol given for pain which he stated was somewhat helpful. Afternoon vials WNL. He spoke with cardiology over the phone.     He reqested to, and attended afternoon group and wishes to attend further groups.  He said afternoon group was both \"energizing and calming\"           Problem: Suicide Risk  Goal: Absence of Self-Harm  Outcome: Not Progressing   Goal Outcome Evaluation:                        "

## 2023-02-09 NOTE — PLAN OF CARE
"Problem: Suicide Risk  Goal: Absence of Self-Harm  Intervention: Assess Risk to Self and Maintain Safety  Behavior Management:    behavioral plan reviewed    boundaries reinforced    impulse control promoted    able to contract for safety while hospitalized  Self-Harm Prevention:    environmental self-harm risks assessed    suicidal thoughts assessed and monitored this shift    Patient is alert and oriented x4, calm and cooperative. Patient remains withdrawn and isolative in his room. Patient continues to avoid social interactions with peers and only comes out for needs. Upon interaction, patient has blunted affect, soft/quiet speech and mood is \"okay\". Patient continues to endorse command auditory hallucinations directing him to shoot himself/cut his throat with a knife. Patient is heriberto for safety while hospitalized. Patient also endorses visual hallucinations \"two white doves, blackbird and a shadow.... that disappears when I look at\". Patient reports feeling less anxious and depressed. No evidence of delusions or acute behavioral concern. Patient is sleeping, hydrating and eating adequately.     Patient is medication compliant with reminders. No med side effects reported/noted this shift. Patient is tolerating well with the clozapine. Titration is on going at 75 mg this evening. Patient reports no pain/discomfort. No acute medical concern. Patient reports BM today around mid-day. VSS /75 (BP Location: Right arm)   Pulse 89   Temp 97.9  F (36.6  C) (Temporal)   Resp 16   Ht 1.6 m (5' 3\")   Wt 75.1 kg (165 lb 8 oz)   SpO2 94%   BMI 29.32 kg/m       Plan is to continue to monitor patient status q 15 mins, assess response to medications, and maintain the patient's safety.    "

## 2023-02-09 NOTE — PROGRESS NOTES
"At 0359 this night shift, patient awake to use the bathroom. Another staff reported patient looked pale and slightly sweaty.  All VS WNL except pulse was 124.  States he feels fine except \"hungry\" and his right shoulder is slightly painful.  Refused any Tylenol for his shoulder but took some Maalox earlier on night shift for  \"ribcage\" pain.  He goes on to talk about the visual hallucinations he has apparently been experiencing quite often lately.  Talks about depression and hearing voices to kill himself (which he denies planning to do and states he feels safe here).  States he may look a bit pale and slightly sweaty as he has been feeling very anxious, un-rested, and has been having frequent nightmares.    Had some milk and saltine crackers.  Previously tonight was given Vistaril and Maalox prn.  Will continue to monitor and support patient who remains on suicide precautions but no longer on SIO staffing.   "

## 2023-02-09 NOTE — PLAN OF CARE
Assessment/Intervention/Current Symtoms and Care Coordination:   Chart Review  Team Meeting  Pt continues to present as symptomatic, reports ongoing SI, and has poor sleep.   Called his CM, Anuradha Silva [phone], and left a VM requesting a call back to collaborate on the case.     Discharge Plan or Goal:  Return to [facility] when stable. There is concern that pt will need more support than sober living after his time at [facility].  His sister would like him to get a [place] Choices Assessment so he can get more supports.       Barriers to Discharge:    Stabilization of mental health symptoms     Referral Status:  No referrals made yet this hospitalization.      Legal Status:   Voluntary
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
## 2023-02-09 NOTE — PROGRESS NOTES
02/08/23 1900   Group Therapy Session   Group Attendance attended group session   Time Session Began 1615   Time Session Ended 1700   Total Time (minutes) 35   Total # Attendees 2-3   Group Type recreation   Group Topic Covered leisure exploration/use of leisure time   Group Session Detail TR leisure group   Patient Response/Contribution cooperative with task   Patient Participation Detail Pt participated in Therapeutic Recreation group with focus on leisure participation, communication skills, and critical thinking. Engaged and focused in the group recreational activity via a group game.  Pt entered group late, but was a full participant for approximately x35 minutes. Pt interacted as part of the activity, but was more reserved and quiet in general.

## 2023-02-10 ENCOUNTER — APPOINTMENT (OUTPATIENT)
Dept: GENERAL RADIOLOGY | Facility: CLINIC | Age: 33
End: 2023-02-10
Payer: COMMERCIAL

## 2023-02-10 ENCOUNTER — APPOINTMENT (OUTPATIENT)
Dept: CARDIOLOGY | Facility: CLINIC | Age: 33
End: 2023-02-10
Attending: NURSE PRACTITIONER
Payer: COMMERCIAL

## 2023-02-10 LAB
ATRIAL RATE - MUSE: 121 BPM
DIASTOLIC BLOOD PRESSURE - MUSE: NORMAL MMHG
FLUAV RNA SPEC QL NAA+PROBE: NEGATIVE
FLUBV RNA RESP QL NAA+PROBE: NEGATIVE
HOLD SPECIMEN: NORMAL
INTERPRETATION ECG - MUSE: NORMAL
LVEF ECHO: NORMAL
P AXIS - MUSE: 44 DEGREES
PR INTERVAL - MUSE: 138 MS
PROCALCITONIN SERPL IA-MCNC: 0.06 NG/ML
QRS DURATION - MUSE: 82 MS
QT - MUSE: 328 MS
QTC - MUSE: 465 MS
R AXIS - MUSE: 85 DEGREES
RSV RNA SPEC NAA+PROBE: NEGATIVE
SARS-COV-2 RNA RESP QL NAA+PROBE: NEGATIVE
SYSTOLIC BLOOD PRESSURE - MUSE: NORMAL MMHG
T AXIS - MUSE: -5 DEGREES
VENTRICULAR RATE- MUSE: 121 BPM

## 2023-02-10 PROCEDURE — 93306 TTE W/DOPPLER COMPLETE: CPT | Mod: 26 | Performed by: INTERNAL MEDICINE

## 2023-02-10 PROCEDURE — 84145 PROCALCITONIN (PCT): CPT

## 2023-02-10 PROCEDURE — 124N000002 HC R&B MH UMMC

## 2023-02-10 PROCEDURE — 87637 SARSCOV2&INF A&B&RSV AMP PRB: CPT | Performed by: PSYCHIATRY & NEUROLOGY

## 2023-02-10 PROCEDURE — 93005 ELECTROCARDIOGRAM TRACING: CPT

## 2023-02-10 PROCEDURE — 99232 SBSQ HOSP IP/OBS MODERATE 35: CPT

## 2023-02-10 PROCEDURE — 71045 X-RAY EXAM CHEST 1 VIEW: CPT

## 2023-02-10 PROCEDURE — 250N000013 HC RX MED GY IP 250 OP 250 PS 637: Performed by: PSYCHIATRY & NEUROLOGY

## 2023-02-10 PROCEDURE — 250N000013 HC RX MED GY IP 250 OP 250 PS 637

## 2023-02-10 PROCEDURE — 99232 SBSQ HOSP IP/OBS MODERATE 35: CPT | Mod: GC | Performed by: INTERNAL MEDICINE

## 2023-02-10 PROCEDURE — 36415 COLL VENOUS BLD VENIPUNCTURE: CPT

## 2023-02-10 PROCEDURE — 93010 ELECTROCARDIOGRAM REPORT: CPT | Performed by: INTERNAL MEDICINE

## 2023-02-10 PROCEDURE — 99233 SBSQ HOSP IP/OBS HIGH 50: CPT | Performed by: PSYCHIATRY & NEUROLOGY

## 2023-02-10 PROCEDURE — 93306 TTE W/DOPPLER COMPLETE: CPT

## 2023-02-10 PROCEDURE — 71045 X-RAY EXAM CHEST 1 VIEW: CPT | Mod: 26 | Performed by: STUDENT IN AN ORGANIZED HEALTH CARE EDUCATION/TRAINING PROGRAM

## 2023-02-10 RX ORDER — ONDANSETRON 4 MG/1
4-8 TABLET, FILM COATED ORAL EVERY 6 HOURS PRN
Status: DISCONTINUED | OUTPATIENT
Start: 2023-02-10 | End: 2023-04-12 | Stop reason: HOSPADM

## 2023-02-10 RX ORDER — CALCIUM CARBONATE 500 MG/1
500 TABLET, CHEWABLE ORAL 2 TIMES DAILY PRN
Status: DISCONTINUED | OUTPATIENT
Start: 2023-02-10 | End: 2023-04-12 | Stop reason: HOSPADM

## 2023-02-10 RX ORDER — CLOZAPINE 100 MG/1
100 TABLET ORAL AT BEDTIME
Status: DISCONTINUED | OUTPATIENT
Start: 2023-02-10 | End: 2023-02-10

## 2023-02-10 RX ADMIN — SERTRALINE HYDROCHLORIDE 150 MG: 50 TABLET ORAL at 08:45

## 2023-02-10 RX ADMIN — RISPERIDONE 2 MG: 2 TABLET ORAL at 20:11

## 2023-02-10 RX ADMIN — RISPERIDONE 1 MG: 1 TABLET ORAL at 08:45

## 2023-02-10 RX ADMIN — FLUTICASONE PROPIONATE 2 SPRAY: 50 SPRAY, METERED NASAL at 10:57

## 2023-02-10 RX ADMIN — Medication 12.5 MG: at 15:41

## 2023-02-10 RX ADMIN — LORATADINE 10 MG: 10 TABLET ORAL at 08:45

## 2023-02-10 RX ADMIN — GABAPENTIN 300 MG: 300 CAPSULE ORAL at 20:11

## 2023-02-10 RX ADMIN — ALUMINUM HYDROXIDE, MAGNESIUM HYDROXIDE, AND SIMETHICONE 30 ML: 200; 200; 20 SUSPENSION ORAL at 08:53

## 2023-02-10 RX ADMIN — GABAPENTIN 300 MG: 300 CAPSULE ORAL at 13:56

## 2023-02-10 RX ADMIN — GABAPENTIN 300 MG: 300 CAPSULE ORAL at 08:45

## 2023-02-10 RX ADMIN — NICOTINE 1 PATCH: 21 PATCH, EXTENDED RELEASE TRANSDERMAL at 11:04

## 2023-02-10 RX ADMIN — DIPHENHYDRAMINE HYDROCHLORIDE 50 MG: 50 CAPSULE ORAL at 21:07

## 2023-02-10 RX ADMIN — ACETAMINOPHEN 325MG 650 MG: 325 TABLET ORAL at 10:56

## 2023-02-10 ASSESSMENT — ACTIVITIES OF DAILY LIVING (ADL)
ADLS_ACUITY_SCORE: 29
ADLS_ACUITY_SCORE: 29
ORAL_HYGIENE: INDEPENDENT
ADLS_ACUITY_SCORE: 29
LAUNDRY: UNABLE TO COMPLETE
DRESS: INDEPENDENT
ADLS_ACUITY_SCORE: 29
HYGIENE/GROOMING: INDEPENDENT
ADLS_ACUITY_SCORE: 29

## 2023-02-10 NOTE — PROGRESS NOTES
"Pt participated in dance/movement therapy (D/MT) using rhythm to organize and make connections with peers in a support group environment.  Swaying and rocking promoted self-soothing and regulation as well.  Pt stated he felt the group was \"calming\" and then explained this in greater detail to unit staff when prompted.       02/09/23 1330   Expressive Therapy   Therapy Type dance/movement;drumming   Minutes of Treatment 50       "

## 2023-02-10 NOTE — PROGRESS NOTES
"[facility], [place]   Psychiatric Progress Note  Hospital Day: 14        Interim History:   The patient's care was discussed with the treatment team during the daily team meeting and/or staff's chart notes were reviewed.  Staff report patient remained isolative and withdrawn. He presented with a depressed mood and blunted affect. Pt endorsed auditory and visual hallucinations. Pt stated \" I'm still hallucinating. The voices are telling me to shoot myself in the head with a gun.\" Slept better overnight, a total of 7 hours. Remains tachycardic. Was assessed by Cardiology on 2/9. ECHO completed today.     Upon interview, Haseeb reports not feeling well; still reporting sore throat, cough, low energy, exhaustion, nasal congestion, myalgias, and intermittent chest pain. He said that he is not experiencing chest pain currently, but does report \"a burning\" in his chest when lying down. He suspects that he may not have swallowed a pill entirely and that it caused discomfort in his chest. He was encouraged to drink a glass of water after taking a pill. He also continues to report lightheadedness upon standing, but feels this has improved. Was encouraged to drink adequate fluids. His appetite is fair. He reports that he was \"tossing and turning\" throughout the night, but sleep was overall improved.     Suicidal ideation: SI with plan to shoot himself, cut himself with a knife. Denies access to firearms. Reports he will not act on these thoughts while hospitalized and feels safe in the hospital    Homicidal ideation: denies current or recent homicidal ideation or behaviors.    Psychotic symptoms: Reports  AH, VH (undulating ceilings, doves, crows, figures in periphery).    Medication side effects reported: as above    Acute medical concerns: as above.     Other issues reported by patient: Patient had no further questions or concerns.           Medications:       cloZAPine  75 mg Oral At Bedtime     " "fluticasone  2 spray Both Nostrils Daily     gabapentin  300 mg Oral TID     loratadine  10 mg Oral Daily     nicotine  1 patch Transdermal Daily     nicotine   Transdermal Q8H     risperiDONE  1 mg Oral Daily     risperiDONE  2 mg Oral At Bedtime     sertraline  150 mg Oral Daily          Allergies:   No Known Allergies       Labs:     Recent Results (from the past 24 hour(s))   EKG 12-lead, complete    Collection Time: 02/10/23  8:02 AM   Result Value Ref Range    Systolic Blood Pressure  mmHg    Diastolic Blood Pressure  mmHg    Ventricular Rate 121 BPM    Atrial Rate 121 BPM    NH Interval 150 ms    QRS Duration 82 ms     ms    QTc 460 ms    P Axis 49 degrees    R AXIS 87 degrees    T Axis -9 degrees    Interpretation ECG       Sinus tachycardia  T wave abnormality, consider inferior ischemia  Abnormal ECG  When compared with ECG of 09-FEB-2023 10:17, (unconfirmed)  No significant change was found            Psychiatric Examination:     /77 (BP Location: Left arm, Patient Position: Sitting, Cuff Size: Adult Regular)   Pulse (!) 132   Temp 98.2  F (36.8  C)   Resp 18   Ht 1.6 m (5' 3\")   Wt 75.1 kg (165 lb 8 oz)   SpO2 95%   BMI 29.32 kg/m    Weight is 165 lbs 8 oz  Body mass index is 29.32 kg/m .    Weight over time:  Vitals:    01/27/23 1718   Weight: 75.1 kg (165 lb 8 oz)       Orthostatic Vitals     None        Cardiometabolic risk assessment. 01/30/23    Reviewed patient profile for cardiometabolic risk factors    Date taken /Value  REFERENCE RANGE   Abdominal Obesity  (Waist Circumference)   See nursing flowsheet Women ?35 in (88 cm)   Men ?40 in (102 cm)      Triglycerides  Triglycerides   Date Value Ref Range Status   01/28/2023 192 (H) <150 mg/dL Final       ?150 mg/dL (1.7 mmol/L) or current treatment for elevated triglycerides   HDL cholesterol  Direct Measure HDL   Date Value Ref Range Status   01/28/2023 41 >=40 mg/dL Final   ]   Women <50 mg/dL (1.3 mmol/L) in women or current " "treatment for low HDL cholesterol  Men <40 mg/dL (1 mmol/L) in men or current treatment for low HDL cholesterol     Fasting plasma glucose (FPG) Lab Results   Component Value Date     01/28/2023      FPG ?100 mg/dL (5.6 mmol/L) or treatment for elevated blood glucose   Blood pressure  BP Readings from Last 3 Encounters:   02/10/23 116/77   01/27/23 118/74   07/20/22 112/79    Blood pressure ?130/85 mmHg or treatment for elevated blood pressure   Family History  See family history     Appearance: awake, alert, dressed in hospital scrubs and unkempt  Attitude:  cooperative  Eye Contact: fair  Mood:  \"exhausted\"  Affect:  mood congruent and intensity is blunted  Speech:  clear, coherent. appropriate  Language: fluent and intact in English  Psychomotor, Gait, Musculoskeletal:  no evidence of tardive dyskinesia, dystonia, or tics  Throught Process:  linear, goal oriented and illogical. Disorganized.  Associations:  no loose associations  Thought Content:  active suicidal ideation present, auditory hallucinations present and visual hallucinations present  Insight:  fair  Judgement:  poor  Oriented to:  time, person, and place  Attention Span and Concentration:  fair  Recent and Remote Memory:  fair  Fund of Knowledge:  appropriate    Clinical Global Impressions  First:  Considering your total clinical experience with this particular patient population, how severe are the patient's symptoms at this time?: 7 (01/28/23 1341)  Compared to the patient's condition at the START of treatment, this patient's condition is: 4 (01/28/23 1341)  Most recent:  Considering your total clinical experience with this particular patient population, how severe are the patient's symptoms at this time?: 7 (01/28/23 1341)  Compared to the patient's condition at the START of treatment, this patient's condition is: 4 (01/28/23 1341)           Precautions:     Behavioral Orders   Procedures     Code 1 - Restrict to Unit     Code 2     For " "imaging     Routine Programming     As clinically indicated     Status 15     Every 15 minutes.     Suicide precautions     Patients on Suicide Precautions should have a Combination Diet ordered that includes a Diet selection(s) AND a Behavioral Tray selection for Safe Tray - with utensils, or Safe Tray - NO utensils       Withdrawal precautions          Diagnoses:     Active suicidal ideation with intent outside of hospital setting  Unspecified psychosis (substance induced likely)  Polysubstance use  Unspecified mood disorder  ADHD, inattentive type per chart  History of idiopathic hypersomnolence per chart  Nicotine use disorder, dependence and withdrawal   Allergies- chronic uticaria and rhinitis per chart  HLD per chart          Assessment & Plan:     Assessment and hospital summary:  This patient is a 32 year old male with history of mood disorder, ADHD and substance use who presented to [facility] with SI on 1/26 in context of reported methamphetamine use and being kicked out of sober living. Medically cleared in ED, placed on 72HH and admitted to 12N. Limited historian, giving inconsistent reports about substance use, UDS negative, very somnolent, endorsing ongoing SI and some psychosis symptoms. PTA medications continued with exception of finasteride as patient states he takes \"1/4 a pill\" and declined ordered dose, will defer to primary team to address. Will continue to evaluate safety and stabilization further as patient more able to engage in assessments.      Inpatient psychiatric hospitalization is warranted at this time for safety, stabilization, and possible adjustment in medications.    Patient reports that he abruptly stopped Zoloft one week prior to his admission. He developed discontinuation syndrome symptoms following that. He reports that he does not have a history of psychosis but is experiencing psychotic symptoms. He is amenable with plan to trial risperidone after R/B/A were discussed. " Risperidone was initiated on 1/30 and titrated to a total of 3 mg daily on 2/1. Clonidine, used for ADHD, was reduced from a total of 0.3 mg daily to 0.2 mg daily on 2/3 due to concern for hypotension. 2/9: Cardiology consult placed and pending. EKG- tachycardia 121 bpm, QTc 465 ms, Abnormal QRS angle and T wave abnormality. COVID negative and labs are unremarkable.     Psychiatric treatment/inteventions:  Medications:   -Hold clozapine for now  -Continue risperidone to 1 mg daily and 2 mg at bedtime for now  -continue PTA sertraline 150mg daily for mood  -continue PTA gabapentin 300mg TID for anxiety      -PRN hydroxyzine 25mg every 4 hour for anxiety  -PRN trazodone 50mg at bedtime for sleep   -PRN olanzapine 10mg PO or IM TID for agitation/psychosis     Spiritual services consult placed on 2/6. Pt is Amish. Appreciate assistance.      Laboratory/Imaging: reviewed: Covid negative; UDS negative; CMP, CBC, TSH, Lipid panel, HgbA1c ordered to complete admission labs. Reviewed.     2/9/23: Troponin, CK, CBC, BMP: Unremarkable, CRP elevated to 38.18, COVID: Negative  2/10/23: Add Influenza A/B given flu-like sx     Patient will be treated in therapeutic milieu with appropriate individual and group therapies as described.     Medical treatment/interventions:  Medical concerns:   Nicotine replacement ordered, educate patient on benefits of cessation, pt unclear about finasteride dose, will hold until further information is obtained. PTA PRN zyrtec, azelastine, fluticasone, triamcinolone and epipen ordered for allergies and PRN ammoniaum lactate, Eucerin for  dry skin.    Dyslipidemia: Will arrange follow up with PCP to address. Discussed importance of healthy eating habits and regular exercise. Will consider nutrition consult if patient amenable.     Lightheadedness, improved: Pt is not hypotensive and orthostatics wnl. He was asked to increase fluid intake.   - Continue to monitor vital signs closely  - Encourage  fluids  - Discontinued clonidine    Chest pain/tachycardia/EKG changes:  - Pain improved with PRN medications.   - Cardiology consult placed on 2/9. See note on that date for details.   - ECHO reviewed and unremarkable.  - Writer discussed care with both Dr. Streeter from [facility] and Christina from IM. Plan for now is to HOLD clozapine until further medical workup. Dr. Streeter explained that myocarditis has been ruled out as troponin was negative. Pericarditis remains on differential, but he does not have EKG findings or a pericardial effusion. IM will assess for pericardial friction rub and pleuritic chest pain. IM is seeing patient this afternoon. Appreciate assistance.     Sore throat/cough/nasal congestion:   - COVID negative on 2/9. Adding repeat COVID test and Influenza A/B.  - Cepacol lozenges added  - PRN Tylenol   - Consult with IM. Appreciate assistance.      Legal Status: 72 hour hold discontinued. Pt agreed to sign in on voluntary basis and discharge directly to treatment once stable.      Safety Assessment:        Behavioral Orders   Procedures     Code 1 - Restrict to Unit     Routine Programming       As clinically indicated     Status 15       Every 15 minutes.     Suicide precautions       Patients on Suicide Precautions should have a Combination Diet ordered that includes a Diet selection(s) AND a Behavioral Tray selection for Safe Tray - with utensils, or Safe Tray - NO utensils        Withdrawal precautions      Pt has not required locked seclusion or restraints in the past 24 hours to maintain safety, please refer to RN documentation for further details.    Discontinued SIO on 2/8 as patient is heriberto for safety. Monitor SI closely.     The risks, benefits, alternatives and side effects have been discussed and are understood by the patient.     Disposition: Pending clinical stabilization. Will likely discharge to CD treatment when stable, pt reports interest in [facility].      This note was created by  undersigned using a Dragon dictation system. All typing errors or contextual distortion are unintentional and software inherent.     Entered by: Nelly Brown MD on 2/10/2023 at 1:00 PM          Ana M Brown MD  [facility] Psychiatry

## 2023-02-10 NOTE — CONSULTS
"Internal Medicine Progress Note      Assessment and plan:  Nikolay \"[place]\" Geno is a 32 year old male with a history of mood disorder, ADHD, substance abuse and seasonal allergies. Admitted to inpatient mental health 1/27/23 with SI. Medicine consulted 2/10/23 for chest pain, tachycardia.     Chest pain  Tachycardia  2/9 patient woke up with vague symptoms described as chest wall pain, tachycardia, and pale.  Pain had seemed to be worse when lying flat.  Currently, patient is pain-free lying flat and also did not have pain from lying to sitting.  Cardiology was consulted and ruled out pericarditis and pericardial effusion with an echocardiogram.  Troponin normal.  Cardiologist review of the EKG showed generalized J-point elevation, a benign presentation that is common finding among young man of Eastern  descent.  Psychiatry has started patient on Clozuril and a major side effect is tachycardia.  Patient also also complaining of burning chest pain and thinks it is possibly reflux.  Has had this in the past and Tums are effective.  Elevated CRP and labs so will check chest x-ray and procalcitonin to rule out airspace disease or bacterial infection.   -metoprolol 12.5 XL for one dose now. If effective, can start daily dose with hold parameters and could start at 25 mg daily if patient blood pressure does not drop.   -TUMS PRN heartburn    Medicine will continue to follow along.  Recommendations relayed to primary team via this progress note.  Thank you for the opportunity to be involved in this patient's care.      Objective:  /72 (BP Location: Left arm, Patient Position: Sitting, Cuff Size: Adult Regular)   Pulse (!) 130   Temp 98.2  F (36.8  C)   Resp 16   Ht 1.6 m (5' 3\")   Wt 75.1 kg (165 lb 8 oz)   SpO2 95%   BMI 29.32 kg/m      Vitals signs reviewed and noted    GENERAL: Alert and oriented x3  HEENT: Anicteric sclera. MMM  CV: , S1, S2. No murmur noted. Tachycardic  RESPIRATORY: Effort normal  " Lungs CTAB with no wheezing or rales  GI: Abdomen soft, non-tender with active bowel sounds. No rebound or guarding  NEUROLOGICAL: No focal deficits. Moves all extremities  EXTREMITIES: No peripheral edema. Intact bilateral pedal pulses  SKIN: No jaundice, no rash    Pertinent labs and procedures were reviewed     Subjective:     Met with patient in room, cooperative and calm for assessment and exam.  Patient states, he is currently pain-free while lying in bed.  Did not complain of any chest pain when sitting up from a lying position.  Does complain of slight burning in chest that is going on for 2 days.  He does have a history of this and Tums is effective.  Denies lightheadedness, dizziness, pre-syncope.  Discussed plan to start metoprolol at small dose and patient agrees.  Discussed potential lower blood pressure while on a beta-blocker.  We will follow-up with patient in a.m.    LINDA Love CNP  Internal Medicine [facility] Hospitalist  Page job code [id number] (3B), [phone] (3A), or [phone] ([facility] and )  Text paging via [employer] is appreciated  February 10, 2023

## 2023-02-10 NOTE — PLAN OF CARE
"Nursing Assessment    Sleep:  Hours of sleep at night: 7    General Shift Summary  Patient has isolated to his room only coming out for his meal tray. He presents with a flat affect. Patient voices having anxiety and depression over his hallucinations. He continues to have auditory hallucinations that tell him to shoot himself in the head. Patient said he can contract for safety. There is poverty of content in speech. Patient has been calm and cooperative with care. He is eating well at almost 100% of meals. Hygiene is poor, he presents as sweaty. Showering was encouraged, patient said he might later.    Patient said he thinks the Maalox helped with his pain since it decreased after administration along with his pulse. Two hours later his pain had increased, patient said \"I feel like I am dying\". Provider was updated on his symptoms, awaiting for patient to have ECHO. He was given Tylenol and offered Atarax but he refused stating he can only take the capsule form.       02/10/23 0850 02/10/23 0910 02/10/23 1046   Vital Signs   Temp 98.2  F (36.8  C)  --   --    Resp  --  18 18   Pulse (!) 144 (!) 129 (!) 132   Pulse Rate Source  --  Monitor Monitor   /78 132/80 116/77   BP Location  --  Left arm Left arm   Patient Position  --  Sitting Sitting   Cuff Size  --  Adult Regular Adult Regular   Oxygen Therapy   SpO2 93 % 94 % 95 %   O2 Device None (Room air) None (Room air) None (Room air)   DVPRS Pain Rating Score   (DVPRS) Pain Rating Score  8 6 9   Pain/Comfort/Sleep   Pain Location chest chest chest      02/10/23 1400   Vital Signs   Temp  --    Resp 16   Pulse (!) 130   Pulse Rate Source Monitor   /72   BP Location Left arm   Patient Position Sitting   Cuff Size Adult Regular   Oxygen Therapy   SpO2  --    O2 Device  --    DVPRS Pain Rating Score   (DVPRS) Pain Rating Score   --    Pain/Comfort/Sleep   Pain Location  --      Writer talked with Cardiologist and Internal medicine, see provider note for " plan.    Plan is to continue to monitor patient status q 15 mins, assess response to medications, and maintain the patients safety.    Nena Nevarez RN MSN
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
## 2023-02-10 NOTE — PLAN OF CARE
Problem: Sleep Disturbance  Goal: Adequate Sleep/Rest  Outcome: Progressing   Goal Outcome Evaluation:    Pt appeared to sleep 7 hours overnight with no problems observed during safety checks. A significant improvement from the night before. No concerns reported. No prns administered. Will continue to monitor and assess pt safety, mental status, and any medical concerns.

## 2023-02-10 NOTE — PLAN OF CARE
"  Problem: Suicide Risk  Goal: Absence of Self-Harm  Intervention: Assess Risk to Self and Maintain Safety  Recent Flowsheet Documentation  Self-Harm Prevention: environmental self-harm risks assessed   Goal Outcome Evaluation:    Plan of Care Reviewed With: patient      Pt was isolative and withdrawn to his room throughout the entire shift. Ate dinner in his room. He presented with depressed mood and blunted affect. Pt voiced having depression and anxiety rated 9 and 6 on a 10 scale respectively. Pt endorsed auditory and visual hallucinations. Pt stated \" I'm still hallucinating. The voices are telling me to shoot myself in the head with a gun.\" Pt contracts for safety and takes his medications as prescribed. He denies physical discomfort at this time.   Plan: Continue to provide safe environment and therapeutic milieu.       "

## 2023-02-10 NOTE — PLAN OF CARE
Assessment/Intervention/Current Symtoms and Care Coordination:   Chart Review  Team Meeting  Pt continues to present as symptomatic, reports ongoing SI, and has poor sleep.   Is very ill with some concerns about medical     Discharge Plan or Goal:  Return to [facility] when stable. There is concern that pt will need more support than sober living after his time at [facility].  His sister would like him to get a [place] Choices Assessment so he can get more supports.       Barriers to Discharge:    Stabilization of mental health symptoms     Referral Status:  No referrals made yet this hospitalization.      Legal Status:   Voluntary

## 2023-02-11 PROCEDURE — 124N000002 HC R&B MH UMMC

## 2023-02-11 PROCEDURE — 250N000013 HC RX MED GY IP 250 OP 250 PS 637

## 2023-02-11 PROCEDURE — 250N000013 HC RX MED GY IP 250 OP 250 PS 637: Performed by: PSYCHIATRY & NEUROLOGY

## 2023-02-11 PROCEDURE — 99232 SBSQ HOSP IP/OBS MODERATE 35: CPT

## 2023-02-11 RX ADMIN — FLUTICASONE PROPIONATE 2 SPRAY: 50 SPRAY, METERED NASAL at 08:15

## 2023-02-11 RX ADMIN — NICOTINE 1 PATCH: 21 PATCH, EXTENDED RELEASE TRANSDERMAL at 08:14

## 2023-02-11 RX ADMIN — GABAPENTIN 300 MG: 300 CAPSULE ORAL at 19:55

## 2023-02-11 RX ADMIN — GABAPENTIN 300 MG: 300 CAPSULE ORAL at 08:14

## 2023-02-11 RX ADMIN — SERTRALINE HYDROCHLORIDE 150 MG: 50 TABLET ORAL at 08:14

## 2023-02-11 RX ADMIN — RISPERIDONE 1 MG: 1 TABLET ORAL at 08:14

## 2023-02-11 RX ADMIN — RISPERIDONE 2 MG: 2 TABLET ORAL at 19:55

## 2023-02-11 RX ADMIN — LORATADINE 10 MG: 10 TABLET ORAL at 08:14

## 2023-02-11 RX ADMIN — GABAPENTIN 300 MG: 300 CAPSULE ORAL at 14:47

## 2023-02-11 RX ADMIN — Medication 12.5 MG: at 10:24

## 2023-02-11 RX ADMIN — ACETAMINOPHEN 325MG 650 MG: 325 TABLET ORAL at 09:46

## 2023-02-11 ASSESSMENT — ACTIVITIES OF DAILY LIVING (ADL)
ADLS_ACUITY_SCORE: 29
DRESS: INDEPENDENT
ADLS_ACUITY_SCORE: 29
ORAL_HYGIENE: INDEPENDENT
HYGIENE/GROOMING: INDEPENDENT
HYGIENE/GROOMING: INDEPENDENT
ADLS_ACUITY_SCORE: 29
LAUNDRY: UNABLE TO COMPLETE
ADLS_ACUITY_SCORE: 29
DRESS: INDEPENDENT
LAUNDRY: UNABLE TO COMPLETE
ORAL_HYGIENE: INDEPENDENT
ADLS_ACUITY_SCORE: 29

## 2023-02-11 NOTE — PLAN OF CARE
Problem: Sleep Disturbance  Goal: Adequate Sleep/Rest  Outcome: Progressing   Goal Outcome Evaluation:    Patient appeared to sleep 6.75 hours this night shift.  No prns or snacks given or requested. No nightmares or hallucinations reported. No concerns were reported or noted.

## 2023-02-11 NOTE — PROGRESS NOTES
"Brief Medicine Note    Following patient for tachycardia    Today's vital signs, medications, and nursing notes were reviewed.    Laboratory and Imaging studies reviewed in the results review section of Epic. Pertinent studies are as below:  Chest XR- no airspace disease  PCL 0.06- very low risk for bacterial infection        /83 (BP Location: Left arm, Patient Position: Sitting, Cuff Size: Adult Large)   Pulse 105   Temp 98.7  F (37.1  C) (Temporal)   Resp 16   Ht 1.6 m (5' 3\")   Wt 75.1 kg (165 lb 8 oz)   SpO2 94%   BMI 29.32 kg/m    General: A&O. NAD.       A/P:    Tachycardia  2/9 patient woke up with vague symptoms described as chest wall pain, tachycardia, and pale.  Pain had seemed to be worse when lying flat.  Currently, patient is pain-free lying flat and also did not have pain from lying to sitting.  Cardiology was consulted and ruled out pericarditis and pericardial effusion with an echocardiogram.  Troponin normal.  Cardiologist review of the EKG showed generalized J-point elevation, a benign presentation that is common finding among young man of Eastern  descent.  Psychiatry has started patient on Clozuril and a major side effect is tachycardia.  Elevated CRP.  Procalcitonin slightly Steven abnormal and chest x-ray did not show any airspace disease.  Very low suspicion for any bacterial infection to explain patient tachycardia.  Responded well to small dose of metoprolol yesterday with decrease in heart rate to the low 100s.  Blood pressure did drop slightly, so will need to be monitored closely.  -metoprolol 12.5 XL daily with hold parameters patient blood pressure does not drop. If blood pressure tolerates it and remains tachycardic, could titrate metoprolol up to 25 mg daily    Currently, medically stable and internal medicine will sign off. Please contact if future questions or concerns arise. Thank you for the opportunity to be a part of this patient's care.      LINDA Love " [facility]  Internal Medicine [facility] Hospitalist  Page job code [phone] (3B), [phone] (3A), or [phone] ([facility] and 4A)  Text paging via [employer] is appreciated  February 11, 2023

## 2023-02-11 NOTE — PLAN OF CARE
"Nursing Assessment    Recent Vitals: B/P: 123/83, T: 98.7, P: 105, R: 16     Sleep:  Hours of sleep at night: 6.75    General Shift Summary  Patient has primarily isolated to his room coming out for his meals, he eats meals in his room. Affect is flat. He is withdrawn but calm and cooperative. Patient said he is having nightmares that people are after him. He said first the people are calm and far away. Then they get closer, start to move faster, and turn into monsters. Writer educated him not to sleep with his Nicotine patch on, he was informed this yesterday as well since both yesterday and today he was found with his prior patch on in the morning. Patient voiced having an understanding. Patient stated he is having AVH, \"I see lanterns, crows, doves, and hear whispers to shoot myself.\". Writer discussed coping skills. Patient said listening to the T.V. helps since it blocks out the voices. Headphones were provided to him. He rated his anxiety 8/10 and depression 9/10. Hygiene is poor, showering was encouraged, patient said he would later since he was worried about his patch falling off. He was offered tape but refused.    Patient had two visitors. Food and a bible were brought in. Family performed a religous ceremony for the patient. When assessing after, patient said he felt \"much better, not as much anxiety\".    Patient denied chest pain, he didn't appear sweaty, he stated he is feeling much better today compared to yesterday.    He is medication compliant, no voiced side effects. He voiced a mild headache that he received Tylenol for, later patient voiced having relief.    Plan is to continue to monitor patient status q 15 mins, assess response to medications, and maintain the patients safety.    Nena Nevarez RN MSN  "

## 2023-02-11 NOTE — PLAN OF CARE
"Stated he feels \"the same\". Said he is calmer\" in  response to being asked about medication effectiveness. Denies  side effects.   Readily recites as if by rote:\"I'm still hallucinating, the same things: the lanterns, the birds, the floating shadow and I still want to kill myself by shooting myself in the head. If I don't have a gun I'll use a kitchen knife to slit my throat.\" Smiling slightly while answering. No observable indication of sadness, anger, or any type of distress.Intermittent eye contact.     Slow rate, no notable latency, clear and coherent. Unchanged self-isolation: lying on his bed watching television or sleeping.  Polite manner.    Requested and received diphenhydramine for c/o itching.    Continuing cravings.    Unchanged future orientation with the desire to return to [employer].    When asked about pain (previously c/o chest pain in ER) said \"my body does not ache any more.\" Denied memory of having c/o chest pain.    Seen by cardiology today; Clozaril being held. Highest HR this evenin (recheck 110). CRP was elevated at 38.18.    Plan: Monitor and document mood and behaviour, thought process and content. Establish and maintain therapeutic relationship. Educate about diagnoses, medications, treatment, legal status, plan of care. Address preexisting and concurrent medical concerns.      P: Unstable mood  G: Stable mood  O: Progressing                 "

## 2023-02-12 PROCEDURE — 90853 GROUP PSYCHOTHERAPY: CPT

## 2023-02-12 PROCEDURE — 250N000013 HC RX MED GY IP 250 OP 250 PS 637

## 2023-02-12 PROCEDURE — 124N000002 HC R&B MH UMMC

## 2023-02-12 PROCEDURE — 250N000013 HC RX MED GY IP 250 OP 250 PS 637: Performed by: PSYCHIATRY & NEUROLOGY

## 2023-02-12 RX ADMIN — LORATADINE 10 MG: 10 TABLET ORAL at 09:05

## 2023-02-12 RX ADMIN — GABAPENTIN 300 MG: 300 CAPSULE ORAL at 20:57

## 2023-02-12 RX ADMIN — GABAPENTIN 300 MG: 300 CAPSULE ORAL at 13:52

## 2023-02-12 RX ADMIN — RISPERIDONE 1 MG: 1 TABLET ORAL at 09:05

## 2023-02-12 RX ADMIN — DIPHENHYDRAMINE HYDROCHLORIDE 50 MG: 50 CAPSULE ORAL at 16:06

## 2023-02-12 RX ADMIN — DIPHENHYDRAMINE HYDROCHLORIDE 50 MG: 50 CAPSULE ORAL at 02:35

## 2023-02-12 RX ADMIN — Medication: at 16:09

## 2023-02-12 RX ADMIN — Medication 12.5 MG: at 09:05

## 2023-02-12 RX ADMIN — RISPERIDONE 2 MG: 2 TABLET ORAL at 20:57

## 2023-02-12 RX ADMIN — FLUTICASONE PROPIONATE 2 SPRAY: 50 SPRAY, METERED NASAL at 13:52

## 2023-02-12 RX ADMIN — NICOTINE 1 PATCH: 21 PATCH, EXTENDED RELEASE TRANSDERMAL at 09:06

## 2023-02-12 RX ADMIN — GABAPENTIN 300 MG: 300 CAPSULE ORAL at 09:05

## 2023-02-12 RX ADMIN — SERTRALINE HYDROCHLORIDE 150 MG: 50 TABLET ORAL at 09:05

## 2023-02-12 ASSESSMENT — ACTIVITIES OF DAILY LIVING (ADL)
ADLS_ACUITY_SCORE: 29
LAUNDRY: UNABLE TO COMPLETE
ADLS_ACUITY_SCORE: 29
LAUNDRY: UNABLE TO COMPLETE
HYGIENE/GROOMING: INDEPENDENT
ADLS_ACUITY_SCORE: 29
ORAL_HYGIENE: INDEPENDENT
ADLS_ACUITY_SCORE: 29
HYGIENE/GROOMING: INDEPENDENT
ORAL_HYGIENE: INDEPENDENT
ADLS_ACUITY_SCORE: 29
ADLS_ACUITY_SCORE: 29
DRESS: INDEPENDENT
DRESS: INDEPENDENT
ADLS_ACUITY_SCORE: 29
ADLS_ACUITY_SCORE: 29

## 2023-02-12 NOTE — PLAN OF CARE
" \"I'm the same: I see the floating lanterns, 2 white doves, blackbirds, the shadow on my right side. Something slithering on the ceiling if I look up. Whispers telling me I should shoot myself. I would if I wasn't here. If I had no gun I would use a kitchen knife to cut my throat.\"     No c/o side effects. Does not consider risperidone particularly effective (Said it does not affect the hallucinations) Would not want to return to [employer] due to weight gain.  \"I know I can't go back to my family but I'm glad  they came. They don't approve of me: my orientation, the drugs. And I'm still Confucianist.\"      Intermittent eye contact, calm isolative behaviour with disinterest in ADLs.   Fluent, clear speech with soft volume, normal prosody and rate.  Minimal latency if any. Polite. Watching cartoons and movies on television while lying on bed all evening.     Unchanged future orientation.     Procalcitonin level did not indicate presence of infection.  with WNL BP this evening and he denied all somatic concerns.         Plan: Monitor and document mood and behaviour, thought process and content. Establish and maintain therapeutic relationship. Educate about diagnoses, medications, treatment, legal status, plan of care. Address preexisting and concurrent medical concerns.      P: Unstable mood  G: Stable mood  O: Progressing                   "

## 2023-02-12 NOTE — PLAN OF CARE
"Nursing Assessment    Recent Vitals: B/P: 113/78, T: 97.1, P: 83, R: 16     Sleep:  Hours of sleep at night: 6.25    General Shift Summary  Patient has isolated to his room. Writer encouraged to come out into the lounge however patient \"That's okay, I don't want my thoughts to come with me.\" Patient stated he continues to have hallucinations that tell him to shoot himself in the head with a gun. Patient said he can remain safe while here. He continues to see lanterns, crows, doves, and a shadow figure on his right that disappears whenever he looks over at it. Patient said that his hallucinations have not increased or decreased over the past few days, the hallucinations have been consistent. Patient said that laying very still with loud noises helps the most. Hygiene is good, patient took a shower in the morning. He is eating well.     Patient is medication compliant, no voiced effects. He continues to deny chest pain and didn't appear sweaty this shift.    Plan is to continue to monitor patient status q 15 mins, assess response to medications, and maintain the patients safety.    Nena Nevarez RN MSN  "

## 2023-02-12 NOTE — PLAN OF CARE
"  Problem: Sleep Disturbance  Goal: Adequate Sleep/Rest  Outcome: Progressing   Goal Outcome Evaluation:    Pt appeared to sleep 6.25 hours overnight with no problems observed during safety checks.     At 0235 pt came to the nurses station and c/o of itching on the back of his upper arms (tricep) area bilaterally. Pt reported he had a nightmare and woke up and the the back of his arms were itching. He said his arms itched earlier in the night before he went to sleep. The pt appeared calm and did not appear in distress. Pt denied SOB, difficulty swallowing or throat closing. Pt denied palpations or lightheadedness. No visible swelling of face or tongue. The areas indicated covered the length of the pt's triceps and the skin was pink in color and was not raised or blotchy and was not found anywhere else on his body. Pt denied anxiety. Pt's VS were WNL: /80 (BP Location: Right arm, Patient Position: Sitting, Cuff Size: Adult Regular)   Pulse 86   Temp 97.7  F (36.5  C) (Oral)   Resp 16   Ht 1.6 m (5' 3\")   Wt 75.1 kg (165 lb 8 oz)   SpO2 95%   BMI 29.32 kg/m       Pt was given a prn of Benadryl 50 mg for itching. Pt sat in the lounge area and was observed for a period of 15 mins when he then asked to go back to his room. The pt agreed to seek out staff if the itching did not subside within 30 minutes.    Will continue to monitor and assess pt safety, mental status, and any medical concerns.     "

## 2023-02-13 LAB
ATRIAL RATE - MUSE: 121 BPM
DIASTOLIC BLOOD PRESSURE - MUSE: NORMAL MMHG
INTERPRETATION ECG - MUSE: NORMAL
P AXIS - MUSE: 49 DEGREES
PR INTERVAL - MUSE: 150 MS
QRS DURATION - MUSE: 82 MS
QT - MUSE: 324 MS
QTC - MUSE: 460 MS
R AXIS - MUSE: 87 DEGREES
SYSTOLIC BLOOD PRESSURE - MUSE: NORMAL MMHG
T AXIS - MUSE: -9 DEGREES
VENTRICULAR RATE- MUSE: 121 BPM

## 2023-02-13 PROCEDURE — 250N000013 HC RX MED GY IP 250 OP 250 PS 637: Performed by: PSYCHIATRY & NEUROLOGY

## 2023-02-13 PROCEDURE — 99232 SBSQ HOSP IP/OBS MODERATE 35: CPT

## 2023-02-13 PROCEDURE — 124N000002 HC R&B MH UMMC

## 2023-02-13 PROCEDURE — 99233 SBSQ HOSP IP/OBS HIGH 50: CPT | Performed by: PSYCHIATRY & NEUROLOGY

## 2023-02-13 PROCEDURE — 250N000013 HC RX MED GY IP 250 OP 250 PS 637

## 2023-02-13 RX ORDER — BENZTROPINE MESYLATE 1 MG/1
1 TABLET ORAL AT BEDTIME
Status: DISCONTINUED | OUTPATIENT
Start: 2023-02-13 | End: 2023-03-09

## 2023-02-13 RX ORDER — PANTOPRAZOLE SODIUM 40 MG/1
40 TABLET, DELAYED RELEASE ORAL
Status: DISCONTINUED | OUTPATIENT
Start: 2023-02-14 | End: 2023-04-12 | Stop reason: HOSPADM

## 2023-02-13 RX ORDER — CLOZAPINE 25 MG/1
25 TABLET ORAL AT BEDTIME
Status: DISCONTINUED | OUTPATIENT
Start: 2023-02-13 | End: 2023-02-15

## 2023-02-13 RX ADMIN — GABAPENTIN 300 MG: 300 CAPSULE ORAL at 19:53

## 2023-02-13 RX ADMIN — LORATADINE 10 MG: 10 TABLET ORAL at 07:57

## 2023-02-13 RX ADMIN — SERTRALINE HYDROCHLORIDE 150 MG: 50 TABLET ORAL at 07:57

## 2023-02-13 RX ADMIN — FLUTICASONE PROPIONATE 2 SPRAY: 50 SPRAY, METERED NASAL at 08:15

## 2023-02-13 RX ADMIN — GABAPENTIN 300 MG: 300 CAPSULE ORAL at 14:25

## 2023-02-13 RX ADMIN — ALUMINUM HYDROXIDE, MAGNESIUM HYDROXIDE, AND SIMETHICONE 30 ML: 200; 200; 20 SUSPENSION ORAL at 14:25

## 2023-02-13 RX ADMIN — Medication 12.5 MG: at 07:56

## 2023-02-13 RX ADMIN — RISPERIDONE 2 MG: 2 TABLET ORAL at 19:54

## 2023-02-13 RX ADMIN — CLOZAPINE 25 MG: 25 TABLET ORAL at 19:53

## 2023-02-13 RX ADMIN — GABAPENTIN 300 MG: 300 CAPSULE ORAL at 07:56

## 2023-02-13 RX ADMIN — HYDROXYZINE HYDROCHLORIDE 100 MG: 50 TABLET ORAL at 14:56

## 2023-02-13 RX ADMIN — BENZTROPINE MESYLATE 1 MG: 1 TABLET ORAL at 19:54

## 2023-02-13 RX ADMIN — RISPERIDONE 1 MG: 1 TABLET ORAL at 07:57

## 2023-02-13 RX ADMIN — NICOTINE 1 PATCH: 21 PATCH, EXTENDED RELEASE TRANSDERMAL at 08:16

## 2023-02-13 ASSESSMENT — ACTIVITIES OF DAILY LIVING (ADL)
HYGIENE/GROOMING: INDEPENDENT
ADLS_ACUITY_SCORE: 29
LAUNDRY: UNABLE TO COMPLETE
ADLS_ACUITY_SCORE: 29
ORAL_HYGIENE: INDEPENDENT
ADLS_ACUITY_SCORE: 29
DRESS: INDEPENDENT
ADLS_ACUITY_SCORE: 29

## 2023-02-13 NOTE — PLAN OF CARE
Problem: Suicide Risk  Goal: Absence of Self-Harm  Outcome: Progressing     Problem: Suicidal Behavior  Goal: Suicidal Behavior is Absent or Managed  Outcome: Progressing   Goal Outcome Evaluation:    Plan of Care Reviewed With: patient      Patient casually groomed, unkept, patient is calm,blunted with flat affect. Patient's speech is normal slow, illogical, he is disorganized. Patient is alert and oriented x 3, disoriented to situation, he is isolated and withdrawn, he was encouraged to attend group. Patient complained of epigastric pain, prn Maalox was given, he was seen by internal medicine, internal medicine will consult with primary and issue new orders. Patient endorsed depression and anxiety, auditory/command and visual hallucinations, telling his to kill self by gun or knife. Patient states that he will execute the plan as soon as he graduates from here. Patient states that if he doesn't get a gun, he will find the knife at the warehouse. Patient heriberto for safety while here. Patient was meals and medication compliant, no side effects endorsed, will continue to monitor.

## 2023-02-13 NOTE — PLAN OF CARE
"\"Nothing is different. I just want to be by myself. Watching TV and the headphones help so I hear everything less. I'm still hearing the voices telling me I should shoot myself in the head.  And I'm still suicidal: if I don't have a gun I'll use a kitchen knife to cut my throat. I still see the doves, the blackbirds, the shadow on my right side that disappears if I look at it.\" Denied suicidal intent while hospitalised.  Remains future oriented.    Calm, clear, coherent, low-volume speech with WNL prosody. Spent most of the evening lying on his bed. Minimal to no latency of responses. Blunted affect with no observable signs of distress. Intermittent eye contact.     Denied cravings to use today.    No physical complaints except itching. (Received lac-Hydrin and diphenhydramine.)    Asked for and received information about his medications.    Plan: Monitor and document mood and behaviour, thought process and content. Establish and maintain therapeutic relationship. Educate about diagnoses, medications, treatment, legal status, plan of care. Address preexisting and concurrent medical concerns.      P: Unstable mood  G: Stable mood  O: Progressing    "

## 2023-02-13 NOTE — PROGRESS NOTES
"[facility]    Medicine Progress Note - Hospitalist Service    Date of Admission:  1/27/2023    Assessment & Plan   Nikolay Lora is a 32 year old year old man with a history of mood disorder, ADHD, substance abuse and seasonal allergies. Admitted to inpatient mental health 1/27/23 with SI. Medicine consulted 2/6/23 for allergies and for chest pain/tachycardia on 2/10. Medicine asked to assess for pericardial friction rub/pleurtic chest pain on exam on 2/13.     Please contact Medicine for any additional charges. Medicine will see pt in AM on 2/14 and sign off.        Tachycardia  Possible Clozaril Induced Tachycardia  Noncardiac chest pain   Possible GERD  2/9 patient woke up with vague symptoms described as chest wall pain, tachycardia, and pale. Pain is located in epigastric region. Pain had seemed to be worse when lying flat and describes is as burning. He had a sour taste in his mouth in the morning. It gets better when sitting up. No change with leaning forward.  He does endorse that it feels like his \"organs were about to explode.\" No pericardial friction rub while sitting up and leaning forward.  Cardiology was consulted and ruled out pericarditis and pericardial effusion with an echocardiogram.  Troponin normal.  Cardiologist review of the EKG showed generalized J-point elevation, a benign presentation that is common finding among young man of Eastern  descent.  Psychiatry has started patient on Clozuril and a major side effect is tachycardia.  Elevated CRP.  Procalcitonin slightly less abnormal and chest x-ray did not show any airspace disease. At this time, I agree with our Cardiology colleagues this is unlikely pericarditis or ACS given lack of no typical s/s, no typical ECG changes, no pericardial effusion on echo, and trops <6. Based on my clinical assessment and data, this is more consistent with GERD. I agree that there is less likely a bacterial " "infection to explain tachycardia. Pt was started on metoprolol for possible Clozaril induced.    - Start Protonix 40 mg daily in AM  - PRN Tums for breakthrough symptoms  - Continue metoprolol 12.5 XL daily with hold parameters patient blood pressure does not drop. If blood pressure tolerates it and remains tachycardic, could titrate metoprolol up to 25 mg daily        Clinically Significant Risk Factors                        # Overweight: Estimated body mass index is 29.32 kg/m  as calculated from the following:    Height as of this encounter: 1.6 m (5' 3\").    Weight as of this encounter: 75.1 kg (165 lb 8 oz).          Disposition Plan     Expected Discharge Date: 02/13/2023    Discharge Delays: Placement - Mental Health/Addiction/Geripsych            The patient's care was discussed with the Attending Physician, Dr. Roman , Bedside Nurse and Patient.    LINDA Rowland [facility]  Hospitalist Service  [facility]  Securely message with [employer] (more info)  Text page via [employer] Paging/Directory   ______________________________________________________________________    Interval History   Nursing/SW/consult/interdisciplinary healthcare worker's notes reviewed. No acute events overnight. Upon exam, pt without any chest pain, but felt that his organs were \"about to explode.\" Stating pain in epigastric region, worse after laying down. No change with leaning forward/ Denies any headaches, fevers, dysphagia, GERD s/s, SOB, new or changingchest pain, N/V, abdominal pain, dysuria, back pain or leg swelling. ROS: 12 point ROS negative other than the symptoms noted here or above in the assessment and plan.     Physical Exam   Vital Signs: Temp: 98  F (36.7  C) Temp src: Oral BP: 121/80 Pulse: 82     SpO2: 96 % O2 Device: None (Room air)    Weight: 165 lbs 8 oz     GENERAL: Alert and oriented x3  HEENT: Anicteric sclera. MMM  CV: , S1, S2. No murmur noted. Tachycardiac, but " regular rate on auscultation/palpation.   RESPIRATORY: Effort normal  Lungs CTAB with no wheezing or rales  GI: Abdomen soft, non-tender with active bowel sounds. No rebound or guarding  NEUROLOGICAL: No focal deficits. Moves all extremities  EXTREMITIES: No peripheral edema. Intact bilateral pedal pulses  SKIN: No jaundice, no rash    Medical Decision Making       45 MINUTES SPENT BY ME on the date of service doing chart review, history, exam, documentation & further activities per the note.      Data   Recent Labs   Lab 02/09/23 1018 02/06/23 2057   WBC 10.7 7.7   HGB 15.8  --    MCV 84  --      --      --    POTASSIUM 4.0  --    CHLORIDE 100  --    CO2 27  --    BUN 12.5  --    CR 0.81  --    ANIONGAP 11  --    KATINA 9.7  --    *  --      Most Recent 3 CBC's:Recent Labs   Lab Test 02/09/23 1018 02/06/23 2057 01/28/23  0854 12/28/21  1008   WBC 10.7 7.7 7.3 6.6   HGB 15.8  --  15.5 16.3   MCV 84  --  87 90     --  246 289     Most Recent 3 BMP's:Recent Labs   Lab Test 02/09/23 1018 01/28/23  0854 12/28/21  1008    139 141   POTASSIUM 4.0 4.2 3.8   CHLORIDE 100 107 105   CO2 27 29 27   BUN 12.5 26 12   CR 0.81 0.99 0.85   ANIONGAP 11 3 9   KATINA 9.7 8.9 9.4   * 101* 102     Most Recent 3 Troponin's:No lab results found.  Most Recent ESR & CRP:Recent Labs   Lab Test 02/09/23  1018   CRPI 38.18*

## 2023-02-13 NOTE — PLAN OF CARE
02/13/23 1422   Individualization/Patient Specific Goals   Patient Personal Strengths community support;resilient;resourceful   Patient Vulnerabilities history of unsuccessful treatment;lacks insight into illness;occupational insecurity;poor physical health   Interprofessional Rounds   Summary Pt is very ill.  He is not responding to treatment.  He needs ongoing psychiatric management.   Participants CTC;nursing;psychiatrist;other (see comments)   Behavioral Team Discussion   Participants Yelena Starr, ELVIA, Nelly Brown MD, qAuiles Berman RN, Dani Mena MD (Resident)   Progress Minimal, not responding, intension suicide   Continued Stay Criteria/Rationale 7-10 days.   Medical/Physical concerns regarding eating habits.   Plan Psychiatric evaluation and stabilization, medication management, internal medicine consult if needed.  Milieu mangement, group therapy, CTC will provide discharge planning.

## 2023-02-13 NOTE — PROGRESS NOTES
"[facility], [place]   Psychiatric Progress Note  Hospital Day: 17        Interim History:   The patient's care was discussed with the treatment team during the daily team meeting and/or staff's chart notes were reviewed.  Staff report patient's tachycardia resolved. No acute physical health concerns and IM following. Still reporting VH, AH, and active SI. Still needs encouragement to complete ADLs. No evidence of agitation or aggressive behaviors. Mother and aunt visited.     Upon interview, Haseeb reports ongoing active SI, VH, AH. He said that the most bothersome MH sx is \"the whispers because they tell me to kill myself and it makes me feel unsafe with myself.\" He again contracted for safety in the hospital. After much discussion regarding R/B/A of clozapine, he is amenable with plan to restart it but with a slower titration. He reported some left sided neck pain, but suspects that it is related to the uncomfortable bed. He is also amenable with plan to start Cogentin at bedtime.     Suicidal ideation: SI with plan to shoot himself, cut himself with a knife. Denies access to firearms. Reports he will not act on these thoughts while hospitalized and feels safe in the hospital    Homicidal ideation: denies current or recent homicidal ideation or behaviors.    Psychotic symptoms: Reports  AH, VH (undulating ceilings, doves, crows, figures in periphery).    Medication side effects reported: as above    Acute medical concerns: as above.     Other issues reported by patient: Patient had no further questions or concerns.           Medications:       cloZAPine  25 mg Oral At Bedtime     fluticasone  2 spray Both Nostrils Daily     gabapentin  300 mg Oral TID     loratadine  10 mg Oral Daily     metoprolol succinate ER  12.5 mg Oral Daily     nicotine  1 patch Transdermal Daily     nicotine   Transdermal Q8H     [START ON 2/14/2023] pantoprazole  40 mg Oral QAM AC     risperiDONE  1 mg Oral " "Daily     risperiDONE  2 mg Oral At Bedtime     sertraline  150 mg Oral Daily          Allergies:   No Known Allergies       Labs:     No results found for this or any previous visit (from the past 24 hour(s)).       Psychiatric Examination:     /80 (BP Location: Right arm)   Pulse 82   Temp 98  F (36.7  C) (Oral)   Resp 16   Ht 1.6 m (5' 3\")   Wt 75.1 kg (165 lb 8 oz)   SpO2 96%   BMI 29.32 kg/m    Weight is 165 lbs 8 oz  Body mass index is 29.32 kg/m .    Weight over time:  Vitals:    01/27/23 1718   Weight: 75.1 kg (165 lb 8 oz)       Orthostatic Vitals     None        Cardiometabolic risk assessment. 01/30/23    Reviewed patient profile for cardiometabolic risk factors    Date taken /Value  REFERENCE RANGE   Abdominal Obesity  (Waist Circumference)   See nursing flowsheet Women ?35 in (88 cm)   Men ?40 in (102 cm)      Triglycerides  Triglycerides   Date Value Ref Range Status   01/28/2023 192 (H) <150 mg/dL Final       ?150 mg/dL (1.7 mmol/L) or current treatment for elevated triglycerides   HDL cholesterol  Direct Measure HDL   Date Value Ref Range Status   01/28/2023 41 >=40 mg/dL Final   ]   Women <50 mg/dL (1.3 mmol/L) in women or current treatment for low HDL cholesterol  Men <40 mg/dL (1 mmol/L) in men or current treatment for low HDL cholesterol     Fasting plasma glucose (FPG) Lab Results   Component Value Date     01/28/2023      FPG ?100 mg/dL (5.6 mmol/L) or treatment for elevated blood glucose   Blood pressure  BP Readings from Last 3 Encounters:   02/13/23 121/80   01/27/23 118/74   07/20/22 112/79    Blood pressure ?130/85 mmHg or treatment for elevated blood pressure   Family History  See family history     Appearance: awake, alert, dressed in hospital scrubs and unkempt  Attitude:  cooperative  Eye Contact: fair  Mood:  \"I am still having suicide thoughts\"  Affect:  mood congruent and intensity is blunted  Speech:  clear, coherent. appropriate  Language: fluent and intact in " English  Psychomotor, Gait, Musculoskeletal:  no evidence of tardive dyskinesia, dystonia, or tics. Has full ROM of neck. No muscle stiffness noted.   Throught Process:  linear, goal oriented and illogical. Disorganized.   Associations:  no loose associations  Thought Content:  active suicidal ideation present, auditory hallucinations present and visual hallucinations present  Insight:  fair  Judgement:  poor  Oriented to:  time, person, and place  Attention Span and Concentration:  fair  Recent and Remote Memory:  fair  Fund of Knowledge:  appropriate    Clinical Global Impressions  First:  Considering your total clinical experience with this particular patient population, how severe are the patient's symptoms at this time?: 7 (01/28/23 1341)  Compared to the patient's condition at the START of treatment, this patient's condition is: 4 (01/28/23 1341)  Most recent:  Considering your total clinical experience with this particular patient population, how severe are the patient's symptoms at this time?: 7 (01/28/23 1341)  Compared to the patient's condition at the START of treatment, this patient's condition is: 4 (01/28/23 1341)           Precautions:     Behavioral Orders   Procedures     Code 1 - Restrict to Unit     Code 2     For imaging     Routine Programming     As clinically indicated     Status 15     Every 15 minutes.     Suicide precautions     Patients on Suicide Precautions should have a Combination Diet ordered that includes a Diet selection(s) AND a Behavioral Tray selection for Safe Tray - with utensils, or Safe Tray - NO utensils       Withdrawal precautions          Diagnoses:     Active suicidal ideation with intent outside of hospital setting  Unspecified psychosis (substance induced likely)  Polysubstance use  Unspecified mood disorder  ADHD, inattentive type per chart  History of idiopathic hypersomnolence per chart  Nicotine use disorder, dependence and withdrawal   Allergies- chronic uticaria  "and rhinitis per chart  HLD per chart          Assessment & Plan:     Assessment and hospital summary:  This patient is a 32 year old male with history of mood disorder, ADHD and substance use who presented to [facility] ED with SI on 1/26 in context of reported methamphetamine use and being kicked out of sober living. Medically cleared in ED, placed on 72HH and admitted to [facility]. Limited historian, giving inconsistent reports about substance use, UDS negative, very somnolent, endorsing ongoing SI and some psychosis symptoms. PTA medications continued with exception of finasteride as patient states he takes \"1/4 a pill\" and declined ordered dose, will defer to primary team to address. Will continue to evaluate safety and stabilization further as patient more able to engage in assessments.      Inpatient psychiatric hospitalization is warranted at this time for safety, stabilization, and possible adjustment in medications.    Patient reports that he abruptly stopped Zoloft one week prior to his admission. He developed discontinuation syndrome symptoms following that. He reports that he does not have a history of psychosis but is experiencing psychotic symptoms. He is amenable with plan to trial risperidone after R/B/A were discussed. Risperidone was initiated on 1/30 and titrated to a total of 3 mg daily on 2/1. Clonidine, used for ADHD, was reduced from a total of 0.3 mg daily to 0.2 mg daily on 2/3 due to concern for hypotension. 2/9: Cardiology consult placed. EKG- tachycardia 121 bpm, QTc 465 ms, Abnormal QRS angle and T wave abnormality. COVID negative and labs are unremarkable.  On 2/10, clozapine was held pending additional workup from IM and cardiology. Pericarditis was ruled out and metoprolol was started. Clozapine was restarted on 2/13.     Psychiatric treatment/inteventions:  Medications:   -Restart clozapine but slower titration of 25 mg every OTHER day while monitoring closely for side effects  -Continue " risperidone to 1 mg daily and 2 mg at bedtime for now  -continue PTA sertraline 150mg daily for mood  -continue PTA gabapentin 300mg TID for anxiety   - Add Cogentin 1 mg at bedtime for possible EPSE     -PRN hydroxyzine 25mg every 4 hour for anxiety  -PRN trazodone 50mg at bedtime for sleep   -PRN olanzapine 10mg PO or IM TID for agitation/psychosis     Spiritual services consult placed on 2/6. Pt is [facility]. Appreciate assistance.      Laboratory/Imaging: reviewed: Covid negative; UDS negative; CMP, CBC, TSH, Lipid panel, HgbA1c ordered to complete admission labs. Reviewed.     2/9/23: Troponin, CK, CBC, BMP: Unremarkable, CRP elevated to 38.18, COVID: Negative  2/10/23: Add Influenza A/B given flu-like sx-negative  Patient will be treated in therapeutic milieu with appropriate individual and group therapies as described.     Medical treatment/interventions:  Medical concerns:   Nicotine replacement ordered, educate patient on benefits of cessation, pt unclear about finasteride dose, will hold until further information is obtained. PTA PRN zyrtec, azelastine, fluticasone, triamcinolone and epipen ordered for allergies and PRN ammoniaum lactate, Eucerin for  dry skin.    Dyslipidemia: Will arrange follow up with PCP to address. Discussed importance of healthy eating habits and regular exercise. Will consider nutrition consult if patient amenable.     Lightheadedness, improved: Pt is not hypotensive and orthostatics wnl. He was asked to increase fluid intake.   - Continue to monitor vital signs closely  - Encourage fluids  - Discontinued clonidine    Chest pain/tachycardia/EKG changes:  - Pain improved with PRN medications.   - Cardiology consult placed on 2/9. See note on that date for details.   - ECHO reviewed and unremarkable.  - Writer discussed care with both Dr. Streeter from [facility] and Christina from IM. Plan for now is to HOLD clozapine until further medical workup. Dr. Streeter explained that myocarditis has been  ruled out as troponin was negative. Pericarditis remains on differential, but he does not have EKG findings or a pericardial effusion. IM will assess for pericardial friction rub and pleuritic chest pain. IM is seeing patient this afternoon. Appreciate assistance. Metoprolol was started.     Update 2/13: Discussed care with Annette from IM today on two occasions. Please see her note on this date for details. She does not suspect that this is pericarditis. He does not have pleuritic pain, characteristic CP, EKG changes, or pericardial effusion. Therefore, will cautiously restart clozapine while monitoring closely for side effects. May consider further increase in metoprolol if necessary. PPI was started due to suspected GERD.     Sore throat/cough/nasal congestion:   - COVID negative on 2/9. Adding repeat COVID test and Influenza A/B.  - Cepacol lozenges added  - PRN Tylenol   - Consult with IM. Appreciate assistance.      Legal Status: 72 hour hold discontinued. Pt agreed to sign in on voluntary basis and discharge directly to treatment once stable.      Safety Assessment:        Behavioral Orders   Procedures     Code 1 - Restrict to Unit     Routine Programming       As clinically indicated     Status 15       Every 15 minutes.     Suicide precautions       Patients on Suicide Precautions should have a Combination Diet ordered that includes a Diet selection(s) AND a Behavioral Tray selection for Safe Tray - with utensils, or Safe Tray - NO utensils        Withdrawal precautions      Pt has not required locked seclusion or restraints in the past 24 hours to maintain safety, please refer to RN documentation for further details.    Discontinued SIO on 2/8 as patient is heriberto for safety. Monitor SI closely.     The risks, benefits, alternatives and side effects have been discussed and are understood by the patient.     Disposition: Pending clinical stabilization. Will likely discharge to [facility] treatment when  chavo pt reports interest in [facility].      This note was created by undersigned using a Dragon dictation system. All typing errors or contextual distortion are unintentional and software inherent.     Entered by: Nelly Brown MD on 2/13/2023 at 3:05 PM          Ana M Brown MD  [facility]iat[facility]

## 2023-02-13 NOTE — PROGRESS NOTES
02/12/23 2000   Group Therapy Session   Group Attendance attended group session   Time Session Began 1600   Time Session Ended 1650   Total Time (minutes) 50   Total # Attendees 7   Group Type psychotherapeutic   Group Topic Covered cognitive therapy techniques   Group Session Detail DBT house   Patient Response/Contribution cooperative with task;discussed personal experience with topic;expressed readiness to alter behaviors     Pt reported mood as resilient. He showed insight on his project. He noted parents and spirituality as support and protection. He wants to be sober he reports.

## 2023-02-14 PROCEDURE — 99207 PR NO CHARGE LOS: CPT

## 2023-02-14 PROCEDURE — 250N000013 HC RX MED GY IP 250 OP 250 PS 637: Performed by: PSYCHIATRY & NEUROLOGY

## 2023-02-14 PROCEDURE — 250N000013 HC RX MED GY IP 250 OP 250 PS 637

## 2023-02-14 PROCEDURE — 124N000002 HC R&B MH UMMC

## 2023-02-14 RX ORDER — AMOXICILLIN 250 MG
1 CAPSULE ORAL 2 TIMES DAILY
Status: DISCONTINUED | OUTPATIENT
Start: 2023-02-14 | End: 2023-04-12 | Stop reason: HOSPADM

## 2023-02-14 RX ORDER — POLYETHYLENE GLYCOL 3350 17 G/17G
17 POWDER, FOR SOLUTION ORAL DAILY PRN
Status: DISCONTINUED | OUTPATIENT
Start: 2023-02-14 | End: 2023-04-12 | Stop reason: HOSPADM

## 2023-02-14 RX ADMIN — OLANZAPINE 5 MG: 5 TABLET, ORALLY DISINTEGRATING ORAL at 16:16

## 2023-02-14 RX ADMIN — SENNOSIDES AND DOCUSATE SODIUM 1 TABLET: 50; 8.6 TABLET ORAL at 12:30

## 2023-02-14 RX ADMIN — Medication 12.5 MG: at 08:28

## 2023-02-14 RX ADMIN — SENNOSIDES AND DOCUSATE SODIUM 1 TABLET: 50; 8.6 TABLET ORAL at 19:47

## 2023-02-14 RX ADMIN — GABAPENTIN 300 MG: 300 CAPSULE ORAL at 08:28

## 2023-02-14 RX ADMIN — RISPERIDONE 1 MG: 1 TABLET ORAL at 08:28

## 2023-02-14 RX ADMIN — GABAPENTIN 300 MG: 300 CAPSULE ORAL at 15:04

## 2023-02-14 RX ADMIN — PANTOPRAZOLE SODIUM 40 MG: 40 TABLET, DELAYED RELEASE ORAL at 08:27

## 2023-02-14 RX ADMIN — CLOZAPINE 25 MG: 25 TABLET ORAL at 19:47

## 2023-02-14 RX ADMIN — DIPHENHYDRAMINE HYDROCHLORIDE 50 MG: 50 CAPSULE ORAL at 16:16

## 2023-02-14 RX ADMIN — NICOTINE 1 PATCH: 21 PATCH, EXTENDED RELEASE TRANSDERMAL at 08:27

## 2023-02-14 RX ADMIN — BENZTROPINE MESYLATE 1 MG: 1 TABLET ORAL at 19:47

## 2023-02-14 RX ADMIN — FLUTICASONE PROPIONATE 2 SPRAY: 50 SPRAY, METERED NASAL at 08:39

## 2023-02-14 RX ADMIN — RISPERIDONE 2 MG: 2 TABLET ORAL at 19:47

## 2023-02-14 RX ADMIN — GABAPENTIN 300 MG: 300 CAPSULE ORAL at 19:47

## 2023-02-14 RX ADMIN — LORATADINE 10 MG: 10 TABLET ORAL at 08:27

## 2023-02-14 RX ADMIN — SERTRALINE HYDROCHLORIDE 150 MG: 50 TABLET ORAL at 08:27

## 2023-02-14 ASSESSMENT — ACTIVITIES OF DAILY LIVING (ADL)
LAUNDRY: UNABLE TO COMPLETE
ADLS_ACUITY_SCORE: 29
ADLS_ACUITY_SCORE: 29
ORAL_HYGIENE: INDEPENDENT
ADLS_ACUITY_SCORE: 29
DRESS: SCRUBS (BEHAVIORAL HEALTH)
HYGIENE/GROOMING: HANDWASHING;INDEPENDENT
ADLS_ACUITY_SCORE: 29
ADLS_ACUITY_SCORE: 29

## 2023-02-14 NOTE — PLAN OF CARE
Assessment/Intervention/Current Symtoms and Care Coordination:   Chart Review  Team Meeting  Pt continues to present as symptomatic, reports ongoing SI, and has poor sleep.   Writer left another VM for pt's SUSSY, Anuradha, requesting a call back to collaborate with discharge plans.      Discharge Plan or Goal:  Return to [facility] when stable. There is concern that pt will need more support than sober living after his time at [facility].  His sister would like him to get a [place] Cho[place] Assessment so he can get more supports.       Barriers to Discharge:    Stabilization of mental health symptoms     Referral Status:  No referrals made yet this hospitalization.      Legal Status:   Voluntary

## 2023-02-14 NOTE — PLAN OF CARE
"Denies current cravings to use.   Endorses unchanged auditory hallucinations of whispers encouraging suicide.  Voices plan of suicide by gun or kitchen knife if discharges. Denies intent to act on his plans while hospitalised.  Also report unchanged visual hallucinations of 2 doves, crows, floating lanterns, disappearing shadow on his right, \"slivering object \" on ceiling.  Uses mostly identical verbiage every day.     Slightly blunted affect and no observable distress. (No anger, sadness.) No response latency.  Clear coherent, soft and fluent speech with appropriate focus, poverty of content.   Intermittent eye contact.  Lacks initiative and spends his time mostly lying on his bed. Polite manner.   Again  C/o vivid dreams of being chased but did not want to call them mightmares and declined to take off nicotine patch.    Starting Clozaril with extra slow titration.       Weekly CBC with differential (next blood draw 2/16/23). Denied somatic concerns including soreness, stiffness, involuntary movements, pain.    Plan: Monitor and document mood and behaviour, thought process and content. Establish and maintain therapeutic relationship. Educate about diagnoses, medications, treatment, legal status, plan of care. Address preexisting and concurrent medical concerns.      P: Unstable mood  G: Stable mood  O: Progressing            "

## 2023-02-14 NOTE — PLAN OF CARE
"Problem: Suicide Risk  Goal: Absence of Self-Harm  2/14/2023 0950 by Tarsha Hernandez RN  Outcome: Not Progressing     Problem: Psychotic Signs/Symptoms  Goal: Optimal Cognitive Function (Psychotic Signs/Symptoms)  Outcome: Not Progressing  Goal: Increased Participation and Engagement (Psychotic Signs/Symptoms)  Outcome: Not Progressing    Goal Outcome Evaluation:    Diagnosis: Active SI with intent outside of hospital setting  Unspecified psychosis (substance induced likely)  Polysubstance use     Safety Precautions: SI, W/D     Vitals: 8:00 AM: /87 (BP Location: Left arm, Patient Position: Sitting, Cuff Size: Adult Large)   Pulse 96   Temp 97.8  F (36.6  C) (Temporal)   Resp 16   Ht 1.6 m (5' 3\")   Wt 75.1 kg (165 lb 8 oz)   SpO2 95%   BMI 29.32 kg/m       Vitals: 12:53 PM: /87 (BP Location: Left arm, Patient Position: Sitting, Cuff Size: Adult Regular)   Pulse 107   Temp 98.5  F (36.9  C) (Tympanic)   Resp 16   Ht 1.6 m (5' 3\")   Wt 75.1 kg (165 lb 8 oz)   SpO2 96%   BMI 29.32 kg/m      Slept overnight: 7 hours     General Shift Summary/Nursing Assessment:  Assessed suicide risk, SIB, HI, mood, depression and anxiety sxs, thought process/content, AVH and bx. Encouraged participation in groups, socialization, and healthy coping skills. Encouraged completion of ADLs and adequate nutrition.    Pt was isolative and withdrawn to pt's room. He was most often found laying in his bed and staring at the ceiling. He may have been RIS. When approached, the pt was polite and cooperative with poor eye contact. His affect was flat and mood was anxious, and possibly fearful. Endorsed depression and SI. \"If I was outside the hospital I might act on it.\" Pt said he felt safe in the hospital and would not act on SI while hospitalized. Pt contracted for safety and agreed to notify staff if there was a change in his SI. Pt endorsed sxs of anxiety and rated it at 8/10. Pt took his morning medications and " "agreed that, if he was still feeling anxious in an hour, he would seek out his RN for assistance. In the meantime, the pt was encouraged to practice deep breathing and relaxation techniques. Pt endorsed AVH, he reported, \"it's the same.\" When asked if he has seen any improvement at all since being admitted, he said, \"A little. But I'm still suicidal and I'm having hallucinations.\"     Hygiene was very poor. Pt declined to attend to ADLs, despite prompting. Pt said he already showered this week. Pt was encouraged to shower more often. Pt ate meals in the pt's room. Pt did not attend unit groups. Pt demonstrated good bx control with no episodes of agitation, intrusiveness, or aggressive bxs observed.     VS WNL. Denied pain and any acute medical concerns. Pt denied concerns with sleep or appetite. Pt was observed eating and drinking fluids adequately. Pt was able to ask staff questions and make requests regarding their personal needs.     Pt compliant with scheduled medications and appeared to be tolerating medications well. No side effects were reported and none were observed.     Around 1245 a staff member reported to this RN that the pt was in his room and appeared as though he wasn't feeling well. This RN assessed the Pt. The pt was found sitting on his bed with his lunch tray in front of him and eating. Pt's affect appeared flat and somewhat pale; however, this was not dissimilar to how he appeared yesterday and this morning. When asked, the pt denied not feeling well. He denied: SOB, chest pain, palpatations, dizziness, lightheadedness, sweating, nausea, and anxiety. Upon further inquiry the pt did endorse he was feeling fatigued. Pt's VS were taken and were WNL as stated above. Pt was given fluids to drink. Pt ate 100% of his lunch and drank two large cups of fluid. Again, the pt denied not feeling well. He thanked and smiled after this RN checked on the pt periodically. Pt declined all prns.     Care plan was " reviewed and updated. Will continue with current POC. Will monitor and reassess sxs. Establish and maintain therapeutic relationship. Educate about dx, medications, tx, legal status and POC.   Length of stay: 18 days. See [facility] chart notes regarding all discharge planning.

## 2023-02-14 NOTE — PROGRESS NOTES
Brief Medicine Note:     Medicine is following peripherally for concerns for pericarditis.  See detailed note from 2/13.  No pericardial friction rub noted while sitting up and leaning forward today.  Patient states that his chest discomfort is getting better after starting the Protonix yesterday.  It does appear that he has also been using the Maalox.  Patient does have Tums available as well.  Please be mindful for any constipation with overuse of the PRNs.    POC:  - Continue Protonix daily for 8 weeks, then taper off  - Recommend lifestyle changes including weight loss head of bed elevation avoidance of late-night eating and specific food elimination such as chocolate caffeine alcohol acidic and/or spicy food.  - Watch for constipation with PRNs for heartburn  - We will schedule senna 1 tab twice daily  - MiraLAX daily as needed added on    Medicine will sign off, please contact us for any acute changes.     Annette Dalal, CNP, APRN  Internal Medicine [facility] Hospitalist  [facility]  [phone]  Securely message with the Vocera Web Console   Text page via [employer] Paging/Directory   Text page via [employer]

## 2023-02-15 PROCEDURE — H2032 ACTIVITY THERAPY, PER 15 MIN: HCPCS

## 2023-02-15 PROCEDURE — 250N000013 HC RX MED GY IP 250 OP 250 PS 637

## 2023-02-15 PROCEDURE — 124N000002 HC R&B MH UMMC

## 2023-02-15 PROCEDURE — G0177 OPPS/PHP; TRAIN & EDUC SERV: HCPCS

## 2023-02-15 PROCEDURE — 99233 SBSQ HOSP IP/OBS HIGH 50: CPT | Performed by: PSYCHIATRY & NEUROLOGY

## 2023-02-15 PROCEDURE — 250N000013 HC RX MED GY IP 250 OP 250 PS 637: Performed by: PSYCHIATRY & NEUROLOGY

## 2023-02-15 RX ORDER — HYDROXYZINE HYDROCHLORIDE 50 MG/1
100 TABLET, FILM COATED ORAL 3 TIMES DAILY PRN
Status: DISCONTINUED | OUTPATIENT
Start: 2023-02-15 | End: 2023-04-12 | Stop reason: HOSPADM

## 2023-02-15 RX ORDER — CLOZAPINE 50 MG/1
50 TABLET ORAL AT BEDTIME
Status: COMPLETED | OUTPATIENT
Start: 2023-02-15 | End: 2023-02-16

## 2023-02-15 RX ADMIN — SENNOSIDES AND DOCUSATE SODIUM 1 TABLET: 50; 8.6 TABLET ORAL at 20:17

## 2023-02-15 RX ADMIN — CLOZAPINE 50 MG: 50 TABLET ORAL at 20:18

## 2023-02-15 RX ADMIN — SERTRALINE HYDROCHLORIDE 150 MG: 50 TABLET ORAL at 10:17

## 2023-02-15 RX ADMIN — SENNOSIDES AND DOCUSATE SODIUM 1 TABLET: 50; 8.6 TABLET ORAL at 10:19

## 2023-02-15 RX ADMIN — HYDROXYZINE HYDROCHLORIDE 100 MG: 50 TABLET, FILM COATED ORAL at 18:07

## 2023-02-15 RX ADMIN — GABAPENTIN 300 MG: 300 CAPSULE ORAL at 10:18

## 2023-02-15 RX ADMIN — RISPERIDONE 2 MG: 2 TABLET ORAL at 20:18

## 2023-02-15 RX ADMIN — NICOTINE 1 PATCH: 21 PATCH, EXTENDED RELEASE TRANSDERMAL at 10:19

## 2023-02-15 RX ADMIN — LORATADINE 10 MG: 10 TABLET ORAL at 10:17

## 2023-02-15 RX ADMIN — GABAPENTIN 300 MG: 300 CAPSULE ORAL at 20:17

## 2023-02-15 RX ADMIN — FLUTICASONE PROPIONATE 2 SPRAY: 50 SPRAY, METERED NASAL at 11:15

## 2023-02-15 RX ADMIN — GABAPENTIN 300 MG: 300 CAPSULE ORAL at 13:33

## 2023-02-15 RX ADMIN — Medication 12.5 MG: at 10:18

## 2023-02-15 RX ADMIN — Medication: at 18:47

## 2023-02-15 RX ADMIN — PANTOPRAZOLE SODIUM 40 MG: 40 TABLET, DELAYED RELEASE ORAL at 10:18

## 2023-02-15 RX ADMIN — BENZTROPINE MESYLATE 1 MG: 1 TABLET ORAL at 20:18

## 2023-02-15 RX ADMIN — RISPERIDONE 1 MG: 1 TABLET ORAL at 10:18

## 2023-02-15 ASSESSMENT — ACTIVITIES OF DAILY LIVING (ADL)
ADLS_ACUITY_SCORE: 29
ORAL_HYGIENE: INDEPENDENT
ADLS_ACUITY_SCORE: 29
LAUNDRY: UNABLE TO COMPLETE
ADLS_ACUITY_SCORE: 29
DRESS: SCRUBS (BEHAVIORAL HEALTH);INDEPENDENT
HYGIENE/GROOMING: INDEPENDENT
ADLS_ACUITY_SCORE: 29
HYGIENE/GROOMING: INDEPENDENT
ADLS_ACUITY_SCORE: 29
DRESS: INDEPENDENT
ORAL_HYGIENE: INDEPENDENT
LAUNDRY: UNABLE TO COMPLETE
ADLS_ACUITY_SCORE: 29

## 2023-02-15 NOTE — PLAN OF CARE
Problem: Sleep Disturbance  Goal: Adequate Sleep/Rest  Outcome: Progressing   Goal Outcome Evaluation:    Pt was awake most of the night. The pt stayed in his bed most of the night. Pt acknowledged that he slept quite a bit during the daytime yesterday and it has interfered with his ability to sleep tonight. Pt endorsed AVH and denied command AH. Pt declined all prns for sleep and anxiety. Pt denied pain and acute medical concerns. Pt denied SOB, chest pain, or lightheadedness. Encouraged the pt to drink fluids.    Pt appeared to sleep a total of 1.5 hours overnight with no problems observed during safety checks. No other concerns reported. Will continue to monitor and assess pt safety, mental status, and any medical concerns.

## 2023-02-15 NOTE — PLAN OF CARE
02/14/23 1800   Group Therapy Session   Group Attendance attended group session   Time Session Began 1615   Time Session Ended 1700   Total Time (minutes) 45   Total # Attendees 1   Group Type task skill   Group Topic Covered coping skills/lifestyle management;emotions/expression   Patient Response/Contribution cooperative with task   Patient Participation Detail See Note     Pt attended group independently and was cooperative.  Pt chose activity once he was presented with 3 different options.  Pt maintained good focus on task for entire session.  Pt's affect was flat and he did not interact with staff unless prompted.  Answers were one word and pt did not expand on any question.  Pt completed simple coloring task in the amount of time given in group and quietly left and went back to room.  Pt will continue to be encouraged to attend groups for further asssesssment and to address goals identified on plan of care.

## 2023-02-15 NOTE — PROGRESS NOTES
"[facility], [place]   Psychiatric Progress Note  Hospital Day: 19        Interim History:   The patient's care was discussed with the treatment team during the daily team meeting and/or staff's chart notes were reviewed.  Staff report patient endorses unchanged auditory hallucinations of whispers encouraging suicide.  Voices plan of suicide by gun or kitchen knife if discharges. Denies intent to act on his plans while hospitalised.  Also report unchanged visual hallucinations of 2 doves, crows, floating lanterns, disappearing shadow on his right, \"slivering object \" on ceiling. Uses mostly identical verbiage every day. Hygiene remains very poor. Declining to attend to ADLs. He is attending some groups with encouragement. Tolerating clozapine well without re-emerging tachycardia thus far.     Upon interview, Haseeb reports that he is \"still suicidal and hallucinating.\" He reports that he has had suicidal thoughts since November, 2022. He said that at that time, they were 4-5/10 on scale of severity (10 being most severe). On admission, he said that they were at a 10/10 and currently they are at a 8/10. He is not sure what has been helpful. He noted that hallucinations have worsened since admission in frequency and intensity.     We reviewed his CD history again today. He said that he has been sober from all illicit substances and alcohol since November, 2022. Last use of illicit substances was in 2021 when he used cocaine. However, he said that he was asked to leave sober housing and enter CD treatment following two positive urine drug screen tests, specifically for Fentanyl. He does not recall ever using Fentanyl, but suspects that it may have been in a cigarette he was given. He also does not recall recently using methamphetamines, but suspected that he was exposed because his pupils were dilated and he wasn't feeling well. He denied IVDU. He would like to return to [facility] because he " "felt that the programming was helpful.     Discussed plan to increase clozapine this evening.  He is amenable.     Suicidal ideation: SI with plan to shoot himself, cut himself with a knife. Denies access to firearms. Reports he will not act on these thoughts while hospitalized and feels safe in the hospital    Homicidal ideation: denies current or recent homicidal ideation or behaviors.    Psychotic symptoms: Consistently reports command AH, VH (undulating ceilings, doves, crows, figures in periphery).     Medication side effects reported: as above    Acute medical concerns: as above.     Other issues reported by patient: Patient had no further questions or concerns.           Medications:       benztropine  1 mg Oral At Bedtime     cloZAPine  25 mg Oral At Bedtime     fluticasone  2 spray Both Nostrils Daily     gabapentin  300 mg Oral TID     loratadine  10 mg Oral Daily     metoprolol succinate ER  12.5 mg Oral Daily     nicotine  1 patch Transdermal Daily     nicotine   Transdermal Q8H     pantoprazole  40 mg Oral QAM AC     risperiDONE  1 mg Oral Daily     risperiDONE  2 mg Oral At Bedtime     senna-docusate  1 tablet Oral BID     sertraline  150 mg Oral Daily          Allergies:   No Known Allergies       Labs:     No results found for this or any previous visit (from the past 24 hour(s)).       Psychiatric Examination:     /71 (Cuff Size: Adult Regular)   Pulse 76   Temp 98.2  F (36.8  C) (Temporal)   Resp 16   Ht 1.6 m (5' 3\")   Wt 75.1 kg (165 lb 8 oz)   SpO2 100%   BMI 29.32 kg/m    Weight is 165 lbs 8 oz  Body mass index is 29.32 kg/m .    Weight over time:  Vitals:    01/27/23 1718   Weight: 75.1 kg (165 lb 8 oz)       Orthostatic Vitals     None        Cardiometabolic risk assessment. 01/30/23    Reviewed patient profile for cardiometabolic risk factors    Date taken /Value  REFERENCE RANGE   Abdominal Obesity  (Waist Circumference)   See nursing flowsheet Women ?35 in (88 cm)   Men ?40 in " "(102 cm)      Triglycerides  Triglycerides   Date Value Ref Range Status   01/28/2023 192 (H) <150 mg/dL Final       ?150 mg/dL (1.7 mmol/L) or current treatment for elevated triglycerides   HDL cholesterol  Direct Measure HDL   Date Value Ref Range Status   01/28/2023 41 >=40 mg/dL Final   ]   Women <50 mg/dL (1.3 mmol/L) in women or current treatment for low HDL cholesterol  Men <40 mg/dL (1 mmol/L) in men or current treatment for low HDL cholesterol     Fasting plasma glucose (FPG) Lab Results   Component Value Date     01/28/2023      FPG ?100 mg/dL (5.6 mmol/L) or treatment for elevated blood glucose   Blood pressure  BP Readings from Last 3 Encounters:   02/14/23 105/71   01/27/23 118/74   07/20/22 112/79    Blood pressure ?130/85 mmHg or treatment for elevated blood pressure   Family History  See family history     Appearance: awake, alert, dressed in hospital scrubs and unkempt  Attitude:  cooperative  Eye Contact: fair, improved  Mood:  \"okay\"  Affect:  mood congruent and intensity is blunted  Speech:  clear, coherent. appropriate  Language: fluent and intact in English  Psychomotor, Gait, Musculoskeletal:  no evidence of tardive dyskinesia, dystonia, or tics. Has full ROM of neck. No muscle stiffness noted.   Throught Process:  linear, goal oriented and illogical. Disorganized.   Associations:  no loose associations  Thought Content:  active suicidal ideation present, auditory hallucinations present and visual hallucinations present  Insight:  fair  Judgement:  poor  Oriented to:  time, person, and place  Attention Span and Concentration:  fair  Recent and Remote Memory:  fair  Fund of Knowledge:  appropriate    Clinical Global Impressions  First:  Considering your total clinical experience with this particular patient population, how severe are the patient's symptoms at this time?: 7 (01/28/23 1341)  Compared to the patient's condition at the START of treatment, this patient's condition is: 4 " "(01/28/23 1341)  Most recent:  Considering your total clinical experience with this particular patient population, how severe are the patient's symptoms at this time?: 7 (01/28/23 1341)  Compared to the patient's condition at the START of treatment, this patient's condition is: 4 (01/28/23 1341)           Precautions:     Behavioral Orders   Procedures     Code 1 - Restrict to Unit     Code 2     For imaging     Routine Programming     As clinically indicated     Status 15     Every 15 minutes.     Suicide precautions     Patients on Suicide Precautions should have a Combination Diet ordered that includes a Diet selection(s) AND a Behavioral Tray selection for Safe Tray - with utensils, or Safe Tray - NO utensils       Withdrawal precautions          Diagnoses:     Active suicidal ideation with intent outside of hospital setting  Unspecified psychosis (substance induced likely)  Polysubstance use  Unspecified mood disorder  ADHD, inattentive type per chart  History of idiopathic hypersomnolence per chart  Nicotine use disorder, dependence and withdrawal   Allergies- chronic uticaria and rhinitis per chart  HLD per chart          Assessment & Plan:     Assessment and hospital summary:  This patient is a 32 year old male with history of mood disorder, ADHD and substance use who presented to [facility] with SI on 1/26 in context of reported methamphetamine use and being kicked out of sober living. Medically cleared in ED, placed on 72HH and admitted to 12N. Limited historian, giving inconsistent reports about substance use, UDS negative, very somnolent, endorsing ongoing SI and some psychosis symptoms. PTA medications continued with exception of finasteride as patient states he takes \"1/4 a pill\" and declined ordered dose, will defer to primary team to address. Will continue to evaluate safety and stabilization further as patient more able to engage in assessments.      Inpatient psychiatric hospitalization is " warranted at this time for safety, stabilization, and possible adjustment in medications.    Patient reports that he abruptly stopped Zoloft one week prior to his admission. He developed discontinuation syndrome symptoms following that. He reports that he does not have a history of psychosis but is experiencing psychotic symptoms. He is amenable with plan to trial risperidone after R/B/A were discussed. Risperidone was initiated on 1/30 and titrated to a total of 3 mg daily on 2/1. Clonidine, used for ADHD, was reduced from a total of 0.3 mg daily to 0.2 mg daily on 2/3 due to concern for hypotension. 2/9: Cardiology consult placed. EKG- tachycardia 121 bpm, QTc 465 ms, Abnormal QRS angle and T wave abnormality. COVID negative and labs are unremarkable.  On 2/10, clozapine was held pending additional workup from IM and cardiology. Pericarditis was ruled out and metoprolol was started. Clozapine was restarted on 2/13.     Psychiatric treatment/inteventions:  Medications:   -Continue clozapine titration but slower titration of 25 mg every OTHER day while monitoring closely for side effects  -Continue risperidone to 1 mg daily and 2 mg at bedtime for now  -continue PTA sertraline 150mg daily for mood  -continue PTA gabapentin 300mg TID for anxiety   -Continue Cogentin 1 mg at bedtime for possible EPSE     -PRN hydroxyzine 25mg every 4 hour for anxiety  -PRN trazodone 50mg at bedtime for sleep   -PRN olanzapine 10mg PO or IM TID for agitation/psychosis     Spiritual services consult placed on 2/6. Pt is [employer]. Appreciate assistance.      Laboratory/Imaging: reviewed: Covid negative; UDS negative; CMP, CBC, TSH, Lipid panel, HgbA1c ordered to complete admission labs. Reviewed.     2/9/23: Troponin, CK, CBC, BMP: Unremarkable, CRP elevated to 38.18, COVID: Negative  2/10/23: Add Influenza A/B given flu-like sx-negative  Patient will be treated in therapeutic milieu with appropriate individual and group therapies as  described.     Medical treatment/interventions:  Medical concerns:   Nicotine replacement ordered, educate patient on benefits of cessation, pt unclear about finasteride dose, will hold until further information is obtained. PTA PRN zyrtec, azelastine, fluticasone, triamcinolone and epipen ordered for allergies and PRN ammoniaum lactate, Eucerin for  dry skin.    Dyslipidemia: Will arrange follow up with PCP to address. Discussed importance of healthy eating habits and regular exercise. Will consider nutrition consult if patient amenable.     Lightheadedness, improved: Pt is not hypotensive and orthostatics wnl. He was asked to increase fluid intake.   - Continue to monitor vital signs closely  - Encourage fluids  - Discontinued clonidine    Chest pain/tachycardia/EKG changes:  - Pain improved with PRN medications.   - Cardiology consult placed on 2/9. See note on that date for details.   - ECHO reviewed and unremarkable.  - Writer discussed care with both Dr. Streeter from [facility] and Christina from . Plan for now is to HOLD clozapine until further medical workup. Dr. Streeter explained that myocarditis has been ruled out as troponin was negative. Pericarditis remains on differential, but he does not have EKG findings or a pericardial effusion. IM will assess for pericardial friction rub and pleuritic chest pain. IM is seeing patient this afternoon. Appreciate assistance. Metoprolol was started.     Update 2/13: Discussed care with Annette from  today on two occasions. Please see her note on this date for details. She does not suspect that this is pericarditis. He does not have pleuritic pain, characteristic CP, EKG changes, or pericardial effusion. Therefore, will cautiously restart clozapine while monitoring closely for side effects. May consider further increase in metoprolol if necessary. PPI was started due to suspected GERD.     Update 2/24 per IM note:  Medicine is following peripherally for concerns for  pericarditis.  See detailed note from 2/13.  No pericardial friction rub noted while sitting up and leaning forward today.  Patient states that his chest discomfort is getting better after starting the Protonix yesterday.  It does appear that he has also been using the Maalox.  Patient does have Tums available as well.  Please be mindful for any constipation with overuse of the PRNs.     POC:  - Continue Protonix daily for 8 weeks, then taper off  - Recommend lifestyle changes including weight loss head of bed elevation avoidance of late-night eating and specific food elimination such as chocolate caffeine alcohol acidic and/or spicy food.  - Watch for constipation with PRNs for heartburn  - We will schedule senna 1 tab twice daily  - MiraLAX daily as needed added on     Medicine will sign off, please contact us for any acute changes.        Sore throat/cough/nasal congestion, resolved:   - COVID negative on 2/9. Repeat COVID test and Influenza A/B neg on 2/10.  - Cepacol lozenges added  - PRN Tylenol   - Consulted with IM. Appreciate assistance. See their notes for details.      Legal Status: VOLUNTARY. 72 hour hold discontinued. Pt agreed to sign in on voluntary basis and discharge directly to treatment once stable.      Safety Assessment:        Behavioral Orders   Procedures     Code 1 - Restrict to Unit     Routine Programming       As clinically indicated     Status 15       Every 15 minutes.     Suicide precautions       Patients on Suicide Precautions should have a Combination Diet ordered that includes a Diet selection(s) AND a Behavioral Tray selection for Safe Tray - with utensils, or Safe Tray - NO utensils        Withdrawal precautions      Pt has not required locked seclusion or restraints in the past 24 hours to maintain safety, please refer to RN documentation for further details.    Discontinued SIO on 2/8 as patient is heriberto for safety. Monitor SI closely.     The risks, benefits,  alternatives and side effects have been discussed and are understood by the patient.     Disposition: Pending clinical stabilization. Will likely discharge to CD treatment when stable, pt reports interest in [facility].      This note was created by undersigned using a Dragon dictation system. All typing errors or contextual distortion are unintentional and software inherent.     Entered by: Nelly Brown MD on 2/15/2023 at 8:21 AM          Ana M Brown MD  [facility]

## 2023-02-15 NOTE — PLAN OF CARE
"  Problem: Adult Behavioral Health Plan of Care  Goal: Adheres to Safety Considerations for Self and Others  Outcome: Progressing     Problem: Suicide Risk  Goal: Absence of Self-Harm  Intervention: Assess Risk to Self and Maintain Safety  Recent Flowsheet Documentation  Taken 2/14/2023 8403 by Gerber Metcalf RN  Behavior Management: behavioral plan reviewed   Goal Outcome Evaluation:    Plan of Care Reviewed With: patient        Patient isolative and withdrawn to room all evening though was cooperative with attending group with prompting. Patient upon approach full range in affect, calm, polite and cooperative with verbal assessment. Patient early in the evening appearing restless and tense in his room pacing listening to headphones. Patient reported on approach that he was experiencing anxiety 9/10, and depression 9/10 due to the auditory hallucinations commanding his to kill himself. Patient reported pacing and listening to music was helping to distract away from the voices but was receptive to offer for PRN medications. Patient at this time also reported generalized \"itchiness\" reporting he had in past days had relief with Benadryl. Patient administered PRN Zyprexa 5mg and Benadryl 50mg. Patient upon reassessment 30 minutes later reporting relief from both symptoms and was observed to attend group. Patient heriberto for safety with suicidal thoughts identifying no plan in the hospital stating \"I feel safe in the hospital\". Patient diet appeared good eating 100% of dinner. Patient cooperative with vital sign assessments with no stated physical symptoms and vitals stable. Patient denied any chest pain or symptoms of heartburn this evening. Patient independent with requesting and accepting of HS medications. Patient independent with identifying needs and completing ADL's though declined to shower this evening indicating plan to complete tomorrow AM.                 "

## 2023-02-15 NOTE — PLAN OF CARE
"Goal Outcome Evaluation:    Plan of Care Reviewed With: patient         Link \"Haseeb\"  slept 1.5 hours on NOC shift and continued to sleep on day shift until awakening at approximately 10:00 am. Pt. continued to spend most of the shift awake and resting in bed.     Haseeb still reports having auditory hallucinations, voices that command him to shoot himself in the head. Haseeb said he feels safe here and does not want to act on the commands. Pt also endorses feeling anxious and depressed which he attributes to the stress of the auditory hallucinations.    Haseeb attended group on Pod 1 this am. Pt's food was WNL. Pt was encouraged to increase his fluid intake.   Pt denies having pain. Haseeb was med adherent, cooperative.            "

## 2023-02-15 NOTE — PLAN OF CARE
Occupational Therapy Group Note:       02/15/23 1251   Group Therapy Session   Group Attendance attended group session   Time Session Began 1115   Time Session Ended 1210   Total Time (minutes) 55   Total # Attendees 3   Group Type recreation   Group Topic Covered leisure exploration/use of leisure time;structured socialization   Group Session Detail OT Leisure Group: Sequence   Patient Response/Contribution confronted peers appropriately;cooperative with task;listened actively   Patient Participation Detail Patient actively engaged in therapeutic leisure activity in order to promote: cognitive skills (attention, planning, sequencing), leisure exploration, and structured socialization. Patient appeared motivated and willing to learn new activity. Patient listened actively to instructions; needed occasional cueing and correction throughout game; however, was always receptive to constructive feedback and correction. Patient remained focused on task for full duration of group. Patient was quiet throughout game; no social interaction with peers. Affect: blunted. Mood: quiet, polite, cooperative.

## 2023-02-15 NOTE — PLAN OF CARE
02/15/23 1242   Group Therapy Session   Group Attendance attended group session   Time Session Began 1015   Time Session Ended 1115   Total Time (minutes) 20 (No Charge)   Total # Attendees 5   Group Type Occupational Therapy   Group Topic Covered balanced lifestyle;coping skills/lifestyle management;leisure exploration/use of leisure time;emotions/expression;relaxation techniques;structured socialization   Group Session Detail OT creative expression and socialization group   Patient Participation Detail  Intervention: OT Creative Expression and Socialization Group with 4 peers.    Patient Response: Pt partcipated in group focused on creative expression, leisure participation and exploration and socialization. Discussed how participation in leisure activities can be used as a healthy coping skill in symptom management and a strategy to reduce stress. Participated in creative task while socializing with peers over ice breaker and future focused questions. Shared with the group areas he would like to travel and ind offered responses to each of the questions with prompting from writer. Was otherwise quiet and soft spoken during group.     Mood/Affect: Pleasant       Plan: Patient encouraged to maintain attendance for continued ongoing support in working towards occupational therapy goals to support overall treatment/care.

## 2023-02-15 NOTE — PLAN OF CARE
Assessment/Intervention/Current Symtoms and Care Coordination:   Chart Review  Team Meeting  Pt continues to present as symptomatic and thus medication adjustments continue. He stated that he would like to go back to [facility] upon stabilization and discharge.   Spoke to Erlin at [facility] ([phone]) who stated that pt is welcome to return when her is ready.  He understands that will be a couple weeks from now due to medication changes.  They are not holding his bed but they typically have a discharge or two per week so it shouldn't be a problem to take him back when he is stable. Writer to call him back next Wednesday or Thursday with another update.      Discharge Plan or Goal:  Return to Psychiatric hospital when stable.      Barriers to Discharge:    Stabilization of mental health symptoms with medication changes in process.      Referral Status:  No referrals made yet this hospitalization.      Legal Status:   Voluntary

## 2023-02-16 LAB
BASOPHILS # BLD AUTO: 0 10E3/UL (ref 0–0.2)
BASOPHILS NFR BLD AUTO: 1 %
EOSINOPHIL # BLD AUTO: 0.2 10E3/UL (ref 0–0.7)
EOSINOPHIL NFR BLD AUTO: 3 %
IMM GRANULOCYTES # BLD: 0.2 10E3/UL
IMM GRANULOCYTES NFR BLD: 2 %
LYMPHOCYTES # BLD AUTO: 2.5 10E3/UL (ref 0.8–5.3)
LYMPHOCYTES NFR BLD AUTO: 33 %
MONOCYTES # BLD AUTO: 0.7 10E3/UL (ref 0–1.3)
MONOCYTES NFR BLD AUTO: 9 %
NEUTROPHILS # BLD AUTO: 4 10E3/UL (ref 1.6–8.3)
NEUTROPHILS NFR BLD AUTO: 52 %
NRBC # BLD AUTO: 0 10E3/UL
NRBC BLD AUTO-RTO: 0 /100
WBC # BLD AUTO: 7.6 10E3/UL (ref 4–11)

## 2023-02-16 PROCEDURE — 99233 SBSQ HOSP IP/OBS HIGH 50: CPT | Performed by: PSYCHIATRY & NEUROLOGY

## 2023-02-16 PROCEDURE — 250N000013 HC RX MED GY IP 250 OP 250 PS 637: Performed by: PSYCHIATRY & NEUROLOGY

## 2023-02-16 PROCEDURE — G0177 OPPS/PHP; TRAIN & EDUC SERV: HCPCS

## 2023-02-16 PROCEDURE — 124N000002 HC R&B MH UMMC

## 2023-02-16 PROCEDURE — 250N000013 HC RX MED GY IP 250 OP 250 PS 637

## 2023-02-16 PROCEDURE — H2032 ACTIVITY THERAPY, PER 15 MIN: HCPCS

## 2023-02-16 PROCEDURE — 85048 AUTOMATED LEUKOCYTE COUNT: CPT | Performed by: PSYCHIATRY & NEUROLOGY

## 2023-02-16 PROCEDURE — 36415 COLL VENOUS BLD VENIPUNCTURE: CPT | Performed by: PSYCHIATRY & NEUROLOGY

## 2023-02-16 RX ORDER — CLOZAPINE 100 MG/1
100 TABLET ORAL AT BEDTIME
Status: COMPLETED | OUTPATIENT
Start: 2023-02-19 | End: 2023-02-20

## 2023-02-16 RX ADMIN — GABAPENTIN 300 MG: 300 CAPSULE ORAL at 20:06

## 2023-02-16 RX ADMIN — CLOZAPINE 50 MG: 50 TABLET ORAL at 20:06

## 2023-02-16 RX ADMIN — FLUTICASONE PROPIONATE 2 SPRAY: 50 SPRAY, METERED NASAL at 12:29

## 2023-02-16 RX ADMIN — RISPERIDONE 1 MG: 1 TABLET ORAL at 08:53

## 2023-02-16 RX ADMIN — PANTOPRAZOLE SODIUM 40 MG: 40 TABLET, DELAYED RELEASE ORAL at 08:53

## 2023-02-16 RX ADMIN — SENNOSIDES AND DOCUSATE SODIUM 1 TABLET: 50; 8.6 TABLET ORAL at 20:06

## 2023-02-16 RX ADMIN — GABAPENTIN 300 MG: 300 CAPSULE ORAL at 14:31

## 2023-02-16 RX ADMIN — NICOTINE 1 PATCH: 21 PATCH, EXTENDED RELEASE TRANSDERMAL at 08:54

## 2023-02-16 RX ADMIN — RISPERIDONE 2 MG: 2 TABLET ORAL at 20:07

## 2023-02-16 RX ADMIN — Medication 12.5 MG: at 08:53

## 2023-02-16 RX ADMIN — SENNOSIDES AND DOCUSATE SODIUM 1 TABLET: 50; 8.6 TABLET ORAL at 08:54

## 2023-02-16 RX ADMIN — SERTRALINE HYDROCHLORIDE 150 MG: 50 TABLET ORAL at 08:54

## 2023-02-16 RX ADMIN — LORATADINE 10 MG: 10 TABLET ORAL at 08:54

## 2023-02-16 RX ADMIN — GABAPENTIN 300 MG: 300 CAPSULE ORAL at 08:53

## 2023-02-16 RX ADMIN — BENZTROPINE MESYLATE 1 MG: 1 TABLET ORAL at 20:06

## 2023-02-16 ASSESSMENT — ACTIVITIES OF DAILY LIVING (ADL)
ADLS_ACUITY_SCORE: 29
LAUNDRY: UNABLE TO COMPLETE
HYGIENE/GROOMING: INDEPENDENT
ADLS_ACUITY_SCORE: 29
DRESS: INDEPENDENT;SCRUBS (BEHAVIORAL HEALTH)
ORAL_HYGIENE: INDEPENDENT
ADLS_ACUITY_SCORE: 29
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
## 2023-02-16 NOTE — PLAN OF CARE
"Goal Outcome Evaluation:    Plan of Care Reviewed With: patient        Link \"Haseeb\" again spent most of the day in his room, he did try participating in Expressive Therapy Group this am.    Pt interacted minimally with peers, and with staff when initiated by staff. Haseeb continues to report feeling depressed and anxious, in addition to having suicidal thoughts of shooting himself if outside of the hospital. Pt denies having any intent to harm himself while hospitalized. Of note, Haseeb said he has not experienced auditory or visual hallucinations today.     Pt was med adherent, pleasant on approach, cooperative. Haseeb's food and fluid intake were good. Pt did not shower today.            "

## 2023-02-16 NOTE — PLAN OF CARE
02/16/23 1652   Group Therapy Session   Group Attendance attended group session   Time Session Began 1600   Time Session Ended 1630   Total Time (minutes) 30   Total # Attendees 1   Group Type psychotherapeutic   Group Topic Covered coping skills/lifestyle management   Group Session Detail Pt participated in completing the self-care wheel. Engaged in discussion about areas of strength and areas that could use additional support.   Patient Response/Contribution cooperative with task;discussed personal experience with topic   Patient Participation Detail Pt shared the importance of his family and how ongoing relationship with them gives him hope and meaning. Also shared his spiritual journey from his family Restorationism of shamanism to Confucianism and how important it is to him to have a higher power. Pt sees the areas of spirituality and meaning as being strengths. Would like to focus more on his social support and mental/emotional health.

## 2023-02-16 NOTE — PLAN OF CARE
Problem: Sleep Disturbance  Goal: Adequate Sleep/Rest  Outcome: Progressing  Note: Patient observed sleeping during safety rounds      Problem: Psychotic Signs/Symptoms  Goal: Improved Behavioral Control (Psychotic Signs/Symptoms)  Outcome: Progressing  Note: Patient manifests no symptoms of psychosis     Problem: Pain Acute  Goal: Optimal Pain Control and Function  Outcome: Progressing  Note: Patient reports no acute or chronic pain    Goal Outcome Evaluation:             Patient seems to have slept uneventfully, safety checks successfully , completed, patient presents with no complaints and made no requests for prn medications or food. All precautions in place, overall, patient acheieves    a total of hours of 6.75 hours of good quality sleep  Patient denies SI/HI, A/VHH or SIB. No evidence of withdrawal or respiratory distress, no symptoms of jovanny or psychosis reported/observed. Will continue to monitor and follow plan of care.             Melvin Pimentel DNP, RN

## 2023-02-16 NOTE — PROGRESS NOTES
Pt participated in dance/movement therapy (D/MT) initially engaging in verbal sharing but then as the peer participation increased, using a support group model to promote social connection and authentic creative self-expression.  Movements of both stretching and connecting to somatic memory supported emotional opening and grounding in strengths. Pt shared he used to like to play tennis, but it is difficult to find an appropriate playing partner.  He also enjoys playing basketball.  Pt engaged in these topic but then left the session, stating he would return but he did not.     02/15/23 2639   Expressive Therapy   Therapy Type dance/movement   Minutes of Treatment 30

## 2023-02-16 NOTE — PLAN OF CARE
"   02/16/23 7740   Group Therapy Session   Group Attendance attended group session   Total Time (minutes) 50   Total # Attendees 2   Group Type psychoeducation   Group Topic Covered coping skills/lifestyle management;leisure exploration/use of leisure time;structured socialization   Group Session Detail OT clinic   Patient Response/Contribution cooperative with task     Pt attended group for the first time with writer since admit to the unit, attending the morning OT clinic group.  Pt was able to select a task project of interest, initiate task and ask for help as needed. Pt demonstrated good planning, task focus, and problem solving. Pt was generally quiet, no interaction with other peer present, and minimally social with writer, often answering \"I don't know\" in attempts to socialize with him - ie asking what he enjoys doing when he's feeling well outside of the hospital.  "

## 2023-02-16 NOTE — PLAN OF CARE
Assessment/Intervention/Current Symtoms and Care Coordination:   Chart Review  Team Meeting  Called pt's SUSSY Ling with an update.       Discharge Plan or Goal:  Return to [facility] when stable. Medication changes in process so discharge won't be until at least 2/27.      Barriers to Discharge:    Stabilization of mental health symptoms with medication changes in process.      Referral Status:  No referrals made yet this hospitalization.      Legal Status:   Voluntary

## 2023-02-16 NOTE — PLAN OF CARE
"\"I still have the same auditory and visual hallucinations except I haven't seen the snake on the ceiling for the last 2 days\"  \"I'm still suicidal but I wouldn't do anything here.\"  \"I'm still anxious and depressed.\"  \"I still want to go back to [facility].\"    Blunted affect, dishevelment. No observable signs of distress. Clear and coherent, slow and fluent and low volume speech. No latency of speech. Again, using similar descriptive verbiage.     \"My chest doesn't hurt and my body doesn't either.\"     Participated in group. Otherwise isolative long and sitting on his bed.     Received Atarax at 1807 for complaints of anxiety and itching, stated it helped with anxiety but not with itching. Also used lac-Hydrin.    Denied nightmares and using dreams but acknowledged middle insomnia and attributed it to daytime sleeping. Denied current cravings to use. (Hypersomnolence during the day has been previously evaluated per  chart.)      No dyskinesias.    Plan: Monitor and document mood and behaviour, thought process and content. Establish and maintain therapeutic relationship. Educate about diagnoses, medications, treatment, legal status, plan of care. Address preexisting and concurrent medical concerns.      P: Unstable mood  G: Stable mood  O: Progressing    "

## 2023-02-16 NOTE — PROGRESS NOTES
"Pt participated in vitalizing and organizing interventions during dance/movement therapy (D/MT.)  Social engagement supported subtle and increasingly expressive movements and a sense of support and sharing both verbally and nonverbally.  For example, pts identified a quality they wanted for themselves this morning and what they were able to share back with the group.  This quality was \"passed around the room\" like a favorite dish at a dinner table. Cheerfulness, calm, courage, strength, excitement, and clear thinking were all \"taken in\" and \"shared.:\"  Pt fulfilled a commitment to this therapist to join the session and he was intermittently engaged, occasionally appearing dissociated, but able to respond to direct- yet gentle- therapist prompts.       02/16/23 0915   Expressive Therapy   Therapy Type dance/movement   Minutes of Treatment 50       "

## 2023-02-17 PROCEDURE — 124N000002 HC R&B MH UMMC

## 2023-02-17 PROCEDURE — 250N000013 HC RX MED GY IP 250 OP 250 PS 637: Performed by: PSYCHIATRY & NEUROLOGY

## 2023-02-17 PROCEDURE — G0177 OPPS/PHP; TRAIN & EDUC SERV: HCPCS

## 2023-02-17 PROCEDURE — 99233 SBSQ HOSP IP/OBS HIGH 50: CPT | Performed by: PSYCHIATRY & NEUROLOGY

## 2023-02-17 PROCEDURE — 250N000013 HC RX MED GY IP 250 OP 250 PS 637

## 2023-02-17 RX ADMIN — SERTRALINE HYDROCHLORIDE 150 MG: 50 TABLET ORAL at 09:36

## 2023-02-17 RX ADMIN — PANTOPRAZOLE SODIUM 40 MG: 40 TABLET, DELAYED RELEASE ORAL at 09:36

## 2023-02-17 RX ADMIN — RISPERIDONE 1 MG: 1 TABLET ORAL at 09:36

## 2023-02-17 RX ADMIN — CLOZAPINE 75 MG: 25 TABLET ORAL at 21:06

## 2023-02-17 RX ADMIN — LORATADINE 10 MG: 10 TABLET ORAL at 09:37

## 2023-02-17 RX ADMIN — NICOTINE 1 PATCH: 21 PATCH, EXTENDED RELEASE TRANSDERMAL at 09:37

## 2023-02-17 RX ADMIN — ACETAMINOPHEN 325MG 650 MG: 325 TABLET ORAL at 12:25

## 2023-02-17 RX ADMIN — GABAPENTIN 300 MG: 300 CAPSULE ORAL at 20:08

## 2023-02-17 RX ADMIN — RISPERIDONE 2 MG: 2 TABLET ORAL at 20:09

## 2023-02-17 RX ADMIN — FLUTICASONE PROPIONATE 2 SPRAY: 50 SPRAY, METERED NASAL at 09:37

## 2023-02-17 RX ADMIN — GABAPENTIN 300 MG: 300 CAPSULE ORAL at 14:31

## 2023-02-17 RX ADMIN — Medication 12.5 MG: at 12:36

## 2023-02-17 RX ADMIN — SENNOSIDES AND DOCUSATE SODIUM 1 TABLET: 50; 8.6 TABLET ORAL at 20:08

## 2023-02-17 RX ADMIN — SENNOSIDES AND DOCUSATE SODIUM 1 TABLET: 50; 8.6 TABLET ORAL at 09:37

## 2023-02-17 RX ADMIN — GABAPENTIN 300 MG: 300 CAPSULE ORAL at 09:37

## 2023-02-17 RX ADMIN — BENZTROPINE MESYLATE 1 MG: 1 TABLET ORAL at 20:08

## 2023-02-17 ASSESSMENT — ACTIVITIES OF DAILY LIVING (ADL)
ADLS_ACUITY_SCORE: 29

## 2023-02-17 NOTE — PLAN OF CARE
Assessment/Intervention/Current Symtoms and Care Coordination:   Chart Review  Team Meeting    Pt will return to [facility] when stable.      Discharge Plan or Goal:  Return to [facility] when stable. Medication changes in process so discharge won't be until at least 2/27.      Barriers to Discharge:    Stabilization of mental health symptoms with medication changes in process.      Referral Status:  No referrals made yet this hospitalization.      Legal Status:   Voluntary

## 2023-02-17 NOTE — PLAN OF CARE
"Haseeb was again isolative to his room the majority of the shift. He is denying AH, has not had any in \"a few days\". He continues to endorse depression, continues to endorse SI with a similar plan of using a firearm, but able to contract for safety on the unit. He was encouraged many times to get out of his room. Was prompted to shower, hygiene poor, stated \"maybe tomorrow.\" He was agreeable to play cheCvent with staff out of his room which he did for one game, but then wanted to go back to his room because he said that he was tired and wanted to watch TV.  He has been watching the [employer] channel most of the day.     He denied medication S/E but did state that he feels that the medications are making him tired. I again encouraged him to get up and out of his room as that also might be contributing to his lethargy.   He said that he had a BM this morning, but he also feels like the medications might also be contributing to his slowing of his bowels.  Movement of any kind again encouraged.     He did not have any goals or wishes this shift. He is thankful that he gets to go [facility] to [facility] once stable.     Problem: Psychotic Signs/Symptoms  Goal: Improved Behavioral Control (Psychotic Signs/Symptoms)  Outcome: Not Progressing   Goal Outcome Evaluation:                        "

## 2023-02-17 NOTE — PLAN OF CARE
Problem: Sleep Disturbance  Goal: Adequate Sleep/Rest  Outcome: Progressing  Note: Patient seems to be sleeping uneventfully     Problem: Psychotic Signs/Symptoms  Goal: Improved Behavioral Control (Psychotic Signs/Symptoms)  Outcome: Progressing  Note: Patient demonstrates no behavioral dyscontrol     Problem: Pain Acute  Goal: Optimal Pain Control and Function  Outcome: Progressing  Note: Patient reports no acute or chronic pain   Goal Outcome Evaluation:        Patient had unremarkable night, observed sleeping with deep non-labored breathing pattern during safety checks. No aggression toward staff or peers. Patient endorses no SI/HI, visual or auditory hallucinations or SIB. No symptoms of jovanny or psychosis reported or observed. No report or observation of actual or potential self harm during the night. Overall, patient achieves about 6.75 hours of uninterrupted sleep                 Melvin Pimentel DNP, RN

## 2023-02-17 NOTE — PLAN OF CARE
"  Stated that this is the first day he is not experiencing auditory or visual hallucinations; that they had all gradually disappeared. Quick to add that   \"I'm still suicidal, I've been depressed for years. I'm still really depressed.\"  Continues to deny intent to act on plans to while hospitalised.    No signs of distress. Remains disheveled. Future oriented. Stated he attended 3 groups today.     Denied cravings to use.       No dyskinesias.     Plan: Monitor and document mood and behaviour, thought process and content. Establish and maintain therapeutic relationship. Educate about diagnoses, medications, treatment, legal status, plan of care. Address preexisting and concurrent medical concerns.      P: Unstable mood  G: Stable mood  O: Progressing               "

## 2023-02-17 NOTE — PLAN OF CARE
"Haseeb was in his room nearly all shift. He was awake most of the shift, watching TV. Low mood , low energy, soft speech but generally cooperative when engaged.  With encouragement, he did attend groups. Generally unkempt, Made multple attempts to get him to shower and he stated \"maybe tomorrow.\"  He continues to deny AH, continues to endorse SI and contract for safety. He did not have any other goals, requests or wishes this shift.     Am [employer] held related to HR not meeting parameters, given at lunch time with a vitals recheck WNL.      Problem: Suicidal Behavior  Goal: Suicidal Behavior is Absent or Managed  Outcome: Not Progressing   Goal Outcome Evaluation:                        "

## 2023-02-17 NOTE — PROGRESS NOTES
"[facility], [place]   Psychiatric Progress Note  Hospital Day: 21        Interim History:   The patient's care was discussed with the treatment team during the daily team meeting and/or staff's chart notes were reviewed.  Staff report patient remains minimally interactive with peers and staff. Continues to report anxiety, depression, active SI with plan to shoot himself with a gun or cut his throat with a kitchen knife or razor. Has repeatedly stated he will not act on SI thoughts in the hospital. However, he noted that yesterday was the first day he did not experience AH or VH. Remains disheveled. Future oriented. He attended three groups yesterday. No side effects from clozapine thus far. Slept 6.75 hours overnight.     Upon interview, Haseeb said \"I am doing better. The whispers and hallucinations are gone. I havent heard them or seen them since two nights ago, but I still am feeling depressed and want to kill myself.\" When asked what has been helpful wrt hallucinations, he replied \"Probably the medications building up in my system.\"     Suicidal ideation: SI with plan to shoot himself, cut himself in the neck or on the wrists with a kitchen knife or a razor. Denies access to firearms. Reports he will not act on these thoughts while hospitalized and feels safe in the hospital. Future oriented. When asked how likely it would be for him to act on SI thoughts if he were released from the hospital today on a scale of 1-10 (10 being most likely), he replied \"a 10.\"      Homicidal ideation: denies current or recent homicidal ideation or behaviors.    Psychotic symptoms: Denying psychotic symptoms    Medication side effects reported: as above    Acute medical concerns: as above.     Other issues reported by patient: Patient had no further questions or concerns.           Medications:       benztropine  1 mg Oral At Bedtime     cloZAPine  75 mg Oral At Bedtime    Followed by     [START ON " "2/19/2023] cloZAPine  100 mg Oral At Bedtime    Followed by     [START ON 2/21/2023] cloZAPine  125 mg Oral At Bedtime    Followed by     [START ON 2/23/2023] cloZAPine  150 mg Oral At Bedtime     fluticasone  2 spray Both Nostrils Daily     gabapentin  300 mg Oral TID     loratadine  10 mg Oral Daily     metoprolol succinate ER  12.5 mg Oral Daily     nicotine  1 patch Transdermal Daily     nicotine   Transdermal Q8H     pantoprazole  40 mg Oral QAM AC     risperiDONE  1 mg Oral Daily     risperiDONE  2 mg Oral At Bedtime     senna-docusate  1 tablet Oral BID     sertraline  150 mg Oral Daily          Allergies:   No Known Allergies       Labs:     Recent Results (from the past 24 hour(s))   WBC and Differential    Collection Time: 02/16/23  8:49 AM   Result Value Ref Range    WBC Count 7.6 4.0 - 11.0 10e3/uL    % Neutrophils 52 %    % Lymphocytes 33 %    % Monocytes 9 %    % Eosinophils 3 %    % Basophils 1 %    % Immature Granulocytes 2 %    NRBCs per 100 WBC 0 <1 /100    Absolute Neutrophils 4.0 1.6 - 8.3 10e3/uL    Absolute Lymphocytes 2.5 0.8 - 5.3 10e3/uL    Absolute Monocytes 0.7 0.0 - 1.3 10e3/uL    Absolute Eosinophils 0.2 0.0 - 0.7 10e3/uL    Absolute Basophils 0.0 0.0 - 0.2 10e3/uL    Absolute Immature Granulocytes 0.2 <=0.4 10e3/uL    Absolute NRBCs 0.0 10e3/uL          Psychiatric Examination:     /88   Pulse 89   Temp 97.8  F (36.6  C) (Oral)   Resp 16   Ht 1.6 m (5' 3\")   Wt 75.1 kg (165 lb 8 oz)   SpO2 94%   BMI 29.32 kg/m    Weight is 165 lbs 8 oz  Body mass index is 29.32 kg/m .    Weight over time:  Vitals:    01/27/23 1718   Weight: 75.1 kg (165 lb 8 oz)       Orthostatic Vitals     None        Cardiometabolic risk assessment. 01/30/23    Reviewed patient profile for cardiometabolic risk factors    Date taken /Value  REFERENCE RANGE   Abdominal Obesity  (Waist Circumference)   See nursing flowsheet Women ?35 in (88 cm)   Men ?40 in (102 cm)      Triglycerides  Triglycerides   Date " "Value Ref Range Status   01/28/2023 192 (H) <150 mg/dL Final       ?150 mg/dL (1.7 mmol/L) or current treatment for elevated triglycerides   HDL cholesterol  Direct Measure HDL   Date Value Ref Range Status   01/28/2023 41 >=40 mg/dL Final   ]   Women <50 mg/dL (1.3 mmol/L) in women or current treatment for low HDL cholesterol  Men <40 mg/dL (1 mmol/L) in men or current treatment for low HDL cholesterol     Fasting plasma glucose (FPG) Lab Results   Component Value Date     01/28/2023      FPG ?100 mg/dL (5.6 mmol/L) or treatment for elevated blood glucose   Blood pressure  BP Readings from Last 3 Encounters:   02/16/23 125/88   01/27/23 118/74   07/20/22 112/79    Blood pressure ?130/85 mmHg or treatment for elevated blood pressure   Family History  See family history     Appearance: awake, alert, dressed in hospital scrubs and unkempt  Attitude:  cooperative  Eye Contact: fair, improved  Mood:  \"better but still depressed\"  Affect:  mood congruent and intensity is blunted  Speech:  clear, coherent. appropriate  Language: fluent and intact in English  Psychomotor, Gait, Musculoskeletal:  no evidence of tardive dyskinesia, dystonia, or tics. Has full ROM of neck. No muscle stiffness noted.   Throught Process:  linear, goal oriented and illogical. Disorganized.   Associations:  no loose associations  Thought Content:  active suicidal ideation present, auditory hallucinations present and visual hallucinations present  Insight:  fair  Judgement:  poor  Oriented to:  time, person, and place  Attention Span and Concentration:  fair  Recent and Remote Memory:  fair  Fund of Knowledge:  appropriate    Clinical Global Impressions  First:  Considering your total clinical experience with this particular patient population, how severe are the patient's symptoms at this time?: 7 (01/28/23 1341)  Compared to the patient's condition at the START of treatment, this patient's condition is: 4 (01/28/23 1341)  Most " "recent:  Considering your total clinical experience with this particular patient population, how severe are the patient's symptoms at this time?: 7 (01/28/23 1341)  Compared to the patient's condition at the START of treatment, this patient's condition is: 4 (01/28/23 1341)           Precautions:     Behavioral Orders   Procedures     Code 1 - Restrict to Unit     Code 2     For imaging     Routine Programming     As clinically indicated     Status 15     Every 15 minutes.     Suicide precautions     Patients on Suicide Precautions should have a Combination Diet ordered that includes a Diet selection(s) AND a Behavioral Tray selection for Safe Tray - with utensils, or Safe Tray - NO utensils       Withdrawal precautions          Diagnoses:     Active suicidal ideation with intent outside of hospital setting  MDD, recurrent, severe with psychotic features vs Schizoaffective Disorder, depressive type  Polysubstance use  Unspecified mood disorder  ADHD, inattentive type per chart  History of idiopathic hypersomnolence per chart  Nicotine use disorder, dependence and withdrawal   Allergies- chronic uticaria and rhinitis per chart  HLD per chart          Assessment & Plan:     Assessment and hospital summary:  This patient is a 32 year old male with history of mood disorder, ADHD and substance use who presented to [place] ED with SI on 1/26 in context of reported methamphetamine use and being kicked out of sober living. Medically cleared in ED, placed on 72HH and admitted to 12N. Limited historian, giving inconsistent reports about substance use, UDS negative, very somnolent, endorsing ongoing SI and some psychosis symptoms. PTA medications continued with exception of finasteride as patient states he takes \"1/4 a pill\" and declined ordered dose, will defer to primary team to address. Will continue to evaluate safety and stabilization further as patient more able to engage in assessments.      Inpatient psychiatric " hospitalization is warranted at this time for safety, stabilization, and possible adjustment in medications.    Patient reports that he abruptly stopped Zoloft one week prior to his admission. He developed discontinuation syndrome symptoms following that. He reports that he does not have a history of psychosis but is experiencing psychotic symptoms. He is amenable with plan to trial risperidone after R/B/A were discussed. Risperidone was initiated on 1/30 and titrated to a total of 3 mg daily on 2/1. Clonidine, used for ADHD, was reduced from a total of 0.3 mg daily to 0.2 mg daily on 2/3 due to concern for hypotension. 2/9: Cardiology consult placed. EKG- tachycardia 121 bpm, QTc 465 ms, Abnormal QRS angle and T wave abnormality. COVID negative and labs are unremarkable.  On 2/10, clozapine was held pending additional workup from IM and cardiology. Pericarditis was ruled out and metoprolol was started. Clozapine was restarted on 2/13 with plan to cautiously titrate to lowest effective dose.     Psychiatric treatment/inteventions:  Medications:   -Continue clozapine titration but slower titration of 25 mg every OTHER day while monitoring closely for side effects. Titration has been ordered through next Thursday. Could consider titrating more quickly (25 mg daily) if patient tolerating titration well over the weekend.   -Continue risperidone to 1 mg daily and 2 mg at bedtime for now  -continue PTA sertraline 150mg daily for mood  -continue PTA gabapentin 300mg TID for anxiety   -Continue Cogentin 1 mg at bedtime for possible EPSE     -PRN hydroxyzine 25mg every 4 hour for anxiety  -PRN trazodone 50mg at bedtime for sleep   -PRN olanzapine 10mg PO or IM TID for agitation/psychosis     Spiritual services consult placed on 2/6. Pt is Orthodoxy. Appreciate assistance.      Laboratory/Imaging: reviewed: Covid negative; UDS negative; CMP, CBC, TSH, Lipid panel, HgbA1c ordered to complete admission labs. Reviewed.      2/9/23: Troponin, CK, CBC, BMP: Unremarkable, CRP elevated to 38.18, COVID: Negative  2/10/23: Add Influenza A/B given flu-like sx-negative  Patient will be treated in therapeutic milieu with appropriate individual and group therapies as described.     Medical treatment/interventions:  Medical concerns:   Nicotine replacement ordered, educate patient on benefits of cessation, pt unclear about finasteride dose, will hold until further information is obtained. PTA PRN zyrtec, azelastine, fluticasone, triamcinolone and epipen ordered for allergies and PRN ammoniaum lactate, Eucerin for  dry skin.    Dyslipidemia: Will arrange follow up with PCP to address. Discussed importance of healthy eating habits and regular exercise. Will consider nutrition consult if patient amenable.     Lightheadedness, resolved: Pt is not hypotensive and orthostatics wnl. He was asked to increase fluid intake.   - Continue to monitor vital signs closely  - Encourage fluids  - Discontinued clonidine    Chest pain/tachycardia/EKG changes (2/9):  - Pain improved with PRN medications.   - Cardiology consult placed on 2/9. See note on that date for details.   - ECHO reviewed and unremarkable.  - Writer discussed care with both Dr. Streeter from [facility] and Christina from . Plan for now is to HOLD clozapine until further medical workup. Dr. Streeter explained that myocarditis has been ruled out as troponin was negative. Pericarditis remains on differential, but he does not have EKG findings or a pericardial effusion. IM will assess for pericardial friction rub and pleuritic chest pain. IM is seeing patient this afternoon. Appreciate assistance. Metoprolol was started.     Update 2/13: Discussed care with Annette from  today on two occasions. Please see her note on this date for details. She does not suspect that this is pericarditis. He does not have pleuritic pain, characteristic CP, EKG changes, or pericardial effusion on ECHO. Therefore, will  cautiously restart clozapine while monitoring closely for side effects. May consider further increase in metoprolol if necessary. PPI was started due to suspected GERD.     Update 2/24 per IM note:  Medicine is following peripherally for concerns for pericarditis.  See detailed note from 2/13.  No pericardial friction rub noted while sitting up and leaning forward today.  Patient states that his chest discomfort is getting better after starting the Protonix yesterday.  It does appear that he has also been using the Maalox.  Patient does have Tums available as well.  Please be mindful for any constipation with overuse of the PRNs.     POC:  - Continue Protonix daily for 8 weeks, then taper off  - Recommend lifestyle changes including weight loss head of bed elevation avoidance of late-night eating and specific food elimination such as chocolate caffeine alcohol acidic and/or spicy food.  - Watch for constipation with PRNs for heartburn  - We will schedule senna 1 tab twice daily  - MiraLAX daily as needed added on     Medicine will sign off, please contact us for any acute changes.      Sore throat/cough/nasal congestion, resolved:   - COVID negative on 2/9. Repeat COVID test and Influenza A/B neg on 2/10.  - Cepacol lozenges added  - PRN Tylenol   - Consulted with IM. Appreciate assistance. See their notes for details.      Legal Status: VOLUNTARY. 72 hour hold discontinued. Pt agreed to sign in on voluntary basis and discharge directly to treatment once stable.      Safety Assessment:        Behavioral Orders   Procedures     Code 1 - Restrict to Unit     Routine Programming       As clinically indicated     Status 15       Every 15 minutes.     Suicide precautions       Patients on Suicide Precautions should have a Combination Diet ordered that includes a Diet selection(s) AND a Behavioral Tray selection for Safe Tray - with utensils, or Safe Tray - NO utensils        Withdrawal precautions      Pt has not  required locked seclusion or restraints in the past 24 hours to maintain safety, please refer to RN documentation for further details.    Discontinued SIO on 2/8 as patient is heriberto for safety. Monitor SI closely.     The risks, benefits, alternatives and side effects have been discussed and are understood by the patient.     Disposition: Pending clinical stabilization. Pt remains actively suicidal. Will likely discharge back to [facility] program once stable.      This note was created by undersigned using a Dragon dictation system. All typing errors or contextual distortion are unintentional and software inherent.     Entered by: Nelly Brown MD on 2/17/2023 at 8:35 AM          Ana M Brown MD  [facility]

## 2023-02-18 PROCEDURE — 250N000013 HC RX MED GY IP 250 OP 250 PS 637

## 2023-02-18 PROCEDURE — 250N000013 HC RX MED GY IP 250 OP 250 PS 637: Performed by: PSYCHIATRY & NEUROLOGY

## 2023-02-18 PROCEDURE — 124N000002 HC R&B MH UMMC

## 2023-02-18 RX ADMIN — GABAPENTIN 300 MG: 300 CAPSULE ORAL at 08:51

## 2023-02-18 RX ADMIN — RISPERIDONE 2 MG: 2 TABLET ORAL at 19:35

## 2023-02-18 RX ADMIN — SENNOSIDES AND DOCUSATE SODIUM 1 TABLET: 50; 8.6 TABLET ORAL at 19:35

## 2023-02-18 RX ADMIN — CLOZAPINE 75 MG: 25 TABLET ORAL at 19:35

## 2023-02-18 RX ADMIN — FLUTICASONE PROPIONATE 2 SPRAY: 50 SPRAY, METERED NASAL at 08:55

## 2023-02-18 RX ADMIN — ACETAMINOPHEN 325MG 650 MG: 325 TABLET ORAL at 08:53

## 2023-02-18 RX ADMIN — Medication 12.5 MG: at 08:52

## 2023-02-18 RX ADMIN — GABAPENTIN 300 MG: 300 CAPSULE ORAL at 13:00

## 2023-02-18 RX ADMIN — GABAPENTIN 300 MG: 300 CAPSULE ORAL at 19:35

## 2023-02-18 RX ADMIN — SENNOSIDES AND DOCUSATE SODIUM 1 TABLET: 50; 8.6 TABLET ORAL at 08:52

## 2023-02-18 RX ADMIN — SERTRALINE HYDROCHLORIDE 150 MG: 50 TABLET ORAL at 08:52

## 2023-02-18 RX ADMIN — NICOTINE 1 PATCH: 21 PATCH, EXTENDED RELEASE TRANSDERMAL at 08:52

## 2023-02-18 RX ADMIN — PANTOPRAZOLE SODIUM 40 MG: 40 TABLET, DELAYED RELEASE ORAL at 08:52

## 2023-02-18 RX ADMIN — BENZTROPINE MESYLATE 1 MG: 1 TABLET ORAL at 19:35

## 2023-02-18 RX ADMIN — RISPERIDONE 1 MG: 1 TABLET ORAL at 08:51

## 2023-02-18 RX ADMIN — LORATADINE 10 MG: 10 TABLET ORAL at 08:52

## 2023-02-18 ASSESSMENT — ACTIVITIES OF DAILY LIVING (ADL)
LAUNDRY: WITH SUPERVISION
ORAL_HYGIENE: INDEPENDENT
ADLS_ACUITY_SCORE: 29
ORAL_HYGIENE: INDEPENDENT
DRESS: INDEPENDENT
LAUNDRY: UNABLE TO COMPLETE
ADLS_ACUITY_SCORE: 29
ADLS_ACUITY_SCORE: 29
HYGIENE/GROOMING: INDEPENDENT
DRESS: INDEPENDENT
ADLS_ACUITY_SCORE: 29
HYGIENE/GROOMING: INDEPENDENT
ADLS_ACUITY_SCORE: 29

## 2023-02-18 NOTE — PLAN OF CARE
Problem: Suicide Risk  Goal: Absence of Self-Harm  Outcome: Progressing  Note: Patient reports no self-harm.     Problem: Sleep Disturbance  Goal: Adequate Sleep/Rest  Outcome: Progressing  Note: Patient appears sleeping well at this time      Problem: Psychotic Signs/Symptoms  Goal: Improved Behavioral Control (Psychotic Signs/Symptoms)  Outcome: Progressing  Note: Behavior improves, patient manifests no behavioral dysregulation   Goal Outcome Evaluation:       Patient slept on/off this night achieves 4 hours overall but remains calm in his room when not sleeping, Patient did not come out of the room, did not request for prn or snacks. Patient was cooperative with safety checks  with no demonstrated physical or verbal aggression toward staff or peers. Patient denies SI/HI, A/VH or SIB. Will continue to monitor and follow plan of care.               Melvin Pimentel DNP, RN

## 2023-02-18 NOTE — PLAN OF CARE
"He is isolative and staying in his room often in the shift.  He complained of neck, back pain and PRN tylenol was given with his morning medications.  He also complained of having a, \" dry throat,\" and given liquids in the shift.  He was reminded to improve his oral intake and agrees to do so.  He is having a faster response time to cognition and did maintain eye contact, which is an improvement compared to previous shifts.  He states that he is no longer experiencing hallucinations and that his anxiety has improved since coming into the hospital.  However, he still states that his depression has not improved and still having suicidal ideation by wanting to shoot himself with a firearm, or cut his throat with a knife or razor.  He agrees to contract for safety while hospitalized and come to staff for safety concerns.  He states pain improved after morning medications.  With his verbal permission, writer updated his sister on his condition in the shift.  Sister was also updated on his medication regiment per request and stated concern that he is on too many medications.  Sister was receptive of having MD updated her on Monday.  ARACELY signed for sister and sister can be reached at [phone].  Sister stated to writer that he needs to shower and change clothes.  Writer inform him of conversation with sister, and her request for him to shower.  States that he will shower.  He was given new pants, shirt, socks, under ware, shampoo, towels, and wash cloths for him to use.        "

## 2023-02-18 NOTE — PLAN OF CARE
"   02/17/23 1166   Group Therapy Session   Group Attendance attended group session   Total Time (minutes) 150   Total # Attendees 2,3,3   Group Type task skill   Group Topic Covered coping skills/lifestyle management;relapse prevention;leisure exploration/use of leisure time;structured socialization   Group Session Detail topic, OT clinic x 2   Patient Response/Contribution able to recall/repeat info presented;cooperative with task;discussed personal experience with topic     Pt had excellent participation in all groups.  Pt was well spoken, readily able to answer questions that were asked of him, and able to carry on conversation.    Topic group:  Topic group - worksheet activity/discussion where purpose was to reflect on a past accomplishment that felt impossible at the time, and to recognize personal skills, strengths, etc that helped to bring success.  Additionally, group members were asked to think of something that feels impossible right now, why it may not be impossible, and small steps to take toward achieving the impossible once again.  Pt response: Pt discussed the process of going through treatment/graduating from in-patient, moving to out-pt, and eventually into sober housing.  Pt spoke of self cares, mindfulness, and seeking support as needed.  Currently, pt acknowledges a need to return to treatment, though shared he knows what to expect, and that brings comfort.  Pt brought up financial stress with unpaid rent and reports he was fired from a job he enjoyed while on a medical leave.    OT clinic - spent time starting a new canvas painting, where on a blank canvas he chose to paint a message for himself, \"stay strong\".  During the second group, pt asked to spend time coloring, which appeared relaxing for him.  Pt is generally quiet, does not initiate conversation, though will answer questions asked of him.  Receptive to positive feedback.                          "

## 2023-02-19 PROCEDURE — 250N000013 HC RX MED GY IP 250 OP 250 PS 637

## 2023-02-19 PROCEDURE — 90853 GROUP PSYCHOTHERAPY: CPT

## 2023-02-19 PROCEDURE — 250N000013 HC RX MED GY IP 250 OP 250 PS 637: Performed by: PSYCHIATRY & NEUROLOGY

## 2023-02-19 PROCEDURE — 124N000002 HC R&B MH UMMC

## 2023-02-19 RX ADMIN — RISPERIDONE 2 MG: 2 TABLET ORAL at 19:29

## 2023-02-19 RX ADMIN — Medication 12.5 MG: at 08:28

## 2023-02-19 RX ADMIN — CLOZAPINE 100 MG: 100 TABLET ORAL at 20:15

## 2023-02-19 RX ADMIN — PANTOPRAZOLE SODIUM 40 MG: 40 TABLET, DELAYED RELEASE ORAL at 08:27

## 2023-02-19 RX ADMIN — SENNOSIDES AND DOCUSATE SODIUM 1 TABLET: 50; 8.6 TABLET ORAL at 19:29

## 2023-02-19 RX ADMIN — RISPERIDONE 1 MG: 1 TABLET ORAL at 08:27

## 2023-02-19 RX ADMIN — NICOTINE 1 PATCH: 21 PATCH, EXTENDED RELEASE TRANSDERMAL at 08:28

## 2023-02-19 RX ADMIN — SENNOSIDES AND DOCUSATE SODIUM 1 TABLET: 50; 8.6 TABLET ORAL at 08:27

## 2023-02-19 RX ADMIN — SERTRALINE HYDROCHLORIDE 150 MG: 50 TABLET ORAL at 08:27

## 2023-02-19 RX ADMIN — BENZTROPINE MESYLATE 1 MG: 1 TABLET ORAL at 19:29

## 2023-02-19 RX ADMIN — ACETAMINOPHEN 325MG 650 MG: 325 TABLET ORAL at 08:29

## 2023-02-19 RX ADMIN — FLUTICASONE PROPIONATE 2 SPRAY: 50 SPRAY, METERED NASAL at 08:37

## 2023-02-19 RX ADMIN — GABAPENTIN 300 MG: 300 CAPSULE ORAL at 13:02

## 2023-02-19 RX ADMIN — LORATADINE 10 MG: 10 TABLET ORAL at 08:27

## 2023-02-19 RX ADMIN — GABAPENTIN 300 MG: 300 CAPSULE ORAL at 19:29

## 2023-02-19 RX ADMIN — GABAPENTIN 300 MG: 300 CAPSULE ORAL at 08:27

## 2023-02-19 ASSESSMENT — ACTIVITIES OF DAILY LIVING (ADL)
DRESS: SCRUBS (BEHAVIORAL HEALTH)
ADLS_ACUITY_SCORE: 29
HYGIENE/GROOMING: PROMPTS
DRESS: SCRUBS (BEHAVIORAL HEALTH)
ORAL_HYGIENE: PROMPTS
ADLS_ACUITY_SCORE: 29
ADLS_ACUITY_SCORE: 39
ORAL_HYGIENE: PROMPTS
ADLS_ACUITY_SCORE: 29
ADLS_ACUITY_SCORE: 39
ADLS_ACUITY_SCORE: 29
ADLS_ACUITY_SCORE: 39
ADLS_ACUITY_SCORE: 39
HYGIENE/GROOMING: PROMPTS
ADLS_ACUITY_SCORE: 39
LAUNDRY: UNABLE TO COMPLETE
ADLS_ACUITY_SCORE: 39
ADLS_ACUITY_SCORE: 29
ADLS_ACUITY_SCORE: 39

## 2023-02-19 NOTE — PLAN OF CARE
"  Problem: Suicide Risk  Goal: Absence of Self-Harm  Outcome: Progressing   Goal Outcome Evaluation:  Pt was isolative and withdrawn to his room for almost the entire shift. He was encouraged to take a shower, but he declined. Pt presented with depressed mood and sad affect. He reported endorsing \" high depression and minimal anxiety.\" Pt continues to have suicidal ideation to shoot himself, but he contracts for safety, stating \" I feel safe in the hospital.\" Pt ate dinner and watched TV in his room. He took his medications as prescribed and denied medication side effects or physical pain.    Plan: Continue to provide safe environment and therapeutic milieu.                             "

## 2023-02-19 NOTE — PLAN OF CARE
Pt asleep at start of shift.     Breathing quiet and unlabored when sleeping.     Pt had no c/o pain or discomfort during the HS.     Appears to have slept 7 hours.     Goal Outcome Evaluation:  Problem: Sleep Disturbance  Goal: Adequate Sleep/Rest  Outcome: Met

## 2023-02-19 NOTE — PLAN OF CARE
He is isolative and staying in his room for most of the shift.  He complained of neck, back pain and PRN tylenol was given with morning medications.  He states that he is not experiencing hallucinations and that his anxiety is reduce since coming into the hospital.  However, he states that his depression has not changed and continues to endorse suicidal ideation by wanting to shoot himself with a firearm or cut his throat with a knife or razor blades.  He contracts for safety while hospitalized, agrees to come to staff for concerns.  He states that the suicidal thoughts have not changed in frequency or intensity since being hospitalized as well.  Stated he was going to take a shower, yet did not and still has hygiene items in his room that writer gave him yesterday.  He was verbally prompted to take a shower in the shift.

## 2023-02-20 PROCEDURE — 250N000013 HC RX MED GY IP 250 OP 250 PS 637

## 2023-02-20 PROCEDURE — 250N000013 HC RX MED GY IP 250 OP 250 PS 637: Performed by: PSYCHIATRY & NEUROLOGY

## 2023-02-20 PROCEDURE — G0177 OPPS/PHP; TRAIN & EDUC SERV: HCPCS

## 2023-02-20 PROCEDURE — 99232 SBSQ HOSP IP/OBS MODERATE 35: CPT | Performed by: PSYCHIATRY & NEUROLOGY

## 2023-02-20 PROCEDURE — 124N000002 HC R&B MH UMMC

## 2023-02-20 RX ADMIN — GABAPENTIN 300 MG: 300 CAPSULE ORAL at 13:49

## 2023-02-20 RX ADMIN — BENZTROPINE MESYLATE 1 MG: 1 TABLET ORAL at 20:58

## 2023-02-20 RX ADMIN — NICOTINE 1 PATCH: 21 PATCH, EXTENDED RELEASE TRANSDERMAL at 08:56

## 2023-02-20 RX ADMIN — LORATADINE 10 MG: 10 TABLET ORAL at 08:56

## 2023-02-20 RX ADMIN — FLUTICASONE PROPIONATE 2 SPRAY: 50 SPRAY, METERED NASAL at 08:56

## 2023-02-20 RX ADMIN — GABAPENTIN 300 MG: 300 CAPSULE ORAL at 08:55

## 2023-02-20 RX ADMIN — RISPERIDONE 1 MG: 1 TABLET ORAL at 08:55

## 2023-02-20 RX ADMIN — PANTOPRAZOLE SODIUM 40 MG: 40 TABLET, DELAYED RELEASE ORAL at 08:55

## 2023-02-20 RX ADMIN — Medication 12.5 MG: at 08:56

## 2023-02-20 RX ADMIN — RISPERIDONE 2 MG: 2 TABLET ORAL at 20:56

## 2023-02-20 RX ADMIN — SENNOSIDES AND DOCUSATE SODIUM 1 TABLET: 50; 8.6 TABLET ORAL at 20:56

## 2023-02-20 RX ADMIN — SERTRALINE HYDROCHLORIDE 150 MG: 50 TABLET ORAL at 08:56

## 2023-02-20 RX ADMIN — CLOZAPINE 100 MG: 100 TABLET ORAL at 20:57

## 2023-02-20 RX ADMIN — SENNOSIDES AND DOCUSATE SODIUM 1 TABLET: 50; 8.6 TABLET ORAL at 08:55

## 2023-02-20 RX ADMIN — GABAPENTIN 300 MG: 300 CAPSULE ORAL at 20:57

## 2023-02-20 ASSESSMENT — ACTIVITIES OF DAILY LIVING (ADL)
ADLS_ACUITY_SCORE: 39
ORAL_HYGIENE: PROMPTS
LAUNDRY: UNABLE TO COMPLETE
LAUNDRY: WITH SUPERVISION
ADLS_ACUITY_SCORE: 39
HYGIENE/GROOMING: INDEPENDENT
ADLS_ACUITY_SCORE: 39
HYGIENE/GROOMING: PROMPTS
ADLS_ACUITY_SCORE: 39
ADLS_ACUITY_SCORE: 39
DRESS: INDEPENDENT
ADLS_ACUITY_SCORE: 39
DRESS: SCRUBS (BEHAVIORAL HEALTH)
ADLS_ACUITY_SCORE: 39
ORAL_HYGIENE: INDEPENDENT
ADLS_ACUITY_SCORE: 39
ADLS_ACUITY_SCORE: 39

## 2023-02-20 NOTE — PROGRESS NOTES
02/19/23 1900   Group Therapy Session   Group Attendance attended group session   Time Session Began 1620   Time Session Ended 1705   Total Time (minutes) 45   Total # Attendees 5   Group Type psychotherapeutic   Group Topic Covered cognitive therapy techniques;relaxation techniques;structured socialization   Group Session Detail Challenging negative thinking/ Mindfulness   Patient Response/Contribution cooperative with task;listened actively     Pt reported feeling content. He listened more than he spoke, but was a positive group participant.
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
## 2023-02-20 NOTE — PLAN OF CARE
Individualization/Patient Specific Goals   Patient Personal Strengths community support;resilient;resourceful   Patient Vulnerabilities history of unsuccessful treatment;lacks insight into illness;occupational insecurity;poor physical health   Interprofessional Rounds   Summary Pt is very ill.  He is not responding to treatment.  He needs ongoing psychiatric management.   Participants CTC;nursing;psychiatrist;other (see comments)   Behavioral Team Discussion   Participants ELVIA Steven, Erika Garber MD, Aquiles Berman RN, Dani Mena MD (Resident)   Progress Minimal, not responding, intension suicide   Continued Stay Criteria/Rationale 7-10 days.   Medical/Physical concerns regarding eating habits.   Plan Psychiatric evaluation and stabilization, medication management, internal medicine consult if needed.  Milieu mangement, group therapy, CTC will provide discharge planning.

## 2023-02-20 NOTE — PLAN OF CARE
02/20/23 5925   Group Therapy Session   Group Attendance attended group session   Total Time (minutes) 90   Total # Attendees 4,4   Group Type task skill;psychoeducation   Group Topic Covered coping skills/lifestyle management;structured socialization   Group Session Detail OT clinic   Patient Response/Contribution cooperative with task     OT clinic x 2. Pt attended morning and afternoon OT clinic groups with invitation, focused on task work, though quiet and kept to self.  Engages in short conversation with writer/answers questions asked of him, answers are on topic, though brief and it is difficult to keep conversation going.

## 2023-02-20 NOTE — PLAN OF CARE
Pt asleep  at start of shift.     Breathing quiet and unlabored when sleeping.     Pt had no c/o pain or discomfort during the HS.     Appears to have slept 7 hours.     Goal Outcome Evaluation:  Problem: Psychotic Signs/Symptoms  Goal: Improved Behavioral Control (Psychotic Signs/Symptoms)  Outcome: Progressing

## 2023-02-20 NOTE — PLAN OF CARE
"Goal Outcome Evaluation:    Plan of Care Reviewed With: patient        \"Haseeb was up ad amara, spent most of the shift in his room though did come out and attend groups. Haseeb continues to report depression of 10/10 and having suicidal thoughts \"I would shoot myself or use a knife to kill myself\".  Haseeb denied having visual or auditory hallucinations \"I haven't had hallucinations for 2 or 3 days\"     Pt was med adherent, his food and fluid intake were WNL. Pt denied having pain or discomfort. Haseeb was engaged when speaking with this writer during check-ins. Pt reported his sleep was \"ok\"            "

## 2023-02-20 NOTE — PLAN OF CARE
"  Problem: Suicide Risk  Goal: Absence of Self-Harm  Outcome: Not Progressing   Goal Outcome Evaluation:    Plan of Care Reviewed With: patient      Pt was isolative and withdrawn to his room for almost the entire shift. However, he attended and participated in the unit group. Pt did take a shower and changed into clean scrubs with a lot of prompting. His bed was done with fresh linens. Nonetheless, pt presented with depressed mood and sad affect. He continues to report endorsing \" depression 10 over 10 and anxiety 5 over 10.\" Pt continues to have suicidal ideation to shoot himself or cut his throat with a knife or razor. However, pt contracts for safety, stating \" I won't do it here because I feel safe in the hospital.\" Pt took his medications as prescribed and denied medication side effects or physical pain.    Plan: Continue to assess for suicidal ideations while providing safe environment and therapeutic milieu.                      "

## 2023-02-20 NOTE — PLAN OF CARE
Assessment/Intervention/Current Symtoms and Care Coordination:   Chart Review  Team Meeting  Pt will return to [facility] when stable.   Moderate improvement.  Reports no hallucinations.  Still having suicidal thoughts.     Discharge Plan or Goal:  Return to [facility] when stable. Medication changes in process so discharge won't be until at least 2/27.      Barriers to Discharge:    Stabilization of mental health symptoms with medication changes in process.      Referral Status:  No referrals made yet this hospitalization.      Legal Status:   Voluntary

## 2023-02-20 NOTE — PLAN OF CARE
Occupational Therapy Group     02/20/23 1236   Group Therapy Session   Group Attendance attended group session   Time Session Began 1030   Time Session Ended 1115   Total Time (minutes) 10   Total # Attendees 7   Group Type psychoeducation;life skill   Group Topic Covered emotions/expression;self-care activities;coping skills/lifestyle management   Group Session Detail General Health and Coping Skills: group discussion on self-esteem/affirmations   Patient Participation Detail Pt joined a group discussion on self-esteem; pt joined late and sat at a seat in the milieu (the table didn't have enough seats to accommodate the group). A peer offered the pt a seat; pt declined and left group; pt returned towards the end of group and was given a discussion worksheet. Pt appeared distracted, eyes moving frequently around toward the ceiling and the environment. When group ended, pt thanked the writer.

## 2023-02-21 PROBLEM — F25.1 SCHIZOAFFECTIVE DISORDER, DEPRESSIVE TYPE (H): Status: ACTIVE | Noted: 2023-02-20

## 2023-02-21 PROBLEM — F19.90 POLYSUBSTANCE USE DISORDER: Status: ACTIVE | Noted: 2023-02-20

## 2023-02-21 PROBLEM — F90.0 ADHD (ATTENTION DEFICIT HYPERACTIVITY DISORDER), INATTENTIVE TYPE: Status: ACTIVE | Noted: 2023-02-20

## 2023-02-21 PROBLEM — R45.851 SUICIDAL IDEATION: Status: ACTIVE | Noted: 2023-02-20

## 2023-02-21 PROCEDURE — 99232 SBSQ HOSP IP/OBS MODERATE 35: CPT | Performed by: PSYCHIATRY & NEUROLOGY

## 2023-02-21 PROCEDURE — 250N000013 HC RX MED GY IP 250 OP 250 PS 637

## 2023-02-21 PROCEDURE — 124N000002 HC R&B MH UMMC

## 2023-02-21 PROCEDURE — 250N000013 HC RX MED GY IP 250 OP 250 PS 637: Performed by: PSYCHIATRY & NEUROLOGY

## 2023-02-21 PROCEDURE — H2032 ACTIVITY THERAPY, PER 15 MIN: HCPCS

## 2023-02-21 RX ORDER — SERTRALINE HYDROCHLORIDE 100 MG/1
200 TABLET, FILM COATED ORAL DAILY
Status: DISCONTINUED | OUTPATIENT
Start: 2023-02-22 | End: 2023-04-12 | Stop reason: HOSPADM

## 2023-02-21 RX ADMIN — FLUTICASONE PROPIONATE 2 SPRAY: 50 SPRAY, METERED NASAL at 08:33

## 2023-02-21 RX ADMIN — PANTOPRAZOLE SODIUM 40 MG: 40 TABLET, DELAYED RELEASE ORAL at 08:32

## 2023-02-21 RX ADMIN — GABAPENTIN 300 MG: 300 CAPSULE ORAL at 13:59

## 2023-02-21 RX ADMIN — Medication 12.5 MG: at 08:32

## 2023-02-21 RX ADMIN — NICOTINE 1 PATCH: 21 PATCH, EXTENDED RELEASE TRANSDERMAL at 08:33

## 2023-02-21 RX ADMIN — SENNOSIDES AND DOCUSATE SODIUM 1 TABLET: 50; 8.6 TABLET ORAL at 08:33

## 2023-02-21 RX ADMIN — RISPERIDONE 1 MG: 1 TABLET ORAL at 08:32

## 2023-02-21 RX ADMIN — GABAPENTIN 300 MG: 300 CAPSULE ORAL at 21:02

## 2023-02-21 RX ADMIN — BENZTROPINE MESYLATE 1 MG: 1 TABLET ORAL at 21:02

## 2023-02-21 RX ADMIN — RISPERIDONE 2 MG: 2 TABLET ORAL at 21:03

## 2023-02-21 RX ADMIN — GABAPENTIN 300 MG: 300 CAPSULE ORAL at 08:32

## 2023-02-21 RX ADMIN — ACETAMINOPHEN 325MG 650 MG: 325 TABLET ORAL at 08:39

## 2023-02-21 RX ADMIN — LORATADINE 10 MG: 10 TABLET ORAL at 08:33

## 2023-02-21 RX ADMIN — SENNOSIDES AND DOCUSATE SODIUM 1 TABLET: 50; 8.6 TABLET ORAL at 21:03

## 2023-02-21 RX ADMIN — CLOZAPINE 125 MG: 100 TABLET ORAL at 21:02

## 2023-02-21 RX ADMIN — SERTRALINE HYDROCHLORIDE 150 MG: 50 TABLET ORAL at 08:33

## 2023-02-21 ASSESSMENT — ACTIVITIES OF DAILY LIVING (ADL)
LAUNDRY: WITH SUPERVISION
ADLS_ACUITY_SCORE: 39
ADLS_ACUITY_SCORE: 29
ADLS_ACUITY_SCORE: 29
HYGIENE/GROOMING: INDEPENDENT;PROMPTS
ADLS_ACUITY_SCORE: 39
DRESS: INDEPENDENT
ADLS_ACUITY_SCORE: 39
ORAL_HYGIENE: INDEPENDENT;PROMPTS
ADLS_ACUITY_SCORE: 29
HYGIENE/GROOMING: INDEPENDENT
ORAL_HYGIENE: INDEPENDENT
LAUNDRY: UNABLE TO COMPLETE
ADLS_ACUITY_SCORE: 39
DRESS: SCRUBS (BEHAVIORAL HEALTH)
ADLS_ACUITY_SCORE: 39
ADLS_ACUITY_SCORE: 39

## 2023-02-21 NOTE — PLAN OF CARE
Assessment/Intervention/Current Symtoms and Care Coordination:   Chart Review  Team Meeting  Pt will return to [facility] when stable.   Continues to report that he is suicidal.     Discharge Plan or Goal:  Return to [facility] when stable. Medication changes in process so discharge won't be until at least 2/27.      Barriers to Discharge:    Stabilization of mental health symptoms with medication changes in process.      Referral Status:  No referrals made yet this hospitalization.      Legal Status:   Voluntary

## 2023-02-21 NOTE — PLAN OF CARE
Occupational Therapy Group Note:       02/21/23 1357   Group Therapy Session   Group Attendance attended group session   Time Session Began 1015   Time Session Ended 1030   Total Time (minutes) 10 (no charge)   Total # Attendees 6-7   Group Type recreation   Group Topic Covered structured socialization   Group Session Detail OT Favorites Bingo   Patient Response/Contribution listened actively   Patient Participation Detail Patient was a participant in a discussion based bingo activity in order to promote positive socialization, introspection, and cognitive skills. Patient voluntarily entered group setting. Instructions provided for today's group activity. Patient initiated activity per instructions; however, group dynamic and therapeutic environment was negatively affected by a peer's behaviors: group was ended early. Patient was verbally targeted by a peer in a negative manner; able to control emotions and remain calm. No charge.

## 2023-02-21 NOTE — PLAN OF CARE
"Goal Outcome Evaluation:    Plan of Care Reviewed With: patient        Haseeb was up ad amara, spent most of the shift in his room resting. Haseeb did come out to group for roughly 30 mins from 10:15-10:30, left because \"I just didn't like it\"   Of note, there was a disruptive peer during that group time. Haseeb also said that he did not sleep well last night and that was in part why he spent so much time in his room.     Haseeb continues to deny experiencing auditory or visual hallucinations. Pt reports having thoughts of killing himself if he were not in the hospital \"I would shoot myself in the head, or if I couldn't find a gun I would cut myself with a razor blade or knife and bleed to death\" Haseeb did say \"I am depressed and anxious, but I am a little better\"    Haseeb was med adherent, food and fluid intake were WNL. Pt reports having regular bowel movements. (takes scheduled Senna)            "

## 2023-02-21 NOTE — PLAN OF CARE
Goal Outcome Evaluation:    Haseeb appeared to sleep a total of 5.25 hours this night shift.  Appears calm and comfortable.  No snacks or prns given or requested.  No talk of SI.  Isolative to his room.

## 2023-02-22 PROCEDURE — G0177 OPPS/PHP; TRAIN & EDUC SERV: HCPCS

## 2023-02-22 PROCEDURE — 99233 SBSQ HOSP IP/OBS HIGH 50: CPT | Performed by: PSYCHIATRY & NEUROLOGY

## 2023-02-22 PROCEDURE — 250N000013 HC RX MED GY IP 250 OP 250 PS 637: Performed by: PSYCHIATRY & NEUROLOGY

## 2023-02-22 PROCEDURE — 124N000002 HC R&B MH UMMC

## 2023-02-22 PROCEDURE — 250N000013 HC RX MED GY IP 250 OP 250 PS 637

## 2023-02-22 RX ADMIN — PANTOPRAZOLE SODIUM 40 MG: 40 TABLET, DELAYED RELEASE ORAL at 08:50

## 2023-02-22 RX ADMIN — NICOTINE 1 PATCH: 21 PATCH, EXTENDED RELEASE TRANSDERMAL at 08:49

## 2023-02-22 RX ADMIN — LORATADINE 10 MG: 10 TABLET ORAL at 08:50

## 2023-02-22 RX ADMIN — CLOZAPINE 125 MG: 100 TABLET ORAL at 19:29

## 2023-02-22 RX ADMIN — GABAPENTIN 300 MG: 300 CAPSULE ORAL at 08:50

## 2023-02-22 RX ADMIN — RISPERIDONE 1 MG: 1 TABLET ORAL at 08:50

## 2023-02-22 RX ADMIN — Medication 12.5 MG: at 09:49

## 2023-02-22 RX ADMIN — METFORMIN HYDROCHLORIDE 500 MG: 500 TABLET, FILM COATED ORAL at 17:38

## 2023-02-22 RX ADMIN — SENNOSIDES AND DOCUSATE SODIUM 1 TABLET: 50; 8.6 TABLET ORAL at 19:29

## 2023-02-22 RX ADMIN — BENZTROPINE MESYLATE 1 MG: 1 TABLET ORAL at 19:30

## 2023-02-22 RX ADMIN — SENNOSIDES AND DOCUSATE SODIUM 1 TABLET: 50; 8.6 TABLET ORAL at 08:49

## 2023-02-22 RX ADMIN — ACETAMINOPHEN 325MG 650 MG: 325 TABLET ORAL at 09:49

## 2023-02-22 RX ADMIN — RISPERIDONE 2 MG: 2 TABLET ORAL at 19:29

## 2023-02-22 RX ADMIN — GABAPENTIN 300 MG: 300 CAPSULE ORAL at 19:30

## 2023-02-22 RX ADMIN — HYDROXYZINE HYDROCHLORIDE 100 MG: 50 TABLET, FILM COATED ORAL at 16:04

## 2023-02-22 RX ADMIN — GABAPENTIN 300 MG: 300 CAPSULE ORAL at 13:31

## 2023-02-22 RX ADMIN — SERTRALINE HYDROCHLORIDE 200 MG: 100 TABLET ORAL at 09:49

## 2023-02-22 RX ADMIN — FLUTICASONE PROPIONATE 2 SPRAY: 50 SPRAY, METERED NASAL at 08:50

## 2023-02-22 ASSESSMENT — ACTIVITIES OF DAILY LIVING (ADL)
ADLS_ACUITY_SCORE: 29
ORAL_HYGIENE: INDEPENDENT
ADLS_ACUITY_SCORE: 29
LAUNDRY: WITH SUPERVISION
ADLS_ACUITY_SCORE: 29
DRESS: SCRUBS (BEHAVIORAL HEALTH);INDEPENDENT
HYGIENE/GROOMING: SHOWER;HANDWASHING;INDEPENDENT
ADLS_ACUITY_SCORE: 29

## 2023-02-22 NOTE — PLAN OF CARE
"  Problem: Suicide Risk  Goal: Absence of Self-Harm  Outcome: Progressing      Patient able to communicate needs appropriately, no further complained of pain and discomfort on this shift. Spent most of the evening in his room, avoided social interaction with peers. Patient said his plan for today is to attend group and eat dinner, refused shower when offer by writer\"I had shower yesterday\". Patient affect remained flat/blunted with pleasant/calm mood, rated anxiety 6/10 and depression 9/10, prn hydroxyzine 100 mg given with some relief later. Patient bedtime Clozaril increase to 150 mg starting tomorrow and also started Metformin 500 mg today.  Patient continues to denies SI/SIB/HI and hallucination, contract for safety and medication compliant.    /78   Pulse 99   Temp 97.1  F (36.2  C) (Oral)   Resp 16   Ht 1.6 m (5' 3\")   Wt 75.1 kg (165 lb 8 oz)   SpO2 96%   BMI 29.32 kg/m                           "

## 2023-02-22 NOTE — PLAN OF CARE
Assessment/Intervention/Current Symtoms and Care Coordination:   Chart Review  Team Meeting  Pt will return to [facility] when stable.   Continues to report that he is suicidal.  Spoke to his sister on the phone.  She is concerned that the medication isn't working and feels he needs therapy not more medication or ECT. Writer explained that individual therapy is not a service offered at the hospital at this time but that her concerns would be passed along to the attending psychiatrist.      Discharge Plan or Goal:  Return to [facility] when stable. Medication changes in process so discharge won't be until at least 2/27.      Barriers to Discharge:    Stabilization of mental health symptoms with medication changes in process.      Referral Status:  No referrals made yet this hospitalization.      Legal Status:   Voluntary

## 2023-02-22 NOTE — PROGRESS NOTES
"  02/21/23 1800   Group Therapy Session   Time Session Began 1630   Time Session Ended 1720   Total Time (minutes) 50   Total # Attendees 5   Group Type expressive therapy   Group Topic Covered cognitive therapy techniques   Group Session Detail Opposite Action Songs   Patient Response/Contribution cooperative with task   Patient Participation Detail Engaged in learning about the \"Opposite Action\" DBT skill, and songs to support changing mood through taking the opposite action when down or low-energy. Patients chose songs from a playlist designed to promote activation and personal affirmation.  Pt response was cooperative with a calm affect.  More observational, but did contribute to group process lightly.        "

## 2023-02-22 NOTE — PROGRESS NOTES
"[facility], [place]   Psychiatric Progress Note  Hospital Day: 26        Interim History:   The patient's care was discussed with the treatment team during the daily team meeting and/or staff's chart notes were reviewed.  Staff report patient remains minimally interactive with peers and staff. Continues to report anxiety, depression, active SI with plan to shoot himself with a gun or cut his throat with a kitchen knife or razor. Has repeatedly stated he will not act on SI thoughts in the hospital. He denies recent AH/VH. Remains disheveled and appears depressed with blunted affect. Future oriented. He is attending some groups. No side effects from clozapine thus far.     Upon interview, Haseeb said \"I am doing better. The whispers and hallucinations are gone. I havent heard them or seen them in over 4 nights, but I still am feeling depressed and suicidal.\" He denies that SI has changed wrt intensity or severity. He is amenable with plan to continue clozapine titration. Reports side effect of increased appetite and weight gain. Otherwise denies side effects. Discussed possibly increasing rate of titration if continuing to tolerate. Discussed metformin R/B/A and he agreed to a trial. We discussed option of ECT. Discussed R/B/A at length, and he then requested a [employer]  to ensure that he fully understands as Hmong is his primary language. This will be arranged. He was encouraged to participate in groups and he has been intermittently attending.     Suicidal ideation: SI with plan to shoot himself, cut himself in the neck or on the wrists with a kitchen knife or a razor. Denies access to firearms. Reports he will not act on these thoughts while hospitalized and feels safe in the hospital. Future oriented.     Homicidal ideation: denies current or recent homicidal ideation or behaviors.    Psychotic symptoms: Denying psychotic symptoms    Medication side effects reported: as " "above    Acute medical concerns: as above.     Other issues reported by patient: Patient had no further questions or concerns.           Medications:       benztropine  1 mg Oral At Bedtime     cloZAPine  125 mg Oral At Bedtime    Followed by     [START ON 2/23/2023] cloZAPine  150 mg Oral At Bedtime     fluticasone  2 spray Both Nostrils Daily     gabapentin  300 mg Oral TID     loratadine  10 mg Oral Daily     metoprolol succinate ER  12.5 mg Oral Daily     nicotine  1 patch Transdermal Daily     nicotine   Transdermal Q8H     pantoprazole  40 mg Oral QAM AC     risperiDONE  1 mg Oral Daily     risperiDONE  2 mg Oral At Bedtime     senna-docusate  1 tablet Oral BID     sertraline  200 mg Oral Daily          Allergies:   No Known Allergies       Labs:     No results found for this or any previous visit (from the past 24 hour(s)).       Psychiatric Examination:     /84   Pulse 101   Temp 98.2  F (36.8  C) (Temporal)   Resp 16   Ht 1.6 m (5' 3\")   Wt 75.1 kg (165 lb 8 oz)   SpO2 99%   BMI 29.32 kg/m    Weight is 165 lbs 8 oz  Body mass index is 29.32 kg/m .    Weight over time:  Vitals:    01/27/23 1718   Weight: 75.1 kg (165 lb 8 oz)       Orthostatic Vitals     None        Cardiometabolic risk assessment. 01/30/23    Reviewed patient profile for cardiometabolic risk factors    Date taken /Value  REFERENCE RANGE   Abdominal Obesity  (Waist Circumference)   See nursing flowsheet Women ?35 in (88 cm)   Men ?40 in (102 cm)      Triglycerides  Triglycerides   Date Value Ref Range Status   01/28/2023 192 (H) <150 mg/dL Final       ?150 mg/dL (1.7 mmol/L) or current treatment for elevated triglycerides   HDL cholesterol  Direct Measure HDL   Date Value Ref Range Status   01/28/2023 41 >=40 mg/dL Final   ]   Women <50 mg/dL (1.3 mmol/L) in women or current treatment for low HDL cholesterol  Men <40 mg/dL (1 mmol/L) in men or current treatment for low HDL cholesterol     Fasting plasma glucose (FPG) Lab Results " "  Component Value Date     01/28/2023      FPG ?100 mg/dL (5.6 mmol/L) or treatment for elevated blood glucose   Blood pressure  BP Readings from Last 3 Encounters:   02/21/23 119/84   01/27/23 118/74   07/20/22 112/79    Blood pressure ?130/85 mmHg or treatment for elevated blood pressure   Family History  See family history     Appearance: awake, alert, dressed in hospital scrubs and unkempt  Attitude:  cooperative  Eye Contact: fair, improved  Mood:  \"better but still depressed\"  Affect:  mood congruent and intensity is blunted  Speech:  clear, coherent. appropriate  Language: fluent and intact in English  Psychomotor, Gait, Musculoskeletal:  no evidence of tardive dyskinesia, dystonia, or tics. Has full ROM of neck. No muscle stiffness noted.   Throught Process:  linear, goal oriented and illogical. Disorganized.   Associations:  no loose associations  Thought Content:  active suicidal ideation present, auditory hallucinations present and visual hallucinations present  Insight:  fair  Judgement:  poor  Oriented to:  time, person, and place  Attention Span and Concentration:  fair  Recent and Remote Memory:  fair  Fund of Knowledge:  appropriate    Clinical Global Impressions  First:  Considering your total clinical experience with this particular patient population, how severe are the patient's symptoms at this time?: 7 (01/28/23 1341)  Compared to the patient's condition at the START of treatment, this patient's condition is: 4 (01/28/23 1341)  Most recent:  Considering your total clinical experience with this particular patient population, how severe are the patient's symptoms at this time?: 7 (01/28/23 1341)  Compared to the patient's condition at the START of treatment, this patient's condition is: 4 (01/28/23 1341)           Precautions:     Behavioral Orders   Procedures     Code 1 - Restrict to Unit     Code 2     For imaging     Routine Programming     As clinically indicated     Status 15     " "Every 15 minutes.     Suicide precautions     Patients on Suicide Precautions should have a Combination Diet ordered that includes a Diet selection(s) AND a Behavioral Tray selection for Safe Tray - with utensils, or Safe Tray - NO utensils       Withdrawal precautions          Diagnoses:     Active suicidal ideation with intent outside of hospital setting  MDD, recurrent, severe with psychotic features vs Schizoaffective Disorder, depressive type  Polysubstance use  Unspecified mood disorder  ADHD, inattentive type per chart  History of idiopathic hypersomnolence per chart  Nicotine use disorder, dependence and withdrawal   Allergies- chronic uticaria and rhinitis per chart  HLD per chart          Assessment & Plan:     Assessment and hospital summary:  This patient is a 32 year old male with history of mood disorder, ADHD and substance use who presented to [facility] with SI on 1/26 in context of reported methamphetamine use and being kicked out of sober living. Medically cleared in ED, placed on 72HH and admitted to [facility]. Limited historian, giving inconsistent reports about substance use, UDS negative, very somnolent, endorsing ongoing SI and some psychosis symptoms. PTA medications continued with exception of finasteride as patient states he takes \"1/4 a pill\" and declined ordered dose, will defer to primary team to address. Will continue to evaluate safety and stabilization further as patient more able to engage in assessments.      Inpatient psychiatric hospitalization is warranted at this time for safety, stabilization, and possible adjustment in medications.    Patient reports that he abruptly stopped Zoloft one week prior to his admission. He developed discontinuation syndrome symptoms following that. He reports that he does not have a history of psychosis but is experiencing psychotic symptoms. He is amenable with plan to trial risperidone after R/B/A were discussed. Risperidone was initiated on 1/30 and " titrated to a total of 3 mg daily on 2/1. Clonidine, used for ADHD, was reduced from a total of 0.3 mg daily to 0.2 mg daily on 2/3 due to concern for hypotension. 2/9: Cardiology consult placed. EKG- tachycardia 121 bpm, QTc 465 ms, Abnormal QRS angle and T wave abnormality. COVID negative and labs are unremarkable.  On 2/10, clozapine was held pending additional workup from IM and cardiology. Pericarditis was ruled out and metoprolol was started. Clozapine was restarted on 2/13 with plan to cautiously titrate to lowest effective dose.     Psychiatric treatment/inteventions:  Medications:   -Continue clozapine titration but slower titration of 25 mg every OTHER day while monitoring closely for side effects. Titration has been ordered through next Thursday. Could consider titrating more quickly (25 mg daily) if patient tolerating titration well. Will reassess tomorrow.   - Consider ECT consult after further discussion regarding R/B/A in presence of [facility]  per patient request  -Continue risperidone to 1 mg daily and 2 mg at bedtime for now  -continue PTA sertraline 150mg daily for mood  -continue PTA gabapentin 300mg TID for anxiety   -Continue Cogentin 1 mg at bedtime for possible EPSE     -PRN hydroxyzine 25mg every 4 hour for anxiety  -PRN trazodone 50mg at bedtime for sleep   -PRN olanzapine 10mg PO or IM TID for agitation/psychosis     Spiritual services consult placed on 2/6. Pt is [facility]. Appreciate assistance.      Laboratory/Imaging: reviewed: Covid negative; UDS negative; CMP, CBC, TSH, Lipid panel, HgbA1c ordered to complete admission labs. Reviewed.     2/9/23: Troponin, CK, CBC, BMP: Unremarkable, CRP elevated to 38.18, COVID: Negative  2/10/23: Add Influenza A/B given flu-like sx-negative  Patient will be treated in therapeutic milieu with appropriate individual and group therapies as described.     Medical treatment/interventions:  Medical concerns:   Nicotine replacement ordered, educate  patient on benefits of cessation, pt unclear about finasteride dose, will hold until further information is obtained. PTA PRN zyrtec, azelastine, fluticasone, triamcinolone and epipen ordered for allergies and PRN ammoniaum lactate, Eucerin for  dry skin.    Neuroleptic induced wt gain:  - Monitor weights  - Start metformin 500 mg BID with meals    Dyslipidemia: Will arrange follow up with PCP to address. Discussed importance of healthy eating habits and regular exercise. Will consider nutrition consult if patient amenable.     Lightheadedness, resolved: Pt is not hypotensive and orthostatics wnl. He was asked to increase fluid intake.   - Continue to monitor vital signs closely  - Encourage fluids  - Discontinued clonidine    Chest pain/tachycardia/EKG changes (2/9):  - Pain improved with PRN medications.   - Cardiology consult placed on 2/9. See note on that date for details.   - ECHO reviewed and unremarkable.  - Writer discussed care with both Dr. Streeter from [facility] and Christina from . Plan for now is to HOLD clozapine until further medical workup. Dr. Streeter explained that myocarditis has been ruled out as troponin was negative. Pericarditis remains on differential, but he does not have EKG findings or a pericardial effusion. IM will assess for pericardial friction rub and pleuritic chest pain. IM is seeing patient this afternoon. Appreciate assistance. Metoprolol was started.     Update 2/13: Discussed care with Annette from  today on two occasions. Please see her note on this date for details. She does not suspect that this is pericarditis. He does not have pleuritic pain, characteristic CP, EKG changes, or pericardial effusion on ECHO. Therefore, will cautiously restart clozapine while monitoring closely for side effects. May consider further increase in metoprolol if necessary. PPI was started due to suspected GERD.     Update 2/24 per IM note:  Medicine is following peripherally for concerns for  pericarditis.  See detailed note from 2/13.  No pericardial friction rub noted while sitting up and leaning forward today.  Patient states that his chest discomfort is getting better after starting the Protonix yesterday.  It does appear that he has also been using the Maalox.  Patient does have Tums available as well.  Please be mindful for any constipation with overuse of the PRNs.     POC:  - Continue Protonix daily for 8 weeks, then taper off  - Recommend lifestyle changes including weight loss head of bed elevation avoidance of late-night eating and specific food elimination such as chocolate caffeine alcohol acidic and/or spicy food.  - Watch for constipation with PRNs for heartburn  - We will schedule senna 1 tab twice daily  - MiraLAX daily as needed added on     Medicine will sign off, please contact us for any acute changes.      Sore throat/cough/nasal congestion, resolved:   - COVID negative on 2/9. Repeat COVID test and Influenza A/B neg on 2/10.  - Cepacol lozenges added  - PRN Tylenol   - Consulted with IM. Appreciate assistance. See their notes for details.      Legal Status: VOLUNTARY. 72 hour hold discontinued. Pt agreed to sign in on voluntary basis and discharge directly to treatment once stable.      Safety Assessment:        Behavioral Orders   Procedures     Code 1 - Restrict to Unit     Routine Programming       As clinically indicated     Status 15       Every 15 minutes.     Suicide precautions       Patients on Suicide Precautions should have a Combination Diet ordered that includes a Diet selection(s) AND a Behavioral Tray selection for Safe Tray - with utensils, or Safe Tray - NO utensils        Withdrawal precautions      Pt has not required locked seclusion or restraints in the past 24 hours to maintain safety, please refer to RN documentation for further details.    Discontinued SIO on 2/8 as patient is heriberto for safety. Monitor SI closely.     The risks, benefits, alternatives  and side effects have been discussed and are understood by the patient.     Disposition: Pending clinical stabilization. Pt remains actively suicidal. Will likely discharge back to [facility] program once stable.      This note was created by undersigned using a Dragon dictation system. All typing errors or contextual distortion are unintentional and software inherent.     Entered by: Nelly Brown MD on 2/22/2023 at 7:21 AM          Ana M Brown MD  [facility]

## 2023-02-22 NOTE — PLAN OF CARE
Problem: Psychotic Signs/Symptoms  Goal: Improved Sleep (Psychotic Signs/Symptoms)  Intervention: Promote Healthy Sleep Hygiene  Recent Flowsheet Documentation  Taken 2/22/2023 0700 by Lyudmila Petit RN  Sleep Hygiene Promotion:   noise level reduced   regular sleep pattern promoted   room lighting adjusted   Goal Outcome Evaluation:    Patient appeared to sleep 7 hours this night shift.  No prns or snacks given or requested.  No concerns were reported or noted.

## 2023-02-22 NOTE — PLAN OF CARE
"\"I'm still suicidal and want to kill myself by shooting myself in the head with a gun or cutting my throat with a kitchen knife.\"  \"I'm still depressed and anxious but not as much.\"  Denied cravings to use. Did not want to talk about Emil, his past boyfriend.  Future oriented and still wants to return to [place].    Remains isolative, lying on his bed much of the time. Restricted affect, good eye contact. Low volume speech with normal prosody.     No hygiene, disheveled appearance, inactive.    No dyskinesias.      Plan: Monitor and document mood and behaviour, thought process and content. Establish and maintain therapeutic relationship. Educate about diagnoses, medications, treatment, legal status, plan of care. Address preexisting and concurrent medical concerns.      P: Unstable mood  G: Stable mood  O: Not Progressing        "
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
## 2023-02-22 NOTE — PLAN OF CARE
"Goal Outcome Evaluation:    Pt's affect is bland, mood is \"good.\" He is calm and pleasant, soft spoken. Pt rates his depression 9/10, anxiety 6. He endorses chronic SI. Pt denies intent, while in the hospital; contracts for safety. Pt said he has not had any psychotic sx \"for 3-4 days.\" He states he is \"willing to talk to people,\" but is not really social w peers. He \"prefers\" to stay in his rm. Pt reports he has nightmares at night. He also endorses sleepiness and increased appetite-states from his medications. Tylenol given for neck/back discomfort rated 8/10; states not new concerns. Pt's goal for the day is \"to go to [facility].\"     [id number]) Pt has been isolative to his rm, this shift. He came out to request snacks from his locker. He has eaten meals in his rm.                      "

## 2023-02-23 LAB
BASOPHILS # BLD AUTO: 0.1 10E3/UL (ref 0–0.2)
BASOPHILS NFR BLD AUTO: 0 %
EOSINOPHIL # BLD AUTO: 0.1 10E3/UL (ref 0–0.7)
EOSINOPHIL NFR BLD AUTO: 1 %
IMM GRANULOCYTES # BLD: 0.1 10E3/UL
IMM GRANULOCYTES NFR BLD: 1 %
LYMPHOCYTES # BLD AUTO: 2.3 10E3/UL (ref 0.8–5.3)
LYMPHOCYTES NFR BLD AUTO: 15 %
MONOCYTES # BLD AUTO: 0.8 10E3/UL (ref 0–1.3)
MONOCYTES NFR BLD AUTO: 5 %
NEUTROPHILS # BLD AUTO: 11.8 10E3/UL (ref 1.6–8.3)
NEUTROPHILS NFR BLD AUTO: 78 %
NRBC # BLD AUTO: 0 10E3/UL
NRBC BLD AUTO-RTO: 0 /100
WBC # BLD AUTO: 15.2 10E3/UL (ref 4–11)

## 2023-02-23 PROCEDURE — 250N000013 HC RX MED GY IP 250 OP 250 PS 637: Performed by: PSYCHIATRY & NEUROLOGY

## 2023-02-23 PROCEDURE — 85048 AUTOMATED LEUKOCYTE COUNT: CPT | Performed by: PSYCHIATRY & NEUROLOGY

## 2023-02-23 PROCEDURE — G0177 OPPS/PHP; TRAIN & EDUC SERV: HCPCS

## 2023-02-23 PROCEDURE — 99233 SBSQ HOSP IP/OBS HIGH 50: CPT | Mod: GC | Performed by: PSYCHIATRY & NEUROLOGY

## 2023-02-23 PROCEDURE — 250N000013 HC RX MED GY IP 250 OP 250 PS 637

## 2023-02-23 PROCEDURE — 36415 COLL VENOUS BLD VENIPUNCTURE: CPT | Performed by: PSYCHIATRY & NEUROLOGY

## 2023-02-23 PROCEDURE — 124N000002 HC R&B MH UMMC

## 2023-02-23 RX ORDER — ATROPINE SULFATE 10 MG/ML
2 SOLUTION/ DROPS OPHTHALMIC
Status: DISCONTINUED | OUTPATIENT
Start: 2023-02-23 | End: 2023-03-02

## 2023-02-23 RX ORDER — CLOZAPINE 100 MG/1
200 TABLET ORAL AT BEDTIME
Status: COMPLETED | OUTPATIENT
Start: 2023-02-25 | End: 2023-02-25

## 2023-02-23 RX ADMIN — LORATADINE 10 MG: 10 TABLET ORAL at 09:05

## 2023-02-23 RX ADMIN — SERTRALINE HYDROCHLORIDE 200 MG: 100 TABLET ORAL at 09:05

## 2023-02-23 RX ADMIN — PANTOPRAZOLE SODIUM 40 MG: 40 TABLET, DELAYED RELEASE ORAL at 09:05

## 2023-02-23 RX ADMIN — Medication 12.5 MG: at 09:05

## 2023-02-23 RX ADMIN — SENNOSIDES AND DOCUSATE SODIUM 1 TABLET: 50; 8.6 TABLET ORAL at 19:41

## 2023-02-23 RX ADMIN — METFORMIN HYDROCHLORIDE 500 MG: 500 TABLET, FILM COATED ORAL at 09:05

## 2023-02-23 RX ADMIN — SENNOSIDES AND DOCUSATE SODIUM 1 TABLET: 50; 8.6 TABLET ORAL at 09:05

## 2023-02-23 RX ADMIN — GABAPENTIN 300 MG: 300 CAPSULE ORAL at 14:16

## 2023-02-23 RX ADMIN — GABAPENTIN 300 MG: 300 CAPSULE ORAL at 09:05

## 2023-02-23 RX ADMIN — BENZTROPINE MESYLATE 1 MG: 1 TABLET ORAL at 19:42

## 2023-02-23 RX ADMIN — RISPERIDONE 2 MG: 2 TABLET ORAL at 19:41

## 2023-02-23 RX ADMIN — RISPERIDONE 1 MG: 1 TABLET ORAL at 09:05

## 2023-02-23 RX ADMIN — GABAPENTIN 300 MG: 300 CAPSULE ORAL at 19:41

## 2023-02-23 RX ADMIN — CLOZAPINE 150 MG: 100 TABLET ORAL at 19:41

## 2023-02-23 RX ADMIN — METFORMIN HYDROCHLORIDE 500 MG: 500 TABLET, FILM COATED ORAL at 17:47

## 2023-02-23 RX ADMIN — FLUTICASONE PROPIONATE 2 SPRAY: 50 SPRAY, METERED NASAL at 09:13

## 2023-02-23 RX ADMIN — NICOTINE 1 PATCH: 21 PATCH, EXTENDED RELEASE TRANSDERMAL at 09:05

## 2023-02-23 ASSESSMENT — ACTIVITIES OF DAILY LIVING (ADL)
ADLS_ACUITY_SCORE: 29
DRESS: INDEPENDENT
ADLS_ACUITY_SCORE: 29
LAUNDRY: UNABLE TO COMPLETE
ADLS_ACUITY_SCORE: 29
ADLS_ACUITY_SCORE: 29
DRESS: INDEPENDENT
ADLS_ACUITY_SCORE: 29
LAUNDRY: UNABLE TO COMPLETE
HYGIENE/GROOMING: INDEPENDENT
ORAL_HYGIENE: INDEPENDENT
ADLS_ACUITY_SCORE: 29
ADLS_ACUITY_SCORE: 29
ORAL_HYGIENE: INDEPENDENT
HYGIENE/GROOMING: INDEPENDENT

## 2023-02-23 NOTE — PROGRESS NOTES
"[facility], [place]   Psychiatric Progress Note  Hospital Day: 27        Interim History:   The patient's care was discussed with the treatment team during the daily team meeting and/or staff's chart notes were reviewed.  Staff report patient was calm and pleasant. He rates depression 9/10 and anxiety 6/10. He reports ongoing suicidal ideation with no intent while hospitalized. He denies hallucinations for the last 3-4 days. He reports increase fatigue and appetite. Receive Tylenol for neck and back pain. In the evening he was withdrawn to his room and avoided socializing with peers. Received hydroxyzine prn for anxiety with some relief. Slept 6.5 hrs.    Upon interview, patient agreed to interview in the conference room to allow for phone  use during ECT discussion. Haseeb reports feeling \"lost and confused\". He notes that medications help make him less anxious and paranoid and denies any hallucinations for the last 4 days. He notes side effects of exhaustion, orthostais and increased appetite. Sialorrhea is ongoing and he is amenable to try atropine drops. Notably he reports restricting water intake to reduce sialorrhea. Haseeb was encouraged to increase fluid intake which may help with orthostasis and advised to practice slow transitions which something hold when changing from sitting/supine to standing. Sleep remains poor d/t nightly nightmares. He notes feeling stressed about his lease expiring and ending up homeless and unemployed. Regarding clozapine Haseeb is amenable to increasing his dose by 25 mg daily despite current side effects. He continues to endorse suicidal ideation with plan and intent when discharged from the hospital but stated he does not have plan or intent while hospitalized.     Subsequently we discussed the risks, benefits, and alternatives of ECT with use of a [facility] telephone . Haseeb expressed understanding of the R/B/A and agreed to pursue " "ECT for treatment of depression.    Homicidal ideation: denies current or recent homicidal ideation or behaviors.    Psychotic symptoms: Denying psychotic symptoms    Medication side effects reported: as above    Acute medical concerns: as above.     Other issues reported by patient: Patient had no further questions or concerns.           Medications:       benztropine  1 mg Oral At Bedtime     cloZAPine  150 mg Oral At Bedtime     fluticasone  2 spray Both Nostrils Daily     gabapentin  300 mg Oral TID     loratadine  10 mg Oral Daily     metFORMIN  500 mg Oral BID w/meals     metoprolol succinate ER  12.5 mg Oral Daily     nicotine  1 patch Transdermal Daily     nicotine   Transdermal Q8H     pantoprazole  40 mg Oral QAM AC     risperiDONE  1 mg Oral Daily     risperiDONE  2 mg Oral At Bedtime     senna-docusate  1 tablet Oral BID     sertraline  200 mg Oral Daily          Allergies:   No Known Allergies       Labs:     No results found for this or any previous visit (from the past 24 hour(s)).       Psychiatric Examination:     /78   Pulse 99   Temp 97.1  F (36.2  C) (Oral)   Resp 16   Ht 1.6 m (5' 3\")   Wt 75.1 kg (165 lb 8 oz)   SpO2 96%   BMI 29.32 kg/m    Weight is 165 lbs 8 oz  Body mass index is 29.32 kg/m .    Weight over time:  Vitals:    01/27/23 1718   Weight: 75.1 kg (165 lb 8 oz)       Orthostatic Vitals     None        Cardiometabolic risk assessment. 01/30/23    Reviewed patient profile for cardiometabolic risk factors    Date taken /Value  REFERENCE RANGE   Abdominal Obesity  (Waist Circumference)   See nursing flowsheet Women ?35 in (88 cm)   Men ?40 in (102 cm)      Triglycerides  Triglycerides   Date Value Ref Range Status   01/28/2023 192 (H) <150 mg/dL Final       ?150 mg/dL (1.7 mmol/L) or current treatment for elevated triglycerides   HDL cholesterol  Direct Measure HDL   Date Value Ref Range Status   01/28/2023 41 >=40 mg/dL Final   ]   Women <50 mg/dL (1.3 mmol/L) in women or " "current treatment for low HDL cholesterol  Men <40 mg/dL (1 mmol/L) in men or current treatment for low HDL cholesterol     Fasting plasma glucose (FPG) Lab Results   Component Value Date     01/28/2023      FPG ?100 mg/dL (5.6 mmol/L) or treatment for elevated blood glucose   Blood pressure  BP Readings from Last 3 Encounters:   02/22/23 111/78   01/27/23 118/74   07/20/22 112/79    Blood pressure ?130/85 mmHg or treatment for elevated blood pressure   Family History  See family history     Appearance: awake, alert, dressed in hospital scrubs and unkempt  Attitude:  cooperative, calm  Eye Contact: fair  Mood:  \"lost and confused\"  Affect:  mood congruent and intensity is blunted  Speech:  clear, coherent. appropriate  Language: fluent and intact in English  Psychomotor, Gait, Musculoskeletal:  no evidence of tardive dyskinesia, dystonia, or ticsNo cogwheeling present. Mild paratonia in BUE on exam. Denies significant tremor or abnormal movements   Throught Process:  Linear, impoverished   Associations:  no loose associations  Thought Content:  active suicidal ideation present, auditory hallucinations present and visual hallucinations present  Insight:  fair  Judgement:  poor  Oriented to:  time, person, and place  Attention Span and Concentration:  fair  Recent and Remote Memory:  fair  Fund of Knowledge:  appropriate    Clinical Global Impressions  First:  Considering your total clinical experience with this particular patient population, how severe are the patient's symptoms at this time?: 7 (01/28/23 1341)    Most recent:  Compared to the patient's condition at the START of treatment, this patient's condition is: 4           Precautions:     Behavioral Orders   Procedures     Code 1 - Restrict to Unit     Code 2     For imaging     Routine Programming     As clinically indicated     Status 15     Every 15 minutes.     Suicide precautions     Patients on Suicide Precautions should have a Combination Diet " "ordered that includes a Diet selection(s) AND a Behavioral Tray selection for Safe Tray - with utensils, or Safe Tray - NO utensils       Withdrawal precautions          Diagnoses:     Active suicidal ideation with intent outside of hospital setting  MDD, recurrent, severe with psychotic features vs Schizoaffective Disorder, depressive type  Polysubstance use  Unspecified mood disorder  ADHD, inattentive type per chart  History of idiopathic hypersomnolence per chart  Nicotine use disorder, dependence and withdrawal   Allergies- chronic uticaria and rhinitis per chart  HLD per chart          Assessment & Plan:     Assessment and hospital summary:  This patient is a 32 year old male with history of mood disorder, ADHD and substance use who presented to [place] ED with SI on 1/26 in context of reported methamphetamine use and being kicked out of sober living. Medically cleared in ED, placed on 72HH and admitted to [facility]. Limited historian, giving inconsistent reports about substance use, UDS negative, very somnolent, endorsing ongoing SI and some psychosis symptoms. PTA medications continued with exception of finasteride as patient states he takes \"1/4 a pill\" and declined ordered dose, will defer to primary team to address. Will continue to evaluate safety and stabilization further as patient more able to engage in assessments.      Inpatient psychiatric hospitalization is warranted at this time for safety, stabilization, and possible adjustment in medications.    Patient reports that he abruptly stopped Zoloft one week prior to his admission. He developed discontinuation syndrome symptoms following that. He reports that he does not have a history of psychosis but is experiencing psychotic symptoms. He is amenable with plan to trial risperidone after R/B/A were discussed. Risperidone was initiated on 1/30 and titrated to a total of 3 mg daily on 2/1. Clonidine, used for ADHD, was reduced from a total of 0.3 mg daily " to 0.2 mg daily on 2/3 due to concern for hypotension. 2/9: Cardiology consult placed. EKG- tachycardia 121 bpm, QTc 465 ms, Abnormal QRS angle and T wave abnormality. COVID negative and labs are unremarkable.  On 2/10, clozapine was held pending additional workup from IM and cardiology. Pericarditis was ruled out and metoprolol was started. Clozapine was restarted on 2/13 with plan to cautiously titrate to lowest effective dose. 2/23/23: Clozapine titration schedule increased 25 mg/day. ECT and IM consults for ECT clearance placed.    Psychiatric treatment/inteventions:  Medications:   -Continue clozapine titration by 25 mg daily while monitoring closely for side effects. Titration has been ordered through 2/25/22.  Consider clozapine levels  - Consider ECT consult and IM consult for ECT clearance placed today  -Continue risperidone to 1 mg daily and 2 mg at bedtime for now  -Continue PTA sertraline 150mg daily for mood  -Continue PTA gabapentin 300mg TID for anxiety   -Continue Cogentin 1 mg at bedtime for possible EPSE     -PRN hydroxyzine 25mg every 4 hour for anxiety  -PRN trazodone 50mg at bedtime for sleep   -PRN olanzapine 10mg PO or IM TID for agitation/psychosis   -PRN atropine 1% SL drops, 2 drops q2h for sialorrhea    Spiritual services consult placed on 2/6. Pt is W[employer]. Appreciate assistance.      Laboratory/Imaging: reviewed: Covid negative; UDS negative; CMP, CBC, TSH, Lipid panel, HgbA1c ordered to complete admission labs. Reviewed.     2/9/23: Troponin, CK, CBC, BMP: Unremarkable, CRP elevated to 38.18, COVID: Negative  2/10/23: Add Influenza A/B given flu-like sx-negative  Patient will be treated in therapeutic milieu with appropriate individual and group therapies as described.     Medical treatment/interventions:  Medical concerns:   Nicotine replacement ordered, educate patient on benefits of cessation, pt unclear about finasteride dose, will hold until further information is obtained. PTA  PRN zyrtec, azelastine, fluticasone, triamcinolone and epipen ordered for allergies and PRN ammoniaum lactate, Eucerin for  dry skin.    Neuroleptic induced wt gain:  - Monitor weights  - Start metformin 500 mg BID with meals    Dyslipidemia: Will arrange follow up with PCP to address. Discussed importance of healthy eating habits and regular exercise. Will consider nutrition consult if patient amenable.     Orthostasis likely 2/2 clozapine: Pt is not hypotensive but reports orthostatic symptoms. He reported restricting fluid intake to reduce sialorrhea   - Continue to monitor vital signs closely  - Encourage fluids  - Discontinued clonidine    Sialorrhea 2/2 clozapine:  - Atropine 1% SL drops prn  - Recommend towel on pillow when sleeping    Chest pain/tachycardia/EKG changes (2/9):  - Pain improved with PRN medications.   - Cardiology consult placed on 2/9. See note on that date for details.   - ECHO reviewed and unremarkable.  - Writer discussed care with both Dr. Streeter from [facility] and Christina from . Plan for now is to HOLD clozapine until further medical workup. Dr. Streeter explained that myocarditis has been ruled out as troponin was negative. Pericarditis remains on differential, but he does not have EKG findings or a pericardial effusion. IM will assess for pericardial friction rub and pleuritic chest pain. IM seen by patient and Metoprolol was started.     Update 2/13: Discussed care with Annette from  today on two occasions. Please see her note on this date for details. She does not suspect that this is pericarditis. He does not have pleuritic pain, characteristic CP, EKG changes, or pericardial effusion on ECHO. Therefore, will cautiously restart clozapine while monitoring closely for side effects. May consider further increase in metoprolol if necessary. PPI was started due to suspected GERD.     Update 2/24 per IM note:  Medicine is following peripherally for concerns for pericarditis.  See detailed note  from 2/13.  No pericardial friction rub noted while sitting up and leaning forward today.  Patient states that his chest discomfort is getting better after starting the Protonix yesterday.  It does appear that he has also been using the Maalox.  Patient does have Tums available as well.  Please be mindful for any constipation with overuse of the PRNs.     POC:  - Continue Protonix daily for 8 weeks, then taper off  - Recommend lifestyle changes including weight loss head of bed elevation avoidance of late-night eating and specific food elimination such as chocolate caffeine alcohol acidic and/or spicy food.  - Watch for constipation with PRNs for heartburn  - We will schedule senna 1 tab twice daily  - MiraLAX daily as needed added on     Medicine will sign off, please contact us for any acute changes.      Sore throat/cough/nasal congestion, resolved:   - COVID negative on 2/9. Repeat COVID test and Influenza A/B neg on 2/10.  - Cepacol lozenges added  - PRN Tylenol   - Consulted with IM. Appreciate assistance. See their notes for details.      Legal Status: VOLUNTARY. 72 hour hold discontinued. Pt agreed to sign in on voluntary basis and discharge directly to treatment once stable.      Safety Assessment:        Behavioral Orders   Procedures     Code 1 - Restrict to Unit     Routine Programming       As clinically indicated     Status 15       Every 15 minutes.     Suicide precautions       Patients on Suicide Precautions should have a Combination Diet ordered that includes a Diet selection(s) AND a Behavioral Tray selection for Safe Tray - with utensils, or Safe Tray - NO utensils        Withdrawal precautions      Pt has not required locked seclusion or restraints in the past 24 hours to maintain safety, please refer to RN documentation for further details.    Discontinued SIO on 2/8 as patient is heriberto for safety. Monitor SI closely.     The risks, benefits, alternatives and side effects have been  discussed and are understood by the patient.     Disposition: Pending clinical stabilization. Pt remains actively suicidal. [facility]      This note was created by undersigned using a Dragon dictation system. All typing errors or contextual distortion are unintentional and software inherent.     Entered by: Tha Browning MD on 2/22/2023 at 8:39 AM          This patient was seen and discussed with my attending physician.  Tha Browning MD  PGY-4Psychiatry Resident Physician

## 2023-02-23 NOTE — PLAN OF CARE
Problem: Sleep Disturbance  Goal: Adequate Sleep/Rest  Outcome: Progressing   Goal Outcome Evaluation:    Patient appeared to sleep 6.5 hours this night shift.  No prns or snacks given or requested.  No concerns were reported or noted.

## 2023-02-23 NOTE — PLAN OF CARE
Nursing Assessment    Recent Vitals: B/P: 107/74, T: 98.5, P: 114, R: 16     Sleep:  Hours of sleep at night: 6.5    General Shift Summary  Patient has isolated to his room coming out for his meal tray. He eats meals in his room. When assessing him he has always been watching t.v. Patient stated the TV helps to relax and distract him. He voiced having SI with plan to shoot himself and if he was unable to access a gun he would slice his throat with a knife. Patient stated he can contract for safety while here. He denied HI/AVH. He rated his anxiety a 6/10 and his depression as a 9/10. Writer discussed coping skills, patient could only identify the T.V.    Patient is medication compliant with no voiced side effects. No PRN's received.    Plan is to continue to stabilize patient, ECT, and to slowly increase Clozaril. Will continue to monitor vitals.    Nena Nevarez RN MSN

## 2023-02-23 NOTE — PLAN OF CARE
02/23/23 1700   Group Therapy Session   Group Attendance attended group session   Time Session Began 1615   Time Session Ended 1715   Total Time (minutes) 60   Total # Attendees 6   Group Type task skill   Group Topic Covered emotions/expression;problem-solving   Patient Response/Contribution cooperative with task;organized   Patient Participation Detail See Note     Pt actively participated in occupational therapy clinic to facilitate coping skill exploration, creative expression within personally meaningful activities, and clinical observation of social, cognitive, and kinesthetic performance skills. Pt response: Independent to initiate, gather materials, sequence, and adjust to workspace demands as needed. Demonstrated good focus, planning, and problem solving for selected canvas painting task. Able to ask for assistance as needed, and minimally social with peers and staff. Pt will continue to be encouraged to attend groups for further asssesssment and to address goals identified on plan of care.

## 2023-02-23 NOTE — PLAN OF CARE
Assessment/Intervention/Current Symtoms and Care Coordination:   Chart Review  Team Meeting  Pt will return to [facility] when stable.   ECT being explored.     Discharge Plan or Goal:  Return to [facility] when stable. Medication changes in process so discharge likely not for another couple weeks.      Barriers to Discharge:    Stabilization of mental health symptoms with medication changes in process.      Referral Status:  No referrals made yet this hospitalization.      Legal Status:   Voluntary

## 2023-02-23 NOTE — PLAN OF CARE
02/22/23 2316   Group Therapy Session   Group Attendance attended group session   Time Session Began 1420   Time Session Ended 1430   Total Time (minutes) 45   Total # Attendees 5   Group Type psychoeducation   Group Topic Covered coping skills/lifestyle management   Group Session Detail OT clinic   Patient Response/Contribution cooperative with task     Pt attended only the afternoon group, though had discussion that writer should try to wake him for all groups if he is sleeping.  Remains fairly quiet in group, though other members are also either very quiet, or quite loud likely also making engaging in conversation for pt difficult.

## 2023-02-24 ENCOUNTER — APPOINTMENT (OUTPATIENT)
Dept: INTERPRETER SERVICES | Facility: CLINIC | Age: 33
End: 2023-02-24
Attending: PSYCHIATRY & NEUROLOGY
Payer: COMMERCIAL

## 2023-02-24 LAB
ALBUMIN SERPL BCG-MCNC: 4.1 G/DL (ref 3.5–5.2)
ALP SERPL-CCNC: 82 U/L (ref 40–129)
ALT SERPL W P-5'-P-CCNC: 58 U/L (ref 10–50)
ANION GAP SERPL CALCULATED.3IONS-SCNC: 12 MMOL/L (ref 7–15)
AST SERPL W P-5'-P-CCNC: 29 U/L (ref 10–50)
BILIRUB SERPL-MCNC: 0.3 MG/DL
BUN SERPL-MCNC: 19.5 MG/DL (ref 6–20)
CALCIUM SERPL-MCNC: 9.4 MG/DL (ref 8.6–10)
CHLORIDE SERPL-SCNC: 103 MMOL/L (ref 98–107)
CREAT SERPL-MCNC: 0.81 MG/DL (ref 0.67–1.17)
DEPRECATED HCO3 PLAS-SCNC: 27 MMOL/L (ref 22–29)
ERYTHROCYTE [DISTWIDTH] IN BLOOD BY AUTOMATED COUNT: 13 % (ref 10–15)
GFR SERPL CREATININE-BSD FRML MDRD: >90 ML/MIN/1.73M2
GLUCOSE SERPL-MCNC: 93 MG/DL (ref 70–99)
HCT VFR BLD AUTO: 46.1 % (ref 40–53)
HGB BLD-MCNC: 15 G/DL (ref 13.3–17.7)
MCH RBC QN AUTO: 27.4 PG (ref 26.5–33)
MCHC RBC AUTO-ENTMCNC: 32.5 G/DL (ref 31.5–36.5)
MCV RBC AUTO: 84 FL (ref 78–100)
PLATELET # BLD AUTO: 256 10E3/UL (ref 150–450)
POTASSIUM SERPL-SCNC: 3.9 MMOL/L (ref 3.4–5.3)
PROT SERPL-MCNC: 7.1 G/DL (ref 6.4–8.3)
RBC # BLD AUTO: 5.48 10E6/UL (ref 4.4–5.9)
SODIUM SERPL-SCNC: 142 MMOL/L (ref 136–145)
WBC # BLD AUTO: 8 10E3/UL (ref 4–11)

## 2023-02-24 PROCEDURE — 99232 SBSQ HOSP IP/OBS MODERATE 35: CPT | Performed by: PHYSICIAN ASSISTANT

## 2023-02-24 PROCEDURE — 250N000013 HC RX MED GY IP 250 OP 250 PS 637: Performed by: STUDENT IN AN ORGANIZED HEALTH CARE EDUCATION/TRAINING PROGRAM

## 2023-02-24 PROCEDURE — 99233 SBSQ HOSP IP/OBS HIGH 50: CPT | Mod: 25 | Performed by: PSYCHIATRY & NEUROLOGY

## 2023-02-24 PROCEDURE — 93005 ELECTROCARDIOGRAM TRACING: CPT

## 2023-02-24 PROCEDURE — 250N000013 HC RX MED GY IP 250 OP 250 PS 637: Performed by: PSYCHIATRY & NEUROLOGY

## 2023-02-24 PROCEDURE — 99232 SBSQ HOSP IP/OBS MODERATE 35: CPT | Performed by: PSYCHIATRY & NEUROLOGY

## 2023-02-24 PROCEDURE — 80053 COMPREHEN METABOLIC PANEL: CPT | Performed by: PHYSICIAN ASSISTANT

## 2023-02-24 PROCEDURE — 93010 ELECTROCARDIOGRAM REPORT: CPT | Performed by: INTERNAL MEDICINE

## 2023-02-24 PROCEDURE — 85027 COMPLETE CBC AUTOMATED: CPT | Performed by: PHYSICIAN ASSISTANT

## 2023-02-24 PROCEDURE — 250N000013 HC RX MED GY IP 250 OP 250 PS 637

## 2023-02-24 PROCEDURE — 36415 COLL VENOUS BLD VENIPUNCTURE: CPT | Performed by: PHYSICIAN ASSISTANT

## 2023-02-24 PROCEDURE — G0177 OPPS/PHP; TRAIN & EDUC SERV: HCPCS

## 2023-02-24 PROCEDURE — 124N000002 HC R&B MH UMMC

## 2023-02-24 RX ORDER — RISPERIDONE 1 MG/1
1 TABLET ORAL AT BEDTIME
Status: DISCONTINUED | OUTPATIENT
Start: 2023-02-24 | End: 2023-02-28

## 2023-02-24 RX ADMIN — LORATADINE 10 MG: 10 TABLET ORAL at 08:34

## 2023-02-24 RX ADMIN — GABAPENTIN 300 MG: 300 CAPSULE ORAL at 20:24

## 2023-02-24 RX ADMIN — CLOZAPINE 175 MG: 100 TABLET ORAL at 21:00

## 2023-02-24 RX ADMIN — HYDROXYZINE HYDROCHLORIDE 100 MG: 50 TABLET, FILM COATED ORAL at 16:29

## 2023-02-24 RX ADMIN — METFORMIN HYDROCHLORIDE 500 MG: 500 TABLET, FILM COATED ORAL at 17:43

## 2023-02-24 RX ADMIN — SERTRALINE HYDROCHLORIDE 200 MG: 100 TABLET ORAL at 08:34

## 2023-02-24 RX ADMIN — SENNOSIDES AND DOCUSATE SODIUM 1 TABLET: 50; 8.6 TABLET ORAL at 20:25

## 2023-02-24 RX ADMIN — METFORMIN HYDROCHLORIDE 500 MG: 500 TABLET, FILM COATED ORAL at 08:34

## 2023-02-24 RX ADMIN — Medication 12.5 MG: at 08:34

## 2023-02-24 RX ADMIN — PANTOPRAZOLE SODIUM 40 MG: 40 TABLET, DELAYED RELEASE ORAL at 08:35

## 2023-02-24 RX ADMIN — BENZTROPINE MESYLATE 1 MG: 1 TABLET ORAL at 20:25

## 2023-02-24 RX ADMIN — RISPERIDONE 1 MG: 1 TABLET ORAL at 08:34

## 2023-02-24 RX ADMIN — GABAPENTIN 300 MG: 300 CAPSULE ORAL at 08:34

## 2023-02-24 RX ADMIN — GABAPENTIN 300 MG: 300 CAPSULE ORAL at 14:03

## 2023-02-24 RX ADMIN — RISPERIDONE 1 MG: 1 TABLET ORAL at 20:24

## 2023-02-24 RX ADMIN — FLUTICASONE PROPIONATE 2 SPRAY: 50 SPRAY, METERED NASAL at 08:35

## 2023-02-24 RX ADMIN — SENNOSIDES AND DOCUSATE SODIUM 1 TABLET: 50; 8.6 TABLET ORAL at 08:34

## 2023-02-24 RX ADMIN — NICOTINE 1 PATCH: 21 PATCH, EXTENDED RELEASE TRANSDERMAL at 08:35

## 2023-02-24 ASSESSMENT — ACTIVITIES OF DAILY LIVING (ADL)
ADLS_ACUITY_SCORE: 29
LAUNDRY: UNABLE TO COMPLETE
ADLS_ACUITY_SCORE: 29
HYGIENE/GROOMING: INDEPENDENT
ADLS_ACUITY_SCORE: 29
ORAL_HYGIENE: INDEPENDENT
ADLS_ACUITY_SCORE: 29
ADLS_ACUITY_SCORE: 29
DRESS: INDEPENDENT

## 2023-02-24 NOTE — PLAN OF CARE
"Occupational Therapy Group Note:       02/24/23 1641   Group Therapy Session   Group Attendance attended group session   Time Session Began 1415   Time Session Ended 1510   Total Time (minutes) 55   Total # Attendees 3   Group Type recreation   Group Topic Covered leisure exploration/use of leisure time;structured socialization   Group Session Detail OT Leisure Group: Skip-esvin   Patient Response/Contribution confronted peers appropriately;cooperative with task;listened actively   Patient Participation Detail Pt actively participated in a structured occupational therapy group with a focus on facilitating social and leisure engagement. This occupational therapy group was facilitated in order to: foster relaxation, explore leisure opportunities, cope with stress, decrease isolation/improve social skills, and exercise overall cognitive skills. Pt was able to follow 2 step directions of the unfamiliar task. Patient listened actively to instructions and demonstrated understanding throughout game with intermittent cueing. Patient was receptive to unprompted help from peers. Patient needed intermittent, but consistent verbal cueing for certain aspects of direction following throughout game. Patient was able to remain focused and attentive throughout game. Patient played two full rounds of game. Patient demonstrated good sportsmanship at end of game, stating, \"good game\" to peers. Affect: blunted. Mood: calm, cooperative, engaged, polite.         "

## 2023-02-24 NOTE — PLAN OF CARE
Problem: Sleep Disturbance  Goal: Adequate Sleep/Rest  Outcome: Progressing   Goal Outcome Evaluation:    Patient had approximately 7 hours of sleep; safety checks in place and no behaviors observed. Will continue to monitor.

## 2023-02-24 NOTE — PROGRESS NOTES
The anesthesiologist, Tim Bill, reviewed this patient's H&P and records and cleared him to start ECT in the ECT suite.

## 2023-02-24 NOTE — PLAN OF CARE
Occupational Therapy Group Note:       02/24/23 1634   Group Therapy Session   Group Attendance attended group session   Time Session Began 1315   Time Session Ended 1405   Total Time (minutes) 15 (no charge)   Total # Attendees 4   Group Type task skill   Group Topic Covered coping skills/lifestyle management;emotions/expression;leisure exploration/use of leisure time;structured socialization   Group Session Detail OT Modified Clinic Group   Patient Response/Contribution confronted peers appropriately;cooperative with task;did not share thoughts verbally   Patient Participation Detail Pt actively participated in occupational therapy clinic to facilitate coping skill exploration, creative expression within personally meaningful activities, and clinical observation of social, cognitive, and kinesthetic performance skills. Patient entered group late. Patient chose to initiate a simple coloring task this date. Patient selected coloring sheet. Patient initiated task with use of crayons. Patient was focused on task. Did not initiate conversation; answered peers inquiry when directed at him. Affect: flat. Mood: quiet, calm, cooperative.

## 2023-02-24 NOTE — PROGRESS NOTES
"[facility], [place]   Psychiatric Progress Note  Hospital Day: 28        Interim History:   The patient's care was discussed with the treatment team during the daily team meeting and/or staff's chart notes were reviewed.  Staff report patient was calm and pleasant. Continues to report significant depressive symptoms, including SI. Assessed by IM today and was medically cleared for ECT. Dr. Kearns, ECT provider, also met with patient this morning.     Upon interview, Haseeb was lying in bed and initially asleep. Reports feeling \"exhausted.\" Feels slightly more tired with higher dose of clozapine, but not sedated. Reports lightheadedness upon standing intermittently. Discussed importance of adequate fluid intake. He will notify staff if this worsens. He is in agreement with plan to pursue ECT, tentatively on Monday. He is aware of need for NPO. He is in agreement with plan to increase clozapine by 25 mg every day.     Suicidal ideation: Haseeb reports ongoing active SI with plan to shoot himself with a gun. Denies intent in the hospital. [place] for safety.     Homicidal ideation: denies current or recent homicidal ideation or behaviors.    Psychotic symptoms: Denying psychotic symptoms    Medication side effects reported: as above    Acute medical concerns: as above.     Other issues reported by patient: Patient had no further questions or concerns.           Medications:       benztropine  1 mg Oral At Bedtime     cloZAPine  175 mg Oral At Bedtime    Followed by     [START ON 2/25/2023] cloZAPine  200 mg Oral At Bedtime     fluticasone  2 spray Both Nostrils Daily     gabapentin  300 mg Oral TID     loratadine  10 mg Oral Daily     metFORMIN  500 mg Oral BID w/meals     metoprolol succinate ER  12.5 mg Oral Daily     nicotine  1 patch Transdermal Daily     nicotine   Transdermal Q8H     pantoprazole  40 mg Oral QAM AC     risperiDONE  1 mg Oral Daily     risperiDONE  2 mg Oral At " Bedtime     senna-docusate  1 tablet Oral BID     sertraline  200 mg Oral Daily          Allergies:   No Known Allergies       Labs:     Recent Results (from the past 24 hour(s))   WBC and Differential    Collection Time: 02/23/23 10:32 AM   Result Value Ref Range    WBC Count 15.2 (H) 4.0 - 11.0 10e3/uL    % Neutrophils 78 %    % Lymphocytes 15 %    % Monocytes 5 %    % Eosinophils 1 %    % Basophils 0 %    % Immature Granulocytes 1 %    NRBCs per 100 WBC 0 <1 /100    Absolute Neutrophils 11.8 (H) 1.6 - 8.3 10e3/uL    Absolute Lymphocytes 2.3 0.8 - 5.3 10e3/uL    Absolute Monocytes 0.8 0.0 - 1.3 10e3/uL    Absolute Eosinophils 0.1 0.0 - 0.7 10e3/uL    Absolute Basophils 0.1 0.0 - 0.2 10e3/uL    Absolute Immature Granulocytes 0.1 <=0.4 10e3/uL    Absolute NRBCs 0.0 10e3/uL   CBC with platelets    Collection Time: 02/24/23  8:18 AM   Result Value Ref Range    WBC Count 8.0 4.0 - 11.0 10e3/uL    RBC Count 5.48 4.40 - 5.90 10e6/uL    Hemoglobin 15.0 13.3 - 17.7 g/dL    Hematocrit 46.1 40.0 - 53.0 %    MCV 84 78 - 100 fL    MCH 27.4 26.5 - 33.0 pg    MCHC 32.5 31.5 - 36.5 g/dL    RDW 13.0 10.0 - 15.0 %    Platelet Count 256 150 - 450 10e3/uL   Comprehensive metabolic panel    Collection Time: 02/24/23  8:18 AM   Result Value Ref Range    Sodium 142 136 - 145 mmol/L    Potassium 3.9 3.4 - 5.3 mmol/L    Chloride 103 98 - 107 mmol/L    Carbon Dioxide (CO2) 27 22 - 29 mmol/L    Anion Gap 12 7 - 15 mmol/L    Urea Nitrogen 19.5 6.0 - 20.0 mg/dL    Creatinine 0.81 0.67 - 1.17 mg/dL    Calcium 9.4 8.6 - 10.0 mg/dL    Glucose 93 70 - 99 mg/dL    Alkaline Phosphatase 82 40 - 129 U/L    AST 29 10 - 50 U/L    ALT 58 (H) 10 - 50 U/L    Protein Total 7.1 6.4 - 8.3 g/dL    Albumin 4.1 3.5 - 5.2 g/dL    Bilirubin Total 0.3 <=1.2 mg/dL    GFR Estimate >90 >60 mL/min/1.73m2   EKG 12-lead, complete    Collection Time: 02/24/23  9:16 AM   Result Value Ref Range    Systolic Blood Pressure  mmHg    Diastolic Blood Pressure  mmHg     "Ventricular Rate 91 BPM    Atrial Rate 91 BPM    MD Interval 152 ms    QRS Duration 88 ms     ms    QTc 474 ms    P Axis 38 degrees    R AXIS 73 degrees    T Axis 21 degrees    Interpretation ECG       Sinus rhythm  Normal ECG  When compared with ECG of 10-FEB-2023 08:02,  No significant change was found            Psychiatric Examination:     BP (!) 122/94 (BP Location: Left arm)   Pulse 98   Temp 98  F (36.7  C) (Oral)   Resp 16   Ht 1.6 m (5' 3\")   Wt 75.1 kg (165 lb 8 oz)   SpO2 92%   BMI 29.32 kg/m    Weight is 165 lbs 8 oz  Body mass index is 29.32 kg/m .    Weight over time:  Vitals:    01/27/23 1718   Weight: 75.1 kg (165 lb 8 oz)       Orthostatic Vitals     None        Cardiometabolic risk assessment. 01/30/23    Reviewed patient profile for cardiometabolic risk factors    Date taken /Value  REFERENCE RANGE   Abdominal Obesity  (Waist Circumference)   See nursing flowsheet Women ?35 in (88 cm)   Men ?40 in (102 cm)      Triglycerides  Triglycerides   Date Value Ref Range Status   01/28/2023 192 (H) <150 mg/dL Final       ?150 mg/dL (1.7 mmol/L) or current treatment for elevated triglycerides   HDL cholesterol  Direct Measure HDL   Date Value Ref Range Status   01/28/2023 41 >=40 mg/dL Final   ]   Women <50 mg/dL (1.3 mmol/L) in women or current treatment for low HDL cholesterol  Men <40 mg/dL (1 mmol/L) in men or current treatment for low HDL cholesterol     Fasting plasma glucose (FPG) Lab Results   Component Value Date     01/28/2023      FPG ?100 mg/dL (5.6 mmol/L) or treatment for elevated blood glucose   Blood pressure  BP Readings from Last 3 Encounters:   02/23/23 (!) 122/94   01/27/23 118/74   07/20/22 112/79    Blood pressure ?130/85 mmHg or treatment for elevated blood pressure   Family History  See family history     Appearance: awake, alert, dressed in hospital scrubs and unkempt  Attitude:  cooperative, calm  Eye Contact: fair  Mood:  \"exhausted\"  Affect:  mood congruent " and intensity is blunted  Speech:  clear, coherent. appropriate  Language: fluent and intact in English  Psychomotor, Gait, Musculoskeletal:  no evidence of tardive dyskinesia, dystonia, or ticsNo cogwheeling present. Mild paratonia in BUE on exam. Denies significant tremor or abnormal movements   Throught Process:  Linear, impoverished   Associations:  no loose associations  Thought Content:  active suicidal ideation present, auditory hallucinations present and visual hallucinations present  Insight:  fair  Judgement:  poor  Oriented to:  time, person, and place  Attention Span and Concentration:  fair  Recent and Remote Memory:  fair  Fund of Knowledge:  appropriate    Clinical Global Impressions  First:  Considering your total clinical experience with this particular patient population, how severe are the patient's symptoms at this time?: 7 (01/28/23 1341)    Most recent:  Compared to the patient's condition at the START of treatment, this patient's condition is: 4           Precautions:     Behavioral Orders   Procedures     Code 1 - Restrict to Unit     Code 2     For imaging     Routine Programming     As clinically indicated     Status 15     Every 15 minutes.     Suicide precautions     Patients on Suicide Precautions should have a Combination Diet ordered that includes a Diet selection(s) AND a Behavioral Tray selection for Safe Tray - with utensils, or Safe Tray - NO utensils       Withdrawal precautions          Diagnoses:     Active suicidal ideation with intent outside of hospital setting  MDD, recurrent, severe with psychotic features vs Schizoaffective Disorder, depressive type  Polysubstance use  Unspecified mood disorder  ADHD, inattentive type per chart  History of idiopathic hypersomnolence per chart  Nicotine use disorder, dependence and withdrawal   Allergies- chronic uticaria and rhinitis per chart  HLD per chart          Assessment & Plan:     Assessment and hospital summary:  This patient is  "a 32 year old male with history of mood disorder, ADHD and substance use who presented to [facility] ED with SI on 1/26 in context of reported methamphetamine use and being kicked out of sober living. Medically cleared in ED, placed on 72HH and admitted to [facility]. Limited historian, giving inconsistent reports about substance use, UDS negative, very somnolent, endorsing ongoing SI and some psychosis symptoms. PTA medications continued with exception of finasteride as patient states he takes \"1/4 a pill\" and declined ordered dose, will defer to primary team to address. Will continue to evaluate safety and stabilization further as patient more able to engage in assessments.      Inpatient psychiatric hospitalization is warranted at this time for safety, stabilization, and possible adjustment in medications.    Patient reports that he abruptly stopped Zoloft one week prior to his admission. He developed discontinuation syndrome symptoms following that. He reports that he does not have a history of psychosis but is experiencing psychotic symptoms. He is amenable with plan to trial risperidone after R/B/A were discussed. Risperidone was initiated on 1/30 and titrated to a total of 3 mg daily on 2/1. Clonidine, used for ADHD, was reduced from a total of 0.3 mg daily to 0.2 mg daily on 2/3 due to concern for hypotension. 2/9: Cardiology consult placed. EKG- tachycardia 121 bpm, QTc 465 ms, Abnormal QRS angle and T wave abnormality. COVID negative and labs are unremarkable.  On 2/10, clozapine was held pending additional workup from IM and cardiology. Pericarditis was ruled out and metoprolol was started. Clozapine was restarted on 2/13 with plan to cautiously titrate to lowest effective dose. 2/23/23: Clozapine titration schedule increased 25 mg/day. ECT and IM consults for ECT clearance placed.    Psychiatric treatment/inteventions:  Medications:   -Continue clozapine titration by 25 mg daily while monitoring closely for side " effects. Titration has been ordered through weekend.  Consider checking clozapine levels next week.   -Continue risperidone to 1 mg daily and reduce to 1 mg at bedtime due to reported lightheadedness/dizziness and lack of effectiveness  -Continue PTA sertraline 150mg daily for mood  -Continue PTA gabapentin 300mg TID for anxiety   -Continue Cogentin 1 mg at bedtime for possible EPSE     -PRN hydroxyzine 25mg every 4 hour for anxiety  -PRN trazodone 50mg at bedtime for sleep   -PRN olanzapine 10mg PO or IM TID for agitation/psychosis   -PRN atropine 1% SL drops, 2 drops q2h for sialorrhea    Spiritual services consult placed on 2/6. Pt is Zoroastrian. Appreciate assistance.     ECT:  - Medically cleared on 2/24. See IM note for details.  - ECT consult placed. Appreciate assistance.   - ECT orders placed  - HOLD Gabapentin on nights prior to ECT and on ECT mornings.   - Tentative start date of 2/27. NPO at midnight.      Laboratory/Imaging: reviewed: Covid negative; UDS negative; CMP, CBC, TSH, Lipid panel, HgbA1c ordered to complete admission labs. Reviewed.     2/9/23: Troponin, CK, CBC, BMP: Unremarkable, CRP elevated to 38.18, COVID: Negative  2/10/23: Add Influenza A/B given flu-like sx-negative  Patient will be treated in therapeutic milieu with appropriate individual and group therapies as described.     Medical treatment/interventions:  Medical concerns:   Nicotine replacement ordered, educate patient on benefits of cessation, pt unclear about finasteride dose, will hold until further information is obtained. PTA PRN zyrtec, azelastine, fluticasone, triamcinolone and epipen ordered for allergies and PRN ammoniaum lactate, Eucerin for  dry skin.    Neuroleptic induced wt gain:  - Monitor weights  - Start metformin 500 mg BID with meals    Dyslipidemia: Will arrange follow up with PCP to address. Discussed importance of healthy eating habits and regular exercise. Will consider nutrition consult if patient amenable.      Orthostasis likely 2/2 clozapine: Pt is not hypotensive but reports orthostatic symptoms. He reported restricting fluid intake to reduce sialorrhea   - Continue to monitor vital signs closely  - Encourage fluids  - Discontinued clonidine    Sialorrhea 2/2 clozapine:  - Atropine 1% SL drops prn  - Recommend towel on pillow when sleeping    Chest pain/tachycardia/EKG changes (2/9):  - Pain improved with PRN medications.   - Cardiology consult placed on 2/9. See note on that date for details.   - ECHO reviewed and unremarkable.  - Writer discussed care with both Dr. Streeter from [facility] and Christnia from . Plan for now is to HOLD clozapine until further medical workup. Dr. Streeter explained that myocarditis has been ruled out as troponin was negative. Pericarditis remains on differential, but he does not have EKG findings or a pericardial effusion. IM will assess for pericardial friction rub and pleuritic chest pain. IM seen by patient and Metoprolol was started.     Update 2/13: Discussed care with Annette from  today on two occasions. Please see her note on this date for details. She does not suspect that this is pericarditis. He does not have pleuritic pain, characteristic CP, EKG changes, or pericardial effusion on ECHO. Therefore, will cautiously restart clozapine while monitoring closely for side effects. May consider further increase in metoprolol if necessary. PPI was started due to suspected GERD.     Update 2/24 per IM note:  Medicine is following peripherally for concerns for pericarditis.  See detailed note from 2/13.  No pericardial friction rub noted while sitting up and leaning forward today.  Patient states that his chest discomfort is getting better after starting the Protonix yesterday.  It does appear that he has also been using the Maalox.  Patient does have Tums available as well.  Please be mindful for any constipation with overuse of the PRNs.     POC:  - Continue Protonix daily for 8 weeks, then  taper off  - Recommend lifestyle changes including weight loss head of bed elevation avoidance of late-night eating and specific food elimination such as chocolate caffeine alcohol acidic and/or spicy food.  - Watch for constipation with PRNs for heartburn  - We will schedule senna 1 tab twice daily  - MiraLAX daily as needed added on     Medicine will sign off, please contact us for any acute changes.      Sore throat/cough/nasal congestion, resolved:   - COVID negative on 2/9. Repeat COVID test and Influenza A/B neg on 2/10.  - Cepacol lozenges added  - PRN Tylenol   - Consulted with IM. Appreciate assistance. See their notes for details.      Legal Status: VOLUNTARY. 72 hour hold discontinued. Pt agreed to sign in on voluntary basis and discharge directly to treatment once stable.      Safety Assessment:        Behavioral Orders   Procedures     Code 1 - Restrict to Unit     Routine Programming       As clinically indicated     Status 15       Every 15 minutes.     Suicide precautions       Patients on Suicide Precautions should have a Combination Diet ordered that includes a Diet selection(s) AND a Behavioral Tray selection for Safe Tray - with utensils, or Safe Tray - NO utensils        Withdrawal precautions      Pt has not required locked seclusion or restraints in the past 24 hours to maintain safety, please refer to RN documentation for further details.    Discontinued SIO on 2/8 as patient is heriberto for safety. Monitor SI closely.     The risks, benefits, alternatives and side effects have been discussed and are understood by the patient.     Disposition: Pending clinical stabilization. Pt remains actively suicidal. Will likely discharge back to [facility] once stable.      This note was created by undersigned using a Dragon dictation system. All typing errors or contextual distortion are unintentional and software inherent.     Entered by: Nelly Brown MD on 2/24/2023 at 9:37 AM
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
## 2023-02-24 NOTE — PLAN OF CARE
Assessment/Intervention/Current Symtoms and Care Coordination:   Chart Review  Team Meeting  Pt will return to [facility] when stable.   ECT being explored.    Attempted to return call to Phil [phone], left voice mail.  Attempted to call  Anuradha Silva- [phone]- left voice mail..    Discharge Plan or Goal:  Return to [facility] when stable. Medication changes in process so discharge likely not for another couple weeks.      Barriers to Discharge:    Stabilization of mental health symptoms with medication changes in process.      Referral Status:  No referrals made yet this hospitalization.      Legal Status:   Voluntary

## 2023-02-24 NOTE — CONSULTS
"[facility], [place]   ECT Consultation   February 24, 2023     Nikolay Lora [id number]   32 year old 1990     Patient Status: Inpatient    Is this the first in a series of 12 treatments?  No    No Known Allergies    Weight:  165 lbs 8 oz              Indications for ECT:   Medications ineffective, Medications poorly tolerated and Imminent suicide risk         Clinical Narrative:     Nikolay Lora is a 32 year old man with affective dysregulation, ADHD and polysubstance use (alcohol*, amphetamine*, cocaine, cannabis) who is hospitalized with progressive depression leading to suicidal ideation with planning on 1/26, in the context of  medication non compliance and losing his place at sober living due to reported methamphetamine use. Throughout his hospital stay medication adjustments have been made (restarted on sertraline, titrated risperidone, added clozapine, which is being titrated); however, intense suicidal ideation has continued and he has been having difficulty tolerating medication changes. This consultation is requested to evaluate candidacy for electroconvulsive therapy (ECT).     We utilized phone [place] [facility] [place] visit to ensure thoroughness, otherwise he can communicate in English well.  The patient shares he cannot stop contemplating suicide with a plan to shoot himself as soon as he is out of the hospital. He reports visual hallucinations of \"lanterns and black birds flying around\" with commanding auditory hallucinations telling him that he is \"worthless\" and \"should kill (himself)\". Although hallucinations got better since admission with medication changes, depression and suicidal contemplation has persisted, he is interested in ECT, hoping for a quicker relief.           Psychiatric History:   Psychiatric Hospitalizations: Multiple, most recently [facility] 2021,   History of Psychosis: none reported  Prior ECT: none  Court Commitment: none  Suicide " Attempts: Multiple  Self-injurious Behavior: none  Violence toward others: none reported  Use of Psychotropics: Adderall, Concerta, Duloxetine, Abilify (likely akathisia, per fill report last filled in early December)  Prior Chemical Dependency treatment: was recently at [facility]            Social History:   Upbringing: born in [facility], moved to [place] at age 7 and then [place] in 2000  Educational History: Bachelors from [employer] major of political science and asian studies   Relationships: never   Children: none  Current Living Situation: was in sober living, appears housing may be unstable, had been living with family in 2021  Occupational History: unemployed, had worked in customer service in past   Financial Support: limited  Legal History: DUI 3-4 years ago  Abuse/Trauma History:none reported          Family History:      H/o completed suicides in family: denies  States no MH or CD history in family however limited historian.       Past Medical History:   Diagnosis Date     Brugada syndrome      Chronic idiopathic urticaria      Concussion with loss of consciousness      Mixed hyperlipidemia      Polysubstance abuse (H)      Schizotypal personality disorder (H)        No past surgical history on file.    No family history on file.    Social History     Tobacco Use     Smoking status: Former     Smokeless tobacco: Never     Tobacco comments:     1-2 cigs per day   Substance Use Topics     Alcohol use: Yes            Current Med  Current Facility-Administered Medications   Medication     acetaminophen (TYLENOL) tablet 650 mg     alum & mag hydroxide-simethicone (MAALOX) suspension 30 mL     ammonium lactate (LAC-HYDRIN) 12 % lotion     atropine 1 % sublingual (ophthalmic drops for sublingual use) 2 drop     azelastine (ASTELIN) nasal spray 2 spray     benzocaine-menthol (CHLORASEPTIC) 6-10 MG lozenge 1 lozenge     benztropine (COGENTIN) tablet 1 mg     calcium carbonate (TUMS)  "chewable tablet 500 mg     cloZAPine (CLOZARIL) tablet 175 mg    Followed by     [START ON 2/25/2023] cloZAPine (CLOZARIL) tablet 200 mg     [START ON 2/26/2023] cloZAPine (CLOZARIL) tablet 225 mg    Followed by     [START ON 2/27/2023] cloZAPine (CLOZARIL) tablet 250 mg    Followed by     [START ON 2/28/2023] cloZAPine (CLOZARIL) tablet 275 mg    Followed by     [START ON 3/1/2023] cloZAPine (CLOZARIL) tablet 300 mg     diphenhydrAMINE (BENADRYL) capsule 50 mg     EPINEPHrine (ADRENALIN) kit 0.3 mg     eucerin cream     fluticasone (FLONASE) 50 MCG/ACT spray 2 spray     gabapentin (NEURONTIN) capsule 300 mg     hydrOXYzine (ATARAX) tablet 100 mg     loratadine (CLARITIN) tablet 10 mg     metFORMIN (GLUCOPHAGE) tablet 500 mg     metoprolol succinate ER (TOPROL-XL) 24 hr half-tab 12.5 mg     nicotine (NICODERM CQ) 21 MG/24HR 24 hr patch 1 patch     nicotine (NICORETTE) lozenge 4 mg     nicotine Patch in Place     OLANZapine (zyPREXA) tablet 10 mg    Or     OLANZapine (zyPREXA) injection 10 mg     OLANZapine zydis (zyPREXA) ODT tab 5-10 mg     ondansetron (ZOFRAN) tablet 4-8 mg     pantoprazole (PROTONIX) EC tablet 40 mg     polyethylene glycol (MIRALAX) Packet 17 g     risperiDONE (risperDAL) tablet 1 mg     risperiDONE (risperDAL) tablet 2 mg     senna-docusate (SENOKOT-S/PERICOLACE) 8.6-50 MG per tablet 1 tablet     sertraline (ZOLOFT) tablet 200 mg     traZODone (DESYREL) tablet 50 mg     triamcinolone (KENALOG) 0.1 % cream       Objective:  BP (!) 122/94 (BP Location: Left arm)   Pulse 98   Temp 98  F (36.7  C) (Oral)   Resp 16   Ht 1.6 m (5' 3\")   Wt 75.1 kg (165 lb 8 oz)   SpO2 92%   BMI 29.32 kg/m   Weight is 165 lbs 8 oz  Body mass index is 29.32 kg/m .    Mental Status Examination:  Appearance/ Behavior: In appropriate attire; has good hygiene. Calmly and actively engages with the examiner. Maintains good eye contact.   Speech:  Clear, spontaneous with no apparent receptive or expressive difficulties. " "Normal rate, rhythm and volume.  Musculoskeletal:  Normal bulk with no visible atrophy.  No abnormal movements noted.  Gait is of normal base, stride and speed. Normal arm swing bilaterally.  Mood/Affect: Mood is reported as \"depressed\".  Affect is flat  Thought Process: Mostly linear with occasional circumstantiality which responds to redirection  Thought Content: Active suicidal ideation with a plan. No evidence for thoughts of harm to others   Perception:  No evidence for any perceptual disturbances  Cognition: alert, fully oriented to person, place and time.  Appropriate fund of knowledge.   Judgment/Insight: Limited insight regarding the multifactorial nature of emotional dysregulation and need for care. Judgment is reasonable within the context of insight.         Labs/imaging:       Lab Results   Component Value Date     02/24/2023     02/09/2023     01/28/2023    Lab Results   Component Value Date    CHLORIDE 103 02/24/2023    CHLORIDE 100 02/09/2023    CHLORIDE 107 01/28/2023    CHLORIDE 105 12/28/2021    Lab Results   Component Value Date    BUN 19.5 02/24/2023    BUN 12.5 02/09/2023    BUN 26 01/28/2023    BUN 12 12/28/2021      Lab Results   Component Value Date    POTASSIUM 3.9 02/24/2023    POTASSIUM 4.0 02/09/2023    POTASSIUM 4.2 01/28/2023    POTASSIUM 3.8 12/28/2021    Lab Results   Component Value Date    CO2 27 02/24/2023    CO2 27 02/09/2023    CO2 29 01/28/2023    CO2 27 12/28/2021    Lab Results   Component Value Date    CR 0.81 02/24/2023    CR 0.81 02/09/2023    CR 0.99 01/28/2023        Lab Results   Component Value Date    WBC 8.0 02/24/2023    WBC 15.2 (H) 02/23/2023    WBC 7.6 02/16/2023    HGB 15.0 02/24/2023    HGB 15.8 02/09/2023    HGB 15.5 01/28/2023    HCT 46.1 02/24/2023    HCT 48.3 02/09/2023    HCT 49.2 01/28/2023    MCV 84 02/24/2023    MCV 84 02/09/2023    MCV 87 01/28/2023     02/24/2023     02/09/2023     01/28/2023     Lab Results "   Component Value Date    TSH 1.09 01/28/2023    TSH 1.89 07/20/2022    TSH 0.92 06/14/2021                Diagnoses:     Schizoaffective Disorder, depressed  Polysubstance Use Disorder in a controlled environment         Assessment/ Plan     Considering the clinical acuity  electroconvulsive therapy (ECT) appears appropriate; despite multifactorial nature of the presentation that would benefit from optimization of cognitive, behavioral and social interventions longitudinally.     Discussed all relevant aspects of ECT with patient, including risks of memory loss, HA, nausea, death <1/50,000, driving prohibition; possible lack of benefit or relapse after successful treatment, alternatives, right to decline, possible outpatient procedures. All questions answered, patient will have opportunity to review ECT video.      - Start bilateral ECT, pending pre-operative assessment by Medicine and Anesthesiology. Treat to remission of depressive and psychotic symptoms or plateau of improvement with no further improvement over 2-4 additional treatments.     - Medication changes in preparation for ECT: Hold gabapentin after 6 PM on the day prior to ECT until completion of the procedure.       - Monitor depression severity with clinical assessment augmented with IDS-SR at baseline and every 3rd treatment.      Ildefonso Trivedi M.D.

## 2023-02-24 NOTE — PLAN OF CARE
Nursing Assessment    Recent Vitals: B/P: 111/78, T: 97.6, P: 88, R: 18     Sleep:  Hours of sleep at night: 7    General Shift Summary  Patient has isolated to his room for the shift coming out for his meal tray however eats in his room. He presents with a flat affect but is calm and cooperative. There is lack of content in speech. He denied HI and AVH. Patient voiced having anxiety 6/10 and depression rated at 9/10. He continues to have SI with a plan to shoot himself. Patient states he can contract for safety while here.    Patient is medication compliant with no voiced side effect. Denies pain.    Plan is for patient to receive ECT and continue to increase Clozaril up to 300mg a day.    Patient talked with Interpretor Nikolay Lora on 2/24/23 at 1320 to discuss ECT. Writer was present. Patient voiced having an understanding and agreed to ECT. Interpretors phone is [phone]. Encounter number: [id number].     Nena Nevarez, RN MSN

## 2023-02-25 PROCEDURE — 250N000013 HC RX MED GY IP 250 OP 250 PS 637: Performed by: PSYCHIATRY & NEUROLOGY

## 2023-02-25 PROCEDURE — 250N000013 HC RX MED GY IP 250 OP 250 PS 637

## 2023-02-25 PROCEDURE — 124N000002 HC R&B MH UMMC

## 2023-02-25 PROCEDURE — 250N000013 HC RX MED GY IP 250 OP 250 PS 637: Performed by: STUDENT IN AN ORGANIZED HEALTH CARE EDUCATION/TRAINING PROGRAM

## 2023-02-25 RX ADMIN — GABAPENTIN 300 MG: 300 CAPSULE ORAL at 20:19

## 2023-02-25 RX ADMIN — SENNOSIDES AND DOCUSATE SODIUM 1 TABLET: 50; 8.6 TABLET ORAL at 08:33

## 2023-02-25 RX ADMIN — PANTOPRAZOLE SODIUM 40 MG: 40 TABLET, DELAYED RELEASE ORAL at 08:33

## 2023-02-25 RX ADMIN — GABAPENTIN 300 MG: 300 CAPSULE ORAL at 08:33

## 2023-02-25 RX ADMIN — CLOZAPINE 200 MG: 100 TABLET ORAL at 21:16

## 2023-02-25 RX ADMIN — GABAPENTIN 300 MG: 300 CAPSULE ORAL at 14:14

## 2023-02-25 RX ADMIN — SENNOSIDES AND DOCUSATE SODIUM 1 TABLET: 50; 8.6 TABLET ORAL at 20:19

## 2023-02-25 RX ADMIN — METFORMIN HYDROCHLORIDE 500 MG: 500 TABLET, FILM COATED ORAL at 18:10

## 2023-02-25 RX ADMIN — RISPERIDONE 1 MG: 1 TABLET ORAL at 20:19

## 2023-02-25 RX ADMIN — Medication 12.5 MG: at 08:33

## 2023-02-25 RX ADMIN — FLUTICASONE PROPIONATE 2 SPRAY: 50 SPRAY, METERED NASAL at 08:34

## 2023-02-25 RX ADMIN — METFORMIN HYDROCHLORIDE 500 MG: 500 TABLET, FILM COATED ORAL at 08:33

## 2023-02-25 RX ADMIN — BENZTROPINE MESYLATE 1 MG: 1 TABLET ORAL at 20:19

## 2023-02-25 RX ADMIN — NICOTINE 1 PATCH: 21 PATCH, EXTENDED RELEASE TRANSDERMAL at 08:34

## 2023-02-25 RX ADMIN — SERTRALINE HYDROCHLORIDE 200 MG: 100 TABLET ORAL at 08:33

## 2023-02-25 RX ADMIN — LORATADINE 10 MG: 10 TABLET ORAL at 08:33

## 2023-02-25 RX ADMIN — RISPERIDONE 1 MG: 1 TABLET ORAL at 08:33

## 2023-02-25 ASSESSMENT — ACTIVITIES OF DAILY LIVING (ADL)
DRESS: SCRUBS (BEHAVIORAL HEALTH);INDEPENDENT
HYGIENE/GROOMING: HANDWASHING;INDEPENDENT
LAUNDRY: UNABLE TO COMPLETE
ADLS_ACUITY_SCORE: 29
DRESS: INDEPENDENT
ORAL_HYGIENE: INDEPENDENT
ORAL_HYGIENE: INDEPENDENT
ADLS_ACUITY_SCORE: 29
HYGIENE/GROOMING: INDEPENDENT
ADLS_ACUITY_SCORE: 29

## 2023-02-25 NOTE — PLAN OF CARE
"  Problem: Thought Process Alteration  Goal: Optimal Thought Clarity  Outcome: Progressing     Problem: Adult Behavioral Health Plan of Care  Goal: Plan of Care Review  Outcome: Progressing  Flowsheets (Taken 2/24/2023 1700)  Patient Agreement with Plan of Care: agrees     Problem: Psychotic Signs/Symptoms  Goal: Optimal Cognitive Function (Psychotic Signs/Symptoms)  Intervention: Support and Promote Cognitive Ability  Recent Flowsheet Documentation  Taken 2/24/2023 1700 by Reuben Wetzel RN  Trust Relationship/Rapport:    care explained    thoughts/feelings acknowledged    reassurance provided    questions encouraged    questions answered    empathic listening provided    emotional support provided   Goal Outcome Evaluation:    Plan of Care Reviewed With: patient        Pt endorsed suicidal ideation with plans to shoot himself with a gun or cut off his throat with a knife if unable to have a gun. He rated anxiety at 6/10, depression 9/10, but denied pain, SIB/HI/AVH, and contracted for safety. Pt stated that \"yesterday and earlier today I was seeing the ceiling in my room moving, but that has stopped\". Hydroxyzine administered with some relief. Pt stated that motivational factor for wanting to kill himself are \"homelessness, my life has turned apart, I hit my rock bottom, that is why I want to kill myself\". When asked what this hospitalization  could do to prevent this from happening, pt stated \"leave me in the hospital until I feel like not killing myself\". Writer provided therapeutic listening and encouragement. Pt was encouraged to attend groups activities and to endeavor to get out more to socialize with peers. Pt agreed. Up and out for groups and for meal tray, otherwise pt remained isolative and withdrawn to room for the majority of the shift. He is scheduled to start ECT on Monday, but writer including charge RN and other staff have not been able to locate a VCR for pt to watch ECT tape despite calling around. " Pt continues to be pleasant with a flat and blunted affect. Mood is calm and cooperative. He is started on Clozaril 175 mg. Thus far he has tolerated it. Pt is medication compliant. No behavioral outburst noted. No concerns noted or verbalized. Will continue to monitor.

## 2023-02-25 NOTE — PLAN OF CARE
"Nursing Assessment    Recent Vitals: B/P: 117/82, T: 99.6, P: 114, R: 16     Suicide Assessment:   Recent suicidal thoughts: Yes: plan to shoot self in head   Any attempts in the last 24 hours: No   Plan or considering various methods: Yes: To shoot self   Access to means: No   Verbal or Written contract for safety: Yes   Self Injurious Behavior: No  Sleep:  Hours of sleep at night: 7    General Shift Summary  Patient isolates to his room. He presents as withdrawn with a flat affect. He denied HI/AVH. He continues to have SI with a plan to shoot himself in the head. He voiced having anxiety and stated his depression has gone down. Patient can contract for safety. Hygiene is poor, showering was encouraged but patient refused. He is eating well. Patient watched less then half of the ECT video. He said \"My brain is feeling scrambled.\". Patient requested the ECT video again later and finished watching it. Patient voiced having an understanding. Incite is fair. Judgement is poor. A&O x4. He had a visit with his mom, visit seemed to go well.    Patient is medication compliant. No voiced side effects. He voiced having generalized leg pain. He was encouraged to walk the unit and agreed. Patient had an elevated pulse in the morning at 114, it was 119 upon rechecking after 1400. Will continue to monitor for further increases.    Plan is for patient to start ECT on Monday 2/27. Will continue to monitor effects of medications.    Nena Nevarez RN MSN  "

## 2023-02-25 NOTE — PLAN OF CARE
Problem: Sleep Disturbance  Goal: Adequate Sleep/Rest  Outcome: Progressing   Goal Outcome Evaluation:    Patient appeared to sleep 7 hours this night shift. Sleeping has improved very well for patient.  No prns or snacks given or requested.  No concerns were reported or noted.  Scheduled to begin ECT Monday 2/27/23 and needs to complete the education pieces prior to beginning ECT.

## 2023-02-26 PROCEDURE — 124N000002 HC R&B MH UMMC

## 2023-02-26 PROCEDURE — 250N000013 HC RX MED GY IP 250 OP 250 PS 637

## 2023-02-26 PROCEDURE — 250N000013 HC RX MED GY IP 250 OP 250 PS 637: Performed by: PSYCHIATRY & NEUROLOGY

## 2023-02-26 RX ADMIN — NICOTINE 1 PATCH: 21 PATCH, EXTENDED RELEASE TRANSDERMAL at 08:38

## 2023-02-26 RX ADMIN — GABAPENTIN 300 MG: 300 CAPSULE ORAL at 08:37

## 2023-02-26 RX ADMIN — SENNOSIDES AND DOCUSATE SODIUM 1 TABLET: 50; 8.6 TABLET ORAL at 08:37

## 2023-02-26 RX ADMIN — CLOZAPINE 225 MG: 200 TABLET ORAL at 22:14

## 2023-02-26 RX ADMIN — RISPERIDONE 1 MG: 1 TABLET ORAL at 08:37

## 2023-02-26 RX ADMIN — METFORMIN HYDROCHLORIDE 500 MG: 500 TABLET, FILM COATED ORAL at 18:40

## 2023-02-26 RX ADMIN — FLUTICASONE PROPIONATE 2 SPRAY: 50 SPRAY, METERED NASAL at 08:37

## 2023-02-26 RX ADMIN — BENZTROPINE MESYLATE 1 MG: 1 TABLET ORAL at 20:01

## 2023-02-26 RX ADMIN — Medication 12.5 MG: at 08:37

## 2023-02-26 RX ADMIN — GABAPENTIN 300 MG: 300 CAPSULE ORAL at 14:34

## 2023-02-26 RX ADMIN — METFORMIN HYDROCHLORIDE 500 MG: 500 TABLET, FILM COATED ORAL at 08:37

## 2023-02-26 RX ADMIN — RISPERIDONE 1 MG: 1 TABLET ORAL at 20:01

## 2023-02-26 RX ADMIN — PANTOPRAZOLE SODIUM 40 MG: 40 TABLET, DELAYED RELEASE ORAL at 08:00

## 2023-02-26 RX ADMIN — SENNOSIDES AND DOCUSATE SODIUM 1 TABLET: 50; 8.6 TABLET ORAL at 20:01

## 2023-02-26 RX ADMIN — SERTRALINE HYDROCHLORIDE 200 MG: 100 TABLET ORAL at 08:37

## 2023-02-26 RX ADMIN — LORATADINE 10 MG: 10 TABLET ORAL at 08:37

## 2023-02-26 ASSESSMENT — ACTIVITIES OF DAILY LIVING (ADL)
ADLS_ACUITY_SCORE: 29
ADLS_ACUITY_SCORE: 29
DRESS: INDEPENDENT
ADLS_ACUITY_SCORE: 29
LAUNDRY: UNABLE TO COMPLETE
ADLS_ACUITY_SCORE: 29
HYGIENE/GROOMING: INDEPENDENT
ADLS_ACUITY_SCORE: 29
ORAL_HYGIENE: INDEPENDENT

## 2023-02-26 NOTE — PLAN OF CARE
"  Problem: Psychotic Signs/Symptoms  Goal: Increased Participation and Engagement (Psychotic Signs/Symptoms)  Outcome: Not Progressing  Intervention: Facilitate Participation and Engagement  Recent Flowsheet Documentation  Taken 2/25/2023 2042 by Aniyah Naidu RN  Diversional Activity: television   Goal Outcome Evaluation:    Plan of Care Reviewed With: patient          Patient was isolative to the room and socially withdrawn to self all shift, watching TV. Patient was only out of the room to take snacks from the fridge and back to the room. Patient endorsed anxiety and depression, stating that their anxiety is better but depression is very high, rating it at 10/10. Patient continues to endorse suicidal ideation with an intent to shoot self in the head with a gun. Patient said \"I don't want to live\". Patient however endorsed feeling safe in the hospital and contracted for safety. No suicidal behavior was observed this shift. Patient denied HI, reports that they no longer have auditory hallucinations for the past five days. Denied visual hallucinations. Patient denied any intrusive thoughts. Patient is able to make needs known, insight is fair and judgement is poor. Heart rate this shift, was 103, patient denied any symptoms. Patient encouraged to take a walk around the unit, was in agreement with that but did not tae the walk. No concerns with appetite, patient ate 100% of meal, medication compliant, no PRN was given this shift. Hygiene is poor, patient encouraged to take a shower this shift but declined. Will continue to monitor.  /75 (BP Location: Right arm, Patient Position: Sitting, Cuff Size: Adult Regular)   Pulse 103   Temp 97.7  F (36.5  C) (Temporal)   Resp 18   Ht 1.6 m (5' 3\")   Wt 75.1 kg (165 lb 8 oz)   SpO2 95%   BMI 29.32 kg/m             "

## 2023-02-26 NOTE — PLAN OF CARE
Nursing Assessment    Recent Vitals: B/P: 112/73, T: 97.1, P: 91, R: 18    Sleep:  Hours of sleep at night: 7    General Shift Summary  Patient has isolated to his room. He eats meals in his room. Affect is flat. He is withdrawn. Speech, there is minimal content. Patient denied HI/AVH. He voiced having SI with a plan to shoot himself in the head. Patient said he can contract for safety while hear. He voiced having anxiety rated 6/10 and depression a 7/10. Patient says he is feeling less depressed. Incite and judgment are fair. A&O x4. Hygiene is good, he showered. He eats 100% of his food.    Patient has been medication cooperative. No voiced side effects. Denies pain. He states he slept better since he remembered to remove his Nicotine patch prior to going to bed.    Plan is for patient to have ECT at 1140 tomorrow 2/26/22. Will continue to monitor response to medications and ECT.    Nena Nevarez, RN MSN

## 2023-02-26 NOTE — PLAN OF CARE
"  Problem: Suicidal Behavior  Goal: Suicidal Behavior is Absent or Managed  Outcome: Progressing  Flowsheets (Taken 2/26/2023 1732)  Mutually Determined Action Steps (Suicidal Behavior Absent/Managed): shares suicidal thoughts     Problem: Depressive Symptoms  Goal: Depressive Symptoms  Description: Signs and symptoms of listed problems will be absent or manageable.  Outcome: Not Progressing  Flowsheets (Taken 2/26/2023 1732)  Depressive Symptoms Present: mood   Patient alert and oriented x 4. Isolative to room much of shift and prefers to eat meal in room. Pt endorses SI with thoughts of shooting himself in the head. Contacted for safety on unit. Pt stated he has not heard whispering voices for five days now and brightened when discussing this. Endorses depression 9/10 this evening. Pt is hopeful that ECT will help lift his mood which patient states is, \"low\". Affect is calm. Pt denies pain but added his body feels heavy. Pt stated medications are helping and was medication compliant with no stated or observed side effects. Pt ate 75%of dinner.    ECT: Pt will have ECT tomorrow. Order states M W F up to 12 treatments. Gabapentin held this evening and is NPO after midnight.  PRN's: None given or requested this evening  Vitals:  /76    P 89    R 16    T 97.9    O2 96%   pain denies                          "

## 2023-02-27 ENCOUNTER — ANESTHESIA EVENT (OUTPATIENT)
Dept: BEHAVIORAL HEALTH | Facility: CLINIC | Age: 33
End: 2023-02-27
Payer: COMMERCIAL

## 2023-02-27 ENCOUNTER — ANESTHESIA (OUTPATIENT)
Dept: BEHAVIORAL HEALTH | Facility: CLINIC | Age: 33
End: 2023-02-27
Payer: COMMERCIAL

## 2023-02-27 ENCOUNTER — APPOINTMENT (OUTPATIENT)
Dept: BEHAVIORAL HEALTH | Facility: CLINIC | Age: 33
End: 2023-02-27
Attending: PSYCHIATRY & NEUROLOGY
Payer: COMMERCIAL

## 2023-02-27 LAB
ATRIAL RATE - MUSE: 91 BPM
DIASTOLIC BLOOD PRESSURE - MUSE: NORMAL MMHG
INTERPRETATION ECG - MUSE: NORMAL
P AXIS - MUSE: 38 DEGREES
PR INTERVAL - MUSE: 152 MS
QRS DURATION - MUSE: 88 MS
QT - MUSE: 386 MS
QTC - MUSE: 474 MS
R AXIS - MUSE: 73 DEGREES
SYSTOLIC BLOOD PRESSURE - MUSE: NORMAL MMHG
T AXIS - MUSE: 21 DEGREES
VENTRICULAR RATE- MUSE: 91 BPM

## 2023-02-27 PROCEDURE — 250N000011 HC RX IP 250 OP 636: Performed by: PSYCHIATRY & NEUROLOGY

## 2023-02-27 PROCEDURE — 90870 ELECTROCONVULSIVE THERAPY: CPT

## 2023-02-27 PROCEDURE — 124N000002 HC R&B MH UMMC

## 2023-02-27 PROCEDURE — 250N000013 HC RX MED GY IP 250 OP 250 PS 637: Performed by: PSYCHIATRY & NEUROLOGY

## 2023-02-27 PROCEDURE — 250N000011 HC RX IP 250 OP 636: Performed by: ANESTHESIOLOGY

## 2023-02-27 PROCEDURE — 250N000013 HC RX MED GY IP 250 OP 250 PS 637

## 2023-02-27 PROCEDURE — 90870 ELECTROCONVULSIVE THERAPY: CPT | Performed by: PSYCHIATRY & NEUROLOGY

## 2023-02-27 PROCEDURE — 99233 SBSQ HOSP IP/OBS HIGH 50: CPT | Performed by: PSYCHIATRY & NEUROLOGY

## 2023-02-27 PROCEDURE — 250N000009 HC RX 250: Performed by: ANESTHESIOLOGY

## 2023-02-27 PROCEDURE — 370N000017 HC ANESTHESIA TECHNICAL FEE, PER MIN

## 2023-02-27 RX ORDER — FENTANYL CITRATE 50 UG/ML
50 INJECTION, SOLUTION INTRAMUSCULAR; INTRAVENOUS EVERY 5 MIN PRN
Status: DISCONTINUED | OUTPATIENT
Start: 2023-02-27 | End: 2023-02-27

## 2023-02-27 RX ORDER — FENTANYL CITRATE 50 UG/ML
25 INJECTION, SOLUTION INTRAMUSCULAR; INTRAVENOUS EVERY 5 MIN PRN
Status: DISCONTINUED | OUTPATIENT
Start: 2023-02-27 | End: 2023-02-27

## 2023-02-27 RX ORDER — KETOROLAC TROMETHAMINE 15 MG/ML
15 INJECTION, SOLUTION INTRAMUSCULAR; INTRAVENOUS ONCE
Status: DISCONTINUED | OUTPATIENT
Start: 2023-02-27 | End: 2023-02-27

## 2023-02-27 RX ORDER — ESMOLOL HYDROCHLORIDE 10 MG/ML
INJECTION INTRAVENOUS PRN
Status: DISCONTINUED | OUTPATIENT
Start: 2023-02-27 | End: 2023-02-27

## 2023-02-27 RX ORDER — ONDANSETRON 2 MG/ML
4 INJECTION INTRAMUSCULAR; INTRAVENOUS EVERY 30 MIN PRN
Status: DISCONTINUED | OUTPATIENT
Start: 2023-02-27 | End: 2023-02-27

## 2023-02-27 RX ORDER — HYDROMORPHONE HCL IN WATER/PF 6 MG/30 ML
0.2 PATIENT CONTROLLED ANALGESIA SYRINGE INTRAVENOUS EVERY 5 MIN PRN
Status: DISCONTINUED | OUTPATIENT
Start: 2023-02-27 | End: 2023-02-27

## 2023-02-27 RX ORDER — ONDANSETRON 4 MG/1
4 TABLET, ORALLY DISINTEGRATING ORAL EVERY 30 MIN PRN
Status: DISCONTINUED | OUTPATIENT
Start: 2023-02-27 | End: 2023-02-27

## 2023-02-27 RX ORDER — HYDROMORPHONE HCL IN WATER/PF 6 MG/30 ML
0.4 PATIENT CONTROLLED ANALGESIA SYRINGE INTRAVENOUS EVERY 5 MIN PRN
Status: DISCONTINUED | OUTPATIENT
Start: 2023-02-27 | End: 2023-02-27

## 2023-02-27 RX ORDER — NICARDIPINE HCL-0.9% SOD CHLOR 1 MG/10 ML
SYRINGE (ML) INTRAVENOUS PRN
Status: DISCONTINUED | OUTPATIENT
Start: 2023-02-27 | End: 2023-02-27

## 2023-02-27 RX ORDER — SODIUM CHLORIDE, SODIUM LACTATE, POTASSIUM CHLORIDE, CALCIUM CHLORIDE 600; 310; 30; 20 MG/100ML; MG/100ML; MG/100ML; MG/100ML
INJECTION, SOLUTION INTRAVENOUS CONTINUOUS
Status: DISCONTINUED | OUTPATIENT
Start: 2023-02-27 | End: 2023-02-27

## 2023-02-27 RX ADMIN — RISPERIDONE 1 MG: 1 TABLET ORAL at 12:02

## 2023-02-27 RX ADMIN — Medication 1000 MCG: at 10:36

## 2023-02-27 RX ADMIN — DIPHENHYDRAMINE HYDROCHLORIDE 50 MG: 50 CAPSULE ORAL at 19:40

## 2023-02-27 RX ADMIN — FLUTICASONE PROPIONATE 2 SPRAY: 50 SPRAY, METERED NASAL at 09:56

## 2023-02-27 RX ADMIN — METHOHEXITAL SODIUM 90 MG: 500 INJECTION, POWDER, LYOPHILIZED, FOR SOLUTION INTRAMUSCULAR; INTRAVENOUS; RECTAL at 10:36

## 2023-02-27 RX ADMIN — KETOROLAC TROMETHAMINE 15 MG: 15 INJECTION, SOLUTION INTRAMUSCULAR; INTRAVENOUS at 11:28

## 2023-02-27 RX ADMIN — NICOTINE 1 PATCH: 21 PATCH, EXTENDED RELEASE TRANSDERMAL at 12:01

## 2023-02-27 RX ADMIN — SENNOSIDES AND DOCUSATE SODIUM 1 TABLET: 50; 8.6 TABLET ORAL at 12:02

## 2023-02-27 RX ADMIN — Medication 12.5 MG: at 09:39

## 2023-02-27 RX ADMIN — PANTOPRAZOLE SODIUM 40 MG: 40 TABLET, DELAYED RELEASE ORAL at 09:54

## 2023-02-27 RX ADMIN — BENZTROPINE MESYLATE 1 MG: 1 TABLET ORAL at 21:01

## 2023-02-27 RX ADMIN — GABAPENTIN 300 MG: 300 CAPSULE ORAL at 21:01

## 2023-02-27 RX ADMIN — METFORMIN HYDROCHLORIDE 500 MG: 500 TABLET, FILM COATED ORAL at 18:02

## 2023-02-27 RX ADMIN — RISPERIDONE 1 MG: 1 TABLET ORAL at 21:01

## 2023-02-27 RX ADMIN — SERTRALINE HYDROCHLORIDE 200 MG: 100 TABLET ORAL at 13:16

## 2023-02-27 RX ADMIN — SENNOSIDES AND DOCUSATE SODIUM 1 TABLET: 50; 8.6 TABLET ORAL at 21:01

## 2023-02-27 RX ADMIN — ESMOLOL HYDROCHLORIDE 100 MG: 10 INJECTION, SOLUTION INTRAVENOUS at 10:36

## 2023-02-27 RX ADMIN — SUCCINYLCHOLINE CHLORIDE 60 MG: 20 INJECTION, SOLUTION INTRAMUSCULAR; INTRAVENOUS; PARENTERAL at 10:36

## 2023-02-27 RX ADMIN — GABAPENTIN 300 MG: 300 CAPSULE ORAL at 13:23

## 2023-02-27 RX ADMIN — METFORMIN HYDROCHLORIDE 500 MG: 500 TABLET, FILM COATED ORAL at 12:02

## 2023-02-27 RX ADMIN — CLOZAPINE 250 MG: 200 TABLET ORAL at 21:02

## 2023-02-27 RX ADMIN — LORATADINE 10 MG: 10 TABLET ORAL at 12:02

## 2023-02-27 ASSESSMENT — ACTIVITIES OF DAILY LIVING (ADL)
ADLS_ACUITY_SCORE: 29
DRESS: INDEPENDENT;SCRUBS (BEHAVIORAL HEALTH)
ADLS_ACUITY_SCORE: 29
HYGIENE/GROOMING: INDEPENDENT
LAUNDRY: UNABLE TO COMPLETE
ORAL_HYGIENE: INDEPENDENT
ORAL_HYGIENE: INDEPENDENT
ADLS_ACUITY_SCORE: 29
DRESS: INDEPENDENT
ADLS_ACUITY_SCORE: 29
HYGIENE/GROOMING: INDEPENDENT
ADLS_ACUITY_SCORE: 29

## 2023-02-27 NOTE — ANESTHESIA POSTPROCEDURE EVALUATION
Patient: Nikolay Lora    Procedure: * No procedures listed *       Anesthesia Type:  No value filed.    Note:  Disposition: Inpatient   Postop Pain Control: Uneventful            Sign Out: Well controlled pain   PONV: No   Neuro/Psych: Uneventful            Sign Out: Acceptable/Baseline neuro status   Airway/Respiratory: Uneventful            Sign Out: Acceptable/Baseline resp. status   CV/Hemodynamics: Uneventful            Sign Out: Acceptable CV status; No obvious hypovolemia; No obvious fluid overload   Other NRE: NONE   DID A NON-ROUTINE EVENT OCCUR? No           Last vitals:  Vitals:    02/26/23 2209 02/27/23 1046 02/27/23 1100   BP: 112/76 128/84 123/82   Pulse: 89 120 116   Resp: 16 18 18   Temp: 36.6  C (97.9  F) (!) 35.9  C (96.7  F) 36  C (96.8  F)   SpO2: 96% 93% 94%       Electronically Signed By: Dominick Culver MD  February 27, 2023  11:13 AM

## 2023-02-27 NOTE — PROGRESS NOTES
"[facility], [place]   Psychiatric Progress Note  Hospital Day: 31        Interim History:   The patient's care was discussed with the treatment team during the daily team meeting and/or staff's chart notes were reviewed.  Staff report patient was calm and pleasant. Continues to report significant depressive symptoms, including active SI. Remains isolative to his room though is attending some groups. Appears disheveled. Eating good amounts. Sleeping relatively well. NPO since midnight for scheduled ECT.     Upon interview, Haseeb was lying in bed and initially asleep. He did awaken to voice and is aware of plan to pursue ECT today. He is in agreement. He completed IDS-SR questionnaire with a total score of 55.     Met with Haseeb again following ECT. He reports feeling \"exhausted.\" He was alert and oriented x 3, though some responses were delayed. He denied all side effects with exception of mild pain at placement site of electrodes. Denies headache. He is aware that prn Tylenol is available. Denied nausea/vomiting.      Suicidal ideation: Haseeb reports ongoing active SI with plan to shoot himself with a gun. Denies intent in the hospital. [place] for safety.     Homicidal ideation: denies current or recent homicidal ideation or behaviors.    Psychotic symptoms: Denying psychotic symptoms    Medication side effects reported: as above    Acute medical concerns: as above.     Other issues reported by patient: Patient had no further questions or concerns.           Medications:       benztropine  1 mg Oral At Bedtime     cloZAPine  250 mg Oral At Bedtime    Followed by     [START ON 2/28/2023] cloZAPine  275 mg Oral At Bedtime    Followed by     [START ON 3/1/2023] cloZAPine  300 mg Oral At Bedtime     fluticasone  2 spray Both Nostrils Daily     gabapentin  300 mg Oral TID     loratadine  10 mg Oral Daily     metFORMIN  500 mg Oral BID w/meals     metoprolol succinate ER  12.5 mg Oral " "Daily     nicotine  1 patch Transdermal Daily     nicotine   Transdermal Q8H     pantoprazole  40 mg Oral QAM AC     risperiDONE  1 mg Oral At Bedtime     risperiDONE  1 mg Oral Daily     senna-docusate  1 tablet Oral BID     sertraline  200 mg Oral Daily          Allergies:   No Known Allergies       Labs:     No results found for this or any previous visit (from the past 24 hour(s)).       Psychiatric Examination:     /67 (BP Location: Right arm, Patient Position: Sitting, Cuff Size: Adult Regular)   Pulse 102   Temp 97.2  F (36.2  C) (Temporal)   Resp 18   Ht 1.6 m (5' 3\")   Wt 75.1 kg (165 lb 8 oz)   SpO2 92%   BMI 29.32 kg/m    Weight is 165 lbs 8 oz  Body mass index is 29.32 kg/m .    Weight over time:  Vitals:    01/27/23 1718   Weight: 75.1 kg (165 lb 8 oz)       Orthostatic Vitals     None        Cardiometabolic risk assessment. 01/30/23    Reviewed patient profile for cardiometabolic risk factors    Date taken /Value  REFERENCE RANGE   Abdominal Obesity  (Waist Circumference)   See nursing flowsheet Women ?35 in (88 cm)   Men ?40 in (102 cm)      Triglycerides  Triglycerides   Date Value Ref Range Status   01/28/2023 192 (H) <150 mg/dL Final       ?150 mg/dL (1.7 mmol/L) or current treatment for elevated triglycerides   HDL cholesterol  Direct Measure HDL   Date Value Ref Range Status   01/28/2023 41 >=40 mg/dL Final   ]   Women <50 mg/dL (1.3 mmol/L) in women or current treatment for low HDL cholesterol  Men <40 mg/dL (1 mmol/L) in men or current treatment for low HDL cholesterol     Fasting plasma glucose (FPG) Lab Results   Component Value Date     01/28/2023      FPG ?100 mg/dL (5.6 mmol/L) or treatment for elevated blood glucose   Blood pressure  BP Readings from Last 3 Encounters:   02/27/23 111/67   01/27/23 118/74   07/20/22 112/79    Blood pressure ?130/85 mmHg or treatment for elevated blood pressure   Family History  See family history     Appearance: awake, alert, dressed in " "hospital scrubs and unkempt  Attitude:  cooperative, calm  Eye Contact: fair  Mood:  \"exhausted\"  Affect:  mood congruent and intensity is blunted  Speech:  clear, coherent. appropriate  Language: fluent and intact in English  Psychomotor, Gait, Musculoskeletal:  no evidence of tardive dyskinesia, dystonia, or ticsNo cogwheeling present. Mild paratonia in BUE on exam. Denies significant tremor or abnormal movements   Throught Process:  Linear, impoverished   Associations:  no loose associations  Thought Content:  active suicidal ideation present, auditory hallucinations present and visual hallucinations present  Insight:  fair  Judgement:  poor  Oriented to:  time, person, and place  Attention Span and Concentration:  fair  Recent and Remote Memory:  fair  Fund of Knowledge:  appropriate    Clinical Global Impressions  First:  Considering your total clinical experience with this particular patient population, how severe are the patient's symptoms at this time?: 7 (01/28/23 1341)    Most recent:  Compared to the patient's condition at the START of treatment, this patient's condition is: 4           Precautions:     Behavioral Orders   Procedures     Code 1 - Restrict to Unit     Code 2     For imaging     Electroconvulsive therapy     Series of up to 12 treatments. Begin Date: 2/27/23     Treating Psychiatrist providing ECT:  Dr. Kearns     Notified on:  2/23/23     Electroconvulsive therapy     For acute ECT series, MWF, up to 12 treatment.     Electroconvulsive therapy     Series of up to 12 treatments. Begin Date: 02/26/23     Treating Psychiatrist providing ECT: Clifton  Notified on: 02/24/23     Fall precautions     Routine Programming     As clinically indicated     Status 15     Every 15 minutes.     Suicide precautions     Patients on Suicide Precautions should have a Combination Diet ordered that includes a Diet selection(s) AND a Behavioral Tray selection for Safe Tray - with utensils, or Safe Tray - NO " "utensils       Withdrawal precautions          Diagnoses:     Active suicidal ideation with intent outside of hospital setting  MDD, recurrent, severe with psychotic features vs Schizoaffective Disorder, depressive type  Polysubstance use  Unspecified mood disorder  ADHD, inattentive type per chart  History of idiopathic hypersomnolence per chart  Nicotine use disorder, dependence and withdrawal   Allergies- chronic uticaria and rhinitis per chart  HLD per chart          Assessment & Plan:     Assessment and hospital summary:  This patient is a 32 year old male with history of mood disorder, ADHD and substance use who presented to [facility] with SI on 1/26 in context of reported methamphetamine use and being kicked out of sober living. Medically cleared in ED, placed on 72HH and admitted to 12N. Limited historian, giving inconsistent reports about substance use, UDS negative, very somnolent, endorsing ongoing SI and some psychosis symptoms. PTA medications continued with exception of finasteride as patient states he takes \"1/4 a pill\" and declined ordered dose, will defer to primary team to address. Will continue to evaluate safety and stabilization further as patient more able to engage in assessments.      Inpatient psychiatric hospitalization is warranted at this time for safety, stabilization, and possible adjustment in medications.    Patient reports that he abruptly stopped Zoloft one week prior to his admission. He developed discontinuation syndrome symptoms following that. He reports that he does not have a history of psychosis but is experiencing psychotic symptoms. He is amenable with plan to trial risperidone after R/B/A were discussed. Risperidone was initiated on 1/30 and titrated to a total of 3 mg daily on 2/1. Clonidine, used for ADHD, was reduced from a total of 0.3 mg daily to 0.2 mg daily on 2/3 due to concern for hypotension. 2/9: Cardiology consult placed. EKG- tachycardia 121 bpm, QTc 465 " ms, Abnormal QRS angle and T wave abnormality. COVID negative and labs are unremarkable.  On 2/10, clozapine was held pending additional workup from IM and cardiology. Pericarditis was ruled out and metoprolol was started. Clozapine was restarted on 2/13 with plan to cautiously titrate to lowest effective dose. 2/23/23: Clozapine titration schedule increased 25 mg/day. ECT and IM consults for ECT clearance placed.    Psychiatric treatment/inteventions:  Medications:   -Continue clozapine titration by 25 mg daily while monitoring closely for side effects. Titration has been ordered through 3/1.  Consider checking clozapine levels at the end of this week.   -Reduce risperidone to 1 mg at bedtime with plan to discontinue due to lack of effectiveness and plan for clozapine monotherapy  -Continue PTA sertraline 150mg daily for mood  -Continue PTA gabapentin 300mg TID for anxiety   -Continue Cogentin 1 mg at bedtime for possible EPSE     -PRN hydroxyzine 25mg every 4 hour for anxiety  -PRN trazodone 50mg at bedtime for sleep   -PRN olanzapine 10mg PO or IM TID for agitation/psychosis   -PRN atropine 1% SL drops, 2 drops q2h for sialorrhea    Spiritual services consult placed on 2/6. Pt is [employer]. Appreciate assistance.     ECT:  - Medically cleared on 2/24. See IM note for details.  - ECT consult placed. Appreciate assistance.   - ECT orders placed  - HOLD Gabapentin on nights prior to ECT and on ECT mornings.   - ECT #1 completed today. Uneventful. Next ECT treatment scheduled for 3/1. Baseline IDS-SR was 55. Placed in chart.      Laboratory/Imaging: reviewed: Covid negative; UDS negative; CMP, CBC, TSH, Lipid panel, HgbA1c ordered to complete admission labs. Reviewed.     2/9/23: Troponin, CK, CBC, BMP: Unremarkable, CRP elevated to 38.18, COVID: Negative  2/10/23: Add Influenza A/B given flu-like sx-negative  Patient will be treated in therapeutic milieu with appropriate individual and group therapies as  described.     Medical treatment/interventions:  Medical concerns:   Nicotine replacement ordered, educate patient on benefits of cessation, pt unclear about finasteride dose, will hold until further information is obtained. PTA PRN zyrtec, azelastine, fluticasone, triamcinolone and epipen ordered for allergies and PRN ammoniaum lactate, Eucerin for  dry skin.    Neuroleptic induced wt gain:  - Monitor weights  - Start metformin 500 mg BID with meals    Dyslipidemia: Will arrange follow up with PCP to address. Discussed importance of healthy eating habits and regular exercise. Will consider nutrition consult if patient amenable.     Orthostasis likely 2/2 clozapine: Pt is not hypotensive but reports orthostatic symptoms. He reported restricting fluid intake to reduce sialorrhea   - Continue to monitor vital signs closely  - Encourage fluids  - Discontinued clonidine    Sialorrhea 2/2 clozapine:  - Atropine 1% SL drops prn  - Recommend towel on pillow when sleeping    Chest pain/tachycardia/EKG changes (2/9):  - Pain improved with PRN medications.   - Cardiology consult placed on 2/9. See note on that date for details.   - ECHO reviewed and unremarkable.  - Writer discussed care with both Dr. Streeter from [facility] and Christina from . Plan for now is to HOLD clozapine until further medical workup. Dr. Streeter explained that myocarditis has been ruled out as troponin was negative. Pericarditis remains on differential, but he does not have EKG findings or a pericardial effusion. IM will assess for pericardial friction rub and pleuritic chest pain. IM seen by patient and Metoprolol was started.     Update 2/13: Discussed care with Annette from  today on two occasions. Please see her note on this date for details. She does not suspect that this is pericarditis. He does not have pleuritic pain, characteristic CP, EKG changes, or pericardial effusion on ECHO. Therefore, will cautiously restart clozapine while monitoring closely  for side effects. May consider further increase in metoprolol if necessary. PPI was started due to suspected GERD.     Update 2/24 per IM note:  Medicine is following peripherally for concerns for pericarditis.  See detailed note from 2/13.  No pericardial friction rub noted while sitting up and leaning forward today.  Patient states that his chest discomfort is getting better after starting the Protonix yesterday.  It does appear that he has also been using the Maalox.  Patient does have Tums available as well.  Please be mindful for any constipation with overuse of the PRNs.     POC:  - Continue Protonix daily for 8 weeks, then taper off  - Recommend lifestyle changes including weight loss head of bed elevation avoidance of late-night eating and specific food elimination such as chocolate caffeine alcohol acidic and/or spicy food.  - Watch for constipation with PRNs for heartburn  - We will schedule senna 1 tab twice daily  - MiraLAX daily as needed added on     Medicine will sign off, please contact us for any acute changes.      Sore throat/cough/nasal congestion, resolved:   - COVID negative on 2/9. Repeat COVID test and Influenza A/B neg on 2/10.  - Cepacol lozenges added  - PRN Tylenol   - Consulted with IM. Appreciate assistance. See their notes for details.      Legal Status: VOLUNTARY. 72 hour hold discontinued. Pt agreed to sign in on voluntary basis and discharge directly to treatment once stable.      Safety Assessment:        Behavioral Orders   Procedures     Code 1 - Restrict to Unit     Routine Programming       As clinically indicated     Status 15       Every 15 minutes.     Suicide precautions       Patients on Suicide Precautions should have a Combination Diet ordered that includes a Diet selection(s) AND a Behavioral Tray selection for Safe Tray - with utensils, or Safe Tray - NO utensils        Withdrawal precautions      Pt has not required locked seclusion or restraints in the past 24 hours  to maintain safety, please refer to RN documentation for further details.    Discontinued SIO on 2/8 as patient is heriberto for safety. Monitor SI closely.     The risks, benefits, alternatives and side effects have been discussed and are understood by the patient.     Disposition: Pending clinical stabilization. Pt remains actively suicidal. [facility]      This note was created by [employer]vaigned using a Dragon dictation system. All typing errors or contextual distortion are unintentional and software inherent.     Entered by: Nelly Brown MD on 2/27/2023 at 2:20 PM

## 2023-02-27 NOTE — PROGRESS NOTES
Patient adequate for discharge back to unit. Report called to unit nurse  Amy . IV removed and hemostasis achieved less than 2 min.  VSS except HR elevated at 110.  Dr. Culver from anesthesia is ok with pt transferring to the floor with elevated HR.  Patient safely transported back to unit with  staff person.

## 2023-02-27 NOTE — PLAN OF CARE
"Patient had ECT today.  Patient reports no concerns post ECT at this time, Denies pain.  /67 (BP Location: Right arm, Patient Position: Sitting, Cuff Size: Adult Regular)   Pulse 102   Temp 97.2  F (36.2  C) (Temporal)   Resp 18   Ht 1.6 m (5' 3\")   Wt 75.1 kg (165 lb 8 oz)   SpO2 92%   BMI 29.32 kg/m  ls      Mood and Affect: Patient describes mood as, \"Sad.\" Affect is flat.    Level of Consciousness: Patient is alert and oriented to person, place, time and situation.     Behavior and Interaction: Patient is withdrawn and isolative.    Patient is cooperative, calm and pleasant with nursing assessment.    Mental Health Symptoms: Patient endorses suicidal ideation with a plan to shoot himself. Patient reports feeling safe being in a hospital, contracts for safety.    Patient reports feeling anxious and depressed, denies any other mental health symptoms.    Medical Concerns: None reported.    Medication Compliant: Patient is compliant with all scheduled medication.    PRN: None given.    Diet: Good appetite. 100% of lunch.    Elimination: No problem reported.    Self Care: Independent. Poorly groomed, encouraged to shower.      Problem: Suicide Risk  Goal: Absence of Self-Harm  Outcome: Progressing     Problem: Psychotic Signs/Symptoms  Goal: Improved Behavioral Control (Psychotic Signs/Symptoms)  Outcome: Progressing   Goal Outcome Evaluation:    Plan of Care Reviewed With: patient                   "

## 2023-02-27 NOTE — PROCEDURES
"Nikolay Lora is a 32 year old  year old male patient.  [id number]  @DX@    [facility]   ECT Procedure Note   02/27/2023    Patient Status: Inpatient    Is this the first in a series of 12 treatments?  Yes   No Known Allergies    Weight:  165 lbs 8 oz         Indications for ECT:   Medications ineffective  Imminent risk of suicide         Clinical Narrative:   Nikolay Lora is a 32 year old man with affective dysregulation, ADHD and polysubstance use (alcohol*, amphetamine*, cocaine, cannabis) who is hospitalized with progressive depression leading to suicidal ideation with planning on 1/26, in the context of  medication non compliance and losing his place at sober living due to reported methamphetamine use. Throughout his hospital stay medication adjustments have been made (restarted on sertraline, titrated risperidone, added clozapine, which is being titrated); however, intense suicidal ideation has continued and he has been having difficulty tolerating medication changes. This consultation is requested to evaluate candidacy for electroconvulsive therapy (ECT).      We utilized phone translation services in [facility] during this visit to ensure thoroughness, otherwise he can communicate in English well.  The patient shares he cannot stop contemplating suicide with a plan to shoot himself as soon as he is out of the hospital. He reports visual hallucinations of \"lanterns and black birds flying around\" with commanding auditory hallucinations telling him that he is \"worthless\" and \"should kill (himself)\". Although hallucinations got better since admission with medication changes, depression and suicidal contemplation has persisted, he is interested in ECT, hoping for a quicker relief.         Diagnosis:   Schizoaffective Disorder, depressed  Polysubstance Use Disorder in a controlled environment          Assessment:   Considering the clinical acuity  electroconvulsive therapy (ECT) appears " "appropriate; despite multifactorial nature of the presentation that would benefit from optimization of cognitive, behavioral and social interventions longitudinally.      Discussed all relevant aspects of ECT with patient, including risks of memory loss, HA, nausea, death <1/50,000, driving prohibition; possible lack of benefit or relapse after successful treatment, alternatives, right to decline, possible outpatient procedures. All questions answered, patient will have opportunity to review ECT video.         Pause for the Cause:     Right patient Yes   Right procedure/laterality settings: Yes          Intra-Procedure Documentation:     #1 02/27/23 pt says he was feeling \"anxious but content\"      ECT #: 1   Treatment number this series: 1   Total treatment number: 1     Type of ECT:  Bilateral, standard    ECT Medications:    Brevital: 90mg  Succinyl Choline: 60mg (plan to increase to 80 mg)    ECT Strip Summary:   Energy Level: 96mC 20hz 1ms 3s 800mA  (seizure threshold)  Motor Seizure Duration: 44 seconds  EEG Seizure Duration: 56 seconds    Complications: No    Plan:   Continue  ECT Q MWF at 1.5x seizure threshold   Monitor depression severity with clinical assessment augmented with CUDOS every other treatment  Continue current medications    Shameka Berrios M.D.,Ph.D.   of Psychiatry  Pager [phone]  "

## 2023-02-27 NOTE — PLAN OF CARE
02/27/23 1115   Interprofessional Rounds   Participants ;CTC;nursing;psychiatrist;social work   Behavioral Team Discussion   Participants Nelly Brown MD, Amy MCGRATH RN, Cresencio, [facility]   Progress Continuing to assess   Continued Stay Criteria/Rationale Patient receiving ECT   Medical/Physical No medical concerns noted at this time   Plan Psychiatrist will see patieent daily to assess psychiatric needs and to assess medication plan. During hospitalization patient will be encouraged to attend therapy groups and to participate in unit programming. CTC will coordinate disposition and aftercare plan.   Rationale for change in precautions or plan No change   Anticipated Discharge Disposition IRTS;substance use treatment     PRECAUTIONS AND SAFETY    Behavioral Orders   Procedures    Code 1 - Restrict to Unit    Code 2     For imaging    Electroconvulsive therapy     Series of up to 12 treatments. Begin Date: 2/27/23     Treating Psychiatrist providing ECT:  Dr. Kearns     Notified on:  2/23/23    Electroconvulsive therapy     For acute ECT series, MWF, up to 12 treatment.    Electroconvulsive therapy     Series of up to 12 treatments. Begin Date: 02/26/23     Treating Psychiatrist providing ECT: Clifton  Notified on: 02/24/23    Fall precautions    Routine Programming     As clinically indicated    Status 15     Every 15 minutes.    Suicide precautions     Patients on Suicide Precautions should have a Combination Diet ordered that includes a Diet selection(s) AND a Behavioral Tray selection for Safe Tray - with utensils, or Safe Tray - NO utensils      Withdrawal precautions       Safety  Safety WDL: WDL  Patient Location: patient room, own  Observed Behavior: sleeping  Observed Behavior (Comment): Laying in bed  Safety Measures: environmental rounds completed, safety rounds completed, suicide assessment completed, suicide check-in completed  Diversional Activity: television  Suicidality: Status 15, Behavioral  scrubs (pajamas), Minimal personal belongings in room, Identify and strengthen protective factors, Promote patient engagement with treatment process, Optimize communication / relationship to minimize opportunity for self-harm, Perform mouth checks after medication administration to prevent hoarding  Withdrawal: monitor withdrawal process  Seizure precautions: clutter free environment  Assault: status 15  Elopement Interventions: status 15  Sexual: status 15

## 2023-02-27 NOTE — ANESTHESIA PREPROCEDURE EVALUATION
Anesthesia Pre-Procedure Evaluation    Patient: Nikolay Lora   MRN: [id number] : 1990        Procedure :           Past Medical History:   Diagnosis Date     Brugada syndrome      Chronic idiopathic urticaria      Concussion with loss of consciousness      Mixed hyperlipidemia      Polysubstance abuse (H)      Schizotypal personality disorder (H)       No past surgical history on file.   No Known Allergies   Social History     Tobacco Use     Smoking status: Former     Smokeless tobacco: Never     Tobacco comments:     1-2 cigs per day   Substance Use Topics     Alcohol use: Yes      Wt Readings from Last 1 Encounters:   23 75.1 kg (165 lb 8 oz)        Anesthesia Evaluation   Pt has not had prior anesthetic         ROS/MED HX  ENT/Pulmonary:       Neurologic:       Cardiovascular: Comment: Repolarization abnormality. brugada ???. Authorized by cardiology. Negative history of syncope, seizures. Family history negative      METS/Exercise Tolerance:     Hematologic:       Musculoskeletal:       GI/Hepatic:       Renal/Genitourinary:       Endo:       Psychiatric/Substance Use:       Infectious Disease:       Malignancy:       Other:            Physical Exam    Airway        Mallampati: III   TM distance: > 3 FB   Neck ROM: full   Mouth opening: > 3 cm    Respiratory Devices and Support         Dental           Cardiovascular   cardiovascular exam normal          Pulmonary   pulmonary exam normal                OUTSIDE LABS:  CBC:   Lab Results   Component Value Date    WBC 8.0 2023    WBC 15.2 (H) 2023    HGB 15.0 2023    HGB 15.8 2023    HCT 46.1 2023    HCT 48.3 2023     2023     2023     BMP:   Lab Results   Component Value Date     2023     2023    POTASSIUM 3.9 2023    POTASSIUM 4.0 2023    CHLORIDE 103 2023    CHLORIDE 100 2023    CO2 27 2023    CO2 27 2023    BUN 19.5 2023     BUN 12.5 02/09/2023    CR 0.81 02/24/2023    CR 0.81 02/09/2023    GLC 93 02/24/2023     (H) 02/09/2023     COAGS: No results found for: PTT, INR, FIBR  POC: No results found for: BGM, HCG, HCGS  HEPATIC:   Lab Results   Component Value Date    ALBUMIN 4.1 02/24/2023    PROTTOTAL 7.1 02/24/2023    ALT 58 (H) 02/24/2023    AST 29 02/24/2023    ALKPHOS 82 02/24/2023    BILITOTAL 0.3 02/24/2023     OTHER:   Lab Results   Component Value Date    A1C 5.4 01/28/2023    KATINA 9.4 02/24/2023    TSH 1.09 01/28/2023       Anesthesia Plan    ASA Status:  2      Anesthesia Type: General.              Consents    Anesthesia Plan(s) and associated risks, benefits, and realistic alternatives discussed. Questions answered and patient/representative(s) expressed understanding.    - Discussed:     - Discussed with:  Patient         Postoperative Care    Pain management: Oral pain medications, IV analgesics.   PONV prophylaxis: Ondansetron (or other 5HT-3)     Comments:                Dominick Culver MD

## 2023-02-27 NOTE — ANESTHESIA CARE TRANSFER NOTE
Patient: Link Lora    Procedure: * No procedures listed *       Diagnosis: * No pre-op diagnosis entered *  Diagnosis Additional Information: No value filed.    Anesthesia Type:   General     Note:      Level of Consciousness: awake  Oxygen Supplementation: room air    Independent Airway: airway patency satisfactory and stable        Patient transferred to: PACU    Handoff Report: Identifed the Patient, Identified the Reponsible Provider, Reviewed the pertinent medical history, Discussed the surgical course, Reviewed Intra-OP anesthesia mangement and issues during anesthesia, Set expectations for post-procedure period and Allowed opportunity for questions and acknowledgement of understanding      Vitals:  Vitals Value Taken Time   /82 02/27/23 1100   Temp 36  C (96.8  F) 02/27/23 1100   Pulse 116 02/27/23 1100   Resp 18 02/27/23 1100   SpO2 94 % 02/27/23 1100       Electronically Signed By: Dominick Culver MD  February 27, 2023  10:47 AM

## 2023-02-27 NOTE — PLAN OF CARE
Problem: Sleep Disturbance  Goal: Adequate Sleep/Rest  Outcome: Progressing   Goal Outcome Evaluation:    Patient appeared to sleep 6.75 hours this night shift.  No prns or snacks given or requested. NPO since midnight for 1130 ECT.  ECT checklist still needs completing.  No concerns were reported or noted.

## 2023-02-28 PROCEDURE — 250N000013 HC RX MED GY IP 250 OP 250 PS 637

## 2023-02-28 PROCEDURE — 250N000013 HC RX MED GY IP 250 OP 250 PS 637: Performed by: PHYSICIAN ASSISTANT

## 2023-02-28 PROCEDURE — 124N000002 HC R&B MH UMMC

## 2023-02-28 PROCEDURE — 250N000013 HC RX MED GY IP 250 OP 250 PS 637: Performed by: PSYCHIATRY & NEUROLOGY

## 2023-02-28 PROCEDURE — G0177 OPPS/PHP; TRAIN & EDUC SERV: HCPCS

## 2023-02-28 PROCEDURE — 99232 SBSQ HOSP IP/OBS MODERATE 35: CPT | Performed by: PSYCHIATRY & NEUROLOGY

## 2023-02-28 RX ORDER — GUAIFENESIN 200 MG/10ML
200 LIQUID ORAL EVERY 4 HOURS PRN
Status: DISCONTINUED | OUTPATIENT
Start: 2023-02-28 | End: 2023-04-12 | Stop reason: HOSPADM

## 2023-02-28 RX ADMIN — METFORMIN HYDROCHLORIDE 500 MG: 500 TABLET, FILM COATED ORAL at 17:58

## 2023-02-28 RX ADMIN — ALUMINUM HYDROXIDE, MAGNESIUM HYDROXIDE, AND SIMETHICONE 30 ML: 200; 200; 20 SUSPENSION ORAL at 21:23

## 2023-02-28 RX ADMIN — SERTRALINE HYDROCHLORIDE 200 MG: 100 TABLET ORAL at 08:15

## 2023-02-28 RX ADMIN — GUAIFENESIN 200 MG: 200 SOLUTION ORAL at 17:58

## 2023-02-28 RX ADMIN — SENNOSIDES AND DOCUSATE SODIUM 1 TABLET: 50; 8.6 TABLET ORAL at 21:15

## 2023-02-28 RX ADMIN — METFORMIN HYDROCHLORIDE 500 MG: 500 TABLET, FILM COATED ORAL at 08:15

## 2023-02-28 RX ADMIN — GABAPENTIN 300 MG: 300 CAPSULE ORAL at 13:48

## 2023-02-28 RX ADMIN — LORATADINE 10 MG: 10 TABLET ORAL at 08:15

## 2023-02-28 RX ADMIN — ACETAMINOPHEN 325MG 650 MG: 325 TABLET ORAL at 08:25

## 2023-02-28 RX ADMIN — NICOTINE 1 PATCH: 21 PATCH, EXTENDED RELEASE TRANSDERMAL at 08:14

## 2023-02-28 RX ADMIN — SENNOSIDES AND DOCUSATE SODIUM 1 TABLET: 50; 8.6 TABLET ORAL at 08:15

## 2023-02-28 RX ADMIN — Medication 12.5 MG: at 08:15

## 2023-02-28 RX ADMIN — CLOZAPINE 275 MG: 200 TABLET ORAL at 21:15

## 2023-02-28 RX ADMIN — FLUTICASONE PROPIONATE 2 SPRAY: 50 SPRAY, METERED NASAL at 08:14

## 2023-02-28 RX ADMIN — PANTOPRAZOLE SODIUM 40 MG: 40 TABLET, DELAYED RELEASE ORAL at 08:15

## 2023-02-28 RX ADMIN — GABAPENTIN 300 MG: 300 CAPSULE ORAL at 08:15

## 2023-02-28 RX ADMIN — BENZTROPINE MESYLATE 1 MG: 1 TABLET ORAL at 21:15

## 2023-02-28 ASSESSMENT — ACTIVITIES OF DAILY LIVING (ADL)
ADLS_ACUITY_SCORE: 29
HYGIENE/GROOMING: INDEPENDENT
ADLS_ACUITY_SCORE: 29
ADLS_ACUITY_SCORE: 29
LAUNDRY: WITH SUPERVISION
ADLS_ACUITY_SCORE: 29
ADLS_ACUITY_SCORE: 29
HYGIENE/GROOMING: INDEPENDENT
ADLS_ACUITY_SCORE: 29
ORAL_HYGIENE: INDEPENDENT
ADLS_ACUITY_SCORE: 29
ADLS_ACUITY_SCORE: 29
DRESS: SCRUBS (BEHAVIORAL HEALTH)
DRESS: INDEPENDENT;SCRUBS (BEHAVIORAL HEALTH)
ADLS_ACUITY_SCORE: 29
ADLS_ACUITY_SCORE: 29
ORAL_HYGIENE: INDEPENDENT

## 2023-02-28 NOTE — PLAN OF CARE
"Patient presents as quiet and socially withdrawn with a flat affect.  He isolated in his room the entire shift, only emerging to request access to the bathroom and to request his meal tray.  Haseeb endorses severe depressive symptoms, which he reports are \"10/10!\".  He continues to verbalize SI with a plan to shoot himself in the head, but he reports that he can remain safe in the hospital; he agreed to alert unit staff should his condition worsen and if he develops intent to carry out self harm here in the hospital.  He denies any anxiety or AVH this evening.  He reports that he is \"completely exhausted\", and he attributes much of this to having ECT this morning.  He denies any headache pain at this time.  He had a good appetite at dinner time, and Haseeb denies any nausea or vomiting.  He did report some hypersalivation, likely associated with his Clozapine.  Apart from this, Haseeb reports that he is tolerating his current medication regimen well with no further adverse side effects reported by Haseeb or observed by RN writer.  Patient c/o discomfort, noted that he was \"feeling itchy\", and he asked for a dose of PRN Benadryl.  He did not feel that dry skin was the culprit, so he declined the ammonium lactate and Eucerin creams that were suggested.  He was given Benadryl 50 mg at 19:40 and later reported relief.  Apart from itching, Haseeb denies any acute physical concerns at this time. /77 (BP Location: Left arm)   Pulse 108   Temp 97.9  F (36.6  C) (Oral)   Resp 16   Ht 1.6 m (5' 3\")   Wt 75.1 kg (165 lb 8 oz)   SpO2 95%   BMI 29.32 kg/m        "
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
## 2023-02-28 NOTE — PLAN OF CARE
"Patient having a good day, spent most of his time in the room resting, withdrawn and isolative to self. Presents with blunted flat affect, sad mood. Compliant with meds, no side effects noted/reported. Endorsed depression and at 5/10, states that is well managed by scheduled meds. Endorsed SI thoughts, \" I wish I had a gun at home, I will kill myself.\" Contracted for safety while at the hospital. C/O foot and generalized pain rating at 10/10, received Tylenol and was somewhat effective per pt. Eating and drinking well, 100% of his meals. At around 1400 pt c/o SOB and cough while lying flat with pillows in bed, encouraged to increase fluid intake for dry throat, walk in the hallway  VSS see Flowsheet, O2 94% RM, Resp 18, dry non-productive cough while lying flat in bed. Updated IM provider no new orders to keep monitoring and update the psychiatrist. Updated Psych provider, no new orders, encouraged for pt to walk around and be active, monitor and update if it worsens. Currently pt in his room resting.  Writer paced in the hallway over five times without issues.   Plan: Continue with current POC and encourage socializing and attend groups.     Goal Outcome Evaluation:    Plan of Care Reviewed With: patient                   "

## 2023-02-28 NOTE — PLAN OF CARE
Assessment/Intervention/Current Symtoms and Care Coordination:   Chart Review  Team Meeting  Pt will return to [facility] when stable. Left a VM for Erlin at [facility] ([phone]) giving him an update that pt started ECT and will be here at least another week.      Discharge Plan or Goal:  Return to [facility] when stable. Medication changes in process so discharge likely not for another couple weeks.      Barriers to Discharge:    Stabilization of mental health symptoms with medication changes in process.      Referral Status:  No referrals made yet this hospitalization.      Legal Status:   Voluntary

## 2023-02-28 NOTE — PLAN OF CARE
Problem: Psychotic Signs/Symptoms  Goal: Improved Behavioral Control (Psychotic Signs/Symptoms)  Outcome: Progressing   Goal Outcome Evaluation:    Patient appeared to sleep 7 hours this night shift.  No prns or snacks given or requested.  No concerns were reported or noted.  ECT tomorrow morning.

## 2023-03-01 ENCOUNTER — ANESTHESIA EVENT (OUTPATIENT)
Dept: BEHAVIORAL HEALTH | Facility: CLINIC | Age: 33
End: 2023-03-01
Payer: COMMERCIAL

## 2023-03-01 ENCOUNTER — ANESTHESIA (OUTPATIENT)
Dept: BEHAVIORAL HEALTH | Facility: CLINIC | Age: 33
End: 2023-03-01
Payer: COMMERCIAL

## 2023-03-01 ENCOUNTER — APPOINTMENT (OUTPATIENT)
Dept: BEHAVIORAL HEALTH | Facility: CLINIC | Age: 33
End: 2023-03-01
Attending: PSYCHIATRY & NEUROLOGY
Payer: COMMERCIAL

## 2023-03-01 PROCEDURE — 124N000002 HC R&B MH UMMC

## 2023-03-01 PROCEDURE — 250N000013 HC RX MED GY IP 250 OP 250 PS 637: Performed by: PSYCHIATRY & NEUROLOGY

## 2023-03-01 PROCEDURE — 250N000013 HC RX MED GY IP 250 OP 250 PS 637

## 2023-03-01 PROCEDURE — 90870 ELECTROCONVULSIVE THERAPY: CPT

## 2023-03-01 PROCEDURE — 370N000017 HC ANESTHESIA TECHNICAL FEE, PER MIN

## 2023-03-01 PROCEDURE — 250N000011 HC RX IP 250 OP 636: Performed by: ANESTHESIOLOGY

## 2023-03-01 PROCEDURE — 99233 SBSQ HOSP IP/OBS HIGH 50: CPT | Mod: 25 | Performed by: PSYCHIATRY & NEUROLOGY

## 2023-03-01 PROCEDURE — 250N000009 HC RX 250: Performed by: ANESTHESIOLOGY

## 2023-03-01 PROCEDURE — 90870 ELECTROCONVULSIVE THERAPY: CPT | Performed by: PSYCHIATRY & NEUROLOGY

## 2023-03-01 RX ORDER — HYDROMORPHONE HCL IN WATER/PF 6 MG/30 ML
0.4 PATIENT CONTROLLED ANALGESIA SYRINGE INTRAVENOUS EVERY 5 MIN PRN
Status: CANCELLED | OUTPATIENT
Start: 2023-03-01

## 2023-03-01 RX ORDER — LABETALOL HYDROCHLORIDE 5 MG/ML
INJECTION, SOLUTION INTRAVENOUS PRN
Status: DISCONTINUED | OUTPATIENT
Start: 2023-03-01 | End: 2023-03-01

## 2023-03-01 RX ORDER — HYDROMORPHONE HCL IN WATER/PF 6 MG/30 ML
0.2 PATIENT CONTROLLED ANALGESIA SYRINGE INTRAVENOUS EVERY 5 MIN PRN
Status: CANCELLED | OUTPATIENT
Start: 2023-03-01

## 2023-03-01 RX ORDER — FENTANYL CITRATE 50 UG/ML
50 INJECTION, SOLUTION INTRAMUSCULAR; INTRAVENOUS EVERY 5 MIN PRN
Status: CANCELLED | OUTPATIENT
Start: 2023-03-01

## 2023-03-01 RX ORDER — FENTANYL CITRATE 50 UG/ML
25 INJECTION, SOLUTION INTRAMUSCULAR; INTRAVENOUS EVERY 5 MIN PRN
Status: CANCELLED | OUTPATIENT
Start: 2023-03-01

## 2023-03-01 RX ORDER — ONDANSETRON 2 MG/ML
4 INJECTION INTRAMUSCULAR; INTRAVENOUS EVERY 30 MIN PRN
Status: CANCELLED | OUTPATIENT
Start: 2023-03-01

## 2023-03-01 RX ORDER — SIMETHICONE 80 MG
80 TABLET,CHEWABLE ORAL EVERY 6 HOURS PRN
Status: DISCONTINUED | OUTPATIENT
Start: 2023-03-01 | End: 2023-04-12 | Stop reason: HOSPADM

## 2023-03-01 RX ORDER — SODIUM CHLORIDE, SODIUM LACTATE, POTASSIUM CHLORIDE, CALCIUM CHLORIDE 600; 310; 30; 20 MG/100ML; MG/100ML; MG/100ML; MG/100ML
INJECTION, SOLUTION INTRAVENOUS CONTINUOUS
Status: CANCELLED | OUTPATIENT
Start: 2023-03-01

## 2023-03-01 RX ORDER — ONDANSETRON 4 MG/1
4 TABLET, ORALLY DISINTEGRATING ORAL EVERY 30 MIN PRN
Status: CANCELLED | OUTPATIENT
Start: 2023-03-01

## 2023-03-01 RX ADMIN — SENNOSIDES AND DOCUSATE SODIUM 1 TABLET: 50; 8.6 TABLET ORAL at 13:59

## 2023-03-01 RX ADMIN — METHOHEXITAL SODIUM 90 MG: 500 INJECTION, POWDER, LYOPHILIZED, FOR SOLUTION INTRAMUSCULAR; INTRAVENOUS; RECTAL at 12:51

## 2023-03-01 RX ADMIN — CLOZAPINE 300 MG: 200 TABLET ORAL at 19:29

## 2023-03-01 RX ADMIN — Medication 12.5 MG: at 12:13

## 2023-03-01 RX ADMIN — BENZTROPINE MESYLATE 1 MG: 1 TABLET ORAL at 19:29

## 2023-03-01 RX ADMIN — LABETALOL HYDROCHLORIDE 10 MG: 5 INJECTION, SOLUTION INTRAVENOUS at 12:51

## 2023-03-01 RX ADMIN — LABETALOL HYDROCHLORIDE 10 MG: 5 INJECTION, SOLUTION INTRAVENOUS at 12:56

## 2023-03-01 RX ADMIN — NICOTINE 1 PATCH: 21 PATCH, EXTENDED RELEASE TRANSDERMAL at 13:59

## 2023-03-01 RX ADMIN — GABAPENTIN 300 MG: 300 CAPSULE ORAL at 13:59

## 2023-03-01 RX ADMIN — METFORMIN HYDROCHLORIDE 500 MG: 500 TABLET, FILM COATED ORAL at 17:57

## 2023-03-01 RX ADMIN — METFORMIN HYDROCHLORIDE 500 MG: 500 TABLET, FILM COATED ORAL at 13:59

## 2023-03-01 RX ADMIN — GABAPENTIN 300 MG: 300 CAPSULE ORAL at 19:29

## 2023-03-01 RX ADMIN — SERTRALINE HYDROCHLORIDE 200 MG: 100 TABLET ORAL at 13:59

## 2023-03-01 RX ADMIN — PANTOPRAZOLE SODIUM 40 MG: 40 TABLET, DELAYED RELEASE ORAL at 12:13

## 2023-03-01 RX ADMIN — FLUTICASONE PROPIONATE 2 SPRAY: 50 SPRAY, METERED NASAL at 12:13

## 2023-03-01 RX ADMIN — LORATADINE 10 MG: 10 TABLET ORAL at 13:59

## 2023-03-01 RX ADMIN — SENNOSIDES AND DOCUSATE SODIUM 1 TABLET: 50; 8.6 TABLET ORAL at 19:29

## 2023-03-01 RX ADMIN — SUCCINYLCHOLINE CHLORIDE 80 MG: 20 INJECTION, SOLUTION INTRAMUSCULAR; INTRAVENOUS; PARENTERAL at 12:51

## 2023-03-01 ASSESSMENT — ACTIVITIES OF DAILY LIVING (ADL)
ADLS_ACUITY_SCORE: 29
ADLS_ACUITY_SCORE: 29
DRESS: INDEPENDENT
ADLS_ACUITY_SCORE: 29
DRESS: INDEPENDENT
HYGIENE/GROOMING: INDEPENDENT
LAUNDRY: WITH SUPERVISION
ORAL_HYGIENE: INDEPENDENT
ADLS_ACUITY_SCORE: 29
HYGIENE/GROOMING: INDEPENDENT
ADLS_ACUITY_SCORE: 29
ORAL_HYGIENE: INDEPENDENT
ADLS_ACUITY_SCORE: 29
LAUNDRY: WITH SUPERVISION

## 2023-03-01 NOTE — PLAN OF CARE
Problem: Sleep Disturbance  Goal: Adequate Sleep/Rest  Outcome: Progressing   Goal Outcome Evaluation:    Pt appeared to sleep 6.25 hours overnight with no problems observed during safety checks.     Pt NPO overnight for ECT scheduled at 1040 this morning.    No concerns reported.     No prns administered.     Will continue to monitor and assess pt safety, mental status, and any medical concerns.

## 2023-03-01 NOTE — ANESTHESIA POSTPROCEDURE EVALUATION
Patient: Nikolay Lora    Procedure: * No procedures listed *       Anesthesia Type:  General    Note:  Disposition: Inpatient   Postop Pain Control: Uneventful            Sign Out: Well controlled pain   PONV: No   Neuro/Psych: Uneventful            Sign Out: Acceptable/Baseline neuro status   Airway/Respiratory: Uneventful            Sign Out: Acceptable/Baseline resp. status   CV/Hemodynamics: Uneventful            Sign Out: Acceptable CV status; No obvious hypovolemia; No obvious fluid overload   Other NRE: NONE   DID A NON-ROUTINE EVENT OCCUR? No           Last vitals:  Vitals:    03/01/23 1257 03/01/23 1311 03/01/23 1327   BP: (!) 141/103 127/86 (!) 116/97   Pulse: 99 95 94   Resp: 16 16 16   Temp: 36.6  C (97.9  F) 36.5  C (97.7  F) 36.7  C (98  F)   SpO2: 95% 94% 93%       Electronically Signed By: Philip Yi MD  March 1, 2023  1:38 PM

## 2023-03-01 NOTE — PROCEDURES
"Nikolay Lora is a 32 year old  year old male patient.  [id number]  @DX@    [facility] [place]   ECT Procedure Note   03/01/2023    Patient Status: Inpatient    Is this the first in a series of 12 treatments?  Yes   No Known Allergies    Weight:  165 lbs 8 oz         Indications for ECT:   Medications ineffective  Imminent risk of suicide         Clinical Narrative:   Nikolay Lora is a 32 year old man with affective dysregulation, ADHD and polysubstance use (alcohol*, amphetamine*, cocaine, cannabis) who is hospitalized with progressive depression leading to suicidal ideation with planning on 1/26, in the context of  medication non compliance and losing his place at sober living due to reported methamphetamine use. Throughout his hospital stay medication adjustments have been made (restarted on sertraline, titrated risperidone, added clozapine, which is being titrated); however, intense suicidal ideation has continued and he has been having difficulty tolerating medication changes. This consultation is requested to evaluate candidacy for electroconvulsive therapy (ECT).      We utilized phone translation services in [facility] during this visit to ensure thoroughness, otherwise he can communicate in English well.  The patient shares he cannot stop contemplating suicide with a plan to shoot himself as soon as he is out of the hospital. He reports visual hallucinations of \"lanterns and black birds flying around\" with commanding auditory hallucinations telling him that he is \"worthless\" and \"should kill (himself)\". Although hallucinations got better since admission with medication changes, depression and suicidal contemplation has persisted, he is interested in ECT, hoping for a quicker relief.         Diagnosis:   Schizoaffective Disorder, depressed  Polysubstance Use Disorder in a controlled environment          Assessment:   Considering the clinical acuity  electroconvulsive therapy (ECT) appears " "appropriate; despite multifactorial nature of the presentation that would benefit from optimization of cognitive, behavioral and social interventions longitudinally.      Discussed all relevant aspects of ECT with patient, including risks of memory loss, HA, nausea, death <1/50,000, driving prohibition; possible lack of benefit or relapse after successful treatment, alternatives, right to decline, possible outpatient procedures. All questions answered, patient will have opportunity to review ECT video.         Pause for the Cause:     Right patient Yes   Right procedure/laterality settings: Yes          Intra-Procedure Documentation:     #1 02/27/23 pt says he was feeling \"anxious but content\"  #2 03/01/23 no auditory hallucinations, concern for visual hallucinations of bugs in his room. Still actively suicidal with a plan with gun or kitchen knife.     ECT #: 2   Treatment number this series: 2   Total treatment number: 2     Type of ECT:  Bilateral, standard    ECT Medications:    Brevital: 90mg  Succinyl Choline: 80mg    ECT Strip Summary:   Energy Level: 144 mC, 20 Hz, 25% intensity, 4.5 sec, 800 mA  Motor Seizure Duration: 35 seconds  EEG Seizure Duration: 76 seconds    Complications: No    Plan:   Continue  ECT Q MWF at  144 mC, 20 Hz, 25% intensity, 4.5 sec, 800 mA  Monitor depression severity with clinical assessment augmented with CUDOS every other treatment  Continue current medications    Shameka Berrios M.D.,Ph.D.   of Psychiatry  Pager [phone]  "

## 2023-03-01 NOTE — PLAN OF CARE
"Patient presents as quiet and socially withdrawn with a flat affect.  Upon greeting Haseeb this afternoon, he immediately stated, \"I'm still suicidal, and I would get a gun and shoot myself in the head!\".  He denies any intent to carry out self harm here in the hospital, and he is able to contract for safety.  He agrees to alert unit staff with any worsening of these target symptoms.  Haseeb denies any CAH at this time.  He endorses severe depressive symptoms, noting that \"I don't see any point in living- I'm hopeless, I'm jobless\".  He was given emotional support and reassurance.  We reviewed his plan of care, and Haseeb remains cooperative with it.  He reports that he is tolerating the Clozapine titration well overall.  He reports occasional hypersalivation, but he was not interested in using PRN Atropine drops this evening.  Haseeb is aware of the plan to have ECT #2 tomorrow morning, and he was cooperative with RN writer removing all food and fluids from his room at 22:00 this evening.  Haseeb c/o cold symptoms and was given a dose of PRN Robitussin at 17:58.  He is also c/o excessive gas and bloating, so he was given a dose of PRN Maalox at 21:23.  Apart from these concerns, Haseeb denies any acute physical discomfort at this time.  /77   Pulse 102   Temp 98.2  F (36.8  C) (Oral)   Resp 16   Ht 1.6 m (5' 3\")   Wt 75.1 kg (165 lb 8 oz)   SpO2 99%   BMI 29.32 kg/m          "

## 2023-03-01 NOTE — ANESTHESIA CARE TRANSFER NOTE
Patient: Link Lora    Procedure: * No procedures listed *       Diagnosis: * No pre-op diagnosis entered *  Diagnosis Additional Information: No value filed.    Anesthesia Type:   General     Note:    Oropharynx: oropharynx clear of all foreign objects  Level of Consciousness: awake  Oxygen Supplementation: room air    Independent Airway: airway patency satisfactory and stable  Dentition: dentition unchanged  Vital Signs Stable: post-procedure vital signs reviewed and stable  Report to RN Given: handoff report given  Patient transferred to: PACU    Handoff Report: Identifed the Patient, Identified the Reponsible Provider, Reviewed the pertinent medical history, Discussed the surgical course, Reviewed Intra-OP anesthesia mangement and issues during anesthesia, Set expectations for post-procedure period and Allowed opportunity for questions and acknowledgement of understanding      Vitals:  Vitals Value Taken Time   BP     Temp     Pulse     Resp     SpO2         Electronically Signed By: Philip Yi MD  March 1, 2023  12:59 PM

## 2023-03-01 NOTE — PROGRESS NOTES
Patient adequate for discharge . Report called to unit nurse Geovanni LINDO   . Iv removed and hemostasis achieved less than 2 min.  Patient safely transported back to unit with  staff persons.

## 2023-03-01 NOTE — PROGRESS NOTES
"[facility], [place]   Psychiatric Progress Note  Hospital Day: 33        Interim History:   The patient's care was discussed with the treatment team during the daily team meeting and/or staff's chart notes were reviewed.  Staff report patient was calm and pleasant. Continues to report significant depressive symptoms, including active SI and hopelessness. Remains isolative to his room though is attending some groups. Appears disheveled. Medication adherent. Tolerating clozapine well but reported hypersalivation at times. Declining atropine drops. Eating good amounts. Sleeping relatively well. NPO since midnight for scheduled ECT.     Upon interview, Haseeb was lying in bed but awake. He has not had any VH/AH in over one week but continues to experience active SI with plan to shoot himself with a handgun. He added \"My depression can't get any higher right now.\" SI has remained unchanged since admission. He denies side effects from medications and ECT other than noticing that \"my memory isn't the best right now.\" He does believe he has been more forgetful. He was oriented x2. He knew the date today but believed it was Monday. Also reported ongoing \"panic attacks\" and nightmares. Noted that overall, anxiety has improved since admission, however.     Suicidal ideation: Haseeb reports ongoing active SI with plan to shoot himself with a gun. Denies intent in the hospital. [place] for safety.     Homicidal ideation: denies current or recent homicidal ideation or behaviors.    Psychotic symptoms: Denying psychotic symptoms    Medication side effects reported: as above    Acute medical concerns: Reported increase gas/flatulence.     Other issues reported by patient: Patient had no further questions or concerns.           Medications:       benztropine  1 mg Oral At Bedtime     cloZAPine  300 mg Oral At Bedtime     fluticasone  2 spray Both Nostrils Daily     gabapentin  300 mg Oral TID     " "loratadine  10 mg Oral Daily     metFORMIN  500 mg Oral BID w/meals     metoprolol succinate ER  12.5 mg Oral Daily     nicotine  1 patch Transdermal Daily     nicotine   Transdermal Q8H     pantoprazole  40 mg Oral QAM AC     senna-docusate  1 tablet Oral BID     sertraline  200 mg Oral Daily          Allergies:   No Known Allergies       Labs:     No results found for this or any previous visit (from the past 24 hour(s)).       Psychiatric Examination:     /78 (BP Location: Left arm, Patient Position: Supine, Cuff Size: Adult Regular)   Pulse 94   Temp 97.2  F (36.2  C) (Temporal)   Resp 16   Ht 1.6 m (5' 3\")   Wt 75.1 kg (165 lb 8 oz)   SpO2 94%   BMI 29.32 kg/m    Weight is 165 lbs 8 oz  Body mass index is 29.32 kg/m .    Weight over time:  Vitals:    01/27/23 1718   Weight: 75.1 kg (165 lb 8 oz)       Orthostatic Vitals     None        Cardiometabolic risk assessment. 01/30/23    Reviewed patient profile for cardiometabolic risk factors    Date taken /Value  REFERENCE RANGE   Abdominal Obesity  (Waist Circumference)   See nursing flowsheet Women ?35 in (88 cm)   Men ?40 in (102 cm)      Triglycerides  Triglycerides   Date Value Ref Range Status   01/28/2023 192 (H) <150 mg/dL Final       ?150 mg/dL (1.7 mmol/L) or current treatment for elevated triglycerides   HDL cholesterol  Direct Measure HDL   Date Value Ref Range Status   01/28/2023 41 >=40 mg/dL Final   ]   Women <50 mg/dL (1.3 mmol/L) in women or current treatment for low HDL cholesterol  Men <40 mg/dL (1 mmol/L) in men or current treatment for low HDL cholesterol     Fasting plasma glucose (FPG) Lab Results   Component Value Date     01/28/2023      FPG ?100 mg/dL (5.6 mmol/L) or treatment for elevated blood glucose   Blood pressure  BP Readings from Last 3 Encounters:   03/01/23 106/78   01/27/23 118/74   07/20/22 112/79    Blood pressure ?130/85 mmHg or treatment for elevated blood pressure   Family History  See family history " "    Appearance: awake, alert, dressed in hospital scrubs and unkempt  Attitude:  cooperative, calm  Eye Contact: fair  Mood:  \"exhausted\"  Affect:  mood congruent and intensity is blunted  Speech:  clear, coherent. appropriate  Language: fluent and intact in English  Psychomotor, Gait, Musculoskeletal:  no evidence of tardive dyskinesia, dystonia, or ticsNo cogwheeling present. Mild paratonia in BUE on exam. Denies significant tremor or abnormal movements   Throught Process:  Linear, impoverished   Associations:  no loose associations  Thought Content:  active suicidal ideation present, auditory hallucinations present and visual hallucinations present  Insight:  fair  Judgement:  poor  Oriented to:  time, person, and place  Attention Span and Concentration:  fair  Recent and Remote Memory:  fair  Fund of Knowledge:  appropriate    Clinical Global Impressions  First:  Considering your total clinical experience with this particular patient population, how severe are the patient's symptoms at this time?: 7 (01/28/23 1341)    Most recent:  Compared to the patient's condition at the START of treatment, this patient's condition is: 4           Precautions:     Behavioral Orders   Procedures     Code 1 - Restrict to Unit     Code 2     For imaging     Electroconvulsive therapy     Series of up to 12 treatments. Begin Date: 2/27/23     Treating Psychiatrist providing ECT:  Dr. Kearns     Notified on:  2/23/23     Electroconvulsive therapy     For acute ECT series, MWF, up to 12 treatment.     Electroconvulsive therapy     Series of up to 12 treatments. Begin Date: 02/26/23     Treating Psychiatrist providing ECT: Clifton  Notified on: 02/24/23     Fall precautions     Routine Programming     As clinically indicated     Status 15     Every 15 minutes.     Suicide precautions     Patients on Suicide Precautions should have a Combination Diet ordered that includes a Diet selection(s) AND a Behavioral Tray selection for Safe " "Tray - with utensils, or Safe Tray - NO utensils       Withdrawal precautions          Diagnoses:     Active suicidal ideation with intent outside of hospital setting  MDD, recurrent, severe with psychotic features vs Schizoaffective Disorder, depressive type  Polysubstance use  Unspecified mood disorder  ADHD, inattentive type per chart  History of idiopathic hypersomnolence per chart  Nicotine use disorder, dependence and withdrawal   Allergies- chronic uticaria and rhinitis per chart  HLD per chart          Assessment & Plan:     Assessment and hospital summary:  This patient is a 32 year old male with history of mood disorder, ADHD and substance use who presented to [facility] ED with SI on 1/26 in context of reported methamphetamine use and being kicked out of sober living. Medically cleared in ED, placed on 72HH and admitted to 12N. Limited historian, giving inconsistent reports about substance use, UDS negative, very somnolent, endorsing ongoing SI and some psychosis symptoms. PTA medications continued with exception of finasteride as patient states he takes \"1/4 a pill\" and declined ordered dose, will defer to primary team to address. Will continue to evaluate safety and stabilization further as patient more able to engage in assessments.      Inpatient psychiatric hospitalization is warranted at this time for safety, stabilization, and possible adjustment in medications.    Patient reports that he abruptly stopped Zoloft one week prior to his admission. He developed discontinuation syndrome symptoms following that. He reports that he does not have a history of psychosis but is experiencing psychotic symptoms. He is amenable with plan to trial risperidone after R/B/A were discussed. Risperidone was initiated on 1/30 and titrated to a total of 3 mg daily on 2/1. Clonidine, used for ADHD, was reduced from a total of 0.3 mg daily to 0.2 mg daily on 2/3 due to concern for hypotension. 2/9: Cardiology consult " placed. EKG- tachycardia 121 bpm, QTc 465 ms, Abnormal QRS angle and T wave abnormality. COVID negative and labs are unremarkable.  On 2/10, clozapine was held pending additional workup from IM and cardiology. Pericarditis was ruled out and metoprolol was started. Clozapine was restarted on 2/13 with plan to cautiously titrate to lowest effective dose. 2/23/23: Clozapine titration schedule increased 25 mg/day. ECT and IM consults for ECT clearance placed. Risperidone was discontinued on 2/28.     Psychiatric treatment/inteventions:  Medications:   -Continue clozapine titration by 25 mg daily while monitoring closely for side effects. Titration has been ordered through 3/1.  Check clozapine level on Friday.   -Continue PTA sertraline 150mg daily for mood  -Continue PTA gabapentin 300mg TID for anxiety   -Continue Cogentin 1 mg at bedtime for possible EPSE     -PRN hydroxyzine 25mg every 4 hour for anxiety  -PRN trazodone 50mg at bedtime for sleep   -PRN olanzapine 10mg PO or IM TID for agitation/psychosis   -PRN atropine 1% SL drops, 2 drops q2h for sialorrhea    Spiritual services consult placed on 2/6. Pt is Spiritism. Appreciate assistance.     ECT:  - Medically cleared on 2/24. See IM note for details.  - ECT consult placed. Appreciate assistance.   - ECT orders placed  - HOLD Gabapentin on nights prior to ECT and on ECT mornings until after ECT.   - ECT #2 completed today. Uneventful. Next ECT treatment scheduled for 3/3. Baseline IDS-SR was 55. Hard copy placed in chart.      Laboratory/Imaging: reviewed: Covid negative; UDS negative; CMP, CBC, TSH, Lipid panel, HgbA1c ordered to complete admission labs. Reviewed.     2/9/23: Troponin, CK, CBC, BMP: Unremarkable, CRP elevated to 38.18, COVID: Negative  2/10/23: Add Influenza A/B given flu-like sx-negative  Patient will be treated in therapeutic milieu with appropriate individual and group therapies as described.     Medical treatment/interventions:  Medical  concerns:   Nicotine replacement ordered, educate patient on benefits of cessation, pt unclear about finasteride dose, will hold until further information is obtained. PTA PRN zyrtec, azelastine, fluticasone, triamcinolone and epipen ordered for allergies and PRN ammoniaum lactate, Eucerin for  dry skin.    Flatulence:   - Add simethicone prn    Neuroleptic induced wt gain:  - Monitor weights  - Start metformin 500 mg BID with meals    Dyslipidemia: Will arrange follow up with PCP to address. Discussed importance of healthy eating habits and regular exercise. Will consider nutrition consult if patient amenable.     Orthostasis likely 2/2 clozapine: Pt is not hypotensive but reports orthostatic symptoms. He reported restricting fluid intake to reduce sialorrhea   - Continue to monitor vital signs closely  - Encourage fluids  - Discontinued clonidine    Sialorrhea 2/2 clozapine:  - Atropine 1% SL drops prn  - Recommend towel on pillow when sleeping    Chest pain/tachycardia/EKG changes (2/9):  - Pain improved with PRN medications.   - Cardiology consult placed on 2/9. See note on that date for details.   - ECHO reviewed and unremarkable.  - Writer discussed care with both Dr. Streeter from [facility] and Christina from . Plan for now is to HOLD clozapine until further medical workup. Dr. Streeter explained that myocarditis has been ruled out as troponin was negative. Pericarditis remains on differential, but he does not have EKG findings or a pericardial effusion. IM will assess for pericardial friction rub and pleuritic chest pain. IM seen by patient and Metoprolol was started.     Update 2/13: Discussed care with Annette from  today on two occasions. Please see her note on this date for details. She does not suspect that this is pericarditis. He does not have pleuritic pain, characteristic CP, EKG changes, or pericardial effusion on ECHO. Therefore, will cautiously restart clozapine while monitoring closely for side effects.  May consider further increase in metoprolol if necessary. PPI was started due to suspected GERD.     Update 2/24 per IM note:  Medicine is following peripherally for concerns for pericarditis.  See detailed note from 2/13.  No pericardial friction rub noted while sitting up and leaning forward today.  Patient states that his chest discomfort is getting better after starting the Protonix yesterday.  It does appear that he has also been using the Maalox.  Patient does have Tums available as well.  Please be mindful for any constipation with overuse of the PRNs.     POC:  - Continue Protonix daily for 8 weeks, then taper off  - Recommend lifestyle changes including weight loss head of bed elevation avoidance of late-night eating and specific food elimination such as chocolate caffeine alcohol acidic and/or spicy food.  - Watch for constipation with PRNs for heartburn  - We will schedule senna 1 tab twice daily  - MiraLAX daily as needed added on     Medicine will sign off, please contact us for any acute changes.      Sore throat/cough/nasal congestion, resolved:   - COVID negative on 2/9. Repeat COVID test and Influenza A/B neg on 2/10.  - Cepacol lozenges added  - PRN Tylenol   - Consulted with IM. Appreciate assistance. See their notes for details.      Legal Status: VOLUNTARY. 72 hour hold discontinued. Pt agreed to sign in on voluntary basis and discharge directly to treatment once stable.      Safety Assessment:        Behavioral Orders   Procedures     Code 1 - Restrict to Unit     Routine Programming       As clinically indicated     Status 15       Every 15 minutes.     Suicide precautions       Patients on Suicide Precautions should have a Combination Diet ordered that includes a Diet selection(s) AND a Behavioral Tray selection for Safe Tray - with utensils, or Safe Tray - NO utensils        Withdrawal precautions      Pt has not required locked seclusion or restraints in the past 24 hours to maintain  safety, please refer to RN documentation for further details.    Discontinued SIO on 2/8 as patient is heriberto for safety. Monitor SI closely.     The risks, benefits, alternatives and side effects have been discussed and are understood by the patient.     Disposition: Pending clinical stabilization. Pt remains actively suicidal. [facility]      This note was created by undersigned using a Dragon dictation system. All typing errors or contextual distortion are unintentional and software inherent.     Entered by: Nelly Brown MD on 3/1/2023 at 9:16 AM

## 2023-03-01 NOTE — ANESTHESIA PREPROCEDURE EVALUATION
Anesthesia Pre-Procedure Evaluation    Patient: Nikolay Lora   MRN: [id number] : 1990        Procedure :           Past Medical History:   Diagnosis Date     Brugada syndrome      Chronic idiopathic urticaria      Concussion with loss of consciousness      Mixed hyperlipidemia      Polysubstance abuse (H)      Schizotypal personality disorder (H)       No past surgical history on file.   No Known Allergies   Social History     Tobacco Use     Smoking status: Former     Smokeless tobacco: Never     Tobacco comments:     1-2 cigs per day   Substance Use Topics     Alcohol use: Yes      Wt Readings from Last 1 Encounters:   23 75.1 kg (165 lb 8 oz)        Anesthesia Evaluation   Pt has not had prior anesthetic         ROS/MED HX  ENT/Pulmonary:       Neurologic:       Cardiovascular: Comment: Repolarization abnormality. brugada ???. Authorized by cardiology. Negative history of syncope, seizures. Family history negative      METS/Exercise Tolerance:     Hematologic:       Musculoskeletal:       GI/Hepatic:       Renal/Genitourinary:       Endo:       Psychiatric/Substance Use:       Infectious Disease:       Malignancy:       Other:            Physical Exam    Airway        Mallampati: III   TM distance: > 3 FB   Neck ROM: full   Mouth opening: > 3 cm    Respiratory Devices and Support         Dental           Cardiovascular   cardiovascular exam normal          Pulmonary   pulmonary exam normal                OUTSIDE LABS:  CBC:   Lab Results   Component Value Date    WBC 8.0 2023    WBC 15.2 (H) 2023    HGB 15.0 2023    HGB 15.8 2023    HCT 46.1 2023    HCT 48.3 2023     2023     2023     BMP:   Lab Results   Component Value Date     2023     2023    POTASSIUM 3.9 2023    POTASSIUM 4.0 2023    CHLORIDE 103 2023    CHLORIDE 100 2023    CO2 27 2023    CO2 27 2023    BUN 19.5 2023     BUN 12.5 02/09/2023    CR 0.81 02/24/2023    CR 0.81 02/09/2023    GLC 93 02/24/2023     (H) 02/09/2023     COAGS: No results found for: PTT, INR, FIBR  POC: No results found for: BGM, HCG, HCGS  HEPATIC:   Lab Results   Component Value Date    ALBUMIN 4.1 02/24/2023    PROTTOTAL 7.1 02/24/2023    ALT 58 (H) 02/24/2023    AST 29 02/24/2023    ALKPHOS 82 02/24/2023    BILITOTAL 0.3 02/24/2023     OTHER:   Lab Results   Component Value Date    A1C 5.4 01/28/2023    KATINA 9.4 02/24/2023    TSH 1.09 01/28/2023       Anesthesia Plan    ASA Status:  2      Anesthesia Type: General.              Consents    Anesthesia Plan(s) and associated risks, benefits, and realistic alternatives discussed. Questions answered and patient/representative(s) expressed understanding.    - Discussed:     - Discussed with:  Patient         Postoperative Care    Pain management: Oral pain medications, IV analgesics.   PONV prophylaxis: Ondansetron (or other 5HT-3)     Comments:           H&P reviewed: Unable to attach H&P to encounter due to EHR limitations. H&P Update: appropriate H&P reviewed, patient examined. No interval changes since H&P (within 30 days).             Philip Yi MD

## 2023-03-01 NOTE — PLAN OF CARE
Goal Outcome Evaluation:    Pt observed resting in bed most of the morning shift isolative and withdrawn to self. Pt presents with depressed affect, calm mood this shift. Pt did not attend any unit groups this shift. Pt continues to endorse SI/SIB  thoughts with a plan of shooting self or stabbing self with a knife if out of the hospital. Pt contracts for safety while in the unit. Pt hopeful of ECT treatment with his symptoms. Pt had ECT #2 this shift with no side effects. Pt reported sleep as so so and appetite as adequate. Staff will continue to monitor and support with current POC.    Plan of Care Reviewed With: patient

## 2023-03-01 NOTE — PLAN OF CARE
Assessment/Intervention/Current Symtoms and Care Coordination:   Chart Review  Team Meeting  Pt will return to [facility] when stable.   CTC to call the intake person, Stacey Romero [phone], at [facility] when pt is ready for discharge.   Spoke to [place] Care Coordinator who scheduled a phone assessment for pt on 3/7 at 1pm with Juan Jose Holguin who is the supervisor for pt's Targeted CM Anuradha Silva at [phone].      Discharge Plan or Goal:  Return to [facility] when stable.      Barriers to Discharge:    Stabilization of mental health symptoms.  Pt started ECT treatment and has second treatment today.      Referral Status:  No referrals made yet this hospitalization.      Legal Status:   Voluntary

## 2023-03-02 LAB
BASOPHILS # BLD AUTO: 0.1 10E3/UL (ref 0–0.2)
BASOPHILS NFR BLD AUTO: 0 %
EOSINOPHIL # BLD AUTO: 0.1 10E3/UL (ref 0–0.7)
EOSINOPHIL NFR BLD AUTO: 1 %
HOLD SPECIMEN: NORMAL
IMM GRANULOCYTES # BLD: 0.1 10E3/UL
IMM GRANULOCYTES NFR BLD: 1 %
LYMPHOCYTES # BLD AUTO: 1.7 10E3/UL (ref 0.8–5.3)
LYMPHOCYTES NFR BLD AUTO: 13 %
MONOCYTES # BLD AUTO: 0.8 10E3/UL (ref 0–1.3)
MONOCYTES NFR BLD AUTO: 7 %
NEUTROPHILS # BLD AUTO: 10 10E3/UL (ref 1.6–8.3)
NEUTROPHILS NFR BLD AUTO: 78 %
NRBC # BLD AUTO: 0 10E3/UL
NRBC BLD AUTO-RTO: 0 /100
SARS-COV-2 RNA RESP QL NAA+PROBE: NEGATIVE
WBC # BLD AUTO: 12.7 10E3/UL (ref 4–11)

## 2023-03-02 PROCEDURE — 124N000002 HC R&B MH UMMC

## 2023-03-02 PROCEDURE — 36415 COLL VENOUS BLD VENIPUNCTURE: CPT | Performed by: PSYCHIATRY & NEUROLOGY

## 2023-03-02 PROCEDURE — 250N000013 HC RX MED GY IP 250 OP 250 PS 637: Performed by: PSYCHIATRY & NEUROLOGY

## 2023-03-02 PROCEDURE — 99233 SBSQ HOSP IP/OBS HIGH 50: CPT | Mod: GC | Performed by: PSYCHIATRY & NEUROLOGY

## 2023-03-02 PROCEDURE — 250N000013 HC RX MED GY IP 250 OP 250 PS 637

## 2023-03-02 PROCEDURE — 250N000009 HC RX 250: Performed by: STUDENT IN AN ORGANIZED HEALTH CARE EDUCATION/TRAINING PROGRAM

## 2023-03-02 PROCEDURE — 85004 AUTOMATED DIFF WBC COUNT: CPT | Performed by: PSYCHIATRY & NEUROLOGY

## 2023-03-02 PROCEDURE — U0003 INFECTIOUS AGENT DETECTION BY NUCLEIC ACID (DNA OR RNA); SEVERE ACUTE RESPIRATORY SYNDROME CORONAVIRUS 2 (SARS-COV-2) (CORONAVIRUS DISEASE [COVID-19]), AMPLIFIED PROBE TECHNIQUE, MAKING USE OF HIGH THROUGHPUT TECHNOLOGIES AS DESCRIBED BY CMS-2020-01-R: HCPCS | Performed by: PSYCHIATRY & NEUROLOGY

## 2023-03-02 RX ORDER — ATROPINE SULFATE 10 MG/ML
2 SOLUTION/ DROPS OPHTHALMIC AT BEDTIME
Status: DISCONTINUED | OUTPATIENT
Start: 2023-03-02 | End: 2023-04-06

## 2023-03-02 RX ADMIN — ACETAMINOPHEN 325MG 650 MG: 325 TABLET ORAL at 12:39

## 2023-03-02 RX ADMIN — METFORMIN HYDROCHLORIDE 500 MG: 500 TABLET, FILM COATED ORAL at 08:17

## 2023-03-02 RX ADMIN — CLOZAPINE 300 MG: 200 TABLET ORAL at 20:02

## 2023-03-02 RX ADMIN — ATROPINE SULFATE 2 DROP: 10 SOLUTION/ DROPS OPHTHALMIC at 20:02

## 2023-03-02 RX ADMIN — LORATADINE 10 MG: 10 TABLET ORAL at 08:17

## 2023-03-02 RX ADMIN — BENZTROPINE MESYLATE 1 MG: 1 TABLET ORAL at 20:02

## 2023-03-02 RX ADMIN — GABAPENTIN 300 MG: 300 CAPSULE ORAL at 08:17

## 2023-03-02 RX ADMIN — GABAPENTIN 300 MG: 300 CAPSULE ORAL at 15:09

## 2023-03-02 RX ADMIN — METFORMIN HYDROCHLORIDE 500 MG: 500 TABLET, FILM COATED ORAL at 18:13

## 2023-03-02 RX ADMIN — NICOTINE 1 PATCH: 21 PATCH, EXTENDED RELEASE TRANSDERMAL at 08:18

## 2023-03-02 RX ADMIN — Medication 12.5 MG: at 08:17

## 2023-03-02 RX ADMIN — SERTRALINE HYDROCHLORIDE 200 MG: 100 TABLET ORAL at 08:18

## 2023-03-02 RX ADMIN — SENNOSIDES AND DOCUSATE SODIUM 1 TABLET: 50; 8.6 TABLET ORAL at 20:02

## 2023-03-02 RX ADMIN — FLUTICASONE PROPIONATE 2 SPRAY: 50 SPRAY, METERED NASAL at 08:18

## 2023-03-02 RX ADMIN — PANTOPRAZOLE SODIUM 40 MG: 40 TABLET, DELAYED RELEASE ORAL at 07:52

## 2023-03-02 RX ADMIN — SENNOSIDES AND DOCUSATE SODIUM 1 TABLET: 50; 8.6 TABLET ORAL at 08:17

## 2023-03-02 ASSESSMENT — ACTIVITIES OF DAILY LIVING (ADL)
ADLS_ACUITY_SCORE: 29

## 2023-03-02 NOTE — PROGRESS NOTES
"[facility], [place]   Psychiatric Progress Note  Hospital Day: 34        Interim History:   The patient's care was discussed with the treatment team during the daily team meeting and/or staff's chart notes were reviewed.  Staff report patient appeared withdrawn socially, declined invitations to socialize and was calm and cooperative with staff. He did not attend groups and continues to endorse SI w/plan upon discharge but contracts for safety during hospitalization. He denies AVH, headache, dizziness or pain.  ECT session 2 completed without complications.  Slept throughout the night.     Upon interview Haseeb reports feeling \"exhausted unhappy and sad\". He notes ECT has been helpful for anxiety but feelings of depression are not changes. He continues to endorse SI with plan to kill himself via gun or knife upon discharge due to feelings of hopelessness, guilt and that his life isn't important. Haseeb states if his mood and outlook improved he wouldn't likely be suicidal. He agreed to attempt attending groups today.  Notably Haseeb reports recalling just one session of ECT and reports short term memory deficits after ECT. Sleep has been restless with frequent wake ups, often to urinate and denies aspiration overnight. Orthostasis symptoms have resolved. He was amenable to scheduling atropine drops for ongoing sialorrhea. Regarding health he reports a BM this morning and daily BMs.  He reports a 1 week history of non-productive cough, right arm soreness with abduction. He denies fever, chills, nausea, vomiting, diarrhea.     Suicidal ideation: Haseeb reports ongoing active SI with plan and intent to shoot himself with a gun or cut his throat upon discharge. Denies suicide plan or intent in the hospital. [place] for safety.     Homicidal ideation: denies current or recent homicidal ideation or behaviors.    Psychotic symptoms: Denying psychotic symptoms    Medication side effects reported: " "as above    Acute medical concerns: Reported right arm pain with abduction, non-productive cough with intermittent chest pain when coughing    Other issues reported by patient: Patient had no further questions or concerns.           Medications:       benztropine  1 mg Oral At Bedtime     cloZAPine  300 mg Oral At Bedtime     fluticasone  2 spray Both Nostrils Daily     gabapentin  300 mg Oral TID     loratadine  10 mg Oral Daily     metFORMIN  500 mg Oral BID w/meals     metoprolol succinate ER  12.5 mg Oral Daily     nicotine  1 patch Transdermal Daily     nicotine   Transdermal Q8H     pantoprazole  40 mg Oral QAM AC     senna-docusate  1 tablet Oral BID     sertraline  200 mg Oral Daily          Allergies:   No Known Allergies       Labs:     Recent Results (from the past 24 hour(s))   WBC and Differential    Collection Time: 03/02/23  8:44 AM   Result Value Ref Range    WBC Count 12.7 (H) 4.0 - 11.0 10e3/uL    % Neutrophils 78 %    % Lymphocytes 13 %    % Monocytes 7 %    % Eosinophils 1 %    % Basophils 0 %    % Immature Granulocytes 1 %    NRBCs per 100 WBC 0 <1 /100    Absolute Neutrophils 10.0 (H) 1.6 - 8.3 10e3/uL    Absolute Lymphocytes 1.7 0.8 - 5.3 10e3/uL    Absolute Monocytes 0.8 0.0 - 1.3 10e3/uL    Absolute Eosinophils 0.1 0.0 - 0.7 10e3/uL    Absolute Basophils 0.1 0.0 - 0.2 10e3/uL    Absolute Immature Granulocytes 0.1 <=0.4 10e3/uL    Absolute NRBCs 0.0 10e3/uL          Psychiatric Examination:     /79   Pulse 96   Temp 97.6  F (36.4  C) (Temporal)   Resp 16   Ht 1.6 m (5' 3\")   Wt 75.1 kg (165 lb 8 oz)   SpO2 96%   BMI 29.32 kg/m    Weight is 165 lbs 8 oz  Body mass index is 29.32 kg/m .    Weight over time:  Vitals:    01/27/23 1718   Weight: 75.1 kg (165 lb 8 oz)       Orthostatic Vitals     None        Cardiometabolic risk assessment. 01/30/23    Reviewed patient profile for cardiometabolic risk factors    Date taken /Value  REFERENCE RANGE   Abdominal Obesity  (Waist " "Circumference)   See nursing flowsheet Women ?35 in (88 cm)   Men ?40 in (102 cm)      Triglycerides  Triglycerides   Date Value Ref Range Status   01/28/2023 192 (H) <150 mg/dL Final       ?150 mg/dL (1.7 mmol/L) or current treatment for elevated triglycerides   HDL cholesterol  Direct Measure HDL   Date Value Ref Range Status   01/28/2023 41 >=40 mg/dL Final   ]   Women <50 mg/dL (1.3 mmol/L) in women or current treatment for low HDL cholesterol  Men <40 mg/dL (1 mmol/L) in men or current treatment for low HDL cholesterol     Fasting plasma glucose (FPG) Lab Results   Component Value Date     01/28/2023      FPG ?100 mg/dL (5.6 mmol/L) or treatment for elevated blood glucose   Blood pressure  BP Readings from Last 3 Encounters:   03/02/23 117/79   01/27/23 118/74   07/20/22 112/79    Blood pressure ?130/85 mmHg or treatment for elevated blood pressure   Family History  See family history     Appearance: awake, alert, dressed in hospital scrubs and unkempt  Attitude:  cooperative, calm  Eye Contact: fair  Mood:  \"exhausted, unhappy, sad\"  Affect:  mood congruent and intensity is blunted  Speech:  clear, coherent. appropriate  Language: fluent and intact in English  Psychomotor, Gait, Musculoskeletal:  no evidence of tardive dyskinesia, dystonia, or tics  Throught Process:  Linear, ruminative  Associations:  No evidence of loose associations  Thought Content:  SI w/plan and intent upon discharge, no plan or intent while in hospital. Denies hallucinations  Insight:  fair  Judgement:  poor  Oriented to:  time, person, and place  Attention Span and Concentration:  fair  Recent and Remote Memory:  fair  Fund of Knowledge:  appropriate    Clinical Global Impressions  First:  Considering your total clinical experience with this particular patient population, how severe are the patient's symptoms at this time?: 7 (01/28/23 1341)    Most recent:  Compared to the patient's condition at the START of treatment, this " "patient's condition is: 4           Precautions:     Behavioral Orders   Procedures     Code 1 - Restrict to Unit     Code 2     For imaging     Electroconvulsive therapy     Series of up to 12 treatments. Begin Date: 2/27/23     Treating Psychiatrist providing ECT:  Dr. Kearns     Notified on:  2/23/23     Electroconvulsive therapy     For acute ECT series, MWF, up to 12 treatment.     Electroconvulsive therapy     Series of up to 12 treatments. Begin Date: 02/26/23     Treating Psychiatrist providing ECT: Clifton  Notified on: 02/24/23     Fall precautions     Routine Programming     As clinically indicated     Status 15     Every 15 minutes.     Suicide precautions     Patients on Suicide Precautions should have a Combination Diet ordered that includes a Diet selection(s) AND a Behavioral Tray selection for Safe Tray - with utensils, or Safe Tray - NO utensils            Diagnoses:     Active suicidal ideation with intent outside of hospital setting  MDD, recurrent, severe with psychotic features vs Schizoaffective Disorder, depressive type  Polysubstance use  Unspecified mood disorder  ADHD, inattentive type per chart  History of idiopathic hypersomnolence per chart  Nicotine use disorder, dependence and withdrawal   Allergies- chronic uticaria and rhinitis per chart  HLD per chart          Assessment & Plan:     Assessment and hospital summary:  This patient is a 32 year old male with history of mood disorder, ADHD and substance use who presented to [place] ED with SI on 1/26 in context of reported methamphetamine use and being kicked out of sober living. Medically cleared in ED, placed on 72HH and admitted to 12N. Limited historian, giving inconsistent reports about substance use, UDS negative, very somnolent, endorsing ongoing SI and some psychosis symptoms. PTA medications continued with exception of finasteride as patient states he takes \"1/4 a pill\" and declined ordered dose, will defer to primary team to " address. Will continue to evaluate safety and stabilization further as patient more able to engage in assessments.      Inpatient psychiatric hospitalization is warranted at this time for safety, stabilization, and possible adjustment in medications.    Patient reports that he abruptly stopped Zoloft one week prior to his admission. He developed discontinuation syndrome symptoms following that. He reports that he does not have a history of psychosis but is experiencing psychotic symptoms. He is amenable with plan to trial risperidone after R/B/A were discussed. Risperidone was initiated on 1/30 and titrated to a total of 3 mg daily on 2/1. Clonidine, used for ADHD, was reduced from a total of 0.3 mg daily to 0.2 mg daily on 2/3 due to concern for hypotension. 2/9: Cardiology consult placed. EKG- tachycardia 121 bpm, QTc 465 ms, Abnormal QRS angle and T wave abnormality. COVID negative and labs are unremarkable.  On 2/10, clozapine was held pending additional workup from IM and cardiology. Pericarditis was ruled out and metoprolol was started. Clozapine was restarted on 2/13 with plan to cautiously titrate to lowest effective dose. 2/23/23: Clozapine titration schedule increased 25 mg/day. ECT and IM consults for ECT clearance placed. 2/27/23: ECT initiated. Risperidone was discontinued on 2/28. ECT initiated. 3/2/23    Psychiatric treatment/inteventions:  Medications:   -Continue clozapine 300 mg qhs. Pending clozapine level on Friday.   -Continue PTA sertraline 200 mg daily for mood  -Continue PTA gabapentin 300 mg TID for anxiety   -Continue Cogentin 1 mg at bedtime for possible EPSE     -PRN hydroxyzine 25mg every 4 hour for anxiety  -PRN trazodone 50mg at bedtime for sleep   -PRN olanzapine 10mg PO or IM TID for agitation/psychosis   -PRN atropine 1% SL drops, 2 drops q2h for sialorrhea    Spiritual services consult placed on 2/6. Pt is Rastafarian. Appreciate assistance.     ECT:  - Medically cleared on 2/24. See   note for details.  - ECT consult placed on 2/23.  - ECT started on 2/27/23  - HOLD Gabapentin on nights prior to ECT and on ECT mornings until after ECT.   - ECT #2 completed 3/1/23. Uneventful. Next ECT treatment scheduled for 3/3. Baseline IDS-SR was 55. Hard copy placed in chart.      Laboratory/Imaging: reviewed: Covid negative; UDS negative; CMP, CBC, TSH, Lipid panel, HgbA1c ordered to complete admission labs. Reviewed.     2/9/23: Troponin, CK, CBC, BMP: Unremarkable, CRP elevated to 38.18, COVID: Negative  2/10/23: Add Influenza A/B given flu-like sx-negative  Patient will be treated in therapeutic milieu with appropriate individual and group therapies as described.     Medical treatment/interventions:  Medical concerns:   Nicotine replacement ordered, educate patient on benefits of cessation, pt unclear about finasteride dose, will hold until further information is obtained. PTA PRN zyrtec, azelastine, fluticasone, triamcinolone and epipen ordered for allergies and PRN ammoniaum lactate, Eucerin for  dry skin.    Flatulence:   - Add simethicone prn    Neuroleptic induced wt gain:  - Monitor weights  - Start metformin 500 mg BID with meals    Dyslipidemia: Will arrange follow up with PCP to address. Discussed importance of healthy eating habits and regular exercise. Will consider nutrition consult if patient amenable.     Orthostasis likely 2/2 clozapine: Pt is not hypotensive but reports orthostatic symptoms and previously restricted fluids. Now resolved.   - Continue to monitor vital signs closely  - Encourage fluids  - Discontinued clonidine    Sialorrhea 2/2 clozapine:  - Atropine 1% SL drops qhs  - Recommend towel on pillow when sleeping    Chest pain/tachycardia/EKG changes (2/9):  - Pain improved with PRN medications.   - Cardiology consult placed on 2/9. See note on that date for details.   - ECHO reviewed and unremarkable.  - Writer discussed care with both Dr. Streeter from [facility] and Christina from .  Plan for now is to HOLD clozapine until further medical workup. Dr. Streeter explained that myocarditis has been ruled out as troponin was negative. Pericarditis remains on differential, but he does not have EKG findings or a pericardial effusion. IM will assess for pericardial friction rub and pleuritic chest pain. IM seen by patient and Metoprolol was started.     Update 2/13: Discussed care with Annette from IM today on two occasions. Please see her note on this date for details. She does not suspect that this is pericarditis. He does not have pleuritic pain, characteristic CP, EKG changes, or pericardial effusion on ECHO. Therefore, will cautiously restart clozapine while monitoring closely for side effects. May consider further increase in metoprolol if necessary. PPI was started due to suspected GERD.     Update 2/24 per IM note:  Medicine is following peripherally for concerns for pericarditis.  See detailed note from 2/13.  No pericardial friction rub noted while sitting up and leaning forward today.  Patient states that his chest discomfort is getting better after starting the Protonix yesterday.  It does appear that he has also been using the Maalox.  Patient does have Tums available as well.  Please be mindful for any constipation with overuse of the PRNs.     POC:  - Continue Protonix daily for 8 weeks, then taper off  - Recommend lifestyle changes including weight loss head of bed elevation avoidance of late-night eating and specific food elimination such as chocolate caffeine alcohol acidic and/or spicy food.  - Watch for constipation with PRNs for heartburn  - We will schedule senna 1 tab twice daily  - MiraLAX daily as needed added on     Medicine will sign off, please contact us for any acute changes.      Sore throat/cough/nasal congestion, resolved:   - COVID negative on 2/9. Repeat COVID test and Influenza A/B neg on 2/10.  - Cepacol lozenges added  - PRN Tylenol   - Consulted with IM. Appreciate  assistance. See their notes for details.      Legal Status: VOLUNTARY. 72 hour hold discontinued. Pt agreed to sign in on voluntary basis and discharge directly to treatment once stable.      Safety Assessment:        Behavioral Orders   Procedures     Code 1 - Restrict to Unit     Routine Programming       As clinically indicated     Status 15       Every 15 minutes.     Suicide precautions       Patients on Suicide Precautions should have a Combination Diet ordered that includes a Diet selection(s) AND a Behavioral Tray selection for Safe Tray - with utensils, or Safe Tray - NO utensils        Withdrawal precautions      Pt has not required locked seclusion or restraints in the past 24 hours to maintain safety, please refer to RN documentation for further details.    Discontinued SIO on 2/8 as patient is heriberto for safety. Monitor SI closely.     The risks, benefits, alternatives and side effects have been discussed and are understood by the patient.     Disposition: Pending clinical stabilization. Pt remains actively suicidal. Will likely discharge back to [facility] program once stable.      This note was created by undersigned using a Dragon dictation system. All typing errors or contextual distortion are unintentional and software inherent.     Entered by: Tha Browning MD on 3/1/2023 at 9:22 AM

## 2023-03-02 NOTE — PLAN OF CARE
"  Problem: Psychotic Signs/Symptoms  Goal: Improved Behavioral Control (Psychotic Signs/Symptoms)  Outcome: Progressing   Goal Outcome Evaluation:    Pt had ECT # 2 done this morning. Upon approach, pt was resting comfortably in bed, watching TV. He is alert and oriented to name, place, and time. He denies headaches, dizziness, and pain. Pt denies auditory and visual hallucinations. Pt continues to endorse depression and SI/SIB  thoughts with a plan of shooting or stabbing themself with a knife. Pt contracted for safety while at this hospital. Pt was isolative and withdrawn to his room this shift. He declined an invitation to come out and socialize with peers. Pt vital signs were stable /81 (BP Location: Left arm, Patient Position: Prone)   Pulse 97   Temp 98.9  F (37.2  C)   Resp 17   Ht 1.6 m (5' 3\")   Wt 75.1 kg (165 lb 8 oz)   SpO2 95%   BMI 29.32 kg/m  . Pt took scheduled medication without any issues and denied the SE's of his medication. Pt on Clizaril and had a lab draw (WBC) scheduled for tomorrow.                    "

## 2023-03-02 NOTE — PLAN OF CARE
Problem: Adult Behavioral Health Plan of Care  Goal: Absence of New-Onset Illness or Injury  Outcome: Progressing  Note: Patient reports no new-onset illness or injury     Problem: Suicidal Behavior  Goal: Suicidal Behavior is Absent or Managed  Outcome: Progressing  Note: Patient manifests no suicidal behavior     Problem: Sleep Disturbance  Goal: Adequate Sleep/Rest  Outcome: Progressing  Note: Patient seems to be sleeping during safety checks   Goal Outcome Evaluation:       Patient slept the whole night with no complaints and no requests made. Patient was cooperative with 15 minutes round with no negative behavior. Patient reports no side effect from medications or ECT treatment. Patient endorses no SI/HI, A/VH or SIB. Will continue to monitor.               Melvin Pimentel DNP, RN

## 2023-03-02 NOTE — PLAN OF CARE
"Problem: Psychotic Signs/Symptoms  Goal: Increased Participation and Engagement (Psychotic Signs/Symptoms)  Outcome: Not Progressing  Intervention: Facilitate Participation and Engagement  Flowsheets (Taken 3/2/2023 1223)  Supportive Measures:    positive reinforcement provided    self-care encouraged    verbalization of feelings encouraged    active listening utilized    Patient is alert and oriented x3, calm and cooperative. Patient is withdrawn and isolative to his room only coming out for needs; declined invitation to attend groups and socialize with peers. He was offered for shower but declined. Patient mostly resting in bed; affect is flat, mood is depressed; and insight remains poor. Patient endorses moderate depression, mild anxiety, and thoughts of self-harm upon discharge. He continues to state that voices are telling him to shoot himself/stab with a knife. Patient denies visual hallucinations, thoughts of harming others, and paranoia. No delusional thinking noted. Patient is able to contract for safety while hospitalized.    Patient is eating, hydrating and sleeping adequately. He reported feeling \"exhausted\" and has generalized pain. Patient accepted Tylenol 650 mg PRN with some relief reported. No acute medical concern. VSS /79   Pulse 96   Temp 97.6  F (36.4  C) (Temporal)   Resp 16   Ht 1.6 m (5' 3\")   Wt 75.1 kg (165 lb 8 oz)   SpO2 96%   BMI 29.32 kg/m       "

## 2023-03-02 NOTE — PLAN OF CARE
Assessment/Intervention/Current Symtoms and Care Coordination:   Chart Review  Team Meeting  Pt will return to [facility] when stable.   CTC to call the intake person, Stacey Romero [phone], at [facility] when pt is ready for discharge.   Spoke to [place] Care Coordinator who scheduled a phone assessment for pt on 3/7 at 1pm with Juan Jose Holguin who is the supervisor for pt's Targeted CM Anuradha Silva at [phone].       Discharge Plan or Goal:  Return to [facility] when stable.      Barriers to Discharge:    Stabilization of mental health symptoms.  Pt started ECT treatment and has second treatment today.      Referral Status:  No referrals made yet this hospitalization.      Legal Status:   Voluntary

## 2023-03-03 ENCOUNTER — APPOINTMENT (OUTPATIENT)
Dept: BEHAVIORAL HEALTH | Facility: CLINIC | Age: 33
End: 2023-03-03
Attending: PSYCHIATRY & NEUROLOGY
Payer: COMMERCIAL

## 2023-03-03 ENCOUNTER — ANESTHESIA EVENT (OUTPATIENT)
Dept: BEHAVIORAL HEALTH | Facility: CLINIC | Age: 33
End: 2023-03-03
Payer: COMMERCIAL

## 2023-03-03 ENCOUNTER — ANESTHESIA (OUTPATIENT)
Dept: BEHAVIORAL HEALTH | Facility: CLINIC | Age: 33
End: 2023-03-03
Payer: COMMERCIAL

## 2023-03-03 LAB
HOLD SPECIMEN: NORMAL
HOLD SPECIMEN: NORMAL

## 2023-03-03 PROCEDURE — 250N000009 HC RX 250: Performed by: STUDENT IN AN ORGANIZED HEALTH CARE EDUCATION/TRAINING PROGRAM

## 2023-03-03 PROCEDURE — 250N000013 HC RX MED GY IP 250 OP 250 PS 637: Performed by: PSYCHIATRY & NEUROLOGY

## 2023-03-03 PROCEDURE — 250N000011 HC RX IP 250 OP 636: Performed by: STUDENT IN AN ORGANIZED HEALTH CARE EDUCATION/TRAINING PROGRAM

## 2023-03-03 PROCEDURE — 250N000013 HC RX MED GY IP 250 OP 250 PS 637

## 2023-03-03 PROCEDURE — 124N000002 HC R&B MH UMMC

## 2023-03-03 PROCEDURE — 99233 SBSQ HOSP IP/OBS HIGH 50: CPT | Mod: 25 | Performed by: PSYCHIATRY & NEUROLOGY

## 2023-03-03 PROCEDURE — 90870 ELECTROCONVULSIVE THERAPY: CPT | Performed by: PSYCHIATRY & NEUROLOGY

## 2023-03-03 PROCEDURE — 370N000017 HC ANESTHESIA TECHNICAL FEE, PER MIN

## 2023-03-03 PROCEDURE — 90870 ELECTROCONVULSIVE THERAPY: CPT

## 2023-03-03 PROCEDURE — 80159 DRUG ASSAY CLOZAPINE: CPT | Performed by: PSYCHIATRY & NEUROLOGY

## 2023-03-03 PROCEDURE — 36415 COLL VENOUS BLD VENIPUNCTURE: CPT | Performed by: PSYCHIATRY & NEUROLOGY

## 2023-03-03 RX ORDER — LABETALOL HYDROCHLORIDE 5 MG/ML
INJECTION, SOLUTION INTRAVENOUS PRN
Status: DISCONTINUED | OUTPATIENT
Start: 2023-03-03 | End: 2023-03-03

## 2023-03-03 RX ADMIN — BENZTROPINE MESYLATE 1 MG: 1 TABLET ORAL at 20:05

## 2023-03-03 RX ADMIN — CLOZAPINE 300 MG: 200 TABLET ORAL at 20:05

## 2023-03-03 RX ADMIN — SERTRALINE HYDROCHLORIDE 200 MG: 100 TABLET ORAL at 07:42

## 2023-03-03 RX ADMIN — METHOHEXITAL SODIUM 90 MG: 500 INJECTION, POWDER, LYOPHILIZED, FOR SOLUTION INTRAMUSCULAR; INTRAVENOUS; RECTAL at 10:25

## 2023-03-03 RX ADMIN — LABETALOL HYDROCHLORIDE 20 MG: 5 INJECTION, SOLUTION INTRAVENOUS at 10:25

## 2023-03-03 RX ADMIN — ACETAMINOPHEN 325MG 650 MG: 325 TABLET ORAL at 07:47

## 2023-03-03 RX ADMIN — GABAPENTIN 300 MG: 300 CAPSULE ORAL at 20:05

## 2023-03-03 RX ADMIN — PANTOPRAZOLE SODIUM 40 MG: 40 TABLET, DELAYED RELEASE ORAL at 07:43

## 2023-03-03 RX ADMIN — NICOTINE 1 PATCH: 21 PATCH, EXTENDED RELEASE TRANSDERMAL at 07:43

## 2023-03-03 RX ADMIN — LORATADINE 10 MG: 10 TABLET ORAL at 07:42

## 2023-03-03 RX ADMIN — SENNOSIDES AND DOCUSATE SODIUM 1 TABLET: 50; 8.6 TABLET ORAL at 20:05

## 2023-03-03 RX ADMIN — ATROPINE SULFATE 2 DROP: 10 SOLUTION/ DROPS OPHTHALMIC at 20:12

## 2023-03-03 RX ADMIN — Medication 12.5 MG: at 07:43

## 2023-03-03 RX ADMIN — SENNOSIDES AND DOCUSATE SODIUM 1 TABLET: 50; 8.6 TABLET ORAL at 07:43

## 2023-03-03 RX ADMIN — FLUTICASONE PROPIONATE 2 SPRAY: 50 SPRAY, METERED NASAL at 07:44

## 2023-03-03 RX ADMIN — GABAPENTIN 300 MG: 300 CAPSULE ORAL at 13:00

## 2023-03-03 RX ADMIN — METFORMIN HYDROCHLORIDE 500 MG: 500 TABLET, FILM COATED ORAL at 07:43

## 2023-03-03 RX ADMIN — SUCCINYLCHOLINE CHLORIDE 80 MG: 20 INJECTION, SOLUTION INTRAMUSCULAR; INTRAVENOUS; PARENTERAL at 10:25

## 2023-03-03 RX ADMIN — METFORMIN HYDROCHLORIDE 500 MG: 500 TABLET, FILM COATED ORAL at 18:22

## 2023-03-03 ASSESSMENT — ACTIVITIES OF DAILY LIVING (ADL)
HYGIENE/GROOMING: SHOWER;INDEPENDENT
ADLS_ACUITY_SCORE: 29
ORAL_HYGIENE: INDEPENDENT
ADLS_ACUITY_SCORE: 29
ADLS_ACUITY_SCORE: 29
DRESS: SCRUBS (BEHAVIORAL HEALTH)
ADLS_ACUITY_SCORE: 29
HYGIENE/GROOMING: INDEPENDENT
ADLS_ACUITY_SCORE: 29

## 2023-03-03 NOTE — PLAN OF CARE
Problem: Adult Behavioral Health Plan of Care  Goal: Absence of New-Onset Illness or Injury  Outcome: Progressing  Note: Patient reports no new-onset illness or injury     Problem: Suicide Risk  Goal: Absence of Self-Harm  Outcome: Progressing  Note: Patient reports no self-harm     Problem: Suicidal Behavior  Goal: Suicidal Behavior is Absent or Managed  Outcome: Progressing  Note: Patient manifests no suicidal behaviors     Problem: Sleep Disturbance  Goal: Adequate Sleep/Rest  Outcome: Progressing  Note: Patient observed to be sleeping quietly during rounds   Goal Outcome Evaluation:       Patient slept well with no complaints of pain, anxiety or discomfort of any kind. Patient was observed sleeping with deep, non-labored breathing during safety checks. Patient manifests no behavioral disturbance. Patient had been NPO since midnight for ECT this morning.Due pre-ECT preparations made. Patient reports no side effect from medications or ECT thus far. Patient endorses no SI/HI, A/VH or SIB. Will continue to monitor and follow plan of care.             Melvin Pimentel DNP, RN

## 2023-03-03 NOTE — PROCEDURES
"Nikolay Lora is a 32 year old  year old male patient.  [id number]  @DX@    [facility], [place]   ECT Procedure Note   03/03/2023    Patient Status: Inpatient    Is this the first in a series of 12 treatments?  no   No Known Allergies    Weight:  165 lbs 8 oz         Indications for ECT:   Medications ineffective  Imminent risk of suicide         Clinical Narrative:   Nikolay Lora is a 32 year old man with affective dysregulation, ADHD and polysubstance use (alcohol*, amphetamine*, cocaine, cannabis) who is hospitalized with progressive depression leading to suicidal ideation with planning on 1/26, in the context of  medication non compliance and losing his place at sober living due to reported methamphetamine use. Throughout his hospital stay medication adjustments have been made (restarted on sertraline, titrated risperidone, added clozapine, which is being titrated); however, intense suicidal ideation has continued and he has been having difficulty tolerating medication changes. This consultation is requested to evaluate candidacy for electroconvulsive therapy (ECT).      We utilized phone translation services in [facility] during this visit to ensure thoroughness, otherwise he can communicate in English well.  The patient shares he cannot stop contemplating suicide with a plan to shoot himself as soon as he is out of the hospital. He reports visual hallucinations of \"lanterns and black birds flying around\" with commanding auditory hallucinations telling him that he is \"worthless\" and \"should kill (himself)\". Although hallucinations got better since admission with medication changes, depression and suicidal contemplation has persisted, he is interested in ECT, hoping for a quicker relief.         Diagnosis:   Schizoaffective Disorder, depressed  Polysubstance Use Disorder in a controlled environment          Assessment:   Considering the clinical acuity  electroconvulsive therapy (ECT) appears " "appropriate; despite multifactorial nature of the presentation that would benefit from optimization of cognitive, behavioral and social interventions longitudinally.      Discussed all relevant aspects of ECT with patient, including risks of memory loss, HA, nausea, death <1/50,000, driving prohibition; possible lack of benefit or relapse after successful treatment, alternatives, right to decline, possible outpatient procedures. All questions answered, patient will have opportunity to review ECT video.         Pause for the Cause:     Right patient Yes   Right procedure/laterality settings: Yes          Intra-Procedure Documentation:     #1 02/27/23 pt says he was feeling \"anxious but content\"  #2 03/01/23 no auditory hallucinations, concern for visual hallucinations of bugs in his room. Still actively suicidal with a plan with gun or kitchen knife.   #3 03/03/23 Visual hallucinations disappeared for a day after last ECT. Thinking about suicide slightly less. Worried about some pain in his right arm. Mood 6.5/10 (10 best)      ECT #: 3   Treatment number this series: 3   Total treatment number: 3     Type of ECT:  Bilateral, standard    ECT Medications:    Brevital: 90mg  Succinyl Choline: 80mg    ECT Strip Summary:   Energy Level: 144 mC, 1ms 20 Hz, 25% intensity, 4.5 sec, 800 mA  Motor Seizure Duration: 21 seconds  EEG Seizure Duration: 25 seconds    Complications: short seizure     Plan: consider increasing energy due to short seizure  Continue  ECT Q MWF at  144 mC, 20 Hz, 25% intensity, 4.5 sec, 800 mA  Monitor depression severity with clinical assessment augmented with CUDOS every other treatment  Continue current medications    Shameka Berrios M.D.,Ph.D.   of Psychiatry  Pager [phone]  "

## 2023-03-03 NOTE — PROGRESS NOTES
Patient arrived in PACU at 1035    Patient meets phase I recovery  criteria at 1050 , switched to phase II recovery at 1051.      The following medications were given in ECT:    Anesthetic:   Brevital 90 mg at 1025    Muscle Relaxant:   Succinylcholine  80 mg at 1025    Blood Pressure:   Labetalol 20 mg at 1025        Patient meets PACU discharge criteria at 1105.    Re-orientation time: 1 minute    Pt discharged from the recovery room via wheelchair back to station 12N   with staff at 1113       Report given to Williams LINDO

## 2023-03-03 NOTE — PLAN OF CARE
"Problem: Suicide Risk  Goal: Absence of Self-Harm  Intervention: Promote Psychosocial Wellbeing  Supportive Measures:    positive reinforcement provided    self-care encouraged    verbalization of feelings encouraged    active listening utilized    Patient is alert and oriented x3, calm and cooperative. Patient continues to withdrawn and isolate in his room only coming out for needs. Patient mostly resting in bed; affect is flat, mood is depressed; and insight remains poor. Patient endorses moderate depression, mild anxiety, and he continues to endorse SI thoughts with a plan to shoot himself/stab with a knife upon discharge. Patient denies auditory/visual hallucinations, thoughts of harming others, and paranoia. No delusional thinking noted. Patient is able to contract for safety while hospitalized. No acute behavioral concern with patient this shift.    Patient is eating, hydrating and sleeping adequately. Reports to have had a BM this morning. Patient endorsed generalized mild pain and reports no any other discomfort. No acute medical concern. VSS /77   Pulse 96   Temp 97.7  F (36.5  C) (Temporal)   Resp 16   Ht 1.6 m (5' 3\")   Wt 75.1 kg (165 lb 8 oz)   SpO2 96%   BMI 29.32 kg/m      "

## 2023-03-03 NOTE — ANESTHESIA CARE TRANSFER NOTE
Patient: Link Lora    Procedure: * No procedures listed *       Diagnosis: * No pre-op diagnosis entered *  Diagnosis Additional Information: No value filed.    Anesthesia Type:   General     Note:    Oropharynx: oropharynx clear of all foreign objects  Level of Consciousness: iatrogenic sedation  Oxygen Supplementation: room air    Independent Airway: airway patency satisfactory and stable  Dentition: dentition unchanged  Vital Signs Stable: post-procedure vital signs reviewed and stable  Report to RN Given: handoff report given  Patient transferred to: PACU    Handoff Report: Identifed the Patient, Identified the Reponsible Provider, Reviewed the pertinent medical history, Discussed the surgical course, Reviewed Intra-OP anesthesia mangement and issues during anesthesia, Set expectations for post-procedure period and Allowed opportunity for questions and acknowledgement of understanding      Vitals:  Vitals Value Taken Time   BP     Temp     Pulse     Resp     SpO2         Electronically Signed By: Salty Pollock MD  March 3, 2023  10:36 AM

## 2023-03-03 NOTE — ANESTHESIA PREPROCEDURE EVALUATION
Anesthesia Pre-Procedure Evaluation    Patient: Nikolay Lora   MRN: [id number] : 1990        Procedure :           Past Medical History:   Diagnosis Date     Brugada syndrome      Chronic idiopathic urticaria      Concussion with loss of consciousness      Mixed hyperlipidemia      Polysubstance abuse (H)      Schizotypal personality disorder (H)       No past surgical history on file.   No Known Allergies   Social History     Tobacco Use     Smoking status: Former     Smokeless tobacco: Never     Tobacco comments:     1-2 cigs per day   Substance Use Topics     Alcohol use: Yes      Wt Readings from Last 1 Encounters:   23 75.1 kg (165 lb 8 oz)        Anesthesia Evaluation   Pt has not had prior anesthetic         ROS/MED HX  ENT/Pulmonary:       Neurologic:       Cardiovascular: Comment: Repolarization abnormality. brugada ???. Authorized by cardiology. Negative history of syncope, seizures. Family history negative      METS/Exercise Tolerance:     Hematologic:       Musculoskeletal:       GI/Hepatic:       Renal/Genitourinary:       Endo:       Psychiatric/Substance Use:       Infectious Disease:       Malignancy:       Other:            Physical Exam    Airway        Mallampati: III   TM distance: > 3 FB   Neck ROM: full   Mouth opening: > 3 cm    Respiratory Devices and Support         Dental           Cardiovascular   cardiovascular exam normal          Pulmonary   pulmonary exam normal                OUTSIDE LABS:  CBC:   Lab Results   Component Value Date    WBC 12.7 (H) 2023    WBC 8.0 2023    HGB 15.0 2023    HGB 15.8 2023    HCT 46.1 2023    HCT 48.3 2023     2023     2023     BMP:   Lab Results   Component Value Date     2023     2023    POTASSIUM 3.9 2023    POTASSIUM 4.0 2023    CHLORIDE 103 2023    CHLORIDE 100 2023    CO2 27 2023    CO2 27 2023    BUN 19.5 2023     BUN 12.5 2023    CR 0.81 2023    CR 0.81 2023    GLC 93 2023     (H) 2023     COAGS: No results found for: PTT, INR, FIBR  POC: No results found for: BGM, HCG, HCGS  HEPATIC:   Lab Results   Component Value Date    ALBUMIN 4.1 2023    PROTTOTAL 7.1 2023    ALT 58 (H) 2023    AST 29 2023    ALKPHOS 82 2023    BILITOTAL 0.3 2023     OTHER:   Lab Results   Component Value Date    A1C 5.4 2023    KATINA 9.4 2023    TSH 1.09 2023       Anesthesia Plan    ASA Status:  2      Anesthesia Type: General.              Consents    Anesthesia Plan(s) and associated risks, benefits, and realistic alternatives discussed. Questions answered and patient/representative(s) expressed understanding.    - Discussed:     - Discussed with:  Patient         Postoperative Care    Pain management: Oral pain medications, IV analgesics.   PONV prophylaxis: Ondansetron (or other 5HT-3)     Comments:           H&P reviewed: Unable to attach H&P to encounter due to EHR limitations. H&P Update: appropriate H&P reviewed, patient examined. No interval changes since H&P (within 30 days).             Kierra Islas Pre-Procedure Evaluation    Patient: Nikolay Lora   MRN: [id number] : 1990        Procedure :           Past Medical History:   Diagnosis Date     Brugada syndrome      Chronic idiopathic urticaria      Concussion with loss of consciousness      Mixed hyperlipidemia      Polysubstance abuse (H)      Schizotypal personality disorder (H)       No past surgical history on file.   No Known Allergies   Social History     Tobacco Use     Smoking status: Former     Smokeless tobacco: Never     Tobacco comments:     1-2 cigs per day   Substance Use Topics     Alcohol use: Yes      Wt Readings from Last 1 Encounters:   23 75.1 kg (165 lb 8 oz)        Anesthesia Evaluation   Pt has not had prior anesthetic         ROS/MED  HX  ENT/Pulmonary:       Neurologic:       Cardiovascular: Comment: Repolarization abnormality. brugada ???. Authorized by cardiology. Negative history of syncope, seizures. Family history negative      METS/Exercise Tolerance:     Hematologic:       Musculoskeletal:       GI/Hepatic:       Renal/Genitourinary:       Endo:       Psychiatric/Substance Use:       Infectious Disease:       Malignancy:       Other:            Physical Exam    Airway        Mallampati: III   TM distance: > 3 FB   Neck ROM: full   Mouth opening: > 3 cm    Respiratory Devices and Support         Dental           Cardiovascular   cardiovascular exam normal          Pulmonary   pulmonary exam normal                OUTSIDE LABS:  CBC:   Lab Results   Component Value Date    WBC 12.7 (H) 03/02/2023    WBC 8.0 02/24/2023    HGB 15.0 02/24/2023    HGB 15.8 02/09/2023    HCT 46.1 02/24/2023    HCT 48.3 02/09/2023     02/24/2023     02/09/2023     BMP:   Lab Results   Component Value Date     02/24/2023     02/09/2023    POTASSIUM 3.9 02/24/2023    POTASSIUM 4.0 02/09/2023    CHLORIDE 103 02/24/2023    CHLORIDE 100 02/09/2023    CO2 27 02/24/2023    CO2 27 02/09/2023    BUN 19.5 02/24/2023    BUN 12.5 02/09/2023    CR 0.81 02/24/2023    CR 0.81 02/09/2023    GLC 93 02/24/2023     (H) 02/09/2023     COAGS: No results found for: PTT, INR, FIBR  POC: No results found for: BGM, HCG, HCGS  HEPATIC:   Lab Results   Component Value Date    ALBUMIN 4.1 02/24/2023    PROTTOTAL 7.1 02/24/2023    ALT 58 (H) 02/24/2023    AST 29 02/24/2023    ALKPHOS 82 02/24/2023    BILITOTAL 0.3 02/24/2023     OTHER:   Lab Results   Component Value Date    A1C 5.4 01/28/2023    KATINA 9.4 02/24/2023    TSH 1.09 01/28/2023       Anesthesia Plan    ASA Status:  2      Anesthesia Type: General.              Consents    Anesthesia Plan(s) and associated risks, benefits, and realistic alternatives discussed. Questions answered and  patient/representative(s) expressed understanding.    - Discussed:     - Discussed with:  Patient         Postoperative Care    Pain management: Oral pain medications, IV analgesics.   PONV prophylaxis: Ondansetron (or other 5HT-3)     Comments:           H&P reviewed: Unable to attach H&P to encounter due to EHR limitations. H&P Update: appropriate H&P reviewed, patient examined. No interval changes since H&P (within 30 days).             Salty Pollock MD

## 2023-03-03 NOTE — PLAN OF CARE
03/02/23 1715   Group Therapy Session   Group Attendance Patient attended this group /session   Time Session Began 1630   Time Session Ended 1657   Total Time (minutes) 27   Total # Attendees 3   Group Topic Covered Insight orientation.   Group Session Detail This group focused on insight orientation that addressed: Individual assessment of cognitive and emotional balance, distress levels, and ways to manage and tolerate ambiguity.   Patient Participation Detail Using grounding techniques in recognition of symptoms such as flight of ideas, and intrussive thoughts, this patient was engaged in exploring ways to perform a cognitive self-check, using concrete ways to keep count of thought and emotional presentations and responses, especially when feeling highly distressed (confused) or pressured to speak/engage in excessive physical expression such as random movements.    This pt reported that he is feeling confused on the unit. He added that he is also feeling determined. Was validated for naming his feelings as well as the way he paired an unhelpful feeling with a helpful one. Discussed the benefits of such pairing.     Writer facilitated grounding techniques and insight orientation methods which the patient recognized, validated and repeated back in his own words as to how he is going to be using them to better manage his symptoms.          Felix Licona, Ph.D., L.I.C.S.W.  3/2/2023

## 2023-03-03 NOTE — ANESTHESIA POSTPROCEDURE EVALUATION
Patient: Nikolay Lora    Procedure: * No procedures listed *       Anesthesia Type:  General    Note:  Disposition: Outpatient   Postop Pain Control: Uneventful            Sign Out: Well controlled pain   PONV:    Neuro/Psych: Uneventful            Sign Out: Acceptable/Baseline neuro status   Airway/Respiratory: Uneventful            Sign Out: Acceptable/Baseline resp. status   CV/Hemodynamics: Uneventful            Sign Out: Acceptable CV status; No obvious hypovolemia; No obvious fluid overload   Other NRE:    DID A NON-ROUTINE EVENT OCCUR?            Last vitals:  Vitals:    03/02/23 1900 03/03/23 0743 03/03/23 1035   BP: 112/77 118/79 (!) 134/93   Pulse:  87 92   Resp:  16 23   Temp: 36.5  C (97.7  F) 36.5  C (97.7  F) 36.2  C (97.1  F)   SpO2: 96% 92% 91%       Electronically Signed By: Salty Pollock MD  March 3, 2023  10:46 AM

## 2023-03-03 NOTE — PLAN OF CARE
"He is isolative due to Covid exposure on the unit.  He states that he is having muscle aches, cough, and a sore throat.  States that his depression has improved, \" a little bit,\" in discussions, yet states he is still having suicidal ideation by wanting to shoot himself with a gun.  He states that he is experiencing auditory and visual hallucinations, but are reduced in intensity in discussions.  He is aware of having ECT this morning.  AM VS:   temperature  97.7, respirations 16, pulse 87, /79, O2 92 on RA.  A new Covid swab was done in the shift. PRN tylenol was given with am medications, gabapentin was held per MD order.  Covid result was negative in the shift.  Writer discussed findings with MD in the shift.  He returned from ECT and given food due to stating he was hungry earlier.  States ECT went, \" fine,\" in talks.  He did shower and changed clothes before lunch.           "

## 2023-03-04 PROCEDURE — 250N000013 HC RX MED GY IP 250 OP 250 PS 637: Performed by: PSYCHIATRY & NEUROLOGY

## 2023-03-04 PROCEDURE — 250N000013 HC RX MED GY IP 250 OP 250 PS 637

## 2023-03-04 PROCEDURE — 124N000002 HC R&B MH UMMC

## 2023-03-04 PROCEDURE — 250N000013 HC RX MED GY IP 250 OP 250 PS 637: Performed by: PHYSICIAN ASSISTANT

## 2023-03-04 RX ADMIN — CLOZAPINE 300 MG: 200 TABLET ORAL at 19:15

## 2023-03-04 RX ADMIN — GABAPENTIN 300 MG: 300 CAPSULE ORAL at 07:58

## 2023-03-04 RX ADMIN — SENNOSIDES AND DOCUSATE SODIUM 1 TABLET: 50; 8.6 TABLET ORAL at 19:15

## 2023-03-04 RX ADMIN — BENZTROPINE MESYLATE 1 MG: 1 TABLET ORAL at 19:15

## 2023-03-04 RX ADMIN — LORATADINE 10 MG: 10 TABLET ORAL at 07:59

## 2023-03-04 RX ADMIN — ATROPINE SULFATE 2 DROP: 10 SOLUTION/ DROPS OPHTHALMIC at 19:15

## 2023-03-04 RX ADMIN — FLUTICASONE PROPIONATE 2 SPRAY: 50 SPRAY, METERED NASAL at 07:59

## 2023-03-04 RX ADMIN — ACETAMINOPHEN 325MG 650 MG: 325 TABLET ORAL at 08:00

## 2023-03-04 RX ADMIN — METFORMIN HYDROCHLORIDE 500 MG: 500 TABLET, FILM COATED ORAL at 07:59

## 2023-03-04 RX ADMIN — GUAIFENESIN 200 MG: 200 SOLUTION ORAL at 13:02

## 2023-03-04 RX ADMIN — NICOTINE 1 PATCH: 21 PATCH, EXTENDED RELEASE TRANSDERMAL at 07:58

## 2023-03-04 RX ADMIN — PANTOPRAZOLE SODIUM 40 MG: 40 TABLET, DELAYED RELEASE ORAL at 07:58

## 2023-03-04 RX ADMIN — HYDROXYZINE HYDROCHLORIDE 100 MG: 50 TABLET, FILM COATED ORAL at 13:02

## 2023-03-04 RX ADMIN — GABAPENTIN 300 MG: 300 CAPSULE ORAL at 13:02

## 2023-03-04 RX ADMIN — GUAIFENESIN 200 MG: 200 SOLUTION ORAL at 08:01

## 2023-03-04 RX ADMIN — METFORMIN HYDROCHLORIDE 500 MG: 500 TABLET, FILM COATED ORAL at 17:35

## 2023-03-04 RX ADMIN — SERTRALINE HYDROCHLORIDE 200 MG: 100 TABLET ORAL at 07:58

## 2023-03-04 RX ADMIN — Medication 12.5 MG: at 07:58

## 2023-03-04 RX ADMIN — SENNOSIDES AND DOCUSATE SODIUM 1 TABLET: 50; 8.6 TABLET ORAL at 07:59

## 2023-03-04 RX ADMIN — GABAPENTIN 300 MG: 300 CAPSULE ORAL at 19:15

## 2023-03-04 RX ADMIN — DIPHENHYDRAMINE HYDROCHLORIDE 50 MG: 50 CAPSULE ORAL at 08:01

## 2023-03-04 ASSESSMENT — ACTIVITIES OF DAILY LIVING (ADL)
LAUNDRY: WITH SUPERVISION
ADLS_ACUITY_SCORE: 29
ADLS_ACUITY_SCORE: 29
ORAL_HYGIENE: INDEPENDENT
ADLS_ACUITY_SCORE: 29
HYGIENE/GROOMING: INDEPENDENT
DRESS: SCRUBS (BEHAVIORAL HEALTH)
ADLS_ACUITY_SCORE: 29
DRESS: SCRUBS (BEHAVIORAL HEALTH)
ADLS_ACUITY_SCORE: 29
HYGIENE/GROOMING: INDEPENDENT
ADLS_ACUITY_SCORE: 29
ORAL_HYGIENE: INDEPENDENT
ADLS_ACUITY_SCORE: 29
ADLS_ACUITY_SCORE: 29

## 2023-03-04 NOTE — PLAN OF CARE
Problem: Sleep Disturbance  Goal: Adequate Sleep/Rest  Outcome: Progressing  Note: Patient has been sleeping since the start of the shift.     Problem: Psychotic Signs/Symptoms  Goal: Improved Behavioral Control (Psychotic Signs/Symptoms)  Outcome: Progressing  Note: Patient manifests no psychotic symptoms     Problem: Pain Acute  Goal: Optimal Pain Control and Function  Outcome: Progressing  Note: Patient reports no acute or chronic pain   Goal Outcome Evaluation:          Patient had unremarkable night, slept on/off with no new complaints, made no request for prn medications or food, patient remains in his room all nigh even when not sleeping. Patient was cooperative with safety checks with no behavioral disturbance, no outburst, physical or verbal aggression targeted at staff or peers. Patient endorses no SI/HI, A/VH or SIB. No symptoms of jovanny or psychosis reported/observed. Patient had a total of 4.25 hours of sleep. Will continue to monitor and follow plan of care.               Melvin Pimentel DNP, RN.

## 2023-03-04 NOTE — PLAN OF CARE
"He is isolative and is displaying disorganized thought process by difficulty explaining his symptoms in interactions.  He is also slightly delayed in speech, cognition in the shift.  He states that he had a difficulty night due to drooling, coughing, and nightmares.  He stated that, \" I was coughing so hard, it hurts my chest.\"  He states that he was coughing early this morning as well, yet nursing staff did not notice him coughing excessively upon observations.    Also, he is complaining of drooling as well this morning. He also complained of right elbow pain.  He is able to move his right arm, no swelling noticed on right elbow.  Writer was able to feel radial pulse and capillary refill was less than three seconds.  He states that his anxiety is higher today due to his symptoms mentioned earlier.  He states he still is having suicidal ideation by wanting to get a gun and shoot himself in the head.  He states that he is experiencing visual hallucinations of, \" crows, doves,\" in discussions that are constant.  He also said that he is hearing voices of himself and, \" spirits,\" that say, \" you are worthless,\" in discussions.  He was given PRN cough syrup, tylenol, and bendaryl with morning medications.  He states feeling better after morning medications.  He later states that when he, \" lays down on my back I get a twirking sensation in my stomach to get up, and then I cough.\"  He was given PRN atarax, cough syrup with afternoon medications.  Redirected when he was seen sleeping on his right elbow in observations.  Psych associates informed writer that he was coughing at times in the shift, yet not intense or forceful coughing.  Education provided not to sleep on right elbow, states felt better after prn medications.                 "

## 2023-03-04 NOTE — PLAN OF CARE
"  Problem: Suicide Risk  Goal: Absence of Self-Harm  Outcome: Progressing     Problem: Psychotic Signs/Symptoms  Goal: Improved Behavioral Control (Psychotic Signs/Symptoms)  Outcome: Progressing   Goal Outcome Evaluation:       Patient is A&O x 3, able to verbalize needs . Isolated in his room watching TV.  Compliant with medications,Denies harming self or others, contracts for safety while on unit.  Pt endorsed auditory hallucinations stating was hearing voices to kill himself also visual hallucinations of seeing  two doves.   Presents with flat affect, calm and cooperative with cares   Pt has good appetite, after dinner was asking for snacks that was hungry.  No other complaints raised and no behaviors noticed this shift.  Staff will continue to monitor and update changes.     /80 (BP Location: Left arm, Patient Position: Sitting, Cuff Size: Adult Regular)   Pulse 98   Temp 97.8  F (36.6  C) (Temporal)   Resp 16   Ht 1.6 m (5' 3\")   Wt 75.1 kg (165 lb 8 oz)   SpO2 92%   BMI 29.32 kg/m          "

## 2023-03-04 NOTE — PROGRESS NOTES
"[facility], [place]   Psychiatric Progress Note  Hospital Day: 35        Interim History:   The patient's care was discussed with the treatment team during the daily team meeting and/or staff's chart notes were reviewed.  Staff report patient has remained oriented. Remains isolative to his room. Continues to report significant depressive symptoms and ongoing active SI with plan to shoot himself or stab himself with a knife upon discharge.      Upon interview Haseeb reports that he is feeling \"weak and exhausted.\" However, he added that he feels \"different, I can't really explain it.\" He was unable to elaborate further, but then said that he has noticed subtle improvement in his mood. Denies side effects. Noted that sialorrhea has improved.     Suicidal ideation: Haseeb reports ongoing active SI with plan and intent to shoot himself with a gun or cut his throat upon discharge. Denies suicide plan or intent in the hospital. [place] for safety.     Homicidal ideation: denies current or recent homicidal ideation or behaviors.    Psychotic symptoms: Denying psychotic symptoms    Medication side effects reported: as above    Acute medical concerns: Reported right arm pain with abduction, unchanged from yesterday. Denied cough today.     Other issues reported by patient: Patient had no further questions or concerns.           Medications:       atropine  2 drop Sublingual At Bedtime     benztropine  1 mg Oral At Bedtime     cloZAPine  300 mg Oral At Bedtime     fluticasone  2 spray Both Nostrils Daily     gabapentin  300 mg Oral TID     loratadine  10 mg Oral Daily     metFORMIN  500 mg Oral BID w/meals     metoprolol succinate ER  12.5 mg Oral Daily     nicotine  1 patch Transdermal Daily     nicotine   Transdermal Q8H     pantoprazole  40 mg Oral QAM AC     senna-docusate  1 tablet Oral BID     sertraline  200 mg Oral Daily          Allergies:   No Known Allergies       Labs:     Recent " "Results (from the past 24 hour(s))   Extra Green Top (Lithium Heparin) Tube    Collection Time: 03/03/23  8:35 AM   Result Value Ref Range    Hold Specimen JIC    Extra Purple Top Tube    Collection Time: 03/03/23  8:35 AM   Result Value Ref Range    Hold Specimen JIC           Psychiatric Examination:     /78 (BP Location: Left arm, Patient Position: Semi-Varner's, Cuff Size: Adult Regular)   Pulse 93   Temp 97.9  F (36.6  C) (Temporal)   Resp 14   Ht 1.6 m (5' 3\")   Wt 75.1 kg (165 lb 8 oz)   SpO2 93%   BMI 29.32 kg/m    Weight is 165 lbs 8 oz  Body mass index is 29.32 kg/m .    Weight over time:  Vitals:    01/27/23 1718   Weight: 75.1 kg (165 lb 8 oz)       Orthostatic Vitals     None        Cardiometabolic risk assessment. 01/30/23    Reviewed patient profile for cardiometabolic risk factors    Date taken /Value  REFERENCE RANGE   Abdominal Obesity  (Waist Circumference)   See nursing flowsheet Women ?35 in (88 cm)   Men ?40 in (102 cm)      Triglycerides  Triglycerides   Date Value Ref Range Status   01/28/2023 192 (H) <150 mg/dL Final       ?150 mg/dL (1.7 mmol/L) or current treatment for elevated triglycerides   HDL cholesterol  Direct Measure HDL   Date Value Ref Range Status   01/28/2023 41 >=40 mg/dL Final   ]   Women <50 mg/dL (1.3 mmol/L) in women or current treatment for low HDL cholesterol  Men <40 mg/dL (1 mmol/L) in men or current treatment for low HDL cholesterol     Fasting plasma glucose (FPG) Lab Results   Component Value Date     01/28/2023      FPG ?100 mg/dL (5.6 mmol/L) or treatment for elevated blood glucose   Blood pressure  BP Readings from Last 3 Encounters:   03/03/23 105/78   01/27/23 118/74   07/20/22 112/79    Blood pressure ?130/85 mmHg or treatment for elevated blood pressure   Family History  See family history     Appearance: awake, alert, dressed in hospital scrubs and unkempt  Attitude:  cooperative, calm  Eye Contact: fair  Mood:  \"exhausted\"  Affect:  mood " congruent and intensity is blunted  Speech:  clear, coherent. appropriate  Language: fluent and intact in English  Psychomotor, Gait, Musculoskeletal:  no evidence of tardive dyskinesia, dystonia, or tics  Throught Process:  Linear, ruminative  Associations:  No evidence of loose associations  Thought Content:  SI w/plan and intent upon discharge, no plan or intent while in hospital. Denies hallucinations  Insight:  fair  Judgement:  poor  Oriented to:  time, person, and place  Attention Span and Concentration:  fair  Recent and Remote Memory:  fair  Fund of Knowledge:  appropriate    Clinical Global Impressions  First:  Considering your total clinical experience with this particular patient population, how severe are the patient's symptoms at this time?: 7 (01/28/23 1341)    Most recent:  Compared to the patient's condition at the START of treatment, this patient's condition is: 4           Precautions:     Behavioral Orders   Procedures     Code 1 - Restrict to Unit     Code 2     For imaging     Electroconvulsive therapy     Series of up to 12 treatments. Begin Date: 2/27/23     Treating Psychiatrist providing ECT:  Dr. Keanrs     Notified on:  2/23/23     Electroconvulsive therapy     For acute ECT series, MWF, up to 12 treatment.     Electroconvulsive therapy     Series of up to 12 treatments. Begin Date: 02/26/23     Treating Psychiatrist providing ECT: Clifton  Notified on: 02/24/23     Fall precautions     Routine Programming     As clinically indicated     Status 15     Every 15 minutes.     Suicide precautions     Patients on Suicide Precautions should have a Combination Diet ordered that includes a Diet selection(s) AND a Behavioral Tray selection for Safe Tray - with utensils, or Safe Tray - NO utensils            Diagnoses:     Active suicidal ideation with intent outside of hospital setting  MDD, recurrent, severe with psychotic features vs Schizoaffective Disorder, depressive type  Polysubstance  "use  Unspecified mood disorder  ADHD, inattentive type per chart  History of idiopathic hypersomnolence per chart  Nicotine use disorder, dependence and withdrawal   Allergies- chronic uticaria and rhinitis per chart  HLD per chart          Assessment & Plan:     Assessment and hospital summary:  This patient is a 32 year old male with history of mood disorder, ADHD and substance use who presented to [facility] with SI on 1/26 in context of reported methamphetamine use and being kicked out of sober living. Medically cleared in ED, placed on 72HH and admitted to [facility]. Limited historian, giving inconsistent reports about substance use, UDS negative, very somnolent, endorsing ongoing SI and some psychosis symptoms. PTA medications continued with exception of finasteride as patient states he takes \"1/4 a pill\" and declined ordered dose, will defer to primary team to address. Will continue to evaluate safety and stabilization further as patient more able to engage in assessments.      Inpatient psychiatric hospitalization is warranted at this time for safety, stabilization, and possible adjustment in medications.    Patient reports that he abruptly stopped Zoloft one week prior to his admission. He developed discontinuation syndrome symptoms following that. He reports that he does not have a history of psychosis but is experiencing psychotic symptoms. He is amenable with plan to trial risperidone after R/B/A were discussed. Risperidone was initiated on 1/30 and titrated to a total of 3 mg daily on 2/1. Clonidine, used for ADHD, was reduced from a total of 0.3 mg daily to 0.2 mg daily on 2/3 due to concern for hypotension. 2/9: Cardiology consult placed. EKG- tachycardia 121 bpm, QTc 465 ms, Abnormal QRS angle and T wave abnormality. COVID negative and labs are unremarkable.  On 2/10, clozapine was held pending additional workup from IM and cardiology. Pericarditis was ruled out and metoprolol was started. Clozapine was " restarted on 2/13 with plan to cautiously titrate to lowest effective dose. 2/23/23: Clozapine titration schedule increased 25 mg/day. ECT and IM consults for ECT clearance placed. 2/27/23: ECT initiated. Risperidone was discontinued on 2/28. ECT initiated. 3/2/23    Psychiatric treatment/inteventions:  Medications:   -Continue clozapine 300 mg qhs. Pending clozapine level results. Level drawn this morning.  -Continue PTA sertraline 200 mg daily for mood  -Continue PTA gabapentin 300 mg TID for anxiety   -Continue Cogentin 1 mg at bedtime for possible EPSE     -PRN hydroxyzine 25mg every 4 hour for anxiety  -PRN trazodone 50mg at bedtime for sleep   -PRN olanzapine 10mg PO or IM TID for agitation/psychosis   -PRN atropine 1% SL drops, 2 drops q2h for sialorrhea    Spiritual services consult placed on 2/6. Pt is Restoration[employer]. Appreciate assistance.     ECT:  - Medically cleared on 2/24. See IM note for details.  - ECT consult placed on 2/23.  - ECT started on 2/27/23  - HOLD Gabapentin on nights prior to ECT and on ECT mornings until after ECT.   - ECT #3 completed 3/3/23. Uneventful. Next ECT treatment scheduled for 3/6. Baseline IDS-SR was 55. Hard copy placed in chart.      Laboratory/Imaging: reviewed: Covid negative; UDS negative; CMP, CBC, TSH, Lipid panel, HgbA1c ordered to complete admission labs. Reviewed.     2/9/23: Troponin, CK, CBC, BMP: Unremarkable, CRP elevated to 38.18, COVID: Negative  2/10/23: Add Influenza A/B given flu-like sx-negative  Patient will be treated in therapeutic milieu with appropriate individual and group therapies as described.     Medical treatment/interventions:  Medical concerns:   Nicotine replacement ordered, educate patient on benefits of cessation, pt unclear about finasteride dose, will hold until further information is obtained. PTA PRN zyrtec, azelastine, fluticasone, triamcinolone and epipen ordered for allergies and PRN ammoniaum lactate, Eucerin for  dry  skin.    Flatulence:   - Add simethicone prn    Neuroleptic induced wt gain:  - Monitor weights  - Start metformin 500 mg BID with meals    Dyslipidemia: Will arrange follow up with PCP to address. Discussed importance of healthy eating habits and regular exercise. Will consider nutrition consult if patient amenable.     Orthostasis likely 2/2 clozapine: Pt is not hypotensive but reports orthostatic symptoms and previously restricted fluids. Now resolved.   - Continue to monitor vital signs closely  - Encourage fluids  - Discontinued clonidine    Sialorrhea 2/2 clozapine:  - Atropine 1% SL drops qhs  - Recommend towel on pillow when sleeping    Chest pain/tachycardia/EKG changes (2/9):  - Pain improved with PRN medications.   - Cardiology consult placed on 2/9. See note on that date for details.   - ECHO reviewed and unremarkable.  - Writer discussed care with both Dr. Streeter from [facility] and Christina from . Plan for now is to HOLD clozapine until further medical workup. Dr. Streeter explained that myocarditis has been ruled out as troponin was negative. Pericarditis remains on differential, but he does not have EKG findings or a pericardial effusion. IM will assess for pericardial friction rub and pleuritic chest pain. IM seen by patient and Metoprolol was started.     Update 2/13: Discussed care with Annette from  today on two occasions. Please see her note on this date for details. She does not suspect that this is pericarditis. He does not have pleuritic pain, characteristic CP, EKG changes, or pericardial effusion on ECHO. Therefore, will cautiously restart clozapine while monitoring closely for side effects. May consider further increase in metoprolol if necessary. PPI was started due to suspected GERD.     Update 2/24 per IM note:  Medicine is following peripherally for concerns for pericarditis.  See detailed note from 2/13.  No pericardial friction rub noted while sitting up and leaning forward today.  Patient  states that his chest discomfort is getting better after starting the Protonix yesterday.  It does appear that he has also been using the Maalox.  Patient does have Tums available as well.  Please be mindful for any constipation with overuse of the PRNs.     POC:  - Continue Protonix daily for 8 weeks, then taper off  - Recommend lifestyle changes including weight loss head of bed elevation avoidance of late-night eating and specific food elimination such as chocolate caffeine alcohol acidic and/or spicy food.  - Watch for constipation with PRNs for heartburn  - We will schedule senna 1 tab twice daily  - MiraLAX daily as needed added on     Medicine will sign off, please contact us for any acute changes.      Sore throat/cough/nasal congestion, resolved:   - COVID negative on 2/9. Repeat COVID test and Influenza A/B neg on 2/10.  - Cepacol lozenges added  - PRN Tylenol   - Consulted with IM. Appreciate assistance. See their notes for details.      Legal Status: VOLUNTARY. 72 hour hold discontinued. Pt agreed to sign in on voluntary basis and discharge directly to treatment once stable.      Safety Assessment:        Behavioral Orders   Procedures     Code 1 - Restrict to Unit     Routine Programming       As clinically indicated     Status 15       Every 15 minutes.     Suicide precautions       Patients on Suicide Precautions should have a Combination Diet ordered that includes a Diet selection(s) AND a Behavioral Tray selection for Safe Tray - with utensils, or Safe Tray - NO utensils        Withdrawal precautions      Pt has not required locked seclusion or restraints in the past 24 hours to maintain safety, please refer to RN documentation for further details.    Discontinued SIO on 2/8 as patient is heriberto for safety. Monitor SI closely.     The risks, benefits, alternatives and side effects have been discussed and are understood by the patient.     Disposition: Pending clinical stabilization. Pt remains  actively suicidal. [facility]      This note was created by undersigned using a Dragon dictation system. All typing errors or contextual distortion are unintentional and software inherent.     Entered by: Nelly Brown MD on 3/3/2023 at 6:03 PM

## 2023-03-05 LAB
CLOZAPINE SERPL-MCNC: 1001 NG/ML
CLOZAPINE+NOR SERPL-MCNC: 1340 NG/ML
CNO SERPL-MCNC: <100 NG/ML
NORCLOZAPINE SERPL-MCNC: 339 NG/ML
SARS-COV-2 RNA RESP QL NAA+PROBE: NEGATIVE

## 2023-03-05 PROCEDURE — U0003 INFECTIOUS AGENT DETECTION BY NUCLEIC ACID (DNA OR RNA); SEVERE ACUTE RESPIRATORY SYNDROME CORONAVIRUS 2 (SARS-COV-2) (CORONAVIRUS DISEASE [COVID-19]), AMPLIFIED PROBE TECHNIQUE, MAKING USE OF HIGH THROUGHPUT TECHNOLOGIES AS DESCRIBED BY CMS-2020-01-R: HCPCS | Performed by: PSYCHIATRY & NEUROLOGY

## 2023-03-05 PROCEDURE — 250N000013 HC RX MED GY IP 250 OP 250 PS 637

## 2023-03-05 PROCEDURE — 124N000002 HC R&B MH UMMC

## 2023-03-05 PROCEDURE — 250N000013 HC RX MED GY IP 250 OP 250 PS 637: Performed by: PSYCHIATRY & NEUROLOGY

## 2023-03-05 RX ADMIN — METFORMIN HYDROCHLORIDE 500 MG: 500 TABLET, FILM COATED ORAL at 17:49

## 2023-03-05 RX ADMIN — BENZTROPINE MESYLATE 1 MG: 1 TABLET ORAL at 19:00

## 2023-03-05 RX ADMIN — Medication 12.5 MG: at 09:03

## 2023-03-05 RX ADMIN — SERTRALINE HYDROCHLORIDE 200 MG: 100 TABLET ORAL at 09:03

## 2023-03-05 RX ADMIN — GABAPENTIN 300 MG: 300 CAPSULE ORAL at 09:03

## 2023-03-05 RX ADMIN — LORATADINE 10 MG: 10 TABLET ORAL at 09:03

## 2023-03-05 RX ADMIN — PANTOPRAZOLE SODIUM 40 MG: 40 TABLET, DELAYED RELEASE ORAL at 09:03

## 2023-03-05 RX ADMIN — GABAPENTIN 300 MG: 300 CAPSULE ORAL at 13:52

## 2023-03-05 RX ADMIN — SENNOSIDES AND DOCUSATE SODIUM 1 TABLET: 50; 8.6 TABLET ORAL at 19:00

## 2023-03-05 RX ADMIN — METFORMIN HYDROCHLORIDE 500 MG: 500 TABLET, FILM COATED ORAL at 09:03

## 2023-03-05 RX ADMIN — CLOZAPINE 300 MG: 200 TABLET ORAL at 19:00

## 2023-03-05 RX ADMIN — FLUTICASONE PROPIONATE 2 SPRAY: 50 SPRAY, METERED NASAL at 09:03

## 2023-03-05 RX ADMIN — NICOTINE 1 PATCH: 21 PATCH, EXTENDED RELEASE TRANSDERMAL at 09:10

## 2023-03-05 RX ADMIN — SENNOSIDES AND DOCUSATE SODIUM 1 TABLET: 50; 8.6 TABLET ORAL at 09:03

## 2023-03-05 RX ADMIN — ATROPINE SULFATE 2 DROP: 10 SOLUTION/ DROPS OPHTHALMIC at 19:04

## 2023-03-05 ASSESSMENT — ACTIVITIES OF DAILY LIVING (ADL)
ADLS_ACUITY_SCORE: 29
ADLS_ACUITY_SCORE: 29
HYGIENE/GROOMING: INDEPENDENT
ADLS_ACUITY_SCORE: 29
ORAL_HYGIENE: INDEPENDENT
ADLS_ACUITY_SCORE: 29
DRESS: INDEPENDENT
DRESS: INDEPENDENT
ADLS_ACUITY_SCORE: 29
LAUNDRY: WITH SUPERVISION
ADLS_ACUITY_SCORE: 29
ORAL_HYGIENE: INDEPENDENT
ADLS_ACUITY_SCORE: 29
ADLS_ACUITY_SCORE: 29
HYGIENE/GROOMING: INDEPENDENT

## 2023-03-05 NOTE — PLAN OF CARE
Problem: Adult Behavioral Health Plan of Care  Goal: Plan of Care Review  Outcome: Progressing  Patient Agreement with Plan of Care: agrees     Problem: Adult Behavioral Health Plan of Care  Goal: Individualized Daily Interaction Plan (IDIP)  Outcome: Progressing     Problem: Adult Behavioral Health Plan of Care  Goal: Adheres to Safety Considerations for Self and Others  Outcome: Progressing  Intervention: Develop and Maintain Individualized Safety Plan   Goal Outcome Evaluation:    Plan of Care Reviewed With: patient      Patient reports that he is feeling good this afternoon, he appeared to be calm, sad but relaxed, his mood was blunted with flat affect. Patient is withdrawn and isolated, he is a ware that he needs to keep safe from COVID reported in the unit, Patient verbalized need to stay indoors. The patient denies any form of hallucinations or delusions. Insight and judgment is poor. No signs or symptoms of COVID related, he appears depressed and anxious, no s/s of SI/SIB,and contracts for safety. Patient was medication compliant, meal intake was adequate.will continue with the plan of care.

## 2023-03-05 NOTE — PLAN OF CARE
Problem: Psychotic Signs/Symptoms  Goal: Improved Behavioral Control (Psychotic Signs/Symptoms)  Outcome: Progressing  Note: Patient manifests no psychotic symptoms  Goal: Improved Mood Symptoms  Outcome: Progressing  Note: Improved mood as evidenced by non-disruptive behaviors     Problem: Psychotic Signs/Symptoms  Goal: Improved Mood Symptoms  Outcome: Progressing  Note: Improved mood as evidenced by non-disruptive behaviors     Problem: Pain Acute  Goal: Optimal Pain Control and Function  Outcome: Progressing  Note: Patient report neither acute nor chronic pain   Goal Outcome Evaluation:       Patient slept well, appears well rested upon awakening, Patient reports no pain, anxiety or any form of discomfort. Patient was cooperative with every 15 minutes room check, manifests no negative behavior through out the shift. Patient endorses no SI/HI, A/VH or SIB. Patient reports no side effect from his medications or other therapeutics/interventions (ECT). Overall, patient slept 7 for hours.  Will continue to monitor and follow plan of care.             Melvin Pimentel DNP, RN

## 2023-03-05 NOTE — PLAN OF CARE
RN Assessment:    Pt presented with flat affect. Pt was calm and cooperative while interacting with the writer. Pt was alert and oriented x 4. Pt denied having HI and hallucinations. Pt endorsed having SI without a plan or intent to act on the thoughts. Pt stated pt would report to staff before harming self. Pt denied having physical pain. Pt denied having new medical concerns. Pt endorsed sleeping well last night; however pt endorsed pt is still having nightmares. Pt feels the medications that are currently ordered are working well. No medication side effects endorsed by pt or observed by writer. Pt was isolative and withdrawn this shift. Continue to monitor for safety and changes in medical condition.     The COVID19 lab ordered for today was NEGATIVE.

## 2023-03-06 ENCOUNTER — ANESTHESIA (OUTPATIENT)
Dept: BEHAVIORAL HEALTH | Facility: CLINIC | Age: 33
End: 2023-03-06
Payer: COMMERCIAL

## 2023-03-06 ENCOUNTER — ANESTHESIA EVENT (OUTPATIENT)
Dept: BEHAVIORAL HEALTH | Facility: CLINIC | Age: 33
End: 2023-03-06
Payer: COMMERCIAL

## 2023-03-06 ENCOUNTER — APPOINTMENT (OUTPATIENT)
Dept: BEHAVIORAL HEALTH | Facility: CLINIC | Age: 33
End: 2023-03-06
Attending: PSYCHIATRY & NEUROLOGY
Payer: COMMERCIAL

## 2023-03-06 PROCEDURE — 90870 ELECTROCONVULSIVE THERAPY: CPT

## 2023-03-06 PROCEDURE — 370N000017 HC ANESTHESIA TECHNICAL FEE, PER MIN

## 2023-03-06 PROCEDURE — 250N000013 HC RX MED GY IP 250 OP 250 PS 637

## 2023-03-06 PROCEDURE — 250N000009 HC RX 250: Performed by: STUDENT IN AN ORGANIZED HEALTH CARE EDUCATION/TRAINING PROGRAM

## 2023-03-06 PROCEDURE — 90870 ELECTROCONVULSIVE THERAPY: CPT | Performed by: PSYCHIATRY & NEUROLOGY

## 2023-03-06 PROCEDURE — 250N000013 HC RX MED GY IP 250 OP 250 PS 637: Performed by: PSYCHIATRY & NEUROLOGY

## 2023-03-06 PROCEDURE — 250N000011 HC RX IP 250 OP 636: Performed by: STUDENT IN AN ORGANIZED HEALTH CARE EDUCATION/TRAINING PROGRAM

## 2023-03-06 PROCEDURE — 124N000002 HC R&B MH UMMC

## 2023-03-06 PROCEDURE — 99233 SBSQ HOSP IP/OBS HIGH 50: CPT | Mod: 25 | Performed by: PSYCHIATRY & NEUROLOGY

## 2023-03-06 RX ORDER — NICARDIPINE HCL-0.9% SOD CHLOR 1 MG/10 ML
SYRINGE (ML) INTRAVENOUS PRN
Status: DISCONTINUED | OUTPATIENT
Start: 2023-03-06 | End: 2023-03-06

## 2023-03-06 RX ADMIN — GABAPENTIN 300 MG: 300 CAPSULE ORAL at 12:12

## 2023-03-06 RX ADMIN — LORATADINE 10 MG: 10 TABLET ORAL at 12:13

## 2023-03-06 RX ADMIN — METHOHEXITAL SODIUM 90 MG: 500 INJECTION, POWDER, LYOPHILIZED, FOR SOLUTION INTRAMUSCULAR; INTRAVENOUS; RECTAL at 10:45

## 2023-03-06 RX ADMIN — GABAPENTIN 300 MG: 300 CAPSULE ORAL at 19:13

## 2023-03-06 RX ADMIN — NICOTINE 1 PATCH: 21 PATCH, EXTENDED RELEASE TRANSDERMAL at 09:05

## 2023-03-06 RX ADMIN — GABAPENTIN 300 MG: 300 CAPSULE ORAL at 16:05

## 2023-03-06 RX ADMIN — FLUTICASONE PROPIONATE 2 SPRAY: 50 SPRAY, METERED NASAL at 09:06

## 2023-03-06 RX ADMIN — Medication 1000 MCG: at 10:46

## 2023-03-06 RX ADMIN — SERTRALINE HYDROCHLORIDE 200 MG: 100 TABLET ORAL at 12:12

## 2023-03-06 RX ADMIN — Medication 12.5 MG: at 09:06

## 2023-03-06 RX ADMIN — SENNOSIDES AND DOCUSATE SODIUM 1 TABLET: 50; 8.6 TABLET ORAL at 19:13

## 2023-03-06 RX ADMIN — PANTOPRAZOLE SODIUM 40 MG: 40 TABLET, DELAYED RELEASE ORAL at 09:06

## 2023-03-06 RX ADMIN — ATROPINE SULFATE 2 DROP: 10 SOLUTION/ DROPS OPHTHALMIC at 19:13

## 2023-03-06 RX ADMIN — METFORMIN HYDROCHLORIDE 1000 MG: 500 TABLET ORAL at 17:21

## 2023-03-06 RX ADMIN — SUCCINYLCHOLINE CHLORIDE 80 MG: 20 INJECTION, SOLUTION INTRAMUSCULAR; INTRAVENOUS; PARENTERAL at 10:47

## 2023-03-06 RX ADMIN — SENNOSIDES AND DOCUSATE SODIUM 1 TABLET: 50; 8.6 TABLET ORAL at 12:13

## 2023-03-06 RX ADMIN — CLOZAPINE 300 MG: 200 TABLET ORAL at 19:13

## 2023-03-06 RX ADMIN — BENZTROPINE MESYLATE 1 MG: 1 TABLET ORAL at 19:13

## 2023-03-06 ASSESSMENT — ACTIVITIES OF DAILY LIVING (ADL)
ADLS_ACUITY_SCORE: 29
ORAL_HYGIENE: INDEPENDENT
ADLS_ACUITY_SCORE: 29
ADLS_ACUITY_SCORE: 29
HYGIENE/GROOMING: INDEPENDENT
ORAL_HYGIENE: INDEPENDENT
DRESS: INDEPENDENT
ADLS_ACUITY_SCORE: 29
LAUNDRY: WITH SUPERVISION
HYGIENE/GROOMING: INDEPENDENT
LAUNDRY: WITH SUPERVISION
ADLS_ACUITY_SCORE: 29
DRESS: INDEPENDENT

## 2023-03-06 NOTE — PROGRESS NOTES
"[facility], [place]   Psychiatric Progress Note  Hospital Day: 38        Interim History:   The patient's care was discussed with the treatment team during the daily team meeting and/or staff's chart notes were reviewed.  Staff report patient has remained oriented. Remains isolative to his room. Continues to report significant depressive symptoms and ongoing active SI with plan to shoot himself or stab himself with a knife upon discharge. Pt reported endorsing depression 9 over 10 and anxiety 5 over 10. He is feeling safe in the hospital. Denies significant side effects from medications.  COVID negative on 3/2 and 3/5. Clozapine level obtained on 3/3 and is 1001.     Upon interview Haseeb reported feeling \"exhausted, depressed and sad.\" However, he also added that he is feeling \"more hopeful\" about ECT and eventual improvement in depression. Continues to experience active SI. When asked if it has changed since admission, he said \"I don't really know yet.\" When asked whether he feels he is benefiting from ECT at this time, he again noted that he \"feels different, but I don't know exactly how to explain it yet.\" Reported increased appetite from clozapine. He is amenable with plan to increase metformin.     Suicidal ideation: Haseeb reports ongoing active SI with plan and intent to shoot himself with a gun or cut his throat upon discharge. Denies suicide plan or intent in the hospital. [place] for safety.     Homicidal ideation: denies current or recent homicidal ideation or behaviors.    Psychotic symptoms: Denying psychotic symptoms for > 1 week    Medication side effects reported: as above    Acute medical concerns: None    Other issues reported by patient: Patient had no further questions or concerns.           Medications:       atropine  2 drop Sublingual At Bedtime     benztropine  1 mg Oral At Bedtime     cloZAPine  300 mg Oral At Bedtime     fluticasone  2 spray Both Nostrils " "Daily     gabapentin  300 mg Oral TID     loratadine  10 mg Oral Daily     metFORMIN  500 mg Oral BID w/meals     metoprolol succinate ER  12.5 mg Oral Daily     nicotine  1 patch Transdermal Daily     nicotine   Transdermal Q8H     pantoprazole  40 mg Oral QAM AC     senna-docusate  1 tablet Oral BID     sertraline  200 mg Oral Daily          Allergies:   No Known Allergies       Labs:     Recent Results (from the past 24 hour(s))   Asymptomatic COVID-19 Virus (Coronavirus) by PCR Nose    Collection Time: 03/05/23  8:57 AM    Specimen: Nose; Swab   Result Value Ref Range    SARS CoV2 PCR Negative Negative          Psychiatric Examination:     /79 (Patient Position: Sitting)   Pulse 92   Temp 96.9  F (36.1  C) (Tympanic)   Resp 16   Ht 1.6 m (5' 3\")   Wt 75.1 kg (165 lb 8 oz)   SpO2 98%   BMI 29.32 kg/m    Weight is 165 lbs 8 oz  Body mass index is 29.32 kg/m .    Weight over time:  Vitals:    01/27/23 1718   Weight: 75.1 kg (165 lb 8 oz)       Orthostatic Vitals     None        Cardiometabolic risk assessment. 01/30/23    Reviewed patient profile for cardiometabolic risk factors    Date taken /Value  REFERENCE RANGE   Abdominal Obesity  (Waist Circumference)   See nursing flowsheet Women ?35 in (88 cm)   Men ?40 in (102 cm)      Triglycerides  Triglycerides   Date Value Ref Range Status   01/28/2023 192 (H) <150 mg/dL Final       ?150 mg/dL (1.7 mmol/L) or current treatment for elevated triglycerides   HDL cholesterol  Direct Measure HDL   Date Value Ref Range Status   01/28/2023 41 >=40 mg/dL Final   ]   Women <50 mg/dL (1.3 mmol/L) in women or current treatment for low HDL cholesterol  Men <40 mg/dL (1 mmol/L) in men or current treatment for low HDL cholesterol     Fasting plasma glucose (FPG) Lab Results   Component Value Date     01/28/2023      FPG ?100 mg/dL (5.6 mmol/L) or treatment for elevated blood glucose   Blood pressure  BP Readings from Last 3 Encounters:   03/06/23 116/79 " "  01/27/23 118/74   07/20/22 112/79    Blood pressure ?130/85 mmHg or treatment for elevated blood pressure   Family History  See family history     Appearance: awake, alert, dressed in hospital scrubs and unkempt  Attitude:  cooperative, calm  Eye Contact: fair  Mood:  \"exhausted\"  Affect:  mood congruent and intensity is blunted  Speech:  clear, coherent. appropriate  Language: fluent and intact in English  Psychomotor, Gait, Musculoskeletal:  no evidence of tardive dyskinesia, dystonia, or tics  Throught Process:  Linear, ruminative  Associations:  No evidence of loose associations  Thought Content:  SI w/plan and intent upon discharge, no plan or intent while in hospital. Denies hallucinations  Insight:  fair  Judgement:  poor  Oriented to:  time, person, and place  Attention Span and Concentration:  fair  Recent and Remote Memory:  fair  Fund of Knowledge:  appropriate    Clinical Global Impressions  First:  Considering your total clinical experience with this particular patient population, how severe are the patient's symptoms at this time?: 7 (01/28/23 1341)    Most recent:  Compared to the patient's condition at the START of treatment, this patient's condition is: 4           Precautions:     Behavioral Orders   Procedures     Code 1 - Restrict to Unit     Code 2     For imaging     Electroconvulsive therapy     Series of up to 12 treatments. Begin Date: 2/27/23     Treating Psychiatrist providing ECT:  Dr. Kearns     Notified on:  2/23/23     Electroconvulsive therapy     For acute ECT series, MWF, up to 12 treatment.     Electroconvulsive therapy     Series of up to 12 treatments. Begin Date: 02/26/23     Treating Psychiatrist providing ECT: Clifton  Notified on: 02/24/23     Fall precautions     Routine Programming     As clinically indicated     Status 15     Every 15 minutes.     Suicide precautions     Patients on Suicide Precautions should have a Combination Diet ordered that includes a Diet " "selection(s) AND a Behavioral Tray selection for Safe Tray - with utensils, or Safe Tray - NO utensils            Diagnoses:     Active suicidal ideation with intent outside of hospital setting  MDD, recurrent, severe with psychotic features vs Schizoaffective Disorder, depressive type  Polysubstance use  Unspecified mood disorder  ADHD, inattentive type per chart  History of idiopathic hypersomnolence per chart  Nicotine use disorder, dependence and withdrawal   Allergies- chronic uticaria and rhinitis per chart  HLD per chart          Assessment & Plan:     Assessment and hospital summary:  This patient is a 32 year old male with history of mood disorder, ADHD and substance use who presented to [place] ED with SI on 1/26 in context of reported methamphetamine use and being kicked out of sober living. Medically cleared in ED, placed on 72HH and admitted to 12N. Limited historian, giving inconsistent reports about substance use, UDS negative, very somnolent, endorsing ongoing SI and some psychosis symptoms. PTA medications continued with exception of finasteride as patient states he takes \"1/4 a pill\" and declined ordered dose, will defer to primary team to address. Will continue to evaluate safety and stabilization further as patient more able to engage in assessments. Inpatient psychiatric hospitalization is warranted at this time for safety, stabilization, and possible adjustment in medications.    Patient reports that he abruptly stopped Zoloft one week prior to his admission. He developed discontinuation syndrome symptoms following that. He reports that he does not have a history of psychosis but is experiencing psychotic symptoms. He is amenable with plan to trial risperidone after R/B/A were discussed. Risperidone was initiated on 1/30 and titrated to a total of 3 mg daily on 2/1. Clonidine, used for ADHD, was reduced from a total of 0.3 mg daily to 0.2 mg daily on 2/3 due to concern for hypotension. 2/9: " Cardiology consult placed. EKG- tachycardia 121 bpm, QTc 465 ms, Abnormal QRS angle and T wave abnormality. COVID negative and labs are unremarkable.  On 2/10, clozapine was held pending additional workup from IM and cardiology. Pericarditis was ruled out and metoprolol was started. Clozapine was restarted on 2/13 with plan to cautiously titrate to lowest effective dose. 2/23/23: Clozapine titration schedule increased 25 mg/day. ECT and IM consults for ECT clearance placed. 2/27/23: ECT initiated. Risperidone was discontinued on 2/28. Clozapine level obtained on 3/3 at corresponding dose of 300 mg and was within therapeutic range (1001). Metformin increased to 1000 mg BID on 3/6 to target increased appetite/wt gain.     Psychiatric treatment/inteventions:  Medications:   -Continue clozapine 300 mg qhs. Clozapine quant at corresponding dose is 1001.  -Continue PTA sertraline 200 mg daily for mood  -Continue PTA gabapentin 300 mg TID for anxiety   -Continue Cogentin 1 mg at bedtime for possible EPSE     -PRN hydroxyzine 25mg every 4 hour for anxiety  -PRN trazodone 50mg at bedtime for sleep   -PRN olanzapine 10mg PO or IM TID for agitation/psychosis   -PRN atropine 1% SL drops, 2 drops q2h for sialorrhea    Spiritual services consult placed on 2/6. Pt is Spiritism. Appreciate assistance.     ECT:  - Medically cleared on 2/24. See IM note for details.  - ECT consult placed on 2/23.  - ECT started on 2/27/23  - HOLD Gabapentin on nights prior to ECT and on ECT mornings until after ECT.   - ECT #3 completed 3/3/23. Uneventful. Next ECT treatment scheduled for Monday, 3/6. Baseline IDS-SR was 55. Hard copy placed in chart.      Laboratory/Imaging: reviewed: Covid negative; UDS negative; CMP, CBC, TSH, Lipid panel, HgbA1c ordered to complete admission labs. Reviewed.     2/9/23: Troponin, CK, CBC, BMP: Unremarkable, CRP elevated to 38.18, COVID: Negative  2/10/23: Add Influenza A/B given flu-like sx-negative  Patient will  be treated in therapeutic milieu with appropriate individual and group therapies as described.     Medical treatment/interventions:  Medical concerns:   Nicotine replacement ordered, educate patient on benefits of cessation, pt unclear about finasteride dose, will hold until further information is obtained. PTA PRN zyrtec, azelastine, fluticasone, triamcinolone and epipen ordered for allergies and PRN ammoniaum lactate, Eucerin for  dry skin.    Flatulence:   - simethicone prn    Neuroleptic induced wt gain:  - Monitor weights  - Increase metformin to 1,000 mg BID with meals    Dyslipidemia: Will arrange follow up with PCP to address. Discussed importance of healthy eating habits and regular exercise. Will consider nutrition consult if patient amenable.     Orthostasis likely 2/2 clozapine: Pt is not hypotensive but reports orthostatic symptoms and previously restricted fluids. Now resolved.   - Continue to monitor vital signs closely  - Encourage fluids  - Discontinued clonidine    Sialorrhea 2/2 clozapine:  - Atropine 1% SL drops qhs  - Recommend towel on pillow when sleeping    Chest pain/tachycardia/EKG changes (2/9):  - Pain improved with PRN medications.   - Cardiology consult placed on 2/9. See note on that date for details.   - ECHO reviewed and unremarkable.  - Writer discussed care with both Dr. Streeter from [facility] and Christina from . Plan for now is to HOLD clozapine until further medical workup. Dr. Streeter explained that myocarditis has been ruled out as troponin was negative. Pericarditis remains on differential, but he does not have EKG findings or a pericardial effusion. IM will assess for pericardial friction rub and pleuritic chest pain. IM seen by patient and Metoprolol was started.     Update 2/13: Discussed care with Annette from  today on two occasions. Please see her note on this date for details. She does not suspect that this is pericarditis. He does not have pleuritic pain, characteristic CP,  EKG changes, or pericardial effusion on ECHO. Therefore, will cautiously restart clozapine while monitoring closely for side effects. May consider further increase in metoprolol if necessary. PPI was started due to suspected GERD.     Update 2/24 per IM note:  Medicine is following peripherally for concerns for pericarditis.  See detailed note from 2/13.  No pericardial friction rub noted while sitting up and leaning forward today.  Patient states that his chest discomfort is getting better after starting the Protonix yesterday.  It does appear that he has also been using the Maalox.  Patient does have Tums available as well.  Please be mindful for any constipation with overuse of the PRNs.     POC:  - Continue Protonix daily for 8 weeks, then taper off  - Recommend lifestyle changes including weight loss head of bed elevation avoidance of late-night eating and specific food elimination such as chocolate caffeine alcohol acidic and/or spicy food.  - Watch for constipation with PRNs for heartburn  - We will schedule senna 1 tab twice daily  - MiraLAX daily as needed added on     Medicine will sign off, please contact us for any acute changes.      Sore throat/cough/nasal congestion, resolved:   - COVID negative on 2/9. Repeat COVID test and Influenza A/B neg on 2/10.  - Cepacol lozenges added  - PRN Tylenol   - Consulted with IM. Appreciate assistance. See their notes for details.      Legal Status: VOLUNTARY. 72 hour hold discontinued. Pt agreed to sign in on voluntary basis and discharge directly to treatment once stable.      Safety Assessment:        Behavioral Orders   Procedures     Code 1 - Restrict to Unit     Routine Programming       As clinically indicated     Status 15       Every 15 minutes.     Suicide precautions       Patients on Suicide Precautions should have a Combination Diet ordered that includes a Diet selection(s) AND a Behavioral Tray selection for Safe Tray - with utensils, or Safe Tray - NO  utensils        Withdrawal precautions      Pt has not required locked seclusion or restraints in the past 24 hours to maintain safety, please refer to RN documentation for further details.    Discontinued SIO on 2/8 as patient is heriberto for safety. Monitor SI closely.     The risks, benefits, alternatives and side effects have been discussed and are understood by the patient.     Disposition: Pending clinical stabilization. Pt remains actively suicidal. Will likely discharge back to [facility] program once stable.      This note was created by undersigned using a Dragon dictation system. All typing errors or contextual distortion are unintentional and software inherent.     Entered by: Nelly Brown MD on 3/6/2023 at 8:55 AM

## 2023-03-06 NOTE — PLAN OF CARE
"  Problem: Adult Behavioral Health Plan of Care  Goal: Plan of Care Review  Recent Flowsheet Documentation  Taken 3/6/2023 0912 by Sree Alvarado RN  Patient Agreement with Plan of Care: agrees   Goal Outcome Evaluation:    Plan of Care Reviewed With: patient    Pt slept this morning because he was having ECT. Pt told this writer he was very hungry and could not wait. ECT called and pt was taken early at 10 AM instead of 1300. Prior to ECT, pt stated he is exhausted and having a sore back and neck from sleeping. Pt encouraged to change position in bed frequently and stated he a little better. Pt endorsed suicide ideation and with a plan to use a gun if available and this SI did not improve with ECT. Pt endorsed anxiety 5/10 and depression 8/10 and will mild improvement with anxiety after ECT. Pt vital stable after ECT. Pt maintained flat and blunted affect, calm and isolative in his room. Pt speech is clear, and soft spoken with moderate tone in volume. Average appetite, voiding freely and no BM issue per pt. Pt denies all Covid 19 symptoms and vital stable. /85 (BP Location: Right arm, Patient Position: Sitting, Cuff Size: Adult Regular)   Pulse 106   Temp 97.8  F (36.6  C) (Temporal)   Resp 18   Ht 1.6 m (5' 3\")   Wt 75.1 kg (165 lb 8 oz)   SpO2 93%   BMI 29.32 kg/m                    "

## 2023-03-06 NOTE — ANESTHESIA PREPROCEDURE EVALUATION
Anesthesia Pre-Procedure Evaluation    Patient: Nikolay Lora   MRN: [id number] : 1990        Procedure :           Past Medical History:   Diagnosis Date     Brugada syndrome      Chronic idiopathic urticaria      Concussion with loss of consciousness      Mixed hyperlipidemia      Polysubstance abuse (H)      Schizotypal personality disorder (H)       No past surgical history on file.   No Known Allergies   Social History     Tobacco Use     Smoking status: Former     Smokeless tobacco: Never     Tobacco comments:     1-2 cigs per day   Substance Use Topics     Alcohol use: Yes      Wt Readings from Last 1 Encounters:   23 75.1 kg (165 lb 8 oz)        Anesthesia Evaluation   Pt has not had prior anesthetic         ROS/MED HX  ENT/Pulmonary:       Neurologic:       Cardiovascular: Comment: Repolarization abnormality. brugada ???. Authorized by cardiology. Negative history of syncope, seizures. Family history negative      METS/Exercise Tolerance:     Hematologic:       Musculoskeletal:       GI/Hepatic:       Renal/Genitourinary:       Endo:       Psychiatric/Substance Use:       Infectious Disease:       Malignancy:       Other:            Physical Exam    Airway        Mallampati: III   TM distance: > 3 FB   Neck ROM: full   Mouth opening: > 3 cm    Respiratory Devices and Support         Dental           Cardiovascular   cardiovascular exam normal          Pulmonary   pulmonary exam normal                OUTSIDE LABS:  CBC:   Lab Results   Component Value Date    WBC 12.7 (H) 2023    WBC 8.0 2023    HGB 15.0 2023    HGB 15.8 2023    HCT 46.1 2023    HCT 48.3 2023     2023     2023     BMP:   Lab Results   Component Value Date     2023     2023    POTASSIUM 3.9 2023    POTASSIUM 4.0 2023    CHLORIDE 103 2023    CHLORIDE 100 2023    CO2 27 2023    CO2 27 2023    BUN 19.5 2023     BUN 12.5 2023    CR 0.81 2023    CR 0.81 2023    GLC 93 2023     (H) 2023     COAGS: No results found for: PTT, INR, FIBR  POC: No results found for: BGM, HCG, HCGS  HEPATIC:   Lab Results   Component Value Date    ALBUMIN 4.1 2023    PROTTOTAL 7.1 2023    ALT 58 (H) 2023    AST 29 2023    ALKPHOS 82 2023    BILITOTAL 0.3 2023     OTHER:   Lab Results   Component Value Date    A1C 5.4 2023    KATINA 9.4 2023    TSH 1.09 2023       Anesthesia Plan    ASA Status:  2      Anesthesia Type: General.              Consents    Anesthesia Plan(s) and associated risks, benefits, and realistic alternatives discussed. Questions answered and patient/representative(s) expressed understanding.    - Discussed:     - Discussed with:  Patient         Postoperative Care    Pain management: Oral pain medications, IV analgesics.   PONV prophylaxis: Ondansetron (or other 5HT-3)     Comments:           H&P reviewed: Unable to attach H&P to encounter due to EHR limitations. H&P Update: appropriate H&P reviewed, patient examined. No interval changes since H&P (within 30 days).             Kierra Cole Pre-Procedure Evaluation    Patient: Nikolay Lora   MRN: [id number] : 1990        Procedure :           Past Medical History:   Diagnosis Date     Brugada syndrome      Chronic idiopathic urticaria      Concussion with loss of consciousness      Mixed hyperlipidemia      Polysubstance abuse (H)      Schizotypal personality disorder (H)       No past surgical history on file.   No Known Allergies   Social History     Tobacco Use     Smoking status: Former     Smokeless tobacco: Never     Tobacco comments:     1-2 cigs per day   Substance Use Topics     Alcohol use: Yes      Wt Readings from Last 1 Encounters:   23 75.1 kg (165 lb 8 oz)        Anesthesia Evaluation   Pt has not had prior anesthetic         ROS/MED  HX  ENT/Pulmonary:       Neurologic:       Cardiovascular: Comment: Repolarization abnormality. brugada ???. Authorized by cardiology. Negative history of syncope, seizures. Family history negative      METS/Exercise Tolerance:     Hematologic:       Musculoskeletal:       GI/Hepatic:       Renal/Genitourinary:       Endo:       Psychiatric/Substance Use:       Infectious Disease:       Malignancy:       Other:            Physical Exam    Airway        Mallampati: III   TM distance: > 3 FB   Neck ROM: full   Mouth opening: > 3 cm    Respiratory Devices and Support         Dental           Cardiovascular   cardiovascular exam normal          Pulmonary   pulmonary exam normal                OUTSIDE LABS:  CBC:   Lab Results   Component Value Date    WBC 12.7 (H) 03/02/2023    WBC 8.0 02/24/2023    HGB 15.0 02/24/2023    HGB 15.8 02/09/2023    HCT 46.1 02/24/2023    HCT 48.3 02/09/2023     02/24/2023     02/09/2023     BMP:   Lab Results   Component Value Date     02/24/2023     02/09/2023    POTASSIUM 3.9 02/24/2023    POTASSIUM 4.0 02/09/2023    CHLORIDE 103 02/24/2023    CHLORIDE 100 02/09/2023    CO2 27 02/24/2023    CO2 27 02/09/2023    BUN 19.5 02/24/2023    BUN 12.5 02/09/2023    CR 0.81 02/24/2023    CR 0.81 02/09/2023    GLC 93 02/24/2023     (H) 02/09/2023     COAGS: No results found for: PTT, INR, FIBR  POC: No results found for: BGM, HCG, HCGS  HEPATIC:   Lab Results   Component Value Date    ALBUMIN 4.1 02/24/2023    PROTTOTAL 7.1 02/24/2023    ALT 58 (H) 02/24/2023    AST 29 02/24/2023    ALKPHOS 82 02/24/2023    BILITOTAL 0.3 02/24/2023     OTHER:   Lab Results   Component Value Date    A1C 5.4 01/28/2023    KATINA 9.4 02/24/2023    TSH 1.09 01/28/2023       Anesthesia Plan    ASA Status:  2      Anesthesia Type: General.              Consents    Anesthesia Plan(s) and associated risks, benefits, and realistic alternatives discussed. Questions answered and  patient/representative(s) expressed understanding.    - Discussed:     - Discussed with:  Patient         Postoperative Care    Pain management: Oral pain medications, IV analgesics.   PONV prophylaxis: Ondansetron (or other 5HT-3)     Comments:           H&P reviewed: Unable to attach H&P to encounter due to EHR limitations. H&P Update: appropriate H&P reviewed, patient examined. No interval changes since H&P (within 30 days).             Ana Cesar MD   Anesthesia Pre-Procedure Evaluation    Patient: Nikolay Lora   MRN: [id number] : 1990        Procedure :           Past Medical History:   Diagnosis Date     Brugada syndrome      Chronic idiopathic urticaria      Concussion with loss of consciousness      Mixed hyperlipidemia      Polysubstance abuse (H)      Schizotypal personality disorder (H)       No past surgical history on file.   No Known Allergies   Social History     Tobacco Use     Smoking status: Former     Smokeless tobacco: Never     Tobacco comments:     1-2 cigs per day   Substance Use Topics     Alcohol use: Yes      Wt Readings from Last 1 Encounters:   23 75.1 kg (165 lb 8 oz)        Anesthesia Evaluation   Pt has not had prior anesthetic         ROS/MED HX  ENT/Pulmonary:       Neurologic:       Cardiovascular: Comment: Repolarization abnormality. brugada ???. Authorized by cardiology. Negative history of syncope, seizures. Family history negative      METS/Exercise Tolerance:     Hematologic:       Musculoskeletal:       GI/Hepatic:       Renal/Genitourinary:       Endo:       Psychiatric/Substance Use:       Infectious Disease:       Malignancy:       Other:            Physical Exam    Airway        Mallampati: III   TM distance: > 3 FB   Neck ROM: full   Mouth opening: > 3 cm    Respiratory Devices and Support         Dental           Cardiovascular   cardiovascular exam normal          Pulmonary   pulmonary exam normal                OUTSIDE LABS:  CBC:   Lab Results    Component Value Date    WBC 12.7 (H) 2023    WBC 8.0 2023    HGB 15.0 2023    HGB 15.8 2023    HCT 46.1 2023    HCT 48.3 2023     2023     2023     BMP:   Lab Results   Component Value Date     2023     2023    POTASSIUM 3.9 2023    POTASSIUM 4.0 2023    CHLORIDE 103 2023    CHLORIDE 100 2023    CO2 27 2023    CO2 27 2023    BUN 19.5 2023    BUN 12.5 2023    CR 0.81 2023    CR 0.81 2023    GLC 93 2023     (H) 2023     COAGS: No results found for: PTT, INR, FIBR  POC: No results found for: BGM, HCG, HCGS  HEPATIC:   Lab Results   Component Value Date    ALBUMIN 4.1 2023    PROTTOTAL 7.1 2023    ALT 58 (H) 2023    AST 29 2023    ALKPHOS 82 2023    BILITOTAL 0.3 2023     OTHER:   Lab Results   Component Value Date    A1C 5.4 2023    KATINA 9.4 2023    TSH 1.09 2023       Anesthesia Plan    ASA Status:  2      Anesthesia Type: General.              Consents    Anesthesia Plan(s) and associated risks, benefits, and realistic alternatives discussed. Questions answered and patient/representative(s) expressed understanding.    - Discussed:     - Discussed with:  Patient         Postoperative Care    Pain management: Oral pain medications, IV analgesics.   PONV prophylaxis: Ondansetron (or other 5HT-3)     Comments:           H&P reviewed: Unable to attach H&P to encounter due to EHR limitations. H&P Update: appropriate H&P reviewed, patient examined. No interval changes since H&P (within 30 days).             Kierra Cole Pre-Procedure Evaluation    Patient: Nikolay Lora   MRN: [id number] : 1990        Procedure :           Past Medical History:   Diagnosis Date     Brugada syndrome      Chronic idiopathic urticaria      Concussion with loss of consciousness      Mixed hyperlipidemia       Polysubstance abuse (H)      Schizotypal personality disorder (H)       No past surgical history on file.   No Known Allergies   Social History     Tobacco Use     Smoking status: Former     Smokeless tobacco: Never     Tobacco comments:     1-2 cigs per day   Substance Use Topics     Alcohol use: Yes      Wt Readings from Last 1 Encounters:   01/27/23 75.1 kg (165 lb 8 oz)        Anesthesia Evaluation   Pt has not had prior anesthetic         ROS/MED HX  ENT/Pulmonary:       Neurologic:       Cardiovascular: Comment: Repolarization abnormality. brugada ???. Authorized by cardiology. Negative history of syncope, seizures. Family history negative      METS/Exercise Tolerance:     Hematologic:       Musculoskeletal:       GI/Hepatic:       Renal/Genitourinary:       Endo:       Psychiatric/Substance Use:       Infectious Disease:       Malignancy:       Other:            Physical Exam    Airway        Mallampati: III   TM distance: > 3 FB   Neck ROM: full   Mouth opening: > 3 cm    Respiratory Devices and Support         Dental           Cardiovascular   cardiovascular exam normal          Pulmonary   pulmonary exam normal                OUTSIDE LABS:  CBC:   Lab Results   Component Value Date    WBC 12.7 (H) 03/02/2023    WBC 8.0 02/24/2023    HGB 15.0 02/24/2023    HGB 15.8 02/09/2023    HCT 46.1 02/24/2023    HCT 48.3 02/09/2023     02/24/2023     02/09/2023     BMP:   Lab Results   Component Value Date     02/24/2023     02/09/2023    POTASSIUM 3.9 02/24/2023    POTASSIUM 4.0 02/09/2023    CHLORIDE 103 02/24/2023    CHLORIDE 100 02/09/2023    CO2 27 02/24/2023    CO2 27 02/09/2023    BUN 19.5 02/24/2023    BUN 12.5 02/09/2023    CR 0.81 02/24/2023    CR 0.81 02/09/2023    GLC 93 02/24/2023     (H) 02/09/2023     COAGS: No results found for: PTT, INR, FIBR  POC: No results found for: BGM, HCG, HCGS  HEPATIC:   Lab Results   Component Value Date    ALBUMIN 4.1 02/24/2023     PROTTOTAL 7.1 2023    ALT 58 (H) 2023    AST 29 2023    ALKPHOS 82 2023    BILITOTAL 0.3 2023     OTHER:   Lab Results   Component Value Date    A1C 5.4 2023    KATINA 9.4 2023    TSH 1.09 2023       Anesthesia Plan    ASA Status:  2      Anesthesia Type: General.              Consents    Anesthesia Plan(s) and associated risks, benefits, and realistic alternatives discussed. Questions answered and patient/representative(s) expressed understanding.    - Discussed:     - Discussed with:  Patient         Postoperative Care    Pain management: Oral pain medications, IV analgesics.   PONV prophylaxis: Ondansetron (or other 5HT-3)     Comments:           H&P reviewed: Unable to attach H&P to encounter due to EHR limitations. H&P Update: appropriate H&P reviewed, patient examined. No interval changes since H&P (within 30 days).             Kierra Islas Pre-Procedure Evaluation    Patient: Nikolay Lora   MRN: [id number] : 1990        Procedure :           Past Medical History:   Diagnosis Date     Brugada syndrome      Chronic idiopathic urticaria      Concussion with loss of consciousness      Mixed hyperlipidemia      Polysubstance abuse (H)      Schizotypal personality disorder (H)       No past surgical history on file.   No Known Allergies   Social History     Tobacco Use     Smoking status: Former     Smokeless tobacco: Never     Tobacco comments:     1-2 cigs per day   Substance Use Topics     Alcohol use: Yes      Wt Readings from Last 1 Encounters:   23 75.1 kg (165 lb 8 oz)        Anesthesia Evaluation   Pt has not had prior anesthetic         ROS/MED HX  ENT/Pulmonary:       Neurologic:       Cardiovascular: Comment: Repolarization abnormality. brugada ???. Authorized by cardiology. Negative history of syncope, seizures. Family history negative      METS/Exercise Tolerance: >4 METS    Hematologic:       Musculoskeletal:       GI/Hepatic:        Renal/Genitourinary:       Endo:       Psychiatric/Substance Use:     (+) psychiatric history     Infectious Disease:       Malignancy:       Other:            Physical Exam    Airway        Mallampati: III   TM distance: > 3 FB   Neck ROM: full   Mouth opening: > 3 cm    Respiratory Devices and Support         Dental       (+) Modest Abnormalities - crowns, retainers, 1 or 2 missing teeth      Cardiovascular   cardiovascular exam normal          Pulmonary   pulmonary exam normal                OUTSIDE LABS:  CBC:   Lab Results   Component Value Date    WBC 12.7 (H) 03/02/2023    WBC 8.0 02/24/2023    HGB 15.0 02/24/2023    HGB 15.8 02/09/2023    HCT 46.1 02/24/2023    HCT 48.3 02/09/2023     02/24/2023     02/09/2023     BMP:   Lab Results   Component Value Date     02/24/2023     02/09/2023    POTASSIUM 3.9 02/24/2023    POTASSIUM 4.0 02/09/2023    CHLORIDE 103 02/24/2023    CHLORIDE 100 02/09/2023    CO2 27 02/24/2023    CO2 27 02/09/2023    BUN 19.5 02/24/2023    BUN 12.5 02/09/2023    CR 0.81 02/24/2023    CR 0.81 02/09/2023    GLC 93 02/24/2023     (H) 02/09/2023     COAGS: No results found for: PTT, INR, FIBR  POC: No results found for: BGM, HCG, HCGS  HEPATIC:   Lab Results   Component Value Date    ALBUMIN 4.1 02/24/2023    PROTTOTAL 7.1 02/24/2023    ALT 58 (H) 02/24/2023    AST 29 02/24/2023    ALKPHOS 82 02/24/2023    BILITOTAL 0.3 02/24/2023     OTHER:   Lab Results   Component Value Date    A1C 5.4 01/28/2023    KATINA 9.4 02/24/2023    TSH 1.09 01/28/2023       Anesthesia Plan    ASA Status:  2   NPO Status:  NPO Appropriate    Anesthesia Type: General.              Consents    Anesthesia Plan(s) and associated risks, benefits, and realistic alternatives discussed. Questions answered and patient/representative(s) expressed understanding.     - Discussed: Risks, Benefits and Alternatives for BOTH SEDATION and the PROCEDURE were discussed     - Discussed with:  Patient       - Extended Intubation/Ventilatory Support Discussed: No.      - Patient is DNR/DNI Status: No    Use of blood products discussed: No .     Postoperative Care    Pain management: Oral pain medications, IV analgesics.   PONV prophylaxis: Ondansetron (or other 5HT-3)     Comments:           H&P reviewed: Unable to attach H&P to encounter due to EHR limitations. H&P Update: appropriate H&P reviewed, patient examined. No interval changes since H&P (within 30 days).             Salty Pollock MD

## 2023-03-06 NOTE — PROGRESS NOTES
Admit to pacu 1058  1110 in phase ll recovery desat to 89, o2 applied.  1120 oriented to name  and place (22min)  28 min fully oriented,  1130 remains sleepy.  1140 up in chair sat at 92-94 on room air, ready to return to floor.

## 2023-03-06 NOTE — ANESTHESIA CARE TRANSFER NOTE
Patient: Link Lora    Procedure: * No procedures listed *       Diagnosis: * No pre-op diagnosis entered *  Diagnosis Additional Information: No value filed.    Anesthesia Type:   General     Note:    Oropharynx: oropharynx clear of all foreign objects  Level of Consciousness: drowsy  Oxygen Supplementation: room air    Independent Airway: airway patency satisfactory and stable  Dentition: dentition unchanged  Vital Signs Stable: post-procedure vital signs reviewed and stable  Report to RN Given: handoff report given  Patient transferred to: PACU    Handoff Report: Identifed the Patient, Identified the Reponsible Provider, Reviewed the pertinent medical history, Discussed the surgical course, Reviewed Intra-OP anesthesia mangement and issues during anesthesia, Set expectations for post-procedure period and Allowed opportunity for questions and acknowledgement of understanding      Vitals:  Vitals Value Taken Time   /84 03/06/23 1140   Temp     Pulse 103 03/06/23 1141   Resp 11 03/06/23 1141   SpO2 87 % 03/06/23 1141   Vitals shown include unvalidated device data.    Electronically Signed By: Ana Cesar MD  March 6, 2023  11:42 AM

## 2023-03-06 NOTE — ANESTHESIA POSTPROCEDURE EVALUATION
Patient: Nikolay Lora    Procedure: * No procedures listed *       Anesthesia Type:  General    Note:  Disposition: Inpatient   Postop Pain Control: Uneventful            Sign Out: Well controlled pain   PONV: No   Neuro/Psych: Uneventful            Sign Out: Acceptable/Baseline neuro status   Airway/Respiratory: Uneventful            Sign Out: Acceptable/Baseline resp. status   CV/Hemodynamics: Uneventful            Sign Out: Acceptable CV status; No obvious hypovolemia; No obvious fluid overload   Other NRE: NONE   DID A NON-ROUTINE EVENT OCCUR? No           Last vitals:  Vitals:    03/06/23 1125 03/06/23 1130 03/06/23 1140   BP:  123/87 107/84   Pulse: 103 106 102   Resp: 20 21 11   Temp:      SpO2: 96% (!) 89% 92%       Electronically Signed By: Ana Cesar MD  March 6, 2023  11:42 AM

## 2023-03-06 NOTE — PROCEDURES
"Nikolay Lora is a 32 year old  year old male patient.  [id number]  @DX@    [facility]   ECT Procedure Note   03/06/2023    Patient Status: Inpatient    Is this the first in a series of 12 treatments?  no   No Known Allergies    Weight:  165 lbs 8 oz         Indications for ECT:   Medications ineffective  Imminent risk of suicide         Clinical Narrative:   Nikolay Lora is a 32 year old man with affective dysregulation, ADHD and polysubstance use (alcohol*, amphetamine*, cocaine, cannabis) who is hospitalized with progressive depression leading to suicidal ideation with planning on 1/26, in the context of  medication non compliance and losing his place at sober living due to reported methamphetamine use. Throughout his hospital stay medication adjustments have been made (restarted on sertraline, titrated risperidone, added clozapine, which is being titrated); however, intense suicidal ideation has continued and he has been having difficulty tolerating medication changes. This consultation is requested to evaluate candidacy for electroconvulsive therapy (ECT).      We utilized phone translation services in [facility] during this visit to ensure thoroughness, otherwise he can communicate in English well.  The patient shares he cannot stop contemplating suicide with a plan to shoot himself as soon as he is out of the hospital. He reports visual hallucinations of \"lanterns and black birds flying around\" with commanding auditory hallucinations telling him that he is \"worthless\" and \"should kill (himself)\". Although hallucinations got better since admission with medication changes, depression and suicidal contemplation has persisted, he is interested in ECT, hoping for a quicker relief.         Diagnosis:   Schizoaffective Disorder, depressed  Polysubstance Use Disorder in a controlled environment          Assessment:   Considering the clinical acuity  electroconvulsive therapy (ECT) appears " "appropriate; despite multifactorial nature of the presentation that would benefit from optimization of cognitive, behavioral and social interventions longitudinally.      Discussed all relevant aspects of ECT with patient, including risks of memory loss, HA, nausea, death <1/50,000, driving prohibition; possible lack of benefit or relapse after successful treatment, alternatives, right to decline, possible outpatient procedures. All questions answered, patient will have opportunity to review ECT video.         Pause for the Cause:     Right patient Yes   Right procedure/laterality settings: Yes          Intra-Procedure Documentation:     #1 02/27/23 pt says he was feeling \"anxious but content\"  #2 03/01/23 no auditory hallucinations, concern for visual hallucinations of bugs in his room. Still actively suicidal with a plan with gun or kitchen knife.   #3 03/03/23 Visual hallucinations disappeared for a day after last ECT. Thinking about suicide slightly less. Worried about some pain in his right arm. Mood 6.5/10 (10 best)  #4 03/06/23 Visual hallucinations returned over the weekend. Auditory hallucinations the same. Still thinking about suicide. Feels safe in hospital but not outside.       ECT #: 4   Treatment number this series: 4   Total treatment number: 4     Type of ECT:  Bilateral, standard    ECT Medications:    Brevital: 90mg  Succinyl Choline: 80mg    ECT Strip Summary: (titration 96 mC)  Energy Level: 192 mC, 1ms 20 Hz, 6 sec, 800 mA (icreased from 144 mC on 3/6/23)  Motor Seizure Duration: 34 seconds  EEG Seizure Duration: 49 seconds    Complications:      Time for re-orientation: 28 min on 3/6/23    Plan:   Continue  ECT Q MWF at  192 mCMonitor depression severity with clinical assessment augmented with CUDOS every other treatment  Continue current medications    Chica Lazo MD    "

## 2023-03-06 NOTE — ANESTHESIA CARE TRANSFER NOTE
Patient: Nikolay Lora    Procedure: * No procedures listed *       Diagnosis: * No pre-op diagnosis entered *  Diagnosis Additional Information: No value filed.    Anesthesia Type:   General     Note:    Oropharynx: oropharynx clear of all foreign objects  Level of Consciousness: awake and drowsy  Oxygen Supplementation: room air    Independent Airway: airway patency satisfactory and stable  Dentition: dentition unchanged  Vital Signs Stable: post-procedure vital signs reviewed and stable  Report to RN Given: handoff report given  Patient transferred to: PACU    Handoff Report: Identifed the Patient, Identified the Reponsible Provider, Reviewed the pertinent medical history, Discussed the surgical course, Reviewed Intra-OP anesthesia mangement and issues during anesthesia, Set expectations for post-procedure period and Allowed opportunity for questions and acknowledgement of understanding      Vitals:  Vitals Value Taken Time   /93 03/06/23 1110   Temp 36.4  C (97.5  F) 03/06/23 1059   Pulse 105 03/06/23 1111   Resp 18 03/06/23 1111   SpO2 97 % 03/06/23 1111   Vitals shown include unvalidated device data.    Electronically Signed By: Ana Cesar MD  March 6, 2023  11:13 AM

## 2023-03-06 NOTE — PLAN OF CARE
Problem: Adult Behavioral Health Plan of Care  Goal: Absence of New-Onset Illness or Injury  Outcome: Progressing  Note: Patient reports no new-onset illness or injury.     Problem: Psychotic Signs/Symptoms  Goal: Improved Behavioral Control (Psychotic Signs/Symptoms)  Outcome: Progressing  Note: Patient manifests no psychotic symptoms     Problem: Pain Acute  Goal: Optimal Pain Control and Function  Outcome: Progressing  Note: Patient reports no acute or chronic pain.   Goal Outcome Evaluation:          Patient slept well, appears  well rested upon awakening. cooperative with care with no demonstrated physical or verbal aggression toward staff or peers. No signs or symptoms of jovanny or psychosis reported or observed. Patient endorses no SI/HI or SIB. Patient did not report or observed responding to visual or auditory hallucination and no angry outburst. Patient remains NPO since midnight for ECT this morning, due pre ECT preparations made. Patient reports no side effects from his medications. Altogether, patient achieves equal to or  greater than 7 hours of good quality sleep. will continue to monitor and follow plan of care.         Melvin Pimentel DNP, RN.

## 2023-03-06 NOTE — PLAN OF CARE
03/06/23 1021   Interprofessional Rounds   Participants ;CTC;nursing;psychiatrist;social work   Behavioral Team Discussion   Participants Nelly Brown MD, Amy MCGRATH RN, Cresencio, [facility]   Progress Continuing to assess: Patient receiving ECT   Anticipated length of stay Unknown as patient is receiving a course of ECT   Continued Stay Criteria/Rationale Patient receiving ECT   Medical/Physical No medical concerns noted at this time   Plan Psychiatrist will see patieent daily to assess psychiatric needs and to assess medication plan. During hospitalization patient will be encouraged to attend therapy groups and to participate in unit programming. CTC will coordinate disposition and aftercare plan.   Rationale for change in precautions or plan No change   Anticipated Discharge Disposition substance use treatment     PRECAUTIONS AND SAFETY    Behavioral Orders   Procedures    Code 1 - Restrict to Unit    Code 2     For imaging    Electroconvulsive therapy     Series of up to 12 treatments. Begin Date: 2/27/23     Treating Psychiatrist providing ECT:  Dr. Kearns     Notified on:  2/23/23    Electroconvulsive therapy     For acute ECT series, MWF, up to 12 treatment.    Electroconvulsive therapy     Series of up to 12 treatments. Begin Date: 02/26/23     Treating Psychiatrist providing ECT: [place]  Notified on: 02/24/23    Fall precautions    Routine Programming     As clinically indicated    Status 15     Every 15 minutes.    Suicide precautions     Patients on Suicide Precautions should have a Combination Diet ordered that includes a Diet selection(s) AND a Behavioral Tray selection for Safe Tray - with utensils, or Safe Tray - NO utensils         Safety  Safety WDL: WDL  Patient Location: patient room, own  Observed Behavior: sitting  Observed Behavior (Comment): resting in bed  Airway Safety Measures: all equipment/monitors on and audible  Safety Measures: safety rounds completed  Diversional Activity:  television  Suicidality: Status 15  Withdrawal: monitor withdrawal process  Seizure precautions: clutter free environment  Assault: status 15  Elopement Interventions: status 15  Sexual: status 15

## 2023-03-06 NOTE — PLAN OF CARE
Assessment/Intervention/Current Symtoms and Care Coordination  -Chart review    -Rounded with team, addressed patient needs/concerns    Current Symptoms include the following:  Patient is receiving a course of  ECT; continues to endorse suicidal ideation    Discharge Plan or Goal  Pending stabilization & development of a safe discharge plan.  Considerations include:  MICD Program @ [facility]    Barriers to Discharge  Patient requires further psychiatric stabilization due to current symptomology    Referral Status  No referrals have been made this hospitalization    Legal Status  Voluntary

## 2023-03-06 NOTE — PLAN OF CARE
"  Problem: Suicide Risk  Goal: Absence of Self-Harm  Outcome: Progressing   Goal Outcome Evaluation:    Pt has ECT tomorrow morning. Pt was isolative and withdrawn to his room the entire shift. Pt reported endorsing depression 9 over 10 and anxiety 5 over 10. Pt reported endorsing auditory and visual hallucinations. Also, pt continues to endorse suicidal ideation to shoot himself or cut his throat, but he contracts for safety, stating \" I feel safe in the hospital.\" Pt took his medications as prescribed and denied medication side effects and physical pain. Denied any bowel and bladder issue.   Plan: Continue to provide safe environment and therapeutic milieu.   "

## 2023-03-07 LAB — SARS-COV-2 RNA RESP QL NAA+PROBE: NEGATIVE

## 2023-03-07 PROCEDURE — U0003 INFECTIOUS AGENT DETECTION BY NUCLEIC ACID (DNA OR RNA); SEVERE ACUTE RESPIRATORY SYNDROME CORONAVIRUS 2 (SARS-COV-2) (CORONAVIRUS DISEASE [COVID-19]), AMPLIFIED PROBE TECHNIQUE, MAKING USE OF HIGH THROUGHPUT TECHNOLOGIES AS DESCRIBED BY CMS-2020-01-R: HCPCS | Performed by: PSYCHIATRY & NEUROLOGY

## 2023-03-07 PROCEDURE — 250N000013 HC RX MED GY IP 250 OP 250 PS 637

## 2023-03-07 PROCEDURE — 124N000002 HC R&B MH UMMC

## 2023-03-07 PROCEDURE — 250N000013 HC RX MED GY IP 250 OP 250 PS 637: Performed by: PSYCHIATRY & NEUROLOGY

## 2023-03-07 PROCEDURE — 99231 SBSQ HOSP IP/OBS SF/LOW 25: CPT | Performed by: PSYCHIATRY & NEUROLOGY

## 2023-03-07 RX ADMIN — SENNOSIDES AND DOCUSATE SODIUM 1 TABLET: 50; 8.6 TABLET ORAL at 08:14

## 2023-03-07 RX ADMIN — CLOZAPINE 300 MG: 200 TABLET ORAL at 19:08

## 2023-03-07 RX ADMIN — SERTRALINE HYDROCHLORIDE 200 MG: 100 TABLET ORAL at 08:13

## 2023-03-07 RX ADMIN — SENNOSIDES AND DOCUSATE SODIUM 1 TABLET: 50; 8.6 TABLET ORAL at 19:08

## 2023-03-07 RX ADMIN — METFORMIN HYDROCHLORIDE 1000 MG: 500 TABLET ORAL at 08:13

## 2023-03-07 RX ADMIN — ATROPINE SULFATE 2 DROP: 10 SOLUTION/ DROPS OPHTHALMIC at 19:05

## 2023-03-07 RX ADMIN — PANTOPRAZOLE SODIUM 40 MG: 40 TABLET, DELAYED RELEASE ORAL at 08:13

## 2023-03-07 RX ADMIN — FLUTICASONE PROPIONATE 2 SPRAY: 50 SPRAY, METERED NASAL at 08:29

## 2023-03-07 RX ADMIN — LORATADINE 10 MG: 10 TABLET ORAL at 08:13

## 2023-03-07 RX ADMIN — Medication 12.5 MG: at 08:13

## 2023-03-07 RX ADMIN — BENZTROPINE MESYLATE 1 MG: 1 TABLET ORAL at 19:08

## 2023-03-07 RX ADMIN — GABAPENTIN 300 MG: 300 CAPSULE ORAL at 08:13

## 2023-03-07 RX ADMIN — ACETAMINOPHEN 325MG 650 MG: 325 TABLET ORAL at 14:04

## 2023-03-07 RX ADMIN — Medication 1 LOZENGE: at 12:45

## 2023-03-07 RX ADMIN — ALUMINUM HYDROXIDE, MAGNESIUM HYDROXIDE, AND SIMETHICONE 30 ML: 200; 200; 20 SUSPENSION ORAL at 19:05

## 2023-03-07 RX ADMIN — GABAPENTIN 300 MG: 300 CAPSULE ORAL at 14:05

## 2023-03-07 RX ADMIN — METFORMIN HYDROCHLORIDE 1000 MG: 500 TABLET ORAL at 18:20

## 2023-03-07 RX ADMIN — NICOTINE 1 PATCH: 21 PATCH, EXTENDED RELEASE TRANSDERMAL at 08:14

## 2023-03-07 RX ADMIN — ACETAMINOPHEN 325MG 650 MG: 325 TABLET ORAL at 08:14

## 2023-03-07 ASSESSMENT — ACTIVITIES OF DAILY LIVING (ADL)
LAUNDRY: UNABLE TO COMPLETE
ORAL_HYGIENE: INDEPENDENT
DRESS: SCRUBS (BEHAVIORAL HEALTH)
ADLS_ACUITY_SCORE: 29
HYGIENE/GROOMING: INDEPENDENT
ADLS_ACUITY_SCORE: 29
ADLS_ACUITY_SCORE: 29
LAUNDRY: WITH SUPERVISION
ADLS_ACUITY_SCORE: 29
HYGIENE/GROOMING: HANDWASHING;SHOWER;INDEPENDENT
ADLS_ACUITY_SCORE: 29
DRESS: INDEPENDENT
ADLS_ACUITY_SCORE: 29
ORAL_HYGIENE: INDEPENDENT
ADLS_ACUITY_SCORE: 29

## 2023-03-07 NOTE — PLAN OF CARE
"Patient spends majority of the time in the room lying down in bed, comes out when in need or phone calls, staff keeps encouraging to be more active and walk in the hallway. Withdrawn and isolative to self. Endorsing high anxiety and depression, rating at 7/10, scheduled meds administered and was somewhat effective per pt. Continues to endorse SI with a plan to shoot himself when discharged from the hospital but contracted for safety while admitted. Says that, \"I don't have a reason to live.\" Was encouraged to be positive and get involved in activities and socialize with staff and peers more. C/o right arm pain, pt reports that might be from sleeping on that side for long period of time. Tylenol was given and was somewhat effective per pt. Provider updated regarding the pain and encouraged to have pt walk and get out of bed more to facilitate good circulation. Pt was encouraged to get out of bed more but declined, risks and benefits explained and understood. At 1400, pt requested Tylenol and was given together with Gabapentin and were effective per pt. States that the arm is much better. Compliant with medication, no side effects noted/reported. Eating and drinking well, ate 100% of his meals.   Plan: Continue to monitor the pain and update with changes.     Goal Outcome Evaluation:    Plan of Care Reviewed With: patient Plan of Care Reviewed With: patient    Overall Patient Progress: no changeOverall Patient Progress: no change           "

## 2023-03-07 NOTE — PLAN OF CARE
"  Problem: Adult Behavioral Health Plan of Care  Goal: Plan of Care Review  Outcome: Progressing  Flowsheets  Taken 3/6/2023 1657  Patient Agreement with Plan of Care: agrees  Taken 3/6/2023 0912  Patient Agreement with Plan of Care: agrees   Goal Outcome Evaluation:    Plan of Care Reviewed With: patient        Pt isolative in his room, watched TV and ate dinner in his room. Pt more alert this evening shift as compared to morning shift after ECT. Pt endorsed Suicide ideation and plan to shot himself with a gun if available. Pt also endorsed anxiety 4/10 and depression 6/10 and received scheduled gabapentin with little improvement per pt. This writer asked pt if ECT is helping and pt replied \" yes I think son\" and this writer asked pt can he elaborate how it had helped him and pt replied \" I don't know but I think it's helping\". Pt denies all other mental health psych symptoms and contracted for safety. Pr presented with flat and blunted affect, guarded and restrictive in his room, however, complied with medications and assessment with no medication side effects observed or reported. Pt speech is clear and soft spoken, intermittent eye contact. Improved appetite during the evening shift, voiding freely, and no BM issue per pt. Pt denies shortness of breathing and other Covid 19 symptoms. Vital sign stable. /79   Pulse 95   Temp 98.1  F (36.7  C) (Oral)   Resp 18   Ht 1.6 m (5' 3\")   Wt 75.1 kg (165 lb 8 oz)   SpO2 94%   BMI 29.32 kg/m               "

## 2023-03-07 NOTE — PLAN OF CARE
Assessment/Intervention/Current Symtoms and Care Coordination  -Chart review     -Rounded with team, addressed patient needs/concerns    -pt completed phone assessment with Juan Jose Villarreal for continued CM.      Discharge Plan or Goal  Pending stabilization & development of a safe discharge plan.  Considerations include:  MICD Program @ [facility]     Barriers to Discharge  Patient requires further psychiatric stabilization due to current symptomology     Referral Status  No referrals have been made this hospitalization     Legal Status  Voluntary

## 2023-03-07 NOTE — PROGRESS NOTES
"[facility], [place]   Psychiatric Progress Note  Hospital Day: 39          Interim History:     Patient seen and chart reviewed. Case discussed in multi-disciplinary treatment team      According to Nursing report:  Patient had ECT#4 yesterday. He is tolerating treatments well. He endorses anxiety and depression. He is flat and blunted and guarded. He isolates but is emerging from room more often. Tolerating Clozaril. He has adequate self care. He reports that symptoms are improving.      According to Nursing notes:  Pt slept this morning because he was having ECT. Pt told this writer he was very hungry and could not wait. ECT called and pt was taken early at 10 AM instead of 1300. Prior to ECT, pt stated he is exhausted and having a sore back and neck from sleeping. Pt encouraged to change position in bed frequently and stated he a little better. Pt endorsed suicide ideation and with a plan to use a gun if available and this SI did not improve with ECT. Pt endorsed anxiety 5/10 and depression 8/10 and will mild improvement with anxiety after ECT. Pt vital stable after ECT. Pt maintained flat and blunted affect, calm and isolative in his room. Pt speech is clear, and soft spoken with moderate tone in volume. Average appetite, voiding freely and no BM issue per pt. Pt denies all Covid 19 symptoms and vital stable.    Pt isolative in his room, watched TV and ate dinner in his room. Pt more alert this evening shift as compared to morning shift after ECT. Pt endorsed Suicide ideation and plan to shot himself with a gun if available. Pt also endorsed anxiety 4/10 and depression 6/10 and received scheduled gabapentin with little improvement per pt. This writer asked pt if ECT is helping and pt replied \" yes I think son\" and this writer asked pt can he elaborate how it had helped him and pt replied \" I don't know but I think it's helping\". Pt denies all other mental health psych symptoms and " "contracted for safety. Pr presented with flat and blunted affect, guarded and restrictive in his room, however, complied with medications and assessment with no medication side effects observed or reported. Pt speech is clear and soft spoken, intermittent eye contact. Improved appetite during the evening shift, voiding freely, and no BM issue per pt. Pt denies shortness of breathing and other Covid 19 symptoms.       Patient slept for a significant portion of the night with no complaints of pain, anxiety or discomfort. Patient remained in her room all night and was cooperative with the planned safety initiatives. All applicable precautions in place. Patient endorses no SI/HI, A/VH or SIB. Patient manifests no manic or psychotic episode or symptoms. Overall, patient achieves about 7 hours of sleep. Will continue to monitor and follow plan of care.     According to  :  Patient will return to  treatment facility    On interview today: Patient complains of feeling tired. He notes improvement since admission and improvement from ECT. He still has intermittent suicidal thoughts and command hallucinations but these have decreased and he has no intent or plan to harm self. He reports some waxing and waning of symptoms. He denies side effects to medications.        ROS:No physical complaints.    Vital signs:  Temp: 98.1  F (36.7  C) Temp src: Oral BP: 119/79 Pulse: 97   Resp: 18 SpO2: 94 % O2 Device: None (Room air) Oxygen Delivery: 4 LPM Height: 160 cm (5' 3\") Weight: 75.1 kg (165 lb 8 oz)  Estimated body mass index is 29.32 kg/m  as calculated from the following:    Height as of this encounter: 1.6 m (5' 3\").    Weight as of this encounter: 75.1 kg (165 lb 8 oz).                Medications:       atropine  2 drop Sublingual At Bedtime     benztropine  1 mg Oral At Bedtime     cloZAPine  300 mg Oral At Bedtime     fluticasone  2 spray Both Nostrils Daily     gabapentin  300 mg Oral TID     loratadine  10 mg " "Oral Daily     metFORMIN  1,000 mg Oral BID w/meals     metoprolol succinate ER  12.5 mg Oral Daily     nicotine  1 patch Transdermal Daily     nicotine   Transdermal Q8H     pantoprazole  40 mg Oral QAM AC     senna-docusate  1 tablet Oral BID     sertraline  200 mg Oral Daily          Allergies:   No Known Allergies       Labs:     No results found for this or any previous visit (from the past 24 hour(s)).       Psychiatric Examination:     /79   Pulse 97   Temp 98.1  F (36.7  C) (Oral)   Resp 18   Ht 1.6 m (5' 3\")   Wt 75.1 kg (165 lb 8 oz)   SpO2 94%   BMI 29.32 kg/m    Weight is 165 lbs 8 oz  Body mass index is 29.32 kg/m .    Weight over time:  Vitals:    01/27/23 1718   Weight: 75.1 kg (165 lb 8 oz)       Orthostatic Vitals     None        Cardiometabolic risk assessment. 01/30/23    Reviewed patient profile for cardiometabolic risk factors    Date taken /Value  REFERENCE RANGE   Abdominal Obesity  (Waist Circumference)   See nursing flowsheet Women ?35 in (88 cm)   Men ?40 in (102 cm)      Triglycerides  Triglycerides   Date Value Ref Range Status   01/28/2023 192 (H) <150 mg/dL Final       ?150 mg/dL (1.7 mmol/L) or current treatment for elevated triglycerides   HDL cholesterol  Direct Measure HDL   Date Value Ref Range Status   01/28/2023 41 >=40 mg/dL Final   ]   Women <50 mg/dL (1.3 mmol/L) in women or current treatment for low HDL cholesterol  Men <40 mg/dL (1 mmol/L) in men or current treatment for low HDL cholesterol     Fasting plasma glucose (FPG) Lab Results   Component Value Date     01/28/2023      FPG ?100 mg/dL (5.6 mmol/L) or treatment for elevated blood glucose   Blood pressure  BP Readings from Last 3 Encounters:   03/07/23 119/79   01/27/23 118/74   07/20/22 112/79    Blood pressure ?130/85 mmHg or treatment for elevated blood pressure   Family History  See family history     Appearance: awake, alert, dressed in hospital scrubs and unkempt  Attitude:  cooperative, " "calm  Eye Contact: fair  Mood:  \"exhausted\"  Affect:  mood congruent and intensity is blunted  Speech:  clear, coherent. appropriate  Language: fluent and intact in English  Psychomotor, Gait, Musculoskeletal:  no evidence of tardive dyskinesia, dystonia, or tics  Throught Process:  Linear, ruminative  Associations:  No evidence of loose associations  Thought Content:  SI w/plan and intent upon discharge, no plan or intent while in hospital. Denies hallucinations  Insight:  fair  Judgement:  poor  Oriented to:  time, person, and place  Attention Span and Concentration:  fair  Recent and Remote Memory:  fair  Fund of Knowledge:  appropriate    Minimal change in mental status in the past 24 hours             Precautions:     Behavioral Orders   Procedures     Code 1 - Restrict to Unit     Code 2     For imaging     Electroconvulsive therapy     Series of up to 12 treatments. Begin Date: 2/27/23     Treating Psychiatrist providing ECT:  Dr. Kearns     Notified on:  2/23/23     Electroconvulsive therapy     For acute ECT series, MWF, up to 12 treatment.     Electroconvulsive therapy     Series of up to 12 treatments. Begin Date: 02/26/23     Treating Psychiatrist providing ECT: Clifton  Notified on: 02/24/23     Fall precautions     Routine Programming     As clinically indicated     Status 15     Every 15 minutes.     Suicide precautions     Patients on Suicide Precautions should have a Combination Diet ordered that includes a Diet selection(s) AND a Behavioral Tray selection for Safe Tray - with utensils, or Safe Tray - NO utensils            Diagnoses:     Active suicidal ideation with intent outside of hospital setting  MDD, recurrent, severe with psychotic features vs Schizoaffective Disorder, depressive type  Polysubstance use  Unspecified mood disorder  ADHD, inattentive type per chart  History of idiopathic hypersomnolence per chart  Nicotine use disorder, dependence and withdrawal   Allergies- chronic uticaria " "and rhinitis per chart  HLD per chart          Assessment & Plan:     Assessment and hospital summary:  This patient is a 32 year old male with history of mood disorder, ADHD and substance use who presented to [facility] ED with SI on 1/26 in context of reported methamphetamine use and being kicked out of sober living. Medically cleared in ED, placed on 72HH and admitted to [facility]. Limited historian, giving inconsistent reports about substance use, UDS negative, very somnolent, endorsing ongoing SI and some psychosis symptoms. PTA medications continued with exception of finasteride as patient states he takes \"1/4 a pill\" and declined ordered dose, will defer to primary team to address. Will continue to evaluate safety and stabilization further as patient more able to engage in assessments. Inpatient psychiatric hospitalization is warranted at this time for safety, stabilization, and possible adjustment in medications.    Patient reports that he abruptly stopped Zoloft one week prior to his admission. He developed discontinuation syndrome symptoms following that. He reports that he does not have a history of psychosis but is experiencing psychotic symptoms. He is amenable with plan to trial risperidone after R/B/A were discussed. Risperidone was initiated on 1/30 and titrated to a total of 3 mg daily on 2/1. Clonidine, used for ADHD, was reduced from a total of 0.3 mg daily to 0.2 mg daily on 2/3 due to concern for hypotension. 2/9: Cardiology consult placed. EKG- tachycardia 121 bpm, QTc 465 ms, Abnormal QRS angle and T wave abnormality. COVID negative and labs are unremarkable.  On 2/10, clozapine was held pending additional workup from IM and cardiology. Pericarditis was ruled out and metoprolol was started. Clozapine was restarted on 2/13 with plan to cautiously titrate to lowest effective dose. 2/23/23: Clozapine titration schedule increased 25 mg/day. ECT and IM consults for ECT clearance placed. 2/27/23: ECT " initiated. Risperidone was discontinued on 2/28. Clozapine level obtained on 3/3 at corresponding dose of 300 mg and was within therapeutic range (1001). Metformin increased to 1000 mg BID on 3/6 to target increased appetite/wt gain.     Minimal change in mental status in the past 24 hours    Psychiatric treatment/inteventions:  Medications:   -Continue clozapine 300 mg qhs. Clozapine quant at corresponding dose is 1001.  -Continue PTA sertraline 200 mg daily for mood  -Continue PTA gabapentin 300 mg TID for anxiety   -Continue Cogentin 1 mg at bedtime for possible EPSE     -PRN hydroxyzine 25mg every 4 hour for anxiety  -PRN trazodone 50mg at bedtime for sleep   -PRN olanzapine 10mg PO or IM TID for agitation/psychosis   -PRN atropine 1% SL drops, 2 drops q2h for sialorrhea    Spiritual services consult placed on 2/6. Pt is [employer]. Appreciate assistance.     ECT:  - Medically cleared on 2/24. See IM note for details.  - ECT consult placed on 2/23.  - ECT started on 2/27/23  - HOLD Gabapentin on nights prior to ECT and on ECT mornings until after ECT.   - ECT #4 completed 3/6/23. Uneventful. Next ECT treatment scheduled for Monday, 3/8. Baseline IDS-SR was 55. Hard copy placed in chart.      Laboratory/Imaging: reviewed: Covid negative; UDS negative; CMP, CBC, TSH, Lipid panel, HgbA1c ordered to complete admission labs. Reviewed.     2/9/23: Troponin, CK, CBC, BMP: Unremarkable, CRP elevated to 38.18, COVID: Negative  2/10/23: Add Influenza A/B given flu-like sx-negative  Patient will be treated in therapeutic milieu with appropriate individual and group therapies as described.     Medical treatment/interventions:  Medical concerns:   Nicotine replacement ordered, educate patient on benefits of cessation, pt unclear about finasteride dose, will hold until further information is obtained. PTA PRN zyrtec, azelastine, fluticasone, triamcinolone and epipen ordered for allergies and PRN ammoniaum lactate, Eucerin for   dry skin.    Flatulence:   - simethicone prn    Neuroleptic induced wt gain:  - Monitor weights  - Increase metformin to 1,000 mg BID with meals    Dyslipidemia: Will arrange follow up with PCP to address. Discussed importance of healthy eating habits and regular exercise. Will consider nutrition consult if patient amenable.     Orthostasis likely 2/2 clozapine: Pt is not hypotensive but reports orthostatic symptoms and previously restricted fluids. Now resolved.   - Continue to monitor vital signs closely  - Encourage fluids  - Discontinued clonidine    Sialorrhea 2/2 clozapine:  - Atropine 1% SL drops qhs  - Recommend towel on pillow when sleeping    Chest pain/tachycardia/EKG changes (2/9):  - Pain improved with PRN medications.   - Cardiology consult placed on 2/9. See note on that date for details.   - ECHO reviewed and unremarkable.  - Writer discussed care with both Dr. Streeter from [facility] and Christina from . Plan for now is to HOLD clozapine until further medical workup. Dr. Streeter explained that myocarditis has been ruled out as troponin was negative. Pericarditis remains on differential, but he does not have EKG findings or a pericardial effusion. IM will assess for pericardial friction rub and pleuritic chest pain. IM seen by patient and Metoprolol was started.     Update 2/13: Discussed care with Annette from  today on two occasions. Please see her note on this date for details. She does not suspect that this is pericarditis. He does not have pleuritic pain, characteristic CP, EKG changes, or pericardial effusion on ECHO. Therefore, will cautiously restart clozapine while monitoring closely for side effects. May consider further increase in metoprolol if necessary. PPI was started due to suspected GERD.     Update 2/24 per IM note:  Medicine is following peripherally for concerns for pericarditis.  See detailed note from 2/13.  No pericardial friction rub noted while sitting up and leaning forward today.   Patient states that his chest discomfort is getting better after starting the Protonix yesterday.  It does appear that he has also been using the Maalox.  Patient does have Tums available as well.  Please be mindful for any constipation with overuse of the PRNs.     POC:  - Continue Protonix daily for 8 weeks, then taper off  - Recommend lifestyle changes including weight loss head of bed elevation avoidance of late-night eating and specific food elimination such as chocolate caffeine alcohol acidic and/or spicy food.  - Watch for constipation with PRNs for heartburn  - We will schedule senna 1 tab twice daily  - MiraLAX daily as needed added on     Medicine will sign off, please contact us for any acute changes.      Sore throat/cough/nasal congestion, resolved:   - COVID negative on 2/9. Repeat COVID test and Influenza A/B neg on 2/10.  - Cepacol lozenges added  - PRN Tylenol   - Consulted with IM. Appreciate assistance. See their notes for details.      Legal Status: VOLUNTARY. 72 hour hold discontinued. Pt agreed to sign in on voluntary basis and discharge directly to treatment once stable.      Safety Assessment:        Behavioral Orders   Procedures     Code 1 - Restrict to Unit     Routine Programming       As clinically indicated     Status 15       Every 15 minutes.     Suicide precautions       Patients on Suicide Precautions should have a Combination Diet ordered that includes a Diet selection(s) AND a Behavioral Tray selection for Safe Tray - with utensils, or Safe Tray - NO utensils        Withdrawal precautions      Pt has not required locked seclusion or restraints in the past 24 hours to maintain safety, please refer to RN documentation for further details.    Discontinued SIO on 2/8 as patient is heriberto for safety. Monitor SI closely.     The risks, benefits, alternatives and side effects have been discussed and are understood by the patient.     Disposition: Pending clinical stabilization. Pt  remains actively suicidal. [facility]      No further change in treatment plan  Patient seen, chart reviewed, case reviewed with  and with nursing.   Case reviewed in multi-disciplinary treatment team.      Entered by: MD Simone Yepez MD

## 2023-03-07 NOTE — PLAN OF CARE
Problem: Sleep Disturbance  Goal: Adequate Sleep/Rest  Outcome: Progressing  Note: Patient had no difficulty initiating sleep and maintaining sleep     Problem: Psychotic Signs/Symptoms  Goal: Improved Behavioral Control (Psychotic Signs/Symptoms)  Outcome: Progressing  Note: Patient demonstrates no behavioral dyscontrol  Goal: Decreased Sensory Symptoms (Psychotic Signs/Symptoms)  Outcome: Progressing  Note: Patient does not appear to be responding to IS     Problem: Psychotic Signs/Symptoms  Goal: Decreased Sensory Symptoms (Psychotic Signs/Symptoms)  Outcome: Progressing  Note: Patient does not appear to be responding to IS   Goal Outcome Evaluation:           Patient slept for a significant portion of the night with no complaints of pain, anxiety or discomfort. Patient remained in her room all night and was cooperative with the planned safety initiatives. All applicable precautions in place. Patient endorses no SI/HI, A/VH or SIB. Patient manifests no manic or psychotic episode or symptoms. Overall, patient achieves about 7 hours of sleep. Will continue to monitor and follow plan of care.           Melvin Pimentel DNP, RN

## 2023-03-08 ENCOUNTER — ANESTHESIA (OUTPATIENT)
Dept: BEHAVIORAL HEALTH | Facility: CLINIC | Age: 33
End: 2023-03-08
Payer: COMMERCIAL

## 2023-03-08 ENCOUNTER — ANESTHESIA EVENT (OUTPATIENT)
Dept: BEHAVIORAL HEALTH | Facility: CLINIC | Age: 33
End: 2023-03-08
Payer: COMMERCIAL

## 2023-03-08 ENCOUNTER — APPOINTMENT (OUTPATIENT)
Dept: BEHAVIORAL HEALTH | Facility: CLINIC | Age: 33
End: 2023-03-08
Attending: PSYCHIATRY & NEUROLOGY
Payer: COMMERCIAL

## 2023-03-08 PROCEDURE — 250N000013 HC RX MED GY IP 250 OP 250 PS 637

## 2023-03-08 PROCEDURE — 99233 SBSQ HOSP IP/OBS HIGH 50: CPT | Mod: 25 | Performed by: PSYCHIATRY & NEUROLOGY

## 2023-03-08 PROCEDURE — 90870 ELECTROCONVULSIVE THERAPY: CPT | Performed by: PSYCHIATRY & NEUROLOGY

## 2023-03-08 PROCEDURE — 90870 ELECTROCONVULSIVE THERAPY: CPT

## 2023-03-08 PROCEDURE — 124N000002 HC R&B MH UMMC

## 2023-03-08 PROCEDURE — H2032 ACTIVITY THERAPY, PER 15 MIN: HCPCS

## 2023-03-08 PROCEDURE — 250N000011 HC RX IP 250 OP 636: Performed by: STUDENT IN AN ORGANIZED HEALTH CARE EDUCATION/TRAINING PROGRAM

## 2023-03-08 PROCEDURE — 250N000013 HC RX MED GY IP 250 OP 250 PS 637: Performed by: PSYCHIATRY & NEUROLOGY

## 2023-03-08 PROCEDURE — 250N000009 HC RX 250: Performed by: STUDENT IN AN ORGANIZED HEALTH CARE EDUCATION/TRAINING PROGRAM

## 2023-03-08 PROCEDURE — 370N000017 HC ANESTHESIA TECHNICAL FEE, PER MIN

## 2023-03-08 RX ORDER — METOPROLOL TARTRATE 1 MG/ML
1-2 INJECTION, SOLUTION INTRAVENOUS EVERY 5 MIN PRN
Status: DISCONTINUED | OUTPATIENT
Start: 2023-03-08 | End: 2023-03-08

## 2023-03-08 RX ORDER — ONDANSETRON 4 MG/1
4 TABLET, ORALLY DISINTEGRATING ORAL EVERY 30 MIN PRN
Status: DISCONTINUED | OUTPATIENT
Start: 2023-03-08 | End: 2023-03-08

## 2023-03-08 RX ORDER — SODIUM CHLORIDE, SODIUM LACTATE, POTASSIUM CHLORIDE, CALCIUM CHLORIDE 600; 310; 30; 20 MG/100ML; MG/100ML; MG/100ML; MG/100ML
INJECTION, SOLUTION INTRAVENOUS CONTINUOUS
Status: CANCELLED | OUTPATIENT
Start: 2023-03-08

## 2023-03-08 RX ORDER — HYDRALAZINE HYDROCHLORIDE 20 MG/ML
2.5-5 INJECTION INTRAMUSCULAR; INTRAVENOUS EVERY 10 MIN PRN
Status: CANCELLED | OUTPATIENT
Start: 2023-03-08

## 2023-03-08 RX ORDER — ONDANSETRON 4 MG/1
4 TABLET, ORALLY DISINTEGRATING ORAL EVERY 30 MIN PRN
Status: CANCELLED | OUTPATIENT
Start: 2023-03-08

## 2023-03-08 RX ORDER — ONDANSETRON 2 MG/ML
4 INJECTION INTRAMUSCULAR; INTRAVENOUS EVERY 30 MIN PRN
Status: CANCELLED | OUTPATIENT
Start: 2023-03-08

## 2023-03-08 RX ORDER — SODIUM CHLORIDE, SODIUM LACTATE, POTASSIUM CHLORIDE, CALCIUM CHLORIDE 600; 310; 30; 20 MG/100ML; MG/100ML; MG/100ML; MG/100ML
INJECTION, SOLUTION INTRAVENOUS CONTINUOUS
Status: DISCONTINUED | OUTPATIENT
Start: 2023-03-08 | End: 2023-03-08

## 2023-03-08 RX ORDER — HYDRALAZINE HYDROCHLORIDE 20 MG/ML
2.5-5 INJECTION INTRAMUSCULAR; INTRAVENOUS EVERY 10 MIN PRN
Status: DISCONTINUED | OUTPATIENT
Start: 2023-03-08 | End: 2023-03-08

## 2023-03-08 RX ORDER — METOPROLOL TARTRATE 1 MG/ML
1-2 INJECTION, SOLUTION INTRAVENOUS EVERY 5 MIN PRN
Status: CANCELLED | OUTPATIENT
Start: 2023-03-08

## 2023-03-08 RX ORDER — ONDANSETRON 2 MG/ML
4 INJECTION INTRAMUSCULAR; INTRAVENOUS EVERY 30 MIN PRN
Status: DISCONTINUED | OUTPATIENT
Start: 2023-03-08 | End: 2023-03-08

## 2023-03-08 RX ADMIN — SERTRALINE HYDROCHLORIDE 200 MG: 100 TABLET ORAL at 07:42

## 2023-03-08 RX ADMIN — SENNOSIDES AND DOCUSATE SODIUM 1 TABLET: 50; 8.6 TABLET ORAL at 07:43

## 2023-03-08 RX ADMIN — METHOHEXITAL SODIUM 90 MG: 500 INJECTION, POWDER, LYOPHILIZED, FOR SOLUTION INTRAMUSCULAR; INTRAVENOUS; RECTAL at 09:29

## 2023-03-08 RX ADMIN — Medication 12.5 MG: at 07:42

## 2023-03-08 RX ADMIN — ACETAMINOPHEN 325MG 650 MG: 325 TABLET ORAL at 07:45

## 2023-03-08 RX ADMIN — LORATADINE 10 MG: 10 TABLET ORAL at 07:43

## 2023-03-08 RX ADMIN — FLUTICASONE PROPIONATE 2 SPRAY: 50 SPRAY, METERED NASAL at 07:44

## 2023-03-08 RX ADMIN — SENNOSIDES AND DOCUSATE SODIUM 1 TABLET: 50; 8.6 TABLET ORAL at 20:32

## 2023-03-08 RX ADMIN — METFORMIN HYDROCHLORIDE 1000 MG: 500 TABLET ORAL at 18:19

## 2023-03-08 RX ADMIN — GABAPENTIN 300 MG: 300 CAPSULE ORAL at 20:32

## 2023-03-08 RX ADMIN — ATROPINE SULFATE 2 DROP: 10 SOLUTION/ DROPS OPHTHALMIC at 20:33

## 2023-03-08 RX ADMIN — BENZTROPINE MESYLATE 1 MG: 1 TABLET ORAL at 20:32

## 2023-03-08 RX ADMIN — METFORMIN HYDROCHLORIDE 1000 MG: 500 TABLET ORAL at 07:44

## 2023-03-08 RX ADMIN — SUCCINYLCHOLINE CHLORIDE 80 MG: 20 INJECTION, SOLUTION INTRAMUSCULAR; INTRAVENOUS; PARENTERAL at 09:29

## 2023-03-08 RX ADMIN — GABAPENTIN 300 MG: 300 CAPSULE ORAL at 13:01

## 2023-03-08 RX ADMIN — NICOTINE 1 PATCH: 21 PATCH, EXTENDED RELEASE TRANSDERMAL at 07:43

## 2023-03-08 RX ADMIN — PANTOPRAZOLE SODIUM 40 MG: 40 TABLET, DELAYED RELEASE ORAL at 07:42

## 2023-03-08 RX ADMIN — OLANZAPINE 10 MG: 10 TABLET, FILM COATED ORAL at 07:45

## 2023-03-08 RX ADMIN — CLOZAPINE 300 MG: 200 TABLET ORAL at 20:32

## 2023-03-08 ASSESSMENT — ACTIVITIES OF DAILY LIVING (ADL)
ADLS_ACUITY_SCORE: 29
ADLS_ACUITY_SCORE: 29
DRESS: SCRUBS (BEHAVIORAL HEALTH)
HYGIENE/GROOMING: HANDWASHING;INDEPENDENT
ADLS_ACUITY_SCORE: 29
HYGIENE/GROOMING: INDEPENDENT
ADLS_ACUITY_SCORE: 29
ORAL_HYGIENE: INDEPENDENT
ADLS_ACUITY_SCORE: 29
ADLS_ACUITY_SCORE: 29
ORAL_HYGIENE: INDEPENDENT
LAUNDRY: UNABLE TO COMPLETE
DRESS: SCRUBS (BEHAVIORAL HEALTH)
ADLS_ACUITY_SCORE: 29

## 2023-03-08 NOTE — PROGRESS NOTES
"[facility], [place]   Psychiatric Progress Note  Hospital Day: 40        Interim History:   The patient's care was discussed with the treatment team during the daily team meeting and/or staff's chart notes were reviewed.  Staff report patient is reporting AH/VH, depressive and anxiety symptoms. He remains withdrawn and isolated. He reported cough and right elbow pain. Reporting ongoing active SI.     Upon interview Haseeb reported feeling \"exhausted.\" He was inconsistent with his answers when asked about MH sx. He now is reporting visual hallucinations of orange lanterns and whispers telling him to kill himself, and that these symptoms have not subsided. He tells me that suicidal thoughts went away for about one week and now they have returned, but are less bothersome and less severe. He then said that because he is experiencing AH/VH, he is feeling less depressed, but could not elaborate further. He was alert and oriented x 3. He said that he is feeling \"more grateful.\" However, he also stated that he would \"probably\" kill himself if he was discharged from hospital today.     Suicidal ideation: Haseeb reports ongoing active SI with plan and intent to shoot himself with a gun or cut his throat upon discharge. Denies suicide plan or intent in the hospital. [place] for safety.     Homicidal ideation: denies current or recent homicidal ideation or behaviors.    Psychotic symptoms: As above    Medication side effects reported: as above    Acute medical concerns: None    Other issues reported by patient: Patient had no further questions or concerns.      Spoke to patient's sister, Farheen, over the phone to provide updates and address questions/concerns. Spoke for > 15 minutes. She said that her brother will often  \"say what you want to hear.\" However, she does believe that he is truly suicidal and depressed. She said \"I don't know if its from the brain though; it might be more emotional.\" She " "asked what next steps are if he does not improve with ECT. She wonders if he wants to remain hospitalized partially due to fears about being homeless.          Medications:       atropine  2 drop Sublingual At Bedtime     benztropine  1 mg Oral At Bedtime     cloZAPine  300 mg Oral At Bedtime     fluticasone  2 spray Both Nostrils Daily     gabapentin  300 mg Oral TID     loratadine  10 mg Oral Daily     metFORMIN  1,000 mg Oral BID w/meals     metoprolol succinate ER  12.5 mg Oral Daily     nicotine  1 patch Transdermal Daily     nicotine   Transdermal Q8H     pantoprazole  40 mg Oral QAM AC     senna-docusate  1 tablet Oral BID     sertraline  200 mg Oral Daily          Allergies:   No Known Allergies       Labs:     No results found for this or any previous visit (from the past 24 hour(s)).       Psychiatric Examination:     BP (!) 145/89 (BP Location: Left arm, Patient Position: Semi-Varner's, Cuff Size: Adult Regular)   Pulse 103   Temp 97.2  F (36.2  C) (Temporal)   Resp 16   Ht 1.6 m (5' 3\")   Wt 79 kg (174 lb 1.6 oz)   SpO2 93%   BMI 30.84 kg/m    Weight is 174 lbs 1.6 oz  Body mass index is 30.84 kg/m .    Weight over time:  Vitals:    01/27/23 1718 03/07/23 0933   Weight: 75.1 kg (165 lb 8 oz) 79 kg (174 lb 1.6 oz)       Orthostatic Vitals     None        Cardiometabolic risk assessment. 01/30/23    Reviewed patient profile for cardiometabolic risk factors    Date taken /Value  REFERENCE RANGE   Abdominal Obesity  (Waist Circumference)   See nursing flowsheet Women ?35 in (88 cm)   Men ?40 in (102 cm)      Triglycerides  Triglycerides   Date Value Ref Range Status   01/28/2023 192 (H) <150 mg/dL Final       ?150 mg/dL (1.7 mmol/L) or current treatment for elevated triglycerides   HDL cholesterol  Direct Measure HDL   Date Value Ref Range Status   01/28/2023 41 >=40 mg/dL Final   ]   Women <50 mg/dL (1.3 mmol/L) in women or current treatment for low HDL cholesterol  Men <40 mg/dL (1 mmol/L) in men or " "current treatment for low HDL cholesterol     Fasting plasma glucose (FPG) Lab Results   Component Value Date     01/28/2023      FPG ?100 mg/dL (5.6 mmol/L) or treatment for elevated blood glucose   Blood pressure  BP Readings from Last 3 Encounters:   03/08/23 (!) 145/89   01/27/23 118/74   07/20/22 112/79    Blood pressure ?130/85 mmHg or treatment for elevated blood pressure   Family History  See family history     Appearance: awake, alert, dressed in hospital scrubs and unkempt  Attitude:  cooperative, calm  Eye Contact: fair  Mood:  \"exhausted\"  Affect:  mood congruent and intensity is blunted  Speech:  clear, coherent. appropriate  Language: fluent and intact in English  Psychomotor, Gait, Musculoskeletal:  no evidence of tardive dyskinesia, dystonia, or tics  Throught Process:  Linear, ruminative  Associations:  No evidence of loose associations  Thought Content:  SI w/plan and intent upon discharge, no plan or intent while in hospital. Denies hallucinations  Insight:  fair  Judgement:  poor  Oriented to:  time, person, and place  Attention Span and Concentration:  fair  Recent and Remote Memory:  fair  Fund of Knowledge:  appropriate    Clinical Global Impressions  First:  Considering your total clinical experience with this particular patient population, how severe are the patient's symptoms at this time?: 7 (01/28/23 1341)    Most recent:  Compared to the patient's condition at the START of treatment, this patient's condition is: 4           Precautions:     Behavioral Orders   Procedures     Code 1 - Restrict to Unit     Code 2     For imaging     Electroconvulsive therapy     Series of up to 12 treatments. Begin Date: 2/27/23     Treating Psychiatrist providing ECT:  Dr. Kearns     Notified on:  2/23/23     Electroconvulsive therapy     For acute ECT series, MWF, up to 12 treatment.     Electroconvulsive therapy     Series of up to 12 treatments. Begin Date: 02/26/23     Treating Psychiatrist " "providing ECT: Clifton  Notified on: 02/24/23     Fall precautions     Routine Programming     As clinically indicated     Status 15     Every 15 minutes.     Suicide precautions     Patients on Suicide Precautions should have a Combination Diet ordered that includes a Diet selection(s) AND a Behavioral Tray selection for Safe Tray - with utensils, or Safe Tray - NO utensils            Diagnoses:     Active suicidal ideation with intent outside of hospital setting  MDD, recurrent, severe with psychotic features vs Schizoaffective Disorder, depressive type  Polysubstance use  Unspecified mood disorder  ADHD, inattentive type per chart  History of idiopathic hypersomnolence per chart  Nicotine use disorder, dependence and withdrawal   Allergies- chronic uticaria and rhinitis per chart  HLD per chart          Assessment & Plan:     Assessment and hospital summary:  This patient is a 32 year old male with history of mood disorder, ADHD and substance use who presented to [place] ED with SI on 1/26 in context of reported methamphetamine use and being kicked out of sober living. Medically cleared in ED, placed on 72HH and admitted to 12N. Limited historian, giving inconsistent reports about substance use, UDS negative, very somnolent, endorsing ongoing SI and some psychosis symptoms. PTA medications continued with exception of finasteride as patient states he takes \"1/4 a pill\" and declined ordered dose, will defer to primary team to address. Will continue to evaluate safety and stabilization further as patient more able to engage in assessments. Inpatient psychiatric hospitalization is warranted at this time for safety, stabilization, and possible adjustment in medications.    Patient reports that he abruptly stopped Zoloft one week prior to his admission. He developed discontinuation syndrome symptoms following that. He reports that he does not have a history of psychosis but is experiencing psychotic symptoms. He is " amenable with plan to trial risperidone after R/B/A were discussed. Risperidone was initiated on 1/30 and titrated to a total of 3 mg daily on 2/1. Clonidine, used for ADHD, was reduced from a total of 0.3 mg daily to 0.2 mg daily on 2/3 due to concern for hypotension. 2/9: Cardiology consult placed. EKG- tachycardia 121 bpm, QTc 465 ms, Abnormal QRS angle and T wave abnormality. COVID negative and labs are unremarkable.  On 2/10, clozapine was held pending additional workup from IM and cardiology. Pericarditis was ruled out and metoprolol was started. Clozapine was restarted on 2/13 with plan to cautiously titrate to lowest effective dose. 2/23/23: Clozapine titration schedule increased 25 mg/day. ECT and IM consults for ECT clearance placed. 2/27/23: ECT initiated. Risperidone was discontinued on 2/28. Clozapine level obtained on 3/3 at corresponding dose of 300 mg and was within therapeutic range (1001). Metformin increased to 1000 mg BID on 3/6 to target increased appetite/wt gain. Minimal improvement noted since initiation of both clozapine and ECT.     Psychiatric treatment/inteventions:  Medications:   -Continue clozapine 300 mg qhs. Clozapine quant at corresponding dose is 1001.  -Continue PTA sertraline 200 mg daily for mood  -Continue PTA gabapentin 300 mg TID for anxiety   -Continue Cogentin 1 mg at bedtime for possible EPSE     -PRN hydroxyzine 25mg every 4 hour for anxiety  -PRN trazodone 50mg at bedtime for sleep   -PRN olanzapine 10mg PO or IM TID for agitation/psychosis   -PRN atropine 1% SL drops, 2 drops q2h for sialorrhea    Spiritual services consult placed on 2/6. Pt is [employer]. Appreciate assistance.     ECT:  - Medically cleared on 2/24. See IM note for details.  - ECT consult placed on 2/23.  - ECT started on 2/27/23  - HOLD Gabapentin on nights prior to ECT and on ECT mornings until after ECT.   - ECT #5 completed 3/8/23. Uneventful. Next ECT treatment scheduled for Friday, 3/10. Baseline  IDS-SR was 55. Hard copy placed in chart.      Laboratory/Imaging: reviewed: Covid negative; UDS negative; CMP, CBC, TSH, Lipid panel, HgbA1c ordered to complete admission labs. Reviewed.     2/9/23: Troponin, CK, CBC, BMP: Unremarkable, CRP elevated to 38.18, COVID: Negative  2/10/23: Add Influenza A/B given flu-like sx-negative  Patient will be treated in therapeutic milieu with appropriate individual and group therapies as described.     Medical treatment/interventions:  Medical concerns:   Nicotine replacement ordered, educate patient on benefits of cessation, pt unclear about finasteride dose, will hold until further information is obtained. PTA PRN zyrtec, azelastine, fluticasone, triamcinolone and epipen ordered for allergies and PRN ammoniaum lactate, Eucerin for  dry skin.    Flatulence:   - simethicone prn    Neuroleptic induced wt gain:  - Monitor weights  - Continue metformin 1,000 mg BID with meals    Dyslipidemia: Will arrange follow up with PCP to address. Discussed importance of healthy eating habits and regular exercise. Will consider nutrition consult if patient amenable.     Orthostasis likely 2/2 clozapine: Pt is not hypotensive but reports orthostatic symptoms and previously restricted fluids. Now resolved.   - Continue to monitor vital signs closely  - Encourage fluids  - Discontinued clonidine    Sialorrhea 2/2 clozapine:  - Atropine 1% SL drops qhs  - Recommend towel on pillow when sleeping    Chest pain/tachycardia/EKG changes (2/9):  - Pain improved with PRN medications.   - Cardiology consult placed on 2/9. See note on that date for details.   - ECHO reviewed and unremarkable.  - Writer discussed care with both Dr. Streeter from [facility] and Christina from . Plan for now is to HOLD clozapine until further medical workup. Dr. Streeter explained that myocarditis has been ruled out as troponin was negative. Pericarditis remains on differential, but he does not have EKG findings or a pericardial effusion.  IM will assess for pericardial friction rub and pleuritic chest pain. IM seen by patient and Metoprolol was started.     Update 2/13: Discussed care with Annette from IM today on two occasions. Please see her note on this date for details. She does not suspect that this is pericarditis. He does not have pleuritic pain, characteristic CP, EKG changes, or pericardial effusion on ECHO. Therefore, will cautiously restart clozapine while monitoring closely for side effects. May consider further increase in metoprolol if necessary. PPI was started due to suspected GERD.     Update 2/24 per IM note:  Medicine is following peripherally for concerns for pericarditis.  See detailed note from 2/13.  No pericardial friction rub noted while sitting up and leaning forward today.  Patient states that his chest discomfort is getting better after starting the Protonix yesterday.  It does appear that he has also been using the Maalox.  Patient does have Tums available as well.  Please be mindful for any constipation with overuse of the PRNs.     POC:  - Continue Protonix daily for 8 weeks, then taper off  - Recommend lifestyle changes including weight loss head of bed elevation avoidance of late-night eating and specific food elimination such as chocolate caffeine alcohol acidic and/or spicy food.  - Watch for constipation with PRNs for heartburn  - We will schedule senna 1 tab twice daily  - MiraLAX daily as needed added on     Medicine will sign off, please contact us for any acute changes.      Sore throat/cough/nasal congestion, resolved:   - COVID negative on 2/9. Repeat COVID test and Influenza A/B neg on 2/10.  - Cepacol lozenges added  - PRN Tylenol   - Consulted with IM. Appreciate assistance. See their notes for details.      Legal Status: VOLUNTARY. 72 hour hold discontinued. Pt agreed to sign in on voluntary basis and discharge directly to treatment once stable.      Safety Assessment:        Behavioral Orders    Procedures     Code 1 - Restrict to Unit     Routine Programming       As clinically indicated     Status 15       Every 15 minutes.     Suicide precautions       Patients on Suicide Precautions should have a Combination Diet ordered that includes a Diet selection(s) AND a Behavioral Tray selection for Safe Tray - with utensils, or Safe Tray - NO utensils        Withdrawal precautions      Pt has not required locked seclusion or restraints in the past 24 hours to maintain safety, please refer to RN documentation for further details.    Discontinued SIO on 2/8 as patient is heriberto for safety. Monitor SI closely.     The risks, benefits, alternatives and side effects have been discussed and are understood by the patient.     Disposition: Pending clinical stabilization. Pt remains actively suicidal. [facility]      This note was created by undersigned using a Dragon dictation system. All typing errors or contextual distortion are unintentional and software inherent.     Entered by: Nelly Brown MD on 3/8/2023 at 9:46 AM

## 2023-03-08 NOTE — PLAN OF CARE
"He is isolative and staying in his room for most of the shift.  He complained of right elbow pain and PRN tylenol was given with morning medications.  He states that his depression has not changed and still states suicidal ideation by wanting to get a gun and shoot himself.  He also states that the auditory and hallucinations, \" were gone, but came back,\" in discussions.  Informed writer that the hallucinations, \" are constant,\" and PRN Zyprexa was given with morning medications.  He complained of higher depression, anxiety today due to poor sleep due to, \" coughing all night,\" and complaining of coughing this morning.  It was noticed that he coughed slightly one time in early discussions with writer.  States he wants a review of his medications, which was done in the shift.  Also, states he wants to review medications with her MD as well.  It was noticed that he has some delay in speech, cognition and some disorganized thought process by difficulty expressing himself in discussions.  ECT was done this morning.       "

## 2023-03-08 NOTE — ANESTHESIA CARE TRANSFER NOTE
Patient: Link Lora    Procedure: * No procedures listed *       Diagnosis: * No pre-op diagnosis entered *  Diagnosis Additional Information: No value filed.    Anesthesia Type:   General     Note:    Oropharynx: oropharynx clear of all foreign objects  Level of Consciousness: iatrogenic sedation      Independent Airway: airway patency satisfactory and stable  Dentition: dentition unchanged  Vital Signs Stable: post-procedure vital signs reviewed and stable  Report to RN Given: handoff report given  Patient transferred to: PACU    Handoff Report: Identifed the Patient, Identified the Reponsible Provider, Reviewed the pertinent medical history, Discussed the surgical course, Reviewed Intra-OP anesthesia mangement and issues during anesthesia, Set expectations for post-procedure period and Allowed opportunity for questions and acknowledgement of understanding      Vitals:  Vitals Value Taken Time   /77 03/08/23 0946   Temp 36.3  C (97.3  F) 03/08/23 0946   Pulse 102 03/08/23 0946   Resp 18 03/08/23 0946   SpO2 93 % 03/08/23 0946       Electronically Signed By: Salty Pollock MD  March 8, 2023  9:51 AM

## 2023-03-08 NOTE — ANESTHESIA POSTPROCEDURE EVALUATION
Patient: Nikolay Lora    Procedure: * No procedures listed *       Anesthesia Type:  General    Note:  Disposition: Inpatient   Postop Pain Control: Uneventful            Sign Out: Well controlled pain   PONV:    Neuro/Psych: Uneventful            Sign Out: Acceptable/Baseline neuro status   Airway/Respiratory: Uneventful            Sign Out: Acceptable/Baseline resp. status   CV/Hemodynamics: Uneventful            Sign Out: Acceptable CV status; No obvious hypovolemia; No obvious fluid overload   Other NRE:    DID A NON-ROUTINE EVENT OCCUR?            Last vitals:  Vitals:    03/08/23 0738 03/08/23 0937 03/08/23 0946   BP: 119/81 (!) 145/89 119/77   Pulse: 91 103 102   Resp: 16 16 18   Temp: 36.6  C (97.8  F) 36.2  C (97.2  F) 36.3  C (97.3  F)   SpO2: 95% 93% 93%       Electronically Signed By: Salty Pollock MD  March 8, 2023  9:54 AM

## 2023-03-08 NOTE — OR NURSING
Patient adequate for discharge back to unit. Report called to unit nurse  Williams. IV removed and hemostasis achieved less than 2 min.  See MAR for medications given during procedure.  Patient safely transported back to unit with  staff persons.

## 2023-03-08 NOTE — ANESTHESIA PREPROCEDURE EVALUATION
Anesthesia Pre-Procedure Evaluation    Patient: Nikolay Lora   MRN: [id number] : 1990        Procedure :           Past Medical History:   Diagnosis Date     Brugada syndrome      Chronic idiopathic urticaria      Concussion with loss of consciousness      Mixed hyperlipidemia      Polysubstance abuse (H)      Schizotypal personality disorder (H)       No past surgical history on file.   No Known Allergies   Social History     Tobacco Use     Smoking status: Former     Smokeless tobacco: Never     Tobacco comments:     1-2 cigs per day   Substance Use Topics     Alcohol use: Yes      Wt Readings from Last 1 Encounters:   23 79 kg (174 lb 1.6 oz)        Anesthesia Evaluation   Pt has not had prior anesthetic         ROS/MED HX  ENT/Pulmonary:       Neurologic:       Cardiovascular: Comment: Repolarization abnormality. brugada ???. Authorized by cardiology. Negative history of syncope, seizures. Family history negative      METS/Exercise Tolerance:     Hematologic:       Musculoskeletal:       GI/Hepatic:       Renal/Genitourinary:       Endo:       Psychiatric/Substance Use:       Infectious Disease:       Malignancy:       Other:            Physical Exam    Airway        Mallampati: III   TM distance: > 3 FB   Neck ROM: full   Mouth opening: > 3 cm    Respiratory Devices and Support         Dental           Cardiovascular   cardiovascular exam normal          Pulmonary   pulmonary exam normal                OUTSIDE LABS:  CBC:   Lab Results   Component Value Date    WBC 12.7 (H) 2023    WBC 8.0 2023    HGB 15.0 2023    HGB 15.8 2023    HCT 46.1 2023    HCT 48.3 2023     2023     2023     BMP:   Lab Results   Component Value Date     2023     2023    POTASSIUM 3.9 2023    POTASSIUM 4.0 2023    CHLORIDE 103 2023    CHLORIDE 100 2023    CO2 27 2023    CO2 27 2023    BUN 19.5 2023     BUN 12.5 2023    CR 0.81 2023    CR 0.81 2023    GLC 93 2023     (H) 2023     COAGS: No results found for: PTT, INR, FIBR  POC: No results found for: BGM, HCG, HCGS  HEPATIC:   Lab Results   Component Value Date    ALBUMIN 4.1 2023    PROTTOTAL 7.1 2023    ALT 58 (H) 2023    AST 29 2023    ALKPHOS 82 2023    BILITOTAL 0.3 2023     OTHER:   Lab Results   Component Value Date    A1C 5.4 2023    KATINA 9.4 2023    TSH 1.09 2023       Anesthesia Plan    ASA Status:  2      Anesthesia Type: General.              Consents    Anesthesia Plan(s) and associated risks, benefits, and realistic alternatives discussed. Questions answered and patient/representative(s) expressed understanding.    - Discussed:     - Discussed with:  Patient         Postoperative Care    Pain management: Oral pain medications, IV analgesics.   PONV prophylaxis: Ondansetron (or other 5HT-3)     Comments:           H&P reviewed: Unable to attach H&P to encounter due to EHR limitations. H&P Update: appropriate H&P reviewed, patient examined. No interval changes since H&P (within 30 days).             Kierra Islas Pre-Procedure Evaluation    Patient: Nikolay Lora   MRN: [id number] : 1990        Procedure :           Past Medical History:   Diagnosis Date     Brugada syndrome      Chronic idiopathic urticaria      Concussion with loss of consciousness      Mixed hyperlipidemia      Polysubstance abuse (H)      Schizotypal personality disorder (H)       No past surgical history on file.   No Known Allergies   Social History     Tobacco Use     Smoking status: Former     Smokeless tobacco: Never     Tobacco comments:     1-2 cigs per day   Substance Use Topics     Alcohol use: Yes      Wt Readings from Last 1 Encounters:   23 79 kg (174 lb 1.6 oz)        Anesthesia Evaluation   Pt has not had prior anesthetic         ROS/MED  HX  ENT/Pulmonary:       Neurologic:       Cardiovascular: Comment: Repolarization abnormality. brugada ???. Authorized by cardiology. Negative history of syncope, seizures. Family history negative      METS/Exercise Tolerance:     Hematologic:       Musculoskeletal:       GI/Hepatic:       Renal/Genitourinary:       Endo:       Psychiatric/Substance Use:       Infectious Disease:       Malignancy:       Other:            Physical Exam    Airway        Mallampati: III   TM distance: > 3 FB   Neck ROM: full   Mouth opening: > 3 cm    Respiratory Devices and Support         Dental           Cardiovascular   cardiovascular exam normal          Pulmonary   pulmonary exam normal                OUTSIDE LABS:  CBC:   Lab Results   Component Value Date    WBC 12.7 (H) 03/02/2023    WBC 8.0 02/24/2023    HGB 15.0 02/24/2023    HGB 15.8 02/09/2023    HCT 46.1 02/24/2023    HCT 48.3 02/09/2023     02/24/2023     02/09/2023     BMP:   Lab Results   Component Value Date     02/24/2023     02/09/2023    POTASSIUM 3.9 02/24/2023    POTASSIUM 4.0 02/09/2023    CHLORIDE 103 02/24/2023    CHLORIDE 100 02/09/2023    CO2 27 02/24/2023    CO2 27 02/09/2023    BUN 19.5 02/24/2023    BUN 12.5 02/09/2023    CR 0.81 02/24/2023    CR 0.81 02/09/2023    GLC 93 02/24/2023     (H) 02/09/2023     COAGS: No results found for: PTT, INR, FIBR  POC: No results found for: BGM, HCG, HCGS  HEPATIC:   Lab Results   Component Value Date    ALBUMIN 4.1 02/24/2023    PROTTOTAL 7.1 02/24/2023    ALT 58 (H) 02/24/2023    AST 29 02/24/2023    ALKPHOS 82 02/24/2023    BILITOTAL 0.3 02/24/2023     OTHER:   Lab Results   Component Value Date    A1C 5.4 01/28/2023    KATINA 9.4 02/24/2023    TSH 1.09 01/28/2023       Anesthesia Plan    ASA Status:  2      Anesthesia Type: General.              Consents    Anesthesia Plan(s) and associated risks, benefits, and realistic alternatives discussed. Questions answered and  patient/representative(s) expressed understanding.    - Discussed:     - Discussed with:  Patient         Postoperative Care    Pain management: Oral pain medications, IV analgesics.   PONV prophylaxis: Ondansetron (or other 5HT-3)     Comments:           H&P reviewed: Unable to attach H&P to encounter due to EHR limitations. H&P Update: appropriate H&P reviewed, patient examined. No interval changes since H&P (within 30 days).             Salty Pollock MD   Anesthesia Pre-Procedure Evaluation    Patient: Nikolay Lora   MRN: [id number] : 1990        Procedure :           Past Medical History:   Diagnosis Date     Brugada syndrome      Chronic idiopathic urticaria      Concussion with loss of consciousness      Mixed hyperlipidemia      Polysubstance abuse (H)      Schizotypal personality disorder (H)       No past surgical history on file.   No Known Allergies   Social History     Tobacco Use     Smoking status: Former     Smokeless tobacco: Never     Tobacco comments:     1-2 cigs per day   Substance Use Topics     Alcohol use: Yes      Wt Readings from Last 1 Encounters:   23 79 kg (174 lb 1.6 oz)        Anesthesia Evaluation   Pt has not had prior anesthetic         ROS/MED HX  ENT/Pulmonary:       Neurologic:       Cardiovascular: Comment: Repolarization abnormality. brugada ???. Authorized by cardiology. Negative history of syncope, seizures. Family history negative      METS/Exercise Tolerance:     Hematologic:       Musculoskeletal:       GI/Hepatic:       Renal/Genitourinary:       Endo:       Psychiatric/Substance Use:       Infectious Disease:       Malignancy:       Other:            Physical Exam    Airway        Mallampati: III   TM distance: > 3 FB   Neck ROM: full   Mouth opening: > 3 cm    Respiratory Devices and Support         Dental           Cardiovascular   cardiovascular exam normal          Pulmonary   pulmonary exam normal                OUTSIDE LABS:  CBC:   Lab Results    Component Value Date    WBC 12.7 (H) 2023    WBC 8.0 2023    HGB 15.0 2023    HGB 15.8 2023    HCT 46.1 2023    HCT 48.3 2023     2023     2023     BMP:   Lab Results   Component Value Date     2023     2023    POTASSIUM 3.9 2023    POTASSIUM 4.0 2023    CHLORIDE 103 2023    CHLORIDE 100 2023    CO2 27 2023    CO2 27 2023    BUN 19.5 2023    BUN 12.5 2023    CR 0.81 2023    CR 0.81 2023    GLC 93 2023     (H) 2023     COAGS: No results found for: PTT, INR, FIBR  POC: No results found for: BGM, HCG, HCGS  HEPATIC:   Lab Results   Component Value Date    ALBUMIN 4.1 2023    PROTTOTAL 7.1 2023    ALT 58 (H) 2023    AST 29 2023    ALKPHOS 82 2023    BILITOTAL 0.3 2023     OTHER:   Lab Results   Component Value Date    A1C 5.4 2023    KATINA 9.4 2023    TSH 1.09 2023       Anesthesia Plan    ASA Status:  2      Anesthesia Type: General.              Consents    Anesthesia Plan(s) and associated risks, benefits, and realistic alternatives discussed. Questions answered and patient/representative(s) expressed understanding.    - Discussed:     - Discussed with:  Patient         Postoperative Care    Pain management: Oral pain medications, IV analgesics.   PONV prophylaxis: Ondansetron (or other 5HT-3)     Comments:           H&P reviewed: Unable to attach H&P to encounter due to EHR limitations. H&P Update: appropriate H&P reviewed, patient examined. No interval changes since H&P (within 30 days).             YVONNE Islasnesthesia Pre-Procedure Evaluation    Patient: Nikolay Lora   MRN: [id number] : 1990        Procedure :           Past Medical History:   Diagnosis Date     Brugada syndrome      Chronic idiopathic urticaria      Concussion with loss of consciousness      Mixed hyperlipidemia       Polysubstance abuse (H)      Schizotypal personality disorder (H)       No past surgical history on file.   No Known Allergies   Social History     Tobacco Use     Smoking status: Former     Smokeless tobacco: Never     Tobacco comments:     1-2 cigs per day   Substance Use Topics     Alcohol use: Yes      Wt Readings from Last 1 Encounters:   03/07/23 79 kg (174 lb 1.6 oz)        Anesthesia Evaluation   Pt has not had prior anesthetic         ROS/MED HX  ENT/Pulmonary:       Neurologic:       Cardiovascular: Comment: Repolarization abnormality. brugada ???. Authorized by cardiology. Negative history of syncope, seizures. Family history negative      METS/Exercise Tolerance:     Hematologic:       Musculoskeletal:       GI/Hepatic:       Renal/Genitourinary:       Endo:       Psychiatric/Substance Use:       Infectious Disease:       Malignancy:       Other:            Physical Exam    Airway        Mallampati: III   TM distance: > 3 FB   Neck ROM: full   Mouth opening: > 3 cm    Respiratory Devices and Support         Dental           Cardiovascular   cardiovascular exam normal          Pulmonary   pulmonary exam normal                OUTSIDE LABS:  CBC:   Lab Results   Component Value Date    WBC 12.7 (H) 03/02/2023    WBC 8.0 02/24/2023    HGB 15.0 02/24/2023    HGB 15.8 02/09/2023    HCT 46.1 02/24/2023    HCT 48.3 02/09/2023     02/24/2023     02/09/2023     BMP:   Lab Results   Component Value Date     02/24/2023     02/09/2023    POTASSIUM 3.9 02/24/2023    POTASSIUM 4.0 02/09/2023    CHLORIDE 103 02/24/2023    CHLORIDE 100 02/09/2023    CO2 27 02/24/2023    CO2 27 02/09/2023    BUN 19.5 02/24/2023    BUN 12.5 02/09/2023    CR 0.81 02/24/2023    CR 0.81 02/09/2023    GLC 93 02/24/2023     (H) 02/09/2023     COAGS: No results found for: PTT, INR, FIBR  POC: No results found for: BGM, HCG, HCGS  HEPATIC:   Lab Results   Component Value Date    ALBUMIN 4.1 02/24/2023    PROTTOTAL  7.1 2023    ALT 58 (H) 2023    AST 29 2023    ALKPHOS 82 2023    BILITOTAL 0.3 2023     OTHER:   Lab Results   Component Value Date    A1C 5.4 2023    KATINA 9.4 2023    TSH 1.09 2023       Anesthesia Plan    ASA Status:  2      Anesthesia Type: General.              Consents    Anesthesia Plan(s) and associated risks, benefits, and realistic alternatives discussed. Questions answered and patient/representative(s) expressed understanding.    - Discussed:     - Discussed with:  Patient         Postoperative Care    Pain management: Oral pain medications, IV analgesics.   PONV prophylaxis: Ondansetron (or other 5HT-3)     Comments:           H&P reviewed: Unable to attach H&P to encounter due to EHR limitations. H&P Update: appropriate H&P reviewed, patient examined. No interval changes since H&P (within 30 days).             Kierra Islas Pre-Procedure Evaluation    Patient: Nikolay Lora   MRN: [id number] : 1990        Procedure :           Past Medical History:   Diagnosis Date     Brugada syndrome      Chronic idiopathic urticaria      Concussion with loss of consciousness      Mixed hyperlipidemia      Polysubstance abuse (H)      Schizotypal personality disorder (H)       No past surgical history on file.   No Known Allergies   Social History     Tobacco Use     Smoking status: Former     Smokeless tobacco: Never     Tobacco comments:     1-2 cigs per day   Substance Use Topics     Alcohol use: Yes      Wt Readings from Last 1 Encounters:   23 79 kg (174 lb 1.6 oz)        Anesthesia Evaluation   Pt has not had prior anesthetic         ROS/MED HX  ENT/Pulmonary:       Neurologic:       Cardiovascular: Comment: Repolarization abnormality. brugada ???. Authorized by cardiology. Negative history of syncope, seizures. Family history negative      METS/Exercise Tolerance: >4 METS    Hematologic:       Musculoskeletal:       GI/Hepatic:        Renal/Genitourinary:       Endo:       Psychiatric/Substance Use:     (+) psychiatric history     Infectious Disease:       Malignancy:       Other:            Physical Exam    Airway        Mallampati: III   TM distance: > 3 FB   Neck ROM: full   Mouth opening: > 3 cm    Respiratory Devices and Support         Dental       (+) Modest Abnormalities - crowns, retainers, 1 or 2 missing teeth      Cardiovascular   cardiovascular exam normal          Pulmonary   pulmonary exam normal                OUTSIDE LABS:  CBC:   Lab Results   Component Value Date    WBC 12.7 (H) 03/02/2023    WBC 8.0 02/24/2023    HGB 15.0 02/24/2023    HGB 15.8 02/09/2023    HCT 46.1 02/24/2023    HCT 48.3 02/09/2023     02/24/2023     02/09/2023     BMP:   Lab Results   Component Value Date     02/24/2023     02/09/2023    POTASSIUM 3.9 02/24/2023    POTASSIUM 4.0 02/09/2023    CHLORIDE 103 02/24/2023    CHLORIDE 100 02/09/2023    CO2 27 02/24/2023    CO2 27 02/09/2023    BUN 19.5 02/24/2023    BUN 12.5 02/09/2023    CR 0.81 02/24/2023    CR 0.81 02/09/2023    GLC 93 02/24/2023     (H) 02/09/2023     COAGS: No results found for: PTT, INR, FIBR  POC: No results found for: BGM, HCG, HCGS  HEPATIC:   Lab Results   Component Value Date    ALBUMIN 4.1 02/24/2023    PROTTOTAL 7.1 02/24/2023    ALT 58 (H) 02/24/2023    AST 29 02/24/2023    ALKPHOS 82 02/24/2023    BILITOTAL 0.3 02/24/2023     OTHER:   Lab Results   Component Value Date    A1C 5.4 01/28/2023    KATINA 9.4 02/24/2023    TSH 1.09 01/28/2023       Anesthesia Plan    ASA Status:  2   NPO Status:  NPO Appropriate    Anesthesia Type: General.              Consents    Anesthesia Plan(s) and associated risks, benefits, and realistic alternatives discussed. Questions answered and patient/representative(s) expressed understanding.     - Discussed: Risks, Benefits and Alternatives for BOTH SEDATION and the PROCEDURE were discussed     - Discussed with:  Patient       - Extended Intubation/Ventilatory Support Discussed: No.      - Patient is DNR/DNI Status: No    Use of blood products discussed: No .     Postoperative Care    Pain management: Oral pain medications, IV analgesics.   PONV prophylaxis: Ondansetron (or other 5HT-3)     Comments:           H&P reviewed: Unable to attach H&P to encounter due to EHR limitations. H&P Update: appropriate H&P reviewed, patient examined. No interval changes since H&P (within 30 days).             Salty Pollock MD

## 2023-03-08 NOTE — PLAN OF CARE
Assessment/Intervention/Current Symtoms and Care Coordination  -Chart review     -Rounded with team, addressed patient needs/concerns    -Pt continues with ECT treatment.      Discharge Plan or Goal  Pending stabilization & development of a safe discharge plan.  Considerations include:  [facility] Program @ [facility]    Outpatient psychiatry appointment scheduled for 3/27 (see AVS).  Will need to be changed if pt is not discharged by then.      Barriers to Discharge  Patient requires further psychiatric stabilization due to current symptomology     Referral Status  No referrals have been made this hospitalization     Legal Status  Voluntary

## 2023-03-08 NOTE — PROCEDURES
"Nikolay Lora is a 32 year old  year old male patient.  [id number]  @DX@    [facility]   ECT Procedure Note   03/08/2023    Patient Status: Inpatient    Is this the first in a series of 12 treatments?  no   No Known Allergies    Weight:  174 lbs 1.6 oz         Indications for ECT:   Medications ineffective  Imminent risk of suicide         Clinical Narrative:   Nikolay Lora is a 32 year old man with affective dysregulation, ADHD and polysubstance use (alcohol*, amphetamine*, cocaine, cannabis) who is hospitalized with progressive depression leading to suicidal ideation with planning on 1/26, in the context of  medication non compliance and losing his place at sober living due to reported methamphetamine use. Throughout his hospital stay medication adjustments have been made (restarted on sertraline, titrated risperidone, added clozapine, which is being titrated); however, intense suicidal ideation has continued and he has been having difficulty tolerating medication changes. This consultation is requested to evaluate candidacy for electroconvulsive therapy (ECT).      [place] [facility] [place] ensure thoroughness, otherwise he can communicate in English well.  The patient shares he cannot stop contemplating suicide with a plan to shoot himself as soon as he is out of the hospital. He reports visual hallucinations of \"lanterns and black birds flying around\" with commanding auditory hallucinations telling him that he is \"worthless\" and \"should kill (himself)\". Although hallucinations got better since admission with medication changes, depression and suicidal contemplation has persisted, he is interested in ECT, hoping for a quicker relief.         Diagnosis:     Schizoaffective Disorder, depressed  Polysubstance Use Disorder in a controlled environment          Assessment:   Considering the clinical acuity  electroconvulsive therapy " "(ECT) appears appropriate; despite multifactorial nature of the presentation that would benefit from optimization of cognitive, behavioral and social interventions longitudinally.      Discussed all relevant aspects of ECT with patient, including risks of memory loss, HA, nausea, death <1/50,000, driving prohibition; possible lack of benefit or relapse after successful treatment, alternatives, right to decline, possible outpatient procedures. All questions answered, patient will have opportunity to review ECT video.         Pause for the Cause:     Right patient Yes   Right procedure/laterality settings: Yes          Intra-Procedure Documentation:     #1 02/27/23 pt says he was feeling \"anxious but content\"  #2 03/01/23 no auditory hallucinations, concern for visual hallucinations of bugs in his room. Still actively suicidal with a plan with gun or kitchen knife.   #3 03/03/23 Visual hallucinations disappeared for a day after last ECT. Thinking about suicide slightly less. Worried about some pain in his right arm. Mood 6.5/10 (10 best)  #4 03/06/23 Visual hallucinations returned over the weekend. Auditory hallucinations the same. Still thinking about suicide. Feels safe in hospital but not outside.   #5 03/08/23 Continues to have violent auditory hallucinations and wishes to die.       ECT #: 5   Treatment number this series: 5   Total treatment number: 5     Type of ECT:  Bilateral, standard    ECT Medications:    Brevital: 90mg  Succinyl Choline: 80mg    ECT Strip Summary: (titration 96 mC)  Energy Level: 192 mC, 1ms 20 Hz, 6 sec, 800 mA (icreased from 144 mC on 3/6/23)  Motor Seizure Duration: 32 seconds  EEG Seizure Duration: 37 seconds    Complications:      Time for re-orientation: 28 min on 3/6/23    Plan:   Continue  ECT Q MWF at  192 mC    Continue current medications    Chica Lazo MD    "

## 2023-03-08 NOTE — PLAN OF CARE
Pt asleep at start of shift.     Breathing quiet and unlabored when sleeping.     Pt had no c/o pain or discomfort during the HS.     Appears to have slept 7 hours.     Pt remained NPO after 0000 for ECT.     Goal Outcome Evaluation:  Problem: Sleep Disturbance  Goal: Adequate Sleep/Rest  Outcome: Progressing

## 2023-03-09 LAB
BASOPHILS # BLD AUTO: 0.1 10E3/UL (ref 0–0.2)
BASOPHILS NFR BLD AUTO: 0 %
EOSINOPHIL # BLD AUTO: 0.1 10E3/UL (ref 0–0.7)
EOSINOPHIL NFR BLD AUTO: 1 %
GLUCOSE BLDC GLUCOMTR-MCNC: 112 MG/DL (ref 70–99)
HOLD SPECIMEN: NORMAL
IMM GRANULOCYTES # BLD: 0.1 10E3/UL
IMM GRANULOCYTES NFR BLD: 1 %
LYMPHOCYTES # BLD AUTO: 2 10E3/UL (ref 0.8–5.3)
LYMPHOCYTES NFR BLD AUTO: 15 %
MONOCYTES # BLD AUTO: 0.7 10E3/UL (ref 0–1.3)
MONOCYTES NFR BLD AUTO: 5 %
NEUTROPHILS # BLD AUTO: 10.1 10E3/UL (ref 1.6–8.3)
NEUTROPHILS NFR BLD AUTO: 78 %
NRBC # BLD AUTO: 0 10E3/UL
NRBC BLD AUTO-RTO: 0 /100
WBC # BLD AUTO: 13 10E3/UL (ref 4–11)

## 2023-03-09 PROCEDURE — 250N000013 HC RX MED GY IP 250 OP 250 PS 637: Performed by: PSYCHIATRY & NEUROLOGY

## 2023-03-09 PROCEDURE — 85004 AUTOMATED DIFF WBC COUNT: CPT | Performed by: PSYCHIATRY & NEUROLOGY

## 2023-03-09 PROCEDURE — 124N000002 HC R&B MH UMMC

## 2023-03-09 PROCEDURE — 99233 SBSQ HOSP IP/OBS HIGH 50: CPT | Performed by: PSYCHIATRY & NEUROLOGY

## 2023-03-09 PROCEDURE — 250N000013 HC RX MED GY IP 250 OP 250 PS 637

## 2023-03-09 PROCEDURE — 36415 COLL VENOUS BLD VENIPUNCTURE: CPT | Performed by: PSYCHIATRY & NEUROLOGY

## 2023-03-09 PROCEDURE — 90853 GROUP PSYCHOTHERAPY: CPT

## 2023-03-09 RX ADMIN — SERTRALINE HYDROCHLORIDE 200 MG: 100 TABLET ORAL at 08:28

## 2023-03-09 RX ADMIN — NICOTINE 1 PATCH: 21 PATCH, EXTENDED RELEASE TRANSDERMAL at 08:28

## 2023-03-09 RX ADMIN — METFORMIN HYDROCHLORIDE 1000 MG: 500 TABLET ORAL at 17:49

## 2023-03-09 RX ADMIN — ACETAMINOPHEN 325MG 650 MG: 325 TABLET ORAL at 08:29

## 2023-03-09 RX ADMIN — PANTOPRAZOLE SODIUM 40 MG: 40 TABLET, DELAYED RELEASE ORAL at 08:28

## 2023-03-09 RX ADMIN — Medication 12.5 MG: at 08:28

## 2023-03-09 RX ADMIN — LORATADINE 10 MG: 10 TABLET ORAL at 08:28

## 2023-03-09 RX ADMIN — GABAPENTIN 300 MG: 300 CAPSULE ORAL at 08:28

## 2023-03-09 RX ADMIN — SENNOSIDES AND DOCUSATE SODIUM 1 TABLET: 50; 8.6 TABLET ORAL at 20:17

## 2023-03-09 RX ADMIN — METFORMIN HYDROCHLORIDE 1000 MG: 500 TABLET ORAL at 08:28

## 2023-03-09 RX ADMIN — SENNOSIDES AND DOCUSATE SODIUM 1 TABLET: 50; 8.6 TABLET ORAL at 08:28

## 2023-03-09 RX ADMIN — FLUTICASONE PROPIONATE 2 SPRAY: 50 SPRAY, METERED NASAL at 08:27

## 2023-03-09 RX ADMIN — GABAPENTIN 300 MG: 300 CAPSULE ORAL at 13:00

## 2023-03-09 RX ADMIN — ATROPINE SULFATE 2 DROP: 10 SOLUTION/ DROPS OPHTHALMIC at 20:18

## 2023-03-09 RX ADMIN — CLOZAPINE 300 MG: 200 TABLET ORAL at 20:17

## 2023-03-09 ASSESSMENT — ACTIVITIES OF DAILY LIVING (ADL)
ADLS_ACUITY_SCORE: 29
LAUNDRY: UNABLE TO COMPLETE
DRESS: SCRUBS (BEHAVIORAL HEALTH)
HYGIENE/GROOMING: INDEPENDENT
ORAL_HYGIENE: INDEPENDENT
DRESS: SCRUBS (BEHAVIORAL HEALTH)
ADLS_ACUITY_SCORE: 29
ADLS_ACUITY_SCORE: 29
HYGIENE/GROOMING: HANDWASHING;INDEPENDENT
ADLS_ACUITY_SCORE: 29
ORAL_HYGIENE: INDEPENDENT

## 2023-03-09 NOTE — PLAN OF CARE
"He is isolative and rests in his room often in the shift.  He continues to have some delay in speech, cognition, however, his response time to questions was faster compared to previous shift.  He continues to display some disorganized thought process by difficulty expressing himself in conversations.  Continues to endorse suicidal ideation by wanting to shoot himself with a firearm, however, he states that he feels safe in the hospital.  When asked about if there has been any changes in his depression due to ECT, he states, \" I feel different, can't explain it,\" yet did state a small improvement in his depression.  He currently denies experiencing any hallucinations when asked and said, \" I woke up and didn't see anything today.\"     "

## 2023-03-09 NOTE — PLAN OF CARE
03/09/23 1022   Group Therapy Session   Group Attendance other (see comments)     Pt was invited to group today, though said he wasn't feeling up to it, and preferred to stay in his room.  Pt did come out briefly, thought didn't stay in group for any amount of time.  Will check in again tomorrow.

## 2023-03-09 NOTE — PROGRESS NOTES
03/08/23 2100   Group Therapy Session   Group Attendance attended group session   Time Session Began 1615   Time Session Ended 1700   Total Time (minutes) 35   Total # Attendees 2-3   Group Type recreation   Group Topic Covered leisure exploration/use of leisure time   Group Session Detail TR leisure group   Patient Response/Contribution cooperative with task   Patient Participation Detail Pt participated in Therapeutic Recreation group with focus on leisure participation, communication skills, and critical thinking. Engaged and focused in the group recreational activity via a group game.  Pt entered group late, but was a full participant throughout remainder of group.

## 2023-03-09 NOTE — PLAN OF CARE
Problem: Thought Process Alteration  Goal: Optimal Thought Clarity  Outcome: Not Progressing     Problem: Psychotic Signs/Symptoms  Goal: Enhanced Social, Occupational or Functional Skills (Psychotic Signs/Symptoms)  Intervention: Promote Social, Occupational and Functional Ability  Recent Flowsheet Documentation  Taken 3/8/2023 2104 by Gerber Metcalf RN  Trust Relationship/Rapport:   care explained   choices provided   emotional support provided   empathic listening provided   questions answered   questions encouraged   reassurance provided   thoughts/feelings acknowledged   Goal Outcome Evaluation:    Plan of Care Reviewed With: patient        Patient remains isolative and withdrawn to room majority of the evening watching television. Patient did come out to participate in group but remained in room afterwards. Patient upon approach flat in affect, calm and cooperative with verbal assessment. Patient reports continued anxiety and depression 7/10 related to command auditory hallucinations and thoughts of SI. Patient continues to report SI thoughts of harming self with a firearm but states no active plan and contracts for safety while hospitalized. Patient accepting of HS medications with no stated or observed side effects. Patient unable to identify any therapeutic effect from medications or ECT treatments. Patient cooperative with vital assessments which were stable. Patient continues to report pain in right elbow 7/10, refusing any nursing interventions or prompting for increased activity. Patient independent with identifying needs and completing ADL's.

## 2023-03-09 NOTE — PLAN OF CARE
Assessment/Intervention/Current Symtoms and Care Coordination:  -Chart review  -Team meeting - Haseeb is continuing with ECT, plan is to have #6 tomorrow 3/10. Team will reach out to [facility] staff when Haseeb has further stabilized to work on getting him back to their program. He endorses high depression and unchanged SI.   Current Symptoms include the following: SI, isolative/withdrawn, depression  Precautions: SI and Fall    Discharge Plan or Goal:  Pending stabilization & development of a safe discharge plan.  Considerations include: MICD treatment - [facility] ([facility])    Barriers to Discharge:  Haseeb presents with active SI with plan and intent to shoot self with a gun or cut his throat. He is completing ECT acute series to target treatment-resistant depression and command AH telling him to kill himself. Haseeb requires symptom stabilization, medication management, and supportive discharge plan.     Referral Status:  Referrals D     Outpatient psychiatry appointment scheduled for 3/27.    Legal Status:  Patient is voluntary    Contacts:    Sister - Farheen Lora (phone: [phone])    [facility]  - Anuradha Silva (phone: [phone])    [facility] Manager - Erlin (phone: [phone])    [facility]  - Stacey Romero (phone: [phone])    Upcoming Meetings/Important Dates:  N/A    Rationale for SIO/No Roommate Order:  Patient is not on SIO.  Patient is in single room.

## 2023-03-09 NOTE — PLAN OF CARE
Problem: Sleep Disturbance  Goal: Adequate Sleep/Rest  Outcome: Progressing   Goal Outcome Evaluation:    Patient appeared to sleep 6.5 hours this night shift.  No prns or snacks given or requested.  No concerns were reported or noted.  ECT tomorrow morning.

## 2023-03-09 NOTE — PROGRESS NOTES
"[facility], [place]   Psychiatric Progress Note  Hospital Day: 41        Interim History:   The patient's care was discussed with the treatment team during the daily team meeting and/or staff's chart notes were reviewed.  Per RN note dated 3/8/23: \"Patient remains isolative and withdrawn to room majority of the evening watching television. Patient did come out to participate in group but remained in room afterwards. Patient upon approach flat in affect, calm and cooperative with verbal assessment. Patient reports continued anxiety and depression 7/10 related to command auditory hallucinations and thoughts of SI. Patient continues to report SI thoughts of harming self with a firearm but states no active plan and contracts for safety while hospitalized. Patient accepting of HS medications with no stated or observed side effects. Patient unable to identify any therapeutic effect from medications or ECT treatments. Patient cooperative with vital assessments which were stable. Patient continues to report pain in right elbow 7/10, refusing any nursing interventions or prompting for increased activity. Patient independent with identifying needs and completing ADL's.\"    Upon interview Haseeb reported that his depression ranges from a 7-10/10 with 10 being the most severe. He is unable to determine whether it has improved or not since starting ECT, but again said that he feels \"different.\" Reports overall improvement in anxiety. Denies VH today. Continues to hear \"whispers telling me to kill myself and end my life because I don't have anything to live for and I am just completely hopeless.\" He expressed concerns about being on too many medications. Discussed discontinuing Cogentin to simplify regimen and he is amenable. He agreed that all other medications are warranted at this time after reviewing his medication list with him in detail. He completed repeat IDS-SR.     Suicidal ideation: Haseeb " "reports ongoing active SI with plan and intent to shoot himself with a gun or cut his throat upon discharge. Denies suicide plan or intent in the hospital. [place] for safety.     Homicidal ideation: denies current or recent homicidal ideation or behaviors.    Psychotic symptoms: As above    Medication side effects reported: None other than apathy. Said that he wants to cry but cant.     Acute medical concerns: None    Other issues reported by patient: Patient had no further questions or concerns.            Medications:       atropine  2 drop Sublingual At Bedtime     benztropine  1 mg Oral At Bedtime     cloZAPine  300 mg Oral At Bedtime     fluticasone  2 spray Both Nostrils Daily     gabapentin  300 mg Oral TID     loratadine  10 mg Oral Daily     metFORMIN  1,000 mg Oral BID w/meals     metoprolol succinate ER  12.5 mg Oral Daily     nicotine  1 patch Transdermal Daily     nicotine   Transdermal Q8H     pantoprazole  40 mg Oral QAM AC     senna-docusate  1 tablet Oral BID     sertraline  200 mg Oral Daily          Allergies:   No Known Allergies       Labs:     Recent Results (from the past 24 hour(s))   Glucose by meter    Collection Time: 03/08/23  8:49 AM   Result Value Ref Range    GLUCOSE BY METER POCT 112 (H) 70 - 99 mg/dL          Psychiatric Examination:     /85 (BP Location: Right arm, Patient Position: Sitting, Cuff Size: Adult Regular)   Pulse 105   Temp 98  F (36.7  C) (Tympanic)   Resp 16   Ht 1.6 m (5' 3\")   Wt 79 kg (174 lb 1.6 oz)   SpO2 94%   BMI 30.84 kg/m    Weight is 174 lbs 1.6 oz  Body mass index is 30.84 kg/m .    Weight over time:  Vitals:    01/27/23 1718 03/07/23 0933   Weight: 75.1 kg (165 lb 8 oz) 79 kg (174 lb 1.6 oz)       Orthostatic Vitals     None        Cardiometabolic risk assessment. 01/30/23    Reviewed patient profile for cardiometabolic risk factors    Date taken /Value  REFERENCE RANGE   Abdominal Obesity  (Waist Circumference)   See nursing flowsheet " "Women ?35 in (88 cm)   Men ?40 in (102 cm)      Triglycerides  Triglycerides   Date Value Ref Range Status   01/28/2023 192 (H) <150 mg/dL Final       ?150 mg/dL (1.7 mmol/L) or current treatment for elevated triglycerides   HDL cholesterol  Direct Measure HDL   Date Value Ref Range Status   01/28/2023 41 >=40 mg/dL Final   ]   Women <50 mg/dL (1.3 mmol/L) in women or current treatment for low HDL cholesterol  Men <40 mg/dL (1 mmol/L) in men or current treatment for low HDL cholesterol     Fasting plasma glucose (FPG) Lab Results   Component Value Date     01/28/2023      FPG ?100 mg/dL (5.6 mmol/L) or treatment for elevated blood glucose   Blood pressure  BP Readings from Last 3 Encounters:   03/09/23 130/85   01/27/23 118/74   07/20/22 112/79    Blood pressure ?130/85 mmHg or treatment for elevated blood pressure   Family History  See family history     Appearance: awake, alert, dressed in hospital scrubs and unkempt  Attitude:  cooperative, calm  Eye Contact: fair  Mood:  \"still exhausted\"  Affect:  mood congruent and intensity is blunted  Speech:  clear, coherent. appropriate  Language: fluent and intact in English  Psychomotor, Gait, Musculoskeletal:  no evidence of tardive dyskinesia, dystonia, or tics  Throught Process:  Linear, ruminative  Associations:  No evidence of loose associations  Thought Content:  SI w/plan and intent upon discharge, no plan or intent while in hospital. Denies hallucinations  Insight:  fair  Judgement:  poor  Oriented to:  time, person, and place  Attention Span and Concentration:  fair  Recent and Remote Memory:  fair  Fund of Knowledge:  appropriate    Clinical Global Impressions  First:  Considering your total clinical experience with this particular patient population, how severe are the patient's symptoms at this time?: 7 (01/28/23 1341)    Most recent:  Compared to the patient's condition at the START of treatment, this patient's condition is: 4           Precautions: " "    Behavioral Orders   Procedures     Code 1 - Restrict to Unit     Code 2     For imaging     Electroconvulsive therapy     Series of up to 12 treatments. Begin Date: 2/27/23     Treating Psychiatrist providing ECT:  Dr. Kearns     Notified on:  2/23/23     Electroconvulsive therapy     For acute ECT series, MWF, up to 12 treatment.     Electroconvulsive therapy     Series of up to 12 treatments. Begin Date: 02/26/23     Treating Psychiatrist providing ECT: Clifton  Notified on: 02/24/23     Fall precautions     Routine Programming     As clinically indicated     Status 15     Every 15 minutes.     Suicide precautions     Patients on Suicide Precautions should have a Combination Diet ordered that includes a Diet selection(s) AND a Behavioral Tray selection for Safe Tray - with utensils, or Safe Tray - NO utensils            Diagnoses:     Active suicidal ideation with intent outside of hospital setting  MDD, recurrent, severe with psychotic features vs Schizoaffective Disorder, depressive type  Polysubstance use  Unspecified mood disorder  ADHD, inattentive type per chart  History of idiopathic hypersomnolence per chart  Nicotine use disorder, dependence and withdrawal   Allergies- chronic uticaria and rhinitis per chart  HLD per chart          Assessment & Plan:     Assessment and hospital summary:  This patient is a 32 year old male with history of mood disorder, ADHD and substance use who presented to [facility] ED with SI on 1/26 in context of reported methamphetamine use and being kicked out of sober living. Medically cleared in ED, placed on 72HH and admitted to 12N. Limited historian, giving inconsistent reports about substance use, UDS negative, very somnolent, endorsing ongoing SI and some psychosis symptoms. PTA medications continued with exception of finasteride as patient states he takes \"1/4 a pill\" and declined ordered dose, will defer to primary team to address. Will continue to evaluate safety and " stabilization further as patient more able to engage in assessments. Inpatient psychiatric hospitalization is warranted at this time for safety, stabilization, and possible adjustment in medications.    Patient reports that he abruptly stopped Zoloft one week prior to his admission. He developed discontinuation syndrome symptoms following that. He reports that he does not have a history of psychosis but is experiencing psychotic symptoms. He is amenable with plan to trial risperidone after R/B/A were discussed. Risperidone was initiated on 1/30 and titrated to a total of 3 mg daily on 2/1. Clonidine, used for ADHD, was reduced from a total of 0.3 mg daily to 0.2 mg daily on 2/3 due to concern for hypotension. 2/9: Cardiology consult placed. EKG- tachycardia 121 bpm, QTc 465 ms, Abnormal QRS angle and T wave abnormality. COVID negative and labs are unremarkable.  On 2/10, clozapine was held pending additional workup from IM and cardiology. Pericarditis was ruled out and metoprolol was started. Clozapine was restarted on 2/13 with plan to cautiously titrate to lowest effective dose. 2/23/23: Clozapine titration schedule increased 25 mg/day. ECT and IM consults for ECT clearance placed. 2/27/23: ECT initiated. Risperidone was discontinued on 2/28. Clozapine level obtained on 3/3 at corresponding dose of 300 mg and was within therapeutic range (1001). Metformin increased to 1000 mg BID on 3/6 to target increased appetite/wt gain. Minimal improvement noted since initiation of both clozapine and ECT.     Psychiatric treatment/inteventions:  Medications:   -Continue clozapine 300 mg qhs. Clozapine quant at corresponding dose is 1001.  -Continue PTA sertraline 200 mg daily for mood  -Continue PTA gabapentin 300 mg TID for anxiety   -Continue Cogentin 1 mg at bedtime for possible EPSE     -PRN hydroxyzine 25mg every 4 hour for anxiety  -PRN trazodone 50mg at bedtime for sleep   -PRN olanzapine 10mg PO or IM TID for  agitation/psychosis   -PRN atropine 1% SL drops, 2 drops q2h for sialorrhea    Spiritual services consult placed on 2/6. Pt is Sabianism. Appreciate assistance.     ECT:  - Medically cleared on 2/24. See IM note for details.  - ECT consult placed on 2/23.  - ECT started on 2/27/23  - HOLD Gabapentin on nights prior to ECT and on ECT mornings until after ECT.   - ECT #5 completed 3/8/23. Uneventful. Next ECT treatment scheduled for Friday, 3/10. Baseline IDS-SR was 55. Hard copy placed in chart. Patient asked to complete again today. Score is 53 as of 3/9.       Laboratory/Imaging: reviewed: Covid negative; UDS negative; CMP, CBC, TSH, Lipid panel, HgbA1c ordered to complete admission labs. Reviewed.     2/9/23: Troponin, CK, CBC, BMP: Unremarkable, CRP elevated to 38.18, COVID: Negative  2/10/23: Add Influenza A/B given flu-like sx-negative  Patient will be treated in therapeutic milieu with appropriate individual and group therapies as described.     Medical treatment/interventions:  Medical concerns:   Nicotine replacement ordered, educate patient on benefits of cessation, pt unclear about finasteride dose, will hold until further information is obtained. PTA PRN zyrtec, azelastine, fluticasone, triamcinolone and epipen ordered for allergies and PRN ammoniaum lactate, Eucerin for  dry skin.    Flatulence:   - simethicone prn    Neuroleptic induced wt gain:  - Monitor weights  - Continue metformin 1,000 mg BID with meals    Dyslipidemia: Will arrange follow up with PCP to address. Discussed importance of healthy eating habits and regular exercise. Will consider nutrition consult if patient amenable.     Orthostasis likely 2/2 clozapine: Pt is not hypotensive but reports orthostatic symptoms and previously restricted fluids. Now resolved.   - Continue to monitor vital signs closely  - Encourage fluids  - Discontinued clonidine    Sialorrhea 2/2 clozapine:  - Atropine 1% SL drops qhs  - Recommend towel on pillow when  sleeping    Chest pain/tachycardia/EKG changes (2/9):  - Pain improved with PRN medications.   - Cardiology consult placed on 2/9. See note on that date for details.   - ECHO reviewed and unremarkable.  - Writer discussed care with both Dr. Streeter from [facility] and Christina from . Plan for now is to HOLD clozapine until further medical workup. Dr. Streeter explained that myocarditis has been ruled out as troponin was negative. Pericarditis remains on differential, but he does not have EKG findings or a pericardial effusion. IM will assess for pericardial friction rub and pleuritic chest pain. IM seen by patient and Metoprolol was started.     Update 2/13: Discussed care with Annette from  today on two occasions. Please see her note on this date for details. She does not suspect that this is pericarditis. He does not have pleuritic pain, characteristic CP, EKG changes, or pericardial effusion on ECHO. Therefore, will cautiously restart clozapine while monitoring closely for side effects. May consider further increase in metoprolol if necessary. PPI was started due to suspected GERD.     Update 2/24 per IM note:  Medicine is following peripherally for concerns for pericarditis.  See detailed note from 2/13.  No pericardial friction rub noted while sitting up and leaning forward today.  Patient states that his chest discomfort is getting better after starting the Protonix yesterday.  It does appear that he has also been using the Maalox.  Patient does have Tums available as well.  Please be mindful for any constipation with overuse of the PRNs.     POC:  - Continue Protonix daily for 8 weeks, then taper off  - Recommend lifestyle changes including weight loss head of bed elevation avoidance of late-night eating and specific food elimination such as chocolate caffeine alcohol acidic and/or spicy food.  - Watch for constipation with PRNs for heartburn  - We will schedule senna 1 tab twice daily  - MiraLAX daily as needed added  on     Medicine will sign off, please contact us for any acute changes.      Sore throat/cough/nasal congestion, resolved:   - COVID negative on 2/9. Repeat COVID test and Influenza A/B neg on 2/10.  - Cepacol lozenges added  - PRN Tylenol   - Consulted with IM. Appreciate assistance. See their notes for details.      Legal Status: VOLUNTARY. 72 hour hold discontinued. Pt agreed to sign in on voluntary basis and discharge directly to treatment once stable.      Safety Assessment:        Behavioral Orders   Procedures     Code 1 - Restrict to Unit     Routine Programming       As clinically indicated     Status 15       Every 15 minutes.     Suicide precautions       Patients on Suicide Precautions should have a Combination Diet ordered that includes a Diet selection(s) AND a Behavioral Tray selection for Safe Tray - with utensils, or Safe Tray - NO utensils        Withdrawal precautions      Pt has not required locked seclusion or restraints in the past 24 hours to maintain safety, please refer to RN documentation for further details.    Discontinued SIO on 2/8 as patient is heriberto for safety. Monitor SI closely.     The risks, benefits, alternatives and side effects have been discussed and are understood by the patient.     Disposition: Pending clinical stabilization. Pt remains actively suicidal. [facility]      This note was created by undersigned using a Dragon dictation system. All typing errors or contextual distortion are unintentional and software inherent.     Entered by: Nelly Brown MD on 3/9/2023 at 8:33 AM

## 2023-03-10 ENCOUNTER — ANESTHESIA EVENT (OUTPATIENT)
Dept: BEHAVIORAL HEALTH | Facility: CLINIC | Age: 33
End: 2023-03-10
Payer: COMMERCIAL

## 2023-03-10 ENCOUNTER — APPOINTMENT (OUTPATIENT)
Dept: BEHAVIORAL HEALTH | Facility: CLINIC | Age: 33
End: 2023-03-10
Attending: PSYCHIATRY & NEUROLOGY
Payer: COMMERCIAL

## 2023-03-10 ENCOUNTER — ANESTHESIA (OUTPATIENT)
Dept: BEHAVIORAL HEALTH | Facility: CLINIC | Age: 33
End: 2023-03-10
Payer: COMMERCIAL

## 2023-03-10 LAB — GLUCOSE BLDC GLUCOMTR-MCNC: 103 MG/DL (ref 70–99)

## 2023-03-10 PROCEDURE — 90870 ELECTROCONVULSIVE THERAPY: CPT

## 2023-03-10 PROCEDURE — 250N000013 HC RX MED GY IP 250 OP 250 PS 637: Performed by: PSYCHIATRY & NEUROLOGY

## 2023-03-10 PROCEDURE — 250N000013 HC RX MED GY IP 250 OP 250 PS 637

## 2023-03-10 PROCEDURE — 90870 ELECTROCONVULSIVE THERAPY: CPT | Performed by: PSYCHIATRY & NEUROLOGY

## 2023-03-10 PROCEDURE — 250N000009 HC RX 250: Performed by: STUDENT IN AN ORGANIZED HEALTH CARE EDUCATION/TRAINING PROGRAM

## 2023-03-10 PROCEDURE — 370N000017 HC ANESTHESIA TECHNICAL FEE, PER MIN

## 2023-03-10 PROCEDURE — 124N000002 HC R&B MH UMMC

## 2023-03-10 PROCEDURE — 250N000011 HC RX IP 250 OP 636: Performed by: STUDENT IN AN ORGANIZED HEALTH CARE EDUCATION/TRAINING PROGRAM

## 2023-03-10 PROCEDURE — 99232 SBSQ HOSP IP/OBS MODERATE 35: CPT | Mod: GC | Performed by: PSYCHIATRY & NEUROLOGY

## 2023-03-10 RX ORDER — OXYCODONE HYDROCHLORIDE 5 MG/1
5 TABLET ORAL EVERY 4 HOURS PRN
Status: DISCONTINUED | OUTPATIENT
Start: 2023-03-10 | End: 2023-03-10

## 2023-03-10 RX ORDER — FENTANYL CITRATE 50 UG/ML
25 INJECTION, SOLUTION INTRAMUSCULAR; INTRAVENOUS EVERY 5 MIN PRN
Status: DISCONTINUED | OUTPATIENT
Start: 2023-03-10 | End: 2023-03-10

## 2023-03-10 RX ORDER — SODIUM CHLORIDE, SODIUM LACTATE, POTASSIUM CHLORIDE, CALCIUM CHLORIDE 600; 310; 30; 20 MG/100ML; MG/100ML; MG/100ML; MG/100ML
INJECTION, SOLUTION INTRAVENOUS CONTINUOUS
Status: DISCONTINUED | OUTPATIENT
Start: 2023-03-10 | End: 2023-03-10

## 2023-03-10 RX ORDER — LABETALOL HYDROCHLORIDE 5 MG/ML
10 INJECTION, SOLUTION INTRAVENOUS
Status: DISCONTINUED | OUTPATIENT
Start: 2023-03-10 | End: 2023-03-10

## 2023-03-10 RX ORDER — FENTANYL CITRATE 50 UG/ML
50 INJECTION, SOLUTION INTRAMUSCULAR; INTRAVENOUS EVERY 5 MIN PRN
Status: CANCELLED | OUTPATIENT
Start: 2023-03-10

## 2023-03-10 RX ORDER — HALOPERIDOL 5 MG/ML
1 INJECTION INTRAMUSCULAR
Status: CANCELLED | OUTPATIENT
Start: 2023-03-10

## 2023-03-10 RX ORDER — OXYCODONE HYDROCHLORIDE 5 MG/1
5 TABLET ORAL EVERY 4 HOURS PRN
Status: CANCELLED | OUTPATIENT
Start: 2023-03-10

## 2023-03-10 RX ORDER — ALBUTEROL SULFATE 0.83 MG/ML
2.5 SOLUTION RESPIRATORY (INHALATION) EVERY 4 HOURS PRN
Status: CANCELLED | OUTPATIENT
Start: 2023-03-10

## 2023-03-10 RX ORDER — HYDROMORPHONE HCL IN WATER/PF 6 MG/30 ML
0.2 PATIENT CONTROLLED ANALGESIA SYRINGE INTRAVENOUS EVERY 5 MIN PRN
Status: DISCONTINUED | OUTPATIENT
Start: 2023-03-10 | End: 2023-03-10

## 2023-03-10 RX ORDER — HYDROMORPHONE HCL IN WATER/PF 6 MG/30 ML
0.2 PATIENT CONTROLLED ANALGESIA SYRINGE INTRAVENOUS EVERY 5 MIN PRN
Status: CANCELLED | OUTPATIENT
Start: 2023-03-10

## 2023-03-10 RX ORDER — SODIUM CHLORIDE, SODIUM LACTATE, POTASSIUM CHLORIDE, CALCIUM CHLORIDE 600; 310; 30; 20 MG/100ML; MG/100ML; MG/100ML; MG/100ML
INJECTION, SOLUTION INTRAVENOUS CONTINUOUS
Status: CANCELLED | OUTPATIENT
Start: 2023-03-10

## 2023-03-10 RX ORDER — HYDROMORPHONE HCL IN WATER/PF 6 MG/30 ML
0.4 PATIENT CONTROLLED ANALGESIA SYRINGE INTRAVENOUS EVERY 5 MIN PRN
Status: CANCELLED | OUTPATIENT
Start: 2023-03-10

## 2023-03-10 RX ORDER — HYDRALAZINE HYDROCHLORIDE 20 MG/ML
2.5-5 INJECTION INTRAMUSCULAR; INTRAVENOUS EVERY 10 MIN PRN
Status: CANCELLED | OUTPATIENT
Start: 2023-03-10

## 2023-03-10 RX ORDER — ALBUTEROL SULFATE 0.83 MG/ML
2.5 SOLUTION RESPIRATORY (INHALATION) EVERY 4 HOURS PRN
Status: DISCONTINUED | OUTPATIENT
Start: 2023-03-10 | End: 2023-03-10

## 2023-03-10 RX ORDER — HYDROMORPHONE HCL IN WATER/PF 6 MG/30 ML
0.4 PATIENT CONTROLLED ANALGESIA SYRINGE INTRAVENOUS EVERY 5 MIN PRN
Status: DISCONTINUED | OUTPATIENT
Start: 2023-03-10 | End: 2023-03-10

## 2023-03-10 RX ORDER — FENTANYL CITRATE 50 UG/ML
25 INJECTION, SOLUTION INTRAMUSCULAR; INTRAVENOUS EVERY 5 MIN PRN
Status: CANCELLED | OUTPATIENT
Start: 2023-03-10

## 2023-03-10 RX ORDER — NICARDIPINE HCL-0.9% SOD CHLOR 1 MG/10 ML
SYRINGE (ML) INTRAVENOUS PRN
Status: DISCONTINUED | OUTPATIENT
Start: 2023-03-10 | End: 2023-03-10

## 2023-03-10 RX ORDER — LABETALOL HYDROCHLORIDE 5 MG/ML
10 INJECTION, SOLUTION INTRAVENOUS
Status: CANCELLED | OUTPATIENT
Start: 2023-03-10

## 2023-03-10 RX ORDER — ACETAMINOPHEN 325 MG/1
975 TABLET ORAL EVERY 6 HOURS PRN
Status: CANCELLED | OUTPATIENT
Start: 2023-03-10

## 2023-03-10 RX ORDER — ONDANSETRON 2 MG/ML
4 INJECTION INTRAMUSCULAR; INTRAVENOUS EVERY 30 MIN PRN
Status: CANCELLED | OUTPATIENT
Start: 2023-03-10

## 2023-03-10 RX ORDER — ONDANSETRON 2 MG/ML
4 INJECTION INTRAMUSCULAR; INTRAVENOUS EVERY 30 MIN PRN
Status: DISCONTINUED | OUTPATIENT
Start: 2023-03-10 | End: 2023-03-10

## 2023-03-10 RX ORDER — HALOPERIDOL 5 MG/ML
1 INJECTION INTRAMUSCULAR
Status: DISCONTINUED | OUTPATIENT
Start: 2023-03-10 | End: 2023-03-10

## 2023-03-10 RX ORDER — ACETAMINOPHEN 325 MG/1
975 TABLET ORAL EVERY 6 HOURS PRN
Status: DISCONTINUED | OUTPATIENT
Start: 2023-03-10 | End: 2023-03-10

## 2023-03-10 RX ORDER — ONDANSETRON 4 MG/1
4 TABLET, ORALLY DISINTEGRATING ORAL EVERY 30 MIN PRN
Status: DISCONTINUED | OUTPATIENT
Start: 2023-03-10 | End: 2023-03-10

## 2023-03-10 RX ORDER — HYDRALAZINE HYDROCHLORIDE 20 MG/ML
2.5-5 INJECTION INTRAMUSCULAR; INTRAVENOUS EVERY 10 MIN PRN
Status: DISCONTINUED | OUTPATIENT
Start: 2023-03-10 | End: 2023-03-10

## 2023-03-10 RX ORDER — FENTANYL CITRATE 50 UG/ML
50 INJECTION, SOLUTION INTRAMUSCULAR; INTRAVENOUS EVERY 5 MIN PRN
Status: DISCONTINUED | OUTPATIENT
Start: 2023-03-10 | End: 2023-03-10

## 2023-03-10 RX ORDER — ONDANSETRON 4 MG/1
4 TABLET, ORALLY DISINTEGRATING ORAL EVERY 30 MIN PRN
Status: CANCELLED | OUTPATIENT
Start: 2023-03-10

## 2023-03-10 RX ADMIN — GABAPENTIN 300 MG: 300 CAPSULE ORAL at 13:11

## 2023-03-10 RX ADMIN — SERTRALINE HYDROCHLORIDE 200 MG: 100 TABLET ORAL at 07:48

## 2023-03-10 RX ADMIN — NICOTINE 1 PATCH: 21 PATCH, EXTENDED RELEASE TRANSDERMAL at 07:58

## 2023-03-10 RX ADMIN — GABAPENTIN 300 MG: 300 CAPSULE ORAL at 20:08

## 2023-03-10 RX ADMIN — SENNOSIDES AND DOCUSATE SODIUM 1 TABLET: 50; 8.6 TABLET ORAL at 20:08

## 2023-03-10 RX ADMIN — CLOZAPINE 300 MG: 200 TABLET ORAL at 20:07

## 2023-03-10 RX ADMIN — METHOHEXITAL SODIUM 90 MG: 500 INJECTION, POWDER, LYOPHILIZED, FOR SOLUTION INTRAMUSCULAR; INTRAVENOUS; RECTAL at 09:38

## 2023-03-10 RX ADMIN — SENNOSIDES AND DOCUSATE SODIUM 1 TABLET: 50; 8.6 TABLET ORAL at 07:48

## 2023-03-10 RX ADMIN — PANTOPRAZOLE SODIUM 40 MG: 40 TABLET, DELAYED RELEASE ORAL at 07:48

## 2023-03-10 RX ADMIN — Medication 12.5 MG: at 07:47

## 2023-03-10 RX ADMIN — METFORMIN HYDROCHLORIDE 1000 MG: 500 TABLET ORAL at 07:48

## 2023-03-10 RX ADMIN — SUCCINYLCHOLINE CHLORIDE 80 MG: 20 INJECTION, SOLUTION INTRAMUSCULAR; INTRAVENOUS; PARENTERAL at 09:38

## 2023-03-10 RX ADMIN — ATROPINE SULFATE 2 DROP: 10 SOLUTION/ DROPS OPHTHALMIC at 20:09

## 2023-03-10 RX ADMIN — METFORMIN HYDROCHLORIDE 1000 MG: 500 TABLET ORAL at 18:25

## 2023-03-10 RX ADMIN — Medication 500 MCG: at 09:38

## 2023-03-10 RX ADMIN — FLUTICASONE PROPIONATE 2 SPRAY: 50 SPRAY, METERED NASAL at 07:58

## 2023-03-10 RX ADMIN — LORATADINE 10 MG: 10 TABLET ORAL at 07:48

## 2023-03-10 ASSESSMENT — ACTIVITIES OF DAILY LIVING (ADL)
ORAL_HYGIENE: INDEPENDENT
ADLS_ACUITY_SCORE: 29
ADLS_ACUITY_SCORE: 29
HYGIENE/GROOMING: HANDWASHING;SHOWER;INDEPENDENT
LAUNDRY: WITH SUPERVISION
ADLS_ACUITY_SCORE: 29
ADLS_ACUITY_SCORE: 29
HYGIENE/GROOMING: INDEPENDENT
ADLS_ACUITY_SCORE: 29
DRESS: SCRUBS (BEHAVIORAL HEALTH)
DRESS: INDEPENDENT
ADLS_ACUITY_SCORE: 29
LAUNDRY: UNABLE TO COMPLETE
ADLS_ACUITY_SCORE: 29
ORAL_HYGIENE: INDEPENDENT

## 2023-03-10 NOTE — PROGRESS NOTES
"[facility], [place]   Psychiatric Progress Note  Hospital Day: 42        Interim History:   The patient's care was discussed with the treatment team during the daily team meeting and/or staff's chart notes were reviewed.  Per RN note dated 3/9/23: \"Patient remains isolative and withdrawn to his room all evening only out in the milieu with prompting to attend group. Patient is in COVID percaution. Patient remainder of the evening isolative to his room watching television. Patient upon approach flat in affect, calm and cooperative with verbal assessment. Patient reporting improvement this evening identifying that he has not experienced command auditory hallucinations though does still at times experience \"whispers\". Patient reporting continued SI though stating he feels safe with no plan or intent to act while in the hospital. Patient reporting anxiety 5/10 and depression 9/10, identifying his high depression is due to \"worrying about all the shit Johanna got to deal with\", unable to elaborate on the comment. Patient accepting of HS medications with no stated or observed side effects. Patient accepting and cooperative with care plan and ECT treatment process, understanding of expectation for fasting after midnight prior to the procedure. Patient cooperative with vital sign assessments which were stable. Patient diet appeared good eating 100% of dinner and snacks and receptive of increasing fluid intake. Patient independent with identifying needs and completing ADL's.\"    Upon interview Haseeb reported that his depression still ranges from a 7-10/10 with 10 being the most severe. He says it is unchanged starting ECT, but he says ECT helps him think more clearly and \"ECT removes the cloudiness I have in my mind\". Reports overall improvement in anxiety. Denies VH today. Continues to hear same \"whispers telling me to kill myself and end my life because I don't have anything to live for and I am " "just completely hopeless.\"  He completed repeat IDS-SR.     Suicidal ideation: Haseeb reports ongoing active SI with plan and intent to shoot himself with a gun or cut his throat upon discharge. Denies suicide plan or intent in the hospital. [place] for safety.     Homicidal ideation: denies current or recent homicidal ideation or behaviors.    Psychotic symptoms: As above    Medication side effects reported: None other than apathy. Said that he wants to cry but cant.     Acute medical concerns: None    Other issues reported by patient: Patient had no further questions or concerns.            Medications:       atropine  2 drop Sublingual At Bedtime     cloZAPine  300 mg Oral At Bedtime     fluticasone  2 spray Both Nostrils Daily     gabapentin  300 mg Oral TID     loratadine  10 mg Oral Daily     metFORMIN  1,000 mg Oral BID w/meals     metoprolol succinate ER  12.5 mg Oral Daily     nicotine  1 patch Transdermal Daily     nicotine   Transdermal Q8H     pantoprazole  40 mg Oral QAM AC     senna-docusate  1 tablet Oral BID     sertraline  200 mg Oral Daily          Allergies:   No Known Allergies       Labs:     Recent Results (from the past 24 hour(s))   WBC and Differential    Collection Time: 03/09/23 10:21 AM   Result Value Ref Range    WBC Count 13.0 (H) 4.0 - 11.0 10e3/uL    % Neutrophils 78 %    % Lymphocytes 15 %    % Monocytes 5 %    % Eosinophils 1 %    % Basophils 0 %    % Immature Granulocytes 1 %    NRBCs per 100 WBC 0 <1 /100    Absolute Neutrophils 10.1 (H) 1.6 - 8.3 10e3/uL    Absolute Lymphocytes 2.0 0.8 - 5.3 10e3/uL    Absolute Monocytes 0.7 0.0 - 1.3 10e3/uL    Absolute Eosinophils 0.1 0.0 - 0.7 10e3/uL    Absolute Basophils 0.1 0.0 - 0.2 10e3/uL    Absolute Immature Granulocytes 0.1 <=0.4 10e3/uL    Absolute NRBCs 0.0 10e3/uL   Extra Green Top (Lithium Heparin) Tube    Collection Time: 03/09/23 10:21 AM   Result Value Ref Range    Hold Specimen [facility]           Psychiatric Examination:     BP " "120/83 (BP Location: Right arm, Patient Position: Sitting, Cuff Size: Adult Regular)   Pulse 88   Temp 97.6  F (36.4  C) (Temporal)   Resp 16   Ht 1.6 m (5' 3\")   Wt 79 kg (174 lb 1.6 oz)   SpO2 96%   BMI 30.84 kg/m    Weight is 174 lbs 1.6 oz  Body mass index is 30.84 kg/m .    Weight over time:  Vitals:    01/27/23 1718 03/07/23 0933   Weight: 75.1 kg (165 lb 8 oz) 79 kg (174 lb 1.6 oz)       Orthostatic Vitals     None        Cardiometabolic risk assessment. 01/30/23    Reviewed patient profile for cardiometabolic risk factors    Date taken /Value  REFERENCE RANGE   Abdominal Obesity  (Waist Circumference)   See nursing flowsheet Women ?35 in (88 cm)   Men ?40 in (102 cm)      Triglycerides  Triglycerides   Date Value Ref Range Status   01/28/2023 192 (H) <150 mg/dL Final       ?150 mg/dL (1.7 mmol/L) or current treatment for elevated triglycerides   HDL cholesterol  Direct Measure HDL   Date Value Ref Range Status   01/28/2023 41 >=40 mg/dL Final   ]   Women <50 mg/dL (1.3 mmol/L) in women or current treatment for low HDL cholesterol  Men <40 mg/dL (1 mmol/L) in men or current treatment for low HDL cholesterol     Fasting plasma glucose (FPG) Lab Results   Component Value Date     01/28/2023      FPG ?100 mg/dL (5.6 mmol/L) or treatment for elevated blood glucose   Blood pressure  BP Readings from Last 3 Encounters:   03/10/23 120/83   01/27/23 118/74   07/20/22 112/79    Blood pressure ?130/85 mmHg or treatment for elevated blood pressure   Family History  See family history     Appearance: awake, alert, dressed in hospital scrubs and unkempt  Attitude:  cooperative, calm  Eye Contact: fair  Mood:  \"still exhausted\"  Affect:  mood congruent and intensity is blunted  Speech:  clear, coherent. appropriate  Language: fluent and intact in English  Psychomotor, Gait, Musculoskeletal:  no evidence of tardive dyskinesia, dystonia, or tics  Throught Process:  Linear, ruminative  Associations:  No evidence " of loose associations  Thought Content:  SI w/plan and intent upon discharge, no plan or intent while in hospital. Denies hallucinations  Insight:  fair  Judgement:  poor  Oriented to:  time, person, and place  Attention Span and Concentration:  fair  Recent and Remote Memory:  fair  Fund of Knowledge:  appropriate    Clinical Global Impressions  First:  Considering your total clinical experience with this particular patient population, how severe are the patient's symptoms at this time?: 7 (01/28/23 1341)    Most recent:  Compared to the patient's condition at the START of treatment, this patient's condition is: 4           Precautions:     Behavioral Orders   Procedures     Code 1 - Restrict to Unit     Code 2     For imaging     Electroconvulsive therapy     Series of up to 12 treatments. Begin Date: 2/27/23     Treating Psychiatrist providing ECT:  Dr. Kearns     Notified on:  2/23/23     Electroconvulsive therapy     For acute ECT series, MWF, up to 12 treatment.     Electroconvulsive therapy     Series of up to 12 treatments. Begin Date: 02/26/23     Treating Psychiatrist providing ECT: Clifton  Notified on: 02/24/23     Fall precautions     Routine Programming     As clinically indicated     Status 15     Every 15 minutes.     Suicide precautions     Patients on Suicide Precautions should have a Combination Diet ordered that includes a Diet selection(s) AND a Behavioral Tray selection for Safe Tray - with utensils, or Safe Tray - NO utensils            Diagnoses:     Active suicidal ideation with intent outside of hospital setting  MDD, recurrent, severe with psychotic features vs Schizoaffective Disorder, depressive type  Polysubstance use  Unspecified mood disorder  ADHD, inattentive type per chart  History of idiopathic hypersomnolence per chart  Nicotine use disorder, dependence and withdrawal   Allergies- chronic uticaria and rhinitis per chart  HLD per chart          Assessment & Plan:     Assessment and  "hospital summary:  This patient is a 32 year old male with history of mood disorder, ADHD and substance use who presented to [place] ED with SI on 1/26 in context of reported methamphetamine use and being kicked out of sober living. Medically cleared in ED, placed on 72HH and admitted to [facility]. Limited historian, giving inconsistent reports about substance use, UDS negative, very somnolent, endorsing ongoing SI and some psychosis symptoms. PTA medications continued with exception of finasteride as patient states he takes \"1/4 a pill\" and declined ordered dose, will defer to primary team to address. Will continue to evaluate safety and stabilization further as patient more able to engage in assessments. Inpatient psychiatric hospitalization is warranted at this time for safety, stabilization, and possible adjustment in medications.    Patient reports that he abruptly stopped Zoloft one week prior to his admission. He developed discontinuation syndrome symptoms following that. He reports that he does not have a history of psychosis but is experiencing psychotic symptoms. He is amenable with plan to trial risperidone after R/B/A were discussed. Risperidone was initiated on 1/30 and titrated to a total of 3 mg daily on 2/1. Clonidine, used for ADHD, was reduced from a total of 0.3 mg daily to 0.2 mg daily on 2/3 due to concern for hypotension. 2/9: Cardiology consult placed. EKG- tachycardia 121 bpm, QTc 465 ms, Abnormal QRS angle and T wave abnormality. COVID negative and labs are unremarkable.  On 2/10, clozapine was held pending additional workup from IM and cardiology. Pericarditis was ruled out and metoprolol was started. Clozapine was restarted on 2/13 with plan to cautiously titrate to lowest effective dose. 2/23/23: Clozapine titration schedule increased 25 mg/day. ECT and IM consults for ECT clearance placed. 2/27/23: ECT initiated. Risperidone was discontinued on 2/28. Clozapine level obtained on 3/3 at " corresponding dose of 300 mg and was within therapeutic range (1001). Metformin increased to 1000 mg BID on 3/6 to target increased appetite/wt gain. Minimal improvement noted since initiation of both clozapine and ECT.     Psychiatric treatment/inteventions:  Medications:   -Continue clozapine 300 mg qhs. Clozapine quant at corresponding dose is 1001.  -Continue PTA sertraline 200 mg daily for mood  -Continue PTA gabapentin 300 mg TID for anxiety   -Continue Cogentin 1 mg at bedtime for possible EPSE     -PRN hydroxyzine 25mg every 4 hour for anxiety  -PRN trazodone 50mg at bedtime for sleep   -PRN olanzapine 10mg PO or IM TID for agitation/psychosis   -PRN atropine 1% SL drops, 2 drops q2h for sialorrhea    Spiritual services consult placed on 2/6. Pt is L[employer] day. Appreciate assistance.     ECT:  - Medically cleared on 2/24. See IM note for details.  - ECT consult placed on 2/23.  - ECT started on 2/27/23  - HOLD Gabapentin on nights prior to ECT and on ECT mornings until after ECT.   - ECT #5 completed 3/8/23. Uneventful. Next ECT treatment scheduled for Monday, 3/13. Baseline IDS-SR was 55. Hard copy placed in chart. Patient asked to complete again today. Score is 53 as of 3/9.      -ECT #6 completed 3/10/23  Improving. Feels safe on unit. Still having SI with plan to shoot self in head but no plan for suicide on unit. Hearing whispers, but voices are quieter. No visual hallucinations of lanterns, etc. Tolerating ECT well. Sleeping well.      Laboratory/Imaging: reviewed: Covid negative; UDS negative; CMP, CBC, TSH, Lipid panel, HgbA1c ordered to complete admission labs. Reviewed.     2/9/23: Troponin, CK, CBC, BMP: Unremarkable, CRP elevated to 38.18, COVID: Negative  2/10/23: Add Influenza A/B given flu-like sx-negative  Patient will be treated in therapeutic milieu with appropriate individual and group therapies as described.     Medical treatment/interventions:  Medical concerns:   Nicotine replacement  ordered, educate patient on benefits of cessation, pt unclear about finasteride dose, will hold until further information is obtained. PTA PRN zyrtec, azelastine, fluticasone, triamcinolone and epipen ordered for allergies and PRN ammoniaum lactate, Eucerin for  dry skin.    Flatulence:   - simethicone prn    Neuroleptic induced wt gain:  - Monitor weights  - Continue metformin 1,000 mg BID with meals    Dyslipidemia: Will arrange follow up with PCP to address. Discussed importance of healthy eating habits and regular exercise. Will consider nutrition consult if patient amenable.     Orthostasis likely 2/2 clozapine: Pt is not hypotensive but reports orthostatic symptoms and previously restricted fluids. Now resolved.   - Continue to monitor vital signs closely  - Encourage fluids  - Discontinued clonidine    Sialorrhea 2/2 clozapine:  - Atropine 1% SL drops qhs  - Recommend towel on pillow when sleeping    Chest pain/tachycardia/EKG changes (2/9):  - Pain improved with PRN medications.   - Cardiology consult placed on 2/9. See note on that date for details.   - ECHO reviewed and unremarkable.  - Writer discussed care with both Dr. Streeter from [facility] and Christina from . Plan for now is to HOLD clozapine until further medical workup. Dr. Streeter explained that myocarditis has been ruled out as troponin was negative. Pericarditis remains on differential, but he does not have EKG findings or a pericardial effusion. IM will assess for pericardial friction rub and pleuritic chest pain. IM seen by patient and Metoprolol was started.     Update 2/13: Discussed care with Annette from  today on two occasions. Please see her note on this date for details. She does not suspect that this is pericarditis. He does not have pleuritic pain, characteristic CP, EKG changes, or pericardial effusion on ECHO. Therefore, will cautiously restart clozapine while monitoring closely for side effects. May consider further increase in metoprolol  if necessary. PPI was started due to suspected GERD.     Update 2/24 per IM note:  Medicine is following peripherally for concerns for pericarditis.  See detailed note from 2/13.  No pericardial friction rub noted while sitting up and leaning forward today.  Patient states that his chest discomfort is getting better after starting the Protonix yesterday.  It does appear that he has also been using the Maalox.  Patient does have Tums available as well.  Please be mindful for any constipation with overuse of the PRNs.     POC:  - Continue Protonix daily for 8 weeks, then taper off  - Recommend lifestyle changes including weight loss head of bed elevation avoidance of late-night eating and specific food elimination such as chocolate caffeine alcohol acidic and/or spicy food.  - Watch for constipation with PRNs for heartburn  - We will schedule senna 1 tab twice daily  - MiraLAX daily as needed added on     Medicine will sign off, please contact us for any acute changes.      Sore throat/cough/nasal congestion, resolved:   - COVID negative on 2/9. Repeat COVID test and Influenza A/B neg on 2/10.  - Cepacol lozenges added  - PRN Tylenol   - Consulted with IM. Appreciate assistance. See their notes for details.      Legal Status: VOLUNTARY. 72 hour hold discontinued. Pt agreed to sign in on voluntary basis and discharge directly to treatment once stable.      Safety Assessment:        Behavioral Orders   Procedures     Code 1 - Restrict to Unit     Routine Programming       As clinically indicated     Status 15       Every 15 minutes.     Suicide precautions       Patients on Suicide Precautions should have a Combination Diet ordered that includes a Diet selection(s) AND a Behavioral Tray selection for Safe Tray - with utensils, or Safe Tray - NO utensils        Withdrawal precautions      Pt has not required locked seclusion or restraints in the past 24 hours to maintain safety, please refer to RN documentation for  further details.    Discontinued SIO on 2/8 as patient is heriberto for safety. Monitor SI closely.     The risks, benefits, alternatives and side effects have been discussed and are understood by the patient.     Disposition: Pending clinical stabilization. Pt remains actively suicidal. [facility]      This note was created by undersigned using a Dragon dictation system. All typing errors or contextual distortion are unintentional and software inherent.     Entered by: Carla Whitehead MD on 3/9/2023 at 8:51 AM     Attestation:  This patient has been seen and evaluated by me, Nelly Brown MD on the date of resident's note. I have discussed this patient with the the resident and I agree with the findings and plan in this note. I have reviewed today's vital signs, medications, labs and imaging.

## 2023-03-10 NOTE — PLAN OF CARE
"  Problem: Suicide Risk  Goal: Absence of Self-Harm  Outcome: Progressing  Intervention: Assess Risk to Self and Maintain Safety  Recent Flowsheet Documentation  Taken 3/10/2023 1400 by Corey Loaiza RN  Behavior Management: behavioral plan reviewed  Intervention: Promote Psychosocial Wellbeing  Recent Flowsheet Documentation  Taken 3/10/2023 1400 by Corey Loaiza RN  Supportive Measures:   positive reinforcement provided   active listening utilized   self-care encouraged   verbalization of feelings encouraged  Family/Support System Care: self-care encouraged   Goal Outcome Evaluation:    Plan of Care Reviewed With: patient      Pt had ECT this morning and was alert and oriented. Pt presented with depressed mood and blunted affect. Reported endorsing auditory and visual hallucinations. Endorsed suicidal ideations thought only and stated \" I feel safe in the hospital.\"  Pt took his medications as prescribed and denied side effects. Pt is eating, drinking and sleeping in adequate amounts. Denied physical discomfort. Denied bowel and bladder issue.   Plan: Status 15s; Build trust with pt. Continue to build on strengths. Encourage compliance and healthy coping.       "

## 2023-03-10 NOTE — ANESTHESIA CARE TRANSFER NOTE
Patient: Link Lora    Procedure: * No procedures listed *       Diagnosis: * No pre-op diagnosis entered *  Diagnosis Additional Information: No value filed.    Anesthesia Type:   General     Note:    Oropharynx: oropharynx clear of all foreign objects  Level of Consciousness: drowsy  Oxygen Supplementation: room air    Independent Airway: airway patency satisfactory and stable  Dentition: dentition unchanged  Vital Signs Stable: post-procedure vital signs reviewed and stable  Report to RN Given: handoff report given  Patient transferred to: PACU    Handoff Report: Identifed the Patient, Identified the Reponsible Provider, Reviewed the pertinent medical history, Discussed the surgical course, Reviewed Intra-OP anesthesia mangement and issues during anesthesia, Set expectations for post-procedure period and Allowed opportunity for questions and acknowledgement of understanding    Electronically Signed By: Coco Calzada MD  March 10, 2023  10:01 AM

## 2023-03-10 NOTE — PROGRESS NOTES
Patients VSS, A/O, IV removed, meets phase 2 criteria and is able to move to [facility] at this time. Report given to RN, pt transported by staff.

## 2023-03-10 NOTE — PLAN OF CARE
03/09/23 1700   Group Therapy Session   Group Attendance Patient attended this group /session   Time Session Began 1615   Time Session Ended 1700   Total Time (minutes) 45   Total # Attendees 3   Group Topic Covered Insight orientation, emotional attunement, and healthy choices.   Group Session Detail This group focused on ways to enhance insight through self-cognitive checks; ways to achieve emotional balance by asking credible people for emotional checks, and ways to make healthy choices by keeping only helpful/supportive friends. Group members took turns to share their thoughts regarding the above topics. Received feedback for their contributions. Specifically, each person identified a healthy way of applying the topics, given their personal health circumstances. Feedback was positive and was focused on building a continuum for mutual problem-solving.    Patient Participation Detail This patient attended and stayed for the entire group.    Reported feeling disoriented to time, this morning when he woke up. He said he has been on the unit for a long time that he is no longer aware of what date or time it is. Was validated and reassured that it is good he is getting the help he needs on the unit so he can function effectively when he is discharged.       Felix Licona, Ph.D., L.I.C.S.W.  3/9/2023.

## 2023-03-10 NOTE — PLAN OF CARE
Assessment/Intervention/Current Symtoms and Care Coordination  -Chart review  -Rounded with team, addressed patient needs/concerns    Current Symptoms include the following: Patient had ECT this morning.      Discharge Plan or Goal  Pending stabilization & development of a safe discharge plan.  Considerations include:  Return to [facility]    Barriers to Discharge  Patient requires further psychiatric stabilization due to current symptomology    Referral Status  No referrals were made this hospitalization    Legal Status  Voluntary

## 2023-03-10 NOTE — PLAN OF CARE
"  Problem: Adult Behavioral Health Plan of Care  Goal: Plan of Care Review  Outcome: Progressing  Flowsheets  Taken 3/9/2023 2204  Plan of Care Reviewed With: patient  Overall Patient Progress: improving  Patient Agreement with Plan of Care: agrees  Taken 3/9/2023 1805  Patient Agreement with Plan of Care: agrees     Problem: Psychotic Signs/Symptoms  Goal: Increased Participation and Engagement (Psychotic Signs/Symptoms)  Outcome: Progressing  Intervention: Facilitate Participation and Engagement  Recent Flowsheet Documentation  Taken 3/9/2023 1805 by Gerber Metcalf RN  Supportive Measures:   positive reinforcement provided   active listening utilized   self-care encouraged   verbalization of feelings encouraged   Goal Outcome Evaluation:    Plan of Care Reviewed With: patient Plan of Care Reviewed With: patient    Overall Patient Progress: improvingOverall Patient Progress: improving    Patient remains isolative and withdrawn to his room all evening only out in the milieu with prompting to attend group. Patient was observed to be engaged and actively participating in group, appearing to brighten in affect during attendance.    Patient remainder of the evening isolative to his room watching television. Patient upon approach flat in affect, calm and cooperative with verbal assessment. Patient reporting improvement this evening identifying that he has not experienced command auditory hallucinations though does still at times experience \"whispers\". Patient reporting continued SI though stating he feels safe with no plan or intent to act while in the hospital. Patient reporting anxiety 5/10 and depression 9/10, identifying his high depression is due to \"worrying about all the shit Johanna got to deal with\", unable to elaborate on the comment.     Patient accepting of HS medications with no stated or observed side effects. Patient accepting and cooperative with care plan and ECT treatment process, understanding of " expectation for fasting after midnight prior to the procedure. Patient cooperative with vital sign assessments which were stable. Patient diet appeared good eating 100% of dinner and snacks and receptive of increasing fluid intake. Patient independent with identifying needs and completing ADL's.

## 2023-03-10 NOTE — PROCEDURES
"Nikolay Lora is a 32 year old  year old male patient.  [id number]  @DX@    [facility]   ECT Procedure Note   03/10/2023    Patient Status: Inpatient    Is this the first in a series of 12 treatments?  no   No Known Allergies    Weight:  174 lbs 1.6 oz         Indications for ECT:   Medications ineffective  Imminent risk of suicide         Clinical Narrative:   Nikolay Lora is a 32 year old man with affective dysregulation, ADHD and polysubstance use (alcohol*, amphetamine*, cocaine, cannabis) who is hospitalized with progressive depression leading to suicidal ideation with planning on 1/26, in the context of  medication non compliance and losing his place at sober living due to reported methamphetamine use. Throughout his hospital stay medication adjustments have been made (restarted on sertraline, titrated risperidone, added clozapine, which is being titrated); however, intense suicidal ideation has continued and he has been having difficulty tolerating medication changes. This consultation is requested to evaluate candidacy for electroconvulsive therapy (ECT).      We utilized phone translation services in [facility] during this visit to ensure thoroughness, otherwise he can communicate in English well.  The patient shares he cannot stop contemplating suicide with a plan to shoot himself as soon as he is out of the hospital. He reports visual hallucinations of \"lanterns and black birds flying around\" with commanding auditory hallucinations telling him that he is \"worthless\" and \"should kill (himself)\". Although hallucinations got better since admission with medication changes, depression and suicidal contemplation has persisted, he is interested in ECT, hoping for a quicker relief.         Diagnosis:     Schizoaffective Disorder, depressed  Polysubstance Use Disorder in a controlled environment          Assessment:   Considering the clinical acuity  electroconvulsive therapy " "(ECT) appears appropriate; despite multifactorial nature of the presentation that would benefit from optimization of cognitive, behavioral and social interventions longitudinally.      Discussed all relevant aspects of ECT with patient, including risks of memory loss, HA, nausea, death <1/50,000, driving prohibition; possible lack of benefit or relapse after successful treatment, alternatives, right to decline, possible outpatient procedures. All questions answered, patient will have opportunity to review ECT video.         Pause for the Cause:     Right patient Yes   Right procedure/laterality settings: Yes          Intra-Procedure Documentation:     #1 02/27/23 pt says he was feeling \"anxious but content\"  #2 03/01/23 no auditory hallucinations, concern for visual hallucinations of bugs in his room. Still actively suicidal with a plan with gun or kitchen knife.   #3 03/03/23 Visual hallucinations disappeared for a day after last ECT. Thinking about suicide slightly less. Worried about some pain in his right arm. Mood 6.5/10 (10 best)  #4 03/06/23 Visual hallucinations returned over the weekend. Auditory hallucinations the same. Still thinking about suicide. Feels safe in hospital but not outside.   #5 03/08/23 Continues to have violent auditory hallucinations and wishes to die.   #6 3/10/23  Improving. Feels safe on unit. Still having SI with plan to shoot self in head but no plan for suicide on unit. Hearing whispers, but voices are quieter. No visual hallucinations of lanterns, etc. Tolerating ECT well. Sleeping well.     ECT #: 6   Treatment number this series: 6   Total treatment number: 6     Type of ECT:  Bilateral, standard    ECT Medications:    Brevital: 90mg  Succinyl Choline: 80mg    ECT Strip Summary: (titration 96 mC)  Energy Level: 192 mC, 1ms 20 Hz, 6 sec, 800 mA (increased from 144 mC on 3/6/23)    Motor Seizure Duration: 34 seconds  EEG Seizure Duration: 47 seconds    Complications:  none    Time " for re-orientation: 28 min on 3/6/23    Plan:   - Continue bilateral ECT Q Mon/Wed/Fri at  [facility]  - Continue current medications    MD ALY Olivarez MD - assisting

## 2023-03-10 NOTE — ANESTHESIA POSTPROCEDURE EVALUATION
Patient: Nikolay Lora    Procedure: * No procedures listed *       Anesthesia Type:  General    Note:     Postop Pain Control: Uneventful            Sign Out: Well controlled pain   PONV: No   Neuro/Psych: Uneventful            Sign Out: Acceptable/Baseline neuro status   Airway/Respiratory: Uneventful            Sign Out: Acceptable/Baseline resp. status   CV/Hemodynamics: Uneventful            Sign Out: Acceptable CV status; No obvious hypovolemia; No obvious fluid overload   Other NRE: NONE   DID A NON-ROUTINE EVENT OCCUR? No           Last vitals:  Vitals:    03/10/23 0947 03/10/23 1000 03/10/23 1015   BP: (!) 143/85 108/72 99/67   Pulse: 115 112 111   Resp: 20 16 18   Temp: 36.8  C (98.2  F) 36.6  C (97.8  F) 36.7  C (98  F)   SpO2: 92% 94% 93%       Electronically Signed By: Coco Calzada MD  March 10, 2023  10:12 AM

## 2023-03-10 NOTE — PLAN OF CARE
Patient NPO after midnight for ECT, appears to be sleeping through the night; patient slept for 7 hours.      Problem: Sleep Disturbance  Goal: Adequate Sleep/Rest  Outcome: Progressing   Goal Outcome Evaluation:

## 2023-03-10 NOTE — ANESTHESIA PREPROCEDURE EVALUATION
Anesthesia Pre-Procedure Evaluation    Patient: Nikolay Lora   MRN: [id number] : 1990        Procedure :   Electroconvulsive Therapy       Past Medical History:   Diagnosis Date     Brugada syndrome      Chronic idiopathic urticaria      Concussion with loss of consciousness      Mixed hyperlipidemia      Polysubstance abuse (H)      Schizotypal personality disorder (H)       No past surgical history on file.   No Known Allergies   Social History     Tobacco Use     Smoking status: Former     Smokeless tobacco: Never     Tobacco comments:     1-2 cigs per day   Substance Use Topics     Alcohol use: Yes      Wt Readings from Last 1 Encounters:   23 79 kg (174 lb 1.6 oz)        Anesthesia Evaluation   Pt has not had prior anesthetic         ROS/MED HX  ENT/Pulmonary:       Neurologic:       Cardiovascular: Comment: Repolarization abnormality - Brugada. Authorized by cardiology. Negative history of syncope, seizures. Family history negative      METS/Exercise Tolerance: >4 METS    Hematologic:       Musculoskeletal:       GI/Hepatic:       Renal/Genitourinary:       Endo:       Psychiatric/Substance Use:     (+) psychiatric history     Infectious Disease:       Malignancy:       Other:               OUTSIDE LABS:  CBC:   Lab Results   Component Value Date    WBC 13.0 (H) 2023    WBC 12.7 (H) 2023    HGB 15.0 2023    HGB 15.8 2023    HCT 46.1 2023    HCT 48.3 2023     2023     2023     BMP:   Lab Results   Component Value Date     2023     2023    POTASSIUM 3.9 2023    POTASSIUM 4.0 2023    CHLORIDE 103 2023    CHLORIDE 100 2023    CO2 27 2023    CO2 27 2023    BUN 19.5 2023    BUN 12.5 2023    CR 0.81 2023    CR 0.81 2023     (H) 2023    GLC 93 2023     COAGS: No results found for: PTT, INR, FIBR  POC: No results found for: BGM, HCG,  HCGS  HEPATIC:   Lab Results   Component Value Date    ALBUMIN 4.1 02/24/2023    PROTTOTAL 7.1 02/24/2023    ALT 58 (H) 02/24/2023    AST 29 02/24/2023    ALKPHOS 82 02/24/2023    BILITOTAL 0.3 02/24/2023     OTHER:   Lab Results   Component Value Date    A1C 5.4 01/28/2023    KATINA 9.4 02/24/2023    TSH 1.09 01/28/2023       Anesthesia Plan    ASA Status:  2   NPO Status:  NPO Appropriate    Anesthesia Type: General.     - Airway: Mask Only   Induction: Intravenous.   Maintenance: TIVA.        Consents            Postoperative Care    Pain management: Multi-modal analgesia.        Comments:           H&P reviewed: Unable to attach H&P to encounter due to EHR limitations. H&P Update: appropriate H&P reviewed, patient examined. No interval changes since H&P (within 30 days).         Coco Calzada MD

## 2023-03-11 PROCEDURE — 124N000002 HC R&B MH UMMC

## 2023-03-11 PROCEDURE — 250N000013 HC RX MED GY IP 250 OP 250 PS 637: Performed by: PSYCHIATRY & NEUROLOGY

## 2023-03-11 PROCEDURE — 250N000013 HC RX MED GY IP 250 OP 250 PS 637

## 2023-03-11 RX ADMIN — HYDROXYZINE HYDROCHLORIDE 100 MG: 50 TABLET, FILM COATED ORAL at 17:25

## 2023-03-11 RX ADMIN — FLUTICASONE PROPIONATE 2 SPRAY: 50 SPRAY, METERED NASAL at 09:08

## 2023-03-11 RX ADMIN — METFORMIN HYDROCHLORIDE 1000 MG: 500 TABLET ORAL at 08:56

## 2023-03-11 RX ADMIN — GABAPENTIN 300 MG: 300 CAPSULE ORAL at 08:57

## 2023-03-11 RX ADMIN — CLOZAPINE 300 MG: 200 TABLET ORAL at 20:52

## 2023-03-11 RX ADMIN — SENNOSIDES AND DOCUSATE SODIUM 1 TABLET: 50; 8.6 TABLET ORAL at 20:51

## 2023-03-11 RX ADMIN — SENNOSIDES AND DOCUSATE SODIUM 1 TABLET: 50; 8.6 TABLET ORAL at 08:57

## 2023-03-11 RX ADMIN — METFORMIN HYDROCHLORIDE 1000 MG: 500 TABLET ORAL at 18:03

## 2023-03-11 RX ADMIN — GABAPENTIN 300 MG: 300 CAPSULE ORAL at 13:36

## 2023-03-11 RX ADMIN — PANTOPRAZOLE SODIUM 40 MG: 40 TABLET, DELAYED RELEASE ORAL at 08:56

## 2023-03-11 RX ADMIN — GABAPENTIN 300 MG: 300 CAPSULE ORAL at 20:52

## 2023-03-11 RX ADMIN — LORATADINE 10 MG: 10 TABLET ORAL at 08:56

## 2023-03-11 RX ADMIN — Medication 12.5 MG: at 08:56

## 2023-03-11 RX ADMIN — SERTRALINE HYDROCHLORIDE 200 MG: 100 TABLET ORAL at 08:56

## 2023-03-11 RX ADMIN — NICOTINE 1 PATCH: 21 PATCH, EXTENDED RELEASE TRANSDERMAL at 08:57

## 2023-03-11 RX ADMIN — ATROPINE SULFATE 2 DROP: 10 SOLUTION/ DROPS OPHTHALMIC at 20:52

## 2023-03-11 ASSESSMENT — ACTIVITIES OF DAILY LIVING (ADL)
ADLS_ACUITY_SCORE: 29
DRESS: INDEPENDENT;SCRUBS (BEHAVIORAL HEALTH)
ADLS_ACUITY_SCORE: 29
DRESS: SCRUBS (BEHAVIORAL HEALTH)
ADLS_ACUITY_SCORE: 29
HYGIENE/GROOMING: INDEPENDENT
ADLS_ACUITY_SCORE: 29
ORAL_HYGIENE: INDEPENDENT
ADLS_ACUITY_SCORE: 29
ORAL_HYGIENE: INDEPENDENT
HYGIENE/GROOMING: HANDWASHING;INDEPENDENT
LAUNDRY: UNABLE TO COMPLETE
ADLS_ACUITY_SCORE: 29

## 2023-03-11 NOTE — PLAN OF CARE
Problem: Sleep Disturbance  Goal: Adequate Sleep/Rest  Outcome: Progressing   Goal Outcome Evaluation:    Patient appeared to sleep 7 hours this night shift.  No prns or snacks given or requested.  No concerns were reported or noted.

## 2023-03-11 NOTE — PLAN OF CARE
"Goal Outcome Evaluation:     Plan of Care Reviewed With: patient     Patient alert and oriented to person, place, and time, adequately groomed. Pt is pleasant, calm, blunted affect, good eye contact, cooperative, medication compliant. No PRN's given. Pt on suicidal, falls precautions, no behaviors observed. Pt contracts for safety. Pt isolated in room entire shift, withdrawn, watching tv 5 hours, sleeping/resting 3 hours, no interaction with peers, appropriate with staff. Pt demonstrates ability to communicate needs to staff. No behaviors noted. Will continue to monitor behavior and encourage engagement.     NURSING ASSESSMENT  Pain: denied  Anxiety: denied \"not this morning yet I just woke up\"  Depression: endorsed history of depression, states he \"feels better but still depressed\"  SI: endorsed, contracts for safety  SIB: denied  HI: denied  AVH: endorsed auditory command hallucinations \"yesterday not today, they're more quiet\" - visual hallucinations \"I just see lanterns (pointing to the ceiling), and butterflies (pointing around the room), did not appear to be responding to internal stimuli, did not demonstrate delusional thinking.  Mood/Affect: blunted   Sleep: pt states \"ok\"  Medication: compliant, no side effects reported or observed  PRN: none  Appetite: breakfast 100%      Lunch 100%  ADLs: independent   Visits: none  Vitals: WNL   Medical:  no current acute issues reported or observed    Problem: Adult Behavioral Health Plan of Care  Goal: Plan of Care Review  Outcome: Progressing     Problem: Depressive Symptoms  Goal: Depressive Symptoms  Description: Signs and symptoms of listed problems will be absent or manageable.  Outcome: Progressing     Problem: Psychotic Signs/Symptoms  Goal: Enhanced Social, Occupational or Functional Skills (Psychotic Signs/Symptoms)  Outcome: Progressing     Problem: Psychotic Signs/Symptoms  Goal: Improved Mood Symptoms  Outcome: Progressing     Problem: Psychotic " Signs/Symptoms  Goal: Increased Participation and Engagement (Psychotic Signs/Symptoms)  Outcome: Progressing     Problem: Sleep Disturbance  Goal: Adequate Sleep/Rest  Outcome: Progressing     Problem: Suicide Risk  Goal: Absence of Self-Harm  Outcome: Progressing     Problem: Suicidal Behavior  Goal: Suicidal Behavior is Absent or Managed  Outcome: Progressing

## 2023-03-11 NOTE — PLAN OF CARE
"  Problem: Psychotic Signs/Symptoms  Goal: Improved Behavioral Control (Psychotic Signs/Symptoms)  Outcome: Progressing     Problem: Adult Behavioral Health Plan of Care  Goal: Plan of Care Review  Recent Flowsheet Documentation  Taken 3/10/2023 2213 by Gerber Metcalf RN  Patient Agreement with Plan of Care: agrees   Goal Outcome Evaluation:    Plan of Care Reviewed With: patient          Patient isolative and withdrawn to room all evening, declining to attend group and only out to retrieve meals and use the restroom. Patient upon approach flat in affect, calm and cooperative with verbal assessment. Patient reporting continued improvement in symptoms identifying that his depression as well as auditory hallucinations. Patient reporting the auditory hallucinations are still command telling him to harm himself though reporting they are more of a \"whisper\" now and that he is able to use music to help drown them out. Patient reporting continued SI but contracts for safety in the hospital. Patient denies any thoughts of harming others. Patient cooperative with vital sign assessments and were stable this evening. Patient diet appeared good eating 100% of dinner and snacks and receptive to increasing fluid intake. Patient accepting of HS medications with no stated or observed side effects. Patient reporting he feels as the the ECT treatments and medications and therapeutic and helping to decrease his symptoms of depression and hallucinations. Patient independent with identifying needs and completing ADL's.         "

## 2023-03-12 PROCEDURE — 124N000002 HC R&B MH UMMC

## 2023-03-12 PROCEDURE — 250N000013 HC RX MED GY IP 250 OP 250 PS 637

## 2023-03-12 PROCEDURE — 250N000013 HC RX MED GY IP 250 OP 250 PS 637: Performed by: PSYCHIATRY & NEUROLOGY

## 2023-03-12 RX ADMIN — SENNOSIDES AND DOCUSATE SODIUM 1 TABLET: 50; 8.6 TABLET ORAL at 20:17

## 2023-03-12 RX ADMIN — Medication 12.5 MG: at 07:43

## 2023-03-12 RX ADMIN — SERTRALINE HYDROCHLORIDE 200 MG: 100 TABLET ORAL at 07:44

## 2023-03-12 RX ADMIN — METFORMIN HYDROCHLORIDE 1000 MG: 500 TABLET ORAL at 07:43

## 2023-03-12 RX ADMIN — GABAPENTIN 300 MG: 300 CAPSULE ORAL at 13:45

## 2023-03-12 RX ADMIN — FLUTICASONE PROPIONATE 2 SPRAY: 50 SPRAY, METERED NASAL at 07:53

## 2023-03-12 RX ADMIN — METFORMIN HYDROCHLORIDE 1000 MG: 500 TABLET ORAL at 17:41

## 2023-03-12 RX ADMIN — ATROPINE SULFATE 2 DROP: 10 SOLUTION/ DROPS OPHTHALMIC at 20:17

## 2023-03-12 RX ADMIN — PANTOPRAZOLE SODIUM 40 MG: 40 TABLET, DELAYED RELEASE ORAL at 07:44

## 2023-03-12 RX ADMIN — NICOTINE 1 PATCH: 21 PATCH, EXTENDED RELEASE TRANSDERMAL at 07:51

## 2023-03-12 RX ADMIN — GABAPENTIN 300 MG: 300 CAPSULE ORAL at 07:44

## 2023-03-12 RX ADMIN — CLOZAPINE 300 MG: 200 TABLET ORAL at 20:16

## 2023-03-12 RX ADMIN — SENNOSIDES AND DOCUSATE SODIUM 1 TABLET: 50; 8.6 TABLET ORAL at 07:44

## 2023-03-12 RX ADMIN — LORATADINE 10 MG: 10 TABLET ORAL at 07:44

## 2023-03-12 ASSESSMENT — ACTIVITIES OF DAILY LIVING (ADL)
LAUNDRY: UNABLE TO COMPLETE
ADLS_ACUITY_SCORE: 29
HYGIENE/GROOMING: HANDWASHING;INDEPENDENT
ORAL_HYGIENE: INDEPENDENT
ADLS_ACUITY_SCORE: 29
ORAL_HYGIENE: INDEPENDENT
DRESS: SCRUBS (BEHAVIORAL HEALTH)
ADLS_ACUITY_SCORE: 29
DRESS: INDEPENDENT
ADLS_ACUITY_SCORE: 29
HYGIENE/GROOMING: INDEPENDENT
ADLS_ACUITY_SCORE: 29
ADLS_ACUITY_SCORE: 29
LAUNDRY: UNABLE TO COMPLETE
ADLS_ACUITY_SCORE: 29
ADLS_ACUITY_SCORE: 29

## 2023-03-12 NOTE — PLAN OF CARE
Problem: Sleep Disturbance  Goal: Adequate Sleep/Rest  Outcome: Progressing   Goal Outcome Evaluation:    Patient appeared to sleep 5.75 out of 6 hours this night shift.  No prns or snacks given or requested.  No concerns were reported or noted.

## 2023-03-12 NOTE — PLAN OF CARE
"Goal Outcome Evaluation:    Plan of Care Reviewed With: patient      Pt was isolative and withdrawn to his room almost the entire shift. He did come ou to use the bathroom in the hallway a couple of times. Pt presented with depressed mood and sad affect. He continued to endorse depression 9 and anxiety 5 on a 10 scale, continued to endorse auditory and visual hallucinations as well as suicidal ideations. However, pt contracts for safety stating \" I feel safe in the hospital.\" Pt stated that he feels like ECT is working. Pt presented as clean as he stated that he took a shower yesterday.\" Denied physical issues.   Plan: Status 15s; Build trust with pt. Continue to build on strengths. Encourage compliance and healthy coping.       "

## 2023-03-12 NOTE — PLAN OF CARE
Problem: Psychotic Signs/Symptoms  Goal: Improved Behavioral Control (Psychotic Signs/Symptoms)  Outcome: Progressing     Problem: Adult Behavioral Health Plan of Care  Goal: Adheres to Safety Considerations for Self and Others  Intervention: Develop and Maintain Individualized Safety Plan  Recent Flowsheet Documentation  Taken 3/11/2023 2101 by Gerber Metcalf RN  Safety Measures:   suicide check-in completed   safety plan reviewed   Goal Outcome Evaluation:    Plan of Care Reviewed With: patient          Patient remains isolative to room all evening only in the lounge to retrieve meal tray and use the restroom. Patient upon approach flat in affect, calm and cooperative with verbal assessment but appearing more distracted and with averted eye contact from past days. Patient reporting that he feels continued improvement with decreased auditory hallucinations and being able to drown them out with the use of distraction and music. Patient reporting increased anxiety 7/10 and depression 9/10 due to continued hospitalization and SI thoughts. Patient receptive to PRN Hydroxyzine and heriberto for safety with his SI concern related to after discharge.     Patient cooperative with vital sign assessments which were stable. Patient diet appeared good eating 100% of dinner and snacks, receptive of increasing fluid intake. Patient accepting of HS medications with no stated or observed side effects. Prompted for completion of ADL's but declined to complete this evening with plan to do them tomorrow.

## 2023-03-13 ENCOUNTER — ANESTHESIA EVENT (OUTPATIENT)
Dept: BEHAVIORAL HEALTH | Facility: CLINIC | Age: 33
End: 2023-03-13
Payer: COMMERCIAL

## 2023-03-13 ENCOUNTER — APPOINTMENT (OUTPATIENT)
Dept: BEHAVIORAL HEALTH | Facility: CLINIC | Age: 33
End: 2023-03-13
Attending: PSYCHIATRY & NEUROLOGY
Payer: COMMERCIAL

## 2023-03-13 ENCOUNTER — ANESTHESIA (OUTPATIENT)
Dept: BEHAVIORAL HEALTH | Facility: CLINIC | Age: 33
End: 2023-03-13
Payer: COMMERCIAL

## 2023-03-13 LAB — GLUCOSE BLDC GLUCOMTR-MCNC: 115 MG/DL (ref 70–99)

## 2023-03-13 PROCEDURE — 250N000013 HC RX MED GY IP 250 OP 250 PS 637

## 2023-03-13 PROCEDURE — 250N000011 HC RX IP 250 OP 636: Performed by: STUDENT IN AN ORGANIZED HEALTH CARE EDUCATION/TRAINING PROGRAM

## 2023-03-13 PROCEDURE — 90870 ELECTROCONVULSIVE THERAPY: CPT

## 2023-03-13 PROCEDURE — 250N000009 HC RX 250: Performed by: STUDENT IN AN ORGANIZED HEALTH CARE EDUCATION/TRAINING PROGRAM

## 2023-03-13 PROCEDURE — 99233 SBSQ HOSP IP/OBS HIGH 50: CPT | Mod: 25 | Performed by: PSYCHIATRY & NEUROLOGY

## 2023-03-13 PROCEDURE — 90870 ELECTROCONVULSIVE THERAPY: CPT | Performed by: PSYCHIATRY & NEUROLOGY

## 2023-03-13 PROCEDURE — 370N000017 HC ANESTHESIA TECHNICAL FEE, PER MIN

## 2023-03-13 PROCEDURE — 124N000002 HC R&B MH UMMC

## 2023-03-13 PROCEDURE — 250N000013 HC RX MED GY IP 250 OP 250 PS 637: Performed by: PSYCHIATRY & NEUROLOGY

## 2023-03-13 RX ORDER — FENTANYL CITRATE 50 UG/ML
INJECTION, SOLUTION INTRAMUSCULAR; INTRAVENOUS PRN
Status: DISCONTINUED | OUTPATIENT
Start: 2023-03-13 | End: 2023-03-13

## 2023-03-13 RX ORDER — ONDANSETRON 4 MG/1
4 TABLET, ORALLY DISINTEGRATING ORAL EVERY 30 MIN PRN
Status: CANCELLED | OUTPATIENT
Start: 2023-03-13

## 2023-03-13 RX ORDER — ONDANSETRON 2 MG/ML
4 INJECTION INTRAMUSCULAR; INTRAVENOUS EVERY 30 MIN PRN
Status: CANCELLED | OUTPATIENT
Start: 2023-03-13

## 2023-03-13 RX ORDER — ACETAMINOPHEN 325 MG/1
975 TABLET ORAL ONCE
Status: CANCELLED | OUTPATIENT
Start: 2023-03-13 | End: 2023-03-13

## 2023-03-13 RX ORDER — DIMENHYDRINATE 50 MG/ML
25 INJECTION, SOLUTION INTRAMUSCULAR; INTRAVENOUS
Status: CANCELLED | OUTPATIENT
Start: 2023-03-13

## 2023-03-13 RX ORDER — ESMOLOL HYDROCHLORIDE 10 MG/ML
INJECTION INTRAVENOUS PRN
Status: DISCONTINUED | OUTPATIENT
Start: 2023-03-13 | End: 2023-03-13

## 2023-03-13 RX ORDER — SODIUM CHLORIDE, SODIUM LACTATE, POTASSIUM CHLORIDE, CALCIUM CHLORIDE 600; 310; 30; 20 MG/100ML; MG/100ML; MG/100ML; MG/100ML
INJECTION, SOLUTION INTRAVENOUS CONTINUOUS
Status: CANCELLED | OUTPATIENT
Start: 2023-03-13

## 2023-03-13 RX ADMIN — PANTOPRAZOLE SODIUM 40 MG: 40 TABLET, DELAYED RELEASE ORAL at 08:51

## 2023-03-13 RX ADMIN — OLANZAPINE 5 MG: 5 TABLET, ORALLY DISINTEGRATING ORAL at 08:53

## 2023-03-13 RX ADMIN — GABAPENTIN 300 MG: 300 CAPSULE ORAL at 14:18

## 2023-03-13 RX ADMIN — CLOZAPINE 300 MG: 200 TABLET ORAL at 20:00

## 2023-03-13 RX ADMIN — SENNOSIDES AND DOCUSATE SODIUM 1 TABLET: 50; 8.6 TABLET ORAL at 08:51

## 2023-03-13 RX ADMIN — SENNOSIDES AND DOCUSATE SODIUM 1 TABLET: 50; 8.6 TABLET ORAL at 20:01

## 2023-03-13 RX ADMIN — SERTRALINE HYDROCHLORIDE 200 MG: 100 TABLET ORAL at 08:51

## 2023-03-13 RX ADMIN — ESMOLOL HYDROCHLORIDE 50 MG: 10 INJECTION, SOLUTION INTRAVENOUS at 13:37

## 2023-03-13 RX ADMIN — METFORMIN HYDROCHLORIDE 1000 MG: 500 TABLET ORAL at 08:51

## 2023-03-13 RX ADMIN — FLUTICASONE PROPIONATE 2 SPRAY: 50 SPRAY, METERED NASAL at 08:53

## 2023-03-13 RX ADMIN — LORATADINE 10 MG: 10 TABLET ORAL at 08:51

## 2023-03-13 RX ADMIN — NICOTINE 1 PATCH: 21 PATCH, EXTENDED RELEASE TRANSDERMAL at 08:51

## 2023-03-13 RX ADMIN — Medication 12.5 MG: at 08:51

## 2023-03-13 RX ADMIN — SUCCINYLCHOLINE CHLORIDE 80 MG: 20 INJECTION, SOLUTION INTRAMUSCULAR; INTRAVENOUS; PARENTERAL at 13:36

## 2023-03-13 RX ADMIN — FENTANYL CITRATE 90 MCG: 50 INJECTION, SOLUTION INTRAMUSCULAR; INTRAVENOUS at 13:32

## 2023-03-13 RX ADMIN — GABAPENTIN 300 MG: 300 CAPSULE ORAL at 20:01

## 2023-03-13 RX ADMIN — METFORMIN HYDROCHLORIDE 1000 MG: 500 TABLET ORAL at 20:00

## 2023-03-13 ASSESSMENT — ACTIVITIES OF DAILY LIVING (ADL)
ADLS_ACUITY_SCORE: 29
LAUNDRY: UNABLE TO COMPLETE
ADLS_ACUITY_SCORE: 29
DRESS: SCRUBS (BEHAVIORAL HEALTH)
LAUNDRY: WITH SUPERVISION
ADLS_ACUITY_SCORE: 29
HYGIENE/GROOMING: INDEPENDENT
HYGIENE/GROOMING: HANDWASHING;INDEPENDENT
ADLS_ACUITY_SCORE: 29
ORAL_HYGIENE: INDEPENDENT
ADLS_ACUITY_SCORE: 29
DRESS: SCRUBS (BEHAVIORAL HEALTH);INDEPENDENT
ORAL_HYGIENE: INDEPENDENT
ADLS_ACUITY_SCORE: 29

## 2023-03-13 NOTE — PLAN OF CARE
Assessment/Intervention/Current Symtoms and Care Coordination  -Chart review  -Rounded with team, addressed patient needs/concerns  Current Symptoms include the following:   Quiet; Receiving ECT    Discharge Plan or Goal  Pending stabilization & development of a safe discharge plan.  Considerations include:  Return to [facility]    Barriers to Discharge  Patient requires further psychiatric stabilization due to current symptomology    Referral Status  No referrals made this hospiotalization    Legal Status  Voluntary

## 2023-03-13 NOTE — PROGRESS NOTES
"[facility], [place]   Psychiatric Progress Note  Hospital Day: 45        Interim History:   The patient's care was discussed with the treatment team during the daily team meeting and/or staff's chart notes were reviewed. Staff report patient remained mostly isolated and withdrawn throughout the weekend. He was calm and cooperative with a flat affect upon approach. He continued to endorse depression, anxiety, SI, AH, and VH throughout the weekend. Staff report that patient endorsed using television and music to drown out the voices and cope with depression. Pt did not report any acute medical conditions or side effects. Staff report encouraging patient to keep up with hygiene and ADLs. Pt is compliant with his medications. Pt slept for 6.75 hours last night. Pt contracted for safety.    Upon interview, Haseeb reported a decrease in his overall anxiety but endorsed ongoing depression. He states taking frequent naps that make him feel \"scattered\" upon awakening. The patient reports visual hallucinations including doves, crows, and lanterns. He reports hearing \"whispers\" that are commanding him to \"shoot himself.\" He says that these whispers briefly went away last week but have now returned. He endorses intermittent SI. The patient reports that he thinks ECT is helpful because it allows him to feel more hopefully and it is helping him understand that his life has value. He is to completed his 7th ECT treatment today.    Suicidal ideation: Haseeb reports ongoing active SI with plan and intent to shoot himself with a gun or cut his throat upon discharge. Denies suicide plan or intent in the hospital. [place] for safety.     Homicidal ideation: denies current or recent homicidal ideation or behaviors.    Psychotic symptoms: As above    Medication side effects reported: None    Acute medical concerns: None    Other issues reported by patient: Patient had no further questions or concerns.     " "       Medications:       atropine  2 drop Sublingual At Bedtime     cloZAPine  300 mg Oral At Bedtime     fluticasone  2 spray Both Nostrils Daily     gabapentin  300 mg Oral TID     loratadine  10 mg Oral Daily     metFORMIN  1,000 mg Oral BID w/meals     metoprolol succinate ER  12.5 mg Oral Daily     nicotine  1 patch Transdermal Daily     nicotine   Transdermal Q8H     pantoprazole  40 mg Oral QAM AC     senna-docusate  1 tablet Oral BID     sertraline  200 mg Oral Daily          Allergies:   No Known Allergies       Labs:     Recent Results (from the past 24 hour(s))   Glucose by meter    Collection Time: 03/13/23 10:54 AM   Result Value Ref Range    GLUCOSE BY METER POCT 115 (H) 70 - 99 mg/dL          Psychiatric Examination:     BP (!) 117/90 (BP Location: Left arm, Patient Position: Sitting)   Pulse 109   Temp 97.7  F (36.5  C) (Temporal)   Resp 16   Ht 1.6 m (5' 3\")   Wt 79 kg (174 lb 1.6 oz)   SpO2 93%   BMI 30.84 kg/m    Weight is 174 lbs 1.6 oz  Body mass index is 30.84 kg/m .    Weight over time:  Vitals:    01/27/23 1718 03/07/23 0933   Weight: 75.1 kg (165 lb 8 oz) 79 kg (174 lb 1.6 oz)       Orthostatic Vitals       None          Cardiometabolic risk assessment. 01/30/23    Reviewed patient profile for cardiometabolic risk factors    Date taken /Value  REFERENCE RANGE   Abdominal Obesity  (Waist Circumference)   See nursing flowsheet Women ?35 in (88 cm)   Men ?40 in (102 cm)      Triglycerides  Triglycerides   Date Value Ref Range Status   01/28/2023 192 (H) <150 mg/dL Final       ?150 mg/dL (1.7 mmol/L) or current treatment for elevated triglycerides   HDL cholesterol  Direct Measure HDL   Date Value Ref Range Status   01/28/2023 41 >=40 mg/dL Final   ]   Women <50 mg/dL (1.3 mmol/L) in women or current treatment for low HDL cholesterol  Men <40 mg/dL (1 mmol/L) in men or current treatment for low HDL cholesterol     Fasting plasma glucose (FPG) Lab Results   Component Value Date     " 01/28/2023      FPG ?100 mg/dL (5.6 mmol/L) or treatment for elevated blood glucose   Blood pressure  BP Readings from Last 3 Encounters:   03/13/23 (!) 117/90   01/27/23 118/74   07/20/22 112/79    Blood pressure ?130/85 mmHg or treatment for elevated blood pressure   Family History  See family history     Appearance: awake, alert, dressed in hospital scrubs and unkempt  Attitude:  cooperative, calm  Eye Contact: fair  Mood: depressed and sad.  Affect:  mood congruent and intensity is blunted  Speech:  clear, coherent. appropriate  Language: fluent and intact in English  Psychomotor, Gait, Musculoskeletal:  no evidence of tardive dyskinesia, dystonia, or tics  Throught Process:  Linear, ruminative  Associations:  No evidence of loose associations  Thought Content:  SI w/plan and intent upon discharge, no plan or intent while in hospital. Auditory and visual hallucinations present.  Insight:  fair  Judgement:  poor  Oriented to:  time, person, and place  Attention Span and Concentration:  fair  Recent and Remote Memory:  fair  Fund of Knowledge:  appropriate    Clinical Global Impressions  First:  Considering your total clinical experience with this particular patient population, how severe are the patient's symptoms at this time?: 7 (01/28/23 1341)    Most recent:  Compared to the patient's condition at the START of treatment, this patient's condition is: 4           Precautions:     Behavioral Orders   Procedures     Code 1 - Restrict to Unit     Code 2     For imaging     Electroconvulsive therapy     Series of up to 12 treatments. Begin Date: 2/27/23     Treating Psychiatrist providing ECT:  Dr. Kearns     Notified on:  2/23/23     Electroconvulsive therapy     For acute ECT series, MWF, up to 12 treatment.     Electroconvulsive therapy     Series of up to 12 treatments. Begin Date: 02/26/23     Treating Psychiatrist providing ECT: Clifton  Notified on: 02/24/23     Fall precautions     Routine Programming     As  "clinically indicated     Status 15     Every 15 minutes.     Suicide precautions     Patients on Suicide Precautions should have a Combination Diet ordered that includes a Diet selection(s) AND a Behavioral Tray selection for Safe Tray - with utensils, or Safe Tray - NO utensils            Diagnoses:     Active suicidal ideation with intent outside of hospital setting  MDD, recurrent, severe with psychotic features vs Schizoaffective Disorder, depressive type  Polysubstance use  Unspecified mood disorder  ADHD, inattentive type per chart  History of idiopathic hypersomnolence per chart  Nicotine use disorder, dependence and withdrawal   Allergies- chronic uticaria and rhinitis per chart  HLD per chart          Assessment & Plan:     Assessment and hospital summary:  This patient is a 32 year old male with history of mood disorder, ADHD and substance use who presented to [facility] ED with SI on 1/26 in context of reported methamphetamine use and being kicked out of sober living. Medically cleared in ED, placed on 72HH and admitted to [facility], giving inconsistent reports about substance use, UDS negative, very somnolent, endorsing ongoing SI and some psychosis symptoms. PTA medications continued with exception of finasteride as patient states he takes \"1/4 a pill\" and declined ordered dose, will defer to primary team to address. Will continue to evaluate safety and stabilization further as patient more able to engage in assessments. Inpatient psychiatric hospitalization is warranted at this time for safety, stabilization, and possible adjustment in medications.    Patient reports that he abruptly stopped Zoloft one week prior to his admission. He developed discontinuation syndrome symptoms following that. He reports that he does not have a history of psychosis but is experiencing psychotic symptoms. He is amenable with plan to trial risperidone after R/B/A were discussed. Risperidone was initiated on " 1/30 and titrated to a total of 3 mg daily on 2/1. Clonidine, used for ADHD, was reduced from a total of 0.3 mg daily to 0.2 mg daily on 2/3 due to concern for hypotension. 2/9: Cardiology consult placed. EKG- tachycardia 121 bpm, QTc 465 ms, Abnormal QRS angle and T wave abnormality. COVID negative and labs are unremarkable.  On 2/10, clozapine was held pending additional workup from IM and cardiology. Pericarditis was ruled out and metoprolol was started. Clozapine was restarted on 2/13 with plan to cautiously titrate to lowest effective dose. 2/23/23: Clozapine titration schedule increased 25 mg/day. ECT and IM consults for ECT clearance placed. 2/27/23: ECT initiated. Risperidone was discontinued on 2/28. Clozapine level obtained on 3/3 at corresponding dose of 300 mg and was within therapeutic range (1001). Metformin increased to 1000 mg BID on 3/6 to target increased appetite/wt gain. Minimal improvement noted since initiation of both clozapine and ECT.     Psychiatric treatment/inteventions:  Medications:   -Continue clozapine 300 mg qhs. Clozapine quant at corresponding dose is 1001.  -Continue PTA sertraline 200 mg daily for mood  -Continue PTA gabapentin 300 mg TID for anxiety   -Continue Cogentin 1 mg at bedtime for possible EPSE     -PRN hydroxyzine 25mg every 4 hour for anxiety  -PRN trazodone 50mg at bedtime for sleep   -PRN olanzapine 10mg PO or IM TID for agitation/psychosis   -PRN atropine 1% SL drops, 2 drops q2h for sialorrhea    Spiritual services consult placed on 2/6. Pt is Judaism. Appreciate assistance.     ECT:  - Medically cleared on 2/24. See IM note for details.  - ECT consult placed on 2/23.  - ECT started on 2/27/23  - HOLD Gabapentin on nights prior to ECT and on ECT mornings until after ECT.   - ECT #5 completed 3/8/23. Uneventful. Next ECT treatment scheduled for Monday, 3/13. Baseline IDS-SR was 55. Hard copy placed in chart. Patient asked to complete again today. Score is 53 as of  3/9.      -ECT #6 completed 3/10/23  Improving. Feels safe on unit. Still having SI with plan to shoot self in head but no plan for suicide on unit. Hearing whispers, but voices are quieter. No visual hallucinations of lanterns, etc. Tolerating ECT well. Sleeping well.      Laboratory/Imaging: reviewed: Covid negative; UDS negative; CMP, CBC, TSH, Lipid panel, HgbA1c ordered to complete admission labs. Reviewed.     2/9/23: Troponin, CK, CBC, BMP: Unremarkable, CRP elevated to 38.18, COVID: Negative  2/10/23: Add Influenza A/B given flu-like sx-negative  Patient will be treated in therapeutic milieu with appropriate individual and group therapies as described.     Medical treatment/interventions:  Medical concerns:   Nicotine replacement ordered, educate patient on benefits of cessation, pt unclear about finasteride dose, will hold until further information is obtained. PTA PRN zyrtec, azelastine, fluticasone, triamcinolone and epipen ordered for allergies and PRN ammoniaum lactate, Eucerin for  dry skin.    Flatulence:   - simethicone prn    Neuroleptic induced wt gain:  - Monitor weights  - Continue metformin 1,000 mg BID with meals    Dyslipidemia: Will arrange follow up with PCP to address. Discussed importance of healthy eating habits and regular exercise. Will consider nutrition consult if patient amenable.     Orthostasis likely 2/2 clozapine: Pt is not hypotensive but reports orthostatic symptoms and previously restricted fluids. Now resolved.   - Continue to monitor vital signs closely  - Encourage fluids  - Discontinued clonidine    Sialorrhea 2/2 clozapine:  - Atropine 1% SL drops qhs  - Recommend towel on pillow when sleeping    Chest pain/tachycardia/EKG changes (2/9):  - Pain improved with PRN medications.   - Cardiology consult placed on 2/9. See note on that date for details.   - ECHO reviewed and unremarkable.  - Writer discussed care with both Dr. Streeter from [facility] and Christina from . Plan for now is  to HOLD clozapine until further medical workup. Dr. Streeter explained that myocarditis has been ruled out as troponin was negative. Pericarditis remains on differential, but he does not have EKG findings or a pericardial effusion. IM will assess for pericardial friction rub and pleuritic chest pain. IM seen by patient and Metoprolol was started.     Update 2/13: Discussed care with Annette from IM today on two occasions. Please see her note on this date for details. She does not suspect that this is pericarditis. He does not have pleuritic pain, characteristic CP, EKG changes, or pericardial effusion on ECHO. Therefore, will cautiously restart clozapine while monitoring closely for side effects. May consider further increase in metoprolol if necessary. PPI was started due to suspected GERD.     Update 2/24 per IM note:  Medicine is following peripherally for concerns for pericarditis.  See detailed note from 2/13.  No pericardial friction rub noted while sitting up and leaning forward today.  Patient states that his chest discomfort is getting better after starting the Protonix yesterday.  It does appear that he has also been using the Maalox.  Patient does have Tums available as well.  Please be mindful for any constipation with overuse of the PRNs.     POC:  - Continue Protonix daily for 8 weeks, then taper off  - Recommend lifestyle changes including weight loss head of bed elevation avoidance of late-night eating and specific food elimination such as chocolate caffeine alcohol acidic and/or spicy food.  - Watch for constipation with PRNs for heartburn  - We will schedule senna 1 tab twice daily  - MiraLAX daily as needed added on     Medicine will sign off, please contact us for any acute changes.      Sore throat/cough/nasal congestion, resolved:   - COVID negative on 2/9. Repeat COVID test and Influenza A/B neg on 2/10.  - Cepacol lozenges added  - PRN Tylenol   - Consulted with IM. Appreciate assistance. See  "their notes for details.      Legal Status: VOLUNTARY. 72 hour hold discontinued. Pt agreed to sign in on voluntary basis and discharge directly to treatment once stable.      Safety Assessment:        Behavioral Orders   Procedures     Code 1 - Restrict to Unit     Routine Programming       As clinically indicated     Status 15       Every 15 minutes.     Suicide precautions       Patients on Suicide Precautions should have a Combination Diet ordered that includes a Diet selection(s) AND a Behavioral Tray selection for Safe Tray - with utensils, or Safe Tray - NO utensils        Withdrawal precautions      Pt has not required locked seclusion or restraints in the past 24 hours to maintain safety, please refer to RN documentation for further details.    Discontinued SIO on 2/8 as patient is heriberto for safety. Monitor SI closely.     The risks, benefits, alternatives and side effects have been discussed and are understood by the patient.     Disposition: Pending clinical stabilization. Pt remains actively suicidal. [facility]     Entered by: Leslie Tavera on 3/13/2023 at 2:05 PM      I was present with PA student who participated in the service and in the documentation of the note. I have verified the history and personally performed the physical exam and medical decision making. I agree with the assessment and plan of care as documented in the note.\"           "

## 2023-03-13 NOTE — PLAN OF CARE
"He is isolative and has some delay in speech, cognition.  He continues to display disorganized thought process  by difficulty expressing himself in discussions.  He states that he is experiencing auditory and visual hallucinations, yet they are less intense.  He denies suicidal ideation, yet stated, \" no, I'm in the hospital, I'm safe here, I would be suicidal if I left the hospital.\"  He denies pain/discomfort and cold, flu like symptoms, yet states,  \" I feel better, but was feeling sick earlier,\" and attributes this to not sleeping well last night.  He agreed to have student nurse go with him to attend ECT this afternoon.  PRN 5 mg of Zyprexa was given with morning medications.  Blood sugar check was 115.  He was given new scrubs, under ware, shampoo, body wash, wash clothes, towels, and socks to encourage hygiene in the shift.        "

## 2023-03-13 NOTE — PLAN OF CARE
Problem: Sleep Disturbance  Goal: Adequate Sleep/Rest  Outcome: Progressing   Goal Outcome Evaluation:    Patient appeared to sleep 6.75 hours this night shift.  No prns or snacks given or requested. NPO for ECT this morning.  No concerns were reported or noted.

## 2023-03-13 NOTE — PROGRESS NOTES
Despite MAR note instructing not to give metformin prior to ECT, metformin given this am while patient NPO and awaiting ECT procedure. Discussed with Dr. Mingo Ruelas. Ok to treat if blood sugar checked. Spoke with patient's RN Williams and charge RN Amy to ensure this information about not giving Metformin prior to ECT was passed along to successive shifts. Let RN know that ok to always call ECT if unsure which medications are ok to give.

## 2023-03-13 NOTE — PLAN OF CARE
Problem: Psychotic Signs/Symptoms  Goal: Increased Participation and Engagement (Psychotic Signs/Symptoms)  Intervention: Facilitate Participation and Engagement  Recent Flowsheet Documentation  Taken 3/12/2023 1802 by Gerber Metcalf RN  Supportive Measures:   positive reinforcement provided   active listening utilized   self-care encouraged   verbalization of feelings encouraged  Diversional Activity: television     Problem: Depressive Symptoms  Goal: Social and Therapeutic (Depression)  Description: Signs and symptoms of listed problems will be absent or manageable.  Outcome: Not Progressing   Goal Outcome Evaluation:    Plan of Care Reviewed With: patient          Patient remains isolative to room all evening watching television. Patient upon approach flat in affect, calm and cooperative during verbal interactions though observed to be more distracted and often averting eye contact with writer. Patient reporting no changes today. Patient reporting he feels his anxiety has improved to 5/10 but still experiencing depression 9/10 due to SI, and command AH to harm self. Patient continues to contract for safety indicating he feels safe while hospitalized. Patient denied VH, or thoughts of harming others. Patient reporting use of television and music as therapeutic to drown out the voices and cope with depression. RN writer attempted to prompt for increased activity and completion of ADL's but patient refused.     Patient diet appeared good eating 100% of dinner and snacks, receptive of increased fluid intake. Patient cooperative with vital sign assessments with were stable this evening. Patient cooperative with HS medications with no stated or observed side effects. Patients Gabapentin held due to upcoming ECT treatment in the morning. Patient receptive of plan for ECT and NPO status at midnight.

## 2023-03-13 NOTE — PROGRESS NOTES
Patient adequate for discharge . Report called to unit nurse  Williams LINDO    . Iv removed and hemostasis achieved less than 2 min.  Patient safely transported back to unit with  staff persons.
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
## 2023-03-13 NOTE — PROCEDURES
"Nikolay Lora is a 32 year old  year old male patient.  [id number]  @DX@    [facility]   ECT Procedure Note   03/13/2023    Patient Status: Inpatient    Is this the first in a series of 12 treatments?  no   No Known Allergies    Weight:  174 lbs 1.6 oz         Indications for ECT:   Medications ineffective  Imminent risk of suicide         Clinical Narrative:   Nikolay Lora is a 32 year old man with affective dysregulation, ADHD and polysubstance use (alcohol*, amphetamine*, cocaine, cannabis) who is hospitalized with progressive depression leading to suicidal ideation with planning on 1/26, in the context of  medication non compliance and losing his place at sober living due to reported methamphetamine use. Throughout his hospital stay medication adjustments have been made (restarted on sertraline, titrated risperidone, added clozapine, which is being titrated); however, intense suicidal ideation has continued and he has been having difficulty tolerating medication changes. This consultation is requested to evaluate candidacy for electroconvulsive therapy (ECT).      [place] [facility] [place] [facility] to ensure thoroughness, otherwise he can communicate in English well.  The patient shares he cannot stop contemplating suicide with a plan to shoot himself as soon as he is out of the hospital. He reports visual hallucinations of \"lanterns and black birds flying around\" with commanding auditory hallucinations telling him that he is \"worthless\" and \"should kill (himself)\". Although hallucinations got better since admission with medication changes, depression and suicidal contemplation has persisted, he is interested in ECT, hoping for a quicker relief.         Diagnosis:     Schizoaffective Disorder, depressed  Polysubstance Use Disorder in a controlled environment          Assessment:   Considering the clinical acuity  electroconvulsive therapy " "(ECT) appears appropriate; despite multifactorial nature of the presentation that would benefit from optimization of cognitive, behavioral and social interventions longitudinally.      Discussed all relevant aspects of ECT with patient, including risks of memory loss, HA, nausea, death <1/50,000, driving prohibition; possible lack of benefit or relapse after successful treatment, alternatives, right to decline, possible outpatient procedures. All questions answered, patient will have opportunity to review ECT video.         Pause for the Cause:     Right patient Yes   Right procedure/laterality settings: Yes          Intra-Procedure Documentation:     #1 02/27/23 pt says he was feeling \"anxious but content\"  #2 03/01/23 no auditory hallucinations, concern for visual hallucinations of bugs in his room. Still actively suicidal with a plan with gun or kitchen knife.   #3 03/03/23 Visual hallucinations disappeared for a day after last ECT. Thinking about suicide slightly less. Worried about some pain in his right arm. Mood 6.5/10 (10 best)  #4 03/06/23 Visual hallucinations returned over the weekend. Auditory hallucinations the same. Still thinking about suicide. Feels safe in hospital but not outside.   #5 03/08/23 Continues to have violent auditory hallucinations and wishes to die.   #6 3/10/23  Improving. Feels safe on unit. Still having SI with plan to shoot self in head but no plan for suicide on unit. Hearing whispers, but voices are quieter. No visual hallucinations of lanterns, etc. Tolerating ECT well. Sleeping well.   #7 3/13/23 Continues to report A/V/H. They are 'not scary' but 'distressing'.     ECT #: 7   Treatment number this series: 7   Total treatment number: 7     Type of ECT:  Bilateral, standard    ECT Medications:    Brevital: 90mg  Succinyl Choline: 80mg    ECT Strip Summary: (titration 96 mC)  Energy Level: 192 mC, 1ms 20 Hz, 6 sec, 800 mA (increased from 144 mC on 3/6/23)    Motor Seizure " Duration: 33 seconds  EEG Seizure Duration:  40 seconds    Complications:  none    Time for re-orientation: 28 min on 3/6/23    Plan:   - Continue bilateral ECT Q Mon/Wed/Fri at  [facility]  - Continue current medications    Chica Lazo MD

## 2023-03-13 NOTE — ANESTHESIA POSTPROCEDURE EVALUATION
Patient: Nikolay Lora    Procedure: * No procedures listed *       Anesthesia Type:  General    Note:     Postop Pain Control: Uneventful            Sign Out: Well controlled pain   PONV: No   Neuro/Psych: Uneventful            Sign Out: Acceptable/Baseline neuro status   Airway/Respiratory: Uneventful            Sign Out: Acceptable/Baseline resp. status   CV/Hemodynamics: Uneventful            Sign Out: Acceptable CV status; No obvious hypovolemia; No obvious fluid overload   Other NRE: NONE   DID A NON-ROUTINE EVENT OCCUR? No           Last vitals:  Vitals:    03/12/23 0743 03/12/23 1847 03/13/23 0847   BP: 112/80 119/83 (!) 126/90   Pulse: 90 104 95   Resp: 16 16 16   Temp: 36.6  C (97.9  F) 37  C (98.6  F) 37.1  C (98.7  F)   SpO2:  96% 95%       Electronically Signed By: Evelyne Ruelas MD  March 13, 2023  1:43 PM

## 2023-03-13 NOTE — ANESTHESIA CARE TRANSFER NOTE
Patient: Link Lora    Procedure: * No procedures listed *       Diagnosis: * No pre-op diagnosis entered *  Diagnosis Additional Information: No value filed.    Anesthesia Type:   General     Note:    Oropharynx: oropharynx clear of all foreign objects  Level of Consciousness: drowsy  Oxygen Supplementation: room air        Vital Signs Stable: post-procedure vital signs reviewed and stable  Report to RN Given: handoff report given  Patient transferred to: PACU    Handoff Report: Identifed the Patient, Identified the Reponsible Provider, Reviewed the pertinent medical history, Discussed the surgical course, Reviewed Intra-OP anesthesia mangement and issues during anesthesia, Set expectations for post-procedure period and Allowed opportunity for questions and acknowledgement of understanding      Vitals:  Vitals Value Taken Time   BP     Temp     Pulse     Resp     SpO2         Electronically Signed By: Evelyne Ruelas MD  March 13, 2023  1:42 PM

## 2023-03-14 PROCEDURE — 124N000002 HC R&B MH UMMC

## 2023-03-14 PROCEDURE — 250N000013 HC RX MED GY IP 250 OP 250 PS 637: Performed by: PSYCHIATRY & NEUROLOGY

## 2023-03-14 PROCEDURE — 250N000013 HC RX MED GY IP 250 OP 250 PS 637

## 2023-03-14 PROCEDURE — G0177 OPPS/PHP; TRAIN & EDUC SERV: HCPCS

## 2023-03-14 RX ADMIN — CLOZAPINE 300 MG: 200 TABLET ORAL at 20:37

## 2023-03-14 RX ADMIN — SERTRALINE HYDROCHLORIDE 200 MG: 100 TABLET ORAL at 08:16

## 2023-03-14 RX ADMIN — LORATADINE 10 MG: 10 TABLET ORAL at 08:16

## 2023-03-14 RX ADMIN — FLUTICASONE PROPIONATE 2 SPRAY: 50 SPRAY, METERED NASAL at 08:17

## 2023-03-14 RX ADMIN — Medication 12.5 MG: at 08:16

## 2023-03-14 RX ADMIN — GABAPENTIN 300 MG: 300 CAPSULE ORAL at 08:16

## 2023-03-14 RX ADMIN — SENNOSIDES AND DOCUSATE SODIUM 1 TABLET: 50; 8.6 TABLET ORAL at 20:37

## 2023-03-14 RX ADMIN — NICOTINE 1 PATCH: 21 PATCH, EXTENDED RELEASE TRANSDERMAL at 08:36

## 2023-03-14 RX ADMIN — METFORMIN HYDROCHLORIDE 1000 MG: 500 TABLET ORAL at 08:16

## 2023-03-14 RX ADMIN — GABAPENTIN 300 MG: 300 CAPSULE ORAL at 13:02

## 2023-03-14 RX ADMIN — ATROPINE SULFATE 2 DROP: 10 SOLUTION/ DROPS OPHTHALMIC at 20:37

## 2023-03-14 RX ADMIN — PANTOPRAZOLE SODIUM 40 MG: 40 TABLET, DELAYED RELEASE ORAL at 08:16

## 2023-03-14 RX ADMIN — SENNOSIDES AND DOCUSATE SODIUM 1 TABLET: 50; 8.6 TABLET ORAL at 08:16

## 2023-03-14 RX ADMIN — METFORMIN HYDROCHLORIDE 1000 MG: 500 TABLET ORAL at 18:01

## 2023-03-14 ASSESSMENT — ACTIVITIES OF DAILY LIVING (ADL)
HYGIENE/GROOMING: INDEPENDENT;PROMPTS
ADLS_ACUITY_SCORE: 29
LAUNDRY: UNABLE TO COMPLETE
DRESS: SCRUBS (BEHAVIORAL HEALTH)
ADLS_ACUITY_SCORE: 39
LAUNDRY: UNABLE TO COMPLETE
ADLS_ACUITY_SCORE: 29
DRESS: SCRUBS (BEHAVIORAL HEALTH)
ADLS_ACUITY_SCORE: 29
HYGIENE/GROOMING: SHOWER;PROMPTS;INDEPENDENT
ORAL_HYGIENE: INDEPENDENT
HYGIENE/GROOMING: INDEPENDENT;HANDWASHING
ORAL_HYGIENE: INDEPENDENT;PROMPTS
ADLS_ACUITY_SCORE: 29
ADLS_ACUITY_SCORE: 39
ADLS_ACUITY_SCORE: 29

## 2023-03-14 NOTE — PLAN OF CARE
Problem: Sleep Disturbance  Goal: Adequate Sleep/Rest  Outcome: Progressing   Goal Outcome Evaluation:    Patient appeared to sleep 6.75 hours this night shift.  No prns or snacks given or requested.  No concerns were reported or noted.  ECT tomorrow. Hold Metformin and Neurontin until after ECT!                         Complex Repair And Graft Additional Text (Will Appearing After The Standard Complex Repair Text): The complex repair was not sufficient to completely close the primary defect. The remaining additional defect was repaired with the graft mentioned below.

## 2023-03-14 NOTE — PROGRESS NOTES
Met with patient this morning to provide him the phone number to [facility] per psychiatrist request. Writer informed patient that if he had difficulty obtaining information regardiing long term disability [facility] would try and assist him make appropriate phone calls.

## 2023-03-14 NOTE — PLAN OF CARE
"He is isolative and complained of being tired in early discussions.  He states having some anxiety and continues to state suicidal ideation by wanting to get a firearm and shoot himself.  Again, as in previous shifts, states that he does not have a plan in the hospital and agrees to contract for safety.  Informed writer that his depression, \" is the same,\" after going through seven ECT treatments.  He continues to state experiencing auditory and visual hallucinations, but said they are less intense compared to first coming into the hospital.  Support and encouragement given that nursing staff will help him shower and change clothes in the shift, which he did before lunch.  Writer followed up with him and given encouragement for showering and changing clothes.  He states that he is having auditory and visual hallucinations that are not bothersome, less intense.  He was offered prn Zyprexa, yet states he was doing, \" ok.\"  He continues to display disorganized thought process by difficulty expressing himself in the shift.         "

## 2023-03-14 NOTE — PLAN OF CARE
"RN Assessment   Isolative and withdrawn to his room. Endorsed feeling \"depressed\" Family visited, brought food. He reported that it was nice to see his family but overall felt \"indifferent.\" Continues to report recurrent SI with plan to shoot self with a gun if he was outside of the hospital. Stated felt safe in the hospital, and would seek staff if this were to change.   Endorsed anxiety about \"things that I need to take care at home.\"   Reports auditory hallucinations, \"whispers telling me to shoot myself\" and visual hallucinations in forms of floating lanterns, swans, and crows. Also reports seeing shadow on R periphery.   Feels that tolerating ECT well, unsure of significant improvement, but hopeful.   Adherent to scheduled medication. No PRN's this shift.   No physical complains or concerns.   /81 (BP Location: Right arm, Patient Position: Sitting)   Pulse 106   Temp 97.8  F (36.6  C) (Temporal)   Resp 16   Ht 1.6 m (5' 3\")   Wt 79 kg (174 lb 1.6 oz)   SpO2 93%   BMI 30.84 kg/m        "

## 2023-03-14 NOTE — PLAN OF CARE
Assessment/Intervention/Current Symtoms and Care Coordination  -Chart review  -Rounded with team, addressed patient needs/concerns  -Called [facility] and confirmed that pt will need a Rule 25 update prior to being readmitted back at their facility.   -CTC with patient this morning to provide him the phone number to [facility] per psychiatrist request. CTC informed patient that if he had difficulty obtaining information regardiing long term disability [facility] would try and assist him make appropriate phone calls.      Discharge Plan or Goal  Pending stabilization & development of a safe discharge plan.  Considerations include:  Return to [facility]     Barriers to Discharge  Patient requires further psychiatric stabilization due to current symptomology     Referral Status  No referrals made this hospiotalization     Legal Status  Voluntary

## 2023-03-15 ENCOUNTER — ANESTHESIA EVENT (OUTPATIENT)
Dept: BEHAVIORAL HEALTH | Facility: CLINIC | Age: 33
End: 2023-03-15
Payer: COMMERCIAL

## 2023-03-15 ENCOUNTER — APPOINTMENT (OUTPATIENT)
Dept: BEHAVIORAL HEALTH | Facility: CLINIC | Age: 33
End: 2023-03-15
Attending: PSYCHIATRY & NEUROLOGY
Payer: COMMERCIAL

## 2023-03-15 ENCOUNTER — ANESTHESIA (OUTPATIENT)
Dept: BEHAVIORAL HEALTH | Facility: CLINIC | Age: 33
End: 2023-03-15
Payer: COMMERCIAL

## 2023-03-15 PROCEDURE — 90870 ELECTROCONVULSIVE THERAPY: CPT

## 2023-03-15 PROCEDURE — 250N000013 HC RX MED GY IP 250 OP 250 PS 637: Performed by: PSYCHIATRY & NEUROLOGY

## 2023-03-15 PROCEDURE — 250N000013 HC RX MED GY IP 250 OP 250 PS 637

## 2023-03-15 PROCEDURE — 250N000009 HC RX 250: Performed by: ANESTHESIOLOGY

## 2023-03-15 PROCEDURE — 250N000011 HC RX IP 250 OP 636: Performed by: ANESTHESIOLOGY

## 2023-03-15 PROCEDURE — 90870 ELECTROCONVULSIVE THERAPY: CPT | Performed by: PSYCHIATRY & NEUROLOGY

## 2023-03-15 PROCEDURE — 370N000017 HC ANESTHESIA TECHNICAL FEE, PER MIN

## 2023-03-15 PROCEDURE — 124N000002 HC R&B MH UMMC

## 2023-03-15 PROCEDURE — H2032 ACTIVITY THERAPY, PER 15 MIN: HCPCS

## 2023-03-15 PROCEDURE — 99233 SBSQ HOSP IP/OBS HIGH 50: CPT | Mod: 25 | Performed by: PSYCHIATRY & NEUROLOGY

## 2023-03-15 RX ORDER — ONDANSETRON 4 MG/1
4 TABLET, ORALLY DISINTEGRATING ORAL EVERY 30 MIN PRN
Status: DISCONTINUED | OUTPATIENT
Start: 2023-03-15 | End: 2023-03-15

## 2023-03-15 RX ORDER — ONDANSETRON 2 MG/ML
4 INJECTION INTRAMUSCULAR; INTRAVENOUS EVERY 30 MIN PRN
Status: DISCONTINUED | OUTPATIENT
Start: 2023-03-15 | End: 2023-03-15

## 2023-03-15 RX ORDER — ESMOLOL HYDROCHLORIDE 10 MG/ML
INJECTION INTRAVENOUS PRN
Status: DISCONTINUED | OUTPATIENT
Start: 2023-03-15 | End: 2023-03-15

## 2023-03-15 RX ORDER — SODIUM CHLORIDE, SODIUM LACTATE, POTASSIUM CHLORIDE, CALCIUM CHLORIDE 600; 310; 30; 20 MG/100ML; MG/100ML; MG/100ML; MG/100ML
INJECTION, SOLUTION INTRAVENOUS CONTINUOUS
Status: DISCONTINUED | OUTPATIENT
Start: 2023-03-15 | End: 2023-03-15

## 2023-03-15 RX ADMIN — SENNOSIDES AND DOCUSATE SODIUM 1 TABLET: 50; 8.6 TABLET ORAL at 20:00

## 2023-03-15 RX ADMIN — NICOTINE 1 PATCH: 21 PATCH, EXTENDED RELEASE TRANSDERMAL at 13:51

## 2023-03-15 RX ADMIN — METFORMIN HYDROCHLORIDE 1000 MG: 500 TABLET ORAL at 17:35

## 2023-03-15 RX ADMIN — FLUTICASONE PROPIONATE 2 SPRAY: 50 SPRAY, METERED NASAL at 08:14

## 2023-03-15 RX ADMIN — ATROPINE SULFATE 2 DROP: 10 SOLUTION/ DROPS OPHTHALMIC at 20:01

## 2023-03-15 RX ADMIN — SERTRALINE HYDROCHLORIDE 200 MG: 100 TABLET ORAL at 13:52

## 2023-03-15 RX ADMIN — SENNOSIDES AND DOCUSATE SODIUM 1 TABLET: 50; 8.6 TABLET ORAL at 13:52

## 2023-03-15 RX ADMIN — METHOHEXITAL SODIUM 90 MG: 500 INJECTION, POWDER, LYOPHILIZED, FOR SOLUTION INTRAMUSCULAR; INTRAVENOUS; RECTAL at 12:40

## 2023-03-15 RX ADMIN — GABAPENTIN 300 MG: 300 CAPSULE ORAL at 20:00

## 2023-03-15 RX ADMIN — LORATADINE 10 MG: 10 TABLET ORAL at 13:52

## 2023-03-15 RX ADMIN — SUCCINYLCHOLINE CHLORIDE 80 MG: 20 INJECTION, SOLUTION INTRAMUSCULAR; INTRAVENOUS; PARENTERAL at 12:44

## 2023-03-15 RX ADMIN — METFORMIN HYDROCHLORIDE 1000 MG: 500 TABLET ORAL at 13:52

## 2023-03-15 RX ADMIN — ESMOLOL HYDROCHLORIDE 50 MG: 10 INJECTION, SOLUTION INTRAVENOUS at 12:45

## 2023-03-15 RX ADMIN — CLOZAPINE 300 MG: 200 TABLET ORAL at 20:00

## 2023-03-15 RX ADMIN — PANTOPRAZOLE SODIUM 40 MG: 40 TABLET, DELAYED RELEASE ORAL at 08:14

## 2023-03-15 RX ADMIN — Medication 12.5 MG: at 08:14

## 2023-03-15 RX ADMIN — GABAPENTIN 300 MG: 300 CAPSULE ORAL at 13:52

## 2023-03-15 ASSESSMENT — ACTIVITIES OF DAILY LIVING (ADL)
ADLS_ACUITY_SCORE: 29
ADLS_ACUITY_SCORE: 29
DRESS: INDEPENDENT
ADLS_ACUITY_SCORE: 29
HYGIENE/GROOMING: INDEPENDENT
HYGIENE/GROOMING: INDEPENDENT
ADLS_ACUITY_SCORE: 29
ORAL_HYGIENE: INDEPENDENT
ADLS_ACUITY_SCORE: 29
LAUNDRY: UNABLE TO COMPLETE
DRESS: INDEPENDENT;SCRUBS (BEHAVIORAL HEALTH)
ADLS_ACUITY_SCORE: 29
ADLS_ACUITY_SCORE: 29
ORAL_HYGIENE: INDEPENDENT

## 2023-03-15 NOTE — ANESTHESIA CARE TRANSFER NOTE
Patient: Nikolay Lora    Procedure: * No procedures listed *       Diagnosis: * No pre-op diagnosis entered *  Diagnosis Additional Information: No value filed.    Anesthesia Type:   General     Note:    Oropharynx: oropharynx clear of all foreign objects  Level of Consciousness: drowsy          Vital Signs Stable: post-procedure vital signs reviewed and stable  Report to RN Given: handoff report given  Patient transferred to: PACU    Handoff Report: Identifed the Patient, Identified the Reponsible Provider, Reviewed the pertinent medical history, Discussed the surgical course, Reviewed Intra-OP anesthesia mangement and issues during anesthesia, Set expectations for post-procedure period and Allowed opportunity for questions and acknowledgement of understanding      Vitals:  Vitals Value Taken Time   /94 03/15/23 1302   Temp 36.2  C (97.1  F) 03/15/23 1302   Pulse 104 03/15/23 1302   Resp 14 03/15/23 1302   SpO2 94 % 03/15/23 1302       Electronically Signed By: Tim Bill DO  March 15, 2023  1:03 PM

## 2023-03-15 NOTE — PROGRESS NOTES
Patient adequate for discharge back to unit. Report called to unit nurse  Beryl. IV removed and hemostasis achieved less than 2 min.  See MAR for medications given during procedure.  Patient safely transported back to unit with  staff person.

## 2023-03-15 NOTE — PLAN OF CARE
Assessment/Intervention/Current Symtoms and Care Coordination  -Chart review  -Rounded with team, addressed patient needs/concerns     Discharge Plan or Goal  Pending stabilization & development of a safe discharge plan.  Considerations include:  Return to [facility].  Will need CD update prior to admission.      Barriers to Discharge  Patient requires further psychiatric stabilization due to current symptomology     Referral Status  No referrals made this hospiotalization     Legal Status  Voluntary

## 2023-03-15 NOTE — PLAN OF CARE
Problem: Psychotic Signs/Symptoms  Goal: Enhanced Social, Occupational or Functional Skills (Psychotic Signs/Symptoms)  Intervention: Promote Social, Occupational and Functional Ability  Recent Flowsheet Documentation  Taken 3/14/2023 1825 by Gerber Metcalf RN  Trust Relationship/Rapport:   care explained   choices provided   emotional support provided   empathic listening provided   questions answered   questions encouraged   reassurance provided   thoughts/feelings acknowledged   Goal Outcome Evaluation:    Plan of Care Reviewed With: patient          Patient more active and social in the milieu this evening, attending group and at times watching television quietly in the lounge. Patient upon approach flat in affect, calm and cooperative with verbal assessment. Patient reporting continued symptoms of anxiety and depression 7/10 and auditory command hallucinations with SI. Patient reporting today that attending group and listening music has been therapeutic for his depression helping him to distract from the thoughts. Patient reporting that he felt very tired early in the day and had a lack of strength. Patient reporting it helped him to increase his activity and RN writer throughout the evening prompted for increased activity. Patient stating acceptance with current care plan, medications, and ECT treatments.     Patient cooperative with vital sign assessment which were stable. Patient diet appeared good eating 75% of dinner tray and receptive of prompting for increased fluid intake. Patient accepting of HS medications with no stated or observed side effects. Patient independent with identifying needs and completing ADl's earlier in the day.

## 2023-03-15 NOTE — PROCEDURES
"Nikolay Lora is a 32 year old  year old male patient.  [id number]  @DX@    [facility]   ECT Procedure Note   03/15/2023    Patient Status: Inpatient    Is this the first in a series of 12 treatments?  no   No Known Allergies    Weight:  174 lbs 1.6 oz         Indications for ECT:   Medications ineffective  Imminent risk of suicide         Clinical Narrative:   Nikolay Lora is a 32 year old man with affective dysregulation, ADHD and polysubstance use (alcohol*, amphetamine*, cocaine, cannabis) who is hospitalized with progressive depression leading to suicidal ideation with planning on 1/26, in the context of  medication non compliance and losing his place at sober living due to reported methamphetamine use. Throughout his hospital stay medication adjustments have been made (restarted on sertraline, titrated risperidone, added clozapine, which is being titrated); however, intense suicidal ideation has continued and he has been having difficulty tolerating medication changes. This consultation is requested to evaluate candidacy for electroconvulsive therapy (ECT).      We utilized [place] [facility] to ensure thoroughness, otherwise he can communicate in English well.  The patient shares he cannot stop contemplating suicide with a plan to shoot himself as soon as he is out of the hospital. He reports visual hallucinations of \"lanterns and black birds flying around\" with commanding auditory hallucinations telling him that he is \"worthless\" and \"should kill (himself)\". Although hallucinations got better since admission with medication changes, depression and suicidal contemplation has persisted, he is interested in ECT, hoping for a quicker relief.         Diagnosis:     Schizoaffective Disorder, depressed  Polysubstance Use Disorder in a controlled environment          Assessment:   Considering the clinical acuity  electroconvulsive therapy " "(ECT) appears appropriate; despite multifactorial nature of the presentation that would benefit from optimization of cognitive, behavioral and social interventions longitudinally.      Discussed all relevant aspects of ECT with patient, including risks of memory loss, HA, nausea, death <1/50,000, driving prohibition; possible lack of benefit or relapse after successful treatment, alternatives, right to decline, possible outpatient procedures. All questions answered, patient will have opportunity to review ECT video.         Pause for the Cause:     Right patient Yes   Right procedure/laterality settings: Yes          Intra-Procedure Documentation:     #1 02/27/23 pt says he was feeling \"anxious but content\"  #2 03/01/23 no auditory hallucinations, concern for visual hallucinations of bugs in his room. Still actively suicidal with a plan with gun or kitchen knife.   #3 03/03/23 Visual hallucinations disappeared for a day after last ECT. Thinking about suicide slightly less. Worried about some pain in his right arm. Mood 6.5/10 (10 best)  #4 03/06/23 Visual hallucinations returned over the weekend. Auditory hallucinations the same. Still thinking about suicide. Feels safe in hospital but not outside.   #5 03/08/23 Continues to have violent auditory hallucinations and wishes to die.   #6 3/10/23  Improving. Feels safe on unit. Still having SI with plan to shoot self in head but no plan for suicide on unit. Hearing whispers, but voices are quieter. No visual hallucinations of lanterns, etc. Tolerating ECT well. Sleeping well.   #7 3/13/23 Continues to report A/V/H. They are 'not scary' but 'distressing'.   #8 3/15/23 Continues to report A/V/H. Asserts that frequency is reduced but not intensity. Denies side effects but admits that 'days are starting to blend in and I don't remember the dates'.     ECT #: 8   Treatment number this series: 8   Total treatment number: 8     Type of ECT:  Bilateral, standard    ECT " Medications:    Brevital: 90mg  Succinyl Choline: 80mg    ECT Strip Summary: (titration 96 mC)  Energy Level: 192 mC, 1 ms 20 Hz, 6 sec, 800 mA (increased from 144 mC on 3/6/23)    Motor Seizure Duration: 27 seconds  EEG Seizure Duration: 43 seconds    Complications:  none    Time for re-orientation: 28 min on 3/6/23    Plan:   - Continue bilateral ECT Q Mon/Wed/Fri at  192 mC  - Continue current medications    Discharge instruction: ask primary team if they are considering clozapine since patient is showing limited response to ECT. We plan to continue up to 12 treatments for now, unless clear improvements are noticed in the days to come.     Chica Lazo MD

## 2023-03-15 NOTE — PLAN OF CARE
"Goal Outcome Evaluation:     Plan of Care Reviewed With: patient     Patient alert and oriented to person, place, and time, adequately groomed. Pt pleasant, calm, blunted affect, good eye contact, cooperative, medication compliant. No PRN's given. Pt on suicidal, falls precautions, no behaviors observed. Pt contracts for safety. Team discussion - will reassess for additional ECT treatments on Monday 3/20. Pt observed isolated in room watching tv, appropriate with peers and staff. ECT scheduled for 0900, ECT checklist completed, VS stable, ECT requested meds given, protonix, metoprolol, and flonase. ECT late, pt off unit at 1200, returned 1330. VS taken, no pain, no nausea. Pt ate lunch, medications given. Pt observed sleeping afternoon.      Pt demonstrates ability to communicate needs to staff. No behaviors noted. Will continue to monitor behavior and encourage engagement.     NURSING ASSESSMENT  Pain: denied  Anxiety: \"a little\"  Depression: \"a little\"  SI: denied  SIB: denied  HI: denied  AVH: denied \"not right now\", did not appear to be responding to internal stimuli  Mood/Affect: calm, pleasant, blunted affect  Sleep: pt states \"good\"  Medication: compliant, no side effects reported or observed  PRN: none  Appetite: Breakfast  0% d/t ECT treatment late this morning                    Lunch 100%  ADLs: independent   Visits: none  Vitals: WNL   Medical:  no issues reported or observed    Problem: Adult Behavioral Health Plan of Care  Goal: Plan of Care Review  Outcome: Progressing     Problem: Adult Behavioral Health Plan of Care  Goal: Adheres to Safety Considerations for Self and Others  Outcome: Progressing     Problem: Suicide Risk  Goal: Absence of Self-Harm  Outcome: Progressing     Problem: Suicidal Behavior  Goal: Suicidal Behavior is Absent or Managed  Outcome: Progressing     Problem: Sleep Disturbance  Goal: Adequate Sleep/Rest  Outcome: Progressing     Problem: Psychotic Signs/Symptoms  Goal: " Increased Participation and Engagement (Psychotic Signs/Symptoms)  Outcome: Progressing     Problem: Psychotic Signs/Symptoms  Goal: Improved Mood Symptoms  Outcome: Progressing   Goal Outcome Evaluation:

## 2023-03-15 NOTE — PROGRESS NOTES
"Pt participated in dance/movement therapy (D/MT) using a gentle self-expressive dance to affirm self-acceptance and worth, with pt initiating and following inspirational movement. Uplifting and opening was balanced with self-protection postures.  Rhythmic group movements supported cognitive organizing and stabilization of mood.  Pt shared he felt \"more energized\" by the movements and this supported an increase in the vitality of his movement expressions.  He expressed a desire to participate in D/MT again soon and made a commitment to do so.       03/15/23 6669   Expressive Therapy   Therapy Type dance/movement   Minutes of Treatment 55       "

## 2023-03-15 NOTE — ANESTHESIA PREPROCEDURE EVALUATION
Anesthesia Pre-Procedure Evaluation    Patient: Nikolay Lora   MRN: [id number] : 1990        Procedure : * No procedures listed *          Past Medical History:   Diagnosis Date     Brugada syndrome      Chronic idiopathic urticaria      Concussion with loss of consciousness      Mixed hyperlipidemia      Polysubstance abuse (H)      Schizotypal personality disorder (H)       No past surgical history on file.   No Known Allergies   Social History     Tobacco Use     Smoking status: Former     Smokeless tobacco: Never     Tobacco comments:     1-2 cigs per day   Substance Use Topics     Alcohol use: Yes      Wt Readings from Last 1 Encounters:   23 79 kg (174 lb 1.6 oz)        Anesthesia Evaluation   Pt has had prior anesthetic.         ROS/MED HX  ENT/Pulmonary:       Neurologic:       Cardiovascular:       METS/Exercise Tolerance:     Hematologic:       Musculoskeletal:       GI/Hepatic:       Renal/Genitourinary:       Endo:       Psychiatric/Substance Use:       Infectious Disease:       Malignancy:       Other:            Physical Exam    Airway        Mallampati: III   TM distance: > 3 FB   Neck ROM: full   Mouth opening: > 3 cm    Respiratory Devices and Support         Dental       (+) Minor Abnormalities - some fillings, tiny chips      Cardiovascular   cardiovascular exam normal          Pulmonary   pulmonary exam normal                OUTSIDE LABS:  CBC:   Lab Results   Component Value Date    WBC 13.0 (H) 2023    WBC 12.7 (H) 2023    HGB 15.0 2023    HGB 15.8 2023    HCT 46.1 2023    HCT 48.3 2023     2023     2023     BMP:   Lab Results   Component Value Date     2023     2023    POTASSIUM 3.9 2023    POTASSIUM 4.0 2023    CHLORIDE 103 2023    CHLORIDE 100 2023    CO2 27 2023    CO2 27 2023    BUN 19.5 2023    BUN 12.5 2023    CR 0.81 2023    CR 0.81  02/09/2023     (H) 03/13/2023     (H) 03/10/2023     COAGS: No results found for: PTT, INR, FIBR  POC: No results found for: BGM, HCG, HCGS  HEPATIC:   Lab Results   Component Value Date    ALBUMIN 4.1 02/24/2023    PROTTOTAL 7.1 02/24/2023    ALT 58 (H) 02/24/2023    AST 29 02/24/2023    ALKPHOS 82 02/24/2023    BILITOTAL 0.3 02/24/2023     OTHER:   Lab Results   Component Value Date    A1C 5.4 01/28/2023    KATINA 9.4 02/24/2023    TSH 1.09 01/28/2023       Anesthesia Plan    ASA Status:  3      Anesthesia Type: General.   Induction: Intravenous.           Consents            Postoperative Care            Comments:                Tim Bill DO

## 2023-03-15 NOTE — PLAN OF CARE
Pt asleep at start of shift.     Breathing quiet and unlabored when sleeping.     Pt had no c/o pain or discomfort during the HS.     Appears to have slept 6.75 hours.     Patient remained NPO after 0000. No medications given during the NOC shift.     Goal Outcome Evaluation:  Problem: Sleep Disturbance  Goal: Adequate Sleep/Rest  Outcome: Progressing

## 2023-03-15 NOTE — PROGRESS NOTES
"[facility], [place]   Psychiatric Progress Note  Hospital Day: 47        Interim History:   The patient's care was discussed with the treatment team during the daily team meeting and/or staff's chart notes were reviewed. Staff report patient remained mostly isolated and withdrawn throughout the weekend. He was calm and cooperative with a flat affect upon approach. He continued to endorse depression, anxiety, SI, AH, and VH throughout the weekend. Staff report that patient endorsed using television and music to drown out the voices and cope with depression. Pt did not report any acute medical conditions or side effects. Staff report encouraging patient to keep up with hygiene and ADLs. Pt is compliant with his medications. Sleeping well.     Upon interview, Haseeb said that he feels ECT is helping his depression. He noted that his depression was a 10/10 in severity upon admission, and currently it is a 7/10. He is feeling more anxious recently, however. Unable to identify what has resulted in heightened anxiety as he reported improved anxiety when I met with him on Monday. He would like to proceed with ECT given perceived benefits and lack of reported or observed side effects. He has not noticed any changes wrt suicidal ideation, however, he did mention the concerns he has about how his death would forever impact his mother's life. He lost his partner from suicide and is aware of the devastating effects it has on loved ones. He remains future oriented with ongoing desire to return to [facility] upon discharge. Continues to report VH of doves, crows, and lanterns. Continues to hear \"whispers\" telling him to harm himself.     Suicidal ideation: Haseeb reports ongoing active SI with plan and intent to shoot himself with a gun or cut his throat or wrists upon discharge. Denies suicide plan or intent in the hospital. [place] for safety.     Homicidal ideation: denies current or recent " "homicidal ideation or behaviors.     Psychotic symptoms: As above    Medication side effects reported: None    Acute medical concerns: None    Other issues reported by patient: Patient had no further questions or concerns.            Medications:       atropine  2 drop Sublingual At Bedtime     cloZAPine  300 mg Oral At Bedtime     fluticasone  2 spray Both Nostrils Daily     gabapentin  300 mg Oral TID     loratadine  10 mg Oral Daily     metFORMIN  1,000 mg Oral BID w/meals     metoprolol succinate ER  12.5 mg Oral Daily     nicotine  1 patch Transdermal Daily     nicotine   Transdermal Q8H     pantoprazole  40 mg Oral QAM AC     senna-docusate  1 tablet Oral BID     sertraline  200 mg Oral Daily          Allergies:   No Known Allergies       Labs:     No results found for this or any previous visit (from the past 24 hour(s)).       Psychiatric Examination:     BP (!) 136/96 (BP Location: Left arm, Patient Position: Sitting, Cuff Size: Adult Regular)   Pulse 100   Temp 96.9  F (36.1  C) (Temporal)   Resp 16   Ht 1.6 m (5' 3\")   Wt 79 kg (174 lb 1.6 oz)   SpO2 93%   BMI 30.84 kg/m    Weight is 174 lbs 1.6 oz  Body mass index is 30.84 kg/m .    Weight over time:  Vitals:    01/27/23 1718 03/07/23 0933   Weight: 75.1 kg (165 lb 8 oz) 79 kg (174 lb 1.6 oz)       Orthostatic Vitals       None          Cardiometabolic risk assessment. 01/30/23    Reviewed patient profile for cardiometabolic risk factors    Date taken /Value  REFERENCE RANGE   Abdominal Obesity  (Waist Circumference)   See nursing flowsheet Women ?35 in (88 cm)   Men ?40 in (102 cm)      Triglycerides  Triglycerides   Date Value Ref Range Status   01/28/2023 192 (H) <150 mg/dL Final       ?150 mg/dL (1.7 mmol/L) or current treatment for elevated triglycerides   HDL cholesterol  Direct Measure HDL   Date Value Ref Range Status   01/28/2023 41 >=40 mg/dL Final   ]   Women <50 mg/dL (1.3 mmol/L) in women or current treatment for low HDL " "cholesterol  Men <40 mg/dL (1 mmol/L) in men or current treatment for low HDL cholesterol     Fasting plasma glucose (FPG) Lab Results   Component Value Date     01/28/2023      FPG ?100 mg/dL (5.6 mmol/L) or treatment for elevated blood glucose   Blood pressure  BP Readings from Last 3 Encounters:   03/15/23 (!) 136/96   01/27/23 118/74   07/20/22 112/79    Blood pressure ?130/85 mmHg or treatment for elevated blood pressure   Family History  See family history     Appearance: awake, alert, dressed in hospital scrubs and unkempt  Attitude:  cooperative, calm  Eye Contact: fair  Mood: \"the depression is better but the anxiety is worse\"  Affect:  mood congruent and intensity is blunted  Speech:  clear, coherent. appropriate  Language: fluent and intact in English  Psychomotor, Gait, Musculoskeletal:  no evidence of tardive dyskinesia, dystonia, or tics  Throught Process:  Linear, ruminative  Associations:  No evidence of loose associations  Thought Content:  SI w/plan and intent upon discharge, no plan or intent while in hospital. Auditory and visual hallucinations present.  Insight:  fair  Judgement:  poor  Oriented to:  time, person, and place  Attention Span and Concentration:  fair  Recent and Remote Memory:  fair  Fund of Knowledge:  appropriate    Clinical Global Impressions  First:  Considering your total clinical experience with this particular patient population, how severe are the patient's symptoms at this time?: 7 (01/28/23 1341)    Most recent:  Compared to the patient's condition at the START of treatment, this patient's condition is: 4           Precautions:     Behavioral Orders   Procedures     Code 1 - Restrict to Unit     Code 2     For imaging     Electroconvulsive therapy     Series of up to 12 treatments. Begin Date: 2/27/23     Treating Psychiatrist providing ECT:  Dr. Kearns     Notified on:  2/23/23     Electroconvulsive therapy     For acute ECT series, MWF, up to 12 treatment.     " "Electroconvulsive therapy     Series of up to 12 treatments. Begin Date: 02/26/23     Treating Psychiatrist providing ECT: Clifton  Notified on: 02/24/23     Fall precautions     Routine Programming     As clinically indicated     Status 15     Every 15 minutes.     Suicide precautions     Patients on Suicide Precautions should have a Combination Diet ordered that includes a Diet selection(s) AND a Behavioral Tray selection for Safe Tray - with utensils, or Safe Tray - NO utensils            Diagnoses:     Active suicidal ideation with intent outside of hospital setting  MDD, recurrent, severe with psychotic features vs Schizoaffective Disorder, depressive type  Polysubstance use  Unspecified mood disorder  ADHD, inattentive type per chart  History of idiopathic hypersomnolence per chart  Nicotine use disorder, dependence and withdrawal   Allergies- chronic uticaria and rhinitis per chart  HLD per chart          Assessment & Plan:     Assessment and hospital summary:  This patient is a 32 year old male with history of mood disorder, ADHD and substance use who presented to [facility] ED with SI on 1/26 in context of reported methamphetamine use and being kicked out of sober living. Medically cleared in ED, placed on 72HH and admitted to [facility]. Limited historian, giving inconsistent reports about substance use, UDS negative, very somnolent, endorsing ongoing SI and some psychosis symptoms. PTA medications continued with exception of finasteride as patient states he takes \"1/4 a pill\" and declined ordered dose, will defer to primary team to address. Will continue to evaluate safety and stabilization further as patient more able to engage in assessments. Inpatient psychiatric hospitalization is warranted at this time for safety, stabilization, and possible adjustment in medications.    Patient reports that he abruptly stopped Zoloft one week prior to his admission. He developed discontinuation syndrome symptoms following " that. He reports that he does not have a history of psychosis but is experiencing psychotic symptoms. He is amenable with plan to trial risperidone after R/B/A were discussed. Risperidone was initiated on 1/30 and titrated to a total of 3 mg daily on 2/1. Clonidine, used for ADHD, was reduced from a total of 0.3 mg daily to 0.2 mg daily on 2/3 due to concern for hypotension. 2/9: Cardiology consult placed. EKG- tachycardia 121 bpm, QTc 465 ms, Abnormal QRS angle and T wave abnormality. COVID negative and labs are unremarkable.  On 2/10, clozapine was held pending additional workup from IM and cardiology. Pericarditis was ruled out and metoprolol was started. Clozapine was restarted on 2/13 with plan to cautiously titrate to lowest effective dose. 2/23/23: Clozapine titration schedule increased 25 mg/day. ECT and IM consults for ECT clearance placed. 2/27/23: ECT initiated. Risperidone was discontinued on 2/28. Clozapine level obtained on 3/3 at corresponding dose of 300 mg and was within therapeutic range (1001). Metformin increased to 1000 mg BID on 3/6 to target increased appetite/wt gain. Minimal improvement noted since initiation of both clozapine and ECT.     Psychiatric treatment/inteventions:  Medications:   -Continue clozapine 300 mg qhs. Clozapine quant at corresponding dose is 1001.  -Continue PTA sertraline 200 mg daily for mood  -Continue PTA gabapentin 300 mg TID for anxiety   -Continue Cogentin 1 mg at bedtime for possible EPSE     -PRN hydroxyzine 25mg every 4 hour for anxiety  -PRN trazodone 50mg at bedtime for sleep   -PRN olanzapine 10mg PO or IM TID for agitation/psychosis   -PRN atropine 1% SL drops, 2 drops q2h for sialorrhea    Spiritual services consult placed on 2/6. Pt is [employer]. Appreciate assistance.     ECT:  - Medically cleared on 2/24. See IM note for details.  - ECT consult placed on 2/23.  - ECT started on 2/27/23  - HOLD Gabapentin on nights prior to ECT and on ECT mornings until  after ECT.   - ECT #8 completed 3/15/23. Uneventful. Next ECT treatment scheduled for Friday, 3/17. Baseline IDS-SR was 55. Hard copy placed in chart. Patient asked to complete again. Score is 53 as of 3/9.       Laboratory/Imaging: reviewed: Covid negative; UDS negative; CMP, CBC, TSH, Lipid panel, HgbA1c ordered to complete admission labs. Reviewed.     2/9/23: Troponin, CK, CBC, BMP: Unremarkable, CRP elevated to 38.18, COVID: Negative  2/10/23: Add Influenza A/B given flu-like sx-negative  Patient will be treated in therapeutic milieu with appropriate individual and group therapies as described.     Medical treatment/interventions:  Medical concerns:   Nicotine replacement ordered, educate patient on benefits of cessation, pt unclear about finasteride dose, will hold until further information is obtained. PTA PRN zyrtec, azelastine, fluticasone, triamcinolone and epipen ordered for allergies and PRN ammoniaum lactate, Eucerin for  dry skin.    Flatulence:   - simethicone prn    Neuroleptic induced wt gain:  - Monitor weights  - Continue metformin 1,000 mg BID with meals    Dyslipidemia: Will arrange follow up with PCP to address. Discussed importance of healthy eating habits and regular exercise. Will consider nutrition consult if patient amenable.     Orthostasis likely 2/2 clozapine: Pt is not hypotensive but reports orthostatic symptoms and previously restricted fluids. Now resolved.   - Continue to monitor vital signs closely  - Encourage fluids  - Discontinued clonidine    Sialorrhea 2/2 clozapine:  - Atropine 1% SL drops qhs  - Recommend towel on pillow when sleeping    Chest pain/tachycardia/EKG changes (2/9):  - Pain improved with PRN medications.   - Cardiology consult placed on 2/9. See note on that date for details.   - ECHO reviewed and unremarkable.  - Writer discussed care with both Dr. Streeter from [facility] and Christina from . Plan for now is to HOLD clozapine until further medical workup. Dr. Streeter  explained that myocarditis has been ruled out as troponin was negative. Pericarditis remains on differential, but he does not have EKG findings or a pericardial effusion. IM will assess for pericardial friction rub and pleuritic chest pain. IM seen by patient and Metoprolol was started.     Update 2/13: Discussed care with Annette from IM today on two occasions. Please see her note on this date for details. She does not suspect that this is pericarditis. He does not have pleuritic pain, characteristic CP, EKG changes, or pericardial effusion on ECHO. Therefore, will cautiously restart clozapine while monitoring closely for side effects. May consider further increase in metoprolol if necessary. PPI was started due to suspected GERD.     Update 2/24 per IM note:  Medicine is following peripherally for concerns for pericarditis.  See detailed note from 2/13.  No pericardial friction rub noted while sitting up and leaning forward today.  Patient states that his chest discomfort is getting better after starting the Protonix yesterday.  It does appear that he has also been using the Maalox.  Patient does have Tums available as well.  Please be mindful for any constipation with overuse of the PRNs.     POC:  - Continue Protonix daily for 8 weeks, then taper off  - Recommend lifestyle changes including weight loss head of bed elevation avoidance of late-night eating and specific food elimination such as chocolate caffeine alcohol acidic and/or spicy food.  - Watch for constipation with PRNs for heartburn  - We will schedule senna 1 tab twice daily  - MiraLAX daily as needed added on     Medicine will sign off, please contact us for any acute changes.      Sore throat/cough/nasal congestion, resolved:   - COVID negative on 2/9. Repeat COVID test and Influenza A/B neg on 2/10.  - Cepacol lozenges added  - PRN Tylenol   - Consulted with IM. Appreciate assistance. See their notes for details.      Legal Status: VOLUNTARY. 72  hour hold discontinued. Pt agreed to sign in on voluntary basis and discharge directly to treatment once stable.      Safety Assessment:        Behavioral Orders   Procedures     Code 1 - Restrict to Unit     Routine Programming       As clinically indicated     Status 15       Every 15 minutes.     Suicide precautions       Patients on Suicide Precautions should have a Combination Diet ordered that includes a Diet selection(s) AND a Behavioral Tray selection for Safe Tray - with utensils, or Safe Tray - NO utensils        Withdrawal precautions      Pt has not required locked seclusion or restraints in the past 24 hours to maintain safety, please refer to RN documentation for further details.    Discontinued SIO on 2/8 as patient is heriberto for safety. Monitor SI closely.     The risks, benefits, alternatives and side effects have been discussed and are understood by the patient.     Disposition: Pending clinical stabilization. Pt remains actively suicidal. [facility].     Entered by: Nelly Brown MD on 3/15/2023 at 3:25 PM

## 2023-03-15 NOTE — ANESTHESIA POSTPROCEDURE EVALUATION
Patient: Nikolay Lora    Procedure: * No procedures listed *       Anesthesia Type:  General    Note:  Disposition: Outpatient   Postop Pain Control: Uneventful            Sign Out: Well controlled pain   PONV: No   Neuro/Psych: Uneventful            Sign Out: Acceptable/Baseline neuro status   Airway/Respiratory: Uneventful            Sign Out: Acceptable/Baseline resp. status   CV/Hemodynamics: Uneventful            Sign Out: Acceptable CV status; No obvious hypovolemia; No obvious fluid overload   Other NRE: NONE   DID A NON-ROUTINE EVENT OCCUR? No           Last vitals:  Vitals:    03/15/23 0814 03/15/23 1250 03/15/23 1302   BP: 117/71 (!) 133/91 (!) 131/94   Pulse: 94 106 104   Resp: 16 16 14   Temp: 36.7  C (98  F) 36.2  C (97.2  F) 36.2  C (97.1  F)   SpO2: 98% 90% 94%       Electronically Signed By: Tim Bill DO  March 15, 2023  1:04 PM

## 2023-03-15 NOTE — PROGRESS NOTES
03/14/23 1900   Group Therapy Session   Group Attendance attended group session   Time Session Began 1615   Time Session Ended 1700   Total Time (minutes) 45   Total # Attendees 5   Group Type recreation   Group Session Detail Tapple category activity for concentration, follow through, cognitive processing, tracking, lateral thinking, coping, mood stabilization, reality-based activity, healthy leisure, socialization and an opportunity to experience success.   Patient Participation Detail Pt was respectful, pleasant and invested in group activity.  Pt was markedly delayed with most responses as he needed additional time to generate a response each time it was his turn.  Pt's affect was flat/blunted for most of the session, though he did brighten briefly a couple of times with socialization and moments of success.  Pt voiced enjoying the activity and he asked therapist where he could purchase a Tapple game.

## 2023-03-16 LAB
BASOPHILS # BLD AUTO: 0.1 10E3/UL (ref 0–0.2)
BASOPHILS NFR BLD AUTO: 0 %
EOSINOPHIL # BLD AUTO: 0.1 10E3/UL (ref 0–0.7)
EOSINOPHIL NFR BLD AUTO: 1 %
IMM GRANULOCYTES # BLD: 0.1 10E3/UL
IMM GRANULOCYTES NFR BLD: 1 %
LYMPHOCYTES # BLD AUTO: 2.2 10E3/UL (ref 0.8–5.3)
LYMPHOCYTES NFR BLD AUTO: 17 %
MONOCYTES # BLD AUTO: 0.8 10E3/UL (ref 0–1.3)
MONOCYTES NFR BLD AUTO: 6 %
NEUTROPHILS # BLD AUTO: 10 10E3/UL (ref 1.6–8.3)
NEUTROPHILS NFR BLD AUTO: 75 %
NRBC # BLD AUTO: 0 10E3/UL
NRBC BLD AUTO-RTO: 0 /100
WBC # BLD AUTO: 13.3 10E3/UL (ref 4–11)

## 2023-03-16 PROCEDURE — 250N000013 HC RX MED GY IP 250 OP 250 PS 637: Performed by: PSYCHIATRY & NEUROLOGY

## 2023-03-16 PROCEDURE — 250N000013 HC RX MED GY IP 250 OP 250 PS 637

## 2023-03-16 PROCEDURE — 99233 SBSQ HOSP IP/OBS HIGH 50: CPT | Mod: GC | Performed by: PSYCHIATRY & NEUROLOGY

## 2023-03-16 PROCEDURE — 124N000002 HC R&B MH UMMC

## 2023-03-16 PROCEDURE — G0177 OPPS/PHP; TRAIN & EDUC SERV: HCPCS

## 2023-03-16 PROCEDURE — 36415 COLL VENOUS BLD VENIPUNCTURE: CPT | Performed by: PSYCHIATRY & NEUROLOGY

## 2023-03-16 PROCEDURE — 85048 AUTOMATED LEUKOCYTE COUNT: CPT | Performed by: PSYCHIATRY & NEUROLOGY

## 2023-03-16 PROCEDURE — 90853 GROUP PSYCHOTHERAPY: CPT

## 2023-03-16 RX ADMIN — METFORMIN HYDROCHLORIDE 1000 MG: 500 TABLET ORAL at 07:49

## 2023-03-16 RX ADMIN — GABAPENTIN 300 MG: 300 CAPSULE ORAL at 14:34

## 2023-03-16 RX ADMIN — LORATADINE 10 MG: 10 TABLET ORAL at 07:52

## 2023-03-16 RX ADMIN — Medication 12.5 MG: at 07:49

## 2023-03-16 RX ADMIN — SENNOSIDES AND DOCUSATE SODIUM 1 TABLET: 50; 8.6 TABLET ORAL at 20:40

## 2023-03-16 RX ADMIN — METFORMIN HYDROCHLORIDE 1000 MG: 500 TABLET ORAL at 17:36

## 2023-03-16 RX ADMIN — SENNOSIDES AND DOCUSATE SODIUM 1 TABLET: 50; 8.6 TABLET ORAL at 07:49

## 2023-03-16 RX ADMIN — GABAPENTIN 300 MG: 300 CAPSULE ORAL at 07:49

## 2023-03-16 RX ADMIN — FLUTICASONE PROPIONATE 2 SPRAY: 50 SPRAY, METERED NASAL at 07:52

## 2023-03-16 RX ADMIN — NICOTINE 1 PATCH: 21 PATCH, EXTENDED RELEASE TRANSDERMAL at 07:49

## 2023-03-16 RX ADMIN — PANTOPRAZOLE SODIUM 40 MG: 40 TABLET, DELAYED RELEASE ORAL at 07:49

## 2023-03-16 RX ADMIN — ATROPINE SULFATE 2 DROP: 10 SOLUTION/ DROPS OPHTHALMIC at 20:50

## 2023-03-16 RX ADMIN — CLOZAPINE 300 MG: 200 TABLET ORAL at 20:39

## 2023-03-16 RX ADMIN — SERTRALINE HYDROCHLORIDE 200 MG: 100 TABLET ORAL at 07:48

## 2023-03-16 ASSESSMENT — ACTIVITIES OF DAILY LIVING (ADL)
ADLS_ACUITY_SCORE: 29
ADLS_ACUITY_SCORE: 29
ORAL_HYGIENE: INDEPENDENT
ADLS_ACUITY_SCORE: 29
LAUNDRY: UNABLE TO COMPLETE
DRESS: INDEPENDENT
ADLS_ACUITY_SCORE: 29
ADLS_ACUITY_SCORE: 29
HYGIENE/GROOMING: INDEPENDENT
ADLS_ACUITY_SCORE: 29

## 2023-03-16 NOTE — PLAN OF CARE
"  Problem: Adult Behavioral Health Plan of Care  Goal: Plan of Care Review  Recent Flowsheet Documentation  Taken 3/15/2023 1830 by Mario Marcelo RN  Patient Agreement with Plan of Care: agrees  Goal: Develops/Participates in Therapeutic Gause to Support Successful Transition  Intervention: Foster Therapeutic Gause  Recent Flowsheet Documentation  Taken 3/15/2023 1830 by Mario Marcelo RN  Trust Relationship/Rapport:    care explained    choices provided    emotional support provided    empathic listening provided    questions answered    questions encouraged    reassurance provided    thoughts/feelings acknowledged        Patient is alert and oriented x 4, able to communicate needs appropriately with no complained of pain and discomfort. Patient remained isolative and withdrawn to his room all evening. Patient reported being tired today due to earlier treatment of ECT. Patient attended group session once this shift and participated. Patient continues to avoid social interaction with peers and staff. Affect blunted with calm/cooperative mood.   Patient ate about 50% of dinner with no nausea or stomach discomfort. Patient endorses suicidal thoughts with no plan or intent\"I think about it occasionally\". Patient denies auditory and visual hallucination, denies SIB and HI. Patient contract for safety and medication compliant.    /82 (BP Location: Left arm, Patient Position: Supine, Cuff Size: Adult Regular)   Pulse 96   Temp 97.3  F (36.3  C) (Temporal)   Resp 16   Ht 1.6 m (5' 3\")   Wt 79 kg (174 lb 1.6 oz)   SpO2 93%   BMI 30.84 kg/m                         "

## 2023-03-16 NOTE — PLAN OF CARE
"Pt had an uneventful shift staying mostly isolated to room. Pt endorses SI thoughts, denies plan or intent and endorses AH presenting as whispers especially when alone in room. RN writer encouraged pt to attend groups and eat meals in the dining room. Pt nodded in agreement. Pt stated he has been asked if he is ready for discharge, pt shared that though the symptoms have decreased, he doesn't feel he is ready to discharge yet. Pt denies depression and anxiety at this time. Pt denies SIB, HI and thoughts of hurting others, eye contact good, speech is quiet, thoughts organized.    Mood is calm with flat affect. Pt was med compliant. Appetite and fluid intake adequate. No acute behavioral or medical concerns this day.    Depression Symptomatology assessment completed and placed in pt's chart.    VS reviewed: /78 (Patient Position: Sitting)   Pulse 87   Temp 97.8  F (36.6  C) (Temporal)   Resp 16   Ht 1.6 m (5' 3\")   Wt 79 kg (174 lb 1.6 oz)   SpO2 95%   BMI 30.84 kg/m   . Patient denies pain.    Length of stay: 48  "

## 2023-03-16 NOTE — PLAN OF CARE
03/16/23 1647   Group Therapy Session   Group Attendance attended group session   Time Session Began 1115   Time Session Ended 1200   Total Time (minutes) 50   Total # Attendees 2   Group Type recreation   Group Topic Covered coping skills/lifestyle management;leisure exploration/use of leisure time   Group Session Detail leisure group   Patient Response/Contribution cooperative with task;able to recall/repeat info presented     Pt attended the morning OT group with invitation.  When asked what he might like to do, he requested a game.  Proceeded to learn a new card game, which he seemed to enjoy.  Attentive to activity the entire time.  Pt reports ECT seems to be helping as he his having less negative thoughts which is leading to an improved mood.

## 2023-03-16 NOTE — PROGRESS NOTES
"[facility], [place]   Psychiatric Progress Note  Hospital Day: 48        Interim History:   The patient's care was discussed with the treatment team during the daily team meeting and/or staff's chart notes were reviewed. Staff report was calm, cooperative and withdrawn to his room most of shift. He attended one group session and participated appropriately. He endorsed ongoing SI but no plan or intent while in the hospital. He is adherent to recommendations including medications and ECT. He slept adequately and denies health concerns.     Upon interview Haseeb reports feeling \"exhausted\" with ongoing depression he rates 10/10 and 6/10 anxiety. He note depression has improved since starting ECT and reports feeling \"valued\" while in the hospital being checked on by staff. He continues to endorse suicidal ideation with a plan to commit suicide once discharged because \"I'm a homeless drug addict\". Haseeb stated if his circumstances changed such as getting housing and CD treatment, he would no longer feel suicidal. Haseeb reports no new side effects and states orthostasis symptoms have resolved and sialorrhea stable and not too bothersome at night. He reports frequent wake ups at night due to nightmares but adequate sleep overall. He is getting along well with staff and found attending groups enjoyable. Haseeb was encouraged to continue group attendance and social activities on the unit. He denies current AVH but reports regular hallucinations of birds, lights and voices typically during the day when feeling stressed.     Suicidal ideation: No change. Haseeb reports ongoing active SI with plan and intent to shoot himself with a gun or cut his throat or wrists upon discharge. Denies suicide plan or intent in the hospital. [place] for safety.     Homicidal ideation: denies current or recent homicidal ideation or behaviors.     Psychotic symptoms: Intermittent auditory and visual hallucinations, " "often triggered by stress    Medication side effects reported: Sialorrhea, stable.     Acute medical concerns: None    Other issues reported by patient: Patient had no further questions or concerns.            Medications:       atropine  2 drop Sublingual At Bedtime     cloZAPine  300 mg Oral At Bedtime     fluticasone  2 spray Both Nostrils Daily     gabapentin  300 mg Oral TID     loratadine  10 mg Oral Daily     metFORMIN  1,000 mg Oral BID w/meals     metoprolol succinate ER  12.5 mg Oral Daily     nicotine  1 patch Transdermal Daily     nicotine   Transdermal Q8H     pantoprazole  40 mg Oral QAM AC     senna-docusate  1 tablet Oral BID     sertraline  200 mg Oral Daily          Allergies:   No Known Allergies       Labs:     No results found for this or any previous visit (from the past 24 hour(s)).       Psychiatric Examination:     /82 (BP Location: Left arm, Patient Position: Supine, Cuff Size: Adult Regular)   Pulse 96   Temp 97.3  F (36.3  C) (Temporal)   Resp 16   Ht 1.6 m (5' 3\")   Wt 79 kg (174 lb 1.6 oz)   SpO2 93%   BMI 30.84 kg/m    Weight is 174 lbs 1.6 oz  Body mass index is 30.84 kg/m .    Weight over time:  Vitals:    01/27/23 1718 03/07/23 0933   Weight: 75.1 kg (165 lb 8 oz) 79 kg (174 lb 1.6 oz)       Orthostatic Vitals       None          Cardiometabolic risk assessment. 01/30/23    Reviewed patient profile for cardiometabolic risk factors    Date taken /Value  REFERENCE RANGE   Abdominal Obesity  (Waist Circumference)   See nursing flowsheet Women ?35 in (88 cm)   Men ?40 in (102 cm)      Triglycerides  Triglycerides   Date Value Ref Range Status   01/28/2023 192 (H) <150 mg/dL Final       ?150 mg/dL (1.7 mmol/L) or current treatment for elevated triglycerides   HDL cholesterol  Direct Measure HDL   Date Value Ref Range Status   01/28/2023 41 >=40 mg/dL Final   ]   Women <50 mg/dL (1.3 mmol/L) in women or current treatment for low HDL cholesterol  Men <40 mg/dL (1 mmol/L) in " "men or current treatment for low HDL cholesterol     Fasting plasma glucose (FPG) Lab Results   Component Value Date     01/28/2023      FPG ?100 mg/dL (5.6 mmol/L) or treatment for elevated blood glucose   Blood pressure  BP Readings from Last 3 Encounters:   03/15/23 122/82   01/27/23 118/74   07/20/22 112/79    Blood pressure ?130/85 mmHg or treatment for elevated blood pressure   Family History  See family history     Appearance: awake, alert, dressed in hospital scrubs and unkempt  Attitude:  cooperative, calm  Eye Contact: fair  Mood: \"exhausted\"  Affect:  mood congruent and intensity is blunted  Speech:  clear, coherent. appropriate  Language: fluent and intact in English  Psychomotor, Gait, Musculoskeletal:  no evidence of tardive dyskinesia, dystonia, or tics  Thought Process:  Linear, ruminative  Associations:  No evidence of loose associations  Thought Content:  SI w/plan and intent upon discharge, no plan or intent while in hospital. Auditory and visual hallucinations present.  Insight:  fair  Judgement:  poor  Oriented to:  time, person, and place  Attention Span and Concentration:  fair  Recent and Remote Memory:  fair  Fund of Knowledge:  appropriate    Clinical Global Impressions  First:  Considering your total clinical experience with this particular patient population, how severe are the patient's symptoms at this time?: 7 (01/28/23 1341)    Most recent:  Compared to the patient's condition at the START of treatment, this patient's condition is: 4           Precautions:     Behavioral Orders   Procedures     Code 1 - Restrict to Unit     Code 2     For imaging     Electroconvulsive therapy     Series of up to 12 treatments. Begin Date: 2/27/23     Treating Psychiatrist providing ECT:  Dr. Kearns     Notified on:  2/23/23     Electroconvulsive therapy     For acute ECT series, MWF, up to 12 treatment.     Electroconvulsive therapy     Series of up to 12 treatments. Begin Date: 02/26/23   " "  Treating Psychiatrist providing ECT: Clifton  Notified on: 02/24/23     Fall precautions     Routine Programming     As clinically indicated     Status 15     Every 15 minutes.     Suicide precautions     Patients on Suicide Precautions should have a Combination Diet ordered that includes a Diet selection(s) AND a Behavioral Tray selection for Safe Tray - with utensils, or Safe Tray - NO utensils            Diagnoses:     Active suicidal ideation with intent outside of hospital setting  MDD, recurrent, severe with psychotic features vs Schizoaffective Disorder, depressive type  Polysubstance use  Unspecified mood disorder  ADHD, inattentive type per chart  History of idiopathic hypersomnolence per chart  Nicotine use disorder, dependence and withdrawal   Allergies- chronic urticaria and rhinitis per chart  HD per chart          Assessment & Plan:     Assessment and hospital summary:  This patient is a 32 year old male with history of mood disorder, ADHD and substance use who presented to [place] ED with SI on 1/26 in context of reported methamphetamine use and being kicked out of sober living. Medically cleared in ED, placed on 72HH and admitted to [facility]. Limited historian, giving inconsistent reports about substance use, UDS negative, very somnolent, endorsing ongoing SI and some psychosis symptoms. PTA medications continued with exception of finasteride as patient states he takes \"1/4 a pill\" and declined ordered dose, will defer to primary team to address. Will continue to evaluate safety and stabilization further as patient more able to engage in assessments. Inpatient psychiatric hospitalization is warranted at this time for safety, stabilization, and possible adjustment in medications.    Patient reports that he abruptly stopped Zoloft one week prior to his admission. He developed discontinuation syndrome symptoms following that. He reports that he does not have a history of psychosis but is experiencing " psychotic symptoms. He is amenable with plan to trial risperidone after R/B/A were discussed. Risperidone was initiated on 1/30 and titrated to a total of 3 mg daily on 2/1. Clonidine, used for ADHD, was reduced from a total of 0.3 mg daily to 0.2 mg daily on 2/3 due to concern for hypotension. 2/9: Cardiology consult placed. EKG- tachycardia 121 bpm, QTc 465 ms, Abnormal QRS angle and T wave abnormality. COVID negative and labs are unremarkable.  On 2/10, clozapine was held pending additional workup from IM and cardiology. Pericarditis was ruled out and metoprolol was started. Clozapine was restarted on 2/13 with plan to cautiously titrate to lowest effective dose. 2/23/23: Clozapine titration schedule increased 25 mg/day. ECT and IM consults for ECT clearance placed. 2/27/23: ECT initiated. Risperidone was discontinued on 2/28. Clozapine level obtained on 3/3 at corresponding dose of 300 mg and was within therapeutic range (1001). Metformin increased to 1000 mg BID on 3/6 to target increased appetite/wt gain. Minimal improvement noted since initiation of both clozapine and ECT. 3/16: Patient reports orthostasis symptoms have resolved and some subjective improvement in depression symptoms with ECT. IDS-SR depression screening given to patient.     Psychiatric treatment/inteventions:  Medications:   -Continue clozapine 300 mg qhs. Clozapine quant at corresponding dose is 1001.  -Continue PTA sertraline 200 mg daily for mood  -Continue PTA gabapentin 300 mg TID for anxiety   -Continue Cogentin 1 mg at bedtime for possible EPSE     -PRN hydroxyzine 25 mg every 4 hour for anxiety  -PRN trazodone 50 mg at bedtime for sleep   -PRN olanzapine 10mg PO or IM TID for agitation/psychosis   -PRN atropine 1% SL drops, 2 drops q2h for sialorrhea    Spiritual services consult placed on 2/6. Pt is [employer]. Appreciate assistance.     ECT:  - Medically cleared on 2/24. See IM note for details.  - ECT consult placed on 2/23.  - ECT  started on 2/27/23  - HOLD Gabapentin on nights prior to ECT and on ECT mornings until after ECT.   - ECT #8 completed 3/15/23. Uneventful. Next ECT treatment scheduled for Friday, 3/17. Baseline IDS-SR was 55. Hard copy placed in chart. Patient asked to complete again. Score is 53 as of 3/9.       Laboratory/Imaging: reviewed: Covid negative; UDS negative; CMP, CBC, TSH, Lipid panel, HgbA1c ordered to complete admission labs. Reviewed.     2/9/23: Troponin, CK, CBC, BMP: Unremarkable, CRP elevated to 38.18, COVID: Negative  2/10/23: Add Influenza A/B given flu-like sx-negative  Patient will be treated in therapeutic milieu with appropriate individual and group therapies as described.     Medical treatment/interventions:  Medical concerns:   Nicotine replacement ordered, educate patient on benefits of cessation, pt unclear about finasteride dose, will hold until further information is obtained. PTA PRN zyrtec, azelastine, fluticasone, triamcinolone and Epipen ordered for allergies and PRN ammonia lactate, Eucerin for  dry skin.    Flatulence:   - simethicone prn    Neuroleptic induced wt gain:  - Monitor weights  - Continue metformin 1,000 mg BID with meals    Dyslipidemia: Will arrange follow up with PCP to address. Discussed importance of healthy eating habits and regular exercise. Will consider nutrition consult if patient amenable.     Orthostasis likely 2/2 clozapine: Pt is not hypotensive but reports orthostatic symptoms and previously restricted fluids. Now resolved.   - Continue to monitor vital signs closely  - Encourage fluids  - Discontinued clonidine    Sialorrhea 2/2 clozapine:  - Atropine 1% SL drops qhs  - Recommend towel on pillow when sleeping    Chest pain/tachycardia/EKG changes (2/9):  - Pain improved with PRN medications.   - Cardiology consult placed on 2/9. See note on that date for details.   - ECHO reviewed and unremarkable.  - Writer discussed care with both Dr. Streeter from Cards and Christina  from IM. Plan for now is to HOLD clozapine until further medical workup. Dr. Streeter explained that myocarditis has been ruled out as troponin was negative. Pericarditis remains on differential, but he does not have EKG findings or a pericardial effusion. IM will assess for pericardial friction rub and pleuritic chest pain. IM seen by patient and Metoprolol was started.     Update 2/13: Discussed care with Annette from IM today on two occasions. Please see her note on this date for details. She does not suspect that this is pericarditis. He does not have pleuritic pain, characteristic CP, EKG changes, or pericardial effusion on ECHO. Therefore, will cautiously restart clozapine while monitoring closely for side effects. May consider further increase in metoprolol if necessary. PPI was started due to suspected GERD.     Update 2/24 per IM note:  Medicine is following peripherally for concerns for pericarditis.  See detailed note from 2/13.  No pericardial friction rub noted while sitting up and leaning forward today.  Patient states that his chest discomfort is getting better after starting the Protonix yesterday.  It does appear that he has also been using the Maalox.  Patient does have Tums available as well.  Please be mindful for any constipation with overuse of the PRNs.     POC:  - Continue Protonix daily for 8 weeks, then taper off  - Recommend lifestyle changes including weight loss head of bed elevation avoidance of late-night eating and specific food elimination such as chocolate caffeine alcohol acidic and/or spicy food.  - Watch for constipation with PRNs for heartburn  - We will schedule senna 1 tab twice daily  - MiraLAX daily as needed added on     Medicine will sign off, please contact us for any acute changes.      Sore throat/cough/nasal congestion, resolved:   - COVID negative on 2/9. Repeat COVID test and Influenza A/B neg on 2/10.  - Cepacol lozenges added  - PRN Tylenol   - Consulted with IM.  Appreciate assistance. See their notes for details.      Legal Status: VOLUNTARY. 72 hour hold discontinued. Pt agreed to sign in on voluntary basis and discharge directly to treatment once stable.      Safety Assessment:        Behavioral Orders   Procedures     Code 1 - Restrict to Unit     Routine Programming       As clinically indicated     Status 15       Every 15 minutes.     Suicide precautions       Patients on Suicide Precautions should have a Combination Diet ordered that includes a Diet selection(s) AND a Behavioral Tray selection for Safe Tray - with utensils, or Safe Tray - NO utensils        Withdrawal precautions      Pt has not required locked seclusion or restraints in the past 24 hours to maintain safety, please refer to RN documentation for further details.    Discontinued SIO on 2/8 as patient is heriberto for safety. Monitor SI closely.     The risks, benefits, alternatives and side effects have been discussed and are understood by the patient.     Disposition: Pending clinical stabilization. Pt remains actively suicidal. Will likely discharge back to [facility] program once stable.     Entered by: Tha Browning MD on 3/16/2023 at 8:11 AM

## 2023-03-16 NOTE — PLAN OF CARE
Problem: Sleep Disturbance  Goal: Adequate Sleep/Rest  Outcome: Progressing   Goal Outcome Evaluation:    Patient appeared to sleep 7 hours this night shift.  No prns or snacks given or requested.  No concerns were reported or noted.  WBC labs today and ECT tomorrow morning.

## 2023-03-16 NOTE — PLAN OF CARE
Assessment/Intervention/Current Symtoms and Care Coordination  -Chart review  -Rounded with team, addressed patient needs/concerns     Discharge Plan or Goal  Pending stabilization & development of a safe discharge plan.  Considerations include:  Return to [facility].  Will need CD update prior to admission.      Barriers to Discharge  Patient requires further psychiatric stabilization due to current symptomology     Referral Status  No referrals made this hospiotalization     Legal Status  Voluntary

## 2023-03-17 ENCOUNTER — APPOINTMENT (OUTPATIENT)
Dept: BEHAVIORAL HEALTH | Facility: CLINIC | Age: 33
End: 2023-03-17
Attending: PSYCHIATRY & NEUROLOGY
Payer: COMMERCIAL

## 2023-03-17 ENCOUNTER — ANESTHESIA (OUTPATIENT)
Dept: BEHAVIORAL HEALTH | Facility: CLINIC | Age: 33
End: 2023-03-17
Payer: COMMERCIAL

## 2023-03-17 ENCOUNTER — ANESTHESIA EVENT (OUTPATIENT)
Dept: BEHAVIORAL HEALTH | Facility: CLINIC | Age: 33
End: 2023-03-17
Payer: COMMERCIAL

## 2023-03-17 PROCEDURE — 250N000011 HC RX IP 250 OP 636: Performed by: ANESTHESIOLOGY

## 2023-03-17 PROCEDURE — 250N000013 HC RX MED GY IP 250 OP 250 PS 637

## 2023-03-17 PROCEDURE — 370N000017 HC ANESTHESIA TECHNICAL FEE, PER MIN

## 2023-03-17 PROCEDURE — 250N000013 HC RX MED GY IP 250 OP 250 PS 637: Performed by: PSYCHIATRY & NEUROLOGY

## 2023-03-17 PROCEDURE — 99232 SBSQ HOSP IP/OBS MODERATE 35: CPT | Mod: 25 | Performed by: PSYCHIATRY & NEUROLOGY

## 2023-03-17 PROCEDURE — 90870 ELECTROCONVULSIVE THERAPY: CPT

## 2023-03-17 PROCEDURE — 124N000002 HC R&B MH UMMC

## 2023-03-17 PROCEDURE — 90870 ELECTROCONVULSIVE THERAPY: CPT | Performed by: PSYCHIATRY & NEUROLOGY

## 2023-03-17 PROCEDURE — 250N000009 HC RX 250: Performed by: ANESTHESIOLOGY

## 2023-03-17 RX ORDER — HYDROMORPHONE HCL IN WATER/PF 6 MG/30 ML
0.4 PATIENT CONTROLLED ANALGESIA SYRINGE INTRAVENOUS EVERY 5 MIN PRN
Status: CANCELLED | OUTPATIENT
Start: 2023-03-17

## 2023-03-17 RX ORDER — FENTANYL CITRATE 50 UG/ML
25 INJECTION, SOLUTION INTRAMUSCULAR; INTRAVENOUS EVERY 5 MIN PRN
Status: CANCELLED | OUTPATIENT
Start: 2023-03-17

## 2023-03-17 RX ORDER — SODIUM CHLORIDE, SODIUM LACTATE, POTASSIUM CHLORIDE, CALCIUM CHLORIDE 600; 310; 30; 20 MG/100ML; MG/100ML; MG/100ML; MG/100ML
INJECTION, SOLUTION INTRAVENOUS CONTINUOUS
Status: CANCELLED | OUTPATIENT
Start: 2023-03-17

## 2023-03-17 RX ORDER — HYDROMORPHONE HCL IN WATER/PF 6 MG/30 ML
0.2 PATIENT CONTROLLED ANALGESIA SYRINGE INTRAVENOUS EVERY 5 MIN PRN
Status: CANCELLED | OUTPATIENT
Start: 2023-03-17

## 2023-03-17 RX ORDER — ONDANSETRON 2 MG/ML
4 INJECTION INTRAMUSCULAR; INTRAVENOUS EVERY 30 MIN PRN
Status: CANCELLED | OUTPATIENT
Start: 2023-03-17

## 2023-03-17 RX ORDER — ONDANSETRON 4 MG/1
4 TABLET, ORALLY DISINTEGRATING ORAL EVERY 30 MIN PRN
Status: CANCELLED | OUTPATIENT
Start: 2023-03-17

## 2023-03-17 RX ORDER — FENTANYL CITRATE 50 UG/ML
50 INJECTION, SOLUTION INTRAMUSCULAR; INTRAVENOUS EVERY 5 MIN PRN
Status: CANCELLED | OUTPATIENT
Start: 2023-03-17

## 2023-03-17 RX ADMIN — METFORMIN HYDROCHLORIDE 1000 MG: 500 TABLET ORAL at 13:44

## 2023-03-17 RX ADMIN — SENNOSIDES AND DOCUSATE SODIUM 1 TABLET: 50; 8.6 TABLET ORAL at 19:36

## 2023-03-17 RX ADMIN — ATROPINE SULFATE 2 DROP: 10 SOLUTION/ DROPS OPHTHALMIC at 19:37

## 2023-03-17 RX ADMIN — SERTRALINE HYDROCHLORIDE 200 MG: 100 TABLET ORAL at 13:44

## 2023-03-17 RX ADMIN — GABAPENTIN 300 MG: 300 CAPSULE ORAL at 16:05

## 2023-03-17 RX ADMIN — SUCCINYLCHOLINE CHLORIDE 80 MG: 20 INJECTION, SOLUTION INTRAMUSCULAR; INTRAVENOUS; PARENTERAL at 12:49

## 2023-03-17 RX ADMIN — METHOHEXITAL SODIUM 90 MG: 500 INJECTION, POWDER, LYOPHILIZED, FOR SOLUTION INTRAMUSCULAR; INTRAVENOUS; RECTAL at 12:49

## 2023-03-17 RX ADMIN — SENNOSIDES AND DOCUSATE SODIUM 1 TABLET: 50; 8.6 TABLET ORAL at 13:43

## 2023-03-17 RX ADMIN — NICOTINE 1 PATCH: 21 PATCH, EXTENDED RELEASE TRANSDERMAL at 08:42

## 2023-03-17 RX ADMIN — FLUTICASONE PROPIONATE 2 SPRAY: 50 SPRAY, METERED NASAL at 08:41

## 2023-03-17 RX ADMIN — PANTOPRAZOLE SODIUM 40 MG: 40 TABLET, DELAYED RELEASE ORAL at 08:41

## 2023-03-17 RX ADMIN — METFORMIN HYDROCHLORIDE 1000 MG: 500 TABLET ORAL at 17:23

## 2023-03-17 RX ADMIN — CLOZAPINE 300 MG: 200 TABLET ORAL at 19:36

## 2023-03-17 RX ADMIN — GABAPENTIN 300 MG: 300 CAPSULE ORAL at 13:44

## 2023-03-17 RX ADMIN — GABAPENTIN 300 MG: 300 CAPSULE ORAL at 19:36

## 2023-03-17 RX ADMIN — LORATADINE 10 MG: 10 TABLET ORAL at 13:44

## 2023-03-17 RX ADMIN — Medication 12.5 MG: at 08:41

## 2023-03-17 ASSESSMENT — ACTIVITIES OF DAILY LIVING (ADL)
LAUNDRY: WITH SUPERVISION
HYGIENE/GROOMING: INDEPENDENT
ORAL_HYGIENE: INDEPENDENT
ADLS_ACUITY_SCORE: 29
ADLS_ACUITY_SCORE: 29
DRESS: INDEPENDENT
ORAL_HYGIENE: INDEPENDENT
ADLS_ACUITY_SCORE: 29
HYGIENE/GROOMING: INDEPENDENT
ADLS_ACUITY_SCORE: 29
DRESS: INDEPENDENT
LAUNDRY: WITH SUPERVISION
ADLS_ACUITY_SCORE: 29

## 2023-03-17 NOTE — ANESTHESIA CARE TRANSFER NOTE
Patient: Link Lora    Procedure: * No procedures listed *       Diagnosis: * No pre-op diagnosis entered *  Diagnosis Additional Information: No value filed.    Anesthesia Type:   No value filed.     Note:    Oropharynx: oropharynx clear of all foreign objects  Level of Consciousness: awake  Oxygen Supplementation: blow-by O2    Independent Airway: airway patency satisfactory and stable  Dentition: dentition unchanged  Vital Signs Stable: post-procedure vital signs reviewed and stable  Report to RN Given: handoff report given  Patient transferred to: PACU    Handoff Report: Identifed the Patient, Identified the Reponsible Provider, Reviewed the pertinent medical history, Discussed the surgical course, Reviewed Intra-OP anesthesia mangement and issues during anesthesia, Set expectations for post-procedure period and Allowed opportunity for questions and acknowledgement of understanding      Vitals:  Vitals Value Taken Time   BP     Temp     Pulse     Resp     SpO2         Electronically Signed By: Jen Murcia MD  March 17, 2023  12:57 PM

## 2023-03-17 NOTE — PROGRESS NOTES
Patient adequate for discharge. Report called to inpatient nurse Sree Alvarado RN. VSS, A/O, IV removed. Discharged with staff at this time.

## 2023-03-17 NOTE — ANESTHESIA POSTPROCEDURE EVALUATION
Patient: Nikolay Lora    Procedure: * No procedures listed *       Anesthesia Type:  General    Note:  Disposition: Outpatient   Postop Pain Control: Uneventful            Sign Out: Well controlled pain   PONV: No   Neuro/Psych: Uneventful            Sign Out: Acceptable/Baseline neuro status   Airway/Respiratory: Uneventful            Sign Out: Acceptable/Baseline resp. status   CV/Hemodynamics: Uneventful            Sign Out: Acceptable CV status; No obvious hypovolemia; No obvious fluid overload   Other NRE: NONE   DID A NON-ROUTINE EVENT OCCUR? No           Last vitals:  Vitals:    03/16/23 0834 03/16/23 1840 03/17/23 0841   BP: 114/78 110/67 128/79   Pulse: 87 99 100   Resp:   18   Temp: 36.6  C (97.8  F) 36.4  C (97.5  F) 36.6  C (97.8  F)   SpO2: 95% 95% 94%       Electronically Signed By: Jen Murcia MD  March 17, 2023  12:58 PM

## 2023-03-17 NOTE — PROGRESS NOTES
"[facility], [place]   Psychiatric Progress Note  Hospital Day: 49        Interim History:   The patient's care was discussed with the treatment team during the daily team meeting and/or staff's chart notes were reviewed. Staff report was calm, cooperative and withdrawn to his room most of shift. He attended one group session and participated appropriately. He endorsed ongoing SI but no plan or intent while in the hospital. He is adherent to recommendations including medications and ECT. He slept adequately and denies health concerns.     Upon interview, Haseeb reports that he is still depressed, and that the depression is unchanged since admission. However, he also reports that he feels ECT is helping. When asked to elaborate, he replied \"It helps me understand my depression better because my head feels clearer after ECT.\" Haseeb is in agreement with plan to pursue psychological testing. He also would like to continue with ECT. Denying any side effects, including memory concerns.     Suicidal ideation: No change. Haseeb reports ongoing active SI with plan and intent to shoot himself with a gun or cut his throat or wrists upon discharge. Denies suicide plan or intent in the hospital. [place] for safety.     Homicidal ideation: denies current or recent homicidal ideation or behaviors.     Psychotic symptoms: Intermittent auditory and visual hallucinations, often triggered by stress    Medication side effects reported: None    Acute medical concerns: None    Other issues reported by patient: Patient had no further questions or concerns.            Medications:       atropine  2 drop Sublingual At Bedtime     cloZAPine  300 mg Oral At Bedtime     fluticasone  2 spray Both Nostrils Daily     gabapentin  300 mg Oral TID     loratadine  10 mg Oral Daily     metFORMIN  1,000 mg Oral BID w/meals     metoprolol succinate ER  12.5 mg Oral Daily     nicotine  1 patch Transdermal Daily     nicotine  " " Transdermal Q8H     pantoprazole  40 mg Oral QAM AC     senna-docusate  1 tablet Oral BID     sertraline  200 mg Oral Daily          Allergies:   No Known Allergies       Labs:     Recent Results (from the past 24 hour(s))   WBC and Differential    Collection Time: 03/16/23 10:33 AM   Result Value Ref Range    WBC Count 13.3 (H) 4.0 - 11.0 10e3/uL    % Neutrophils 75 %    % Lymphocytes 17 %    % Monocytes 6 %    % Eosinophils 1 %    % Basophils 0 %    % Immature Granulocytes 1 %    NRBCs per 100 WBC 0 <1 /100    Absolute Neutrophils 10.0 (H) 1.6 - 8.3 10e3/uL    Absolute Lymphocytes 2.2 0.8 - 5.3 10e3/uL    Absolute Monocytes 0.8 0.0 - 1.3 10e3/uL    Absolute Eosinophils 0.1 0.0 - 0.7 10e3/uL    Absolute Basophils 0.1 0.0 - 0.2 10e3/uL    Absolute Immature Granulocytes 0.1 <=0.4 10e3/uL    Absolute NRBCs 0.0 10e3/uL          Psychiatric Examination:     /79 (BP Location: Right arm, Patient Position: Sitting, Cuff Size: Adult Regular)   Pulse 100   Temp 97.8  F (36.6  C) (Oral)   Resp 18   Ht 1.6 m (5' 3\")   Wt 79 kg (174 lb 1.6 oz)   SpO2 94%   BMI 30.84 kg/m    Weight is 174 lbs 1.6 oz  Body mass index is 30.84 kg/m .    Weight over time:  Vitals:    01/27/23 1718 03/07/23 0933   Weight: 75.1 kg (165 lb 8 oz) 79 kg (174 lb 1.6 oz)       Orthostatic Vitals       None          Cardiometabolic risk assessment. 01/30/23    Reviewed patient profile for cardiometabolic risk factors    Date taken /Value  REFERENCE RANGE   Abdominal Obesity  (Waist Circumference)   See nursing flowsheet Women ?35 in (88 cm)   Men ?40 in (102 cm)      Triglycerides  Triglycerides   Date Value Ref Range Status   01/28/2023 192 (H) <150 mg/dL Final       ?150 mg/dL (1.7 mmol/L) or current treatment for elevated triglycerides   HDL cholesterol  Direct Measure HDL   Date Value Ref Range Status   01/28/2023 41 >=40 mg/dL Final   ]   Women <50 mg/dL (1.3 mmol/L) in women or current treatment for low HDL cholesterol  Men <40 mg/dL (1 " "mmol/L) in men or current treatment for low HDL cholesterol     Fasting plasma glucose (FPG) Lab Results   Component Value Date     01/28/2023      FPG ?100 mg/dL (5.6 mmol/L) or treatment for elevated blood glucose   Blood pressure  BP Readings from Last 3 Encounters:   03/17/23 128/79   01/27/23 118/74   07/20/22 112/79    Blood pressure ?130/85 mmHg or treatment for elevated blood pressure   Family History  See family history     Appearance: awake, alert, dressed in hospital scrubs and unkempt  Attitude:  cooperative, calm  Eye Contact: fair  Mood: \"exhausted\"  Affect:  mood congruent and intensity is blunted  Speech:  clear, coherent. appropriate  Language: fluent and intact in English  Psychomotor, Gait, Musculoskeletal:  no evidence of tardive dyskinesia, dystonia, or tics  Thought Process:  Linear, ruminative  Associations:  No evidence of loose associations  Thought Content:  SI w/plan and intent upon discharge, no plan or intent while in hospital. Auditory and visual hallucinations present.  Insight:  fair  Judgement:  poor  Oriented to:  time, person, and place  Attention Span and Concentration:  fair  Recent and Remote Memory:  fair  Fund of Knowledge:  appropriate    Clinical Global Impressions  First:  Considering your total clinical experience with this particular patient population, how severe are the patient's symptoms at this time?: 7 (01/28/23 1341)    Most recent:  Compared to the patient's condition at the START of treatment, this patient's condition is: 4           Precautions:     Behavioral Orders   Procedures     Code 1 - Restrict to Unit     Code 2     For imaging     Electroconvulsive therapy     Series of up to 12 treatments. Begin Date: 2/27/23     Treating Psychiatrist providing ECT:  Dr. Kearns     Notified on:  2/23/23     Electroconvulsive therapy     For acute ECT series, MWF, up to 12 treatment.     Electroconvulsive therapy     Series of up to 12 treatments. Begin Date: " "02/26/23     Treating Psychiatrist providing ECT: Clifton  Notified on: 02/24/23     Fall precautions     Routine Programming     As clinically indicated     Status 15     Every 15 minutes.     Suicide precautions     Patients on Suicide Precautions should have a Combination Diet ordered that includes a Diet selection(s) AND a Behavioral Tray selection for Safe Tray - with utensils, or Safe Tray - NO utensils            Diagnoses:     Active suicidal ideation with intent outside of hospital setting  MDD, recurrent, severe with psychotic features vs Schizoaffective Disorder, depressive type  Polysubstance use  Unspecified mood disorder  ADHD, inattentive type per chart  History of idiopathic hypersomnolence per chart  Nicotine use disorder, dependence and withdrawal   Allergies- chronic urticaria and rhinitis per chart  HD per chart          Assessment & Plan:     Assessment and hospital summary:  This patient is a 32 year old male with history of mood disorder, ADHD and substance use who presented to [facility] ED with SI on 1/26 in context of reported methamphetamine use and being kicked out of sober living. Medically cleared in ED, placed on 72HH and admitted to [facility]. Limited historian, giving inconsistent reports about substance use, UDS negative, very somnolent, endorsing ongoing SI and some psychosis symptoms. PTA medications continued with exception of finasteride as patient states he takes \"1/4 a pill\" and declined ordered dose, will defer to primary team to address. Will continue to evaluate safety and stabilization further as patient more able to engage in assessments. Inpatient psychiatric hospitalization is warranted at this time for safety, stabilization, and possible adjustment in medications.    Patient reports that he abruptly stopped Zoloft one week prior to his admission. He developed discontinuation syndrome symptoms following that. He reports that he does not have a history of psychosis but is " experiencing psychotic symptoms. He is amenable with plan to trial risperidone after R/B/A were discussed. Risperidone was initiated on 1/30 and titrated to a total of 3 mg daily on 2/1. Clonidine, used for ADHD, was reduced from a total of 0.3 mg daily to 0.2 mg daily on 2/3 due to concern for hypotension. 2/9: Cardiology consult placed. EKG- tachycardia 121 bpm, QTc 465 ms, Abnormal QRS angle and T wave abnormality. COVID negative and labs are unremarkable.  On 2/10, clozapine was held pending additional workup from IM and cardiology. Pericarditis was ruled out and metoprolol was started. Clozapine was restarted on 2/13 with plan to cautiously titrate to lowest effective dose. 2/23/23: Clozapine titration schedule increased 25 mg/day. ECT and IM consults for ECT clearance placed. 2/27/23: ECT initiated. Risperidone was discontinued on 2/28. Clozapine level obtained on 3/3 at corresponding dose of 300 mg and was within therapeutic range (1001). Metformin increased to 1000 mg BID on 3/6 to target increased appetite/wt gain. Minimal improvement noted since initiation of both clozapine and ECT. 3/16: Patient reports orthostasis symptoms have resolved and some subjective improvement in depression symptoms with ECT. IDS-SR depression screening given to patient.     Psychiatric treatment/inteventions:  Medications:   -Continue clozapine 300 mg qhs. Clozapine quant at corresponding dose is 1001.  -Continue PTA sertraline 200 mg daily for mood  -Continue PTA gabapentin 300 mg TID for anxiety   -Continue Cogentin 1 mg at bedtime for possible EPSE     -PRN hydroxyzine 25 mg every 4 hour for anxiety  -PRN trazodone 50 mg at bedtime for sleep   -PRN olanzapine 10mg PO or IM TID for agitation/psychosis   -PRN atropine 1% SL drops, 2 drops q2h for sialorrhea    Spiritual services consult placed on 2/6. Pt is [facility]. Appreciate assistance.  Psychological testing ordered. Once patient completes forms, will place psychology  consult.     ECT:  - Medically cleared on 2/24. See IM note for details.  - ECT consult placed on 2/23.  - ECT started on 2/27/23  - HOLD Gabapentin on nights prior to ECT and on ECT mornings until after ECT.   - ECT #9 will be completed 3/17/23. Uneventful. Next ECT treatment scheduled for Monday, 3/20. Baseline IDS-SR was 55. Hard copy placed in chart. Patient asked to complete again. Score is 53 as of 3/9.  He is working on completing updated IDS-SR from 3/16.      Laboratory/Imaging: reviewed: Covid negative; UDS negative; CMP, CBC, TSH, Lipid panel, HgbA1c ordered to complete admission labs. Reviewed.     2/9/23: Troponin, CK, CBC, BMP: Unremarkable, CRP elevated to 38.18, COVID: Negative  2/10/23: Add Influenza A/B given flu-like sx-negative  Patient will be treated in therapeutic milieu with appropriate individual and group therapies as described.     Medical treatment/interventions:  Medical concerns:   Nicotine replacement ordered, educate patient on benefits of cessation, pt unclear about finasteride dose, will hold until further information is obtained. PTA PRN zyrtec, azelastine, fluticasone, triamcinolone and Epipen ordered for allergies and PRN ammonia lactate, Eucerin for  dry skin.    Flatulence:   - simethicone prn    Neuroleptic induced wt gain:  - Monitor weights  - Continue metformin 1,000 mg BID with meals    Dyslipidemia: Will arrange follow up with PCP to address. Discussed importance of healthy eating habits and regular exercise. Will consider nutrition consult if patient amenable.     Orthostasis likely 2/2 clozapine: Pt is not hypotensive but reports orthostatic symptoms and previously restricted fluids. Now resolved.   - Continue to monitor vital signs closely  - Encourage fluids  - Discontinued clonidine    Sialorrhea 2/2 clozapine:  - Atropine 1% SL drops qhs  - Recommend towel on pillow when sleeping    Chest pain/tachycardia/EKG changes (2/9):  - Pain improved with PRN medications.   -  Cardiology consult placed on 2/9. See note on that date for details.   - ECHO reviewed and unremarkable.  - Writer discussed care with both Dr. Streeter from [facility] and Christina from IM. Plan for now is to HOLD clozapine until further medical workup. Dr. Streeter explained that myocarditis has been ruled out as troponin was negative. Pericarditis remains on differential, but he does not have EKG findings or a pericardial effusion. IM will assess for pericardial friction rub and pleuritic chest pain. IM seen by patient and Metoprolol was started.     Update 2/13: Discussed care with Annette from  today on two occasions. Please see her note on this date for details. She does not suspect that this is pericarditis. He does not have pleuritic pain, characteristic CP, EKG changes, or pericardial effusion on ECHO. Therefore, will cautiously restart clozapine while monitoring closely for side effects. May consider further increase in metoprolol if necessary. PPI was started due to suspected GERD.     Update 2/24 per IM note:  Medicine is following peripherally for concerns for pericarditis.  See detailed note from 2/13.  No pericardial friction rub noted while sitting up and leaning forward today.  Patient states that his chest discomfort is getting better after starting the Protonix yesterday.  It does appear that he has also been using the Maalox.  Patient does have Tums available as well.  Please be mindful for any constipation with overuse of the PRNs.     POC:  - Continue Protonix daily for 8 weeks, then taper off  - Recommend lifestyle changes including weight loss head of bed elevation avoidance of late-night eating and specific food elimination such as chocolate caffeine alcohol acidic and/or spicy food.  - Watch for constipation with PRNs for heartburn  - We will schedule senna 1 tab twice daily  - MiraLAX daily as needed added on     Medicine will sign off, please contact us for any acute changes.      Sore  throat/cough/nasal congestion, resolved:   - COVID negative on 2/9. Repeat COVID test and Influenza A/B neg on 2/10.  - Cepacol lozenges added  - PRN Tylenol   - Consulted with IM. Appreciate assistance. See their notes for details.      Legal Status: VOLUNTARY. 72 hour hold discontinued. Pt agreed to sign in on voluntary basis and discharge directly to treatment once stable.      Safety Assessment:        Behavioral Orders   Procedures     Code 1 - Restrict to Unit     Routine Programming       As clinically indicated     Status 15       Every 15 minutes.     Suicide precautions       Patients on Suicide Precautions should have a Combination Diet ordered that includes a Diet selection(s) AND a Behavioral Tray selection for Safe Tray - with utensils, or Safe Tray - NO utensils        Withdrawal precautions      Pt has not required locked seclusion or restraints in the past 24 hours to maintain safety, please refer to RN documentation for further details.    Discontinued SIO on 2/8 as patient is heriberto for safety. Monitor SI closely.     The risks, benefits, alternatives and side effects have been discussed and are understood by the patient.     Disposition: Pending clinical stabilization. Pt remains actively suicidal. [facility]     Entered by: Nelly Brown MD on 3/17/2023 at 8:57 AM

## 2023-03-17 NOTE — PROGRESS NOTES
Patient slept through the shift without any problems. Checked patient every 15 minutes, he remained safe. Will continue to monitor patient.

## 2023-03-17 NOTE — ANESTHESIA PREPROCEDURE EVALUATION
Anesthesia Pre-Procedure Evaluation    Patient: Nikolay Lora   MRN: [id number] : 1990        Procedure : * No procedures listed *          Past Medical History:   Diagnosis Date     Brugada syndrome      Chronic idiopathic urticaria      Concussion with loss of consciousness      Mixed hyperlipidemia      Polysubstance abuse (H)      Schizotypal personality disorder (H)       No past surgical history on file.   No Known Allergies   Social History     Tobacco Use     Smoking status: Former     Smokeless tobacco: Never     Tobacco comments:     1-2 cigs per day   Substance Use Topics     Alcohol use: Yes      Wt Readings from Last 1 Encounters:   23 79 kg (174 lb 1.6 oz)              OUTSIDE LABS:  CBC:   Lab Results   Component Value Date    WBC 13.3 (H) 2023    WBC 13.0 (H) 2023    HGB 15.0 2023    HGB 15.8 2023    HCT 46.1 2023    HCT 48.3 2023     2023     2023     BMP:   Lab Results   Component Value Date     2023     2023    POTASSIUM 3.9 2023    POTASSIUM 4.0 2023    CHLORIDE 103 2023    CHLORIDE 100 2023    CO2 27 2023    CO2 27 2023    BUN 19.5 2023    BUN 12.5 2023    CR 0.81 2023    CR 0.81 2023     (H) 2023     (H) 03/10/2023     COAGS: No results found for: PTT, INR, FIBR  POC: No results found for: BGM, HCG, HCGS  HEPATIC:   Lab Results   Component Value Date    ALBUMIN 4.1 2023    PROTTOTAL 7.1 2023    ALT 58 (H) 2023    AST 29 2023    ALKPHOS 82 2023    BILITOTAL 0.3 2023     OTHER:   Lab Results   Component Value Date    A1C 5.4 2023    KATINA 9.4 2023    TSH 1.09 2023       Anesthesia Plan    ASA Status:  2      Anesthesia Type: General.   Induction: Intravenous.   Maintenance: TIVA.        Consents    Anesthesia Plan(s) and associated risks, benefits, and realistic  alternatives discussed. Questions answered and patient/representative(s) expressed understanding.    - Discussed:     - Discussed with:  Patient         Postoperative Care            Comments:                Jen Murcia MD

## 2023-03-17 NOTE — PLAN OF CARE
03/16/23 1640   Group Therapy Session   Group Attendance attended group session   Time Session Began 1640   Time Session Ended 1540   Total Time (minutes) 60   Total # Attendees 4   Group Type Psychotherapy   Group Topic Covered Coping skills with intrusive thoughts; staying motivated in the midst of crisis   Group Session Detail Group focused on strategies for cognitive grounding, realizing when thoughts are intrusive and how to activate support. Group members identified sources of stress and shared ways they try to cope with the stress of intrusive thoughts. Clinician provided guided and positive feedback as well as redirection where required.    Patient Response/Contribution Pt participated and stayed for the entire group. m to believe in the treatment that is being provided.   Patient Participation Detail Engaged and reported that he is having ECT and believes some of his symptoms are receeding. Provided positive feedback to other group members and encouraged them to believe in the treatment that is being provided.

## 2023-03-17 NOTE — PROCEDURES
"Nikolay Lora is a 32 year old  year old male patient.  [id number]  @DX@    [facility]   ECT Procedure Note   03/17/2023    Patient Status: Inpatient    Is this the first in a series of 12 treatments?  no   No Known Allergies    Weight:  174 lbs 1.6 oz         Indications for ECT:   Medications ineffective  Imminent risk of suicide         Clinical Narrative:   Nikolay Lora is a 32 year old man with affective dysregulation, ADHD and polysubstance use (alcohol*, amphetamine*, cocaine, cannabis) who is hospitalized with progressive depression leading to suicidal ideation with planning on 1/26, in the context of  medication non compliance and losing his place at sober living due to reported methamphetamine use. Throughout his hospital stay medication adjustments have been made (restarted on sertraline, titrated risperidone, added clozapine, which is being titrated); however, intense suicidal ideation has continued and he has been having difficulty tolerating medication changes. This consultation is requested to evaluate candidacy for electroconvulsive therapy (ECT).      We utilized phone translation services in [facility] during this visit to ensure thoroughness, otherwise he can communicate in English well.  The patient shares he cannot stop contemplating suicide with a plan to shoot himself as soon as he is out of the hospital. He reports visual hallucinations of \"lanterns and black birds flying around\" with commanding auditory hallucinations telling him that he is \"worthless\" and \"should kill (himself)\". Although hallucinations got better since admission with medication changes, depression and suicidal contemplation has persisted, he is interested in ECT, hoping for a quicker relief.         Diagnosis:     Schizoaffective Disorder, depressed  Polysubstance Use Disorder in a controlled environment          Assessment:   Considering the clinical acuity  electroconvulsive therapy " "(ECT) appears appropriate; despite multifactorial nature of the presentation that would benefit from optimization of cognitive, behavioral and social interventions longitudinally.      Discussed all relevant aspects of ECT with patient, including risks of memory loss, HA, nausea, death <1/50,000, driving prohibition; possible lack of benefit or relapse after successful treatment, alternatives, right to decline, possible outpatient procedures. All questions answered, patient will have opportunity to review ECT video.         Pause for the Cause:     Right patient Yes   Right procedure/laterality settings: Yes          Intra-Procedure Documentation:     #1 02/27/23 pt says he was feeling \"anxious but content\"  #2 03/01/23 no auditory hallucinations, concern for visual hallucinations of bugs in his room. Still actively suicidal with a plan with gun or kitchen knife.   #3 03/03/23 Visual hallucinations disappeared for a day after last ECT. Thinking about suicide slightly less. Worried about some pain in his right arm. Mood 6.5/10 (10 best)  #4 03/06/23 Visual hallucinations returned over the weekend. Auditory hallucinations the same. Still thinking about suicide. Feels safe in hospital but not outside.   #5 03/08/23 Continues to have violent auditory hallucinations and wishes to die.   #6 3/10/23  Improving. Feels safe on unit. Still having SI with plan to shoot self in head but no plan for suicide on unit. Hearing whispers, but voices are quieter. No visual hallucinations of lanterns, etc. Tolerating ECT well. Sleeping well.   #7 3/13/23 Continues to report A/V/H. They are 'not scary' but 'distressing'.   #8 3/15/23 Continues to report A/V/H. Asserts that frequency is reduced but not intensity. Denies side effects but admits that 'days are starting to blend in and I don't remember the dates'.   #9 3/17/23 Patient believes that ECT is helping. When prompted to explain why, he had difficulty articulating except to admit " that his A/V/H are reduced. (incidentally, this is the first time when asked about A/V/H he did not backtrack and states that 'this morning' or 'yesterday' the A/V/H 'were back'). Reportedly 50% improved. Patient denies history of abusing solvents (sniffing glue or paint).     ECT #: 9   Treatment number this series: 9   Total treatment number: 9     Type of ECT:  Bilateral, standard    ECT Medications:    Brevital: 90mg  Succinyl Choline: 80mg    ECT Strip Summary: (titration 96 mC)  Energy Level: 192 mC, 1 ms 20 Hz, 6 sec, 800 mA (increased from 144 mC on 3/6/23)    Motor Seizure Duration: 35 seconds  EEG Seizure Duration: 58 seconds    Complications:  none    Time for re-orientation: 28 min on 3/6/23    Plan:   - Continue bilateral ECT Q Mon/Wed/Fri at  192 mC  - Continue current medications      Chica Lazo MD

## 2023-03-17 NOTE — PLAN OF CARE
"  Problem: Thought Process Alteration  Goal: Optimal Thought Clarity  Outcome: Progressing   Goal Outcome Evaluation:    Plan of Care Reviewed With: patient        Pt mostly isolative in his room most of the shift and went for ECT today at noon. Pt endorsed anxiety 7/10 and depression 10/10 before ECT. Pt also endorsed hearing voice and the voices are saying \"you don't belong here, get out of here\". Pt also endorsed suicide ideation and plan to use a gun to shot his head and if he does not have a gun, he will use a blade to cut his wrist. After ECT pt endorsed anxiety 6/10 and depression 9/10 and received his scheduled medication which include gabapentin and Zoloft. Pt ate late lunch and went to bed after lunch and vital sign stable. Even breathing pattern and pt denies all Covid 19 symptoms. /86 (BP Location: Right arm, Patient Position: Sitting, Cuff Size: Adult Regular)   Pulse 104   Temp 98.5  F (36.9  C) (Oral)   Resp 20   Ht 1.6 m (5' 3\")   Wt 79 kg (174 lb 1.6 oz)   SpO2 92%   BMI 30.84 kg/m               "

## 2023-03-17 NOTE — PLAN OF CARE
Problem: Psychotic Signs/Symptoms  Goal: Improved Behavioral Control (Psychotic Signs/Symptoms)  Outcome: Progressing   Goal Outcome Evaluation:           Pt was sitting in bed watching TV when approached. He continues to endorse SI but will not on it. Denied HI. Asked right away if there's a group. Came out when informed a group was starting. He asked to have his dinner tray brought to his room. However, when this writer encouraged him to come out to eat and socialized, he came out and ate in the lounge next to another pt.   Pt will have ECT in AM. Gabapentin was held at 2000, understands he has has to be NPO after midnight.

## 2023-03-18 PROCEDURE — 250N000013 HC RX MED GY IP 250 OP 250 PS 637: Performed by: PSYCHIATRY & NEUROLOGY

## 2023-03-18 PROCEDURE — 250N000013 HC RX MED GY IP 250 OP 250 PS 637

## 2023-03-18 PROCEDURE — 124N000002 HC R&B MH UMMC

## 2023-03-18 RX ADMIN — ATROPINE SULFATE 2 DROP: 10 SOLUTION/ DROPS OPHTHALMIC at 20:25

## 2023-03-18 RX ADMIN — FLUTICASONE PROPIONATE 2 SPRAY: 50 SPRAY, METERED NASAL at 08:46

## 2023-03-18 RX ADMIN — METFORMIN HYDROCHLORIDE 1000 MG: 500 TABLET ORAL at 17:55

## 2023-03-18 RX ADMIN — PANTOPRAZOLE SODIUM 40 MG: 40 TABLET, DELAYED RELEASE ORAL at 08:30

## 2023-03-18 RX ADMIN — CLOZAPINE 300 MG: 200 TABLET ORAL at 20:22

## 2023-03-18 RX ADMIN — GABAPENTIN 300 MG: 300 CAPSULE ORAL at 20:22

## 2023-03-18 RX ADMIN — METFORMIN HYDROCHLORIDE 1000 MG: 500 TABLET ORAL at 08:30

## 2023-03-18 RX ADMIN — GABAPENTIN 300 MG: 300 CAPSULE ORAL at 08:30

## 2023-03-18 RX ADMIN — NICOTINE 1 PATCH: 21 PATCH, EXTENDED RELEASE TRANSDERMAL at 08:43

## 2023-03-18 RX ADMIN — LORATADINE 10 MG: 10 TABLET ORAL at 08:30

## 2023-03-18 RX ADMIN — GABAPENTIN 300 MG: 300 CAPSULE ORAL at 13:15

## 2023-03-18 RX ADMIN — Medication 12.5 MG: at 08:30

## 2023-03-18 RX ADMIN — SENNOSIDES AND DOCUSATE SODIUM 1 TABLET: 50; 8.6 TABLET ORAL at 20:22

## 2023-03-18 RX ADMIN — SERTRALINE HYDROCHLORIDE 200 MG: 100 TABLET ORAL at 08:30

## 2023-03-18 RX ADMIN — SENNOSIDES AND DOCUSATE SODIUM 1 TABLET: 50; 8.6 TABLET ORAL at 08:30

## 2023-03-18 ASSESSMENT — ACTIVITIES OF DAILY LIVING (ADL)
DRESS: SCRUBS (BEHAVIORAL HEALTH);INDEPENDENT
ADLS_ACUITY_SCORE: 29
HYGIENE/GROOMING: INDEPENDENT
ADLS_ACUITY_SCORE: 29
ORAL_HYGIENE: INDEPENDENT
ADLS_ACUITY_SCORE: 29

## 2023-03-18 NOTE — PLAN OF CARE
"Patient presents as quiet and socially withdrawn with a flat affect.  Haseeb was somewhat confused upon awakening, uncertain of the date, thinking that he was having ECT today.  He was relieved to hear that his next treatment would be on Monday.  Haseeb was calm, pleasant, and cooperative on approach today.  He endorses CAH urging suicide and reports that he has been thinking about two potential suicide plans: \"I would get a gun and shoot myself in the head or I would find something sharp to slit my wrists!\".  He denies any intent to carry out self harm here in the hospital, and he is able to contract for safety.  He agrees to alert unit staff with any worsening of these target symptoms.  Haseeb endorses depressive symptoms, but he feels that his ECT treatments are offering some relief (\"I think my depression has been getting better since I started ECT\").  Haseeb also noted that he has seen an improvement with less overall anxiety since ECT was initiated.  He asked about receiving medications to treat his ADHD, and he was informed that stimulants are often contraindicated when there is a concern for psychotic symptoms.  Haseeb was encouraged to discuss this further with Dr. Brown and Dr. Browning when the treatment team returns to duty on Monday.  Haseeb enjoyed a visit from family members, and this visit appeared to go well.  Haseeb accepted all of his scheduled medications without incident, and no adverse side effects have been reported or observed.  He denies any acute physical concerns at this time. /79 (Patient Position: Sitting)   Pulse 102   Temp 97.5  F (36.4  C) (Tympanic)   Resp 16   Ht 1.6 m (5' 3\")   Wt 79 kg (174 lb 1.6 oz)   SpO2 93%   BMI 30.84 kg/m      "

## 2023-03-18 NOTE — PLAN OF CARE
Problem: Thought Process Alteration  Goal: Optimal Thought Clarity  Outcome: Progressing     Problem: Adult Behavioral Health Plan of Care  Goal: Plan of Care Review  Outcome: Progressing   Goal Outcome Evaluation:    Plan of Care Reviewed With: patient    Patient was calm, restrictive and guarded, blunted with flat affect. Patient appeared depressed and avoids social contact. Patient is alert and oriented x 3, speech is soft quiet, he is withdrawn and isolated. Patient's insight to illness poor. Patient denies any other discomfort, no pain, denies anxiety and depression, no SI/SIB. endorses AH, no VH during this shift. Patient worked on his MCMI-111 assessment, his MMPI-2 is in progress. Patient is medication compliant no prn this shift, no stated side effects, meal consumption was adequate, will continue to monitor.

## 2023-03-19 ENCOUNTER — ANESTHESIA EVENT (OUTPATIENT)
Dept: BEHAVIORAL HEALTH | Facility: CLINIC | Age: 33
End: 2023-03-19
Payer: COMMERCIAL

## 2023-03-19 PROCEDURE — 250N000013 HC RX MED GY IP 250 OP 250 PS 637

## 2023-03-19 PROCEDURE — 250N000013 HC RX MED GY IP 250 OP 250 PS 637: Performed by: PSYCHIATRY & NEUROLOGY

## 2023-03-19 PROCEDURE — 124N000002 HC R&B MH UMMC

## 2023-03-19 RX ORDER — LABETALOL HYDROCHLORIDE 5 MG/ML
10 INJECTION, SOLUTION INTRAVENOUS
Status: CANCELLED | OUTPATIENT
Start: 2023-03-19

## 2023-03-19 RX ORDER — ONDANSETRON 4 MG/1
4 TABLET, ORALLY DISINTEGRATING ORAL EVERY 30 MIN PRN
Status: CANCELLED | OUTPATIENT
Start: 2023-03-19

## 2023-03-19 RX ORDER — ONDANSETRON 2 MG/ML
4 INJECTION INTRAMUSCULAR; INTRAVENOUS EVERY 30 MIN PRN
Status: CANCELLED | OUTPATIENT
Start: 2023-03-19

## 2023-03-19 RX ORDER — SODIUM CHLORIDE, SODIUM LACTATE, POTASSIUM CHLORIDE, CALCIUM CHLORIDE 600; 310; 30; 20 MG/100ML; MG/100ML; MG/100ML; MG/100ML
INJECTION, SOLUTION INTRAVENOUS CONTINUOUS
Status: CANCELLED | OUTPATIENT
Start: 2023-03-19

## 2023-03-19 RX ADMIN — METFORMIN HYDROCHLORIDE 1000 MG: 500 TABLET ORAL at 08:58

## 2023-03-19 RX ADMIN — ATROPINE SULFATE 2 DROP: 10 SOLUTION/ DROPS OPHTHALMIC at 19:20

## 2023-03-19 RX ADMIN — METFORMIN HYDROCHLORIDE 1000 MG: 500 TABLET ORAL at 17:23

## 2023-03-19 RX ADMIN — SENNOSIDES AND DOCUSATE SODIUM 1 TABLET: 50; 8.6 TABLET ORAL at 08:58

## 2023-03-19 RX ADMIN — NICOTINE 1 PATCH: 21 PATCH, EXTENDED RELEASE TRANSDERMAL at 08:59

## 2023-03-19 RX ADMIN — CLOZAPINE 300 MG: 200 TABLET ORAL at 19:20

## 2023-03-19 RX ADMIN — GABAPENTIN 300 MG: 300 CAPSULE ORAL at 13:30

## 2023-03-19 RX ADMIN — GABAPENTIN 300 MG: 300 CAPSULE ORAL at 08:59

## 2023-03-19 RX ADMIN — SENNOSIDES AND DOCUSATE SODIUM 1 TABLET: 50; 8.6 TABLET ORAL at 19:20

## 2023-03-19 RX ADMIN — Medication 12.5 MG: at 08:59

## 2023-03-19 RX ADMIN — LORATADINE 10 MG: 10 TABLET ORAL at 08:58

## 2023-03-19 RX ADMIN — SERTRALINE HYDROCHLORIDE 200 MG: 100 TABLET ORAL at 08:58

## 2023-03-19 RX ADMIN — PANTOPRAZOLE SODIUM 40 MG: 40 TABLET, DELAYED RELEASE ORAL at 08:58

## 2023-03-19 ASSESSMENT — ACTIVITIES OF DAILY LIVING (ADL)
ADLS_ACUITY_SCORE: 29
DRESS: SCRUBS (BEHAVIORAL HEALTH);INDEPENDENT
ORAL_HYGIENE: INDEPENDENT
LAUNDRY: WITH SUPERVISION
DRESS: INDEPENDENT
ADLS_ACUITY_SCORE: 29
HYGIENE/GROOMING: HANDWASHING;INDEPENDENT
ADLS_ACUITY_SCORE: 29
ORAL_HYGIENE: INDEPENDENT
ADLS_ACUITY_SCORE: 29
ADLS_ACUITY_SCORE: 29
HYGIENE/GROOMING: INDEPENDENT
ADLS_ACUITY_SCORE: 29

## 2023-03-19 NOTE — PLAN OF CARE
"Goal Outcome Evaluation:    Pt is pleasant, calm, a bit guarded. His mood is \"good\"-though he rates anxiety and depression both 10/10. Pt also endorses si-thoughts only; denies plan or intent. He describes AH as hearing \"crows and doves.\" Pt said he would attend any grps held, otherwise keeps mainly to self, and in rm. He states he will also nap some, during the shift. Pt's goal for the day is to finish his psych testing.    1352) Pt has been in his rm, today. He remains pleasant and calm, and is working on his MMPI.                      "

## 2023-03-19 NOTE — PROGRESS NOTES
Pt slept through the shift without any problems. Checked patent every 15 minutes, he remained safe. Will continue to monitor patient

## 2023-03-19 NOTE — PLAN OF CARE
Problem: Thought Process Alteration  Goal: Optimal Thought Clarity  Outcome: Progressing     Problem: Suicidal Behavior  Goal: Suicidal Behavior is Absent or Managed  Outcome: Progressing   Goal Outcome Evaluation:    Plan of Care Reviewed With: patient      Patient is alert, and oriented x 3. Patient's Mood is calm, blunted with flat affect. Patient is isolated  and withdrawn, impaired judgement and lacks meaningful conversation. denies depression and anxiety, no SI/SIB/HI, he endorsed AVH that he should kill self, patient able to contract for safety.  Patient denies any other discomfort, no pain, patient was meals and medication compliant, no side effects endorsed, will continue to monitor.

## 2023-03-20 ENCOUNTER — ANESTHESIA (OUTPATIENT)
Dept: BEHAVIORAL HEALTH | Facility: CLINIC | Age: 33
End: 2023-03-20
Payer: COMMERCIAL

## 2023-03-20 ENCOUNTER — APPOINTMENT (OUTPATIENT)
Dept: BEHAVIORAL HEALTH | Facility: CLINIC | Age: 33
End: 2023-03-20
Attending: PSYCHIATRY & NEUROLOGY
Payer: COMMERCIAL

## 2023-03-20 PROCEDURE — 124N000002 HC R&B MH UMMC

## 2023-03-20 PROCEDURE — 250N000013 HC RX MED GY IP 250 OP 250 PS 637: Performed by: PSYCHIATRY & NEUROLOGY

## 2023-03-20 PROCEDURE — 90870 ELECTROCONVULSIVE THERAPY: CPT

## 2023-03-20 PROCEDURE — 370N000017 HC ANESTHESIA TECHNICAL FEE, PER MIN

## 2023-03-20 PROCEDURE — 250N000009 HC RX 250: Performed by: ANESTHESIOLOGY

## 2023-03-20 PROCEDURE — 250N000013 HC RX MED GY IP 250 OP 250 PS 637

## 2023-03-20 PROCEDURE — 99233 SBSQ HOSP IP/OBS HIGH 50: CPT | Mod: 25 | Performed by: PSYCHIATRY & NEUROLOGY

## 2023-03-20 PROCEDURE — 90870 ELECTROCONVULSIVE THERAPY: CPT | Performed by: PSYCHIATRY & NEUROLOGY

## 2023-03-20 PROCEDURE — 250N000011 HC RX IP 250 OP 636: Performed by: ANESTHESIOLOGY

## 2023-03-20 RX ORDER — ESMOLOL HYDROCHLORIDE 10 MG/ML
INJECTION INTRAVENOUS PRN
Status: DISCONTINUED | OUTPATIENT
Start: 2023-03-20 | End: 2023-03-20

## 2023-03-20 RX ADMIN — ESMOLOL HYDROCHLORIDE 50 MG: 10 INJECTION, SOLUTION INTRAVENOUS at 12:30

## 2023-03-20 RX ADMIN — Medication 12.5 MG: at 08:41

## 2023-03-20 RX ADMIN — NICOTINE 1 PATCH: 21 PATCH, EXTENDED RELEASE TRANSDERMAL at 08:41

## 2023-03-20 RX ADMIN — ATROPINE SULFATE 2 DROP: 10 SOLUTION/ DROPS OPHTHALMIC at 20:45

## 2023-03-20 RX ADMIN — SENNOSIDES AND DOCUSATE SODIUM 1 TABLET: 50; 8.6 TABLET ORAL at 08:41

## 2023-03-20 RX ADMIN — GABAPENTIN 300 MG: 300 CAPSULE ORAL at 13:43

## 2023-03-20 RX ADMIN — FLUTICASONE PROPIONATE 2 SPRAY: 50 SPRAY, METERED NASAL at 08:41

## 2023-03-20 RX ADMIN — METHOHEXITAL SODIUM 90 MG: 500 INJECTION, POWDER, LYOPHILIZED, FOR SOLUTION INTRAMUSCULAR; INTRAVENOUS; RECTAL at 12:30

## 2023-03-20 RX ADMIN — METFORMIN HYDROCHLORIDE 1000 MG: 500 TABLET ORAL at 17:40

## 2023-03-20 RX ADMIN — PANTOPRAZOLE SODIUM 40 MG: 40 TABLET, DELAYED RELEASE ORAL at 08:41

## 2023-03-20 RX ADMIN — ACETAMINOPHEN 325MG 650 MG: 325 TABLET ORAL at 08:41

## 2023-03-20 RX ADMIN — SENNOSIDES AND DOCUSATE SODIUM 1 TABLET: 50; 8.6 TABLET ORAL at 20:42

## 2023-03-20 RX ADMIN — SUCCINYLCHOLINE CHLORIDE 80 MG: 20 INJECTION, SOLUTION INTRAMUSCULAR; INTRAVENOUS; PARENTERAL at 12:30

## 2023-03-20 RX ADMIN — LORATADINE 10 MG: 10 TABLET ORAL at 08:41

## 2023-03-20 RX ADMIN — GABAPENTIN 300 MG: 300 CAPSULE ORAL at 20:41

## 2023-03-20 RX ADMIN — SERTRALINE HYDROCHLORIDE 200 MG: 100 TABLET ORAL at 08:41

## 2023-03-20 RX ADMIN — CLOZAPINE 300 MG: 200 TABLET ORAL at 20:41

## 2023-03-20 ASSESSMENT — ACTIVITIES OF DAILY LIVING (ADL)
LAUNDRY: UNABLE TO COMPLETE
ADLS_ACUITY_SCORE: 29
HYGIENE/GROOMING: INDEPENDENT
ADLS_ACUITY_SCORE: 29
HYGIENE/GROOMING: INDEPENDENT
DRESS: INDEPENDENT
ORAL_HYGIENE: INDEPENDENT
DRESS: INDEPENDENT
ADLS_ACUITY_SCORE: 29
ORAL_HYGIENE: INDEPENDENT

## 2023-03-20 NOTE — PLAN OF CARE
"\"I still see lanterns floating, doves, and crows.\"  \"I still hear whispers. They tell me to kill myself with a gun or a knife. \"   \"I'm not going to do anything here (to harm myself).\"    \"ECT is helping but not much.\" (45 sec seizure today, tx # 10.) \"I forget a lot but that's okay. I eat too much.\"  \"I'm not being treated for depression or ADHD here.\"    \"I want to be sober. I thought I was just using meth but I tested positive for fentanyl. I want to go back to [facility] and finish treatment.\"   \"I want to go back to my family.\"    Would not name any future occupation or scholastic goal (in addition to his 4 year degree in political science + studies.    Calm, blunted affect, stated mood is anxious and depressed. Does not respond to humour. Oriented to person, place, partial circumstance. Spending his time lying or sitting on his bed, at times watching television. Very inactive. Good eye contact. Soft volume speech with some hesitation due to \"trouble finding the right words.\" Phrasing of description of hallucinations and suicidal ideation remains the same.   [facility] + MMPI  testing results not available yet.     No dyskinesia, disheveled appearance.    Plan: Monitor and document mood and behaviour, thought process and content. Establish and maintain therapeutic relationship. Educate about diagnoses, medications, treatment, legal status, plan of care. Address preexisting and concurrent medical concerns.      P: Unstable mood  G: Stable mood  O: Not Progressing            "

## 2023-03-20 NOTE — PROGRESS NOTES
Pt slept through the shift without any problems. Checked patent every 15 minutes, he remained safe. Pt was NPO from mid night for ECT. Pre-ct checked list done. Will continue to monitor patient

## 2023-03-20 NOTE — PROCEDURES
"Nikolay Lora is a 32 year old  year old male patient.  [id number]  @DX@    [facility]   ECT Procedure Note   03/20/2023    Patient Status: Inpatient    Is this the first in a series of 12 treatments?  no   No Known Allergies    Weight:  174 lbs 1.6 oz         Indications for ECT:   Medications ineffective  Imminent risk of suicide         Clinical Narrative:   Nikolay Lora is a 32 year old man with affective dysregulation, ADHD and polysubstance use (alcohol*, amphetamine*, cocaine, cannabis) who is hospitalized with progressive depression leading to suicidal ideation with planning on 1/26, in the context of  medication non compliance and losing his place at sober living due to reported methamphetamine use. Throughout his hospital stay medication adjustments have been made (restarted on sertraline, titrated risperidone, added clozapine, which is being titrated); however, intense suicidal ideation has continued and he has been having difficulty tolerating medication changes. This consultation is requested to evaluate candidacy for electroconvulsive therapy (ECT).      We utilized phone translation services in [facility] during this visit to ensure thoroughness, otherwise he can communicate in English well.  The patient shares he cannot stop contemplating suicide with a plan to shoot himself as soon as he is out of the hospital. He reports visual hallucinations of \"lanterns and black birds flying around\" with commanding auditory hallucinations telling him that he is \"worthless\" and \"should kill (himself)\". Although hallucinations got better since admission with medication changes, depression and suicidal contemplation has persisted, he is interested in ECT, hoping for a quicker relief.         Diagnosis:     Schizoaffective Disorder, depressed  Polysubstance Use Disorder in a controlled environment          Assessment:   Considering the clinical acuity  electroconvulsive therapy " "(ECT) appears appropriate; despite multifactorial nature of the presentation that would benefit from optimization of cognitive, behavioral and social interventions longitudinally.      Discussed all relevant aspects of ECT with patient, including risks of memory loss, HA, nausea, death <1/50,000, driving prohibition; possible lack of benefit or relapse after successful treatment, alternatives, right to decline, possible outpatient procedures. All questions answered, patient will have opportunity to review ECT video.         Pause for the Cause:     Right patient Yes   Right procedure/laterality settings: Yes          Intra-Procedure Documentation:     #1 02/27/23 pt says he was feeling \"anxious but content\"  #2 03/01/23 no auditory hallucinations, concern for visual hallucinations of bugs in his room. Still actively suicidal with a plan with gun or kitchen knife.   #3 03/03/23 Visual hallucinations disappeared for a day after last ECT. Thinking about suicide slightly less. Worried about some pain in his right arm. Mood 6.5/10 (10 best)  #4 03/06/23 Visual hallucinations returned over the weekend. Auditory hallucinations the same. Still thinking about suicide. Feels safe in hospital but not outside.   #5 03/08/23 Continues to have violent auditory hallucinations and wishes to die.   #6 3/10/23  Improving. Feels safe on unit. Still having SI with plan to shoot self in head but no plan for suicide on unit. Hearing whispers, but voices are quieter. No visual hallucinations of lanterns, etc. Tolerating ECT well. Sleeping well.   #7 3/13/23 Continues to report A/V/H. They are 'not scary' but 'distressing'.   #8 3/15/23 Continues to report A/V/H. Asserts that frequency is reduced but not intensity. Denies side effects but admits that 'days are starting to blend in and I don't remember the dates'.   #9 3/17/23 Patient believes that ECT is helping. When prompted to explain why, he had difficulty articulating except to admit " that his A/V/H are reduced. (incidentally, this is the first time when asked about A/V/H he did not backtrack and states that 'this morning' or 'yesterday' the A/V/H 'were back'). Reportedly 50% improved. Patient denies history of abusing solvents (sniffing glue or paint).   #10 3/20/23 Clozapine level 1340 ng/ml (rather high but patient has no signs of toxicity, so wonder if due to a non-trough blood draw). MMPI pending. Denies visual hallucinations for the first time (not sure if it will be consistent).     ECT #: 10   Treatment number this series: 10   Total treatment number: 10     Type of ECT:  Bilateral, standard    ECT Medications:    Brevital: 90mg  Succinyl Choline: 80mg    ECT Strip Summary: (titration 96 mC)  Energy Level: 192 mC, 1 ms 20 Hz, 6 sec, 800 mA (increased from 144 mC on 3/6/23)    Motor Seizure Duration: 35 seconds  EEG Seizure Duration: 45 seconds    Complications:  none    Time for re-orientation: 28 min on 3/6/23    Plan:   - Continue bilateral ECT Q Mon/Wed/Fri at  192 mC  - Continue current medications      Chica Lazo MD
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
## 2023-03-20 NOTE — PLAN OF CARE
"  Problem: Thought Process Alteration  Goal: Optimal Thought Clarity  Outcome: Progressing   Goal Outcome Evaluation:    Plan of Care Reviewed With: patient          Pt mostly isolative in his room and refused to come out despite encouragement for this writer. Pt observed watching TV and listening to music on his headphone. Pt endorsed anxiety and depression 8&9/10 respectively. Pt also stated he is having suicide thought but he trying to focus on positive aspect of his life. Pt encouraged to attend groups and other activities in order to improve on this positive aspect and pt stated he will think about that. Pt completed his MMPI assessment and assessment  placed in Dr. Brown clip board.  Pt clear and coherent and intermittent eye contact. Pt presented with flat affect, calm and polite and cooperated with medications. Pt going for ECT tomorrow and pt educated to be NPO from midnight and he verbalized understanding. Good appetite, voiding freely and no BM issue per pt. Even breathing pattern and vital sign stable. /79 (BP Location: Left arm, Patient Position: Sitting)   Pulse 105   Temp 97.9  F (36.6  C) (Temporal)   Resp 16   Ht 1.6 m (5' 3\")   Wt 79 kg (174 lb 1.6 oz)   SpO2 100%   BMI 30.84 kg/m             "

## 2023-03-20 NOTE — PROGRESS NOTES
Patient arrived in PACU at 1239    Patient meets phase I recovery criteria at 1309 , switched to phase II recovery at 1310.      The following medications were given in ECT:    Anesthetic:   Brevital 90 mg at 1230     Muscle Relaxant:   Succinylcholine  80 mg at 1230    Blood Pressure:   Esmolol 50  mg at 1230    Patient meets PACU discharge criteria at 1318 .    Re-orientation time: 13 minutes    Pt discharged from the recovery room via wheelchair back to station 12N   with staff at 1327       Report given to Ellyn LINDO           Feel free to call with any questions at *[phone]    Gabby Bryson RN

## 2023-03-20 NOTE — PLAN OF CARE
RN Assessment:    Pt presented with flat affect. Pt was calm and cooperative while interacting with the writer. Pt was alert and oriented x 4. Pt denied having HI. Pt endorsed having SI with plan to shoot self outside of the hospital. Pt had no plan to harm self inside of the hospital. Pt endorsed having intermittent visual hallucinations. Pt stated he sees lanterns, doves, and crows. Pt endorsed having right elbow pain rated 5/10. Pt given PRN medication for pain. Pt had no other medical concerns. Pt endorsed sleeping well last night. Pt feels the medications that are currently ordered are working well. No medication side effects endorsed by pt or observed by writer. Pt was isolative and withdrawn this shift. Continue to monitor for safety and changes in medical condition.     Pt had tenth ECT treatment today. Vital signs assessed after pt returned to the unit. Vital signs WDL. Pt denied having nausea. Pt denied having pain. Pt given lunch tray. Pt ate some of the food on lunch tray and then rested in bed with eyes closed.       PRN Medications passed this shift:    0841: Acetaminophen 650  mg PO for right elbow pain. This medication was effective per pt report.

## 2023-03-20 NOTE — ANESTHESIA POSTPROCEDURE EVALUATION
Patient: Nikolay Lora    Procedure: * No procedures listed *       Anesthesia Type:  General    Note:  Disposition: Inpatient   Postop Pain Control: Uneventful            Sign Out: Well controlled pain   PONV: No   Neuro/Psych: Uneventful            Sign Out: Acceptable/Baseline neuro status   Airway/Respiratory: Uneventful            Sign Out: Acceptable/Baseline resp. status   CV/Hemodynamics: Uneventful            Sign Out: Acceptable CV status; No obvious hypovolemia; No obvious fluid overload   Other NRE: NONE   DID A NON-ROUTINE EVENT OCCUR? No           Last vitals:  Vitals:    03/20/23 1254 03/20/23 1300 03/20/23 1304   BP: 127/85  (!) 122/92   Pulse: 101     Resp: 18     Temp:      SpO2: 93% 93%        Electronically Signed By: Nata Adhikari MD  March 20, 2023  1:10 PM

## 2023-03-20 NOTE — ANESTHESIA CARE TRANSFER NOTE
Patient: Link Lora    Procedure: * No procedures listed *       Diagnosis: * No pre-op diagnosis entered *  Diagnosis Additional Information: No value filed.    Anesthesia Type:   General     Note:    Oropharynx: oropharynx clear of all foreign objects  Level of Consciousness: drowsy  Oxygen Supplementation: room air    Independent Airway: airway patency satisfactory and stable  Dentition: dentition unchanged  Vital Signs Stable: post-procedure vital signs reviewed and stable  Report to RN Given: handoff report given  Patient transferred to: PACU    Handoff Report: Identifed the Patient, Identified the Reponsible Provider, Reviewed the pertinent medical history, Discussed the surgical course, Reviewed Intra-OP anesthesia mangement and issues during anesthesia, Set expectations for post-procedure period and Allowed opportunity for questions and acknowledgement of understanding      Vitals:  Vitals Value Taken Time   BP     Temp     Pulse     Resp     SpO2         Electronically Signed By: Nata Adhikari MD  March 20, 2023  12:42 PM

## 2023-03-21 PROCEDURE — 250N000013 HC RX MED GY IP 250 OP 250 PS 637

## 2023-03-21 PROCEDURE — H2032 ACTIVITY THERAPY, PER 15 MIN: HCPCS

## 2023-03-21 PROCEDURE — 250N000013 HC RX MED GY IP 250 OP 250 PS 637: Performed by: PSYCHIATRY & NEUROLOGY

## 2023-03-21 PROCEDURE — 124N000002 HC R&B MH UMMC

## 2023-03-21 RX ADMIN — SERTRALINE HYDROCHLORIDE 200 MG: 100 TABLET ORAL at 07:58

## 2023-03-21 RX ADMIN — METFORMIN HYDROCHLORIDE 1000 MG: 500 TABLET ORAL at 18:09

## 2023-03-21 RX ADMIN — METFORMIN HYDROCHLORIDE 1000 MG: 500 TABLET ORAL at 07:58

## 2023-03-21 RX ADMIN — SENNOSIDES AND DOCUSATE SODIUM 1 TABLET: 50; 8.6 TABLET ORAL at 07:58

## 2023-03-21 RX ADMIN — SENNOSIDES AND DOCUSATE SODIUM 1 TABLET: 50; 8.6 TABLET ORAL at 20:53

## 2023-03-21 RX ADMIN — LORATADINE 10 MG: 10 TABLET ORAL at 07:58

## 2023-03-21 RX ADMIN — ACETAMINOPHEN 325MG 650 MG: 325 TABLET ORAL at 07:59

## 2023-03-21 RX ADMIN — GABAPENTIN 300 MG: 300 CAPSULE ORAL at 07:58

## 2023-03-21 RX ADMIN — Medication 12.5 MG: at 07:58

## 2023-03-21 RX ADMIN — PANTOPRAZOLE SODIUM 40 MG: 40 TABLET, DELAYED RELEASE ORAL at 07:58

## 2023-03-21 RX ADMIN — CLOZAPINE 300 MG: 200 TABLET ORAL at 20:53

## 2023-03-21 RX ADMIN — GABAPENTIN 300 MG: 300 CAPSULE ORAL at 13:00

## 2023-03-21 RX ADMIN — NICOTINE 1 PATCH: 21 PATCH, EXTENDED RELEASE TRANSDERMAL at 07:58

## 2023-03-21 RX ADMIN — FLUTICASONE PROPIONATE 2 SPRAY: 50 SPRAY, METERED NASAL at 08:00

## 2023-03-21 ASSESSMENT — ACTIVITIES OF DAILY LIVING (ADL)
ADLS_ACUITY_SCORE: 29
LAUNDRY: UNABLE TO COMPLETE
ADLS_ACUITY_SCORE: 29
HYGIENE/GROOMING: HANDWASHING;INDEPENDENT
ADLS_ACUITY_SCORE: 29
DRESS: INDEPENDENT;SCRUBS (BEHAVIORAL HEALTH)
HYGIENE/GROOMING: INDEPENDENT
ADLS_ACUITY_SCORE: 29
ADLS_ACUITY_SCORE: 29
ORAL_HYGIENE: INDEPENDENT
ORAL_HYGIENE: INDEPENDENT
DRESS: SCRUBS (BEHAVIORAL HEALTH)
ADLS_ACUITY_SCORE: 29

## 2023-03-21 NOTE — PROGRESS NOTES
"[facility] [place]   Psychiatric Progress Note  Hospital Day: 52        Interim History:   The patient's care was discussed with the treatment team during the daily team meeting and/or staff's chart notes were reviewed. Staff report was calm, cooperative and withdrawn to his room most of shift. He attended one group session and participated appropriately. He endorsed ongoing SI and depression. Still reporting intermittent AH/VH. Completed MMPI paperwork.     Upon interview, Haseeb reports that his depression is a 8/10. Continues to report active SI with intent. Stated that he would not feel safe leaving the hospital. When asked what would need to change for him to feel safe with discharge, he replied \"I want my mood better and to be fully there so they [CD treatment staff] can help me with my addictions.\" He said that he wants to continue with ECT because he feels better, but could not elaborate today.     Suicidal ideation: No change. Haseeb reports ongoing active SI with plan and intent to shoot himself with a gun or cut his throat or wrists upon discharge. Denies suicide plan or intent in the hospital. [place] for safety.     He is in agreement with plan to pursue psych testing.     Homicidal ideation: denies current or recent homicidal ideation or behaviors.     Psychotic symptoms: Intermittent auditory and visual hallucinations, often triggered by stress    Medication side effects reported: None    Acute medical concerns: None    Other issues reported by patient: Patient had no further questions or concerns.            Medications:       atropine  2 drop Sublingual At Bedtime     cloZAPine  300 mg Oral At Bedtime     fluticasone  2 spray Both Nostrils Daily     gabapentin  300 mg Oral TID     loratadine  10 mg Oral Daily     metFORMIN  1,000 mg Oral BID w/meals     metoprolol succinate ER  12.5 mg Oral Daily     nicotine  1 patch Transdermal Daily     nicotine   Transdermal Q8H " "    pantoprazole  40 mg Oral QAM AC     senna-docusate  1 tablet Oral BID     sertraline  200 mg Oral Daily          Allergies:   No Known Allergies       Labs:     No results found for this or any previous visit (from the past 24 hour(s)).       Psychiatric Examination:     /89 (BP Location: Left arm, Patient Position: Sitting, Cuff Size: Adult Regular)   Pulse 102   Temp 97.3  F (36.3  C) (Temporal)   Resp 16   Ht 1.6 m (5' 3\")   Wt 79 kg (174 lb 1.6 oz)   SpO2 95%   BMI 30.84 kg/m    Weight is 174 lbs 1.6 oz  Body mass index is 30.84 kg/m .    Weight over time:  Vitals:    01/27/23 1718 03/07/23 0933   Weight: 75.1 kg (165 lb 8 oz) 79 kg (174 lb 1.6 oz)       Orthostatic Vitals       None          Cardiometabolic risk assessment. 01/30/23    Reviewed patient profile for cardiometabolic risk factors    Date taken /Value  REFERENCE RANGE   Abdominal Obesity  (Waist Circumference)   See nursing flowsheet Women ?35 in (88 cm)   Men ?40 in (102 cm)      Triglycerides  Triglycerides   Date Value Ref Range Status   01/28/2023 192 (H) <150 mg/dL Final       ?150 mg/dL (1.7 mmol/L) or current treatment for elevated triglycerides   HDL cholesterol  Direct Measure HDL   Date Value Ref Range Status   01/28/2023 41 >=40 mg/dL Final   ]   Women <50 mg/dL (1.3 mmol/L) in women or current treatment for low HDL cholesterol  Men <40 mg/dL (1 mmol/L) in men or current treatment for low HDL cholesterol     Fasting plasma glucose (FPG) Lab Results   Component Value Date     01/28/2023      FPG ?100 mg/dL (5.6 mmol/L) or treatment for elevated blood glucose   Blood pressure  BP Readings from Last 3 Encounters:   03/20/23 134/89   01/27/23 118/74   07/20/22 112/79    Blood pressure ?130/85 mmHg or treatment for elevated blood pressure   Family History  See family history     Appearance: awake, alert, dressed in hospital scrubs and unkempt  Attitude:  cooperative, calm  Eye Contact: fair  Mood: \"depressed\"  Affect:  " mood congruent and intensity is blunted  Speech:  clear, coherent. appropriate  Language: fluent and intact in English  Psychomotor, Gait, Musculoskeletal:  no evidence of tardive dyskinesia, dystonia, or tics  Thought Process:  Linear, ruminative  Associations:  No evidence of loose associations  Thought Content:  SI w/plan and intent upon discharge, no plan or intent while in hospital. Auditory and visual hallucinations present.  Insight:  fair  Judgement:  poor  Oriented to:  time, person, and place  Attention Span and Concentration:  fair  Recent and Remote Memory:  fair  Fund of Knowledge:  appropriate    Clinical Global Impressions  First:  Considering your total clinical experience with this particular patient population, how severe are the patient's symptoms at this time?: 7 (01/28/23 1341)    Most recent:  Compared to the patient's condition at the START of treatment, this patient's condition is: 4           Precautions:     Behavioral Orders   Procedures     Code 1 - Restrict to Unit     Code 2     For imaging     Electroconvulsive therapy     Series of up to 12 treatments. Begin Date: 2/27/23     Treating Psychiatrist providing ECT:  Dr. Kearns     Notified on:  2/23/23     Electroconvulsive therapy     For acute ECT series, MWF, up to 12 treatment.     Electroconvulsive therapy     Series of up to 12 treatments. Begin Date: 02/26/23     Treating Psychiatrist providing ECT: Clifton  Notified on: 02/24/23     Fall precautions     [place]     MMPI     Routine Programming     As clinically indicated     Status 15     Every 15 minutes.     Suicide precautions     Patients on Suicide Precautions should have a Combination Diet ordered that includes a Diet selection(s) AND a Behavioral Tray selection for Safe Tray - with utensils, or Safe Tray - NO utensils            Diagnoses:     Active suicidal ideation with intent outside of hospital setting  MDD, recurrent, severe with psychotic features vs Schizoaffective  "Disorder, depressive type  Polysubstance use  Unspecified mood disorder  ADHD, inattentive type per chart  History of idiopathic hypersomnolence per chart  Nicotine use disorder, dependence and withdrawal   Allergies- chronic urticaria and rhinitis per chart  HD per chart          Assessment & Plan:     Assessment and hospital summary:  This patient is a 32 year old male with history of mood disorder, ADHD and substance use who presented to [facility] ED with SI on 1/26 in context of reported methamphetamine use and being kicked out of sober living. Medically cleared in ED, placed on 72HH and admitted to [facility]. Limited historian, giving inconsistent reports about substance use, UDS negative, very somnolent, endorsing ongoing SI and some psychosis symptoms. PTA medications continued with exception of finasteride as patient states he takes \"1/4 a pill\" and declined ordered dose, will defer to primary team to address. Will continue to evaluate safety and stabilization further as patient more able to engage in assessments. Inpatient psychiatric hospitalization is warranted at this time for safety, stabilization, and possible adjustment in medications.    Patient reports that he abruptly stopped Zoloft one week prior to his admission. He developed discontinuation syndrome symptoms following that. He reports that he does not have a history of psychosis but is experiencing psychotic symptoms. He is amenable with plan to trial risperidone after R/B/A were discussed. Risperidone was initiated on 1/30 and titrated to a total of 3 mg daily on 2/1. Clonidine, used for ADHD, was reduced from a total of 0.3 mg daily to 0.2 mg daily on 2/3 due to concern for hypotension. 2/9: Cardiology consult placed. EKG- tachycardia 121 bpm, QTc 465 ms, Abnormal QRS angle and T wave abnormality. COVID negative and labs are unremarkable.  On 2/10, clozapine was held pending additional workup from IM and cardiology. Pericarditis was ruled out and " metoprolol was started. Clozapine was restarted on 2/13 with plan to cautiously titrate to lowest effective dose. 2/23/23: Clozapine titration schedule increased 25 mg/day. ECT and IM consults for ECT clearance placed. 2/27/23: ECT initiated. Risperidone was discontinued on 2/28. Clozapine level obtained on 3/3 at corresponding dose of 300 mg and was within therapeutic range (1001). Metformin increased to 1000 mg BID on 3/6 to target increased appetite/wt gain. Minimal improvement noted since initiation of both clozapine and ECT. 3/16: Patient reports orthostasis symptoms have resolved and some subjective improvement in depression symptoms with ECT. IDS-SR depression screening given to patient.     Psychiatric treatment/inteventions:  Medications:   -Continue clozapine 300 mg qhs. Clozapine quant at corresponding dose is 1001.  -Continue PTA sertraline 200 mg daily for mood  -Continue PTA gabapentin 300 mg TID for anxiety   -Continue Cogentin 1 mg at bedtime for possible EPSE     -PRN hydroxyzine 25 mg every 4 hour for anxiety  -PRN trazodone 50 mg at bedtime for sleep   -PRN olanzapine 10mg PO or IM TID for agitation/psychosis   -PRN atropine 1% SL drops, 2 drops q2h for sialorrhea    Spiritual services consult placed on 2/6. Pt is Religious. Appreciate assistance.  Psychological consult order placed. Dr. Nation will be meeting with patient on Tuesday morning (tentative). Appreciate assistance.     ECT:  - Medically cleared on 2/24. See IM note for details.  - ECT consult placed on 2/23.  - ECT started on 2/27/23  - HOLD Gabapentin on nights prior to ECT and on ECT mornings until after ECT.   - ECT #9 will be completed 3/17/23. Uneventful. Next ECT treatment scheduled for Wednesday, 3/22. Baseline IDS-SR was 55. Hard copy placed in chart. Patient asked to complete again. Score is 53 as of 3/9.  He is working on completing updated IDS-SR from 3/16.      Laboratory/Imaging: reviewed: Covid negative; UDS negative;  CMP, CBC, TSH, Lipid panel, HgbA1c ordered to complete admission labs. Reviewed.     2/9/23: Troponin, CK, CBC, BMP: Unremarkable, CRP elevated to 38.18, COVID: Negative  2/10/23: Add Influenza A/B given flu-like sx-negative  Patient will be treated in therapeutic milieu with appropriate individual and group therapies as described.     Medical treatment/interventions:  Medical concerns:   Nicotine replacement ordered, educate patient on benefits of cessation, pt unclear about finasteride dose, will hold until further information is obtained. PTA PRN zyrtec, azelastine, fluticasone, triamcinolone and Epipen ordered for allergies and PRN ammonia lactate, Eucerin for  dry skin.    Flatulence:   - simethicone prn    Neuroleptic induced wt gain:  - Monitor weights  - Continue metformin 1,000 mg BID with meals    Dyslipidemia: Will arrange follow up with PCP to address. Discussed importance of healthy eating habits and regular exercise. Will consider nutrition consult if patient amenable.     Orthostasis likely 2/2 clozapine: Pt is not hypotensive but reports orthostatic symptoms and previously restricted fluids. Now resolved.   - Continue to monitor vital signs closely  - Encourage fluids  - Discontinued clonidine    Sialorrhea 2/2 clozapine:  - Atropine 1% SL drops qhs  - Recommend towel on pillow when sleeping    Chest pain/tachycardia/EKG changes (2/9):  - Pain improved with PRN medications.   - Cardiology consult placed on 2/9. See note on that date for details.   - ECHO reviewed and unremarkable.  - Writer discussed care with both Dr. Streeter from [facility] and Christina from IM. Plan for now is to HOLD clozapine until further medical workup. Dr. Streeter explained that myocarditis has been ruled out as troponin was negative. Pericarditis remains on differential, but he does not have EKG findings or a pericardial effusion. IM will assess for pericardial friction rub and pleuritic chest pain. IM seen by patient and Metoprolol was  started.     Update 2/13: Discussed care with Annette from IM today on two occasions. Please see her note on this date for details. She does not suspect that this is pericarditis. He does not have pleuritic pain, characteristic CP, EKG changes, or pericardial effusion on ECHO. Therefore, will cautiously restart clozapine while monitoring closely for side effects. May consider further increase in metoprolol if necessary. PPI was started due to suspected GERD.     Update 2/24 per IM note:  Medicine is following peripherally for concerns for pericarditis.  See detailed note from 2/13.  No pericardial friction rub noted while sitting up and leaning forward today.  Patient states that his chest discomfort is getting better after starting the Protonix yesterday.  It does appear that he has also been using the Maalox.  Patient does have Tums available as well.  Please be mindful for any constipation with overuse of the PRNs.     POC:  - Continue Protonix daily for 8 weeks, then taper off  - Recommend lifestyle changes including weight loss head of bed elevation avoidance of late-night eating and specific food elimination such as chocolate caffeine alcohol acidic and/or spicy food.  - Watch for constipation with PRNs for heartburn  - We will schedule senna 1 tab twice daily  - MiraLAX daily as needed added on     Medicine will sign off, please contact us for any acute changes.      Sore throat/cough/nasal congestion, resolved:   - COVID negative on 2/9. Repeat COVID test and Influenza A/B neg on 2/10.  - Cepacol lozenges added  - PRN Tylenol   - Consulted with IM. Appreciate assistance. See their notes for details.      Legal Status: VOLUNTARY. 72 hour hold discontinued. Pt agreed to sign in on voluntary basis and discharge directly to treatment once stable.      Safety Assessment:        Behavioral Orders   Procedures     Code 1 - Restrict to Unit     Routine Programming       As clinically indicated     Status 15        Every 15 minutes.     Suicide precautions       Patients on Suicide Precautions should have a Combination Diet ordered that includes a Diet selection(s) AND a Behavioral Tray selection for Safe Tray - with utensils, or Safe Tray - NO utensils        Withdrawal precautions      Pt has not required locked seclusion or restraints in the past 24 hours to maintain safety, please refer to RN documentation for further details.    Discontinued SIO on 2/8 as patient is heriberto for safety. Monitor SI closely.     The risks, benefits, alternatives and side effects have been discussed and are understood by the patient.     Disposition: Pending clinical stabilization. Pt remains actively suicidal. Will likely discharge back to [facility] program once stable.     Entered by: Nelly Brown MD on 3/20/2023 at 8:31 PM

## 2023-03-21 NOTE — PLAN OF CARE
Assessment/Intervention/Current Symtoms and Care Coordination  -Chart review  -Rounded with team, addressed patient needs/concerns    Current Symptoms include the following: Appears depressed and unkempt: Completed psychological testing with Dr Irby    Discharge Plan or Goal  Pending stabilization & development of a safe discharge plan.  Considerations include: Return to [facility]    Barriers to Discharge  Patient requires further psychiatric stabilization due to current symptomology    Referral Status  No referrals made this hospitalization    Legal Status  Voluntary

## 2023-03-21 NOTE — PLAN OF CARE
"He is isolative and is delayed in speech, cognition.  As in previous shifts, he continues to display disorganized thought process by difficulty expressing himself in discussions.  He complained of generalized body aches and PRN tylenol was given with morning medications.  Informed writer that ECT is helping due to having less intense visual hallucinations.  However, he asked writer what does, \" ECT,\" mean and education was given to him.  Continues to state suicidal ideation by wanting to get a firearm and shoot himself in the head, yet contracts for safety on the unit.  States that he is experiencing only visual hallucinations, which he reports are reduced.  He is aware to be NPO at midnight and ECT is scheduled at 1200.    "

## 2023-03-21 NOTE — PLAN OF CARE
"  Problem: Psychotic Signs/Symptoms  Goal: Increased Participation and Engagement (Psychotic Signs/Symptoms)  Intervention: Facilitate Participation and Engagement  Recent Flowsheet Documentation  Taken 3/21/2023 1827 by Gerber Metcalf RN  Supportive Measures:    positive reinforcement provided    active listening utilized    self-care encouraged    verbalization of feelings encouraged  Diversional Activity: television     Problem: Adult Behavioral Health Plan of Care  Goal: Plan of Care Review  Recent Flowsheet Documentation  Taken 3/21/2023 1827 by Gerber Metcalf RN  Patient Agreement with Plan of Care: agrees   Goal Outcome Evaluation:    Plan of Care Reviewed With: patient          Patient isolative to room majority of the evening though did attend and participate in group with prompting. Patient upon approach flat in affect, calm and cooperative with verbal assessment. Patient reporting continued SI and AH command hallucinations that he described as \"whispers\" that tell him to harm himself. Patient contracts for safety and identifies that he has no way to act on the thoughts while hospitalized. Patient continues to report anxiety and depression 6/10. Patient denies visual hallucinations or thoughts of harming others. Patient stating acceptance of current care plan though questions the continued ECT treatments identifying that he has not noticed significant change in symptoms with recent treatments.    Patient cooperative with vital sign assessments which were stable. Patient diet appeared good eating 100% of dinner and snacks and receptive of extra fluids. Patient accepting of HS medications though declined sublingual atropine reporting no hypersalivation. Patient had no observed or reported side effects. Patients HS Gabapentin was held due to upcoming ECT treatment. Patient independent with identifying needs and completing ADL's though did not want to shower this evening.          "

## 2023-03-21 NOTE — PLAN OF CARE
Problem: Sleep Disturbance  Goal: Adequate Sleep/Rest  Outcome: Progressing   Goal Outcome Evaluation:    Patient appeared to sleep 7 hours this night shift.  No prns or snacks given or requested.  No concerns were reported or noted.  ECT tomorrow morning.

## 2023-03-22 ENCOUNTER — ANESTHESIA EVENT (OUTPATIENT)
Dept: BEHAVIORAL HEALTH | Facility: CLINIC | Age: 33
End: 2023-03-22
Payer: COMMERCIAL

## 2023-03-22 ENCOUNTER — APPOINTMENT (OUTPATIENT)
Dept: BEHAVIORAL HEALTH | Facility: CLINIC | Age: 33
End: 2023-03-22
Attending: PSYCHIATRY & NEUROLOGY
Payer: COMMERCIAL

## 2023-03-22 ENCOUNTER — ANESTHESIA (OUTPATIENT)
Dept: BEHAVIORAL HEALTH | Facility: CLINIC | Age: 33
End: 2023-03-22
Payer: COMMERCIAL

## 2023-03-22 PROCEDURE — 370N000017 HC ANESTHESIA TECHNICAL FEE, PER MIN

## 2023-03-22 PROCEDURE — 250N000013 HC RX MED GY IP 250 OP 250 PS 637

## 2023-03-22 PROCEDURE — 250N000011 HC RX IP 250 OP 636: Performed by: ANESTHESIOLOGY

## 2023-03-22 PROCEDURE — 90870 ELECTROCONVULSIVE THERAPY: CPT | Performed by: PSYCHIATRY & NEUROLOGY

## 2023-03-22 PROCEDURE — 250N000009 HC RX 250: Performed by: ANESTHESIOLOGY

## 2023-03-22 PROCEDURE — H2032 ACTIVITY THERAPY, PER 15 MIN: HCPCS

## 2023-03-22 PROCEDURE — 250N000013 HC RX MED GY IP 250 OP 250 PS 637: Performed by: PSYCHIATRY & NEUROLOGY

## 2023-03-22 PROCEDURE — 99232 SBSQ HOSP IP/OBS MODERATE 35: CPT | Mod: 25 | Performed by: PSYCHIATRY & NEUROLOGY

## 2023-03-22 PROCEDURE — 124N000002 HC R&B MH UMMC

## 2023-03-22 PROCEDURE — 90870 ELECTROCONVULSIVE THERAPY: CPT

## 2023-03-22 RX ORDER — FENTANYL CITRATE 50 UG/ML
50 INJECTION, SOLUTION INTRAMUSCULAR; INTRAVENOUS EVERY 5 MIN PRN
Status: DISCONTINUED | OUTPATIENT
Start: 2023-03-22 | End: 2023-03-22

## 2023-03-22 RX ORDER — HYDROMORPHONE HCL IN WATER/PF 6 MG/30 ML
0.2 PATIENT CONTROLLED ANALGESIA SYRINGE INTRAVENOUS EVERY 5 MIN PRN
Status: DISCONTINUED | OUTPATIENT
Start: 2023-03-22 | End: 2023-03-22

## 2023-03-22 RX ORDER — FENTANYL CITRATE 50 UG/ML
25 INJECTION, SOLUTION INTRAMUSCULAR; INTRAVENOUS EVERY 5 MIN PRN
Status: DISCONTINUED | OUTPATIENT
Start: 2023-03-22 | End: 2023-03-22

## 2023-03-22 RX ORDER — SODIUM CHLORIDE, SODIUM LACTATE, POTASSIUM CHLORIDE, CALCIUM CHLORIDE 600; 310; 30; 20 MG/100ML; MG/100ML; MG/100ML; MG/100ML
INJECTION, SOLUTION INTRAVENOUS CONTINUOUS
Status: DISCONTINUED | OUTPATIENT
Start: 2023-03-22 | End: 2023-03-22

## 2023-03-22 RX ORDER — HYDROMORPHONE HCL IN WATER/PF 6 MG/30 ML
0.4 PATIENT CONTROLLED ANALGESIA SYRINGE INTRAVENOUS EVERY 5 MIN PRN
Status: DISCONTINUED | OUTPATIENT
Start: 2023-03-22 | End: 2023-03-22

## 2023-03-22 RX ORDER — ONDANSETRON 4 MG/1
4 TABLET, ORALLY DISINTEGRATING ORAL EVERY 30 MIN PRN
Status: DISCONTINUED | OUTPATIENT
Start: 2023-03-22 | End: 2023-03-22

## 2023-03-22 RX ORDER — ONDANSETRON 2 MG/ML
4 INJECTION INTRAMUSCULAR; INTRAVENOUS EVERY 30 MIN PRN
Status: DISCONTINUED | OUTPATIENT
Start: 2023-03-22 | End: 2023-03-22

## 2023-03-22 RX ADMIN — METFORMIN HYDROCHLORIDE 1000 MG: 500 TABLET ORAL at 18:13

## 2023-03-22 RX ADMIN — GABAPENTIN 300 MG: 300 CAPSULE ORAL at 13:55

## 2023-03-22 RX ADMIN — NICOTINE 1 PATCH: 21 PATCH, EXTENDED RELEASE TRANSDERMAL at 11:01

## 2023-03-22 RX ADMIN — SUCCINYLCHOLINE CHLORIDE 80 MG: 20 INJECTION, SOLUTION INTRAMUSCULAR; INTRAVENOUS; PARENTERAL at 08:21

## 2023-03-22 RX ADMIN — FLUTICASONE PROPIONATE 2 SPRAY: 50 SPRAY, METERED NASAL at 07:11

## 2023-03-22 RX ADMIN — SENNOSIDES AND DOCUSATE SODIUM 1 TABLET: 50; 8.6 TABLET ORAL at 11:03

## 2023-03-22 RX ADMIN — LORATADINE 10 MG: 10 TABLET ORAL at 07:10

## 2023-03-22 RX ADMIN — SENNOSIDES AND DOCUSATE SODIUM 1 TABLET: 50; 8.6 TABLET ORAL at 20:02

## 2023-03-22 RX ADMIN — SERTRALINE HYDROCHLORIDE 200 MG: 100 TABLET ORAL at 07:10

## 2023-03-22 RX ADMIN — METHOHEXITAL SODIUM 90 MG: 500 INJECTION, POWDER, LYOPHILIZED, FOR SOLUTION INTRAMUSCULAR; INTRAVENOUS; RECTAL at 08:21

## 2023-03-22 RX ADMIN — METFORMIN HYDROCHLORIDE 1000 MG: 500 TABLET ORAL at 11:02

## 2023-03-22 RX ADMIN — CLOZAPINE 300 MG: 200 TABLET ORAL at 20:02

## 2023-03-22 RX ADMIN — PANTOPRAZOLE SODIUM 40 MG: 40 TABLET, DELAYED RELEASE ORAL at 07:10

## 2023-03-22 RX ADMIN — GABAPENTIN 300 MG: 300 CAPSULE ORAL at 20:02

## 2023-03-22 ASSESSMENT — ACTIVITIES OF DAILY LIVING (ADL)
ADLS_ACUITY_SCORE: 29
DRESS: SCRUBS (BEHAVIORAL HEALTH)
ADLS_ACUITY_SCORE: 29
ADLS_ACUITY_SCORE: 29
DRESS: INDEPENDENT
LAUNDRY: UNABLE TO COMPLETE
ADLS_ACUITY_SCORE: 29
ORAL_HYGIENE: INDEPENDENT
ADLS_ACUITY_SCORE: 29
ADLS_ACUITY_SCORE: 29
ORAL_HYGIENE: INDEPENDENT
ADLS_ACUITY_SCORE: 29
HYGIENE/GROOMING: INDEPENDENT
ADLS_ACUITY_SCORE: 29
ADLS_ACUITY_SCORE: 29
LAUNDRY: UNABLE TO COMPLETE
HYGIENE/GROOMING: HANDWASHING;INDEPENDENT
ADLS_ACUITY_SCORE: 29

## 2023-03-22 NOTE — PROCEDURES
"Nikolay Lora is a 32 year old  year old male patient.  [id number]  @DX@    [facility]   ECT Procedure Note   03/22/2023    Patient Status: Inpatient    Is this the first in a series of 12 treatments?  no   No Known Allergies    Weight:  174 lbs 1.6 oz         Indications for ECT:   Medications ineffective  Imminent risk of suicide         Clinical Narrative:   Nikolay Lora is a 32 year old man with affective dysregulation, ADHD and polysubstance use (alcohol*, amphetamine*, cocaine, cannabis) who is hospitalized with progressive depression leading to suicidal ideation with planning on 1/26, in the context of  medication non compliance and losing his place at sober living due to reported methamphetamine use. Throughout his hospital stay medication adjustments have been made (restarted on sertraline, titrated risperidone, added clozapine, which is being titrated); however, intense suicidal ideation has continued and he has been having difficulty tolerating medication changes. This consultation is requested to evaluate candidacy for electroconvulsive therapy (ECT).      We utilized phone translation services in [facility] during this visit to ensure thoroughness, otherwise he can communicate in English well.  The patient shares he cannot stop contemplating suicide with a plan to shoot himself as soon as he is out of the hospital. He reports visual hallucinations of \"lanterns and black birds flying around\" with commanding auditory hallucinations telling him that he is \"worthless\" and \"should kill (himself)\". Although hallucinations got better since admission with medication changes, depression and suicidal contemplation has persisted, he is interested in ECT, hoping for a quicker relief.         Diagnosis:     Schizoaffective Disorder, depressed  Polysubstance Use Disorder in a controlled environment          Assessment:   Considering the clinical acuity  electroconvulsive therapy " "(ECT) appears appropriate; despite multifactorial nature of the presentation that would benefit from optimization of cognitive, behavioral and social interventions longitudinally.      Discussed all relevant aspects of ECT with patient, including risks of memory loss, HA, nausea, death <1/50,000, driving prohibition; possible lack of benefit or relapse after successful treatment, alternatives, right to decline, possible outpatient procedures. All questions answered, patient will have opportunity to review ECT video.         Pause for the Cause:     Right patient Yes   Right procedure/laterality settings: Yes          Intra-Procedure Documentation:     #1 02/27/23 pt says he was feeling \"anxious but content\"  #2 03/01/23 no auditory hallucinations, concern for visual hallucinations of bugs in his room. Still actively suicidal with a plan with gun or kitchen knife.   #3 03/03/23 Visual hallucinations disappeared for a day after last ECT. Thinking about suicide slightly less. Worried about some pain in his right arm. Mood 6.5/10 (10 best)  #4 03/06/23 Visual hallucinations returned over the weekend. Auditory hallucinations the same. Still thinking about suicide. Feels safe in hospital but not outside.   #5 03/08/23 Continues to have violent auditory hallucinations and wishes to die.   #6 3/10/23  Improving. Feels safe on unit. Still having SI with plan to shoot self in head but no plan for suicide on unit. Hearing whispers, but voices are quieter. No visual hallucinations of lanterns, etc. Tolerating ECT well. Sleeping well.   #7 3/13/23 Continues to report A/V/H. They are 'not scary' but 'distressing'.   #8 3/15/23 Continues to report A/V/H. Asserts that frequency is reduced but not intensity. Denies side effects but admits that 'days are starting to blend in and I don't remember the dates'.   #9 3/17/23 Patient believes that ECT is helping. When prompted to explain why, he had difficulty articulating except to admit " that his A/V/H are reduced. (incidentally, this is the first time when asked about A/V/H he did not backtrack and states that 'this morning' or 'yesterday' the A/V/H 'were back'). Reportedly 50% improved. Patient denies history of abusing solvents (sniffing glue or paint).   #10 3/20/23 Clozapine level 1340 ng/ml (rather high but patient has no signs of toxicity, so wonder if due to a non-trough blood draw). MMPI pending. Denies visual hallucinations for the first time (not sure if it will be consistent).   #11 3/22/23 Continues to present the same. Minimal change from baseline. States that the treatments are helping (but unclear how). Reports some dizziness when standing up (BP WNL)     ECT #: 11   Treatment number this series: 11   Total treatment number: 11     Type of ECT:  Bilateral, standard    ECT Medications:    Brevital: 90mg  Succinyl Choline: 80mg    ECT Strip Summary: (titration 96 mC)  Energy Level: 192 mC, 1 ms 20 Hz, 6 sec, 800 mA (increased from 144 mC on 3/6/23)    Motor Seizure Duration: > 30seconds  EEG Seizure Duration: >30 seconds    Complications:  none    Time for re-orientation: 28 min on 3/6/23    Plan:   - Continue bilateral ECT Q Mon/Wed/Fri at  192 mC. Patient has not shown any substantial improvement. Appears to tolerate ECT and his course could be extended to 15 sessions if primary team agrees.   - Continue current medications    ALBERTO Vang MD Assisting  Chica Lazo MD

## 2023-03-22 NOTE — PROGRESS NOTES
"[facility], [place]   Psychiatric Progress Note  Hospital Day: 54        Interim History:   The patient's care was discussed with the treatment team during the daily team meeting and/or staff's chart notes were reviewed. Staff report was calm, cooperative and withdrawn to his room. Affect is flat. He attended some groups. He endorsed ongoing SI and depression. Still reporting intermittent AH/VH. Completed MMPI paperwork.     Upon interview, Haseeb is in agreement with plan to procced with additional ECT treatments (through next week or total of 15 treatments). He denies side effects and is noticing some improvements including \"I feel safe and comfortable here and I am not thinking as much about what I need to do outside of the hospital like going to court for my lease and getting a job.\" Discussed expectations of hospital stay and importance of not reinforcing avoidant behaviors. He expressed understanding.     Suicidal ideation: No change. Haseeb reports ongoing active SI with plan and intent to shoot himself with a gun or cut his throat or wrists upon discharge. Denies suicide plan or intent in the hospital. [place] for safety.     He is in agreement with plan to pursue psych testing. Met with Dr. Nation on 3/21. He said \"He's a good sadi.\"     Homicidal ideation: denies current or recent homicidal ideation or behaviors.     Psychotic symptoms: Intermittent auditory and visual hallucinations, often triggered by stress    Medication side effects reported: None    Acute medical concerns: None    Other issues reported by patient: Patient had no further questions or concerns.            Medications:       atropine  2 drop Sublingual At Bedtime     cloZAPine  300 mg Oral At Bedtime     fluticasone  2 spray Both Nostrils Daily     gabapentin  300 mg Oral TID     loratadine  10 mg Oral Daily     metFORMIN  1,000 mg Oral BID w/meals     metoprolol succinate ER  12.5 mg Oral Daily     " "nicotine  1 patch Transdermal Daily     nicotine   Transdermal Q8H     pantoprazole  40 mg Oral QAM AC     senna-docusate  1 tablet Oral BID     sertraline  200 mg Oral Daily          Allergies:   No Known Allergies       Labs:     No results found for this or any previous visit (from the past 24 hour(s)).       Psychiatric Examination:     BP (!) 135/94 (BP Location: Right arm, Patient Position: Semi-Varner's, Cuff Size: Adult Regular)   Pulse 108   Temp 97.2  F (36.2  C) (Temporal)   Resp 18   Ht 1.6 m (5' 3\")   Wt 79 kg (174 lb 1.6 oz)   SpO2 93%   BMI 30.84 kg/m    Weight is 174 lbs 1.6 oz  Body mass index is 30.84 kg/m .    Weight over time:  Vitals:    01/27/23 1718 03/07/23 0933   Weight: 75.1 kg (165 lb 8 oz) 79 kg (174 lb 1.6 oz)       Orthostatic Vitals       None          Cardiometabolic risk assessment. 01/30/23    Reviewed patient profile for cardiometabolic risk factors    Date taken /Value  REFERENCE RANGE   Abdominal Obesity  (Waist Circumference)   See nursing flowsheet Women ?35 in (88 cm)   Men ?40 in (102 cm)      Triglycerides  Triglycerides   Date Value Ref Range Status   01/28/2023 192 (H) <150 mg/dL Final       ?150 mg/dL (1.7 mmol/L) or current treatment for elevated triglycerides   HDL cholesterol  Direct Measure HDL   Date Value Ref Range Status   01/28/2023 41 >=40 mg/dL Final   ]   Women <50 mg/dL (1.3 mmol/L) in women or current treatment for low HDL cholesterol  Men <40 mg/dL (1 mmol/L) in men or current treatment for low HDL cholesterol     Fasting plasma glucose (FPG) Lab Results   Component Value Date     01/28/2023      FPG ?100 mg/dL (5.6 mmol/L) or treatment for elevated blood glucose   Blood pressure  BP Readings from Last 3 Encounters:   03/22/23 (!) 135/94   01/27/23 118/74   07/20/22 112/79    Blood pressure ?130/85 mmHg or treatment for elevated blood pressure   Family History  See family history     Appearance: awake, alert, dressed in hospital scrubs and " "unkempt  Attitude:  cooperative, calm  Eye Contact: fair  Mood: \"depressed but maybe a little better\"  Affect:  mood congruent and intensity is blunted  Speech:  clear, coherent. appropriate  Language: fluent and intact in English  Psychomotor, Gait, Musculoskeletal:  no evidence of tardive dyskinesia, dystonia, or tics  Thought Process:  Linear, ruminative  Associations:  No evidence of loose associations  Thought Content:  SI w/plan and intent upon discharge, no plan or intent while in hospital. Auditory and visual hallucinations present.  Insight:  fair  Judgement:  poor  Oriented to:  time, person, and place  Attention Span and Concentration:  fair  Recent and Remote Memory:  fair  Fund of Knowledge:  appropriate    Clinical Global Impressions  First:  Considering your total clinical experience with this particular patient population, how severe are the patient's symptoms at this time?: 7 (01/28/23 1341)    Most recent:  Compared to the patient's condition at the START of treatment, this patient's condition is: 4           Precautions:     Behavioral Orders   Procedures     Code 1 - Restrict to Unit     Code 2     For imaging     Electroconvulsive therapy     Series of up to 12 treatments. Begin Date: 2/27/23     Treating Psychiatrist providing ECT:  Dr. Kearns     Notified on:  2/23/23     Electroconvulsive therapy     For acute ECT series, MWF, up to 12 treatment.     Electroconvulsive therapy     Series of up to 12 treatments. Begin Date: 02/26/23     Treating Psychiatrist providing ECT: [place]  Notified on: 02/24/23     Fall precautions     [place]     MMPI     Routine Programming     As clinically indicated     Status 15     Every 15 minutes.     Suicide precautions     Patients on Suicide Precautions should have a Combination Diet ordered that includes a Diet selection(s) AND a Behavioral Tray selection for Safe Tray - with utensils, or Safe Tray - NO utensils            Diagnoses:     Active suicidal " "ideation with intent outside of hospital setting  MDD, recurrent, severe with psychotic features vs Schizoaffective Disorder, depressive type  Polysubstance use  Unspecified mood disorder  ADHD, inattentive type per chart  History of idiopathic hypersomnolence per chart  Nicotine use disorder, dependence and withdrawal   Allergies- chronic urticaria and rhinitis per chart  HD per chart          Assessment & Plan:     Assessment and hospital summary:  This patient is a 32 year old male with history of mood disorder, ADHD and substance use who presented to [facility] ED with SI on 1/26 in context of reported methamphetamine use and being kicked out of sober living. Medically cleared in ED, placed on 72HH and admitted to [facility]. Limited historian, giving inconsistent reports about substance use, UDS negative, very somnolent, endorsing ongoing SI and some psychosis symptoms. PTA medications continued with exception of finasteride as patient states he takes \"1/4 a pill\" and declined ordered dose, will defer to primary team to address. Will continue to evaluate safety and stabilization further as patient more able to engage in assessments. Inpatient psychiatric hospitalization is warranted at this time for safety, stabilization, and possible adjustment in medications.    Patient reports that he abruptly stopped Zoloft one week prior to his admission. He developed discontinuation syndrome symptoms following that. He reports that he does not have a history of psychosis but is experiencing psychotic symptoms. He is amenable with plan to trial risperidone after R/B/A were discussed. Risperidone was initiated on 1/30 and titrated to a total of 3 mg daily on 2/1. Clonidine, used for ADHD, was reduced from a total of 0.3 mg daily to 0.2 mg daily on 2/3 due to concern for hypotension. 2/9: Cardiology consult placed. EKG- tachycardia 121 bpm, QTc 465 ms, Abnormal QRS angle and T wave abnormality. COVID negative and labs are " unremarkable.  On 2/10, clozapine was held pending additional workup from IM and cardiology. Pericarditis was ruled out and metoprolol was started. Clozapine was restarted on 2/13 with plan to cautiously titrate to lowest effective dose. 2/23/23: Clozapine titration schedule increased 25 mg/day. ECT and IM consults for ECT clearance placed. 2/27/23: ECT initiated. Risperidone was discontinued on 2/28. Clozapine level obtained on 3/3 at corresponding dose of 300 mg and was within therapeutic range (1001). Metformin increased to 1000 mg BID on 3/6 to target increased appetite/wt gain. Minimal improvement noted since initiation of both clozapine and ECT. 3/16: Patient reports orthostasis symptoms have resolved and some subjective improvement in depression symptoms with ECT. IDS-SR depression screening given to patient.     Psychiatric treatment/inteventions:  Medications:   -Continue clozapine 300 mg qhs. Clozapine quant at corresponding dose is 1001.  -Continue PTA sertraline 200 mg daily for mood  -Continue PTA gabapentin 300 mg TID for anxiety   -Continue Cogentin 1 mg at bedtime for possible EPSE     -PRN hydroxyzine 25 mg every 4 hour for anxiety  -PRN trazodone 50 mg at bedtime for sleep   -PRN olanzapine 10mg PO or IM TID for agitation/psychosis   -PRN atropine 1% SL drops, 2 drops q2h for sialorrhea    Spiritual services consult placed on 2/6. Pt is [employer]. Appreciate assistance.  Psychological consult order placed. Dr. Nation will be meeting with patient on Tuesday morning (tentative). Appreciate assistance.     ECT:  - Medically cleared on 2/24. See IM note for details.  - ECT consult placed on 2/23.  - ECT started on 2/27/23  - HOLD Gabapentin on nights prior to ECT and on ECT mornings until after ECT.   - ECT #9 will be completed 3/17/23. Uneventful. Next ECT treatment scheduled for Friday, 3/24. Baseline IDS-SR was 55. Hard copy placed in chart. Patient asked to complete again. Score is 53 as of 3/9.   He is working on completing updated IDS-SR from 3/16.     This writer is in agreement with plan to proceed with a total of 15 ECT treatments in acute series.      Laboratory/Imaging: reviewed: Covid negative; UDS negative; CMP, CBC, TSH, Lipid panel, HgbA1c ordered to complete admission labs. Reviewed.     2/9/23: Troponin, CK, CBC, BMP: Unremarkable, CRP elevated to 38.18, COVID: Negative  2/10/23: Add Influenza A/B given flu-like sx-negative  Patient will be treated in therapeutic milieu with appropriate individual and group therapies as described.     Medical treatment/interventions:  Medical concerns:   Nicotine replacement ordered, educate patient on benefits of cessation, pt unclear about finasteride dose, will hold until further information is obtained. PTA PRN zyrtec, azelastine, fluticasone, triamcinolone and Epipen ordered for allergies and PRN ammonia lactate, Eucerin for  dry skin.    Flatulence:   - simethicone prn    Neuroleptic induced wt gain:  - Monitor weights  - Continue metformin 1,000 mg BID with meals    Dyslipidemia: Will arrange follow up with PCP to address. Discussed importance of healthy eating habits and regular exercise. Will consider nutrition consult if patient amenable.     Orthostasis likely 2/2 clozapine: Pt is not hypotensive but reports orthostatic symptoms and previously restricted fluids. Now resolved.   - Continue to monitor vital signs closely  - Encourage fluids  - Discontinued clonidine    Sialorrhea 2/2 clozapine:  - Atropine 1% SL drops qhs  - Recommend towel on pillow when sleeping    Chest pain/tachycardia/EKG changes (2/9):  - Pain improved with PRN medications.   - Cardiology consult placed on 2/9. See note on that date for details.   - ECHO reviewed and unremarkable.  - Writer discussed care with both Dr. Streeter from [facility] and Christina from . Plan for now is to HOLD clozapine until further medical workup. Dr. Streeter explained that myocarditis has been ruled out as troponin  was negative. Pericarditis remains on differential, but he does not have EKG findings or a pericardial effusion. IM will assess for pericardial friction rub and pleuritic chest pain. IM seen by patient and Metoprolol was started.     Update 2/13: Discussed care with Annette from IM today on two occasions. Please see her note on this date for details. She does not suspect that this is pericarditis. He does not have pleuritic pain, characteristic CP, EKG changes, or pericardial effusion on ECHO. Therefore, will cautiously restart clozapine while monitoring closely for side effects. May consider further increase in metoprolol if necessary. PPI was started due to suspected GERD.     Update 2/24 per IM note:  Medicine is following peripherally for concerns for pericarditis.  See detailed note from 2/13.  No pericardial friction rub noted while sitting up and leaning forward today.  Patient states that his chest discomfort is getting better after starting the Protonix yesterday.  It does appear that he has also been using the Maalox.  Patient does have Tums available as well.  Please be mindful for any constipation with overuse of the PRNs.     POC:  - Continue Protonix daily for 8 weeks, then taper off  - Recommend lifestyle changes including weight loss head of bed elevation avoidance of late-night eating and specific food elimination such as chocolate caffeine alcohol acidic and/or spicy food.  - Watch for constipation with PRNs for heartburn  - We will schedule senna 1 tab twice daily  - MiraLAX daily as needed added on     Medicine will sign off, please contact us for any acute changes.      Sore throat/cough/nasal congestion, resolved:   - COVID negative on 2/9. Repeat COVID test and Influenza A/B neg on 2/10.  - Cepacol lozenges added  - PRN Tylenol   - Consulted with IM. Appreciate assistance. See their notes for details.      Legal Status: VOLUNTARY. 72 hour hold discontinued. Pt agreed to sign in on voluntary  basis and discharge directly to treatment once stable.      Safety Assessment:        Behavioral Orders   Procedures     Code 1 - Restrict to Unit     Routine Programming       As clinically indicated     Status 15       Every 15 minutes.     Suicide precautions       Patients on Suicide Precautions should have a Combination Diet ordered that includes a Diet selection(s) AND a Behavioral Tray selection for Safe Tray - with utensils, or Safe Tray - NO utensils        Withdrawal precautions      Pt has not required locked seclusion or restraints in the past 24 hours to maintain safety, please refer to RN documentation for further details.    Discontinued SIO on 2/8 as patient is heriberto for safety. Monitor SI closely.     The risks, benefits, alternatives and side effects have been discussed and are understood by the patient.     Disposition: Pending clinical stabilization. Pt remains actively suicidal. [facility].     Entered by: Nelly Brown MD on 3/22/2023 at 2:39 PM
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
## 2023-03-22 NOTE — ANESTHESIA POSTPROCEDURE EVALUATION
Patient: Nikolay Lora    Procedure: * No procedures listed *       Anesthesia Type:  General    Note:  Disposition: Inpatient   Postop Pain Control: Uneventful            Sign Out: Well controlled pain   PONV: No   Neuro/Psych: Uneventful            Sign Out: Acceptable/Baseline neuro status   Airway/Respiratory: Uneventful            Sign Out: Acceptable/Baseline resp. status   CV/Hemodynamics: Uneventful            Sign Out: Acceptable CV status; No obvious hypovolemia; No obvious fluid overload   Other NRE: NONE   DID A NON-ROUTINE EVENT OCCUR? No           Last vitals:  Vitals:    03/22/23 0720 03/22/23 0829 03/22/23 0840   BP: 117/82 (!) 138/92 (!) 137/96   Pulse: 83 105 110   Resp: 16 16 18   Temp:  36.3  C (97.3  F) 36.2  C (97.2  F)   SpO2: 95% 95% 93%       Electronically Signed By: Jen Murcia MD  March 22, 2023  8:43 AM

## 2023-03-22 NOTE — PROGRESS NOTES
Patient adequate for discharge back to unit. Report called to unit nurse   Nena. IV removed and hemostasis achieved less than 2 min.  VSS.  Pt is tachycardic,  on discharge back to unit.  Dr. Murcia is ok with pt transfer to the floor.  See MAR for medications given during procedure.  Patient safely transported back to unit with  staff person.

## 2023-03-22 NOTE — PLAN OF CARE
Problem: Sleep Disturbance  Goal: Adequate Sleep/Rest  Outcome: Progressing   Goal Outcome Evaluation:    Patient appeared to sleep 5.5 hours this night shift.  No prns or snacks given or requested. Has been NPO for ECT this morning. No concerns were reported or noted.

## 2023-03-22 NOTE — PLAN OF CARE
Nursing Assessment    Recent Vitals: B/P: 135/94, T: 97.2, P: 108, R: 18     General Shift Summary  Patient continues to isolate to his room. He presents as more social and brighter when writer assessed him. He continues to have SI with plan to shoot himself but said that he has these thoughts a lot less often. He denied seeing any lanterns, crows, or other hallucinations today. Patient dicussed using coping skills like deep breathing, meditation, and writing in a journal to help with anxiety and suicidal thoughts. He rated depression 5/10 and said his anxiety is lower then yesterday. Hygiene is good, patient showered. Appetite is fair, patient ate about 50% of breakfast and 25% of lunch. Patient had ECT this morning, no issues.    Plan: Patient will have his final ECT on 3/24. [facility] once [facility].    Nena Nevarez, RN MSN

## 2023-03-22 NOTE — PROGRESS NOTES
"  03/21/23 1900   Group Therapy Session   Time Session Began 1620   Time Session Ended 1715   Total Time (minutes) 15   Total # Attendees 3   Group Type expressive therapy   Group Topic Covered emotions/expression;coping skills/lifestyle management   Group Session Detail \"One Day\" Reflections   Patient Response/Contribution cooperative with task   Patient Participation Detail Haseeb entered group for the last portion of session today.  He wrote and shared his reflections on a song with the group.  He was positive and open in his sharing.  He, at times, seemed to hesitate when speaking, but was calm and appropriate with a seemingly gentle demeanor.  He engaged appropriately with peers in session.        "

## 2023-03-23 ENCOUNTER — ANESTHESIA EVENT (OUTPATIENT)
Dept: BEHAVIORAL HEALTH | Facility: CLINIC | Age: 33
End: 2023-03-23
Payer: COMMERCIAL

## 2023-03-23 LAB
BASOPHILS # BLD AUTO: 0 10E3/UL (ref 0–0.2)
BASOPHILS NFR BLD AUTO: 0 %
EOSINOPHIL # BLD AUTO: 0.1 10E3/UL (ref 0–0.7)
EOSINOPHIL NFR BLD AUTO: 1 %
HOLD SPECIMEN: NORMAL
IMM GRANULOCYTES # BLD: 0.1 10E3/UL
IMM GRANULOCYTES NFR BLD: 1 %
LYMPHOCYTES # BLD AUTO: 1.8 10E3/UL (ref 0.8–5.3)
LYMPHOCYTES NFR BLD AUTO: 14 %
MONOCYTES # BLD AUTO: 0.6 10E3/UL (ref 0–1.3)
MONOCYTES NFR BLD AUTO: 4 %
NEUTROPHILS # BLD AUTO: 10.6 10E3/UL (ref 1.6–8.3)
NEUTROPHILS NFR BLD AUTO: 80 %
NRBC # BLD AUTO: 0 10E3/UL
NRBC BLD AUTO-RTO: 0 /100
WBC # BLD AUTO: 13.1 10E3/UL (ref 4–11)

## 2023-03-23 PROCEDURE — H2032 ACTIVITY THERAPY, PER 15 MIN: HCPCS

## 2023-03-23 PROCEDURE — 250N000013 HC RX MED GY IP 250 OP 250 PS 637: Performed by: PSYCHIATRY & NEUROLOGY

## 2023-03-23 PROCEDURE — 99233 SBSQ HOSP IP/OBS HIGH 50: CPT | Performed by: PSYCHIATRY & NEUROLOGY

## 2023-03-23 PROCEDURE — 85048 AUTOMATED LEUKOCYTE COUNT: CPT | Performed by: PSYCHIATRY & NEUROLOGY

## 2023-03-23 PROCEDURE — 250N000013 HC RX MED GY IP 250 OP 250 PS 637

## 2023-03-23 PROCEDURE — 124N000002 HC R&B MH UMMC

## 2023-03-23 PROCEDURE — 36415 COLL VENOUS BLD VENIPUNCTURE: CPT | Performed by: PSYCHIATRY & NEUROLOGY

## 2023-03-23 RX ORDER — CLOZAPINE 200 MG/1
200 TABLET ORAL AT BEDTIME
Status: DISCONTINUED | OUTPATIENT
Start: 2023-03-23 | End: 2023-04-12 | Stop reason: HOSPADM

## 2023-03-23 RX ADMIN — Medication 12.5 MG: at 08:37

## 2023-03-23 RX ADMIN — SERTRALINE HYDROCHLORIDE 200 MG: 100 TABLET ORAL at 08:36

## 2023-03-23 RX ADMIN — METFORMIN HYDROCHLORIDE 1000 MG: 500 TABLET ORAL at 08:37

## 2023-03-23 RX ADMIN — GABAPENTIN 300 MG: 300 CAPSULE ORAL at 13:10

## 2023-03-23 RX ADMIN — GABAPENTIN 300 MG: 300 CAPSULE ORAL at 08:37

## 2023-03-23 RX ADMIN — LORATADINE 10 MG: 10 TABLET ORAL at 08:36

## 2023-03-23 RX ADMIN — METFORMIN HYDROCHLORIDE 1000 MG: 500 TABLET ORAL at 18:03

## 2023-03-23 RX ADMIN — SENNOSIDES AND DOCUSATE SODIUM 1 TABLET: 50; 8.6 TABLET ORAL at 19:42

## 2023-03-23 RX ADMIN — ATROPINE SULFATE 2 DROP: 10 SOLUTION/ DROPS OPHTHALMIC at 19:43

## 2023-03-23 RX ADMIN — SENNOSIDES AND DOCUSATE SODIUM 1 TABLET: 50; 8.6 TABLET ORAL at 08:37

## 2023-03-23 RX ADMIN — CLOZAPINE 200 MG: 200 TABLET ORAL at 19:42

## 2023-03-23 RX ADMIN — PANTOPRAZOLE SODIUM 40 MG: 40 TABLET, DELAYED RELEASE ORAL at 08:36

## 2023-03-23 RX ADMIN — FLUTICASONE PROPIONATE 2 SPRAY: 50 SPRAY, METERED NASAL at 08:37

## 2023-03-23 RX ADMIN — NICOTINE 1 PATCH: 21 PATCH, EXTENDED RELEASE TRANSDERMAL at 08:36

## 2023-03-23 ASSESSMENT — ACTIVITIES OF DAILY LIVING (ADL)
HYGIENE/GROOMING: INDEPENDENT
ADLS_ACUITY_SCORE: 29
ORAL_HYGIENE: INDEPENDENT
ADLS_ACUITY_SCORE: 29
LAUNDRY: UNABLE TO COMPLETE
ADLS_ACUITY_SCORE: 29
DRESS: INDEPENDENT;SCRUBS (BEHAVIORAL HEALTH)

## 2023-03-23 NOTE — PLAN OF CARE
Nursing Assessment    Recent Vitals: B/P: 130/81, T: 97.4, P: 117, R: 16     Sleep:  Hours of sleep at night: 7    General Shift Summary  Patient presents as more withdrawn and depressed compared to yesterday. He did attend dance therapy group. He voiced having depression 10/10 and anxiety 5/10. He continues to have suicidal thoughts and hears whispers to shoot himself in the head. Patient said he is also hearing sirens and asked if writer could hear any at that moment. Patient denied any visual hallucinations. Hygiene is good, he showered yesterday and said he would shower tomorrow. Appetite is good, he is eating over 80% of his meals.     Patient is medication compliant with no voiced side effects. Voiced intermittent right arm pain/cramping since receiving ECT yesterday.    Plan is to continue to stabilize patient, continue with further ECT, and decrease patient's Clozapine due to potential interference with ECT.    Nena Nevarez RN MSN

## 2023-03-23 NOTE — PLAN OF CARE
Problem: Sleep Disturbance  Goal: Adequate Sleep/Rest  Outcome: Progressing   Goal Outcome Evaluation:    Patient appeared to sleep 7 hours this night shift.  No prns or snacks given or requested.  No concerns were reported or noted.  Labs this morning. ECT tomorrow morning.
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
## 2023-03-23 NOTE — PLAN OF CARE
"  Problem: Adult Behavioral Health Plan of Care  Goal: Plan of Care Review  Outcome: Progressing  Flowsheets  Taken 3/22/2023 2128  Plan of Care Reviewed With: patient  Overall Patient Progress: improving  Patient Agreement with Plan of Care: agrees  Taken 3/22/2023 2125  Patient Agreement with Plan of Care: agrees     Problem: Psychotic Signs/Symptoms  Goal: Increased Participation and Engagement (Psychotic Signs/Symptoms)  Intervention: Facilitate Participation and Engagement  Recent Flowsheet Documentation  Taken 3/22/2023 2125 by Gerber Metcalf RN  Supportive Measures:   positive reinforcement provided   active listening utilized   self-care encouraged   verbalization of feelings encouraged  Diversional Activity: television   Goal Outcome Evaluation:    Plan of Care Reviewed With: patient Plan of Care Reviewed With: patient    Overall Patient Progress: improvingOverall Patient Progress: improving     Patient remains isolative to room all evening only coming out with prompting to attend group. Patient upon approach remains flat in affect, calm and cooperative with verbal assessment. Patient reporting continued anxiety 6/10 and depression 10/10. Patient reporting he feels his depression has not improved with medication or ECT treatments and still has concerns about discharge without improvement. RN writer attempted to promote increased activity and less isolation to room including coming into the lounge for movies, puzzles, games and completing ADL's including showering but patient refused stating \"Im okay\" Patient observed in his room all evening watching television and listening to music. Patient continues to report suicidal thoughts and auditory hallucinations commanding SI thoughts. Patient continues to contract for safety and identify he feels safe while in the hospital. Patient does continue to report that the hallucinations have been improving and identifies them as \"whispers\" that he can control and drown " out with music and television.    Patient cooperative with vital sign assessments. Vitals indicate increased pulse of 117, on trend with past results. RN writer encouraged patient to continue to increase fluid intake to prevent dehydration which he was cooperative with. Patient diet appeared good eating 100% of dinner and snacks. Patient accepting of HS medications with no stated or observed side effects. Patient declined HS scheduled sublingual Atropine reporting no hypersalivation in past days.

## 2023-03-23 NOTE — PROGRESS NOTES
"Pt joined for part of a dance/movement therapy (D/MT) session until [facility] code [facility].  Pt was already using co-regulating movements and easily followed therapist direction to move to a safer location.  He was engaged and used more movement than any previous sessions with this therapist.  He acknowledged a loud siren sound that \"comes and goes\" and asked if this therapist could hear it.  He accepted affirmation that just because I couldn't hear it doesn't mean it isn't something we want to address when it bothers him.  He was pleasant and stated he would like to be invited to groups.       03/23/23 1330   Expressive Therapy   Therapy Type dance/movement   Minutes of Treatment 20       "

## 2023-03-23 NOTE — PROGRESS NOTES
"[facility], [place]   Psychiatric Progress Note  Hospital Day: 55        Interim History:   The patient's care was discussed with the treatment team during the daily team meeting and/or staff's chart notes were reviewed. Staff report was calm, cooperative and withdrawn to his room. Affect is flat. He attended some groups. Reported improvement in SI thoughts yesterday afternoon. Psych testing completed and reviewed.     Upon interview, Haseeb said that ECT has been \"pushing the hallucinations out. I haven't seen or heard them today.\" He said \"I am still depressed and suicidal. I don't know what it will take to not want to kill myself. My subconscious is telling me not to.\" He added that he is still feeling quite hopeless. However, he is also future oriented.     Suicidal ideation: No change. Haseeb reports ongoing active SI with plan and intent to shoot himself with a gun or cut his throat or wrists upon discharge. Denies suicide plan or intent in the hospital. [place] for safety.     Homicidal ideation: denies current or recent homicidal ideation or behaviors.     Psychotic symptoms: Intermittent auditory and visual hallucinations, often triggered by stress    Medication side effects reported: None    Acute medical concerns: None    Other issues reported by patient: Patient had no further questions or concerns.            Medications:       atropine  2 drop Sublingual At Bedtime     cloZAPine  300 mg Oral At Bedtime     fluticasone  2 spray Both Nostrils Daily     gabapentin  300 mg Oral TID     loratadine  10 mg Oral Daily     metFORMIN  1,000 mg Oral BID w/meals     metoprolol succinate ER  12.5 mg Oral Daily     nicotine  1 patch Transdermal Daily     nicotine   Transdermal Q8H     pantoprazole  40 mg Oral QAM AC     senna-docusate  1 tablet Oral BID     sertraline  200 mg Oral Daily          Allergies:   No Known Allergies       Labs:     No results found for this or any previous " "visit (from the past 24 hour(s)).       Psychiatric Examination:     /81   Pulse 117   Temp 97.4  F (36.3  C)   Resp 16   Ht 1.6 m (5' 3\")   Wt 79 kg (174 lb 1.6 oz)   SpO2 95%   BMI 30.84 kg/m    Weight is 174 lbs 1.6 oz  Body mass index is 30.84 kg/m .    Weight over time:  Vitals:    01/27/23 1718 03/07/23 0933   Weight: 75.1 kg (165 lb 8 oz) 79 kg (174 lb 1.6 oz)       Orthostatic Vitals       None          Cardiometabolic risk assessment. 01/30/23    Reviewed patient profile for cardiometabolic risk factors    Date taken /Value  REFERENCE RANGE   Abdominal Obesity  (Waist Circumference)   See nursing flowsheet Women ?35 in (88 cm)   Men ?40 in (102 cm)      Triglycerides  Triglycerides   Date Value Ref Range Status   01/28/2023 192 (H) <150 mg/dL Final       ?150 mg/dL (1.7 mmol/L) or current treatment for elevated triglycerides   HDL cholesterol  Direct Measure HDL   Date Value Ref Range Status   01/28/2023 41 >=40 mg/dL Final   ]   Women <50 mg/dL (1.3 mmol/L) in women or current treatment for low HDL cholesterol  Men <40 mg/dL (1 mmol/L) in men or current treatment for low HDL cholesterol     Fasting plasma glucose (FPG) Lab Results   Component Value Date     01/28/2023      FPG ?100 mg/dL (5.6 mmol/L) or treatment for elevated blood glucose   Blood pressure  BP Readings from Last 3 Encounters:   03/23/23 130/81   01/27/23 118/74   07/20/22 112/79    Blood pressure ?130/85 mmHg or treatment for elevated blood pressure   Family History  See family history     Appearance: awake, alert, dressed in hospital scrubs and unkempt  Attitude:  cooperative, calm  Eye Contact: fair  Mood: \"still depressed\"  Affect:  mood congruent and intensity is blunted  Speech:  clear, coherent. appropriate  Language: fluent and intact in English  Psychomotor, Gait, Musculoskeletal:  no evidence of tardive dyskinesia, dystonia, or tics  Thought Process:  Linear, ruminative  Associations:  No evidence of loose " associations  Thought Content:  SI w/plan and intent upon discharge, no plan or intent while in hospital. Auditory and visual hallucinations absent this morning  Insight:  fair  Judgement:  fair  Oriented to:  time, person, and place  Attention Span and Concentration:  fair  Recent and Remote Memory:  fair  Fund of Knowledge:  appropriate    Clinical Global Impressions  First:  Considering your total clinical experience with this particular patient population, how severe are the patient's symptoms at this time?: 7 (01/28/23 1341)    Most recent:  Compared to the patient's condition at the START of treatment, this patient's condition is: 4           Precautions:     Behavioral Orders   Procedures     Code 1 - Restrict to Unit     Code 2     For imaging     Electroconvulsive therapy     Series of up to 12 treatments. Begin Date: 2/27/23     Treating Psychiatrist providing ECT:  Dr. Kearns     Notified on:  2/23/23     Electroconvulsive therapy     For acute ECT series, MWF, up to 12 treatment.     Electroconvulsive therapy     Series of up to 12 treatments. Begin Date: 02/26/23     Treating Psychiatrist providing ECT: Clifton  Notified on: 02/24/23     Fall precautions     [place]     MMPI     Routine Programming     As clinically indicated     Status 15     Every 15 minutes.     Suicide precautions     Patients on Suicide Precautions should have a Combination Diet ordered that includes a Diet selection(s) AND a Behavioral Tray selection for Safe Tray - with utensils, or Safe Tray - NO utensils            Diagnoses:     Active suicidal ideation with intent outside of hospital setting  MDD, recurrent, severe with psychotic features vs Schizoaffective Disorder, depressive type  Polysubstance use  Unspecified mood disorder  ADHD, inattentive type per chart  History of idiopathic hypersomnolence per chart  Nicotine use disorder, dependence and withdrawal   Allergies- chronic urticaria and rhinitis per chart  HD per chart    "       Assessment & Plan:     Assessment and hospital summary:  This patient is a 32 year old male with history of mood disorder, ADHD and substance use who presented to [place] ED with SI on 1/26 in context of reported methamphetamine use and being kicked out of sober living. Medically cleared in ED, placed on 72HH and admitted to [facility]. Limited historian, giving inconsistent reports about substance use, UDS negative, very somnolent, endorsing ongoing SI and some psychosis symptoms. PTA medications continued with exception of finasteride as patient states he takes \"1/4 a pill\" and declined ordered dose, will defer to primary team to address. Will continue to evaluate safety and stabilization further as patient more able to engage in assessments. Inpatient psychiatric hospitalization is warranted at this time for safety, stabilization, and possible adjustment in medications.    Patient reports that he abruptly stopped Zoloft one week prior to his admission. He developed discontinuation syndrome symptoms following that. He reports that he does not have a history of psychosis but is experiencing psychotic symptoms. He is amenable with plan to trial risperidone after R/B/A were discussed. Risperidone was initiated on 1/30 and titrated to a total of 3 mg daily on 2/1. Clonidine, used for ADHD, was reduced from a total of 0.3 mg daily to 0.2 mg daily on 2/3 due to concern for hypotension. 2/9: Cardiology consult placed. EKG- tachycardia 121 bpm, QTc 465 ms, Abnormal QRS angle and T wave abnormality. COVID negative and labs are unremarkable.  On 2/10, clozapine was held pending additional workup from IM and cardiology. Pericarditis was ruled out and metoprolol was started. Clozapine was restarted on 2/13 with plan to cautiously titrate to lowest effective dose. 2/23/23: Clozapine titration schedule increased 25 mg/day. ECT and IM consults for ECT clearance placed. 2/27/23: ECT initiated. Risperidone was discontinued on " 2/28. Clozapine level obtained on 3/3 at corresponding dose of 300 mg and was within therapeutic range (1001). Metformin increased to 1000 mg BID on 3/6 to target increased appetite/wt gain. Minimal improvement noted since initiation of both clozapine and ECT. 3/16: Patient reports orthostasis symptoms have resolved and some subjective improvement in depression symptoms with ECT. IDS-SR depression screening given to patient.     Psychiatric treatment/inteventions:  Medications:   -Reduce clozapine to 200 mg qhs. Per Dr. Lazo, his current clozapine dose and blood levels, being so high, might be getting in the way of a strong inhibitory response to ECT (one mechanism of action for ECT is the seizure recruits inhibitory neurons (EDEL) and that helps with the therapeutic benefits).  Check level on 3/26.   -Continue PTA sertraline 200 mg daily for mood  -Continue PTA gabapentin 300 mg TID for anxiety   -Continue Cogentin 1 mg at bedtime for possible EPSE     -PRN hydroxyzine 25 mg every 4 hour for anxiety  -PRN trazodone 50 mg at bedtime for sleep   -PRN olanzapine 10mg PO or IM TID for agitation/psychosis   -PRN atropine 1% SL drops, 2 drops q2h for sialorrhea    Spiritual services consult placed on 2/6. Pt is Gnosticism. Appreciate assistance.  Psychological consult completed and reviewed by writer. Please see Dr. Nation's note dated 3/22 for details.     ECT:  - Medically cleared on 2/24. See IM note for details.  - ECT consult placed on 2/23.  - ECT started on 2/27/23  - HOLD Gabapentin on nights prior to ECT and on ECT mornings until after ECT.   - ECT #12 will be completed 3/24/23. Baseline IDS-SR was 55. Hard copy placed in chart. Patient asked to complete again. Score is 53 as of 3/9.  He is working on completing updated IDS-SR from 3/16.     This writer is in agreement with plan to proceed with a total of 15 ECT treatments in acute series.      Laboratory/Imaging: reviewed: Covid negative; UDS negative; CMP,  CBC, TSH, Lipid panel, HgbA1c ordered to complete admission labs. Reviewed.     2/9/23: Troponin, CK, CBC, BMP: Unremarkable, CRP elevated to 38.18, COVID: Negative  2/10/23: Add Influenza A/B given flu-like sx-negative  Patient will be treated in therapeutic milieu with appropriate individual and group therapies as described.     Medical treatment/interventions:  Medical concerns:   Nicotine replacement ordered, educate patient on benefits of cessation, pt unclear about finasteride dose, will hold until further information is obtained. PTA PRN zyrtec, azelastine, fluticasone, triamcinolone and Epipen ordered for allergies and PRN ammonia lactate, Eucerin for  dry skin.    Flatulence:   - simethicone prn    Neuroleptic induced wt gain:  - Monitor weights  - Continue metformin 1,000 mg BID with meals    Dyslipidemia: Will arrange follow up with PCP to address. Discussed importance of healthy eating habits and regular exercise. Will consider nutrition consult if patient amenable.     Orthostasis likely 2/2 clozapine: Pt is not hypotensive but reports orthostatic symptoms and previously restricted fluids. Now resolved.   - Continue to monitor vital signs closely  - Encourage fluids  - Discontinued clonidine and reduced clozapine dose    Sialorrhea 2/2 clozapine:  - Atropine 1% SL drops qhs  - Recommend towel on pillow when sleeping    Chest pain/tachycardia/EKG changes (2/9):  - Pain improved with PRN medications.   - Cardiology consult placed on 2/9. See note on that date for details.   - ECHO reviewed and unremarkable.  - Writer discussed care with both Dr. Streeter from [facility] and Christina from IM. Plan for now is to HOLD clozapine until further medical workup. Dr. Streeter explained that myocarditis has been ruled out as troponin was negative. Pericarditis remains on differential, but he does not have EKG findings or a pericardial effusion. IM will assess for pericardial friction rub and pleuritic chest pain. IM seen by  patient and Metoprolol was started.     Update 2/13: Discussed care with Annette from IM today on two occasions. Please see her note on this date for details. She does not suspect that this is pericarditis. He does not have pleuritic pain, characteristic CP, EKG changes, or pericardial effusion on ECHO. Therefore, will cautiously restart clozapine while monitoring closely for side effects. May consider further increase in metoprolol if necessary. PPI was started due to suspected GERD.     Update 2/24 per IM note:  Medicine is following peripherally for concerns for pericarditis.  See detailed note from 2/13.  No pericardial friction rub noted while sitting up and leaning forward today.  Patient states that his chest discomfort is getting better after starting the Protonix yesterday.  It does appear that he has also been using the Maalox.  Patient does have Tums available as well.  Please be mindful for any constipation with overuse of the PRNs.     POC:  - Continue Protonix daily for 8 weeks, then taper off  - Recommend lifestyle changes including weight loss head of bed elevation avoidance of late-night eating and specific food elimination such as chocolate caffeine alcohol acidic and/or spicy food.  - Watch for constipation with PRNs for heartburn  - We will schedule senna 1 tab twice daily  - MiraLAX daily as needed added on     Medicine will sign off, please contact us for any acute changes.      Sore throat/cough/nasal congestion, resolved:   - COVID negative on 2/9. Repeat COVID test and Influenza A/B neg on 2/10.  - Cepacol lozenges added  - PRN Tylenol   - Consulted with IM. Appreciate assistance. See their notes for details.      Legal Status: VOLUNTARY. 72 hour hold discontinued. Pt agreed to sign in on voluntary basis and discharge directly to treatment once stable.      Safety Assessment:        Behavioral Orders   Procedures     Code 1 - Restrict to Unit     Routine Programming       As clinically  indicated     Status 15       Every 15 minutes.     Suicide precautions       Patients on Suicide Precautions should have a Combination Diet ordered that includes a Diet selection(s) AND a Behavioral Tray selection for Safe Tray - with utensils, or Safe Tray - NO utensils        Withdrawal precautions      Pt has not required locked seclusion or restraints in the past 24 hours to maintain safety, please refer to RN documentation for further details.    Discontinued SIO on 2/8 as patient is heriberto for safety. Monitor SI closely.     The risks, benefits, alternatives and side effects have been discussed and are understood by the patient.     Disposition: Pending clinical stabilization. Pt remains actively suicidal. Will likely discharge back to [facility] program once stable.     Entered by: Nelly Brown MD on 3/23/2023 at 9:12 AM

## 2023-03-23 NOTE — PROGRESS NOTES
03/22/23 1900   Group Therapy Session   Group Attendance attended group session   Time Session Began 1615   Time Session Ended 1700   Total Time (minutes) 40   Total # Attendees 3   Group Type recreation   Group Topic Covered leisure exploration/use of leisure time   Group Session Detail TR leisure group   Patient Response/Contribution cooperative with task   Patient Participation Detail Pt attended the structured Therapeutic Recreation group, participating in a group activity. Pt participated in group discussion and leisure participation as a healthy outlet to gain self-esteem, manage behaviors, improve social skills, and decrease isolation.  Pt remained focused and engaged throughout group activity.  Pt participated in the group discussion about healthy outlets and participated in the group activity, contributing to the clues and descriptions for the game.

## 2023-03-24 ENCOUNTER — APPOINTMENT (OUTPATIENT)
Dept: BEHAVIORAL HEALTH | Facility: CLINIC | Age: 33
End: 2023-03-24
Attending: PSYCHIATRY & NEUROLOGY
Payer: COMMERCIAL

## 2023-03-24 ENCOUNTER — ANESTHESIA (OUTPATIENT)
Dept: BEHAVIORAL HEALTH | Facility: CLINIC | Age: 33
End: 2023-03-24
Payer: COMMERCIAL

## 2023-03-24 PROCEDURE — 250N000013 HC RX MED GY IP 250 OP 250 PS 637: Performed by: PSYCHIATRY & NEUROLOGY

## 2023-03-24 PROCEDURE — 250N000013 HC RX MED GY IP 250 OP 250 PS 637

## 2023-03-24 PROCEDURE — 99232 SBSQ HOSP IP/OBS MODERATE 35: CPT | Mod: 25 | Performed by: PSYCHIATRY & NEUROLOGY

## 2023-03-24 PROCEDURE — 250N000011 HC RX IP 250 OP 636: Performed by: ANESTHESIOLOGY

## 2023-03-24 PROCEDURE — 250N000009 HC RX 250: Performed by: ANESTHESIOLOGY

## 2023-03-24 PROCEDURE — 90870 ELECTROCONVULSIVE THERAPY: CPT

## 2023-03-24 PROCEDURE — G0177 OPPS/PHP; TRAIN & EDUC SERV: HCPCS

## 2023-03-24 PROCEDURE — H2032 ACTIVITY THERAPY, PER 15 MIN: HCPCS

## 2023-03-24 PROCEDURE — 370N000017 HC ANESTHESIA TECHNICAL FEE, PER MIN

## 2023-03-24 PROCEDURE — 90870 ELECTROCONVULSIVE THERAPY: CPT | Performed by: PSYCHIATRY & NEUROLOGY

## 2023-03-24 PROCEDURE — 124N000002 HC R&B MH UMMC

## 2023-03-24 RX ORDER — NICARDIPINE HCL-0.9% SOD CHLOR 1 MG/10 ML
SYRINGE (ML) INTRAVENOUS PRN
Status: DISCONTINUED | OUTPATIENT
Start: 2023-03-24 | End: 2023-03-24

## 2023-03-24 RX ADMIN — SERTRALINE HYDROCHLORIDE 200 MG: 100 TABLET ORAL at 08:05

## 2023-03-24 RX ADMIN — CLOZAPINE 200 MG: 200 TABLET ORAL at 19:46

## 2023-03-24 RX ADMIN — SENNOSIDES AND DOCUSATE SODIUM 1 TABLET: 50; 8.6 TABLET ORAL at 08:05

## 2023-03-24 RX ADMIN — Medication 1000 MCG: at 09:37

## 2023-03-24 RX ADMIN — SENNOSIDES AND DOCUSATE SODIUM 1 TABLET: 50; 8.6 TABLET ORAL at 19:47

## 2023-03-24 RX ADMIN — Medication 12.5 MG: at 10:34

## 2023-03-24 RX ADMIN — GABAPENTIN 300 MG: 300 CAPSULE ORAL at 10:35

## 2023-03-24 RX ADMIN — LORATADINE 10 MG: 10 TABLET ORAL at 08:05

## 2023-03-24 RX ADMIN — GABAPENTIN 300 MG: 300 CAPSULE ORAL at 19:47

## 2023-03-24 RX ADMIN — METFORMIN HYDROCHLORIDE 1000 MG: 500 TABLET ORAL at 10:34

## 2023-03-24 RX ADMIN — METFORMIN HYDROCHLORIDE 1000 MG: 500 TABLET ORAL at 18:07

## 2023-03-24 RX ADMIN — FLUTICASONE PROPIONATE 2 SPRAY: 50 SPRAY, METERED NASAL at 08:17

## 2023-03-24 RX ADMIN — SUCCINYLCHOLINE CHLORIDE 80 MG: 20 INJECTION, SOLUTION INTRAMUSCULAR; INTRAVENOUS; PARENTERAL at 09:37

## 2023-03-24 RX ADMIN — METHOHEXITAL SODIUM 90 MG: 500 INJECTION, POWDER, LYOPHILIZED, FOR SOLUTION INTRAMUSCULAR; INTRAVENOUS; RECTAL at 09:37

## 2023-03-24 RX ADMIN — NICOTINE 1 PATCH: 21 PATCH, EXTENDED RELEASE TRANSDERMAL at 10:34

## 2023-03-24 RX ADMIN — PANTOPRAZOLE SODIUM 40 MG: 40 TABLET, DELAYED RELEASE ORAL at 08:05

## 2023-03-24 RX ADMIN — GABAPENTIN 300 MG: 300 CAPSULE ORAL at 14:29

## 2023-03-24 ASSESSMENT — ACTIVITIES OF DAILY LIVING (ADL)
DRESS: INDEPENDENT
ADLS_ACUITY_SCORE: 29
LAUNDRY: UNABLE TO COMPLETE
ADLS_ACUITY_SCORE: 29
ORAL_HYGIENE: INDEPENDENT
ADLS_ACUITY_SCORE: 29
DRESS: SCRUBS (BEHAVIORAL HEALTH)
ADLS_ACUITY_SCORE: 29
ORAL_HYGIENE: INDEPENDENT
ADLS_ACUITY_SCORE: 29
HYGIENE/GROOMING: HANDWASHING;INDEPENDENT;SHOWER
ADLS_ACUITY_SCORE: 29
HYGIENE/GROOMING: INDEPENDENT

## 2023-03-24 NOTE — PLAN OF CARE
Assessment/Intervention/Current Symtoms and Care Coordination  -Chart review  -Rounded with team, addressed patient needs/concerns     Current Symptoms include the following: Appears depressed and unkempt: Completed psychological testing with Dr Irby    ECT treatments extended to 15. Symptoms currently unchanged      Discharge Plan or Goal  Pending stabilization & development of a safe discharge plan.  Considerations include: Return to [facility]     Barriers to Discharge  Patient requires further psychiatric stabilization due to current symptomology     Referral Status  No referrals made this hospitalization     Legal Status  Voluntary      Laparoscopic Cholecystectomy

## 2023-03-24 NOTE — ANESTHESIA POSTPROCEDURE EVALUATION
Patient: Nikolay Lora    Procedure: * No procedures listed *       Anesthesia Type:  General    Note:  Disposition: Inpatient   Postop Pain Control: Uneventful            Sign Out: Well controlled pain   PONV: No   Neuro/Psych: Uneventful            Sign Out: Acceptable/Baseline neuro status   Airway/Respiratory: Uneventful            Sign Out: Acceptable/Baseline resp. status   CV/Hemodynamics: Uneventful            Sign Out: Acceptable CV status; No obvious hypovolemia; No obvious fluid overload   Other NRE: NONE   DID A NON-ROUTINE EVENT OCCUR? No           Last vitals:  Vitals:    03/23/23 1825 03/24/23 0800 03/24/23 0943   BP: 118/79 107/74 135/87   Pulse: 110 88 110   Resp: 16 16 20   Temp: 36.5  C (97.7  F) 36.6  C (97.8  F) 36.7  C (98  F)   SpO2: 97% 97% 92%       Electronically Signed By: Zina Boyer MD  March 24, 2023  9:51 AM

## 2023-03-24 NOTE — PLAN OF CARE
Occupational Therapy Group Note:     03/24/23 1500   Group Therapy Session   Group Attendance attended group session   Time Session Began 1415   Time Session Ended 1500   Total Time (minutes) 45   Total # Attendees 1   Group Type task skill;recreation   Group Topic Covered cognitive activities;problem-solving;leisure exploration/use of leisure time   Group Session Detail visual-spatial group game   Patient Response/Contribution cooperative with task   Patient Participation Detail Pt actively participated in a structured occupational therapy group with a focus on visuospatial problem solving and social engagement via a group game. Pt demonstrated understanding of the novel 3-step task after an initial explanation. Pt remained focused and engaged throughout the full duration of group. Gradually became more strategic as the task progressed. Upon finishing the game, he taught writer a new version of a familiar card game, and his affect appeared to brighten when he won the game. Politely thanked writer for group.

## 2023-03-24 NOTE — PROGRESS NOTES
Patients VSS, A/O, IV removed, meets phase 2 criteria and is able to move to [facility] at this time. Report given to RN, pt transported by staff.

## 2023-03-24 NOTE — PLAN OF CARE
Occupational Therapy Group Note:       03/24/23 1219   Group Therapy Session   Group Attendance attended group session   Time Session Began 1115   Time Session Ended 1200   Total Time (minutes) 35 (no charge)   Total # Attendees 2   Group Type psychoeducation   Group Topic Covered relaxation techniques   Group Session Detail Progressive Muscle Relaxation   Patient Response/Contribution cooperative with task;listened actively   Patient Participation Detail Patient actively participated in a guided progressive muscle relaxation group in order to promote: positive relaxation and coping skills, encourage insight and self-reflection, promote a positive change, manage behaviors, and improve focus. Patient entered group late. Patient self-reported feeling joyful and indifferent during check-in. Patient listened actively to instructions on deep breathing; able to provide return demonstration to ensure understanding. Patient appeared to engage inconsistently in PMR exercises following verbal instruction. Patient kept eyes closed for majority of group (appropriate for group topic). Patient reported feeling more focused and relaxed post group activity. Patient reported that he utilizes meditation/journaling occasionally for relaxation outside of hospital setting. Patient was calm, cooperative in group. Affect: flat.
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
## 2023-03-24 NOTE — PROCEDURES
"Nikolay Lora is a 32 year old  year old male patient.  [id number]  @DX@    [facility]   ECT Procedure Note   03/24/2023    Patient Status: Inpatient    Is this the first in a series of 12 treatments?  no   No Known Allergies    Weight:  174 lbs 1.6 oz         Indications for ECT:   Medications ineffective  Imminent risk of suicide         Clinical Narrative:   Nikolay Lora is a 32 year old man with affective dysregulation, ADHD and polysubstance use (alcohol*, amphetamine*, cocaine, cannabis) who is hospitalized with progressive depression leading to suicidal ideation with planning on 1/26, in the context of  medication non compliance and losing his place at sober living due to reported methamphetamine use. Throughout his hospital stay medication adjustments have been made (restarted on sertraline, titrated risperidone, added clozapine, which is being titrated); however, intense suicidal ideation has continued and he has been having difficulty tolerating medication changes. This consultation is requested to evaluate candidacy for electroconvulsive therapy (ECT).      We utilized phone translation services in [facility] during this visit to ensure thoroughness, otherwise he can communicate in English well.  The patient shares he cannot stop contemplating suicide with a plan to shoot himself as soon as he is out of the hospital. He reports visual hallucinations of \"lanterns and black birds flying around\" with commanding auditory hallucinations telling him that he is \"worthless\" and \"should kill (himself)\". Although hallucinations got better since admission with medication changes, depression and suicidal contemplation has persisted, he is interested in ECT, hoping for a quicker relief.         Diagnosis:     Schizoaffective Disorder, depressed  Polysubstance Use Disorder in a controlled environment          Assessment:   Considering the clinical acuity  electroconvulsive therapy " "(ECT) appears appropriate; despite multifactorial nature of the presentation that would benefit from optimization of cognitive, behavioral and social interventions longitudinally.      Discussed all relevant aspects of ECT with patient, including risks of memory loss, HA, nausea, death <1/50,000, driving prohibition; possible lack of benefit or relapse after successful treatment, alternatives, right to decline, possible outpatient procedures. All questions answered, patient will have opportunity to review ECT video.         Pause for the Cause:     Right patient Yes   Right procedure/laterality settings: Yes          Intra-Procedure Documentation:     #1 02/27/23 pt says he was feeling \"anxious but content\"  #2 03/01/23 no auditory hallucinations, concern for visual hallucinations of bugs in his room. Still actively suicidal with a plan with gun or kitchen knife.   #3 03/03/23 Visual hallucinations disappeared for a day after last ECT. Thinking about suicide slightly less. Worried about some pain in his right arm. Mood 6.5/10 (10 best)  #4 03/06/23 Visual hallucinations returned over the weekend. Auditory hallucinations the same. Still thinking about suicide. Feels safe in hospital but not outside.   #5 03/08/23 Continues to have violent auditory hallucinations and wishes to die.   #6 3/10/23  Improving. Feels safe on unit. Still having SI with plan to shoot self in head but no plan for suicide on unit. Hearing whispers, but voices are quieter. No visual hallucinations of lanterns, etc. Tolerating ECT well. Sleeping well.   #7 3/13/23 Continues to report A/V/H. They are 'not scary' but 'distressing'.   #8 3/15/23 Continues to report A/V/H. Asserts that frequency is reduced but not intensity. Denies side effects but admits that 'days are starting to blend in and I don't remember the dates'.   #9 3/17/23 Patient believes that ECT is helping. When prompted to explain why, he had difficulty articulating except to admit " "that his A/V/H are reduced. (incidentally, this is the first time when asked about A/V/H he did not backtrack and states that 'this morning' or 'yesterday' the A/V/H 'were back'). Reportedly 50% improved. Patient denies history of abusing solvents (sniffing glue or paint).   #10 3/20/23 Clozapine level 1340 ng/ml (rather high but patient has no signs of toxicity, so wonder if due to a non-trough blood draw). MMPI pending. Denies visual hallucinations for the first time (not sure if it will be consistent).   #11 3/22/23 Continues to present the same. Minimal change from baseline. States that the treatments are helping (but unclear how). Reports some dizziness when standing up (BP WNL)   #12  3/24/23  Continues to report A/V/H \"maybe less intense\". Rates the intensity of his SI 10/10 \"at this point I don;t know how to help myself\". We are currently attempting to reduce the clozapine levels so possibly could get a better post-ictal suppression. Will extend his treatments to 15 sessions.    ECT #: 12   Treatment number this series: 12   Total treatment number: 12     Type of ECT:  Bilateral, standard    ECT Medications:    Brevital: 90mg  Succinyl Choline: 80mg    ECT Strip Summary: (titration 96 mC) INCREASE INTENSITY  mC pm 3/27/23)  Energy Level: 192 mC, 1 ms,  20 Hz, 6 sec, 800 mA (increased from 144 mC on 3/6/23)    Motor Seizure Duration: 23 seconds  EEG Seizure Duration: 28  Seconds (interestingly a shorter seizure now that clozapine levels are lower)    Complications:  none    Time for re-orientation: 28 min on 3/6/23    Plan:   - Continue bilateral ECT Q Mon/Wed/Fri at  192 mC. Patient has not shown any substantial improvement. Appears to tolerate ECT and his course could be extended to 15 sessions if primary team agrees.   - Continue current medications      ALY Berg MD Assisting  Chica Lazo MD      "

## 2023-03-24 NOTE — PLAN OF CARE
Nursing Assessment    Recent Vitals: B/P: 118/79, T: 97.7, P: 110, R: 16     General Shift Summary  Patient continues to isolate to his room. He presents with a flat affect and is withdrawn. He voiced using deep breathing and journaling as coping skills. Patient stated that he is hearing more whispers and sirens compared to day shift. The whispers tell him to shoot himself and says he continues to feel suicidal. He denied HI. Depression was rated 10/10 and anxiety was rated 6/10. Hygiene is fair, patient last showered yesterday. He stated he will shower tomorrow. Patient is eating well at 100%. Incite and judgment are poor. A&O x4.    Patient is medication compliant with no voiced side effects. He denies pain.    Plan is for patient to have ECT tomorrow and continue with further ECT treatments. His Clozapine is slowly being reduced due to the potential that it is interfering with ECT.    Nena Nevarez RN MSN

## 2023-03-24 NOTE — ANESTHESIA CARE TRANSFER NOTE
Patient: Link Lora    Procedure: * No procedures listed *       Diagnosis: * No pre-op diagnosis entered *  Diagnosis Additional Information: No value filed.    Anesthesia Type:   General     Note:    Oropharynx: oropharynx clear of all foreign objects  Level of Consciousness: drowsy      Independent Airway: airway patency satisfactory and stable  Dentition: dentition unchanged    Report to RN Given: handoff report given  Patient transferred to: PACU    Handoff Report: Identifed the Patient, Identified the Reponsible Provider, Reviewed the pertinent medical history, Discussed the surgical course, Reviewed Intra-OP anesthesia mangement and issues during anesthesia, Set expectations for post-procedure period and Allowed opportunity for questions and acknowledgement of understanding      Vitals:  Vitals Value Taken Time   /87 03/24/23 0943   Temp 36.7  C (98  F) 03/24/23 0943   Pulse 110 03/24/23 0943   Resp 20 03/24/23 0943   SpO2 92 % 03/24/23 0943       Electronically Signed By: Zina Boyer MD  March 24, 2023  9:51 AM

## 2023-03-24 NOTE — PROGRESS NOTES
"[facility], [place]   Psychiatric Progress Note  Hospital Day: 55        Interim History:   The patient's care was discussed with the treatment team during the daily team meeting and/or staff's chart notes were reviewed. Staff report was calm, cooperative and withdrawn to his room. Affect is flat. He attended some groups. He voiced having depression 10/10 and anxiety 5/10. He continues to have suicidal thoughts and hears whispers to shoot himself in the head. Reporting AH of sirens. Hygiene, sleep, and appetite are good. Voiced intermittent right arm pain/cramping since receiving ECT on Wednesday.    Upon interview, Haseeb reports that he is still feeling depressed and suicidal, but also added that he is feeling \"joyful, more focused, and isolated.\" He reports improvement in his energy level. He is feeling exhausted after ECT. He feels that his stress and insecurities prompt his desire to harm himself. In agreement with plan to continue ECT. Denies side effects or concerns about ECT.     Suicidal ideation: No change. Haseeb reports ongoing active SI with plan and intent to shoot himself with a gun or cut his throat or wrists upon discharge. Denies suicide plan or intent in the hospital. [place] for safety.     Homicidal ideation: denies current or recent homicidal ideation or behaviors.     Psychotic symptoms: Intermittent auditory and visual hallucinations, often triggered by stress    Medication side effects reported: None    Acute medical concerns: None    Other issues reported by patient: Patient had no further questions or concerns.            Medications:       atropine  2 drop Sublingual At Bedtime     cloZAPine  200 mg Oral At Bedtime     fluticasone  2 spray Both Nostrils Daily     gabapentin  300 mg Oral TID     loratadine  10 mg Oral Daily     metFORMIN  1,000 mg Oral BID w/meals     metoprolol succinate ER  12.5 mg Oral Daily     nicotine  1 patch Transdermal Daily     " "nicotine   Transdermal Q8H     pantoprazole  40 mg Oral QAM AC     senna-docusate  1 tablet Oral BID     sertraline  200 mg Oral Daily          Allergies:   No Known Allergies       Labs:     Recent Results (from the past 24 hour(s))   WBC and Differential    Collection Time: 03/23/23  8:43 AM   Result Value Ref Range    WBC Count 13.1 (H) 4.0 - 11.0 10e3/uL    % Neutrophils 80 %    % Lymphocytes 14 %    % Monocytes 4 %    % Eosinophils 1 %    % Basophils 0 %    % Immature Granulocytes 1 %    NRBCs per 100 WBC 0 <1 /100    Absolute Neutrophils 10.6 (H) 1.6 - 8.3 10e3/uL    Absolute Lymphocytes 1.8 0.8 - 5.3 10e3/uL    Absolute Monocytes 0.6 0.0 - 1.3 10e3/uL    Absolute Eosinophils 0.1 0.0 - 0.7 10e3/uL    Absolute Basophils 0.0 0.0 - 0.2 10e3/uL    Absolute Immature Granulocytes 0.1 <=0.4 10e3/uL    Absolute NRBCs 0.0 10e3/uL   Extra Green Top (Lithium Heparin) Tube    Collection Time: 03/23/23  8:43 AM   Result Value Ref Range    Hold Specimen [facility]           Psychiatric Examination:     /79 (BP Location: Right arm)   Pulse 110   Temp 97.7  F (36.5  C) (Temporal)   Resp 16   Ht 1.6 m (5' 3\")   Wt 79 kg (174 lb 1.6 oz)   SpO2 97%   BMI 30.84 kg/m    Weight is 174 lbs 1.6 oz  Body mass index is 30.84 kg/m .    Weight over time:  Vitals:    01/27/23 1718 03/07/23 0933   Weight: 75.1 kg (165 lb 8 oz) 79 kg (174 lb 1.6 oz)       Orthostatic Vitals       None          Cardiometabolic risk assessment. 01/30/23    Reviewed patient profile for cardiometabolic risk factors    Date taken /Value  REFERENCE RANGE   Abdominal Obesity  (Waist Circumference)   See nursing flowsheet Women ?35 in (88 cm)   Men ?40 in (102 cm)      Triglycerides  Triglycerides   Date Value Ref Range Status   01/28/2023 192 (H) <150 mg/dL Final       ?150 mg/dL (1.7 mmol/L) or current treatment for elevated triglycerides   HDL cholesterol  Direct Measure HDL   Date Value Ref Range Status   01/28/2023 41 >=40 mg/dL Final   ]   Women <50 " "mg/dL (1.3 mmol/L) in women or current treatment for low HDL cholesterol  Men <40 mg/dL (1 mmol/L) in men or current treatment for low HDL cholesterol     Fasting plasma glucose (FPG) Lab Results   Component Value Date     01/28/2023      FPG ?100 mg/dL (5.6 mmol/L) or treatment for elevated blood glucose   Blood pressure  BP Readings from Last 3 Encounters:   03/23/23 118/79   01/27/23 118/74   07/20/22 112/79    Blood pressure ?130/85 mmHg or treatment for elevated blood pressure   Family History  See family history     Appearance: awake, alert, dressed in hospital scrubs and unkempt  Attitude:  cooperative, calm  Eye Contact: fair  Mood: \"still depressed but also joyful\"  Affect:  mood congruent and intensity is blunted  Speech:  clear, coherent. appropriate  Language: fluent and intact in English  Psychomotor, Gait, Musculoskeletal:  no evidence of tardive dyskinesia, dystonia, or tics  Thought Process:  Linear, ruminative  Associations:  No evidence of loose associations  Thought Content:  SI w/plan and intent upon discharge, no plan or intent while in hospital. Auditory and visual hallucinations absent this morning  Insight:  fair  Judgement:  fair  Oriented to:  time, person, and place  Attention Span and Concentration:  fair  Recent and Remote Memory:  fair  Fund of Knowledge:  appropriate    Clinical Global Impressions  First:  Considering your total clinical experience with this particular patient population, how severe are the patient's symptoms at this time?: 7 (01/28/23 1341)    Most recent:  Compared to the patient's condition at the START of treatment, this patient's condition is: 4           Precautions:     Behavioral Orders   Procedures     Code 1 - Restrict to Unit     Code 2     For imaging     Electroconvulsive therapy     Series of up to 12 treatments. Begin Date: 2/27/23     Treating Psychiatrist providing ECT:  Dr. Kearns     Notified on:  2/23/23     Electroconvulsive therapy     For " "acute ECT series, MWF, up to 12 treatment.     Electroconvulsive therapy     Series of up to 12 treatments. Begin Date: 02/26/23     Treating Psychiatrist providing ECT: Clifton  Notified on: 02/24/23     Fall precautions     [place]     MMPI     Routine Programming     As clinically indicated     Status 15     Every 15 minutes.     Suicide precautions     Patients on Suicide Precautions should have a Combination Diet ordered that includes a Diet selection(s) AND a Behavioral Tray selection for Safe Tray - with utensils, or Safe Tray - NO utensils            Diagnoses:     Active suicidal ideation with intent outside of hospital setting  MDD, recurrent, severe with psychotic features vs Schizoaffective Disorder, depressive type  Polysubstance use  Unspecified mood disorder  ADHD, inattentive type per chart  History of idiopathic hypersomnolence per chart  Nicotine use disorder, dependence and withdrawal   Allergies- chronic urticaria and rhinitis per chart  HD per chart          Assessment & Plan:     Assessment and hospital summary:  This patient is a 32 year old male with history of mood disorder, ADHD and substance use who presented to [place] ED with SI on 1/26 in context of reported methamphetamine use and being kicked out of sober living. Medically cleared in ED, placed on 72HH and admitted to 12N. Limited historian, giving inconsistent reports about substance use, UDS negative, very somnolent, endorsing ongoing SI and some psychosis symptoms. PTA medications continued with exception of finasteride as patient states he takes \"1/4 a pill\" and declined ordered dose, will defer to primary team to address. Will continue to evaluate safety and stabilization further as patient more able to engage in assessments. Inpatient psychiatric hospitalization is warranted at this time for safety, stabilization, and possible adjustment in medications.    Patient reports that he abruptly stopped Zoloft one week prior to his " admission. He developed discontinuation syndrome symptoms following that. He reports that he does not have a history of psychosis but is experiencing psychotic symptoms. He is amenable with plan to trial risperidone after R/B/A were discussed. Risperidone was initiated on 1/30 and titrated to a total of 3 mg daily on 2/1. Clonidine, used for ADHD, was reduced from a total of 0.3 mg daily to 0.2 mg daily on 2/3 due to concern for hypotension. 2/9: Cardiology consult placed. EKG- tachycardia 121 bpm, QTc 465 ms, Abnormal QRS angle and T wave abnormality. COVID negative and labs are unremarkable.  On 2/10, clozapine was held pending additional workup from IM and cardiology. Pericarditis was ruled out and metoprolol was started. Clozapine was restarted on 2/13 with plan to cautiously titrate to lowest effective dose. 2/23/23: Clozapine titration schedule increased 25 mg/day. ECT and IM consults for ECT clearance placed. 2/27/23: ECT initiated. Risperidone was discontinued on 2/28. Clozapine level obtained on 3/3 at corresponding dose of 300 mg and was within therapeutic range (1001). Metformin increased to 1000 mg BID on 3/6 to target increased appetite/wt gain. Minimal improvement noted since initiation of both clozapine and ECT. 3/16: Patient reports orthostasis symptoms have resolved and some subjective improvement in depression symptoms with ECT. IDS-SR depression screening given to patient.     Psychiatric treatment/inteventions:  Medications:   -Reduced clozapine to 200 mg at bedtime on 3/23. Per Dr. Lazo, his current clozapine dose and blood levels, being so high, might be getting in the way of a strong inhibitory response to ECT (one mechanism of action for ECT is the seizure recruits inhibitory neurons (EDEL) and that helps with the therapeutic benefits).  Check level on 3/26.   -Continue PTA sertraline 200 mg daily for mood  -Continue PTA gabapentin 300 mg TID for anxiety   -Continue Cogentin 1 mg at bedtime  for possible EPSE     -PRN hydroxyzine 25 mg every 4 hour for anxiety  -PRN trazodone 50 mg at bedtime for sleep   -PRN olanzapine 10mg PO or IM TID for agitation/psychosis   -PRN atropine 1% SL drops, 2 drops q2h for sialorrhea    Spiritual services consult placed on 2/6. Pt is Gnosticism. Appreciate assistance.  Psychological consult completed and reviewed by writer. Please see Dr. Nation's note dated 3/22 for details.     ECT:  - Medically cleared on 2/24. See IM note for details.  - ECT consult placed on 2/23.  - ECT started on 2/27/23  - HOLD Gabapentin on nights prior to ECT and on ECT mornings until after ECT.   - ECT #13 will be completed 3/27/23 with plan to complete a total of 15 treatments in the acute series. Baseline IDS-SR was 55. Hard copy placed in chart. Patient asked to complete again. Score is 53 as of 3/9.  He is working on completing updated IDS-SR from 3/16.     Laboratory/Imaging: reviewed: Covid negative; UDS negative; CMP, CBC, TSH, Lipid panel, HgbA1c ordered to complete admission labs. Reviewed.     2/9/23: Troponin, CK, CBC, BMP: Unremarkable, CRP elevated to 38.18, COVID: Negative  2/10/23: Add Influenza A/B given flu-like sx-negative  Patient will be treated in therapeutic milieu with appropriate individual and group therapies as described.     Medical treatment/interventions:  Medical concerns:   Nicotine replacement ordered, educate patient on benefits of cessation, pt unclear about finasteride dose, will hold until further information is obtained. PTA PRN zyrtec, azelastine, fluticasone, triamcinolone and Epipen ordered for allergies and PRN ammonia lactate, Eucerin for  dry skin.    Flatulence:   - simethicone prn    Neuroleptic induced wt gain:  - Monitor weights  - Continue metformin 1,000 mg BID with meals    Dyslipidemia: Will arrange follow up with PCP to address. Discussed importance of healthy eating habits and regular exercise. Will consider nutrition consult if patient  amenable.     Orthostasis likely 2/2 clozapine: Pt is not hypotensive but reports orthostatic symptoms and previously restricted fluids. Now resolved.   - Continue to monitor vital signs closely  - Encourage fluids  - Discontinued clonidine and reduced clozapine dose    Sialorrhea 2/2 clozapine:  - Atropine 1% SL drops qhs  - Recommend towel on pillow when sleeping    Chest pain/tachycardia/EKG changes (2/9):  - Pain improved with PRN medications.   - Cardiology consult placed on 2/9. See note on that date for details.   - ECHO reviewed and unremarkable.  - Writer discussed care with both [facility] Streeter from [facility] and Christina from . Plan for now is to HOLD clozapine until further medical workup. Dr. Streeter explained that myocarditis has been ruled out as troponin was negative. Pericarditis remains on differential, but he does not have EKG findings or a pericardial effusion. IM will assess for pericardial friction rub and pleuritic chest pain. IM seen by patient and Metoprolol was started.     Update 2/13: Discussed care with Annette from  today on two occasions. Please see her note on this date for details. She does not suspect that this is pericarditis. He does not have pleuritic pain, characteristic CP, EKG changes, or pericardial effusion on ECHO. Therefore, will cautiously restart clozapine while monitoring closely for side effects. May consider further increase in metoprolol if necessary. PPI was started due to suspected GERD.     Update 2/24 per IM note:  Medicine is following peripherally for concerns for pericarditis.  See detailed note from 2/13.  No pericardial friction rub noted while sitting up and leaning forward today.  Patient states that his chest discomfort is getting better after starting the Protonix yesterday.  It does appear that he has also been using the Maalox.  Patient does have Tums available as well.  Please be mindful for any constipation with overuse of the PRNs.     POC:  - Continue  Protonix daily for 8 weeks, then taper off  - Recommend lifestyle changes including weight loss head of bed elevation avoidance of late-night eating and specific food elimination such as chocolate caffeine alcohol acidic and/or spicy food.  - Watch for constipation with PRNs for heartburn  - We will schedule senna 1 tab twice daily  - MiraLAX daily as needed added on     Medicine will sign off, please contact us for any acute changes.      Sore throat/cough/nasal congestion, resolved:   - COVID negative on 2/9. Repeat COVID test and Influenza A/B neg on 2/10.  - Cepacol lozenges added  - PRN Tylenol   - Consulted with IM. Appreciate assistance. See their notes for details.      Legal Status: VOLUNTARY. 72 hour hold discontinued. Pt agreed to sign in on voluntary basis and discharge directly to treatment once stable.      Safety Assessment:        Behavioral Orders   Procedures     Code 1 - Restrict to Unit     Routine Programming       As clinically indicated     Status 15       Every 15 minutes.     Suicide precautions       Patients on Suicide Precautions should have a Combination Diet ordered that includes a Diet selection(s) AND a Behavioral Tray selection for Safe Tray - with utensils, or Safe Tray - NO utensils        Withdrawal precautions      Pt has not required locked seclusion or restraints in the past 24 hours to maintain safety, please refer to RN documentation for further details.    Discontinued SIO on 2/8 as patient is heriberto for safety. Monitor SI closely.     The risks, benefits, alternatives and side effects have been discussed and are understood by the patient.     Disposition: Pending clinical stabilization. Pt remains actively suicidal. [facility]     Entered by: Nelly Brown MD on 3/24/2023 at 8:53 PM

## 2023-03-24 NOTE — PLAN OF CARE
"Pt has a blunted flat affect with depressed mood and attended group after encouragement. Pt had ECT #12 this am and tolerated well per report. Pt rates anxiety at 7/10 and depression 10/10. Pt rates pain at 0/10. Pt endorses SI, but denies HI/SIB and contracts for safety. Pt endorses auditory  hallucinations  that are command in nature. Pt reports AH telling him to \"shoot me in the head with a gun\". Pt reports feeling safe while in the hospital. Pt not noted responding to any internal stimuli. Pt was medication compliant. No reported or observed medication side effects. Pt was visible on unit for group and short periods. Pt declined to shower this shift. Continue current POC.    Plan of Care Reviewed With: patient Plan of Care Reviewed With: patient    Overall Patient Progress: no changeOverall Patient Progress: no change           "

## 2023-03-24 NOTE — PLAN OF CARE
Goal Outcome Evaluation:         Problem: Sleep Disturbance  Goal: Adequate Sleep/Rest  Outcome: Progressing     Patient appeared to be asleep for 6.75 hours during safety checks this shift. No complaints or concerns voiced by patient or noted by staff. Will continue to monitor and update if there are changes.

## 2023-03-25 PROCEDURE — 250N000013 HC RX MED GY IP 250 OP 250 PS 637

## 2023-03-25 PROCEDURE — 250N000013 HC RX MED GY IP 250 OP 250 PS 637: Performed by: PSYCHIATRY & NEUROLOGY

## 2023-03-25 PROCEDURE — 124N000002 HC R&B MH UMMC

## 2023-03-25 RX ADMIN — SERTRALINE HYDROCHLORIDE 200 MG: 100 TABLET ORAL at 07:56

## 2023-03-25 RX ADMIN — GABAPENTIN 300 MG: 300 CAPSULE ORAL at 07:56

## 2023-03-25 RX ADMIN — FLUTICASONE PROPIONATE 2 SPRAY: 50 SPRAY, METERED NASAL at 07:58

## 2023-03-25 RX ADMIN — PANTOPRAZOLE SODIUM 40 MG: 40 TABLET, DELAYED RELEASE ORAL at 07:56

## 2023-03-25 RX ADMIN — LORATADINE 10 MG: 10 TABLET ORAL at 07:56

## 2023-03-25 RX ADMIN — Medication 12.5 MG: at 07:56

## 2023-03-25 RX ADMIN — GABAPENTIN 300 MG: 300 CAPSULE ORAL at 19:39

## 2023-03-25 RX ADMIN — CLOZAPINE 200 MG: 200 TABLET ORAL at 19:39

## 2023-03-25 RX ADMIN — SENNOSIDES AND DOCUSATE SODIUM 1 TABLET: 50; 8.6 TABLET ORAL at 19:39

## 2023-03-25 RX ADMIN — METFORMIN HYDROCHLORIDE 1000 MG: 500 TABLET ORAL at 18:12

## 2023-03-25 RX ADMIN — SENNOSIDES AND DOCUSATE SODIUM 1 TABLET: 50; 8.6 TABLET ORAL at 07:56

## 2023-03-25 RX ADMIN — GABAPENTIN 300 MG: 300 CAPSULE ORAL at 14:17

## 2023-03-25 RX ADMIN — METFORMIN HYDROCHLORIDE 1000 MG: 500 TABLET ORAL at 07:56

## 2023-03-25 RX ADMIN — NICOTINE 1 PATCH: 21 PATCH, EXTENDED RELEASE TRANSDERMAL at 07:56

## 2023-03-25 ASSESSMENT — ACTIVITIES OF DAILY LIVING (ADL)
ADLS_ACUITY_SCORE: 29
DRESS: INDEPENDENT
LAUNDRY: UNABLE TO COMPLETE
ADLS_ACUITY_SCORE: 29
ORAL_HYGIENE: INDEPENDENT
ADLS_ACUITY_SCORE: 29
ADLS_ACUITY_SCORE: 29
HYGIENE/GROOMING: INDEPENDENT
ADLS_ACUITY_SCORE: 29
DRESS: INDEPENDENT
ADLS_ACUITY_SCORE: 29
ORAL_HYGIENE: INDEPENDENT
HYGIENE/GROOMING: INDEPENDENT
ADLS_ACUITY_SCORE: 29
LAUNDRY: UNABLE TO COMPLETE

## 2023-03-25 NOTE — PLAN OF CARE
Problem: Adult Behavioral Health Plan of Care  Goal: Plan of Care Review  Outcome: Not Progressing  Flowsheets  Taken 3/24/2023 1950  Plan of Care Reviewed With: patient  Overall Patient Progress: no change  Patient Agreement with Plan of Care: agrees  Taken 3/24/2023 1833  Patient Agreement with Plan of Care: agrees     Problem: Psychotic Signs/Symptoms  Goal: Increased Participation and Engagement (Psychotic Signs/Symptoms)  Outcome: Progressing  Intervention: Facilitate Participation and Engagement  Recent Flowsheet Documentation  Taken 3/24/2023 1833 by Gerber Metcalf RN  Supportive Measures:   positive reinforcement provided   active listening utilized   self-care encouraged   verbalization of feelings encouraged  Diversional Activity: television   Goal Outcome Evaluation:    Plan of Care Reviewed With: patient Plan of Care Reviewed With: patient    Overall Patient Progress: no changeOverall Patient Progress: no change     Patient remains isolative to room majority of the evening only, though did attend group with staff prompting. Patient upon approach remains flat in affect, calm and cooperative with verbal assessments though appearing more distracted this evening. Patient reporting continued symptoms of anxiety 7/10 and depression 9/10 with command auditory hallucinations and thoughts of suicide. Patient unable to identify any therapeutic effect from medications or ECT treatments and stating concerns about discharge with continuation of symptoms. Patient continues to contract for safety stating he feels safe in the hospital. RN writer attempted to promote increased activity and interaction in the milieu but patient declined this evening.    Patient cooperative with vital sign assessments which were stable. Patient diet appeared good eating 100% of dinner and snack. Patient accepting of HS medications with no stated or observed side effects. Patient continues to refuse the sublingual atropine drops denying  any hypersalivation. Patient early in the evening reported right elbow pain 6/10 which he has been reporting occasionally in past days. RN writer offered medications and nonpharmacologic interventions but patient refused, later reporting the pain subsided with rest. Patient independent with identifying needs and completing ADL's.

## 2023-03-25 NOTE — PLAN OF CARE
"Pt reports sleeping \"ok\" and woke up feeling somewhat rested. Pt c/o soreness all over, states d/t lack of exercise as he usually maintains a rigorous fitness routine. Pt declined offer of PRN Tylenol.    Nursing assessment completed. Pt denies HI, SIB and thoughts of hurting others. Pt denies depression, anxiety and VH. Pt endorses AH as whispers that \"encourage\" him to complete suicide. Pt denies plan or intent while in the hospital. When asked if he would follow through while in the community after discharge, pt answered \"if I feel overwhelmed, I will let staff know.\" Pt stated that the TV and headphones help to drown out voices. Pt continues to express concern that he will be discharged too soon as he would like the AH to be resolved.    Pt remained isolated to room, eating meals alone. Pt appeared distracted and exhibited mild response lag in conversation. Eye contact fair. Mood is calm/anxious with flat affect. Pt was cooperative and med compliant. Food and fluid intake adequate. Room is tidy. Pt did now shower despite encouragement.    VS reviewed: /68 (Patient Position: Sitting)   Pulse 85   Temp 97.7  F (36.5  C) (Temporal)   Resp 18   Ht 1.6 m (5' 3\")   Wt 79 kg (174 lb 1.6 oz)   SpO2 94%   BMI 30.84 kg/m   . Patient reports pain. *See documentation above.    Length of stay: 57  "

## 2023-03-25 NOTE — PLAN OF CARE
Problem: Sleep Disturbance  Goal: Adequate Sleep/Rest  Outcome: Progressing     Problem: Sleep Disturbance  Goal: Adequate Sleep/Rest  Outcome: Progressing   Goal Outcome Evaluation:    Patient appeared to sleep 7 hours this night shift.  No prns or snacks given or requested.  No concerns were reported or noted.  ECT this upcoming Monday.

## 2023-03-25 NOTE — PLAN OF CARE
Occupational Therapy Group Note:       03/25/23 1609   Group Therapy Session   Group Attendance attended group session   Time Session Began 1130   Time Session Ended 1205   Total Time (minutes) 35 (no charge)   Total # Attendees 1   Group Type recreation   Group Topic Covered leisure exploration/use of leisure time;structured socialization   Group Session Detail OT Leisure Group   Patient Response/Contribution cooperative with task;listened actively   Patient Participation Detail Patient actively engaged in a occupational therapy group with leisure exploration and engagement being the topic and focus. Leisure exploration offered for increased intrinsic motivation to engage in social situations with peers. This specific leisure game addressed: sequencing, new learning, retention, initiation, planning, and organization. Patient seemed willing to learn a new game this date. Patient was a quiet, but engaged participant. Patient listened actively to instructions and asked appropriate clarifying questions. Patient needed guidance throughout game; however, understanding seemed to improve with repetitions. Patient benefited from choices and explanation of options. Patient was able to play one round of game. Writer provided patient with option to play another round or to eat lunch (as lunch trays just arrived). Patient reported he was hungry and opted to eat lunch. Patient did make a request to play this game (Sumerian) in future OT sessions. Affect: blunted. Mood: calm, engaged, pleasant.

## 2023-03-25 NOTE — PROGRESS NOTES
03/24/23 1950   Group Therapy Session   Group Attendance attended group session   Time Session Began 1615   Time Session Ended 1710   Total Time (minutes) 50   Total # Attendees 3   Group Type recreation   Group Topic Covered leisure exploration/use of leisure time   Group Session Detail TR leisure group   Patient Response/Contribution cooperative with task   Patient Participation Detail Pt actively participated in a structured Therapeutic Recreation group with a focus on leisure participation, stress reduction, and social engagement via an active group game. Pt remained focused and engaged throughout full duration of group.  Pt mood was calm and was appropriate with interactions. Pt was unfamiliar with the game and needed guidance throughout. Pt was able to identify basic scoring after several rounds, but still looked for some assistance during his turns.

## 2023-03-26 PROCEDURE — 250N000013 HC RX MED GY IP 250 OP 250 PS 637

## 2023-03-26 PROCEDURE — 124N000002 HC R&B MH UMMC

## 2023-03-26 PROCEDURE — 80159 DRUG ASSAY CLOZAPINE: CPT | Performed by: PSYCHIATRY & NEUROLOGY

## 2023-03-26 PROCEDURE — 250N000013 HC RX MED GY IP 250 OP 250 PS 637: Performed by: PSYCHIATRY & NEUROLOGY

## 2023-03-26 PROCEDURE — 36415 COLL VENOUS BLD VENIPUNCTURE: CPT | Performed by: PSYCHIATRY & NEUROLOGY

## 2023-03-26 RX ADMIN — GABAPENTIN 300 MG: 300 CAPSULE ORAL at 08:08

## 2023-03-26 RX ADMIN — LORATADINE 10 MG: 10 TABLET ORAL at 08:08

## 2023-03-26 RX ADMIN — SENNOSIDES AND DOCUSATE SODIUM 1 TABLET: 50; 8.6 TABLET ORAL at 19:19

## 2023-03-26 RX ADMIN — METFORMIN HYDROCHLORIDE 1000 MG: 500 TABLET ORAL at 17:52

## 2023-03-26 RX ADMIN — Medication 12.5 MG: at 08:08

## 2023-03-26 RX ADMIN — SERTRALINE HYDROCHLORIDE 200 MG: 100 TABLET ORAL at 08:08

## 2023-03-26 RX ADMIN — SENNOSIDES AND DOCUSATE SODIUM 1 TABLET: 50; 8.6 TABLET ORAL at 08:08

## 2023-03-26 RX ADMIN — CLOZAPINE 200 MG: 200 TABLET ORAL at 19:19

## 2023-03-26 RX ADMIN — PANTOPRAZOLE SODIUM 40 MG: 40 TABLET, DELAYED RELEASE ORAL at 08:08

## 2023-03-26 RX ADMIN — NICOTINE 1 PATCH: 21 PATCH, EXTENDED RELEASE TRANSDERMAL at 08:08

## 2023-03-26 RX ADMIN — METFORMIN HYDROCHLORIDE 1000 MG: 500 TABLET ORAL at 08:08

## 2023-03-26 RX ADMIN — FLUTICASONE PROPIONATE 2 SPRAY: 50 SPRAY, METERED NASAL at 08:09

## 2023-03-26 RX ADMIN — GABAPENTIN 300 MG: 300 CAPSULE ORAL at 14:11

## 2023-03-26 ASSESSMENT — ACTIVITIES OF DAILY LIVING (ADL)
ADLS_ACUITY_SCORE: 29
ADLS_ACUITY_SCORE: 29
HYGIENE/GROOMING: INDEPENDENT
DRESS: INDEPENDENT
ADLS_ACUITY_SCORE: 29
LAUNDRY: UNABLE TO COMPLETE
ADLS_ACUITY_SCORE: 29
ORAL_HYGIENE: INDEPENDENT
ADLS_ACUITY_SCORE: 29

## 2023-03-26 NOTE — PLAN OF CARE
"Pt spent most of day isolated to room. Mood is calm with flat affect, brightening upon approach. Pt denies SIB, HI and thoughts of hurting others. Pt continues to endorse AH command in nature encouraging pt to complete suicide. Pt denies VH. Pt endorses depression rated 4/10 and anxiety 3/10. Pt states this is ongoing for him.    Pt ate 100% of breakfast and lunch with adequate fluid intake. Pt states he is ready for ECT tomorrow. He states he tolerates ECT \"OK\", just that he is tired and wants to sleep but feels better after sleep.    Pt refused shower this shift.     VS reviewed: /78 (BP Location: Right arm, Patient Position: Sitting, Cuff Size: Adult Regular)   Pulse 94   Temp 98.1  F (36.7  C) (Oral)   Resp 18   Ht 1.6 m (5' 3\")   Wt 79 kg (174 lb 1.6 oz)   SpO2 94%   BMI 30.84 kg/m   . Patient denies pain.    Length of stay: 58  "

## 2023-03-26 NOTE — PLAN OF CARE
Problem: Psychotic Signs/Symptoms  Goal: Improved Behavioral Control (Psychotic Signs/Symptoms)  Outcome: Progressing   Goal Outcome Evaluation:     Pt was isolated and withdrawn to his room. Although he was advised to come out, socialize with peers, and pace the hallway, he refused. Pt endorsed having 8/10 anxiety and 8/10 depression. Pt endorsed experiencing suicidal thoughts and commanding auditory hallucinations. Pt was unable to elaborate on the nature of his auditory hallucinations but contracted for safety. He took his scheduled medication without issues and denied experiencing any side effects. ECT is scheduled for Monday morning.

## 2023-03-27 ENCOUNTER — APPOINTMENT (OUTPATIENT)
Dept: BEHAVIORAL HEALTH | Facility: CLINIC | Age: 33
End: 2023-03-27
Attending: PSYCHIATRY & NEUROLOGY
Payer: COMMERCIAL

## 2023-03-27 ENCOUNTER — ANESTHESIA (OUTPATIENT)
Dept: BEHAVIORAL HEALTH | Facility: CLINIC | Age: 33
End: 2023-03-27
Payer: COMMERCIAL

## 2023-03-27 ENCOUNTER — ANESTHESIA EVENT (OUTPATIENT)
Dept: BEHAVIORAL HEALTH | Facility: CLINIC | Age: 33
End: 2023-03-27
Payer: COMMERCIAL

## 2023-03-27 PROCEDURE — 250N000013 HC RX MED GY IP 250 OP 250 PS 637

## 2023-03-27 PROCEDURE — 90870 ELECTROCONVULSIVE THERAPY: CPT | Performed by: PSYCHIATRY & NEUROLOGY

## 2023-03-27 PROCEDURE — 90870 ELECTROCONVULSIVE THERAPY: CPT

## 2023-03-27 PROCEDURE — H2032 ACTIVITY THERAPY, PER 15 MIN: HCPCS

## 2023-03-27 PROCEDURE — 124N000002 HC R&B MH UMMC

## 2023-03-27 PROCEDURE — 250N000013 HC RX MED GY IP 250 OP 250 PS 637: Performed by: PSYCHIATRY & NEUROLOGY

## 2023-03-27 PROCEDURE — 258N000003 HC RX IP 258 OP 636: Performed by: ANESTHESIOLOGY

## 2023-03-27 PROCEDURE — 99232 SBSQ HOSP IP/OBS MODERATE 35: CPT | Mod: GC | Performed by: PSYCHIATRY & NEUROLOGY

## 2023-03-27 PROCEDURE — 370N000017 HC ANESTHESIA TECHNICAL FEE, PER MIN

## 2023-03-27 RX ORDER — ONDANSETRON 2 MG/ML
4 INJECTION INTRAMUSCULAR; INTRAVENOUS EVERY 30 MIN PRN
Status: DISCONTINUED | OUTPATIENT
Start: 2023-03-27 | End: 2023-03-27

## 2023-03-27 RX ORDER — HYDROMORPHONE HCL IN WATER/PF 6 MG/30 ML
0.4 PATIENT CONTROLLED ANALGESIA SYRINGE INTRAVENOUS EVERY 5 MIN PRN
Status: DISCONTINUED | OUTPATIENT
Start: 2023-03-27 | End: 2023-03-27

## 2023-03-27 RX ORDER — HYDROMORPHONE HCL IN WATER/PF 6 MG/30 ML
0.2 PATIENT CONTROLLED ANALGESIA SYRINGE INTRAVENOUS EVERY 5 MIN PRN
Status: DISCONTINUED | OUTPATIENT
Start: 2023-03-27 | End: 2023-03-27

## 2023-03-27 RX ORDER — SODIUM CHLORIDE, SODIUM LACTATE, POTASSIUM CHLORIDE, CALCIUM CHLORIDE 600; 310; 30; 20 MG/100ML; MG/100ML; MG/100ML; MG/100ML
INJECTION, SOLUTION INTRAVENOUS CONTINUOUS
Status: DISCONTINUED | OUTPATIENT
Start: 2023-03-27 | End: 2023-03-27

## 2023-03-27 RX ORDER — ONDANSETRON 4 MG/1
4 TABLET, ORALLY DISINTEGRATING ORAL EVERY 30 MIN PRN
Status: DISCONTINUED | OUTPATIENT
Start: 2023-03-27 | End: 2023-03-27

## 2023-03-27 RX ORDER — FENTANYL CITRATE 50 UG/ML
50 INJECTION, SOLUTION INTRAMUSCULAR; INTRAVENOUS EVERY 5 MIN PRN
Status: DISCONTINUED | OUTPATIENT
Start: 2023-03-27 | End: 2023-03-27

## 2023-03-27 RX ORDER — FENTANYL CITRATE 50 UG/ML
25 INJECTION, SOLUTION INTRAMUSCULAR; INTRAVENOUS EVERY 5 MIN PRN
Status: DISCONTINUED | OUTPATIENT
Start: 2023-03-27 | End: 2023-03-27

## 2023-03-27 RX ADMIN — GABAPENTIN 300 MG: 300 CAPSULE ORAL at 19:35

## 2023-03-27 RX ADMIN — LORATADINE 10 MG: 10 TABLET ORAL at 08:11

## 2023-03-27 RX ADMIN — FLUTICASONE PROPIONATE 2 SPRAY: 50 SPRAY, METERED NASAL at 08:12

## 2023-03-27 RX ADMIN — Medication 12.5 MG: at 08:09

## 2023-03-27 RX ADMIN — SODIUM CHLORIDE, POTASSIUM CHLORIDE, SODIUM LACTATE AND CALCIUM CHLORIDE: 600; 310; 30; 20 INJECTION, SOLUTION INTRAVENOUS at 13:37

## 2023-03-27 RX ADMIN — SENNOSIDES AND DOCUSATE SODIUM 1 TABLET: 50; 8.6 TABLET ORAL at 19:35

## 2023-03-27 RX ADMIN — METFORMIN HYDROCHLORIDE 1000 MG: 500 TABLET ORAL at 18:51

## 2023-03-27 RX ADMIN — GABAPENTIN 300 MG: 300 CAPSULE ORAL at 14:41

## 2023-03-27 RX ADMIN — CLOZAPINE 200 MG: 200 TABLET ORAL at 19:35

## 2023-03-27 RX ADMIN — SENNOSIDES AND DOCUSATE SODIUM 1 TABLET: 50; 8.6 TABLET ORAL at 14:40

## 2023-03-27 RX ADMIN — NICOTINE 1 PATCH: 21 PATCH, EXTENDED RELEASE TRANSDERMAL at 08:13

## 2023-03-27 RX ADMIN — SERTRALINE HYDROCHLORIDE 200 MG: 100 TABLET ORAL at 08:11

## 2023-03-27 RX ADMIN — PANTOPRAZOLE SODIUM 40 MG: 40 TABLET, DELAYED RELEASE ORAL at 08:11

## 2023-03-27 RX ADMIN — ATROPINE SULFATE 2 DROP: 10 SOLUTION/ DROPS OPHTHALMIC at 19:36

## 2023-03-27 ASSESSMENT — ACTIVITIES OF DAILY LIVING (ADL)
ADLS_ACUITY_SCORE: 29
HYGIENE/GROOMING: INDEPENDENT
ADLS_ACUITY_SCORE: 29
ADLS_ACUITY_SCORE: 29
ORAL_HYGIENE: INDEPENDENT
ADLS_ACUITY_SCORE: 29
DRESS: SCRUBS (BEHAVIORAL HEALTH)
ADLS_ACUITY_SCORE: 29

## 2023-03-27 NOTE — PLAN OF CARE
Problem: Sleep Disturbance  Goal: Adequate Sleep/Rest  Outcome: Progressing   Goal Outcome Evaluation:    Patient appeared to sleep 7 hours this night shift.  No prns or snacks given or requested. ECT today.  No concerns were reported or noted.

## 2023-03-27 NOTE — PROGRESS NOTES
Patients VSS, A/O, IV removed, meets phase 2 criteria and is able to move to [facility] at this time. Report given to Maryjane RN, pt transported by staff.

## 2023-03-27 NOTE — PROCEDURES
"Nikolay Lora is a 32 year old  year old male patient.  [id number]  @DX@    [facility] acute hospital   ECT Procedure Note   03/27/2023    Patient Status: Inpatient    Is this the first in a series of 12 treatments?  no   No Known Allergies    Weight:  174 lbs 1.6 oz         Indications for ECT:   Medications ineffective  Imminent risk of suicide         Clinical Narrative:   Nikolay Lora is a 32 year old man with affective dysregulation, ADHD and polysubstance use (alcohol*, amphetamine*, cocaine, cannabis) who is hospitalized with progressive depression leading to suicidal ideation with planning on 1/26, in the context of  medication non compliance and losing his place at sober living due to reported methamphetamine use. Throughout his hospital stay medication adjustments have been made (restarted on sertraline, titrated risperidone, added clozapine, which is being titrated); however, intense suicidal ideation has continued and he has been having difficulty tolerating medication changes. This consultation is requested to evaluate candidacy for electroconvulsive therapy (ECT).      We utilized phone translation services in [facility] during this visit to ensure thoroughness, otherwise he can communicate in English well.  The patient shares he cannot stop contemplating suicide with a plan to shoot himself as soon as he is out of the hospital. He reports visual hallucinations of \"lanterns and black birds flying around\" with commanding auditory hallucinations telling him that he is \"worthless\" and \"should kill (himself)\". Although hallucinations got better since admission with medication changes, depression and suicidal contemplation has persisted, he is interested in ECT, hoping for a quicker relief.         Diagnosis:     Schizoaffective Disorder, depressed  Polysubstance Use Disorder in a controlled environment          Assessment:   Considering the clinical acuity  electroconvulsive therapy " "(ECT) appears appropriate; despite multifactorial nature of the presentation that would benefit from optimization of cognitive, behavioral and social interventions longitudinally.      Discussed all relevant aspects of ECT with patient, including risks of memory loss, HA, nausea, death <1/50,000, driving prohibition; possible lack of benefit or relapse after successful treatment, alternatives, right to decline, possible outpatient procedures. All questions answered, patient will have opportunity to review ECT video.         Pause for the Cause:     Right patient Yes   Right procedure/laterality settings: Yes          Intra-Procedure Documentation:     #1 02/27/23 pt says he was feeling \"anxious but content\"  #2 03/01/23 no auditory hallucinations, concern for visual hallucinations of bugs in his room. Still actively suicidal with a plan with gun or kitchen knife.   #3 03/03/23 Visual hallucinations disappeared for a day after last ECT. Thinking about suicide slightly less. Worried about some pain in his right arm. Mood 6.5/10 (10 best)  #4 03/06/23 Visual hallucinations returned over the weekend. Auditory hallucinations the same. Still thinking about suicide. Feels safe in hospital but not outside.   #5 03/08/23 Continues to have violent auditory hallucinations and wishes to die.   #6 3/10/23  Improving. Feels safe on unit. Still having SI with plan to shoot self in head but no plan for suicide on unit. Hearing whispers, but voices are quieter. No visual hallucinations of lanterns, etc. Tolerating ECT well. Sleeping well.   #7 3/13/23 Continues to report A/V/H. They are 'not scary' but 'distressing'.   #8 3/15/23 Continues to report A/V/H. Asserts that frequency is reduced but not intensity. Denies side effects but admits that 'days are starting to blend in and I don't remember the dates'.   #9 3/17/23 Patient believes that ECT is helping. When prompted to explain why, he had difficulty articulating except to admit " "that his A/V/H are reduced. (incidentally, this is the first time when asked about A/V/H he did not backtrack and states that 'this morning' or 'yesterday' the A/V/H 'were back'). Reportedly 50% improved. Patient denies history of abusing solvents (sniffing glue or paint).   #10 3/20/23 Clozapine level 1340 ng/ml (rather high but patient has no signs of toxicity, so wonder if due to a non-trough blood draw). MMPI pending. Denies visual hallucinations for the first time (not sure if it will be consistent).   #11 3/22/23 Continues to present the same. Minimal change from baseline. States that the treatments are helping (but unclear how). Reports some dizziness when standing up (BP WNL)   #12  3/24/23  Continues to report A/V/H \"maybe less intense\". Rates the intensity of his SI 10/10 \"at this point I don;t know how to help myself\". We are currently attempting to reduce the clozapine levels so possibly could get a better post-ictal suppression. Will extend his treatments to 15 sessions.  #13 3/27/23 Continues to report A/H but 'not as intense'. Denies V/H. Admits for SI. Mood 'good'. When prompted to reconcile with what he reported earlier, he states \"that's because I can feel\". Denies side effects from ECT.    ECT #: 13   Treatment number this series: 13   Total treatment number: 13     Type of ECT:  Bilateral, standard    ECT Medications:    Brevital: 90mg  Succinyl Choline: 80mg    ECT Strip Summary: (titration 96 mC) INCREASE INTENSITY  mC pm 3/27/23)  Energy Level: 192 mC, 1 ms,  20 Hz, 6 sec, 800 mA (increased from 144 mC on 3/6/23)    Motor Seizure Duration: 35 seconds  EEG Seizure Duration: 61 Seconds (slow transition to post-ictal suppression)    Complications:  none    Time for re-orientation: 28 min on 3/6/23    Plan:   - Continue bilateral ECT Q Mon/Wed/Fri at  192 mC. Patient has not shown any substantial improvement. Appears to tolerate ECT and his course could be extended to 15 sessions if primary " team agrees.   - Continue current medications      LAMONT Vang MD Assisting  Chica Lazo MD

## 2023-03-27 NOTE — PLAN OF CARE
"  Problem: Thought Process Alteration  Goal: Optimal Thought Clarity  Outcome: Progressing     Problem: Adult Behavioral Health Plan of Care  Goal: Plan of Care Review  Outcome: Progressing  Flowsheets (Taken 3/26/2023 1700)  Patient Agreement with Plan of Care: agrees     Problem: Psychotic Signs/Symptoms  Goal: Improved Psychomotor Symptoms (Psychotic Signs/Symptoms)  Intervention: Manage Psychomotor Movement  Recent Flowsheet Documentation  Taken 3/26/2023 1700 by Reuben Wetzel RN  Patient Performed Hygiene: teeth brushed  Activity (Behavioral Health): activity encouraged   Goal Outcome Evaluation:    Plan of Care Reviewed With: patient        Pt endorsed auditory hallucination, command in nature. \"The voices are telling me to shoot myself on the head with a gun\". Pt stated. When asked if pt feels as hurting himself, pt stated \"I am still feeling like I want to kill myself  if for example I was released today\" \"I am going to find a gun to shoot myself on the head or cut my throat with a knife. Pt rated depression  at 9/10, but denied anxiety and HI. When asked if pt is hearing voices, pt stated \"not now, but I am not over it yet\". Pt remains pleasant on approach, affect continues to be flat and blunted. Mood is calm and cooperative. Pt is medication compliant. Gabapentin held in preparation for ECT tomorrow. No other concerns noted or verbalized. Will continue to monitor and treat as ordered.              "

## 2023-03-27 NOTE — PROGRESS NOTES
"[facility], [place]   Psychiatric Progress Note  Hospital Day: 59        Interim History:   The patient's care was discussed with the treatment team during the daily team meeting and/or staff's chart notes were reviewed. Staff report was calm, cooperative and withdrawn to his room. Affect is flat. He attended some groups. He voiced having depression 9/10 and anxiety 6/10. He continues to have suicidal thoughts and hears whispers to shoot himself in the head. Reporting AH of sirens. Sleep, and appetite are good. His hygiene is not ideal while he takes showers.    Upon interview, Haseeb reports that he is still feeling depressed and suicidal, but also added that he is feeling that \"there is no support for him outside\". He reports improvement in his anxiety level while his depression is still the same. He is looking forward to have his ECT today. He feels ECT is helpful but does not know how. Denies side effects or concerns about ECT.     Suicidal ideation: No change. Haseeb reports ongoing active SI with plan and intent to shoot himself with a gun or cut his throat or wrists upon discharge. Denies suicide plan or intent in the hospital. [place] for safety.     We discussed with him opportunities with residential treatments for him after being discharged from the hospital. He is agreeable while he was not sure he can be in control of his SI. The author called sister as the patient asked for and updated her.    Homicidal ideation: denies current or recent homicidal ideation or behaviors.     Psychotic symptoms: Intermittent auditory and visual hallucinations, often triggered by stress    Medication side effects reported: None    Acute medical concerns: None    Other issues reported by patient: Patient had no further questions or concerns.            Medications:       atropine  2 drop Sublingual At Bedtime     cloZAPine  200 mg Oral At Bedtime     fluticasone  2 spray Both Nostrils Daily " "    gabapentin  300 mg Oral TID     loratadine  10 mg Oral Daily     metFORMIN  1,000 mg Oral BID w/meals     metoprolol succinate ER  12.5 mg Oral Daily     nicotine  1 patch Transdermal Daily     nicotine   Transdermal Q8H     pantoprazole  40 mg Oral QAM AC     senna-docusate  1 tablet Oral BID     sertraline  200 mg Oral Daily          Allergies:   No Known Allergies       Labs:     No results found for this or any previous visit (from the past 24 hour(s)).       Psychiatric Examination:     /78   Pulse 99   Temp 97.7  F (36.5  C) (Oral)   Resp 18   Ht 1.6 m (5' 3\")   Wt 79 kg (174 lb 1.6 oz)   SpO2 95%   BMI 30.84 kg/m    Weight is 174 lbs 1.6 oz  Body mass index is 30.84 kg/m .    Weight over time:  Vitals:    01/27/23 1718 03/07/23 0933   Weight: 75.1 kg (165 lb 8 oz) 79 kg (174 lb 1.6 oz)       Orthostatic Vitals       None          Cardiometabolic risk assessment. 01/30/23    Reviewed patient profile for cardiometabolic risk factors    Date taken /Value  REFERENCE RANGE   Abdominal Obesity  (Waist Circumference)   See nursing flowsheet Women ?35 in (88 cm)   Men ?40 in (102 cm)      Triglycerides  Triglycerides   Date Value Ref Range Status   01/28/2023 192 (H) <150 mg/dL Final       ?150 mg/dL (1.7 mmol/L) or current treatment for elevated triglycerides   HDL cholesterol  Direct Measure HDL   Date Value Ref Range Status   01/28/2023 41 >=40 mg/dL Final   ]   Women <50 mg/dL (1.3 mmol/L) in women or current treatment for low HDL cholesterol  Men <40 mg/dL (1 mmol/L) in men or current treatment for low HDL cholesterol     Fasting plasma glucose (FPG) Lab Results   Component Value Date     01/28/2023      FPG ?100 mg/dL (5.6 mmol/L) or treatment for elevated blood glucose   Blood pressure  BP Readings from Last 3 Encounters:   03/27/23 114/78   01/27/23 118/74   07/20/22 112/79    Blood pressure ?130/85 mmHg or treatment for elevated blood pressure   Family History  See family history " "    Appearance: awake, alert, dressed in hospital scrubs and unkempt  Attitude:  cooperative, calm  Eye Contact: fair  Mood: \"still depressedl\"  Affect:  mood congruent and intensity is blunted  Speech:  clear, coherent. appropriate  Language: fluent and intact in English  Psychomotor, Gait, Musculoskeletal:  no evidence of tardive dyskinesia, dystonia, or tics  Thought Process:  Linear, ruminative while sometimes disrupted  Associations:  No evidence of loose associations  Thought Content:  SI w/plan and intent upon discharge, no plan or intent while in hospital. Auditory and visual hallucinations absent this morning  Insight:  fair  Judgement:  fair  Oriented to:  time, person, and place  Attention Span and Concentration:  fair  Recent and Remote Memory:  fair  Fund of Knowledge:  appropriate    Clinical Global Impressions  First:  Considering your total clinical experience with this particular patient population, how severe are the patient's symptoms at this time?: 7 (01/28/23 1341)    Most recent:  Compared to the patient's condition at the START of treatment, this patient's condition is: 4           Precautions:     Behavioral Orders   Procedures     Code 1 - Restrict to Unit     Code 2     For imaging     Electroconvulsive therapy     Series of up to 12 treatments. Begin Date: 2/27/23     Treating Psychiatrist providing ECT:  Dr. Kearns     Notified on:  2/23/23     Electroconvulsive therapy     For acute ECT series, MWF, up to 12 treatment.     Electroconvulsive therapy     Series of up to 12 treatments. Begin Date: 02/26/23     Treating Psychiatrist providing ECT: Clifton  Notified on: 02/24/23     Fall precautions     [place]     MMPI     Routine Programming     As clinically indicated     Status 15     Every 15 minutes.     Suicide precautions     Patients on Suicide Precautions should have a Combination Diet ordered that includes a Diet selection(s) AND a Behavioral Tray selection for Safe Tray - with " "utensils, or Safe Tray - NO utensils            Diagnoses:     Active suicidal ideation with intent outside of hospital setting  MDD, recurrent, severe with psychotic features vs Schizoaffective Disorder, depressive type  Polysubstance use  Unspecified mood disorder  ADHD, inattentive type per chart  History of idiopathic hypersomnolence per chart  Nicotine use disorder, dependence and withdrawal   Allergies- chronic urticaria and rhinitis per chart  HD per chart          Assessment & Plan:     Assessment and hospital summary:  This patient is a 32 year old male with history of mood disorder, ADHD and substance use who presented to [facility] ED with SI on 1/26 in context of reported methamphetamine use and being kicked out of sober living. Medically cleared in ED, placed on 72HH and admitted to 12N. Limited historian, giving inconsistent reports about substance use, UDS negative, very somnolent, endorsing ongoing SI and some psychosis symptoms. PTA medications continued with exception of finasteride as patient states he takes \"1/4 a pill\" and declined ordered dose, will defer to primary team to address. Will continue to evaluate safety and stabilization further as patient more able to engage in assessments. Inpatient psychiatric hospitalization is warranted at this time for safety, stabilization, and possible adjustment in medications.    Patient reports that he abruptly stopped Zoloft one week prior to his admission. He developed discontinuation syndrome symptoms following that. He reports that he does not have a history of psychosis but is experiencing psychotic symptoms. He is amenable with plan to trial risperidone after R/B/A were discussed. Risperidone was initiated on 1/30 and titrated to a total of 3 mg daily on 2/1. Clonidine, used for ADHD, was reduced from a total of 0.3 mg daily to 0.2 mg daily on 2/3 due to concern for hypotension. 2/9: Cardiology consult placed. EKG- tachycardia 121 bpm, QTc 465 ms, " Abnormal QRS angle and T wave abnormality. COVID negative and labs are unremarkable.  On 2/10, clozapine was held pending additional workup from IM and cardiology. Pericarditis was ruled out and metoprolol was started. Clozapine was restarted on 2/13 with plan to cautiously titrate to lowest effective dose. 2/23/23: Clozapine titration schedule increased 25 mg/day. ECT and IM consults for ECT clearance placed. 2/27/23: ECT initiated. Risperidone was discontinued on 2/28. Clozapine level obtained on 3/3 at corresponding dose of 300 mg and was within therapeutic range (1001). Metformin increased to 1000 mg BID on 3/6 to target increased appetite/wt gain. Minimal improvement noted since initiation of both clozapine and ECT. 3/16: Patient reports orthostasis symptoms have resolved and some subjective improvement in depression symptoms with ECT. IDS-SR depression screening given to patient.     Psychiatric treatment/inteventions:  Medications:   -Reduced clozapine to 200 mg at bedtime on 3/23. Per Dr. Lazo, his current clozapine dose and blood levels, being so high, might be getting in the way of a strong inhibitory response to ECT (one mechanism of action for ECT is the seizure recruits inhibitory neurons (EDEL) and that helps with the therapeutic benefits).  Check level on 3/26.   -Continue PTA sertraline 200 mg daily for mood  -Continue PTA gabapentin 300 mg TID for anxiety   -Continue Cogentin 1 mg at bedtime for possible EPSE     -PRN hydroxyzine 25 mg every 4 hour for anxiety  -PRN trazodone 50 mg at bedtime for sleep   -PRN olanzapine 10mg PO or IM TID for agitation/psychosis   -PRN atropine 1% SL drops, 2 drops q2h for sialorrhea    Spiritual services consult placed on 2/6. Pt is Gnosticist. Appreciate assistance.  Psychological consult completed and reviewed by writer. Please see Dr. Nation's note dated 3/22 for details.     ECT:  - Medically cleared on 2/24. See IM note for details.  - ECT consult placed on  2/23.  - ECT started on 2/27/23  - HOLD Gabapentin on nights prior to ECT and on ECT mornings until after ECT.   - ECT #13 is completed today with plan to complete a total of 15 treatments in the acute series.     Laboratory/Imaging: reviewed: Covid negative; UDS negative; CMP, CBC, TSH, Lipid panel, HgbA1c ordered to complete admission labs. Reviewed.     2/9/23: Troponin, CK, CBC, BMP: Unremarkable, CRP elevated to 38.18, COVID: Negative  2/10/23: Add Influenza A/B given flu-like sx-negative  Patient will be treated in therapeutic milieu with appropriate individual and group therapies as described.     Medical treatment/interventions:  Medical concerns:   Nicotine replacement ordered, educate patient on benefits of cessation, pt unclear about finasteride dose, will hold until further information is obtained. PTA PRN zyrtec, azelastine, fluticasone, triamcinolone and Epipen ordered for allergies and PRN ammonia lactate, Eucerin for  dry skin.    Flatulence:   - simethicone prn    Neuroleptic induced wt gain:  - Monitor weights  - Continue metformin 1,000 mg BID with meals    Dyslipidemia: Will arrange follow up with PCP to address. Discussed importance of healthy eating habits and regular exercise. Will consider nutrition consult if patient amenable.     Orthostasis likely 2/2 clozapine: Pt is not hypotensive but reports orthostatic symptoms and previously restricted fluids. Now resolved.   - Continue to monitor vital signs closely  - Encourage fluids  - Discontinued clonidine and reduced clozapine dose    Sialorrhea 2/2 clozapine:  - Atropine 1% SL drops qhs  - Recommend towel on pillow when sleeping    Chest pain/tachycardia/EKG changes (2/9):  - Pain improved with PRN medications.   - Cardiology consult placed on 2/9. See note on that date for details.   - ECHO reviewed and unremarkable.  - Writer discussed care with both Dr. Streeter from [facility] and Christina from . Plan for now is to HOLD clozapine until further  medical workup. Dr. Streeter explained that myocarditis has been ruled out as troponin was negative. Pericarditis remains on differential, but he does not have EKG findings or a pericardial effusion. IM will assess for pericardial friction rub and pleuritic chest pain. IM seen by patient and Metoprolol was started.     Update 2/13: Discussed care with Annette from IM today on two occasions. Please see her note on this date for details. She does not suspect that this is pericarditis. He does not have pleuritic pain, characteristic CP, EKG changes, or pericardial effusion on ECHO. Therefore, will cautiously restart clozapine while monitoring closely for side effects. May consider further increase in metoprolol if necessary. PPI was started due to suspected GERD.     Update 2/24 per IM note:  Medicine is following peripherally for concerns for pericarditis.  See detailed note from 2/13.  No pericardial friction rub noted while sitting up and leaning forward today.  Patient states that his chest discomfort is getting better after starting the Protonix yesterday.  It does appear that he has also been using the Maalox.  Patient does have Tums available as well.  Please be mindful for any constipation with overuse of the PRNs.     POC:  - Continue Protonix daily for 8 weeks, then taper off  - Recommend lifestyle changes including weight loss head of bed elevation avoidance of late-night eating and specific food elimination such as chocolate caffeine alcohol acidic and/or spicy food.  - Watch for constipation with PRNs for heartburn  - We will schedule senna 1 tab twice daily  - MiraLAX daily as needed added on     Medicine will sign off, please contact us for any acute changes.      Sore throat/cough/nasal congestion, resolved:   - COVID negative on 2/9. Repeat COVID test and Influenza A/B neg on 2/10.  - Cepacol lozenges added  - PRN Tylenol   - Consulted with IM. Appreciate assistance. See their notes for details.       Legal Status: VOLUNTARY. 72 hour hold discontinued. Pt agreed to sign in on voluntary basis and discharge directly to treatment once stable.      Safety Assessment:        Behavioral Orders   Procedures     Code 1 - Restrict to Unit     Routine Programming       As clinically indicated     Status 15       Every 15 minutes.     Suicide precautions       Patients on Suicide Precautions should have a Combination Diet ordered that includes a Diet selection(s) AND a Behavioral Tray selection for Safe Tray - with utensils, or Safe Tray - NO utensils        Withdrawal precautions      Pt has not required locked seclusion or restraints in the past 24 hours to maintain safety, please refer to RN documentation for further details.    Discontinued SIO on 2/8 as patient is heriberto for safety. Monitor SI closely.     The risks, benefits, alternatives and side effects have been discussed and are understood by the patient.     Disposition: Pending clinical stabilization. Pt remains actively suicidal. [facility] once stable.     I saw the patient with my attending Dr Brown and she she agrees with my assessments and plans.     Carla Whitehead MD, PGY2,  Psychiatry Resident

## 2023-03-27 NOTE — ANESTHESIA CARE TRANSFER NOTE
Patient: Nikolay Lora    Procedure: * No procedures listed *       Diagnosis: * No pre-op diagnosis entered *  Diagnosis Additional Information: No value filed.    Anesthesia Type:   General     Note:    Oropharynx: oropharynx clear of all foreign objects and spontaneously breathing  Level of Consciousness: drowsy  Oxygen Supplementation: room air    Independent Airway: airway patency satisfactory and stable  Dentition: dentition unchanged  Vital Signs Stable: post-procedure vital signs reviewed and stable  Report to RN Given: handoff report given  Patient transferred to: PACU    Handoff Report: Identifed the Patient, Identified the Reponsible Provider, Reviewed the pertinent medical history, Discussed the surgical course, Reviewed Intra-OP anesthesia mangement and issues during anesthesia, Set expectations for post-procedure period and Allowed opportunity for questions and acknowledgement of understanding      Vitals:  Vitals Value Taken Time   /102 03/27/23 1317   Temp 36.6  C (97.8  F) 03/27/23 1317   Pulse 104 03/27/23 1317   Resp 18 03/27/23 1317   SpO2 91 % 03/27/23 1317       Electronically Signed By: LINDA Mejia CRNA  March 27, 2023  1:21 PM

## 2023-03-27 NOTE — PLAN OF CARE
Problem: Suicide Risk  Goal: Absence of Self-Harm  Outcome: Progressing   Goal Outcome Evaluation:    Plan of Care Reviewed With: patient      Pt rated anxiety at 6/10, depression at 10/10, auditory hallucination, and S.I.  Pt stated that he is a drug addict and has been in treatment when staff was discussing with him, and asked why he would be thinking of hurting himself.  He said that he does not have a specific plan, but his thought is to shoot himself with a gun.  Staff spent some time with pt to encourage him to focus on getting better with the treatment that he is getting.  Pt expressed appreciation.  Pt had ECT treatment this afternoon and ate after he came back.  Pt denied pain or nausea.  Pt has been in his room.

## 2023-03-27 NOTE — PLAN OF CARE
03/27/23 1400   Interprofessional Rounds   Participants ;CTC;nursing;psychiatrist;social work   Behavioral Team Discussion   Participants Nelly Brown MD, Resident, Maritza Gilman RN, Karmen, Nursing Supervisor, BELGICA Gregorio, ELVIA Dupont   Progress Continuing to assess: Patient receiving ECT   Anticipated length of stay 5-7 days   Continued Stay Criteria/Rationale Patient receiving ECT   Medical/Physical No medical concerns noted   Plan Attending psychiatrist will meet with patient daily to assess psychiatric needs and to discuss medication questions. During hospitalization patient will be encouraged to attend therapy groups and to participate in unit programming. CTC will coortdinate disposition and aftercare plan.   Rationale for change in precautions or plan No change   Anticipated Discharge Disposition IRTS;substance use treatment     PRECAUTIONS AND SAFETY    Behavioral Orders   Procedures    Code 1 - Restrict to Unit    Code 2     For imaging    Electroconvulsive therapy     Series of up to 12 treatments. Begin Date: 2/27/23     Treating Psychiatrist providing ECT:  Dr. Kearns     Notified on:  2/23/23    Electroconvulsive therapy     For acute ECT series, MWF, up to 12 treatment.    Electroconvulsive therapy     Series of up to 12 treatments. Begin Date: 02/26/23     Treating Psychiatrist providing ECT: Clifton  Notified on: 02/24/23    Fall precautions    [place]    MMPI    Routine Programming     As clinically indicated    Status 15     Every 15 minutes.    Suicide precautions     Patients on Suicide Precautions should have a Combination Diet ordered that includes a Diet selection(s) AND a Behavioral Tray selection for Safe Tray - with utensils, or Safe Tray - NO utensils         Safety  Safety WDL: WDL  Patient Location: patient room, own  Observed Behavior: calm  Observed Behavior (Comment): laying in bed  Airway Safety Measures: all equipment/monitors on and audible, emergency medication  sheet, mask at bedside, manual resuscitator/mask/valve at bedside, manual resuscitator/mask/valve in room, oxygen flowmeter, suction at bedside, suction equipment, suction regulator  Safety Measures: environmental rounds completed, safety rounds completed, suicide assessment completed, suicide check-in completed  Diversional Activity: television  Suicidality: Behavioral scrubs (pajamas)  Withdrawal: monitor withdrawal process  Seizure precautions: clutter free environment  Assault: status 15  Elopement Interventions: status 15  Sexual: status 15

## 2023-03-27 NOTE — PLAN OF CARE
03/27/23 1617   Group Therapy Session   Group Attendance attended group session   Time Session Began 1115   Time Session Ended 1200   Total Time (minutes) 20   Total # Attendees 2   Group Type psychoeducation   Group Topic Covered coping skills/lifestyle management;self-care activities   Group Session Detail Topic group (not billed)   Patient Response/Contribution cooperative with task     Pt readily attended group when invited while waiting for ECT appointment.  Pt stated he was interested in a topic group, though says very little, and appears  uncomfortable when it is his turn to talk.  Pt did have some brief participation in self awareness discussion group - discussed/somewhat questioned the fact that he is on medical leave and long term disability from work, though acknowledged when he is better, he would like to work again.  Writer acknowledged  the benefits, protections, and supports of being on leave and the financial assistance he is receiving now, though encouraged him to talk to his provider and  about the process of returning to work down the road once he has a period of stability.  Pt brought up long term goal of independent living, though is looking forward of returning to the program he was in which provided supportive living.
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
## 2023-03-27 NOTE — ANESTHESIA POSTPROCEDURE EVALUATION
Patient: Nikolay Lora    Procedure: * No procedures listed *       Anesthesia Type:  General    Note:  Disposition: Inpatient   Postop Pain Control: Uneventful            Sign Out: Well controlled pain   PONV: No   Neuro/Psych: Uneventful            Sign Out: Acceptable/Baseline neuro status   Airway/Respiratory: Uneventful            Sign Out: Acceptable/Baseline resp. status   CV/Hemodynamics: Uneventful            Sign Out: Acceptable CV status; No obvious hypovolemia; No obvious fluid overload   Other NRE: NONE   DID A NON-ROUTINE EVENT OCCUR? No           Last vitals:  Vitals:    03/27/23 1317 03/27/23 1322 03/27/23 1323   BP: (!) 126/102 131/86 (!) 129/94   Pulse: 104 104 107   Resp: 18 20 16   Temp: 36.6  C (97.8  F) 36.4  C (97.5  F) 36.5  C (97.7  F)   SpO2: 91% 92% 94%       Electronically Signed By: Jen Murcia MD  March 27, 2023  1:40 PM

## 2023-03-27 NOTE — ANESTHESIA CARE TRANSFER NOTE
Patient: Link Lora    Procedure: * No procedures listed *       Diagnosis: * No pre-op diagnosis entered *  Diagnosis Additional Information: No value filed.    Anesthesia Type:   General     Note:      Level of Consciousness: awake      Independent Airway: airway patency satisfactory and stable  Dentition: dentition unchanged  Vital Signs Stable: post-procedure vital signs reviewed and stable  Report to RN Given: handoff report given  Patient transferred to: PACU    Handoff Report: Identifed the Patient, Identified the Reponsible Provider, Reviewed the pertinent medical history, Discussed the surgical course, Reviewed Intra-OP anesthesia mangement and issues during anesthesia, Set expectations for post-procedure period and Allowed opportunity for questions and acknowledgement of understanding      Vitals:  Vitals Value Taken Time   BP     Temp     Pulse     Resp     SpO2         Electronically Signed By: Jen Murcia MD  March 27, 2023  1:39 PM

## 2023-03-28 LAB
CLOZAPINE SERPL-MCNC: 614 NG/ML
CLOZAPINE+NOR SERPL-MCNC: 867 NG/ML
CNO SERPL-MCNC: <100 NG/ML
NORCLOZAPINE SERPL-MCNC: 253 NG/ML

## 2023-03-28 PROCEDURE — 124N000002 HC R&B MH UMMC

## 2023-03-28 PROCEDURE — 250N000013 HC RX MED GY IP 250 OP 250 PS 637: Performed by: PSYCHIATRY & NEUROLOGY

## 2023-03-28 PROCEDURE — G0177 OPPS/PHP; TRAIN & EDUC SERV: HCPCS

## 2023-03-28 PROCEDURE — 250N000013 HC RX MED GY IP 250 OP 250 PS 637

## 2023-03-28 PROCEDURE — 99231 SBSQ HOSP IP/OBS SF/LOW 25: CPT | Performed by: PSYCHIATRY & NEUROLOGY

## 2023-03-28 RX ADMIN — METFORMIN HYDROCHLORIDE 1000 MG: 500 TABLET ORAL at 17:36

## 2023-03-28 RX ADMIN — NICOTINE 1 PATCH: 21 PATCH, EXTENDED RELEASE TRANSDERMAL at 07:56

## 2023-03-28 RX ADMIN — SENNOSIDES AND DOCUSATE SODIUM 1 TABLET: 50; 8.6 TABLET ORAL at 19:22

## 2023-03-28 RX ADMIN — SENNOSIDES AND DOCUSATE SODIUM 1 TABLET: 50; 8.6 TABLET ORAL at 07:57

## 2023-03-28 RX ADMIN — FLUTICASONE PROPIONATE 2 SPRAY: 50 SPRAY, METERED NASAL at 07:57

## 2023-03-28 RX ADMIN — GABAPENTIN 300 MG: 300 CAPSULE ORAL at 14:52

## 2023-03-28 RX ADMIN — SERTRALINE HYDROCHLORIDE 200 MG: 100 TABLET ORAL at 07:57

## 2023-03-28 RX ADMIN — CLOZAPINE 200 MG: 200 TABLET ORAL at 19:22

## 2023-03-28 RX ADMIN — ATROPINE SULFATE 2 DROP: 10 SOLUTION/ DROPS OPHTHALMIC at 19:21

## 2023-03-28 RX ADMIN — GABAPENTIN 300 MG: 300 CAPSULE ORAL at 07:57

## 2023-03-28 RX ADMIN — METFORMIN HYDROCHLORIDE 1000 MG: 500 TABLET ORAL at 07:57

## 2023-03-28 RX ADMIN — GABAPENTIN 300 MG: 300 CAPSULE ORAL at 19:22

## 2023-03-28 RX ADMIN — PANTOPRAZOLE SODIUM 40 MG: 40 TABLET, DELAYED RELEASE ORAL at 07:57

## 2023-03-28 RX ADMIN — Medication 12.5 MG: at 07:57

## 2023-03-28 RX ADMIN — LORATADINE 10 MG: 10 TABLET ORAL at 07:57

## 2023-03-28 ASSESSMENT — ACTIVITIES OF DAILY LIVING (ADL)
ADLS_ACUITY_SCORE: 29
HYGIENE/GROOMING: INDEPENDENT
ADLS_ACUITY_SCORE: 29

## 2023-03-28 NOTE — PLAN OF CARE
"  Problem: Psychotic Signs/Symptoms  Goal: Improved Behavioral Control (Psychotic Signs/Symptoms)  Outcome: Progressing  Goal: Optimal Cognitive Function (Psychotic Signs/Symptoms)  Intervention: Support and Promote Cognitive Ability  Recent Flowsheet Documentation  Taken 3/28/2023 1621 by Mario Marcelo RN  Trust Relationship/Rapport:    care explained    choices provided    emotional support provided    empathic listening provided    questions answered    questions encouraged    reassurance provided    thoughts/feelings acknowledged  Patient continues to isolate to room most of the evening shift, stated he attended earlier group session and participated. Patient reported having passive suicidal thoughts without a plan, encourage to update staff if he develop a plan to harm himself. Patient ate about 75% of dinner with no nausea or stomach discomfort. Patient endorsed anxiety 6/10 and depression 5/10, denies SIB/HI and hallucination. Patient contract for safety and medication compliant.    /71 (BP Location: Left arm)   Pulse 99   Temp 97.4  F (36.3  C) (Oral)   Resp 20   Ht 1.6 m (5' 3\")   Wt 79 kg (174 lb 1.6 oz)   SpO2 94%   BMI 30.84 kg/m                           "

## 2023-03-28 NOTE — PROGRESS NOTES
"   03/28/23 1000   Group Therapy Session   Group Attendance attended group session   Time Session Began 1615   Time Session Ended 1700   Total Time (minutes) 45   Total # Attendees 2   Group Type expressive therapy   Group Topic Covered emotions/expression   Patient Response/Contribution cooperative with task     Art Therapy directive was to choose a personal intention for the evening and/or week and to create a watercolor painting to express this personal intention.  Goals of directive: to identify personal strengths and goals, mindfulness, emotional expression  Pt was a quiet engaged participant, at times appeared anxious, focused on task for the full duration of group. Pt shared that he created an image of a grassy field and the personal intention of this painting is \"peaceful/serenity.\"  Pts mood was calm.  "

## 2023-03-28 NOTE — PROGRESS NOTES
[facility], [place]   Psychiatric Progress Note  Hospital Day: 60      Interim History:     Patient seen and chart reviewed. Case discussed in multi-disciplinary treatment team      According to Nursing report:  Patient had 13th ECT yesterday. He is tolerating this well. He feels that ECT is helping but he still endorses suicidal thoughts and states that there are whispers telling him to kill himself. He does not feel ready for discharge. He mostly isolates but attends groups. He has somewhat marginal self care but has improved.        According to Nursing notes:  Pt rated anxiety at 6/10, depression at 10/10, auditory hallucination, and S.I.  Pt stated that he is a drug addict and has been in treatment when staff was discussing with him, and asked why he would be thinking of hurting himself.  He said that he does not have a specific plan, but his thought is to shoot himself with a gun.  Staff spent some time with pt to encourage him to focus on getting better with the treatment that he is getting.  Pt expressed appreciation.  Pt had ECT treatment this afternoon and ate after he came back.  Pt denied pain or nausea.  Pt has been in his room.   Haseeb had ECT done this morning. Upon nursing assessments, he denies any pain, headaches, or dizziness. His vital signs are stable. Pt attends and participates in group therapy. Pt continues endorsing anxiety rated 6/10 and depression rated at 10/10. Pt also endorsed AH command in nature, encouraging him to complete suicide by shooting himself. When asked if he had access to a gun, pt stated, I can get it on the street, and if not, I will use a blade to cut my wrist. Pt stated that he felt safe on the unit and contracted for safety. Pt then asked if he could ask his doctor for Methylphenidate for his depression. He was encouraged to talk with his doctor tomorrow. Pt took scheduled medication without any issues. His appetite is good, and his hygiene  "is fair.      Patient appeared to sleep 7 hours this night shift.  No prns or snacks given or requested.  No concerns were reported or noted.             According to  :  Plan is to return to [facility] for ongoing CD treatment. He is voluntary      On interview today:Patient continues to endorse suicidal thoughts and command hallucinations persisting. He denies side effects to medications and has no physical complaints. He is tolerating ECT.        ROS: Patient has no other physical complaints today.      Vital signs:  Temp: 98.5  F (36.9  C) Temp src: Oral BP: 114/81 Pulse: 100   Resp: 20 SpO2: 95 % O2 Device: None (Room air) Oxygen Delivery: 3 LPM Height: 160 cm (5' 3\") Weight: 79 kg (174 lb 1.6 oz)  Estimated body mass index is 30.84 kg/m  as calculated from the following:    Height as of this encounter: 1.6 m (5' 3\").    Weight as of this encounter: 79 kg (174 lb 1.6 oz).             Interim History:          Medications:       atropine  2 drop Sublingual At Bedtime     cloZAPine  200 mg Oral At Bedtime     fluticasone  2 spray Both Nostrils Daily     gabapentin  300 mg Oral TID     loratadine  10 mg Oral Daily     metFORMIN  1,000 mg Oral BID w/meals     metoprolol succinate ER  12.5 mg Oral Daily     nicotine  1 patch Transdermal Daily     nicotine   Transdermal Q8H     pantoprazole  40 mg Oral QAM AC     senna-docusate  1 tablet Oral BID     sertraline  200 mg Oral Daily          Allergies:   No Known Allergies       Labs:     No results found for this or any previous visit (from the past 24 hour(s)).       Psychiatric Examination:     /81   Pulse 100   Temp 98.5  F (36.9  C) (Oral)   Resp 20   Ht 1.6 m (5' 3\")   Wt 79 kg (174 lb 1.6 oz)   SpO2 95%   BMI 30.84 kg/m    Weight is 174 lbs 1.6 oz  Body mass index is 30.84 kg/m .    Weight over time:  Vitals:    01/27/23 1718 03/07/23 0933   Weight: 75.1 kg (165 lb 8 oz) 79 kg (174 lb 1.6 oz)       Orthostatic Vitals       None      " "    Cardiometabolic risk assessment. 01/30/23    Reviewed patient profile for cardiometabolic risk factors    Date taken /Value  REFERENCE RANGE   Abdominal Obesity  (Waist Circumference)   See nursing flowsheet Women ?35 in (88 cm)   Men ?40 in (102 cm)      Triglycerides  Triglycerides   Date Value Ref Range Status   01/28/2023 192 (H) <150 mg/dL Final       ?150 mg/dL (1.7 mmol/L) or current treatment for elevated triglycerides   HDL cholesterol  Direct Measure HDL   Date Value Ref Range Status   01/28/2023 41 >=40 mg/dL Final   ]   Women <50 mg/dL (1.3 mmol/L) in women or current treatment for low HDL cholesterol  Men <40 mg/dL (1 mmol/L) in men or current treatment for low HDL cholesterol     Fasting plasma glucose (FPG) Lab Results   Component Value Date     01/28/2023      FPG ?100 mg/dL (5.6 mmol/L) or treatment for elevated blood glucose   Blood pressure  BP Readings from Last 3 Encounters:   03/27/23 114/81   01/27/23 118/74   07/20/22 112/79    Blood pressure ?130/85 mmHg or treatment for elevated blood pressure   Family History  See family history     Appearance: awake, alert, dressed in hospital scrubs and unkempt  Attitude:  cooperative, calm  Eye Contact: fair  Mood: \"still depressedl\"  Affect:  mood congruent and intensity is blunted  Speech:  clear, coherent. appropriate  Language: fluent and intact in English  Psychomotor, Gait, Musculoskeletal:  no evidence of tardive dyskinesia, dystonia, or tics  Thought Process:  Linear, ruminative while sometimes disrupted  Associations:  No evidence of loose associations  Thought Content:  SI w/plan and intent upon discharge, no plan or intent while in hospital. Auditory and visual hallucinations absent this morning  Insight:  fair  Judgement:  fair  Oriented to:  time, person, and place  Attention Span and Concentration:  fair  Recent and Remote Memory:  fair  Fund of Knowledge:  appropriate    Minimal change in mental status in the past 24 " "hours             Precautions:     Behavioral Orders   Procedures     Code 1 - Restrict to Unit     Code 2     For imaging     Electroconvulsive therapy     Series of up to 12 treatments. Begin Date: 2/27/23     Treating Psychiatrist providing ECT:  Dr. Kearns     Notified on:  2/23/23     Electroconvulsive therapy     For acute ECT series, MWF, up to 12 treatment.     Electroconvulsive therapy     Series of up to 12 treatments. Begin Date: 02/26/23     Treating Psychiatrist providing ECT: Clifton  Notified on: 02/24/23     Fall precautions     [place]     MMPI     Routine Programming     As clinically indicated     Status 15     Every 15 minutes.     Suicide precautions     Patients on Suicide Precautions should have a Combination Diet ordered that includes a Diet selection(s) AND a Behavioral Tray selection for Safe Tray - with utensils, or Safe Tray - NO utensils            Diagnoses:     Active suicidal ideation with intent outside of hospital setting  MDD, recurrent, severe with psychotic features vs Schizoaffective Disorder, depressive type  Polysubstance use  Unspecified mood disorder  ADHD, inattentive type per chart  History of idiopathic hypersomnolence per chart  Nicotine use disorder, dependence and withdrawal   Allergies- chronic urticaria and rhinitis per chart  HD per chart          Assessment & Plan:     Assessment and hospital summary:  This patient is a 32 year old male with history of mood disorder, ADHD and substance use who presented to [place] ED with SI on 1/26 in context of reported methamphetamine use and being kicked out of sober living. Medically cleared in ED, placed on 72HH and admitted to 12N. Limited historian, giving inconsistent reports about substance use, UDS negative, very somnolent, endorsing ongoing SI and some psychosis symptoms. PTA medications continued with exception of finasteride as patient states he takes \"1/4 a pill\" and declined ordered dose, will defer to primary team " to address. Will continue to evaluate safety and stabilization further as patient more able to engage in assessments. Inpatient psychiatric hospitalization is warranted at this time for safety, stabilization, and possible adjustment in medications.    Patient reports that he abruptly stopped Zoloft one week prior to his admission. He developed discontinuation syndrome symptoms following that. He reports that he does not have a history of psychosis but is experiencing psychotic symptoms. He is amenable with plan to trial risperidone after R/B/A were discussed. Risperidone was initiated on 1/30 and titrated to a total of 3 mg daily on 2/1. Clonidine, used for ADHD, was reduced from a total of 0.3 mg daily to 0.2 mg daily on 2/3 due to concern for hypotension. 2/9: Cardiology consult placed. EKG- tachycardia 121 bpm, QTc 465 ms, Abnormal QRS angle and T wave abnormality. COVID negative and labs are unremarkable.  On 2/10, clozapine was held pending additional workup from IM and cardiology. Pericarditis was ruled out and metoprolol was started. Clozapine was restarted on 2/13 with plan to cautiously titrate to lowest effective dose. 2/23/23: Clozapine titration schedule increased 25 mg/day. ECT and IM consults for ECT clearance placed. 2/27/23: ECT initiated. Risperidone was discontinued on 2/28. Clozapine level obtained on 3/3 at corresponding dose of 300 mg and was within therapeutic range (1001). Metformin increased to 1000 mg BID on 3/6 to target increased appetite/wt gain. Minimal improvement noted since initiation of both clozapine and ECT. 3/16: Patient reports orthostasis symptoms have resolved and some subjective improvement in depression symptoms with ECT. IDS-SR depression screening given to patient.     Psychiatric treatment/inteventions:  Medications:   -Reduced clozapine to 200 mg at bedtime on 3/23. Per Dr. Lazo, his current clozapine dose and blood levels, being so high, might be getting in the way of a  strong inhibitory response to ECT (one mechanism of action for ECT is the seizure recruits inhibitory neurons (EDEL) and that helps with the therapeutic benefits).  Check level on 3/26.   -Continue PTA sertraline 200 mg daily for mood  -Continue PTA gabapentin 300 mg TID for anxiety   -Continue Cogentin 1 mg at bedtime for possible EPSE     -PRN hydroxyzine 25 mg every 4 hour for anxiety  -PRN trazodone 50 mg at bedtime for sleep   -PRN olanzapine 10mg PO or IM TID for agitation/psychosis   -PRN atropine 1% SL drops, 2 drops q2h for sialorrhea    Spiritual services consult placed on 2/6. Pt is Sabianism. Appreciate assistance.  Psychological consult completed and reviewed by writer. Please see Dr. Nation's note dated 3/22 for details.     ECT:  - Medically cleared on 2/24. See IM note for details.  - ECT consult placed on 2/23.  - ECT started on 2/27/23  - HOLD Gabapentin on nights prior to ECT and on ECT mornings until after ECT.   - ECT #13 is completed yesterday with plan to complete a total of 15 treatments in the acute series.     Laboratory/Imaging: reviewed: Covid negative; UDS negative; CMP, CBC, TSH, Lipid panel, HgbA1c ordered to complete admission labs. Reviewed.     2/9/23: Troponin, CK, CBC, BMP: Unremarkable, CRP elevated to 38.18, COVID: Negative  2/10/23: Add Influenza A/B given flu-like sx-negative  Patient will be treated in therapeutic milieu with appropriate individual and group therapies as described.     Medical treatment/interventions:  Medical concerns:   Nicotine replacement ordered, educate patient on benefits of cessation, pt unclear about finasteride dose, will hold until further information is obtained. PTA PRN zyrtec, azelastine, fluticasone, triamcinolone and Epipen ordered for allergies and PRN ammonia lactate, Eucerin for  dry skin.    Flatulence:   - simethicone prn    Neuroleptic induced wt gain:  - Monitor weights  - Continue metformin 1,000 mg BID with meals    Dyslipidemia: Will  arrange follow up with PCP to address. Discussed importance of healthy eating habits and regular exercise. Will consider nutrition consult if patient amenable.     Orthostasis likely 2/2 clozapine: Pt is not hypotensive but reports orthostatic symptoms and previously restricted fluids. Now resolved.   - Continue to monitor vital signs closely  - Encourage fluids  - Discontinued clonidine and reduced clozapine dose    Sialorrhea 2/2 clozapine:  - Atropine 1% SL drops qhs  - Recommend towel on pillow when sleeping    Chest pain/tachycardia/EKG changes (2/9):  - Pain improved with PRN medications.   - Cardiology consult placed on 2/9. See note on that date for details.   - ECHO reviewed and unremarkable.  - Writer discussed care with both Dr. Streeter from [facility] and Christina from IM. Plan for now is to HOLD clozapine until further medical workup. Dr. Streeter explained that myocarditis has been ruled out as troponin was negative. Pericarditis remains on differential, but he does not have EKG findings or a pericardial effusion. IM will assess for pericardial friction rub and pleuritic chest pain. IM seen by patient and Metoprolol was started.     Update 2/13: Discussed care with Annette from  today on two occasions. Please see her note on this date for details. She does not suspect that this is pericarditis. He does not have pleuritic pain, characteristic CP, EKG changes, or pericardial effusion on ECHO. Therefore, will cautiously restart clozapine while monitoring closely for side effects. May consider further increase in metoprolol if necessary. PPI was started due to suspected GERD.     Update 2/24 per IM note:  Medicine is following peripherally for concerns for pericarditis.  See detailed note from 2/13.  No pericardial friction rub noted while sitting up and leaning forward today.  Patient states that his chest discomfort is getting better after starting the Protonix yesterday.  It does appear that he has also been using  the Maalox.  Patient does have Tums available as well.  Please be mindful for any constipation with overuse of the PRNs.     POC:  - Continue Protonix daily for 8 weeks, then taper off  - Recommend lifestyle changes including weight loss head of bed elevation avoidance of late-night eating and specific food elimination such as chocolate caffeine alcohol acidic and/or spicy food.  - Watch for constipation with PRNs for heartburn  - We will schedule senna 1 tab twice daily  - MiraLAX daily as needed added on     Medicine will sign off, please contact us for any acute changes.      Sore throat/cough/nasal congestion, resolved:   - COVID negative on 2/9. Repeat COVID test and Influenza A/B neg on 2/10.  - Cepacol lozenges added  - PRN Tylenol   - Consulted with IM. Appreciate assistance. See their notes for details.      Legal Status: VOLUNTARY. 72 hour hold discontinued. Pt agreed to sign in on voluntary basis and discharge directly to treatment once stable.      Safety Assessment:        Behavioral Orders   Procedures     Code 1 - Restrict to Unit     Routine Programming       As clinically indicated     Status 15       Every 15 minutes.     Suicide precautions       Patients on Suicide Precautions should have a Combination Diet ordered that includes a Diet selection(s) AND a Behavioral Tray selection for Safe Tray - with utensils, or Safe Tray - NO utensils        Withdrawal precautions      Pt has not required locked seclusion or restraints in the past 24 hours to maintain safety, please refer to RN documentation for further details.    Discontinued SIO on 2/8 as patient is heriberto for safety. Monitor SI closely.     The risks, benefits, alternatives and side effects have been discussed and are understood by the patient.     Disposition: Pending clinical stabilization. Pt remains actively suicidal. Will likely discharge back to [facility] program once stable.     No further change in treatment plan  Patient  seen, chart reviewed, case reviewed with  and with nursing.   Case reviewed in multi-disciplinary treatment team.    Simone Kim MD

## 2023-03-28 NOTE — PLAN OF CARE
"Pt presents in \"good\" mood, flat affect which brightens on approach. Pt denies SIB, HI and thoughts of harming others. Pt endorses depression 4/10 and anxiety as \"low\" today. Pt was med compliant, cooperative and participated in afternoon group. Appetite and fluid intake adequate. Pt was calm, cooperative and medication compliant.    Pt continues to endorses intermittent AH presenting as \"whispers\" that encourage him to complete suicide. Pt states that thoughts of suicide are \"always in the back of my mind\". Pt states he struggles with these thoughts and has reported over several days that he is concerned that he would discharge too soon. RN writer asked if he thinks about the day after he discharges, what that might look like. Pt states he would like to go back to work as a . He says he enjoyed this work in the past and has been off on medical leave for \"about a year\". Pt has smiled quite a bit today and combed his hair before attending group.    No acute behavioral or medical concerns this day.    VS reviewed: /81   Pulse 100   Temp 98.5  F (36.9  C) (Oral)   Resp 20   Ht 1.6 m (5' 3\")   Wt 79 kg (174 lb 1.6 oz)   SpO2 95%   BMI 30.84 kg/m   . Patient denies pain.    Length of stay: 60  "

## 2023-03-28 NOTE — PLAN OF CARE
Assessment/Intervention/Current Symtoms and Care Coordination  -Chart review  -Rounded with team, addressed patient needs/concerns  Current Symptoms include the following: Patient appears to be improving    Discharge Plan or Goal  Pending stabilization & development of a safe discharge plan.  [facility]    Barriers to Discharge  Patient requires further psychiatric stabilization due to current symptomology    Referral Status  No referrals were made this hospitalization    Legal Status  Voluntary

## 2023-03-28 NOTE — PLAN OF CARE
Problem: Sleep Disturbance  Goal: Adequate Sleep/Rest  Outcome: Progressing   Goal Outcome Evaluation:    Patient appeared to sleep 7 hours this night shift.  No prns or snacks given or requested.  No concerns were reported or noted.  ECT tomorrow.

## 2023-03-28 NOTE — PLAN OF CARE
Problem: Psychotic Signs/Symptoms  Goal: Improved Behavioral Control (Psychotic Signs/Symptoms)  Outcome: Progressing   Goal Outcome Evaluation:    Haseeb had ECT done this morning. Upon nursing assessments, he denies any pain, headaches, or dizziness. His vital signs are stable. Pt attends and participates in group therapy. Pt continues endorsing anxiety rated 6/10 and depression rated at 10/10. Pt also endorsed AH command in nature, encouraging him to complete suicide by shooting himself. When asked if he had access to a gun, pt stated, I can get it on the street, and if not, I will use a blade to cut my wrist. Pt stated that he felt safe on the unit and contracted for safety. Pt then asked if he could ask his doctor for Methylphenidate for his depression. He was encouraged to talk with his doctor tomorrow. Pt took scheduled medication without any issues. His appetite is good, and his hygiene is fair.

## 2023-03-29 ENCOUNTER — ANESTHESIA EVENT (OUTPATIENT)
Dept: BEHAVIORAL HEALTH | Facility: CLINIC | Age: 33
End: 2023-03-29
Payer: COMMERCIAL

## 2023-03-29 ENCOUNTER — APPOINTMENT (OUTPATIENT)
Dept: BEHAVIORAL HEALTH | Facility: CLINIC | Age: 33
End: 2023-03-29
Attending: PSYCHIATRY & NEUROLOGY
Payer: COMMERCIAL

## 2023-03-29 ENCOUNTER — ANESTHESIA (OUTPATIENT)
Dept: BEHAVIORAL HEALTH | Facility: CLINIC | Age: 33
End: 2023-03-29
Payer: COMMERCIAL

## 2023-03-29 PROCEDURE — 370N000017 HC ANESTHESIA TECHNICAL FEE, PER MIN

## 2023-03-29 PROCEDURE — 250N000013 HC RX MED GY IP 250 OP 250 PS 637: Performed by: PSYCHIATRY & NEUROLOGY

## 2023-03-29 PROCEDURE — 124N000002 HC R&B MH UMMC

## 2023-03-29 PROCEDURE — 90870 ELECTROCONVULSIVE THERAPY: CPT

## 2023-03-29 PROCEDURE — 90870 ELECTROCONVULSIVE THERAPY: CPT | Performed by: PSYCHIATRY & NEUROLOGY

## 2023-03-29 PROCEDURE — 250N000011 HC RX IP 250 OP 636: Performed by: STUDENT IN AN ORGANIZED HEALTH CARE EDUCATION/TRAINING PROGRAM

## 2023-03-29 PROCEDURE — 99231 SBSQ HOSP IP/OBS SF/LOW 25: CPT | Mod: 25 | Performed by: PSYCHIATRY & NEUROLOGY

## 2023-03-29 PROCEDURE — 250N000013 HC RX MED GY IP 250 OP 250 PS 637

## 2023-03-29 PROCEDURE — 250N000009 HC RX 250: Performed by: STUDENT IN AN ORGANIZED HEALTH CARE EDUCATION/TRAINING PROGRAM

## 2023-03-29 RX ORDER — ONDANSETRON 4 MG/1
4 TABLET, ORALLY DISINTEGRATING ORAL EVERY 30 MIN PRN
Status: CANCELLED | OUTPATIENT
Start: 2023-03-29

## 2023-03-29 RX ORDER — FENTANYL CITRATE 50 UG/ML
25 INJECTION, SOLUTION INTRAMUSCULAR; INTRAVENOUS EVERY 5 MIN PRN
Status: CANCELLED | OUTPATIENT
Start: 2023-03-29

## 2023-03-29 RX ORDER — HYDROMORPHONE HCL IN WATER/PF 6 MG/30 ML
0.2 PATIENT CONTROLLED ANALGESIA SYRINGE INTRAVENOUS EVERY 5 MIN PRN
Status: CANCELLED | OUTPATIENT
Start: 2023-03-29

## 2023-03-29 RX ORDER — FENTANYL CITRATE 50 UG/ML
50 INJECTION, SOLUTION INTRAMUSCULAR; INTRAVENOUS EVERY 5 MIN PRN
Status: CANCELLED | OUTPATIENT
Start: 2023-03-29

## 2023-03-29 RX ORDER — HYDROMORPHONE HCL IN WATER/PF 6 MG/30 ML
0.4 PATIENT CONTROLLED ANALGESIA SYRINGE INTRAVENOUS EVERY 5 MIN PRN
Status: CANCELLED | OUTPATIENT
Start: 2023-03-29

## 2023-03-29 RX ORDER — ONDANSETRON 2 MG/ML
4 INJECTION INTRAMUSCULAR; INTRAVENOUS EVERY 30 MIN PRN
Status: CANCELLED | OUTPATIENT
Start: 2023-03-29

## 2023-03-29 RX ORDER — SODIUM CHLORIDE, SODIUM LACTATE, POTASSIUM CHLORIDE, CALCIUM CHLORIDE 600; 310; 30; 20 MG/100ML; MG/100ML; MG/100ML; MG/100ML
INJECTION, SOLUTION INTRAVENOUS CONTINUOUS
Status: CANCELLED | OUTPATIENT
Start: 2023-03-29

## 2023-03-29 RX ADMIN — METHOHEXITAL SODIUM 90 MG: 500 INJECTION, POWDER, LYOPHILIZED, FOR SOLUTION INTRAMUSCULAR; INTRAVENOUS; RECTAL at 11:04

## 2023-03-29 RX ADMIN — SUCCINYLCHOLINE CHLORIDE 80 MG: 20 INJECTION, SOLUTION INTRAMUSCULAR; INTRAVENOUS; PARENTERAL at 11:04

## 2023-03-29 RX ADMIN — NICOTINE 1 PATCH: 21 PATCH, EXTENDED RELEASE TRANSDERMAL at 12:14

## 2023-03-29 RX ADMIN — SERTRALINE HYDROCHLORIDE 200 MG: 100 TABLET ORAL at 08:36

## 2023-03-29 RX ADMIN — LORATADINE 10 MG: 10 TABLET ORAL at 08:36

## 2023-03-29 RX ADMIN — PANTOPRAZOLE SODIUM 40 MG: 40 TABLET, DELAYED RELEASE ORAL at 08:36

## 2023-03-29 RX ADMIN — SENNOSIDES AND DOCUSATE SODIUM 1 TABLET: 50; 8.6 TABLET ORAL at 19:26

## 2023-03-29 RX ADMIN — GABAPENTIN 300 MG: 300 CAPSULE ORAL at 12:14

## 2023-03-29 RX ADMIN — Medication 12.5 MG: at 08:36

## 2023-03-29 RX ADMIN — FLUTICASONE PROPIONATE 2 SPRAY: 50 SPRAY, METERED NASAL at 08:43

## 2023-03-29 RX ADMIN — METFORMIN HYDROCHLORIDE 1000 MG: 500 TABLET ORAL at 12:13

## 2023-03-29 RX ADMIN — CLOZAPINE 200 MG: 200 TABLET ORAL at 19:26

## 2023-03-29 RX ADMIN — METFORMIN HYDROCHLORIDE 1000 MG: 500 TABLET ORAL at 18:44

## 2023-03-29 RX ADMIN — SENNOSIDES AND DOCUSATE SODIUM 1 TABLET: 50; 8.6 TABLET ORAL at 08:36

## 2023-03-29 RX ADMIN — GABAPENTIN 300 MG: 300 CAPSULE ORAL at 19:26

## 2023-03-29 ASSESSMENT — ACTIVITIES OF DAILY LIVING (ADL)
ADLS_ACUITY_SCORE: 29
LAUNDRY: UNABLE TO COMPLETE
ADLS_ACUITY_SCORE: 29
ADLS_ACUITY_SCORE: 29
ORAL_HYGIENE: INDEPENDENT
ADLS_ACUITY_SCORE: 29
ADLS_ACUITY_SCORE: 29
DRESS: INDEPENDENT
ADLS_ACUITY_SCORE: 29
HYGIENE/GROOMING: INDEPENDENT

## 2023-03-29 NOTE — PROGRESS NOTES
03/29/23 1812   Group Therapy Session   Group Attendance attended group session   Time Session Began 1615   Time Session Ended 1700   Total Time (minutes) 20   Total # Attendees 3   Group Type task skill   Group Session Detail Discussion on the importance of healthy leisure and hands on watercolor activity for creative expression, healthy leisure, coping, relaxation, mood stabilization, adding daily structure/routine, reality-based activity, building self-esteem, fine motor skills, and quiet socialization.   Patient Participation Detail Pt was pleasant and engaged in working on a small painting.  Pt was social with familiar staff and responded to social questions easily.  Pt was willing to clean up his space when he was finished.  No charge.

## 2023-03-29 NOTE — PLAN OF CARE
"   03/29/23 1322   Group Therapy Session   Group Attendance attended group session   Total Time (minutes) 45   Total # Attendees 4   Group Type psychoeducation   Group Topic Covered coping skills/lifestyle management;structured socialization   Group Session Detail topic group   Patient Response/Contribution cooperative with task   Pt attended self awareness topic group, spent time completing worksheet, and was open to sharing answers.  Pt was positive as he spoke about seeing each day as a \"new day and new opportunity, and one more reason to be hopeful for\".  Pt described himself as grateful, blessed, and resilient.  Pt shared a change he's made and a goal to continue working on as meditation and breathing exercises.  Following discussion, pt spent the remainder of group working on a wood painting project.                        "

## 2023-03-29 NOTE — PROGRESS NOTES
Patient arrived in PACU at 1114    Patient meets phase I recovery  criteria at 1144 , switched to phase II recovery at 1145.      The following medications were given in ECT:    Anesthetic:   Brevital 90 mg at 1104    Muscle Relaxant:   Succinylcholine  80 mg at 1104      Patient meets PACU discharge criteria at 1145.    Re-orientation time: 2 minutes    Pt discharged from the recovery room via wheelchair back to station 12N   with staff at 1200       Report given to Delisa LINDO           Feel free to call with any questions at *[phone]    Gabby Bryson RN

## 2023-03-29 NOTE — PLAN OF CARE
Pt asleep at start of shift.     Breathing quiet and unlabored when sleeping.     Pt had no c/o pain or discomfort during the HS.     Appears to have slept 7 hours.     NPO after 0000.     Goal Outcome Evaluation:  Problem: Sleep Disturbance  Goal: Adequate Sleep/Rest  Outcome: Progressing
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
## 2023-03-29 NOTE — PROGRESS NOTES
[facility], [place]   Psychiatric Progress Note  Hospital Day: 61        Interim History:   The patient's care was discussed with the treatment team during the daily team meeting and/or staff's chart notes were reviewed. Staff report was calm, cooperative and withdrawn to his room. Affect is flat. He attended some groups. He voiced having depression 9/10 and anxiety 6/10. He continues to have suicidal thoughts and hears whispers to shoot himself in the head. Reporting AH of sirens. Sleep, and appetite are good. His hygiene is not ideal while he takes showers.    Upon interview today before ECT, Haseeb reports that he feels fatigued and exhausted. He was asking for Adderall to be prescribed or his ADHD. He also presented interests for being discharged to a residential center. He reports he has a better control on his suicidal thought now. [place] for safety. Monday, we discussed with him opportunities with residential treatments for him after being discharged from the hospital. He was agreeable while he was not sure he can be in control of his SI yesterday, but today, he was feeling a better level of control.     Homicidal ideation: denies current or recent homicidal ideation or behaviors.     Psychotic symptoms: Intermittent auditory and visual hallucinations, often triggered by stress    Medication side effects reported: None    Acute medical concerns: None    Other issues reported by patient: Patient had no further questions or concerns.            Medications:       atropine  2 drop Sublingual At Bedtime     cloZAPine  200 mg Oral At Bedtime     fluticasone  2 spray Both Nostrils Daily     gabapentin  300 mg Oral TID     loratadine  10 mg Oral Daily     metFORMIN  1,000 mg Oral BID w/meals     metoprolol succinate ER  12.5 mg Oral Daily     nicotine  1 patch Transdermal Daily     nicotine   Transdermal Q8H     pantoprazole  40 mg Oral QAM AC     senna-docusate  1 tablet Oral BID  "    sertraline  200 mg Oral Daily          Allergies:   No Known Allergies       Labs:     No results found for this or any previous visit (from the past 24 hour(s)).       Psychiatric Examination:     /71 (BP Location: Left arm)   Pulse 99   Temp 97.4  F (36.3  C) (Oral)   Resp 20   Ht 1.6 m (5' 3\")   Wt 79 kg (174 lb 1.6 oz)   SpO2 94%   BMI 30.84 kg/m    Weight is 174 lbs 1.6 oz  Body mass index is 30.84 kg/m .    Weight over time:  Vitals:    01/27/23 1718 03/07/23 0933   Weight: 75.1 kg (165 lb 8 oz) 79 kg (174 lb 1.6 oz)       Orthostatic Vitals       None          Cardiometabolic risk assessment. 01/30/23    Reviewed patient profile for cardiometabolic risk factors    Date taken /Value  REFERENCE RANGE   Abdominal Obesity  (Waist Circumference)   See nursing flowsheet Women ?35 in (88 cm)   Men ?40 in (102 cm)      Triglycerides  Triglycerides   Date Value Ref Range Status   01/28/2023 192 (H) <150 mg/dL Final       ?150 mg/dL (1.7 mmol/L) or current treatment for elevated triglycerides   HDL cholesterol  Direct Measure HDL   Date Value Ref Range Status   01/28/2023 41 >=40 mg/dL Final   ]   Women <50 mg/dL (1.3 mmol/L) in women or current treatment for low HDL cholesterol  Men <40 mg/dL (1 mmol/L) in men or current treatment for low HDL cholesterol     Fasting plasma glucose (FPG) Lab Results   Component Value Date     01/28/2023      FPG ?100 mg/dL (5.6 mmol/L) or treatment for elevated blood glucose   Blood pressure  BP Readings from Last 3 Encounters:   03/28/23 111/71   01/27/23 118/74   07/20/22 112/79    Blood pressure ?130/85 mmHg or treatment for elevated blood pressure   Family History  See family history     Appearance: awake, alert, dressed in hospital scrubs and unkempt  Attitude:  cooperative, calm  Eye Contact: fair  Mood: \"fatigued\"  Affect:  mood congruent and intensity is blunted  Speech:  clear, coherent. Appropriate while mildly confused  Language: fluent and intact in " English  Psychomotor, Gait, Musculoskeletal:  no evidence of tardive dyskinesia, dystonia, or tics  Thought Process:  Linear, ruminative while sometimes disrupted  Associations:  No evidence of loose associations  Thought Content:  SI w/plan and intent upon discharge, no plan or intent while in hospital. Auditory and visual hallucinations absent this morning  Insight:  fair  Judgement:  fair  Oriented to:  time, person, and place  Attention Span and Concentration:  fair  Recent and Remote Memory:  fair  Fund of Knowledge:  appropriate    Clinical Global Impressions  First:  Considering your total clinical experience with this particular patient population, how severe are the patient's symptoms at this time?: 7 (01/28/23 8661)    Most recent:  Compared to the patient's condition at the START of treatment, this patient's condition is: 4           Precautions:     Behavioral Orders   Procedures     Code 1 - Restrict to Unit     Code 2     For imaging     Electroconvulsive therapy     Series of up to 12 treatments. Begin Date: 2/27/23     Treating Psychiatrist providing ECT:  Dr. Kearns     Notified on:  2/23/23     Electroconvulsive therapy     For acute ECT series, MWF, up to 12 treatment.     Electroconvulsive therapy     Series of up to 12 treatments. Begin Date: 02/26/23     Treating Psychiatrist providing ECT: Clifton  Notified on: 02/24/23     Fall precautions     [place]     MMPI     Routine Programming     As clinically indicated     Status 15     Every 15 minutes.     Suicide precautions     Patients on Suicide Precautions should have a Combination Diet ordered that includes a Diet selection(s) AND a Behavioral Tray selection for Safe Tray - with utensils, or Safe Tray - NO utensils            Diagnoses:     Active suicidal ideation with intent outside of hospital setting  MDD, recurrent, severe with psychotic features vs Schizoaffective Disorder, depressive type  Polysubstance use  Unspecified mood  "disorder  ADHD, inattentive type per chart  History of idiopathic hypersomnolence per chart  Nicotine use disorder, dependence and withdrawal   Allergies- chronic urticaria and rhinitis per chart  HD per chart          Assessment & Plan:     Assessment and hospital summary:  This patient is a 32 year old male with history of mood disorder, ADHD and substance use who presented to [place] ED with SI on 1/26 in context of reported methamphetamine use and being kicked out of sober living. Medically cleared in ED, placed on 72HH and admitted to [facility]. Limited historian, giving inconsistent reports about substance use, UDS negative, very somnolent, endorsing ongoing SI and some psychosis symptoms. PTA medications continued with exception of finasteride as patient states he takes \"1/4 a pill\" and declined ordered dose, will defer to primary team to address. Will continue to evaluate safety and stabilization further as patient more able to engage in assessments. Inpatient psychiatric hospitalization is warranted at this time for safety, stabilization, and possible adjustment in medications.    Patient reports that he abruptly stopped Zoloft one week prior to his admission. He developed discontinuation syndrome symptoms following that. He reports that he does not have a history of psychosis but is experiencing psychotic symptoms. He is amenable with plan to trial risperidone after R/B/A were discussed. Risperidone was initiated on 1/30 and titrated to a total of 3 mg daily on 2/1. Clonidine, used for ADHD, was reduced from a total of 0.3 mg daily to 0.2 mg daily on 2/3 due to concern for hypotension. 2/9: Cardiology consult placed. EKG- tachycardia 121 bpm, QTc 465 ms, Abnormal QRS angle and T wave abnormality. COVID negative and labs are unremarkable.  On 2/10, clozapine was held pending additional workup from IM and cardiology. Pericarditis was ruled out and metoprolol was started. Clozapine was restarted on 2/13 with plan " to cautiously titrate to lowest effective dose. 2/23/23: Clozapine titration schedule increased 25 mg/day. ECT and IM consults for ECT clearance placed. 2/27/23: ECT initiated. Risperidone was discontinued on 2/28. Clozapine level obtained on 3/3 at corresponding dose of 300 mg and was within therapeutic range (1001). Metformin increased to 1000 mg BID on 3/6 to target increased appetite/wt gain. Minimal improvement noted since initiation of both clozapine and ECT. 3/16: Patient reports orthostasis symptoms have resolved and some subjective improvement in depression symptoms with ECT. IDS-SR depression screening given to patient.     Psychiatric treatment/inteventions:  Medications:   -Reduced clozapine to 200 mg at bedtime on 3/23. Per Dr. Lazo, his current clozapine dose and blood levels, being so high, might be getting in the way of a strong inhibitory response to ECT (one mechanism of action for ECT is the seizure recruits inhibitory neurons (EDEL) and that helps with the therapeutic benefits).  Check level on 3/26.   -Continue PTA sertraline 200 mg daily for mood  -Continue PTA gabapentin 300 mg TID for anxiety   -Continue Cogentin 1 mg at bedtime for possible EPSE     -PRN hydroxyzine 25 mg every 4 hour for anxiety  -PRN trazodone 50 mg at bedtime for sleep   -PRN olanzapine 10mg PO or IM TID for agitation/psychosis   -PRN atropine 1% SL drops, 2 drops q2h for sialorrhea    Spiritual services consult placed on 2/6. Pt is Quaker. Appreciate assistance.  Psychological consult completed and reviewed by writer. Please see Dr. Nation's note dated 3/22 for details.     ECT:  - Medically cleared on 2/24. See IM note for details.  - ECT consult placed on 2/23.  - ECT started on 2/27/23  - HOLD Gabapentin on nights prior to ECT and on ECT mornings until after ECT.   - ECT #13 is completed today with plan to complete a total of 15 treatments in the acute series.   -ECT#14 Today    Laboratory/Imaging: reviewed: Covid  negative; UDS negative; CMP, CBC, TSH, Lipid panel, HgbA1c ordered to complete admission labs. Reviewed.     2/9/23: Troponin, CK, CBC, BMP: Unremarkable, CRP elevated to 38.18, COVID: Negative  2/10/23: Add Influenza A/B given flu-like sx-negative  Patient will be treated in therapeutic milieu with appropriate individual and group therapies as described.     Medical treatment/interventions:  Medical concerns:   Nicotine replacement ordered, educate patient on benefits of cessation, pt unclear about finasteride dose, will hold until further information is obtained. PTA PRN zyrtec, azelastine, fluticasone, triamcinolone and Epipen ordered for allergies and PRN ammonia lactate, Eucerin for  dry skin.    Flatulence:   - simethicone prn    Neuroleptic induced wt gain:  - Monitor weights  - Continue metformin 1,000 mg BID with meals    Dyslipidemia: Will arrange follow up with PCP to address. Discussed importance of healthy eating habits and regular exercise. Will consider nutrition consult if patient amenable.     Orthostasis likely 2/2 clozapine: Pt is not hypotensive but reports orthostatic symptoms and previously restricted fluids. Now resolved.   - Continue to monitor vital signs closely  - Encourage fluids  - Discontinued clonidine and reduced clozapine dose    Sialorrhea 2/2 clozapine:  - Atropine 1% SL drops qhs  - Recommend towel on pillow when sleeping    Chest pain/tachycardia/EKG changes (2/9):  - Pain improved with PRN medications.   - Cardiology consult placed on 2/9. See note on that date for details.   - ECHO reviewed and unremarkable.  - Writer discussed care with both Dr. Streeter from [facility] and Christina from . Plan for now is to HOLD clozapine until further medical workup. Dr. Streeter explained that myocarditis has been ruled out as troponin was negative. Pericarditis remains on differential, but he does not have EKG findings or a pericardial effusion. IM will assess for pericardial friction rub and pleuritic  chest pain. IM seen by patient and Metoprolol was started.     Update 2/13: Discussed care with Annette from IM today on two occasions. Please see her note on this date for details. She does not suspect that this is pericarditis. He does not have pleuritic pain, characteristic CP, EKG changes, or pericardial effusion on ECHO. Therefore, will cautiously restart clozapine while monitoring closely for side effects. May consider further increase in metoprolol if necessary. PPI was started due to suspected GERD.     Update 2/24 per IM note:  Medicine is following peripherally for concerns for pericarditis.  See detailed note from 2/13.  No pericardial friction rub noted while sitting up and leaning forward today.  Patient states that his chest discomfort is getting better after starting the Protonix yesterday.  It does appear that he has also been using the Maalox.  Patient does have Tums available as well.  Please be mindful for any constipation with overuse of the PRNs.     POC:  - Continue Protonix daily for 8 weeks, then taper off  - Recommend lifestyle changes including weight loss head of bed elevation avoidance of late-night eating and specific food elimination such as chocolate caffeine alcohol acidic and/or spicy food.  - Watch for constipation with PRNs for heartburn  - We will schedule senna 1 tab twice daily  - MiraLAX daily as needed added on     Medicine will sign off, please contact us for any acute changes.      Sore throat/cough/nasal congestion, resolved:   - COVID negative on 2/9. Repeat COVID test and Influenza A/B neg on 2/10.  - Cepacol lozenges added  - PRN Tylenol   - Consulted with IM. Appreciate assistance. See their notes for details.      Legal Status: VOLUNTARY. 72 hour hold discontinued. Pt agreed to sign in on voluntary basis and discharge directly to treatment once stable.      Safety Assessment:        Behavioral Orders   Procedures     Code 1 - Restrict to Unit     Routine Programming        As clinically indicated     Status 15       Every 15 minutes.     Suicide precautions       Patients on Suicide Precautions should have a Combination Diet ordered that includes a Diet selection(s) AND a Behavioral Tray selection for Safe Tray - with utensils, or Safe Tray - NO utensils        Withdrawal precautions      Pt has not required locked seclusion or restraints in the past 24 hours to maintain safety, please refer to RN documentation for further details.    Discontinued SIO on 2/8 as patient is heriberto for safety. Monitor SI closely.     The risks, benefits, alternatives and side effects have been discussed and are understood by the patient.     Disposition: Pending clinical stabilization. Pt remains actively suicidal. Will likely discharge back to [facility] program once stable.     I saw the patient discussed with my attending Dr Kim and he agrees with my assessments and plans.     Carla Whitehead MD, PGY2,  Psychiatry Resident    This patient has been seen and evaluated by me, Simone Kim MD.  I have discussed this patient with the house staff team including the resident and I agree with the findings and plan in this note. I have reviewed today's vital signs, and medications.    Simone Kim MD.

## 2023-03-29 NOTE — PROCEDURES
"Nikolay Lora is a 32 year old  year old male patient.  [id number]  @DX@    [facility]   ECT Procedure Note   03/29/2023    Patient Status: Inpatient    Is this the first in a series of 12 treatments?  no   No Known Allergies    Weight:  174 lbs 1.6 oz         Indications for ECT:   Medications ineffective  Imminent risk of suicide         Clinical Narrative:   Nikolay Lora is a 32 year old man with affective dysregulation, ADHD and polysubstance use (alcohol*, amphetamine*, cocaine, cannabis) who is hospitalized with progressive depression leading to suicidal ideation with planning on 1/26, in the context of  medication non compliance and losing his place at sober living due to reported methamphetamine use. Throughout his hospital stay medication adjustments have been made (restarted on sertraline, titrated risperidone, added clozapine, which is being titrated); however, intense suicidal ideation has continued and he has been having difficulty tolerating medication changes. This consultation is requested to evaluate candidacy for electroconvulsive therapy (ECT).      We utilized phone [place] services [place] [facility] [place] to ensure thoroughness, otherwise he can communicate in English well.  The patient shares he cannot stop contemplating suicide with a plan to shoot himself as soon as he is out of the hospital. He reports visual hallucinations of \"lanterns and black birds flying around\" with commanding auditory hallucinations telling him that he is \"worthless\" and \"should kill (himself)\". Although hallucinations got better since admission with medication changes, depression and suicidal contemplation has persisted, he is interested in ECT, hoping for a quicker relief.         Diagnosis:     Schizoaffective Disorder, depressed  Polysubstance Use Disorder in a controlled environment          Assessment:   Considering the clinical acuity  electroconvulsive therapy " "(ECT) appears appropriate; despite multifactorial nature of the presentation that would benefit from optimization of cognitive, behavioral and social interventions longitudinally.      Discussed all relevant aspects of ECT with patient, including risks of memory loss, HA, nausea, death <1/50,000, driving prohibition; possible lack of benefit or relapse after successful treatment, alternatives, right to decline, possible outpatient procedures. All questions answered, patient will have opportunity to review ECT video.         Pause for the Cause:     Right patient Yes   Right procedure/laterality settings: Yes          Intra-Procedure Documentation:     #1 02/27/23 pt says he was feeling \"anxious but content\"  #2 03/01/23 no auditory hallucinations, concern for visual hallucinations of bugs in his room. Still actively suicidal with a plan with gun or kitchen knife.   #3 03/03/23 Visual hallucinations disappeared for a day after last ECT. Thinking about suicide slightly less. Worried about some pain in his right arm. Mood 6.5/10 (10 best)  #4 03/06/23 Visual hallucinations returned over the weekend. Auditory hallucinations the same. Still thinking about suicide. Feels safe in hospital but not outside.   #5 03/08/23 Continues to have violent auditory hallucinations and wishes to die.   #6 3/10/23  Improving. Feels safe on unit. Still having SI with plan to shoot self in head but no plan for suicide on unit. Hearing whispers, but voices are quieter. No visual hallucinations of lanterns, etc. Tolerating ECT well. Sleeping well.   #7 3/13/23 Continues to report A/V/H. They are 'not scary' but 'distressing'.   #8 3/15/23 Continues to report A/V/H. Asserts that frequency is reduced but not intensity. Denies side effects but admits that 'days are starting to blend in and I don't remember the dates'.   #9 3/17/23 Patient believes that ECT is helping. When prompted to explain why, he had difficulty articulating except to admit " "that his A/V/H are reduced. (incidentally, this is the first time when asked about A/V/H he did not backtrack and states that 'this morning' or 'yesterday' the A/V/H 'were back'). Reportedly 50% improved. Patient denies history of abusing solvents (sniffing glue or paint).   #10 3/20/23 Clozapine level 1340 ng/ml (rather high but patient has no signs of toxicity, so wonder if due to a non-trough blood draw). MMPI pending. Denies visual hallucinations for the first time (not sure if it will be consistent).   #11 3/22/23 Continues to present the same. Minimal change from baseline. States that the treatments are helping (but unclear how). Reports some dizziness when standing up (BP WNL)   #12  3/24/23  Continues to report A/V/H \"maybe less intense\". Rates the intensity of his SI 10/10 \"at this point I don;t know how to help myself\". We are currently attempting to reduce the clozapine levels so possibly could get a better post-ictal suppression. Will extend his treatments to 15 sessions.  #13 3/27/23 Continues to report A/H but 'not as intense'. Denies V/H. Admits for SI. Mood 'good'. When prompted to reconcile with what he reported earlier, he states \"that's because I can feel\". Denies side effects from ECT.  #14 3/29/23 Difficulty assessing if tangible improvement. Remains flat with a monotone voice    ECT #: 14   Treatment number this series: 14   Total treatment number: 14     Type of ECT:  Bilateral, standard    ECT Medications:    Brevital: 90mg  Succinyl Choline: 80mg    ECT Strip Summary: (titration 96 mC)   Energy Level: 256 mC, 1 ms,  20 Hz, 8 sec, 800 mA (increased from 192 mC on 3/27/23 and from 144 mC on 3/6/23)    Motor Seizure Duration: 21 seconds  EEG Seizure Duration: 28 seconds (better transition to post-ictal suppression than previously observed)    Complications:  none    Time for re-orientation: 28 min on 3/6/23    Plan:   - Continue bilateral ECT Q Mon/Wed/Fri at  192 mC. Patient has not shown any " substantial improvement. Appears to tolerate ECT and his course could be extended to 15 sessions if primary team agrees.   - Continue current medications      Chica Lazo MD

## 2023-03-29 NOTE — ANESTHESIA CARE TRANSFER NOTE
Patient: Link Lora    Procedure: * No procedures listed *       Diagnosis: * No pre-op diagnosis entered *  Diagnosis Additional Information: No value filed.    Anesthesia Type:   No value filed.     Note:    Oropharynx: oropharynx clear of all foreign objects  Level of Consciousness: drowsy  Oxygen Supplementation: room air    Independent Airway: airway patency satisfactory and stable  Dentition: dentition unchanged  Vital Signs Stable: post-procedure vital signs reviewed and stable  Report to RN Given: handoff report given  Patient transferred to: PACU    Handoff Report: Identifed the Patient, Identified the Reponsible Provider, Reviewed the pertinent medical history, Discussed the surgical course, Reviewed Intra-OP anesthesia mangement and issues during anesthesia, Set expectations for post-procedure period and Allowed opportunity for questions and acknowledgement of understanding      Vitals:  Vitals Value Taken Time   BP     Temp     Pulse     Resp     SpO2         Electronically Signed By: Ana Cesar MD  March 29, 2023  11:15 AM

## 2023-03-29 NOTE — PLAN OF CARE
Goal Outcome Evaluation:    Plan of Care Reviewed With: patient      Problem: Adult Behavioral Health Plan of Care  Goal: Plan of Care Review  Outcome: Progressing  Flowsheets (Taken 3/29/2023 1000)  Patient Agreement with Plan of Care: agrees  Pt was NPO for ECT treatment today. Treatment went okay, vital signs were stable. He was only oriented to person post ECT, but when he got up to the floor, he was oriented x 4. He was polite with a flat affect. Pt endorses hearing voices telling him to kill himself, but he contracted for safety. Depression is still there but much better. He was medication compliant and denied any side effects. Pt had adequate intake, and attended group. Will continue to monitor

## 2023-03-29 NOTE — ANESTHESIA POSTPROCEDURE EVALUATION
Patient: Nikolay Lora    Procedure: * No procedures listed *       Anesthesia Type:  No value filed.    Note:  Disposition: Inpatient   Postop Pain Control: Uneventful            Sign Out: Well controlled pain   PONV: No   Neuro/Psych: Uneventful            Sign Out: Acceptable/Baseline neuro status   Airway/Respiratory: Uneventful            Sign Out: Acceptable/Baseline resp. status   CV/Hemodynamics: Uneventful            Sign Out: Acceptable CV status; No obvious hypovolemia; No obvious fluid overload   Other NRE: NONE   DID A NON-ROUTINE EVENT OCCUR? No           Last vitals:  Vitals:    03/29/23 0836 03/29/23 1114 03/29/23 1120   BP: 104/73 127/88    Pulse: 86 99    Resp: 16 18    Temp: 36.1  C (96.9  F) (!) 35.9  C (96.7  F)    SpO2: 96% 92% 93%       Electronically Signed By: Ana Cesar MD  March 29, 2023  11:29 AM

## 2023-03-30 ENCOUNTER — ANESTHESIA EVENT (OUTPATIENT)
Dept: BEHAVIORAL HEALTH | Facility: CLINIC | Age: 33
End: 2023-03-30
Payer: COMMERCIAL

## 2023-03-30 LAB
BASOPHILS # BLD AUTO: 0.1 10E3/UL (ref 0–0.2)
BASOPHILS NFR BLD AUTO: 1 %
EOSINOPHIL # BLD AUTO: 0.2 10E3/UL (ref 0–0.7)
EOSINOPHIL NFR BLD AUTO: 2 %
HOLD SPECIMEN: NORMAL
IMM GRANULOCYTES # BLD: 0.1 10E3/UL
IMM GRANULOCYTES NFR BLD: 1 %
LYMPHOCYTES # BLD AUTO: 2.5 10E3/UL (ref 0.8–5.3)
LYMPHOCYTES NFR BLD AUTO: 23 %
MONOCYTES # BLD AUTO: 0.9 10E3/UL (ref 0–1.3)
MONOCYTES NFR BLD AUTO: 8 %
NEUTROPHILS # BLD AUTO: 7.2 10E3/UL (ref 1.6–8.3)
NEUTROPHILS NFR BLD AUTO: 65 %
NRBC # BLD AUTO: 0 10E3/UL
NRBC BLD AUTO-RTO: 0 /100
WBC # BLD AUTO: 10.9 10E3/UL (ref 4–11)

## 2023-03-30 PROCEDURE — 250N000013 HC RX MED GY IP 250 OP 250 PS 637: Performed by: PSYCHIATRY & NEUROLOGY

## 2023-03-30 PROCEDURE — 250N000013 HC RX MED GY IP 250 OP 250 PS 637

## 2023-03-30 PROCEDURE — 99233 SBSQ HOSP IP/OBS HIGH 50: CPT | Mod: GC | Performed by: PSYCHIATRY & NEUROLOGY

## 2023-03-30 PROCEDURE — 124N000002 HC R&B MH UMMC

## 2023-03-30 PROCEDURE — G0177 OPPS/PHP; TRAIN & EDUC SERV: HCPCS

## 2023-03-30 PROCEDURE — 36415 COLL VENOUS BLD VENIPUNCTURE: CPT | Performed by: PSYCHIATRY & NEUROLOGY

## 2023-03-30 PROCEDURE — 85004 AUTOMATED DIFF WBC COUNT: CPT | Performed by: PSYCHIATRY & NEUROLOGY

## 2023-03-30 RX ADMIN — FLUTICASONE PROPIONATE 2 SPRAY: 50 SPRAY, METERED NASAL at 08:37

## 2023-03-30 RX ADMIN — LORATADINE 10 MG: 10 TABLET ORAL at 08:34

## 2023-03-30 RX ADMIN — NICOTINE 1 PATCH: 21 PATCH, EXTENDED RELEASE TRANSDERMAL at 08:34

## 2023-03-30 RX ADMIN — Medication 12.5 MG: at 08:33

## 2023-03-30 RX ADMIN — GABAPENTIN 300 MG: 300 CAPSULE ORAL at 12:18

## 2023-03-30 RX ADMIN — CLOZAPINE 200 MG: 200 TABLET ORAL at 20:38

## 2023-03-30 RX ADMIN — METFORMIN HYDROCHLORIDE 1000 MG: 500 TABLET ORAL at 08:34

## 2023-03-30 RX ADMIN — SENNOSIDES AND DOCUSATE SODIUM 1 TABLET: 50; 8.6 TABLET ORAL at 08:34

## 2023-03-30 RX ADMIN — ATROPINE SULFATE 2 DROP: 10 SOLUTION/ DROPS OPHTHALMIC at 20:40

## 2023-03-30 RX ADMIN — SENNOSIDES AND DOCUSATE SODIUM 1 TABLET: 50; 8.6 TABLET ORAL at 20:38

## 2023-03-30 RX ADMIN — METFORMIN HYDROCHLORIDE 1000 MG: 500 TABLET ORAL at 17:45

## 2023-03-30 RX ADMIN — PANTOPRAZOLE SODIUM 40 MG: 40 TABLET, DELAYED RELEASE ORAL at 08:34

## 2023-03-30 RX ADMIN — GABAPENTIN 300 MG: 300 CAPSULE ORAL at 16:24

## 2023-03-30 RX ADMIN — SERTRALINE HYDROCHLORIDE 200 MG: 100 TABLET ORAL at 08:34

## 2023-03-30 ASSESSMENT — ACTIVITIES OF DAILY LIVING (ADL)
ADLS_ACUITY_SCORE: 29
HYGIENE/GROOMING: INDEPENDENT
ADLS_ACUITY_SCORE: 29
ADLS_ACUITY_SCORE: 29
LAUNDRY: UNABLE TO COMPLETE
ADLS_ACUITY_SCORE: 29
DRESS: SCRUBS (BEHAVIORAL HEALTH)
ADLS_ACUITY_SCORE: 29
HYGIENE/GROOMING: INDEPENDENT
ORAL_HYGIENE: INDEPENDENT
ORAL_HYGIENE: INDEPENDENT
ADLS_ACUITY_SCORE: 29
ADLS_ACUITY_SCORE: 29
DRESS: INDEPENDENT

## 2023-03-30 NOTE — PLAN OF CARE
Problem: Psychotic Signs/Symptoms  Goal: Improved Behavioral Control (Psychotic Signs/Symptoms)  Outcome: Progressing   Goal Outcome Evaluation:    Haseeb had ECT done this morning. Upon nursing assessments this evening, he denies any pain, headaches, or dizziness. His vital signs are slightly elevated, P=104 and B/P=135/92. Blood pressure rechecks after about an hour was P=109 and B/P 114/77. Pt attends and participates in group therapy. Pt continues endorsing anxiety rated 7/10 and depression rated at 10/10. Pt also endorsed AH command in nature, telling him to shoot himself. Pt stated that he felt safe on the unit and contracted for safety. He ate 100% of his dinner. His hygiene remains poor, and he refuses to shower.

## 2023-03-30 NOTE — PLAN OF CARE
"Nursing Assessment    Recent Vitals: B/P: 98/72, T: 97.2, P: 91    Sleep:  Hours of sleep at night: 7    General Shift Summary  Patient has been calm and cooperative during the shift. He isolates to his room and remains withdrawn. Patient continues to voice having AH that tell him to shoot himself. Patient voices feeling suicidal and states he would do what the voices are telling him to do. He denied any visual hallucinations. Patient voiced having depression 10/10 and anxiety 6/10. He will have his 15th and final ECT tomorrow. Patient requested meth prior to discharging. Writer asked if he meant Adderall. Patient said \"No meth, I've got it off the street.\". Writer informed him that this is a recreational drug that is not prescribed by our providers. Patient stated that he has been provided it before and seemed confused as to why his current provider couldn't write a script for it. Incite and judgment are poor. Hygiene is poor, showering was encouraged but patient refused. He is eating well.     Patient was medication cooperative with no voiced side effects. Denies pain.    Nena Nevarez, RN MSN  "

## 2023-03-30 NOTE — PLAN OF CARE
Assessment/Intervention/Current Symtoms and Care Coordination  -Chart review  -Rounded with team, addressed patient needs/concerns  Current Symptoms include the following: Patient appears to be making small improvements.   -CD assessment order placed.     Discharge Plan or Goal  Pending stabilization & development of a safe discharge plan.  Considerations include:  Return to [facility]   -Admission scheduled for 4/5/23.   -Possible ECT maintenance outpatient.     Barriers to Discharge  Patient requires further psychiatric stabilization due to current symptomology     Referral Status  No referrals were made this hospitalization     Legal Status  Voluntary

## 2023-03-30 NOTE — PROGRESS NOTES
"[facility], [place]   Psychiatric Progress Note  Hospital Day: 62        Interim History:   The patient's care was discussed with the treatment team during the daily team meeting and/or staff's chart notes were reviewed. Staff report patient appeared to sleep 7 hours and had an uneventful night. Haseeb completed ECT session #14 yesterday and endorsed 7 out of 10 anxiety and 10 out of 10 depression after treatment.  He endorsed command auditory hallucinations telling him to shoot himself though reported feeling safe in the unit and contracted for safety.  He attended groups and participated appropriately.  Hygiene remains poor and he did not shower.    Upon interview:   Haseeb reports feeling \"clearing the head\" and \"exhausted\".  He reports his medications are helpful and denies side effects.  He describes his sleep as \"fine\" but endorses nightly recurrent nightmares of the same dream every night.  He expresses many concerns about discharge worried that he may relapse into methamphetamine use if returning home.  He expresses interest in ADHD treatment but states his sober living would not allow him to take methylphenidate or other ADHD medications.  Of note Haseeb frequently mentions use of Desoxyn, or prescription methamphetamine.  He was informed this medication would not be prescribed while hospitalized and other ADHD medications may be more appropriate for him.  He expressed concern that he would not be allowed ADHD medications while living in a sober house and cannot work without them.  He is amenable to continuing ECT treatment and the possibility of ongoing maintenance therapy.  Regarding suicidal ideation he continues to endorse suicidal ideation with plan to shoot himself if discharged from the hospital and does not want to discharge until depression improves. Amenable to showering today.    Homicidal ideation: denies current or recent homicidal ideation or behaviors.     Psychotic " "symptoms: CAH to shoot self that have reduced in frequency, denies VH    Medication side effects reported: None    Acute medical concerns: None    Other issues reported by patient: Patient had no further questions or concerns.            Medications:       atropine  2 drop Sublingual At Bedtime     cloZAPine  200 mg Oral At Bedtime     fluticasone  2 spray Both Nostrils Daily     gabapentin  300 mg Oral TID     loratadine  10 mg Oral Daily     metFORMIN  1,000 mg Oral BID w/meals     metoprolol succinate ER  12.5 mg Oral Daily     nicotine  1 patch Transdermal Daily     nicotine   Transdermal Q8H     pantoprazole  40 mg Oral QAM AC     senna-docusate  1 tablet Oral BID     sertraline  200 mg Oral Daily          Allergies:   No Known Allergies       Labs:     Recent Results (from the past 24 hour(s))   WBC and Differential    Collection Time: 03/30/23  6:47 AM   Result Value Ref Range    WBC Count 10.9 4.0 - 11.0 10e3/uL    % Neutrophils 65 %    % Lymphocytes 23 %    % Monocytes 8 %    % Eosinophils 2 %    % Basophils 1 %    % Immature Granulocytes 1 %    NRBCs per 100 WBC 0 <1 /100    Absolute Neutrophils 7.2 1.6 - 8.3 10e3/uL    Absolute Lymphocytes 2.5 0.8 - 5.3 10e3/uL    Absolute Monocytes 0.9 0.0 - 1.3 10e3/uL    Absolute Eosinophils 0.2 0.0 - 0.7 10e3/uL    Absolute Basophils 0.1 0.0 - 0.2 10e3/uL    Absolute Immature Granulocytes 0.1 <=0.4 10e3/uL    Absolute NRBCs 0.0 10e3/uL   Extra Green Top (Lithium Heparin) Tube    Collection Time: 03/30/23  6:47 AM   Result Value Ref Range    Hold Specimen [facility]           Psychiatric Examination:     /77   Pulse 109   Temp 97.3  F (36.3  C) (Temporal)   Resp 18   Ht 1.6 m (5' 3\")   Wt 79 kg (174 lb 1.6 oz)   SpO2 96%   BMI 30.84 kg/m    Weight is 174 lbs 1.6 oz  Body mass index is 30.84 kg/m .    Weight over time:  Vitals:    01/27/23 1718 03/07/23 0933   Weight: 75.1 kg (165 lb 8 oz) 79 kg (174 lb 1.6 oz)       Orthostatic Vitals       None      " "    Cardiometabolic risk assessment. 01/30/23    Reviewed patient profile for cardiometabolic risk factors    Date taken /Value  REFERENCE RANGE   Abdominal Obesity  (Waist Circumference)   See nursing flowsheet Women ?35 in (88 cm)   Men ?40 in (102 cm)      Triglycerides  Triglycerides   Date Value Ref Range Status   01/28/2023 192 (H) <150 mg/dL Final       ?150 mg/dL (1.7 mmol/L) or current treatment for elevated triglycerides   HDL cholesterol  Direct Measure HDL   Date Value Ref Range Status   01/28/2023 41 >=40 mg/dL Final   ]   Women <50 mg/dL (1.3 mmol/L) in women or current treatment for low HDL cholesterol  Men <40 mg/dL (1 mmol/L) in men or current treatment for low HDL cholesterol     Fasting plasma glucose (FPG) Lab Results   Component Value Date     01/28/2023      FPG ?100 mg/dL (5.6 mmol/L) or treatment for elevated blood glucose   Blood pressure  BP Readings from Last 3 Encounters:   03/29/23 114/77   01/27/23 118/74   07/20/22 112/79    Blood pressure ?130/85 mmHg or treatment for elevated blood pressure   Family History  See family history     Appearance: awake, alert, dressed in hospital scrubs and unkempt  Attitude:  cooperative, calm  Eye Contact: fair  Mood: \"clear in the head\"  Affect:  mood congruent and intensity is blunted  Speech:  clear, coherent. Appropriate while mildly confused  Language: fluent and intact in English  Psychomotor, Gait, Musculoskeletal:  no evidence of tardive dyskinesia, dystonia, or tics  Thought Process:  Linear, ruminative, mild disorganization  Associations:  No evidence of loose associations  Thought Content:  SI w/plan and intent upon discharge, no plan or intent while in hospital. CAH present. Denies VH.   Insight:  fair  Judgement:  fair  Oriented to:  time, person, and place  Attention Span and Concentration:  fair  Recent and Remote Memory:  fair  Fund of Knowledge:  appropriate    Clinical Global Impressions  First:  Considering your total clinical " experience with this particular patient population, how severe are the patient's symptoms at this time?: 7 (01/28/23 0654)    Most recent:  Compared to the patient's condition at the START of treatment, this patient's condition is: 4           Precautions:     Behavioral Orders   Procedures     Code 1 - Restrict to Unit     Code 2     For imaging     Electroconvulsive therapy     Series of up to 12 treatments. Begin Date: 2/27/23     Treating Psychiatrist providing ECT:  Dr. Kearns     Notified on:  2/23/23     Electroconvulsive therapy     For acute ECT series, MWF, up to 12 treatment.     Electroconvulsive therapy     Series of up to 12 treatments. Begin Date: 02/26/23     Treating Psychiatrist providing ECT: Clifton  Notified on: 02/24/23     Fall precautions     [place]     MMPI     Routine Programming     As clinically indicated     Status 15     Every 15 minutes.     Suicide precautions     Patients on Suicide Precautions should have a Combination Diet ordered that includes a Diet selection(s) AND a Behavioral Tray selection for Safe Tray - with utensils, or Safe Tray - NO utensils            Diagnoses:     Active suicidal ideation with intent outside of hospital setting  MDD, recurrent, severe with psychotic features vs Schizoaffective Disorder, depressive type  Polysubstance use  Unspecified mood disorder  ADHD, inattentive type per chart  History of idiopathic hypersomnolence per chart  Nicotine use disorder, dependence and withdrawal   Allergies- chronic urticaria and rhinitis per chart  HD per chart          Assessment & Plan:     Assessment and hospital summary:  This patient is a 32 year old male with history of mood disorder, ADHD and substance use who presented to [facility] ED with SI on 1/26 in context of reported methamphetamine use and being kicked out of sober living. Medically cleared in ED, placed on 72HH and admitted to [facility]. Limited historian, giving inconsistent reports about substance use, UDS  "negative, very somnolent, endorsing ongoing SI and some psychosis symptoms. PTA medications continued with exception of finasteride as patient states he takes \"1/4 a pill\" and declined ordered dose, will defer to primary team to address. Will continue to evaluate safety and stabilization further as patient more able to engage in assessments. Inpatient psychiatric hospitalization is warranted at this time for safety, stabilization, and possible adjustment in medications.    Patient reports that he abruptly stopped Zoloft one week prior to his admission. He developed discontinuation syndrome symptoms following that. He reports that he does not have a history of psychosis but is experiencing psychotic symptoms. He is amenable with plan to trial risperidone after R/B/A were discussed. Risperidone was initiated on 1/30 and titrated to a total of 3 mg daily on 2/1. Clonidine, used for ADHD, was reduced from a total of 0.3 mg daily to 0.2 mg daily on 2/3 due to concern for hypotension. 2/9: Cardiology consult placed. EKG- tachycardia 121 bpm, QTc 465 ms, Abnormal QRS angle and T wave abnormality. COVID negative and labs are unremarkable.  On 2/10, clozapine was held pending additional workup from IM and cardiology. Pericarditis was ruled out and metoprolol was started. Clozapine was restarted on 2/13 with plan to cautiously titrate to lowest effective dose. 2/23/23: Clozapine titration schedule increased 25 mg/day. ECT and IM consults for ECT clearance placed. 2/27/23: ECT initiated. Risperidone was discontinued on 2/28. Clozapine level obtained on 3/3 at corresponding dose of 300 mg and was within therapeutic range (1001). Metformin increased to 1000 mg BID on 3/6 to target increased appetite/wt gain. Minimal improvement noted since initiation of both clozapine and ECT. 3/16: Patient reports orthostasis symptoms have resolved and some subjective improvement in depression symptoms with ECT. IDS-SR depression screening " given to patient. 3/26: reduced clozapine to 200 mg qhs per Dr. Lazo recommendation as may interfere with ECT response. Clozapine blood level was 614. 3/30: Rule 25 consult placed    Psychiatric treatment/inteventions:  Medications:   -Reduced clozapine to 200 mg at bedtime on 3/23. Per Dr. Lazo, his current clozapine dose and blood levels, being so high, might be getting in the way of a strong inhibitory response to ECT (one mechanism of action for ECT is the seizure recruits inhibitory neurons (EDEL) and that helps with the therapeutic benefits).    -Continue PTA sertraline 200 mg daily for mood  -Continue PTA gabapentin 300 mg TID for anxiety   -Continue Cogentin 1 mg at bedtime for possible EPSE     -PRN hydroxyzine 25 mg every 4 hour for anxiety  -PRN trazodone 50 mg at bedtime for sleep   -PRN olanzapine 10mg PO or IM TID for agitation/psychosis   -PRN atropine 1% SL drops, 2 drops q2h for sialorrhea    Spiritual services consult placed on 2/6. Pt is Worship. Appreciate assistance.  Psychological consult completed and reviewed by writer. Please see Dr. Nation's note dated 3/22 for details.     Chemical dependency consult placed on 3/30/23.    ECT:  - Medically cleared on 2/24. See IM note for details.  - ECT consult placed on 2/23.  - ECT started on 2/27/23  - HOLD Gabapentin on nights prior to ECT and on ECT mornings until after ECT.   - ECT #14 is completed 3/29 with plan to complete a total of 15 treatments in the acute series.     Laboratory/Imaging: reviewed: Covid negative; UDS negative; CMP, CBC, TSH, Lipid panel, HgbA1c ordered to complete admission labs. Reviewed.     2/9/23: Troponin, CK, CBC, BMP: Unremarkable, CRP elevated to 38.18, COVID: Negative  2/10/23: Add Influenza A/B given flu-like sx-negative  Patient will be treated in therapeutic milieu with appropriate individual and group therapies as described.     Medical treatment/interventions:  Medical concerns:   Nicotine replacement  ordered, educate patient on benefits of cessation, pt unclear about finasteride dose, will hold until further information is obtained. PTA PRN zyrtec, azelastine, fluticasone, triamcinolone and Epipen ordered for allergies and PRN ammonia lactate, Eucerin for  dry skin.    Flatulence:   - simethicone prn    Neuroleptic induced wt gain:  - Monitor weights  - Continue metformin 1,000 mg BID with meals    Dyslipidemia: Will arrange follow up with PCP to address. Discussed importance of healthy eating habits and regular exercise. Will consider nutrition consult if patient amenable.     Orthostasis likely 2/2 clozapine: Pt is not hypotensive but reports orthostatic symptoms and previously restricted fluids. Now resolved.   - Continue to monitor vital signs closely  - Encourage fluids  - Discontinued clonidine and reduced clozapine dose    Sialorrhea 2/2 clozapine:  - Atropine 1% SL drops qhs  - Recommend towel on pillow when sleeping    Chest pain/tachycardia/EKG changes (2/9):  - Pain improved with PRN medications.   - Cardiology consult placed on 2/9. See note on that date for details.   - ECHO reviewed and unremarkable.  - Writer discussed care with both Dr. Streeter from [facility] and Christina from . Plan for now is to HOLD clozapine until further medical workup. Dr. Srteeter explained that myocarditis has been ruled out as troponin was negative. Pericarditis remains on differential, but he does not have EKG findings or a pericardial effusion. IM will assess for pericardial friction rub and pleuritic chest pain. IM seen by patient and Metoprolol was started.     Update 2/13: Discussed care with Annette from  today on two occasions. Please see her note on this date for details. She does not suspect that this is pericarditis. He does not have pleuritic pain, characteristic CP, EKG changes, or pericardial effusion on ECHO. Therefore, will cautiously restart clozapine while monitoring closely for side effects. May consider  further increase in metoprolol if necessary. PPI was started due to suspected GERD.     Update 2/24 per IM note:  Medicine is following peripherally for concerns for pericarditis.  See detailed note from 2/13.  No pericardial friction rub noted while sitting up and leaning forward today.  Patient states that his chest discomfort is getting better after starting the Protonix yesterday.  It does appear that he has also been using the Maalox.  Patient does have Tums available as well.  Please be mindful for any constipation with overuse of the PRNs.     POC:  - Continue Protonix daily for 8 weeks, then taper off  - Recommend lifestyle changes including weight loss head of bed elevation avoidance of late-night eating and specific food elimination such as chocolate caffeine alcohol acidic and/or spicy food.  - Watch for constipation with PRNs for heartburn  - We will schedule senna 1 tab twice daily  - MiraLAX daily as needed added on     Medicine will sign off, please contact us for any acute changes.      Sore throat/cough/nasal congestion, resolved:   - COVID negative on 2/9. Repeat COVID test and Influenza A/B neg on 2/10.  - Cepacol lozenges added  - PRN Tylenol   - Consulted with IM. Appreciate assistance. See their notes for details.      Legal Status: VOLUNTARY. 72 hour hold discontinued. Pt agreed to sign in on voluntary basis and discharge directly to treatment once stable.      Safety Assessment:        Behavioral Orders   Procedures     Code 1 - Restrict to Unit     Routine Programming       As clinically indicated     Status 15       Every 15 minutes.     Suicide precautions       Patients on Suicide Precautions should have a Combination Diet ordered that includes a Diet selection(s) AND a Behavioral Tray selection for Safe Tray - with utensils, or Safe Tray - NO utensils        Withdrawal precautions      Pt has not required locked seclusion or restraints in the past 24 hours to maintain safety, please  refer to RN documentation for further details.    Discontinued SIO on 2/8 as patient is heriberto for safety. Monitor SI closely.     The risks, benefits, alternatives and side effects have been discussed and are understood by the patient.     Disposition: Pending clinical stabilization. Pt remains actively suicidal. [facility]       This patient was seen and discussed with my attending physician.  Tha Browning MD  PGY-4 Psychiatry Resident Physician

## 2023-03-30 NOTE — PLAN OF CARE
Problem: Sleep Disturbance  Goal: Adequate Sleep/Rest  Outcome: Progressing                  Patient had an uneventful night. Appeared to have slept a total of 7 hours. No prn given. No acute distress reported.

## 2023-03-31 ENCOUNTER — ANESTHESIA (OUTPATIENT)
Dept: BEHAVIORAL HEALTH | Facility: CLINIC | Age: 33
End: 2023-03-31
Payer: COMMERCIAL

## 2023-03-31 ENCOUNTER — APPOINTMENT (OUTPATIENT)
Dept: BEHAVIORAL HEALTH | Facility: CLINIC | Age: 33
End: 2023-03-31
Attending: PSYCHIATRY & NEUROLOGY
Payer: COMMERCIAL

## 2023-03-31 PROCEDURE — 250N000013 HC RX MED GY IP 250 OP 250 PS 637: Performed by: PSYCHIATRY & NEUROLOGY

## 2023-03-31 PROCEDURE — 90870 ELECTROCONVULSIVE THERAPY: CPT

## 2023-03-31 PROCEDURE — 250N000009 HC RX 250: Performed by: ANESTHESIOLOGY

## 2023-03-31 PROCEDURE — 250N000013 HC RX MED GY IP 250 OP 250 PS 637

## 2023-03-31 PROCEDURE — 370N000017 HC ANESTHESIA TECHNICAL FEE, PER MIN: Performed by: ANESTHESIOLOGY

## 2023-03-31 PROCEDURE — 250N000011 HC RX IP 250 OP 636: Performed by: ANESTHESIOLOGY

## 2023-03-31 PROCEDURE — 124N000002 HC R&B MH UMMC

## 2023-03-31 PROCEDURE — GZB2ZZZ ELECTROCONVULSIVE THERAPY, BILATERAL-SINGLE SEIZURE: ICD-10-PCS | Performed by: PSYCHIATRY & NEUROLOGY

## 2023-03-31 PROCEDURE — G0177 OPPS/PHP; TRAIN & EDUC SERV: HCPCS

## 2023-03-31 PROCEDURE — 99232 SBSQ HOSP IP/OBS MODERATE 35: CPT | Mod: 25 | Performed by: PSYCHIATRY & NEUROLOGY

## 2023-03-31 PROCEDURE — 90870 ELECTROCONVULSIVE THERAPY: CPT | Performed by: PSYCHIATRY & NEUROLOGY

## 2023-03-31 RX ORDER — HYDROMORPHONE HCL IN WATER/PF 6 MG/30 ML
0.2 PATIENT CONTROLLED ANALGESIA SYRINGE INTRAVENOUS EVERY 5 MIN PRN
Status: DISCONTINUED | OUTPATIENT
Start: 2023-03-31 | End: 2023-03-31

## 2023-03-31 RX ORDER — ONDANSETRON 4 MG/1
4 TABLET, ORALLY DISINTEGRATING ORAL EVERY 30 MIN PRN
Status: DISCONTINUED | OUTPATIENT
Start: 2023-03-31 | End: 2023-03-31

## 2023-03-31 RX ORDER — HYDROMORPHONE HCL IN WATER/PF 6 MG/30 ML
0.4 PATIENT CONTROLLED ANALGESIA SYRINGE INTRAVENOUS EVERY 5 MIN PRN
Status: DISCONTINUED | OUTPATIENT
Start: 2023-03-31 | End: 2023-03-31

## 2023-03-31 RX ORDER — LABETALOL HYDROCHLORIDE 5 MG/ML
INJECTION, SOLUTION INTRAVENOUS PRN
Status: DISCONTINUED | OUTPATIENT
Start: 2023-03-31 | End: 2023-03-31

## 2023-03-31 RX ORDER — ONDANSETRON 2 MG/ML
4 INJECTION INTRAMUSCULAR; INTRAVENOUS EVERY 30 MIN PRN
Status: DISCONTINUED | OUTPATIENT
Start: 2023-03-31 | End: 2023-03-31

## 2023-03-31 RX ORDER — FENTANYL CITRATE 50 UG/ML
50 INJECTION, SOLUTION INTRAMUSCULAR; INTRAVENOUS EVERY 5 MIN PRN
Status: DISCONTINUED | OUTPATIENT
Start: 2023-03-31 | End: 2023-03-31

## 2023-03-31 RX ORDER — SODIUM CHLORIDE, SODIUM LACTATE, POTASSIUM CHLORIDE, CALCIUM CHLORIDE 600; 310; 30; 20 MG/100ML; MG/100ML; MG/100ML; MG/100ML
INJECTION, SOLUTION INTRAVENOUS CONTINUOUS
Status: DISCONTINUED | OUTPATIENT
Start: 2023-03-31 | End: 2023-03-31

## 2023-03-31 RX ORDER — FENTANYL CITRATE 50 UG/ML
25 INJECTION, SOLUTION INTRAMUSCULAR; INTRAVENOUS EVERY 5 MIN PRN
Status: DISCONTINUED | OUTPATIENT
Start: 2023-03-31 | End: 2023-03-31

## 2023-03-31 RX ADMIN — LORATADINE 10 MG: 10 TABLET ORAL at 10:07

## 2023-03-31 RX ADMIN — METHOHEXITAL SODIUM 90 MG: 500 INJECTION, POWDER, LYOPHILIZED, FOR SOLUTION INTRAMUSCULAR; INTRAVENOUS; RECTAL at 08:42

## 2023-03-31 RX ADMIN — CLOZAPINE 200 MG: 200 TABLET ORAL at 19:50

## 2023-03-31 RX ADMIN — GABAPENTIN 300 MG: 300 CAPSULE ORAL at 19:50

## 2023-03-31 RX ADMIN — PANTOPRAZOLE SODIUM 40 MG: 40 TABLET, DELAYED RELEASE ORAL at 07:57

## 2023-03-31 RX ADMIN — Medication 12.5 MG: at 07:57

## 2023-03-31 RX ADMIN — METFORMIN HYDROCHLORIDE 1000 MG: 500 TABLET ORAL at 10:07

## 2023-03-31 RX ADMIN — SENNOSIDES AND DOCUSATE SODIUM 1 TABLET: 50; 8.6 TABLET ORAL at 10:06

## 2023-03-31 RX ADMIN — SERTRALINE HYDROCHLORIDE 200 MG: 100 TABLET ORAL at 10:06

## 2023-03-31 RX ADMIN — SUCCINYLCHOLINE CHLORIDE 80 MG: 20 INJECTION, SOLUTION INTRAMUSCULAR; INTRAVENOUS; PARENTERAL at 08:42

## 2023-03-31 RX ADMIN — LABETALOL HYDROCHLORIDE 5 MG: 5 INJECTION, SOLUTION INTRAVENOUS at 08:42

## 2023-03-31 RX ADMIN — SENNOSIDES AND DOCUSATE SODIUM 1 TABLET: 50; 8.6 TABLET ORAL at 19:50

## 2023-03-31 RX ADMIN — METFORMIN HYDROCHLORIDE 1000 MG: 500 TABLET ORAL at 17:45

## 2023-03-31 RX ADMIN — GABAPENTIN 300 MG: 300 CAPSULE ORAL at 14:09

## 2023-03-31 RX ADMIN — GABAPENTIN 300 MG: 300 CAPSULE ORAL at 10:06

## 2023-03-31 RX ADMIN — NICOTINE 1 PATCH: 21 PATCH, EXTENDED RELEASE TRANSDERMAL at 10:13

## 2023-03-31 RX ADMIN — ATROPINE SULFATE 2 DROP: 10 SOLUTION/ DROPS OPHTHALMIC at 19:51

## 2023-03-31 RX ADMIN — FLUTICASONE PROPIONATE 2 SPRAY: 50 SPRAY, METERED NASAL at 07:58

## 2023-03-31 ASSESSMENT — ACTIVITIES OF DAILY LIVING (ADL)
ADLS_ACUITY_SCORE: 29
ORAL_HYGIENE: INDEPENDENT
DRESS: INDEPENDENT
HYGIENE/GROOMING: INDEPENDENT
ADLS_ACUITY_SCORE: 29
HYGIENE/GROOMING: INDEPENDENT
ADLS_ACUITY_SCORE: 29
DRESS: INDEPENDENT
ADLS_ACUITY_SCORE: 29
ORAL_HYGIENE: INDEPENDENT
ADLS_ACUITY_SCORE: 29
ADLS_ACUITY_SCORE: 29

## 2023-03-31 NOTE — PROCEDURES
"Nikolay Loar is a 32 year old  year old male patient.  [id number]    [facility], [place]   ECT Procedure Note   03/31/2023    Patient Status: Inpatient    Is this the first in a series of 12 treatments?  No     No Known Allergies    Weight:  175 lbs 9.6 oz         Indications for ECT:   Medications ineffective  Imminent risk of suicide         Clinical Narrative:   Nikolay Lora is a 32 year old man with affective dysregulation, ADHD and polysubstance use (alcohol*, amphetamine*, cocaine, cannabis) who is hospitalized with progressive depression leading to suicidal ideation with planning on 1/26, in the context of  medication non compliance and losing his place at sober living due to reported methamphetamine use. Throughout his hospital stay medication adjustments have been made (restarted on sertraline, titrated risperidone, added clozapine, which is being titrated); however, intense suicidal ideation has continued and he has been having difficulty tolerating medication changes. This consultation is requested to evaluate candidacy for electroconvulsive therapy (ECT).      We utilized phone translation services in [facility] during this visit to ensure thoroughness, otherwise he can communicate in English well.  The patient shares he cannot stop contemplating suicide with a plan to shoot himself as soon as he is out of the hospital. He reports visual hallucinations of \"lanterns and black birds flying around\" with commanding auditory hallucinations telling him that he is \"worthless\" and \"should kill (himself)\". Although hallucinations got better since admission with medication changes, depression and suicidal contemplation has persisted, he is interested in ECT, hoping for a quicker relief.         Diagnosis:     Schizoaffective Disorder, depressed  Polysubstance Use Disorder in a controlled environment          Assessment:   Considering the clinical acuity  electroconvulsive therapy (ECT) appears " "appropriate; despite multifactorial nature of the presentation that would benefit from optimization of cognitive, behavioral and social interventions longitudinally.      Discussed all relevant aspects of ECT with patient, including risks of memory loss, HA, nausea, death <1/50,000, driving prohibition; possible lack of benefit or relapse after successful treatment, alternatives, right to decline, possible outpatient procedures. All questions answered, patient will have opportunity to review ECT video.         Pause for the Cause:     Right patient Yes   Right procedure/laterality settings: Yes          Intra-Procedure Documentation:     #1 02/27/23 pt says he was feeling \"anxious but content\"  #2 03/01/23 no auditory hallucinations, concern for visual hallucinations of bugs in his room. Still actively suicidal with a plan with gun or kitchen knife.   #3 03/03/23 Visual hallucinations disappeared for a day after last ECT. Thinking about suicide slightly less. Worried about some pain in his right arm. Mood 6.5/10 (10 best)  #4 03/06/23 Visual hallucinations returned over the weekend. Auditory hallucinations the same. Still thinking about suicide. Feels safe in hospital but not outside.   #5 03/08/23 Continues to have violent auditory hallucinations and wishes to die.   #6 3/10/23  Improving. Feels safe on unit. Still having SI with plan to shoot self in head but no plan for suicide on unit. Hearing whispers, but voices are quieter. No visual hallucinations of lanterns, etc. Tolerating ECT well. Sleeping well.   #7 3/13/23 Continues to report A/V/H. They are 'not scary' but 'distressing'.   #8 3/15/23 Continues to report A/V/H. Asserts that frequency is reduced but not intensity. Denies side effects but admits that 'days are starting to blend in and I don't remember the dates'.   #9 3/17/23 Patient believes that ECT is helping. When prompted to explain why, he had difficulty articulating except to admit that his A/V/H " "are reduced. (incidentally, this is the first time when asked about A/V/H he did not backtrack and states that 'this morning' or 'yesterday' the A/V/H 'were back'). Reportedly 50% improved. Patient denies history of abusing solvents (sniffing glue or paint).   #10 3/20/23 Clozapine level 1340 ng/ml (rather high but patient has no signs of toxicity, so wonder if due to a non-trough blood draw). MMPI pending. Denies visual hallucinations for the first time (not sure if it will be consistent).   #11 3/22/23 Continues to present the same. Minimal change from baseline. States that the treatments are helping (but unclear how). Reports some dizziness when standing up (BP WNL)   #12  3/24/23  Continues to report A/V/H \"maybe less intense\". Rates the intensity of his SI 10/10 \"at this point I don;t know how to help myself\". We are currently attempting to reduce the clozapine levels so possibly could get a better post-ictal suppression. Will extend his treatments to 15 sessions.  #13 3/27/23 Continues to report A/H but 'not as intense'. Denies V/H. Admits for SI. Mood 'good'. When prompted to reconcile with what he reported earlier, he states \"that's because I can feel\". Denies side effects from ECT.  #14 3/29/23 Difficulty assessing if tangible improvement. Remains flat with a monotone voice  #15 3/31/23  Reports no V/H but unsure about A/H. OK to stop ECT.     ECT #: 15   Treatment number this series: 15   Total treatment number: 15     Type of ECT:  Bilateral, standard    ECT Medications:    Brevital: 90mg  Succinyl Choline: 80mg    ECT Strip Summary: (titration 96 mC)   Energy Level: 256 mC, 1 ms,  20 Hz, 8 sec, 800 mA (increased from 192 mC on 3/27/23 and from 144 mC on 3/6/23)    Motor Seizure Duration:   28 seconds  EEG Seizure Duration:  41 seconds (short transition to post-ictal suppression than previously observed; better quality seizure)    Complications:  none    Time for re-orientation: 28 min on 3/6/23    Plan: "   - STOP ECT  Unless Dr Brown/inpatient team believes there is a tangible improvement with the last 3-4 ECT sessions (when clozapine dose was reduced)  - Continue current medications; Could increase clozapine if clinically necessary if stopping ECT.        Chica Lazo MD

## 2023-03-31 NOTE — PLAN OF CARE
"  Problem: Suicidal Behavior  Goal: Suicidal Behavior is Absent or Managed  Outcome: Progressing   Goal Outcome Evaluation:    Plan of Care Reviewed With: patient      Pt had ECT and was alert and oriented x 4 post treatment. VSS. Pt stated that he endorsed \" high anxiety and low depression.\" Pt stated that he believes ECT is working, but he continues to endorse auditory hallucinations telling him to kill himself. Pt contracts for safety, stating \" I feel safe in the hospital.\" Pt was isolative and withdrawn to his room the remainder of the shift. Presented with depressed mood and flat affect. Took his medications as prescribed. Denied medication side effects or physical pain. His appetite is intact.  Plan: Continue to provide safe environment and therapeutic milieu.     "
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
## 2023-03-31 NOTE — PROGRESS NOTES
"[facility], [place]   Psychiatric Progress Note  Hospital Day: 63        Interim History:   The patient's care was discussed with the treatment team during the daily team meeting and/or staff's chart notes were reviewed. Staff report patient appeared to sleep 7 hours and had an uneventful night. Haseeb completed ECT session #14 Wednesday and endorsed 7 out of 10 anxiety and 10 out of 10 depression after treatment.  He endorsed command auditory hallucinations telling him to shoot himself though reported feeling safe in the unit and contracted for safety.  He attended groups and participated appropriately.  Hygiene remains poor and he did not shower. He will have his #15 ECT today.    Upon interview:  Haseeb reports feeling better today.  He reports his medications and ECT are helpful and denies side effects.  He describes his sleep as \"fine\" but endorses nightly recurrent nightmares of the same dream every night.      He expresses many concerns about discharge worried that he may relapse into methamphetamine use if returning home.      He expresses interest in ADHD treatment but states his sober living would not allow him to take methylphenidate or other ADHD medications.      Haseeb frequently mentions use of ADHD meds. He expressed concern that he would not be allowed ADHD medications while living in a sober house and cannot work without them.      He is amenable to continuing ECT treatment and the possibility of ongoing maintenance therapy.      Regarding suicidal ideation he continues to endorse suicidal ideation with plan to shoot himself if discharged from the hospital and does not want to discharge until depression improves. He likes the idea of being back to [facility] while he is not sure what is the best option.     Homicidal ideation: denies current or recent homicidal ideation or behaviors.     Psychotic symptoms: CAH to shoot self that have reduced in " "frequency, denies VH    Medication side effects reported: None    Acute medical concerns: None    Other issues reported by patient: Patient had no further questions or concerns.            Medications:       atropine  2 drop Sublingual At Bedtime     cloZAPine  200 mg Oral At Bedtime     fluticasone  2 spray Both Nostrils Daily     gabapentin  300 mg Oral TID     loratadine  10 mg Oral Daily     metFORMIN  1,000 mg Oral BID w/meals     metoprolol succinate ER  12.5 mg Oral Daily     nicotine  1 patch Transdermal Daily     nicotine   Transdermal Q8H     pantoprazole  40 mg Oral QAM AC     senna-docusate  1 tablet Oral BID     sertraline  200 mg Oral Daily          Allergies:   No Known Allergies       Labs:     No results found for this or any previous visit (from the past 24 hour(s)).       Psychiatric Examination:     /89 (Patient Position: Sitting)   Pulse 81   Temp 97.6  F (36.4  C) (Temporal)   Resp 16   Ht 1.6 m (5' 3\")   Wt 79.7 kg (175 lb 9.6 oz)   SpO2 95%   BMI 31.11 kg/m    Weight is 175 lbs 9.6 oz  Body mass index is 31.11 kg/m .    Weight over time:  Vitals:    01/27/23 1718 03/07/23 0933 03/31/23 0745   Weight: 75.1 kg (165 lb 8 oz) 79 kg (174 lb 1.6 oz) 79.7 kg (175 lb 9.6 oz)       Orthostatic Vitals       None          Cardiometabolic risk assessment. 01/30/23    Reviewed patient profile for cardiometabolic risk factors    Date taken /Value  REFERENCE RANGE   Abdominal Obesity  (Waist Circumference)   See nursing flowsheet Women ?35 in (88 cm)   Men ?40 in (102 cm)      Triglycerides  Triglycerides   Date Value Ref Range Status   01/28/2023 192 (H) <150 mg/dL Final       ?150 mg/dL (1.7 mmol/L) or current treatment for elevated triglycerides   HDL cholesterol  Direct Measure HDL   Date Value Ref Range Status   01/28/2023 41 >=40 mg/dL Final   ]   Women <50 mg/dL (1.3 mmol/L) in women or current treatment for low HDL cholesterol  Men <40 mg/dL (1 mmol/L) in men or current treatment for " "low HDL cholesterol     Fasting plasma glucose (FPG) Lab Results   Component Value Date     01/28/2023      FPG ?100 mg/dL (5.6 mmol/L) or treatment for elevated blood glucose   Blood pressure  BP Readings from Last 3 Encounters:   03/31/23 129/89   01/27/23 118/74   07/20/22 112/79    Blood pressure ?130/85 mmHg or treatment for elevated blood pressure   Family History  See family history     Appearance: awake, alert, dressed in hospital scrubs and unkempt  Attitude:  cooperative, calm  Eye Contact: fair  Mood: \"clear in the head\"  Affect:  mood congruent and intensity is blunted  Speech:  clear, coherent. Appropriate while mildly confused  Language: fluent and intact in English  Psychomotor, Gait, Musculoskeletal:  no evidence of tardive dyskinesia, dystonia, or tics  Thought Process:  Linear, ruminative, mild disorganization  Associations:  No evidence of loose associations  Thought Content:  SI w/plan and intent upon discharge, no plan or intent while in hospital. CAH present. Denies VH.   Insight:  fair  Judgement:  fair  Oriented to:  time, person, and place  Attention Span and Concentration:  fair  Recent and Remote Memory:  fair  Fund of Knowledge:  appropriate    Clinical Global Impressions  First:  Considering your total clinical experience with this particular patient population, how severe are the patient's symptoms at this time?: 7 (01/28/23 1341)    Most recent:  Compared to the patient's condition at the START of treatment, this patient's condition is: 4           Precautions:     Behavioral Orders   Procedures     Code 1 - Restrict to Unit     Code 2     For imaging     Electroconvulsive therapy     Series of up to 12 treatments. Begin Date: 2/27/23     Treating Psychiatrist providing ECT:  Dr. Kearns     Notified on:  2/23/23     Electroconvulsive therapy     For acute ECT series, MWF, up to 12 treatment.     Electroconvulsive therapy     Series of up to 12 treatments. Begin Date: 02/26/23   " "  Treating Psychiatrist providing ECT: Clifton  Notified on: 02/24/23     Fall precautions     [place]     MMPI     Routine Programming     As clinically indicated     Status 15     Every 15 minutes.     Suicide precautions     Patients on Suicide Precautions should have a Combination Diet ordered that includes a Diet selection(s) AND a Behavioral Tray selection for Safe Tray - with utensils, or Safe Tray - NO utensils            Diagnoses:     Active suicidal ideation with intent outside of hospital setting  MDD, recurrent, severe with psychotic features vs Schizoaffective Disorder, depressive type  Polysubstance use  Unspecified mood disorder  ADHD, inattentive type per chart  History of idiopathic hypersomnolence per chart  Nicotine use disorder, dependence and withdrawal   Allergies- chronic urticaria and rhinitis per chart  HD per chart          Assessment & Plan:     Assessment and hospital summary:  This patient is a 32 year old male with history of mood disorder, ADHD and substance use who presented to [facility] ED with SI on 1/26 in context of reported methamphetamine use and being kicked out of sober living. Medically cleared in ED, placed on 72HH and admitted to [facility]. Limited historian, giving inconsistent reports about substance use, UDS negative, very somnolent, endorsing ongoing SI and some psychosis symptoms. PTA medications continued with exception of finasteride as patient states he takes \"1/4 a pill\" and declined ordered dose, will defer to primary team to address. Will continue to evaluate safety and stabilization further as patient more able to engage in assessments. Inpatient psychiatric hospitalization is warranted at this time for safety, stabilization, and possible adjustment in medications.    Patient reports that he abruptly stopped Zoloft one week prior to his admission. He developed discontinuation syndrome symptoms following that. He reports that he does not have a history of psychosis but " is experiencing psychotic symptoms. He is amenable with plan to trial risperidone after R/B/A were discussed. Risperidone was initiated on 1/30 and titrated to a total of 3 mg daily on 2/1. Clonidine, used for ADHD, was reduced from a total of 0.3 mg daily to 0.2 mg daily on 2/3 due to concern for hypotension. 2/9: Cardiology consult placed. EKG- tachycardia 121 bpm, QTc 465 ms, Abnormal QRS angle and T wave abnormality. COVID negative and labs are unremarkable.  On 2/10, clozapine was held pending additional workup from IM and cardiology. Pericarditis was ruled out and metoprolol was started. Clozapine was restarted on 2/13 with plan to cautiously titrate to lowest effective dose. 2/23/23: Clozapine titration schedule increased 25 mg/day. ECT and IM consults for ECT clearance placed. 2/27/23: ECT initiated. Risperidone was discontinued on 2/28. Clozapine level obtained on 3/3 at corresponding dose of 300 mg and was within therapeutic range (1001). Metformin increased to 1000 mg BID on 3/6 to target increased appetite/wt gain. Minimal improvement noted since initiation of both clozapine and ECT. 3/16: Patient reports orthostasis symptoms have resolved and some subjective improvement in depression symptoms with ECT. IDS-SR depression screening given to patient. 3/26: reduced clozapine to 200 mg qhs per Dr. Lazo recommendation as may interfere with ECT response. Clozapine blood level was 614. 3/30: Rule 25 consult placed    Psychiatric treatment/inteventions:  Medications:   -Reduced clozapine to 200 mg at bedtime on 3/23. Per Dr. Lazo, his current clozapine dose and blood levels, being so high, might be getting in the way of a strong inhibitory response to ECT (one mechanism of action for ECT is the seizure recruits inhibitory neurons (EDEL) and that helps with the therapeutic benefits).    -Continue PTA sertraline 200 mg daily for mood  -Continue PTA gabapentin 300 mg TID for anxiety   -Continue Cogentin 1 mg at  bedtime for possible EPSE     -PRN hydroxyzine 25 mg every 4 hour for anxiety  -PRN trazodone 50 mg at bedtime for sleep   -PRN olanzapine 10mg PO or IM TID for agitation/psychosis   -PRN atropine 1% SL drops, 2 drops q2h for sialorrhea    Spiritual services consult placed on 2/6. Pt is L[employer]. Appreciate assistance.  Psychological consult completed and reviewed by writer. Please see Dr. Nation's note dated 3/22 for details.     Chemical dependency consult placed on 3/30/23.    ECT:  - Medically cleared on 2/24. See IM note for details.  - ECT consult placed on 2/23.  - ECT started on 2/27/23  - HOLD Gabapentin on nights prior to ECT and on ECT mornings until after ECT.   - ECT #14 is completed 3/29 with plan to complete a total of 15 treatments in the acute series.     ECT Recs 3/31 after Session 15:    - STOP ECT  Unless Dr Brown/inpatient team believes there is a tangible improvement with the last 3-4 ECT sessions (when clozapine dose was reduced)  - Continue current medications; Could increase clozapine if clinically necessary if stopping ECT.       Laboratory/Imaging: reviewed: Covid negative; UDS negative; CMP, CBC, TSH, Lipid panel, HgbA1c ordered to complete admission labs. Reviewed.     2/9/23: Troponin, CK, CBC, BMP: Unremarkable, CRP elevated to 38.18, COVID: Negative  2/10/23: Add Influenza A/B given flu-like sx-negative  Patient will be treated in therapeutic milieu with appropriate individual and group therapies as described.     Medical treatment/interventions:  Medical concerns:   Nicotine replacement ordered, educate patient on benefits of cessation, pt unclear about finasteride dose, will hold until further information is obtained. PTA PRN zyrtec, azelastine, fluticasone, triamcinolone and Epipen ordered for allergies and PRN ammonia lactate, Eucerin for  dry skin.    Flatulence:   - simethicone prn    Neuroleptic induced wt gain:  - Monitor weights  - Continue metformin 1,000 mg BID with  meals    Dyslipidemia: Will arrange follow up with PCP to address. Discussed importance of healthy eating habits and regular exercise. Will consider nutrition consult if patient amenable.     Orthostasis likely 2/2 clozapine: Pt is not hypotensive but reports orthostatic symptoms and previously restricted fluids. Now resolved.   - Continue to monitor vital signs closely  - Encourage fluids  - Discontinued clonidine and reduced clozapine dose    Sialorrhea 2/2 clozapine:  - Atropine 1% SL drops qhs  - Recommend towel on pillow when sleeping    Chest pain/tachycardia/EKG changes (2/9):  - Pain improved with PRN medications.   - Cardiology consult placed on 2/9. See note on that date for details.   - ECHO reviewed and unremarkable.  - Writer discussed care with both Dr. Streeter from [facility] and Christina from IM. Plan for now is to HOLD clozapine until further medical workup. Dr. Streeter explained that myocarditis has been ruled out as troponin was negative. Pericarditis remains on differential, but he does not have EKG findings or a pericardial effusion. IM will assess for pericardial friction rub and pleuritic chest pain. IM seen by patient and Metoprolol was started.     Update 2/13: Discussed care with Annette from  today on two occasions. Please see her note on this date for details. She does not suspect that this is pericarditis. He does not have pleuritic pain, characteristic CP, EKG changes, or pericardial effusion on ECHO. Therefore, will cautiously restart clozapine while monitoring closely for side effects. May consider further increase in metoprolol if necessary. PPI was started due to suspected GERD.     Update 2/24 per IM note:  Medicine is following peripherally for concerns for pericarditis.  See detailed note from 2/13.  No pericardial friction rub noted while sitting up and leaning forward today.  Patient states that his chest discomfort is getting better after starting the Protonix yesterday.  It does appear  that he has also been using the Maalox.  Patient does have Tums available as well.  Please be mindful for any constipation with overuse of the PRNs.     POC:  - Continue Protonix daily for 8 weeks, then taper off  - Recommend lifestyle changes including weight loss head of bed elevation avoidance of late-night eating and specific food elimination such as chocolate caffeine alcohol acidic and/or spicy food.  - Watch for constipation with PRNs for heartburn  - We will schedule senna 1 tab twice daily  - MiraLAX daily as needed added on     Medicine will sign off, please contact us for any acute changes.      Sore throat/cough/nasal congestion, resolved:   - COVID negative on 2/9. Repeat COVID test and Influenza A/B neg on 2/10.  - Cepacol lozenges added  - PRN Tylenol   - Consulted with IM. Appreciate assistance. See their notes for details.      Legal Status: VOLUNTARY. 72 hour hold discontinued. Pt agreed to sign in on voluntary basis and discharge directly to treatment once stable.      Safety Assessment:        Behavioral Orders   Procedures     Code 1 - Restrict to Unit     Routine Programming       As clinically indicated     Status 15       Every 15 minutes.     Suicide precautions       Patients on Suicide Precautions should have a Combination Diet ordered that includes a Diet selection(s) AND a Behavioral Tray selection for Safe Tray - with utensils, or Safe Tray - NO utensils        Withdrawal precautions      Pt has not required locked seclusion or restraints in the past 24 hours to maintain safety, please refer to RN documentation for further details.    Discontinued SIO on 2/8 as patient is heriberto for safety. Monitor SI closely.     The risks, benefits, alternatives and side effects have been discussed and are understood by the patient.     Disposition: Pending clinical stabilization. Pt remains actively suicidal. Will likely discharge back to [facility] program once stable.       This patient  was seen and discussed with my attending physician Dr Brown.  Carla Whitehead MD, PGY2,  Psychiatry Resident

## 2023-03-31 NOTE — ANESTHESIA CARE TRANSFER NOTE
Patient: Link Lora    Procedure: * No procedures listed *       Diagnosis: * No pre-op diagnosis entered *  Diagnosis Additional Information: No value filed.    Anesthesia Type:   General     Note:    Oropharynx: oropharynx clear of all foreign objects  Level of Consciousness: awake  Oxygen Supplementation: room air    Independent Airway: airway patency satisfactory and stable  Dentition: dentition unchanged  Vital Signs Stable: post-procedure vital signs reviewed and stable  Report to RN Given: handoff report given  Patient transferred to: PACU    Handoff Report: Identifed the Patient, Identified the Reponsible Provider, Reviewed the pertinent medical history, Discussed the surgical course, Reviewed Intra-OP anesthesia mangement and issues during anesthesia, Set expectations for post-procedure period and Allowed opportunity for questions and acknowledgement of understanding      Vitals:  Vitals Value Taken Time   /77 03/31/23 0850   Temp 36.7  C (98.1  F) 03/31/23 0850   Pulse 97 03/31/23 0850   Resp 19 03/31/23 0850   SpO2 93 % 03/31/23 0850       Electronically Signed By: Philip Yi MD  March 31, 2023  8:59 AM

## 2023-03-31 NOTE — PROGRESS NOTES
Patients VSS, A/O, IV removed, meets phase 2 criteria and is able to move to [facility] at this time. Report given to RN. Pt transported by staff.

## 2023-03-31 NOTE — CONSULTS
3/31/2023    CD consult acknowledged. Expect consult will be completed on Monday or Tuesday depending on pt's ECT schedule.     [email or web address]  Direct phone: [phone]

## 2023-03-31 NOTE — PLAN OF CARE
03/31/23 1555   Group Therapy Session   Group Attendance attended group session   Total Time (minutes) 60   Total # Attendees 1   Group Type psychoeducation   Group Topic Covered coping skills/lifestyle management;relapse prevention   Group Session Detail topic group   Patient Response/Contribution cooperative with task     Pt participated in a relapse prevention discussion with writer as he was the only person to attend group.  Pt discussed having had issues with gambling, having gambled thousands of dollars he did not have to spend.  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  Currently, pt feels gambling is not a primary issue, though he continues to struggle with his MI/CD issues, and occasional suicidal thoughts, tough he remains hopeful despite having had his last ECT treatment today.  Pt asked questions regarding antidepressants and ADHD medications, and was encouraged to talk to his nurse tonight and provider next week.  Pt spoke of ways to keep himself and his safe space as he transitions toward discharge.

## 2023-03-31 NOTE — PLAN OF CARE
Problem: Suicidal Behavior  Goal: Suicidal Behavior is Absent or Managed  3/30/2023 4241 by Maryjane Waters RN  Outcome: Progressing   Goal Outcome Evaluation:    Plan of Care Reviewed With: patient      Pt denied all psych symptoms except S.I thoughts and auditory hallucination.  There was no S.I behavior.  Pt contracts for safety.  Pt stated that he requested for Desoxyn (Methamphetamine)  for his ADHD, and his provider said it is not prescribed in the hospital.  Med education was done ,and pt was reminded about the reason why he came to the hospital.  Pt was advised to speak with his provider about medication that will help him with his ADHD instead.  Pt verbalized understanding.  Diastolic blood pressure was elevated and noted to have the same trend for the last few days.  Staff will monitor.  Pt had a good appetite and he was med compliant.

## 2023-03-31 NOTE — ANESTHESIA POSTPROCEDURE EVALUATION
Patient: Nikolay Lora    Procedure: * No procedures listed *       Anesthesia Type:  General    Note:  Disposition: Outpatient   Postop Pain Control: Uneventful            Sign Out: Well controlled pain   PONV: No   Neuro/Psych: Uneventful            Sign Out: Acceptable/Baseline neuro status   Airway/Respiratory: Uneventful            Sign Out: Acceptable/Baseline resp. status   CV/Hemodynamics: Uneventful            Sign Out: Acceptable CV status; No obvious hypovolemia; No obvious fluid overload   Other NRE: NONE   DID A NON-ROUTINE EVENT OCCUR? No           Last vitals:  Vitals:    03/31/23 0850 03/31/23 0905 03/31/23 0920   BP: 114/77 108/87 118/76   Pulse: 97 84 89   Resp: 19 16 16   Temp: 36.7  C (98.1  F) 36.1  C (97  F) 36.7  C (98  F)   SpO2: 93% 93% 94%       Electronically Signed By: Philip Yi MD  March 31, 2023  9:17 AM

## 2023-03-31 NOTE — PROGRESS NOTES
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 Follow all recommendations upon discharge from treatment. Recommendations may include, but are not limited to: extended treatment, outpatient treatment and/or sober housing.  5. Follow all recommendations of your medical and mental health providers    Clinical Substantiation:  Patient has unstable housing, lacks a sober living environment. Patient lacks long-term sober maintenance skills and has dual issues of mental health and substance use. Patient has been sober due to being in treatment and hospital setting.      Referrals/Alternatives:  [facility] Team for Referrals  Phone: [phone] or [phone]  Fax: [phone]  Admissions email: [email or web address]    [facility]  [address] [place] [address]  Phone: [phone]  Fax: [phone]     SAL consult completed by: BRANDIE Mclain.  Phone Number: [phone]  E-mail Address: [email or web address] Mental Health and Addiction Services Evaluation Department  [address] [place] [address]     *Due to regulation of Title 42 of the Code of Federal Regulations (CFR) Part  2: Confidentiality laws apply to this note and the information wherein.  Thus, this note cannot be copy and pasted into any other health care staff's note nor can it be included in general medical records sent to ANY outside agency without the patient's written consent.

## 2023-04-01 PROCEDURE — 250N000013 HC RX MED GY IP 250 OP 250 PS 637: Performed by: PSYCHIATRY & NEUROLOGY

## 2023-04-01 PROCEDURE — 250N000013 HC RX MED GY IP 250 OP 250 PS 637

## 2023-04-01 PROCEDURE — G0177 OPPS/PHP; TRAIN & EDUC SERV: HCPCS

## 2023-04-01 PROCEDURE — 124N000002 HC R&B MH UMMC

## 2023-04-01 RX ADMIN — SERTRALINE HYDROCHLORIDE 200 MG: 100 TABLET ORAL at 08:01

## 2023-04-01 RX ADMIN — SENNOSIDES AND DOCUSATE SODIUM 1 TABLET: 50; 8.6 TABLET ORAL at 08:01

## 2023-04-01 RX ADMIN — SENNOSIDES AND DOCUSATE SODIUM 1 TABLET: 50; 8.6 TABLET ORAL at 20:30

## 2023-04-01 RX ADMIN — PANTOPRAZOLE SODIUM 40 MG: 40 TABLET, DELAYED RELEASE ORAL at 08:01

## 2023-04-01 RX ADMIN — GABAPENTIN 300 MG: 300 CAPSULE ORAL at 13:59

## 2023-04-01 RX ADMIN — CLOZAPINE 200 MG: 200 TABLET ORAL at 20:30

## 2023-04-01 RX ADMIN — NICOTINE 1 PATCH: 21 PATCH, EXTENDED RELEASE TRANSDERMAL at 08:09

## 2023-04-01 RX ADMIN — METFORMIN HYDROCHLORIDE 1000 MG: 500 TABLET ORAL at 18:07

## 2023-04-01 RX ADMIN — Medication 12.5 MG: at 08:01

## 2023-04-01 RX ADMIN — FLUTICASONE PROPIONATE 2 SPRAY: 50 SPRAY, METERED NASAL at 08:10

## 2023-04-01 RX ADMIN — METFORMIN HYDROCHLORIDE 1000 MG: 500 TABLET ORAL at 08:01

## 2023-04-01 RX ADMIN — GABAPENTIN 300 MG: 300 CAPSULE ORAL at 20:30

## 2023-04-01 RX ADMIN — LORATADINE 10 MG: 10 TABLET ORAL at 08:01

## 2023-04-01 RX ADMIN — ATROPINE SULFATE 2 DROP: 10 SOLUTION/ DROPS OPHTHALMIC at 20:30

## 2023-04-01 RX ADMIN — GABAPENTIN 300 MG: 300 CAPSULE ORAL at 08:01

## 2023-04-01 ASSESSMENT — ACTIVITIES OF DAILY LIVING (ADL)
HYGIENE/GROOMING: INDEPENDENT
ADLS_ACUITY_SCORE: 29
LAUNDRY: WITH SUPERVISION
ORAL_HYGIENE: INDEPENDENT
HYGIENE/GROOMING: INDEPENDENT
DRESS: INDEPENDENT
ADLS_ACUITY_SCORE: 29
DRESS: INDEPENDENT
ADLS_ACUITY_SCORE: 29
ADLS_ACUITY_SCORE: 29
ORAL_HYGIENE: INDEPENDENT

## 2023-04-01 NOTE — PLAN OF CARE
Goal Outcome Evaluation:    Plan of Care Reviewed With: patient        Problem: Suicidal Behavior  Goal: Suicidal Behavior is Absent or Managed  Outcome: Progressing    Pt was calm and isolative in his room during this shift though he was encouraged to come out to the lounge.   Pt denies all psych symptoms except that he endorsed depression at 9/10.  Pt had good food and fluid intake.  He took his meds without problems.  He continues to express concerns about getting ADHD medication before he discharge from this hospital.

## 2023-04-01 NOTE — PLAN OF CARE
Problem: Suicidal Behavior  Goal: Suicidal Behavior is Absent or Managed  Outcome: Progressing   Goal Outcome Evaluation:    Plan of Care Reviewed With: patient      Pt was isolative and withdrawn to room the entire shift. He preferred to stay in his room watching TV. He endorsed anxiety 6 and depression 3 on a 10 scale. Pt presented with poor hygiene poor. Did not attend to his ADLs, despite prompting. Pt ate both meals in his room. His mood was depressed and affect blunted. Pt continues to endorse auditory hallucinations, where the voices are telling him to kill himself. Pt denies any plan to act.   Plan: Continue to provide safe environment and therapeutic milieu.

## 2023-04-01 NOTE — PROGRESS NOTES
04/01/23 1847   Group Therapy Session   Group Attendance attended group session   Time Session Began 1615   Time Session Ended 1700   Total Time (minutes) 45   Total # Attendees 4   Group Type task skill   Group Session Detail Education provided on the importance of engaging in meaningful activity in the context of caring for one s mental health with hand on Sand Art project for creative expression, sequencing, new learning, following directions, attention to detail, frustration tolerance, FM skills, coping, reality-based activity, mood stabilization, encouraging daily structure, improving self-esteem, and expanding social skills.   Patient Participation Detail Pt was pleasant, polite and invested in his project.  After given directions and a demonstration pt was able to work independently.  Pt did not appear to be bothered by a peer who was on the periphery of group talking in an incendiary manner in an attempt to get a rise out of anyone who was in the room.  Pt was able to ignore this and shared that he enjoyed the project.

## 2023-04-01 NOTE — PROVIDER NOTIFICATION
04/01/23 0700   Sleep/Rest   Sleep/Rest/Relaxation no problem identified;appears asleep   Sleep Hygiene Promotion noise level reduced;room lighting adjusted   Night Time # Hours 7 hours     Haseeb was noted as sleeping peacefully through the night with no signs of distress or complaints. No PRN medication requested or given. Will continue to monitor and to provide for his safety and his comfort per policy.

## 2023-04-02 PROCEDURE — 250N000013 HC RX MED GY IP 250 OP 250 PS 637

## 2023-04-02 PROCEDURE — 250N000013 HC RX MED GY IP 250 OP 250 PS 637: Performed by: PSYCHIATRY & NEUROLOGY

## 2023-04-02 PROCEDURE — 124N000002 HC R&B MH UMMC

## 2023-04-02 PROCEDURE — G0177 OPPS/PHP; TRAIN & EDUC SERV: HCPCS

## 2023-04-02 RX ADMIN — GABAPENTIN 300 MG: 300 CAPSULE ORAL at 14:02

## 2023-04-02 RX ADMIN — NICOTINE 1 PATCH: 21 PATCH, EXTENDED RELEASE TRANSDERMAL at 07:58

## 2023-04-02 RX ADMIN — Medication 12.5 MG: at 07:58

## 2023-04-02 RX ADMIN — ACETAMINOPHEN 325MG 650 MG: 325 TABLET ORAL at 18:42

## 2023-04-02 RX ADMIN — GABAPENTIN 300 MG: 300 CAPSULE ORAL at 20:19

## 2023-04-02 RX ADMIN — METFORMIN HYDROCHLORIDE 1000 MG: 500 TABLET ORAL at 18:18

## 2023-04-02 RX ADMIN — ATROPINE SULFATE 2 DROP: 10 SOLUTION/ DROPS OPHTHALMIC at 20:24

## 2023-04-02 RX ADMIN — LORATADINE 10 MG: 10 TABLET ORAL at 07:59

## 2023-04-02 RX ADMIN — PANTOPRAZOLE SODIUM 40 MG: 40 TABLET, DELAYED RELEASE ORAL at 07:58

## 2023-04-02 RX ADMIN — METFORMIN HYDROCHLORIDE 1000 MG: 500 TABLET ORAL at 07:58

## 2023-04-02 RX ADMIN — SERTRALINE HYDROCHLORIDE 200 MG: 100 TABLET ORAL at 07:59

## 2023-04-02 RX ADMIN — SENNOSIDES AND DOCUSATE SODIUM 1 TABLET: 50; 8.6 TABLET ORAL at 07:58

## 2023-04-02 RX ADMIN — FLUTICASONE PROPIONATE 2 SPRAY: 50 SPRAY, METERED NASAL at 08:43

## 2023-04-02 RX ADMIN — GABAPENTIN 300 MG: 300 CAPSULE ORAL at 07:58

## 2023-04-02 RX ADMIN — CLOZAPINE 200 MG: 200 TABLET ORAL at 20:19

## 2023-04-02 RX ADMIN — SENNOSIDES AND DOCUSATE SODIUM 1 TABLET: 50; 8.6 TABLET ORAL at 20:19

## 2023-04-02 ASSESSMENT — ACTIVITIES OF DAILY LIVING (ADL)
DRESS: INDEPENDENT
ADLS_ACUITY_SCORE: 39
ADLS_ACUITY_SCORE: 29
ADLS_ACUITY_SCORE: 39
ORAL_HYGIENE: PROMPTS;INDEPENDENT
ADLS_ACUITY_SCORE: 39
ADLS_ACUITY_SCORE: 29
ADLS_ACUITY_SCORE: 29
ADLS_ACUITY_SCORE: 39
HYGIENE/GROOMING: INDEPENDENT;PROMPTS
ADLS_ACUITY_SCORE: 39
ADLS_ACUITY_SCORE: 29
ADLS_ACUITY_SCORE: 39
ADLS_ACUITY_SCORE: 39
ADLS_ACUITY_SCORE: 29

## 2023-04-02 NOTE — PLAN OF CARE
Writer in RN role, not NP  Goal Outcome Evaluation:    Plan of Care Reviewed With: patient         Shift update: Haseeb was agreeable to scheduled meds, he also showered today.  Ate about 50%+ of his breakfast. Says he's feeling better, less depressed and hallucinations are milder now. Still spends the majority of this time in his room sleeping or watching TV / eating / listening to music. Calm / cooperative and pleasant.     SI: no   SIB: no     Hallucinations/Psychosis: improved (per Haseeb)     Activity/Participation: withdrawn and in room   PRN Meds: none   Appetite: adequate     Medical: Says he had a BM yesterday - no concerns

## 2023-04-02 NOTE — PLAN OF CARE
Problem: Thought Process Alteration  Goal: Optimal Thought Clarity  Outcome: Progressing   Goal Outcome Evaluation:    Plan of Care Reviewed With: (P) patient      Pt was isolative and withdrawn to room but he did attend the unit OT group. Pt's  mood appears to be improving although he continues to endorse depressive symptoms and anxiety. Pt presented with poor hygiene. Did not attend to his ADLs as prompted. Pt ate all of his meals in his room. Pt continues to hear voices telling him to kill himself. But pt denies any plan to act. Pt did not request any prn medications.  Plan: Continue to encourage adequate hygiene while provide safe environment and therapeutic milieu.

## 2023-04-02 NOTE — PLAN OF CARE
[facility] Occupational Therapy Group Intervention Note     04/02/23 1703   Group Therapy Session   Group Attendance attended group session   Total Time (minutes) 45   Group Type task skill;recreation   Group Topic Covered coping skills/lifestyle management;relapse prevention;leisure exploration/use of leisure time;structured socialization   Group Session Detail OT: Leisure exploration offered for increased intrinsic motivation to engage in social non-obligatory occupations, challenge new learning, sequencing, problem solving, and retention via a cognitive group game.    Patient Response/Contribution cooperative with task;listened actively   Patient Participation Detail Somewhat restricted affect. Brightened upon approach Able to learn and follow novel game instructions with min assistance throughout.     Crai Ponce, OT on 4/2/2023 at 5:04 PM

## 2023-04-03 PROCEDURE — 124N000002 HC R&B MH UMMC

## 2023-04-03 PROCEDURE — 250N000013 HC RX MED GY IP 250 OP 250 PS 637: Performed by: PSYCHIATRY & NEUROLOGY

## 2023-04-03 PROCEDURE — 99233 SBSQ HOSP IP/OBS HIGH 50: CPT | Mod: GC | Performed by: PSYCHIATRY & NEUROLOGY

## 2023-04-03 PROCEDURE — 250N000013 HC RX MED GY IP 250 OP 250 PS 637

## 2023-04-03 PROCEDURE — 250N000009 HC RX 250: Performed by: STUDENT IN AN ORGANIZED HEALTH CARE EDUCATION/TRAINING PROGRAM

## 2023-04-03 PROCEDURE — H0001 ALCOHOL AND/OR DRUG ASSESS: HCPCS

## 2023-04-03 RX ORDER — LITHIUM CARBONATE 450 MG
900 TABLET, EXTENDED RELEASE ORAL AT BEDTIME
Status: DISCONTINUED | OUTPATIENT
Start: 2023-04-03 | End: 2023-04-12 | Stop reason: HOSPADM

## 2023-04-03 RX ADMIN — SENNOSIDES AND DOCUSATE SODIUM 1 TABLET: 50; 8.6 TABLET ORAL at 20:18

## 2023-04-03 RX ADMIN — LORATADINE 10 MG: 10 TABLET ORAL at 08:51

## 2023-04-03 RX ADMIN — GABAPENTIN 300 MG: 300 CAPSULE ORAL at 08:51

## 2023-04-03 RX ADMIN — PANTOPRAZOLE SODIUM 40 MG: 40 TABLET, DELAYED RELEASE ORAL at 08:51

## 2023-04-03 RX ADMIN — METFORMIN HYDROCHLORIDE 1000 MG: 500 TABLET ORAL at 08:51

## 2023-04-03 RX ADMIN — SERTRALINE HYDROCHLORIDE 200 MG: 100 TABLET ORAL at 08:51

## 2023-04-03 RX ADMIN — CLOZAPINE 200 MG: 200 TABLET ORAL at 20:18

## 2023-04-03 RX ADMIN — NICOTINE 1 PATCH: 21 PATCH, EXTENDED RELEASE TRANSDERMAL at 08:57

## 2023-04-03 RX ADMIN — SENNOSIDES AND DOCUSATE SODIUM 1 TABLET: 50; 8.6 TABLET ORAL at 08:51

## 2023-04-03 RX ADMIN — FLUTICASONE PROPIONATE 2 SPRAY: 50 SPRAY, METERED NASAL at 08:51

## 2023-04-03 RX ADMIN — ATROPINE SULFATE 2 DROP: 10 SOLUTION/ DROPS OPHTHALMIC at 20:19

## 2023-04-03 RX ADMIN — GABAPENTIN 300 MG: 300 CAPSULE ORAL at 13:59

## 2023-04-03 RX ADMIN — METFORMIN HYDROCHLORIDE 1000 MG: 500 TABLET ORAL at 17:09

## 2023-04-03 RX ADMIN — Medication 12.5 MG: at 08:51

## 2023-04-03 RX ADMIN — LITHIUM CARBONATE 900 MG: 450 TABLET, EXTENDED RELEASE ORAL at 20:18

## 2023-04-03 RX ADMIN — GABAPENTIN 300 MG: 300 CAPSULE ORAL at 20:18

## 2023-04-03 ASSESSMENT — ACTIVITIES OF DAILY LIVING (ADL)
ADLS_ACUITY_SCORE: 39
ADLS_ACUITY_SCORE: 29
DRESS: INDEPENDENT
ADLS_ACUITY_SCORE: 39
ORAL_HYGIENE: INDEPENDENT
DRESS: INDEPENDENT
ADLS_ACUITY_SCORE: 29
HYGIENE/GROOMING: INDEPENDENT
LAUNDRY: WITH SUPERVISION
ADLS_ACUITY_SCORE: 29
ADLS_ACUITY_SCORE: 29
ADLS_ACUITY_SCORE: 39
HYGIENE/GROOMING: INDEPENDENT
ADLS_ACUITY_SCORE: 29
ADLS_ACUITY_SCORE: 39
ADLS_ACUITY_SCORE: 39
ORAL_HYGIENE: INDEPENDENT
ADLS_ACUITY_SCORE: 39
ADLS_ACUITY_SCORE: 29

## 2023-04-03 NOTE — CONSULTS
4/3/2023    CD consult completed.  Will send ARACELY to unit [facility] and make referral today.  Pt requesting to return to [facility]. Pt may have trouble finding SAL treatment due to ongoing SI and appears pt has not used substances in months. Pt did ask writer about meth prescription, stating he is isn't getting anything for his ADHD.  Explained to pt I am not a prescriber, will have to talk with mental health providers. Pt verbalized understanding.     Recommendations:   1. Abstain from all non-prescribed mood-altering substances  2. Take all medications as prescribed  3. Enter and complete a co-occurring residential or inpatient treatment program  4. Follow all recommendations upon discharge from treatment. Recommendations may include, but are not limited to: extended treatment, outpatient treatment and/or sober housing.  5. Follow all recommendations of your medical and mental health providers     Clinical Substantiation:  Patient has unstable housing, lacks a sober living environment. Patient lacks long-term sober maintenance skills and has dual issues of mental health and substance use. Patient has been sober due to being in treatment and hospital setting.       Referrals/Alternatives:  [facility] Team for Referrals  Phone: [phone] or [phone]  Fax: [phone]  Admissions email: [email or web address]     [facility]  [address] [place] [address]  Phone: [phone]  Fax: [phone]     SAL consult completed by: BRANDIE Mclain.  Phone Number: [phone]  E-mail Address: [email or web address] Mental Health and Addiction Services Evaluation Department  [address] [place] [address]     *Due to regulation of Title 42 of the Code of Federal Regulations (CFR) Part 2: Confidentiality laws apply to this note and the information wherein.  Thus, this note cannot be copy and pasted into any other health care staff's note nor  can it be included in general medical records sent to ANY outside agency without the patient's written consent.

## 2023-04-03 NOTE — PLAN OF CARE
04/03/23 1405   Interprofessional Rounds   Participants ;CTC;nursing;psychiatrist;social work   Behavioral Team Discussion   Participants Nelly Brown MD. Carla (Resident), Maritza, RN, Esteban Rosales, BELGICA,  Berna, [place], Cresencio, [facility]   Progress Continuing to assess   Anticipated length of stay 5-7 days   Continued Stay Criteria/Rationale [facility]   Medical/Physical No medical concerns noted   Plan Attending psychiatrist will meet with patient daily to assess psychiatric needs and to discuss medication questions. During hospitalization patient will be encouraged to attend therapy groups and to participate in unit programming. CTC will coortdinate disposition and aftercare plan.   Rationale for change in precautions or plan No change   Anticipated Discharge Disposition substance use treatment     PRECAUTIONS AND SAFETY    Behavioral Orders   Procedures    Code 1 - Restrict to Unit    Code 2     For imaging    Electroconvulsive therapy     Series of up to 12 treatments. Begin Date: 2/27/23     Treating Psychiatrist providing ECT:  Dr. Kearns     Notified on:  2/23/23    Electroconvulsive therapy     For acute ECT series, MWF, up to 12 treatment.    Electroconvulsive therapy     Series of up to 12 treatments. Begin Date: 02/26/23     Treating Psychiatrist providing ECT: [place]  Notified on: 02/24/23    Fall precautions    [place]    MMPI    Routine Programming     As clinically indicated    Status 15     Every 15 minutes.    Suicide precautions     Patients on Suicide Precautions should have a Combination Diet ordered that includes a Diet selection(s) AND a Behavioral Tray selection for Safe Tray - with utensils, or Safe Tray - NO utensils         Safety  Safety WDL: WDL  Patient Location: patient room, own  Observed Behavior: calm  Observed Behavior (Comment): laying in bed  Airway Safety Measures: all equipment/monitors on and audible, emergency medication sheet, mask at bedside, manual  resuscitator/mask/valve at bedside, manual resuscitator/mask/valve in room, oxygen flowmeter, suction at bedside, suction equipment, suction regulator  Safety Measures: environmental rounds completed, safety rounds completed, suicide assessment completed, suicide check-in completed  Diversional Activity: television  Suicidality: Status 15, Minimal furniture in room  Withdrawal: monitor withdrawal process  Seizure precautions: clutter free environment  Assault: status 15  Elopement Interventions: status 15  Sexual: status 15

## 2023-04-03 NOTE — PLAN OF CARE
Problem: Thought Process Alteration  Goal: Optimal Thought Clarity  Outcome: Progressing     Problem: Adult Behavioral Health Plan of Care  Goal: Plan of Care Review  Outcome: Progressing   Goal Outcome Evaluation:  Patient was calm, restrictive and guarded, blunted with flat affect. Patient appeared depressed and avoids social contact. Patient is alert and oriented x 3, speech is soft quiet, he is withdrawn and isolated. Patient's insight to illness poor. Patient denies any other discomfort, no pain, denies anxiety and depression, no SI/SIB. endorses AH, no plan,, and he is able to contract for safety. No VH during this shift. Patient is medication compliant no prn this shift, no stated side effects, meal consumption was adequate, will continue to monitor.

## 2023-04-03 NOTE — PROGRESS NOTES
"[facility], [place]   Psychiatric Progress Note  Hospital Day: 66        Interim History:     The patient's care was discussed with the treatment team during the daily team meeting and/or staff's chart notes were reviewed. Staff report patient appeared to sleep 7 hours and had an uneventful night. Haseeb completed ECT session #15 Friday and endorsed 7 out of 10 anxiety and 10 out of 10 depression after treatment.  He still endorsed command auditory hallucinations telling him to shoot himself though reported feeling safe in the unit and contracted for safety. He reports his AH are lessened today. He attended groups and participated appropriately.  Hygiene remains poor and he did not shower. His IDS-SR was 49 (no significant improvement), so per ECT team recs, we decided not to continue with ECT and start trying Lithium. Lithium 900mg started today.    Upon interview:  Haseeb reports feeling mildly better today.  He denies any side effects.  He describes his sleep as \"fine\" but endorses nightly recurrent nightmares of the same dream every night. He expresses many concerns about discharge worried that he may relapse into methamphetamine use if returning home.  He expresses interest in ADHD treatment but states his sober living would not allow him to take methylphenidate or other ADHD medications.      Regarding suicidal ideation he continues to endorse suicidal ideation with plan to shoot himself if discharged from the hospital and does not want to discharge until depression improves. He likes the idea of being back to [facility] while he is not sure what is the best option. He is agreeable to try Lithium as we stopped trying ECT.    Homicidal ideation: denies current or recent homicidal ideation or behaviors.     Psychotic symptoms: CAH to shoot self that have reduced in frequency, denies VH    Medication side effects reported: None    Acute medical concerns: None    Other " "issues reported by patient: Patient had no further questions or concerns.            Medications:       atropine  2 drop Sublingual At Bedtime     cloZAPine  200 mg Oral At Bedtime     fluticasone  2 spray Both Nostrils Daily     gabapentin  300 mg Oral TID     loratadine  10 mg Oral Daily     metFORMIN  1,000 mg Oral BID w/meals     metoprolol succinate ER  12.5 mg Oral Daily     nicotine  1 patch Transdermal Daily     nicotine   Transdermal Q8H     pantoprazole  40 mg Oral QAM AC     senna-docusate  1 tablet Oral BID     sertraline  200 mg Oral Daily          Allergies:   No Known Allergies       Labs:     No results found for this or any previous visit (from the past 24 hour(s)).       Psychiatric Examination:     /69 (BP Location: Left arm, Patient Position: Supine, Cuff Size: Adult Regular)   Pulse 83   Temp 97.2  F (36.2  C) (Temporal)   Resp 16   Ht 1.6 m (5' 3\")   Wt 79.7 kg (175 lb 9.6 oz)   SpO2 95%   BMI 31.11 kg/m    Weight is 175 lbs 9.6 oz  Body mass index is 31.11 kg/m .    Weight over time:  Vitals:    01/27/23 1718 03/07/23 0933 03/31/23 0745   Weight: 75.1 kg (165 lb 8 oz) 79 kg (174 lb 1.6 oz) 79.7 kg (175 lb 9.6 oz)       Orthostatic Vitals       None          Cardiometabolic risk assessment. 01/30/23    Reviewed patient profile for cardiometabolic risk factors    Date taken /Value  REFERENCE RANGE   Abdominal Obesity  (Waist Circumference)   See nursing flowsheet Women ?35 in (88 cm)   Men ?40 in (102 cm)      Triglycerides  Triglycerides   Date Value Ref Range Status   01/28/2023 192 (H) <150 mg/dL Final       ?150 mg/dL (1.7 mmol/L) or current treatment for elevated triglycerides   HDL cholesterol  Direct Measure HDL   Date Value Ref Range Status   01/28/2023 41 >=40 mg/dL Final   ]   Women <50 mg/dL (1.3 mmol/L) in women or current treatment for low HDL cholesterol  Men <40 mg/dL (1 mmol/L) in men or current treatment for low HDL cholesterol     Fasting plasma glucose (FPG) Lab " "Results   Component Value Date     01/28/2023      FPG ?100 mg/dL (5.6 mmol/L) or treatment for elevated blood glucose   Blood pressure  BP Readings from Last 3 Encounters:   04/03/23 105/69   01/27/23 118/74   07/20/22 112/79    Blood pressure ?130/85 mmHg or treatment for elevated blood pressure   Family History  See family history     Appearance: awake, alert, dressed in hospital scrubs and unkempt  Attitude:  cooperative, calm  Eye Contact: fair  Mood: \"clear in the head\"  Affect:  mood congruent and intensity is blunted  Speech:  clear, coherent. Appropriate while mildly confused  Language: fluent and intact in English  Psychomotor, Gait, Musculoskeletal:  no evidence of tardive dyskinesia, dystonia, or tics  Thought Process:  Linear, ruminative, mild disorganization  Associations:  No evidence of loose associations  Thought Content:  SI w/plan and intent upon discharge, no plan or intent while in hospital. CAH present. Denies VH.   Insight:  fair  Judgement:  fair  Oriented to:  time, person, and place  Attention Span and Concentration:  fair  Recent and Remote Memory:  fair  Fund of Knowledge:  appropriate    Clinical Global Impressions  First:  Considering your total clinical experience with this particular patient population, how severe are the patient's symptoms at this time?: 7 (01/28/23 1341)    Most recent:  Compared to the patient's condition at the START of treatment, this patient's condition is: 4           Precautions:     Behavioral Orders   Procedures     Code 1 - Restrict to Unit     Code 2     For imaging     Electroconvulsive therapy     Series of up to 12 treatments. Begin Date: 2/27/23     Treating Psychiatrist providing ECT:  Dr. Kearns     Notified on:  2/23/23     Electroconvulsive therapy     For acute ECT series, MWF, up to 12 treatment.     Electroconvulsive therapy     Series of up to 12 treatments. Begin Date: 02/26/23     Treating Psychiatrist providing ECT: Clifton  Notified " "on: 02/24/23     Fall precautions     [place]     MMPI     Routine Programming     As clinically indicated     Status 15     Every 15 minutes.     Suicide precautions     Patients on Suicide Precautions should have a Combination Diet ordered that includes a Diet selection(s) AND a Behavioral Tray selection for Safe Tray - with utensils, or Safe Tray - NO utensils            Diagnoses:     Active suicidal ideation with intent outside of hospital setting  MDD, recurrent, severe with psychotic features vs Schizoaffective Disorder, depressive type  Polysubstance use  Unspecified mood disorder  ADHD, inattentive type per chart  History of idiopathic hypersomnolence per chart  Nicotine use disorder, dependence and withdrawal   Allergies- chronic urticaria and rhinitis per chart  HD per chart          Assessment & Plan:     Assessment and hospital summary:  This patient is a 32 year old male with history of mood disorder, ADHD and substance use who presented to [facility] ED with SI on 1/26 in context of reported methamphetamine use and being kicked out of sober living. Medically cleared in ED, placed on 72[facility] and admitted to [facility]. Limited historian, giving inconsistent reports about substance use, UDS negative, very somnolent, endorsing ongoing SI and some psychosis symptoms. PTA medications continued with exception of finasteride as patient states he takes \"1/4 a pill\" and declined ordered dose, will defer to primary team to address. Will continue to evaluate safety and stabilization further as patient more able to engage in assessments. Inpatient psychiatric hospitalization is warranted at this time for safety, stabilization, and possible adjustment in medications.    Patient reports that he abruptly stopped Zoloft one week prior to his admission. He developed discontinuation syndrome symptoms following that. He reports that he does not have a history of psychosis but is experiencing psychotic symptoms. He is amenable with " plan to trial risperidone after R/B/A were discussed. Risperidone was initiated on 1/30 and titrated to a total of 3 mg daily on 2/1. Clonidine, used for ADHD, was reduced from a total of 0.3 mg daily to 0.2 mg daily on 2/3 due to concern for hypotension. 2/9: Cardiology consult placed. EKG- tachycardia 121 bpm, QTc 465 ms, Abnormal QRS angle and T wave abnormality. COVID negative and labs are unremarkable.  On 2/10, clozapine was held pending additional workup from IM and cardiology. Pericarditis was ruled out and metoprolol was started. Clozapine was restarted on 2/13 with plan to cautiously titrate to lowest effective dose. 2/23/23: Clozapine titration schedule increased 25 mg/day. ECT and IM consults for ECT clearance placed. 2/27/23: ECT initiated. Risperidone was discontinued on 2/28. Clozapine level obtained on 3/3 at corresponding dose of 300 mg and was within therapeutic range (1001). Metformin increased to 1000 mg BID on 3/6 to target increased appetite/wt gain. Minimal improvement noted since initiation of both clozapine and ECT. 3/16: Patient reports orthostasis symptoms have resolved and some subjective improvement in depression symptoms with ECT. IDS-SR depression screening given to patient. 3/26: reduced clozapine to 200 mg qhs per Dr. Lazo recommendation as may interfere with ECT response. Clozapine blood level was 614. 3/30: Rule 25 consult placed    Psychiatric treatment/inteventions:  Medications:   -Reduced clozapine to 200 mg at bedtime on 3/23. Per Dr. Lazo, his current clozapine dose and blood levels, being so high, might be getting in the way of a strong inhibitory response to ECT (one mechanism of action for ECT is the seizure recruits inhibitory neurons (EDEL) and that helps with the therapeutic benefits).    -Continue PTA sertraline 200 mg daily for mood  -Continue PTA gabapentin 300 mg TID for anxiety   -Continue Cogentin 1 mg at bedtime for possible EPSE     -PRN hydroxyzine 25 mg  every 4 hour for anxiety  -PRN trazodone 50 mg at bedtime for sleep   -PRN olanzapine 10mg PO or IM TID for agitation/psychosis   -PRN atropine 1% SL drops, 2 drops q2h for sialorrhea    -Start Lithium CR 900mg at bedtime    Will re-evaluate for increasing Clozapine    Spiritual services consult placed on 2/6. Pt is [employer]reg[employer]. Appreciate assistance.  Psychological consult completed and reviewed by writer. Please see Dr. Nation's note dated 3/22 for details.     Chemical dependency consult placed on 3/30/23.    ECT:  - Medically cleared on 2/24. See IM note for details.  - ECT consult placed on 2/23.  - ECT started on 2/27/23  - HOLD Gabapentin on nights prior to ECT and on ECT mornings until after ECT.   - ECT #14 is completed 3/29 with plan to complete a total of 15 treatments in the acute series.     ECT Recs 3/31 after Session 15:  - STOP ECT  Unless Dr Brown/inpatient team believes there is a tangible improvement with the last 3-4 ECT sessions (when clozapine dose was reduced)  - Continue current medications; Could increase clozapine if clinically necessary if stopping ECT.  We stopped ECT accordingly    4/3/2023, CD consult completed:  Will send ARACELY to unit [facility] and make referral today.  Pt requesting to return to [facility]. Pt may have trouble finding SAL treatment due to ongoing SI and appears pt has not used substances in months. Pt did ask writer about meth prescription, stating he is isn't getting anything for his ADHD.  Explained to pt I am not a prescriber, will have to talk with mental health providers. Pt verbalized understanding.    Laboratory/Imaging: reviewed: Covid negative; UDS negative; CMP, CBC, TSH, Lipid panel, HgbA1c ordered to complete admission labs. Reviewed.     2/9/23: Troponin, CK, CBC, BMP: Unremarkable, CRP elevated to 38.18, COVID: Negative  2/10/23: Add Influenza A/B given flu-like sx-negative  Patient will be treated in therapeutic milieu with appropriate individual and group  therapies as described.     Medical treatment/interventions:  Medical concerns:   Nicotine replacement ordered, educate patient on benefits of cessation, pt unclear about finasteride dose, will hold until further information is obtained. PTA PRN zyrtec, azelastine, fluticasone, triamcinolone and Epipen ordered for allergies and PRN ammonia lactate, Eucerin for  dry skin.    Flatulence:   - simethicone prn    Neuroleptic induced wt gain:  - Monitor weights  - Continue metformin 1,000 mg BID with meals    Dyslipidemia: Will arrange follow up with PCP to address. Discussed importance of healthy eating habits and regular exercise. Will consider nutrition consult if patient amenable.     Orthostasis likely 2/2 clozapine: Pt is not hypotensive but reports orthostatic symptoms and previously restricted fluids. Now resolved.   - Continue to monitor vital signs closely  - Encourage fluids  - Discontinued clonidine and reduced clozapine dose    Sialorrhea 2/2 clozapine:  - Atropine 1% SL drops qhs  - Recommend towel on pillow when sleeping    Chest pain/tachycardia/EKG changes (2/9):  - Pain improved with PRN medications.   - Cardiology consult placed on 2/9. See note on that date for details.   - ECHO reviewed and unremarkable.  - Writer discussed care with both Dr. Streeter from [facility] and Christina from . Plan for now is to HOLD clozapine until further medical workup. Dr. Streeter explained that myocarditis has been ruled out as troponin was negative. Pericarditis remains on differential, but he does not have EKG findings or a pericardial effusion. IM will assess for pericardial friction rub and pleuritic chest pain. IM seen by patient and Metoprolol was started.     Update 2/13: Discussed care with Annette from  today on two occasions. Please see her note on this date for details. She does not suspect that this is pericarditis. He does not have pleuritic pain, characteristic CP, EKG changes, or pericardial effusion on ECHO.  Therefore, will cautiously restart clozapine while monitoring closely for side effects. May consider further increase in metoprolol if necessary. PPI was started due to suspected GERD.     Update 2/24 per IM note:  Medicine is following peripherally for concerns for pericarditis.  See detailed note from 2/13.  No pericardial friction rub noted while sitting up and leaning forward today.  Patient states that his chest discomfort is getting better after starting the Protonix yesterday.  It does appear that he has also been using the Maalox.  Patient does have Tums available as well.  Please be mindful for any constipation with overuse of the PRNs.     POC:  - Continue Protonix daily for 8 weeks, then taper off  - Recommend lifestyle changes including weight loss head of bed elevation avoidance of late-night eating and specific food elimination such as chocolate caffeine alcohol acidic and/or spicy food.  - Watch for constipation with PRNs for heartburn  - We will schedule senna 1 tab twice daily  - MiraLAX daily as needed added on     Medicine will sign off, please contact us for any acute changes.      Sore throat/cough/nasal congestion, resolved:   - COVID negative on 2/9. Repeat COVID test and Influenza A/B neg on 2/10.  - Cepacol lozenges added  - PRN Tylenol   - Consulted with IM. Appreciate assistance. See their notes for details.      Legal Status: VOLUNTARY. 72 hour hold discontinued. Pt agreed to sign in on voluntary basis and discharge directly to treatment once stable.      Safety Assessment:        Behavioral Orders   Procedures     Code 1 - Restrict to Unit     Routine Programming       As clinically indicated     Status 15       Every 15 minutes.     Suicide precautions       Patients on Suicide Precautions should have a Combination Diet ordered that includes a Diet selection(s) AND a Behavioral Tray selection for Safe Tray - with utensils, or Safe Tray - NO utensils        Withdrawal precautions      Pt  has not required locked seclusion or restraints in the past 24 hours to maintain safety, please refer to RN documentation for further details.    Discontinued SIO on 2/8 as patient is heriberto for safety. Monitor SI closely.     The risks, benefits, alternatives and side effects have been discussed and are understood by the patient.     Disposition: Pending clinical stabilization. Pt remains actively suicidal. Will likely discharge back to [facility] program once stable.       This patient was seen and discussed with my attending physician Dr Brown.  Carla Whitehead MD, PGY2,  Psychiatry Resident

## 2023-04-03 NOTE — PLAN OF CARE
"  Problem: Suicidal Behavior  Goal: Suicidal Behavior is Absent or Managed  Outcome: Progressing   Goal Outcome Evaluation:    Pt remained isolative and withdrawn to his room. Presented with depressed mood but brightened upon approach. Pt stated \" I'd like my doctor to give me medications for ADHD (methamphetamines ).\" Pt endorsed \"high depression and low anxiety.\" Pt continues to hear voices telling him to kill himself. But pt stated that he feels safe in the hospital. Pt requested and received prn Tylenol for \" butt pain.\" Pt had excellent an appetite and denied any bowel and bladder issue.   Plan: Continue to encourage adequate hygiene while provide safe environment and therapeutic milieu.                             "

## 2023-04-03 NOTE — PLAN OF CARE
RN Assessment:    Pt presented with flat affect. Pt was calm and cooperative while interacting with the writer. Pt was alert and oriented x 4. Pt endorsed having SI with plan to harm self outside of the hospital. Pt agreed to safety plan while in the hospital. Pt denied having HI and hallucinations. Pt endorsed having general body pain rated at 4/10. Pt declined all interventions offered for pain. Pt endorsed sleeping well last night. Pt feels that the medications that are currently ordered are working well. No medication side effects endorsed by pt or observed by writer. Pt was isolative and withdrawn this shift. Continue to monitor for safety and changes in medical condition.

## 2023-04-04 PROCEDURE — 250N000013 HC RX MED GY IP 250 OP 250 PS 637

## 2023-04-04 PROCEDURE — 124N000002 HC R&B MH UMMC

## 2023-04-04 PROCEDURE — G0177 OPPS/PHP; TRAIN & EDUC SERV: HCPCS

## 2023-04-04 PROCEDURE — 250N000013 HC RX MED GY IP 250 OP 250 PS 637: Performed by: PSYCHIATRY & NEUROLOGY

## 2023-04-04 PROCEDURE — H2032 ACTIVITY THERAPY, PER 15 MIN: HCPCS

## 2023-04-04 RX ADMIN — FLUTICASONE PROPIONATE 2 SPRAY: 50 SPRAY, METERED NASAL at 09:12

## 2023-04-04 RX ADMIN — GABAPENTIN 300 MG: 300 CAPSULE ORAL at 09:10

## 2023-04-04 RX ADMIN — CLOZAPINE 200 MG: 200 TABLET ORAL at 19:41

## 2023-04-04 RX ADMIN — ATROPINE SULFATE 2 DROP: 10 SOLUTION/ DROPS OPHTHALMIC at 19:42

## 2023-04-04 RX ADMIN — PANTOPRAZOLE SODIUM 40 MG: 40 TABLET, DELAYED RELEASE ORAL at 09:10

## 2023-04-04 RX ADMIN — LORATADINE 10 MG: 10 TABLET ORAL at 09:10

## 2023-04-04 RX ADMIN — SERTRALINE HYDROCHLORIDE 200 MG: 100 TABLET ORAL at 09:10

## 2023-04-04 RX ADMIN — GABAPENTIN 300 MG: 300 CAPSULE ORAL at 19:41

## 2023-04-04 RX ADMIN — Medication 12.5 MG: at 09:11

## 2023-04-04 RX ADMIN — GABAPENTIN 300 MG: 300 CAPSULE ORAL at 13:38

## 2023-04-04 RX ADMIN — SENNOSIDES AND DOCUSATE SODIUM 1 TABLET: 50; 8.6 TABLET ORAL at 19:41

## 2023-04-04 RX ADMIN — SENNOSIDES AND DOCUSATE SODIUM 1 TABLET: 50; 8.6 TABLET ORAL at 09:13

## 2023-04-04 RX ADMIN — METFORMIN HYDROCHLORIDE 1000 MG: 500 TABLET ORAL at 18:10

## 2023-04-04 RX ADMIN — METFORMIN HYDROCHLORIDE 1000 MG: 500 TABLET ORAL at 09:10

## 2023-04-04 RX ADMIN — NICOTINE 1 PATCH: 21 PATCH, EXTENDED RELEASE TRANSDERMAL at 09:27

## 2023-04-04 RX ADMIN — LITHIUM CARBONATE 900 MG: 450 TABLET, EXTENDED RELEASE ORAL at 19:41

## 2023-04-04 ASSESSMENT — ACTIVITIES OF DAILY LIVING (ADL)
ADLS_ACUITY_SCORE: 29
HYGIENE/GROOMING: INDEPENDENT
LAUNDRY: UNABLE TO COMPLETE
ADLS_ACUITY_SCORE: 29
LAUNDRY: WITH SUPERVISION
ADLS_ACUITY_SCORE: 29
ADLS_ACUITY_SCORE: 29
DRESS: INDEPENDENT
ORAL_HYGIENE: INDEPENDENT
DRESS: SCRUBS (BEHAVIORAL HEALTH)
ORAL_HYGIENE: INDEPENDENT
ADLS_ACUITY_SCORE: 29
HYGIENE/GROOMING: HANDWASHING;INDEPENDENT

## 2023-04-04 NOTE — PLAN OF CARE
Assessment/Intervention/Current Symtoms and Care Coordination  -Chart review    -Rounded with team, addressed patient needs/concerns: completed CD Assessment yesterday; Faxed to [facility]    Current Symptoms include the following: Isolative to room all day    Discharge Plan or Goal  Pending stabilization & development of a safe discharge plan.  Considerations include:  [facility]    Barriers to Discharge  Patient requires further psychiatric stabilization due to current symptomology: CTC cancelled admission to [facility] for Wednesday; will need to contact Erlin @ [facility] a couple of days prior to when discharge date known.     Referral Status  No referrals made this hospitalization     Legal Status  Voluntary

## 2023-04-04 NOTE — PLAN OF CARE
Problem: Psychotic Signs/Symptoms  Goal: Improved Psychomotor Symptoms (Psychotic Signs/Symptoms)  Intervention: Manage Psychomotor Movement  Recent Flowsheet Documentation  Taken 4/4/2023 0314 by Maryse Esparza RN  Patient Performed Hygiene: dressed   Goal Outcome Evaluation:    Patient stable on this shift , affect flat with depressed mood, rated depression 9/10  and anxiety 7/10. Still have suicidal thoughts, but with no plan to harm himself in the hospital. Patient isolated self in room most of  the shift, but came out around 1300 and attended OT group, and was social with peers in group. Patient is medication complaint, appetite was good ate 100% of  breakfast and lunch. No complain of pain or any discomfort. No behaviors issue  or any safety concern.

## 2023-04-04 NOTE — PLAN OF CARE
04/04/23 1526   Group Therapy Session   Group Attendance attended group session   Total Time (minutes) 45   Total # Attendees 3   Group Type task skill   Group Topic Covered coping skills/lifestyle management;structured socialization   Group Session Detail OT clinic   Patient Response/Contribution cooperative with task     Pt declined invitation to morning discussion group opting to stay in his room, though readily attended the afternoon OT clinic group. Congruent and engaged. Polite to writer and peers. Demonstrated consistent performance skills as observed on previous dates - independent with all simple tasks. Observed with calming effect while completing project.

## 2023-04-04 NOTE — PLAN OF CARE
Problem: Adult Behavioral Health Plan of Care  Goal: Plan of Care Review  Outcome: Progressing     Problem: Psychotic Signs/Symptoms  Goal: Improved Sleep (Psychotic Signs/Symptoms)  Intervention: Promote Healthy Sleep Hygiene  Recent Flowsheet Documentation  Taken 4/4/2023 [id number] by Reuben Wetzel RN  Sleep Hygiene Promotion:   noise level reduced   regular sleep pattern promoted     Problem: Sleep Disturbance  Goal: Adequate Sleep/Rest  Outcome: Progressing   Goal Outcome Evaluation:       Pt appears to be sleeping comfortably during all safety checks. No signs of pain, discomfort, or behavioral symptoms noted. Pt slept for a total of 7 hours. Will continue with same plan of care.

## 2023-04-04 NOTE — PROGRESS NOTES
04/04/23 1800   Group Therapy Session   Time Session Began 1615   Time Session Ended 1705   Total Time (minutes) 10   Total # Attendees 7   Group Type expressive therapy   Group Topic Covered coping skills/lifestyle management;relationship   Group Session Detail Attachment Styles in Songs   Patient Response/Contribution cooperative with task   Patient Participation Detail Engaged in identifying different relationship attachment styles (dependent, interdependent, independent) from a playlist of popular songs, and the qualities of each based on a handout given.  Discussed which category they felt each yu fell into and why.      Haseeb presented as calm and cooperative during his time in session.  No redirections needed.

## 2023-04-05 PROCEDURE — G0177 OPPS/PHP; TRAIN & EDUC SERV: HCPCS

## 2023-04-05 PROCEDURE — 250N000013 HC RX MED GY IP 250 OP 250 PS 637: Performed by: PSYCHIATRY & NEUROLOGY

## 2023-04-05 PROCEDURE — 250N000013 HC RX MED GY IP 250 OP 250 PS 637

## 2023-04-05 PROCEDURE — H2032 ACTIVITY THERAPY, PER 15 MIN: HCPCS

## 2023-04-05 PROCEDURE — 99232 SBSQ HOSP IP/OBS MODERATE 35: CPT | Performed by: PSYCHIATRY & NEUROLOGY

## 2023-04-05 PROCEDURE — 124N000002 HC R&B MH UMMC

## 2023-04-05 RX ADMIN — GABAPENTIN 300 MG: 300 CAPSULE ORAL at 08:43

## 2023-04-05 RX ADMIN — PANTOPRAZOLE SODIUM 40 MG: 40 TABLET, DELAYED RELEASE ORAL at 08:43

## 2023-04-05 RX ADMIN — FLUTICASONE PROPIONATE 2 SPRAY: 50 SPRAY, METERED NASAL at 08:44

## 2023-04-05 RX ADMIN — SERTRALINE HYDROCHLORIDE 200 MG: 100 TABLET ORAL at 10:39

## 2023-04-05 RX ADMIN — LITHIUM CARBONATE 900 MG: 450 TABLET, EXTENDED RELEASE ORAL at 20:21

## 2023-04-05 RX ADMIN — NICOTINE 1 PATCH: 21 PATCH, EXTENDED RELEASE TRANSDERMAL at 08:43

## 2023-04-05 RX ADMIN — ACETAMINOPHEN 325MG 650 MG: 325 TABLET ORAL at 18:12

## 2023-04-05 RX ADMIN — CLOZAPINE 200 MG: 200 TABLET ORAL at 20:21

## 2023-04-05 RX ADMIN — SENNOSIDES AND DOCUSATE SODIUM 1 TABLET: 50; 8.6 TABLET ORAL at 20:21

## 2023-04-05 RX ADMIN — METFORMIN HYDROCHLORIDE 1000 MG: 500 TABLET ORAL at 18:07

## 2023-04-05 RX ADMIN — METFORMIN HYDROCHLORIDE 1000 MG: 500 TABLET ORAL at 10:39

## 2023-04-05 RX ADMIN — GABAPENTIN 300 MG: 300 CAPSULE ORAL at 20:21

## 2023-04-05 RX ADMIN — SENNOSIDES AND DOCUSATE SODIUM 1 TABLET: 50; 8.6 TABLET ORAL at 08:43

## 2023-04-05 RX ADMIN — GABAPENTIN 300 MG: 300 CAPSULE ORAL at 14:36

## 2023-04-05 RX ADMIN — Medication 12.5 MG: at 08:43

## 2023-04-05 RX ADMIN — LORATADINE 10 MG: 10 TABLET ORAL at 08:47

## 2023-04-05 ASSESSMENT — ACTIVITIES OF DAILY LIVING (ADL)
ADLS_ACUITY_SCORE: 29
DRESS: SCRUBS (BEHAVIORAL HEALTH)
ADLS_ACUITY_SCORE: 29
HYGIENE/GROOMING: HANDWASHING;SHOWER;INDEPENDENT
ADLS_ACUITY_SCORE: 29
ORAL_HYGIENE: INDEPENDENT
ADLS_ACUITY_SCORE: 29
LAUNDRY: UNABLE TO COMPLETE

## 2023-04-05 NOTE — PLAN OF CARE
Problem: Adult Behavioral Health Plan of Care  Goal: Plan of Care Review  Outcome: Progressing     Problem: Psychotic Signs/Symptoms  Goal: Improved Sleep (Psychotic Signs/Symptoms)  Intervention: Promote Healthy Sleep Hygiene  Recent Flowsheet Documentation  Taken 4/5/2023 [id number] by Reuben Wetzel RN  Sleep Hygiene Promotion:   noise level reduced   regular sleep pattern promoted     Problem: Sleep Disturbance  Goal: Adequate Sleep/Rest  Outcome: Progressing   Goal Outcome Evaluation:        Pt appears to be sleeping comfortably during all safety checks. No signs of pain, discomfort, or behavioral symptoms noted. Pt slept for a total of 7 hours. Will continue with same plan of care.

## 2023-04-05 NOTE — PROGRESS NOTES
"[facility], [place]   Psychiatric Progress Note  Hospital Day: 68        Interim History:     The patient's care was discussed with the treatment team during the daily team meeting and/or staff's chart notes were reviewed.   Per RN note dated 4/4/23:   Patient remains isolative and withdrawn to room all evening only out to retreive meals and with prompting to attend group. Patient upon approach flat in affect, calm and cooperative with verbal assessment though appearing distracted at times. Patient continues to report anxiety and depression 6/10 with auditory hallucinations that he identifies as \"whispers\" that tell him to \"kill himself\". Patient continues to deny plan or intent to act while hospitalized and contracted for safety. Patient stating understanding with plan to discharge to treatment and understanding that it will no longer occur tomorrow. Patient still stating concern for safety after discharge but did identify to RN that he thought he would talk to someone and seek help if having thoughts to act on suicidal thoughts.      Patient did actively participate in group with RN prompting to attend, reporting to RN that the music was therapeutic, identifying and demonstrating the use of headphones later in the evening to cope with symptoms. RN writer attempted throughout the evening to encourage increased activity with others in the milieu but patient was unreceptive.      Patient cooperative with vital sign assessments which were stable. Patient diet appeared good eating 100% of dinner and receptive of increased fluid intake. Patient accepting of HS medications with no stated or observed side effects. Patient identifying that he believes the medications are therapeutic with decreasing his auditory hallucinations. Patient did request methamphetamine from RN writer twice this evening appearing unreceptive of RN's explanation for why that was inappropriate and not realistic. RN " "ensure that patient didn't possibly have medications for ADHD confused with street drugs though he was able to differentiate between the two though continued to request and report that he has only had relief from depression with methamphetamine use.        Upon interview:  Haseeb reports that he is \"still depressed.\" When asked about suicide intent upon discharge, he replied \"I don't know how to answer that.\" He could not confirm that he would seek help in the future despite telling RN otherwise the night prior. He does feel he has been in the hospital a long time and would like to be discharged, but again was unable to contract for safety.     Homicidal ideation: denies current or recent homicidal ideation or behaviors.     Psychotic symptoms: CAH (\"whispers\") to shoot self that have reduced in frequency, denies VH    Medication side effects reported: None    Acute medical concerns: None    Other issues reported by patient: Patient had no further questions or concerns.            Medications:       atropine  2 drop Sublingual At Bedtime     cloZAPine  200 mg Oral At Bedtime     fluticasone  2 spray Both Nostrils Daily     gabapentin  300 mg Oral TID     lithium ER  900 mg Oral At Bedtime     loratadine  10 mg Oral Daily     metFORMIN  1,000 mg Oral BID w/meals     metoprolol succinate ER  12.5 mg Oral Daily     nicotine  1 patch Transdermal Daily     nicotine   Transdermal Q8H     pantoprazole  40 mg Oral QAM AC     senna-docusate  1 tablet Oral BID     sertraline  200 mg Oral Daily          Allergies:   No Known Allergies       Labs:     No results found for this or any previous visit (from the past 24 hour(s)).       Psychiatric Examination:     /81 (BP Location: Left arm)   Pulse 102   Temp 97.3  F (36.3  C) (Temporal)   Resp 20   Ht 1.6 m (5' 3\")   Wt 79.7 kg (175 lb 9.6 oz)   SpO2 96%   BMI 31.11 kg/m    Weight is 175 lbs 9.6 oz  Body mass index is 31.11 kg/m .    Weight over time:  Vitals:    " "01/27/23 1718 03/07/23 0933 03/31/23 0745   Weight: 75.1 kg (165 lb 8 oz) 79 kg (174 lb 1.6 oz) 79.7 kg (175 lb 9.6 oz)       Orthostatic Vitals       None          Cardiometabolic risk assessment. 01/30/23    Reviewed patient profile for cardiometabolic risk factors    Date taken /Value  REFERENCE RANGE   Abdominal Obesity  (Waist Circumference)   See nursing flowsheet Women ?35 in (88 cm)   Men ?40 in (102 cm)      Triglycerides  Triglycerides   Date Value Ref Range Status   01/28/2023 192 (H) <150 mg/dL Final       ?150 mg/dL (1.7 mmol/L) or current treatment for elevated triglycerides   HDL cholesterol  Direct Measure HDL   Date Value Ref Range Status   01/28/2023 41 >=40 mg/dL Final   ]   Women <50 mg/dL (1.3 mmol/L) in women or current treatment for low HDL cholesterol  Men <40 mg/dL (1 mmol/L) in men or current treatment for low HDL cholesterol     Fasting plasma glucose (FPG) Lab Results   Component Value Date     01/28/2023      FPG ?100 mg/dL (5.6 mmol/L) or treatment for elevated blood glucose   Blood pressure  BP Readings from Last 3 Encounters:   04/04/23 117/81   01/27/23 118/74   07/20/22 112/79    Blood pressure ?130/85 mmHg or treatment for elevated blood pressure   Family History  See family history     Appearance: awake, alert, dressed in hospital scrubs and unkempt  Attitude:  cooperative, calm  Eye Contact: fair  Mood: \"still depressed\"  Affect:  mood congruent and intensity is blunted  Speech:  clear, coherent. Appropriate while mildly confused  Language: fluent and intact in English  Psychomotor, Gait, Musculoskeletal:  no evidence of tardive dyskinesia, dystonia, or tics  Thought Process:  Linear, ruminative, mild disorganization  Associations:  No evidence of loose associations  Thought Content:  SI w/plan and intent upon discharge, no plan or intent while in hospital. CAH present. Denies VH.   Insight:  fair  Judgement:  fair  Oriented to:  time, person, and place  Attention Span and " Concentration:  fair  Recent and Remote Memory:  fair  Fund of Knowledge:  appropriate    Clinical Global Impressions  First:  Considering your total clinical experience with this particular patient population, how severe are the patient's symptoms at this time?: 7 (01/28/23 6551)    Most recent:  Compared to the patient's condition at the START of treatment, this patient's condition is: 4           Precautions:     Behavioral Orders   Procedures     Code 1 - Restrict to Unit     Code 2     For imaging     Electroconvulsive therapy     Series of up to 12 treatments. Begin Date: 2/27/23     Treating Psychiatrist providing ECT:  Dr. Kearns     Notified on:  2/23/23     Electroconvulsive therapy     For acute ECT series, MWF, up to 12 treatment.     Electroconvulsive therapy     Series of up to 12 treatments. Begin Date: 02/26/23     Treating Psychiatrist providing ECT: Clifton  Notified on: 02/24/23     Fall precautions     [place]     MMPI     Routine Programming     As clinically indicated     Status 15     Every 15 minutes.     Suicide precautions     Patients on Suicide Precautions should have a Combination Diet ordered that includes a Diet selection(s) AND a Behavioral Tray selection for Safe Tray - with utensils, or Safe Tray - NO utensils            Diagnoses:     Active suicidal ideation with intent outside of hospital setting  MDD, recurrent, severe with psychotic features vs Schizoaffective Disorder, depressive type  Polysubstance use  Unspecified mood disorder  ADHD, inattentive type per chart  History of idiopathic hypersomnolence per chart  Nicotine use disorder, dependence and withdrawal   Allergies- chronic urticaria and rhinitis per chart  HD per chart          Assessment & Plan:     Assessment and hospital summary:  This patient is a 32 year old male with history of mood disorder, ADHD and substance use who presented to [facility] ED with SI on 1/26 in context of reported methamphetamine use and being  "kicked out of sober living. Medically cleared in ED, placed on 72HH and admitted to 12N. Limited historian, giving inconsistent reports about substance use, UDS negative, very somnolent, endorsing ongoing SI and some psychosis symptoms. PTA medications continued with exception of finasteride as patient states he takes \"1/4 a pill\" and declined ordered dose, will defer to primary team to address. Will continue to evaluate safety and stabilization further as patient more able to engage in assessments. Inpatient psychiatric hospitalization is warranted at this time for safety, stabilization, and possible adjustment in medications.    Patient reports that he abruptly stopped Zoloft one week prior to his admission. He developed discontinuation syndrome symptoms following that. He reports that he does not have a history of psychosis but is experiencing psychotic symptoms. He is amenable with plan to trial risperidone after R/B/A were discussed. Risperidone was initiated on 1/30 and titrated to a total of 3 mg daily on 2/1. Clonidine, used for ADHD, was reduced from a total of 0.3 mg daily to 0.2 mg daily on 2/3 due to concern for hypotension. 2/9: Cardiology consult placed. EKG- tachycardia 121 bpm, QTc 465 ms, Abnormal QRS angle and T wave abnormality. COVID negative and labs are unremarkable.  On 2/10, clozapine was held pending additional workup from IM and cardiology. Pericarditis was ruled out and metoprolol was started. Clozapine was restarted on 2/13 with plan to cautiously titrate to lowest effective dose. 2/23/23: Clozapine titration schedule increased 25 mg/day. ECT and IM consults for ECT clearance placed. 2/27/23: ECT initiated. Risperidone was discontinued on 2/28. Clozapine level obtained on 3/3 at corresponding dose of 300 mg and was within therapeutic range (1001). Metformin increased to 1000 mg BID on 3/6 to target increased appetite/wt gain. Minimal improvement noted since initiation of both clozapine " and ECT. 3/16: Patient reports orthostasis symptoms have resolved and some subjective improvement in depression symptoms with ECT. IDS-SR depression screening given to patient. 3/26: reduced clozapine to 200 mg qhs per Dr. Lazo recommendation as may interfere with ECT response. Clozapine blood level was 614. 3/30: Rule 25 consult placed.     Psychiatric treatment/inteventions:  Medications:   -Reduced clozapine to 200 mg at bedtime on 3/23. Per Dr. Lazo, his current clozapine dose and blood levels, being so high, might be getting in the way of a strong inhibitory response to ECT (one mechanism of action for ECT is the seizure recruits inhibitory neurons (EDEL) and that helps with the therapeutic benefits).    -Continue PTA sertraline 200 mg daily for mood  -Continue PTA gabapentin 300 mg TID for anxiety   -Continue Cogentin 1 mg at bedtime for possible EPSE     -PRN hydroxyzine 25 mg every 4 hour for anxiety  -PRN trazodone 50 mg at bedtime for sleep   -PRN olanzapine 10mg PO or IM TID for agitation/psychosis   -PRN atropine 1% SL drops, 2 drops q2h for sialorrhea    -Lithium CR 900mg at bedtime. Li level will be checked on Friday.     Will re-evaluate for increasing Clozapine    Spiritual services consult placed on 2/6. Pt is Judaism. Appreciate assistance.  Psychological consult completed and reviewed by writer. Please see Dr. Nation's note dated 3/22 for details.     Chemical dependency consult placed on 3/30/23.    ECT:  - Medically cleared on 2/24. See IM note for details.  - ECT consult placed on 2/23.  - ECT started on 2/27/23  - HOLD Gabapentin on nights prior to ECT and on ECT mornings until after ECT.   - ECT #14 is completed 3/29 with plan to complete a total of 15 treatments in the acute series.     ECT Recs 3/31 after Session 15:  - STOP ECT  Unless Dr Brown/inpatient team believes there is a tangible improvement with the last 3-4 ECT sessions (when clozapine dose was reduced)  - Continue current  medications; Could increase clozapine if clinically necessary if stopping ECT.  We stopped ECT accordingly. Pt will no longer have ECT.     4/3/2023, CD consult completed:  Will send ARACELY to unit [facility] and make referral today.  Pt requesting to return to [facility]. Pt may have trouble finding SAL treatment due to ongoing SI and appears pt has not used substances in months. Pt did ask writer about meth prescription, stating he is isn't getting anything for his ADHD.  Explained to pt I am not a prescriber, will have to talk with mental health providers. Pt verbalized understanding.    Laboratory/Imaging: reviewed: Covid negative; UDS negative; CMP, CBC, TSH, Lipid panel, HgbA1c ordered to complete admission labs. Reviewed.     2/9/23: Troponin, CK, CBC, BMP: Unremarkable, CRP elevated to 38.18, COVID: Negative  2/10/23: Add Influenza A/B given flu-like sx-negative  Patient will be treated in therapeutic milieu with appropriate individual and group therapies as described.     Medical treatment/interventions:  Medical concerns:   Nicotine replacement ordered, educate patient on benefits of cessation, pt unclear about finasteride dose, will hold until further information is obtained. PTA PRN zyrtec, azelastine, fluticasone, triamcinolone and Epipen ordered for allergies and PRN ammonia lactate, Eucerin for  dry skin.    Flatulence:   - simethicone prn    Neuroleptic induced wt gain:  - Monitor weights  - Continue metformin 1,000 mg BID with meals    Dyslipidemia: Will arrange follow up with PCP to address. Discussed importance of healthy eating habits and regular exercise. Will consider nutrition consult if patient amenable.     Orthostasis likely 2/2 clozapine: Pt is not hypotensive but reports orthostatic symptoms and previously restricted fluids. Now resolved.   - Continue to monitor vital signs closely  - Encourage fluids  - Discontinued clonidine and reduced clozapine dose    Sialorrhea 2/2 clozapine:  - Atropine 1% SL  drops qhs  - Recommend towel on pillow when sleeping    Chest pain/tachycardia/EKG changes (2/9):  - Pain improved with PRN medications.   - Cardiology consult placed on 2/9. See note on that date for details.   - ECHO reviewed and unremarkable.  - Writer discussed care with both Dr. Streeter from [facility] and Christina from IM. Plan for now is to HOLD clozapine until further medical workup. Dr. Streeter explained that myocarditis has been ruled out as troponin was negative. Pericarditis remains on differential, but he does not have EKG findings or a pericardial effusion. IM will assess for pericardial friction rub and pleuritic chest pain. IM seen by patient and Metoprolol was started.     Update 2/13: Discussed care with Annette from  today on two occasions. Please see her note on this date for details. She does not suspect that this is pericarditis. He does not have pleuritic pain, characteristic CP, EKG changes, or pericardial effusion on ECHO. Therefore, will cautiously restart clozapine while monitoring closely for side effects. May consider further increase in metoprolol if necessary. PPI was started due to suspected GERD.     Update 2/24 per IM note:  Medicine is following peripherally for concerns for pericarditis.  See detailed note from 2/13.  No pericardial friction rub noted while sitting up and leaning forward today.  Patient states that his chest discomfort is getting better after starting the Protonix yesterday.  It does appear that he has also been using the Maalox.  Patient does have Tums available as well.  Please be mindful for any constipation with overuse of the PRNs.     POC:  - Continue Protonix daily for 8 weeks, then taper off  - Recommend lifestyle changes including weight loss head of bed elevation avoidance of late-night eating and specific food elimination such as chocolate caffeine alcohol acidic and/or spicy food.  - Watch for constipation with PRNs for heartburn  - We will schedule senna 1 tab  twice daily  - MiraLAX daily as needed added on     Medicine will sign off, please contact us for any acute changes.      Sore throat/cough/nasal congestion, resolved:   - COVID negative on 2/9. Repeat COVID test and Influenza A/B neg on 2/10.  - Cepacol lozenges added  - PRN Tylenol   - Consulted with IM. Appreciate assistance. See their notes for details.      Legal Status: VOLUNTARY. 72 hour hold discontinued. Pt agreed to sign in on voluntary basis and discharge directly to treatment once stable.      Safety Assessment:        Behavioral Orders   Procedures     Code 1 - Restrict to Unit     Routine Programming       As clinically indicated     Status 15       Every 15 minutes.     Suicide precautions       Patients on Suicide Precautions should have a Combination Diet ordered that includes a Diet selection(s) AND a Behavioral Tray selection for Safe Tray - with utensils, or Safe Tray - NO utensils        Withdrawal precautions      Pt has not required locked seclusion or restraints in the past 24 hours to maintain safety, please refer to RN documentation for further details.    Discontinued SIO on 2/8 as patient is heriberto for safety. Monitor SI closely.     The risks, benefits, alternatives and side effects have been discussed and are understood by the patient.     Disposition: Pending clinical stabilization. Pt remains actively suicidal. [facility].       Ana M Brown MD  [facility]

## 2023-04-05 NOTE — PLAN OF CARE
"  Problem: Thought Process Alteration  Goal: Optimal Thought Clarity  Outcome: Not Progressing     Problem: Suicidal Behavior  Goal: Suicidal Behavior is Absent or Managed  Outcome: Not Progressing     Problem: Adult Behavioral Health Plan of Care  Goal: Adheres to Safety Considerations for Self and Others  Intervention: Develop and Maintain Individualized Safety Plan  Recent Flowsheet Documentation  Taken 4/4/2023 2222 by Gerber Metcalf RN  Safety Measures:   suicide check-in completed   safety plan reviewed   Goal Outcome Evaluation:    Plan of Care Reviewed With: patient        Patient remains isolative and withdrawn to room all evening only out to retreive meals and with prompting to attend group. Patient upon approach flat in affect, calm and cooperative with verbal assessment though appearing distracted at times. Patient continues to report anxiety and depression 6/10 with auditory hallucinations that he identifies as \"whispers\" that tell him to \"kill himself\". Patient continues to deny plan or intent to act while hospitalized and contracted for safety. Patient stating understanding with plan to discharge to treatment and understanding that it will no longer occur tomorrow. Patient still stating concern for safety after discharge but did identify to RN that he thought he would talk to someone and seek help if having thoughts to act on suicidal thoughts.     Patient did actively participate in group with RN prompting to attend, reporting to RN that the music was therapeutic, identifying and demonstrating the use of headphones later in the evening to cope with symptoms. RN writer attempted throughout the evening to encourage increased activity with others in the milieu but patient was unreceptive.     Patient cooperative with vital sign assessments which were stable. Patient diet appeared good eating 100% of dinner and receptive of increased fluid intake. Patient accepting of HS medications with no stated or " observed side effects. Patient identifying that he believes the medications are therapeutic with decreasing his auditory hallucinations. Patient did request methamphetamine from RN writer twice this evening appearing unreceptive of RN's explanation for why that was inappropriate and not realistic. RN ensure that patient didn't possibly have medications for ADHD confused with street drugs though he was able to differentiate between the two though continued to request and report that he has only had relief from depression with methamphetamine use.

## 2023-04-05 NOTE — PLAN OF CARE
04/05/23 1408   Group Therapy Session   Group Attendance attended group session   Time Session Began 1300   Time Session Ended 1345   Total Time (minutes) 45   Total # Attendees 5   Group Type Occupational Therapy   Group Topic Covered balanced lifestyle;coping skills/lifestyle management;cognitive activities;leisure exploration/use of leisure time;relaxation techniques;structured socialization   Group Session Detail OT Leisure Group   Patient Participation Detail   Intervention: Leisure Group with 4 peers.    Patient Response: Pt participated in a group dice activity for leisure exploration and participation as a healthy coping skill, socialization, problem solving and sequencing. Discussed how participation in leisure activities can be used as a healthy coping skill in symptom management and a strategy to reduce stress. Was an active participant throughout the duration of the activity. Did not require any additional instruction after explanation of game other than asking one clarification questions of writer. Was accepting of others' strategy tips during their turn, however also able to complete on his own. Did not ind initiate conversation outside of his turn, however briefly engaged when another peer initiated conversation.     Mood/Affect:  Anxious, Pleasant      Plan: Patient encouraged to maintain attendance for continued ongoing support in working towards occupational therapy goals to support overall treatment/care.

## 2023-04-05 NOTE — PLAN OF CARE
Assessment/Intervention/Current Symtoms and Care Coordination  -Chart review     -Rounded with team, addressed patient needs/concerns: Medication titration in process.      Discharge Plan or Goal  Pending stabilization & development of a safe discharge plan.  Considerations include:  Lattitudes     Barriers to Discharge  Patient requires further psychiatric stabilization due to current symptomology    Referral Status  No referrals made this hospitaliZation     Legal Status  Voluntary

## 2023-04-05 NOTE — PLAN OF CARE
"Pt had an uneventful shift this day spending most of the day isolated to room. Pt denies SIB and thoughts of hurting others. Pt endorses depression and anxiety rated 6/10 respectively. Pt endorses AH as whispers that tell him to complete suicide. Pt suicide intent assessed on a scale of 0-10, pt rated 8/10.     Mood is \"good\" with flat affect that brightens upon approach. Pt was med compliant. Food and fluid intake adequate.    Pt was med compliant. Attended groups with appropriate interaction with staff and peers.    VS reviewed: /81 (BP Location: Left arm)   Pulse 102   Temp 97.3  F (36.3  C) (Temporal)   Resp 20   Ht 1.6 m (5' 3\")   Wt 79.7 kg (175 lb 9.6 oz)   SpO2 96%   BMI 31.11 kg/m   . Pt denies pain.    Length of stay: 68  "

## 2023-04-05 NOTE — PLAN OF CARE
"   04/05/23 1208   Group Therapy Session   Group Attendance attended group session   Time Session Began 1015   Time Session Ended 1100   Total Time (minutes) 45   Total # Attendees 3   Group Type Occupational Therapy   Group Topic Covered balanced lifestyle;coping skills/lifestyle management;emotions/expression;relaxation techniques;self-care activities;structured socialization   Group Session Detail General Health and Coping   Patient Participation Detail Intervention: General Health and Coping Group with 2 peers.    Patient Response: Pt participated in discussion activity with emphasis on healthy coping skills and different emotional regulation and self-care practices to support overall health and wellness. Was an active participant throughout the group time, offering responses to each of the coping skills discussion questions sometimes ind and others when asked/prompted by writer. When asked to name an accomplishment in their life they are proud of patient stated \"I don't really feel like I have that much going on in my life right now to be proud of\". Patient had earlier stated that he graduated from [employer] at 22 with a bachelors in political science with goals to become a . When reminded of this he responded saying \"well that was 10 years ago though\". Writer reminded patient that this is still a major accomplishment in his life to be proud of and peer sitting next to patient agreed and offered patient a compliment on this. Patient further shared that he would like to go back to school to get his masters in an area such as communications and explore opportunities within the tech industry. 3 things he is grateful for is \"Hardy here, a bed in a warm place to sleep and food\".     Mood/Affect: Anxious, Pleasant       Plan: Patient encouraged to maintain attendance for continued ongoing support in working towards occupational therapy goals to support overall treatment/care.          "

## 2023-04-06 LAB
BASOPHILS # BLD AUTO: 0 10E3/UL (ref 0–0.2)
BASOPHILS NFR BLD AUTO: 0 %
EOSINOPHIL # BLD AUTO: 0.3 10E3/UL (ref 0–0.7)
EOSINOPHIL NFR BLD AUTO: 3 %
IMM GRANULOCYTES # BLD: 0.1 10E3/UL
IMM GRANULOCYTES NFR BLD: 1 %
LYMPHOCYTES # BLD AUTO: 2.2 10E3/UL (ref 0.8–5.3)
LYMPHOCYTES NFR BLD AUTO: 27 %
MONOCYTES # BLD AUTO: 0.7 10E3/UL (ref 0–1.3)
MONOCYTES NFR BLD AUTO: 8 %
NEUTROPHILS # BLD AUTO: 5 10E3/UL (ref 1.6–8.3)
NEUTROPHILS NFR BLD AUTO: 61 %
NRBC # BLD AUTO: 0 10E3/UL
NRBC BLD AUTO-RTO: 0 /100
WBC # BLD AUTO: 8.3 10E3/UL (ref 4–11)

## 2023-04-06 PROCEDURE — 250N000013 HC RX MED GY IP 250 OP 250 PS 637

## 2023-04-06 PROCEDURE — 124N000002 HC R&B MH UMMC

## 2023-04-06 PROCEDURE — 85048 AUTOMATED LEUKOCYTE COUNT: CPT | Performed by: PSYCHIATRY & NEUROLOGY

## 2023-04-06 PROCEDURE — 99232 SBSQ HOSP IP/OBS MODERATE 35: CPT | Mod: GC | Performed by: PSYCHIATRY & NEUROLOGY

## 2023-04-06 PROCEDURE — 250N000013 HC RX MED GY IP 250 OP 250 PS 637: Performed by: PSYCHIATRY & NEUROLOGY

## 2023-04-06 PROCEDURE — G0177 OPPS/PHP; TRAIN & EDUC SERV: HCPCS

## 2023-04-06 PROCEDURE — 36415 COLL VENOUS BLD VENIPUNCTURE: CPT | Performed by: PSYCHIATRY & NEUROLOGY

## 2023-04-06 RX ORDER — ATROPINE SULFATE 10 MG/ML
2 SOLUTION/ DROPS OPHTHALMIC
Status: DISCONTINUED | OUTPATIENT
Start: 2023-04-06 | End: 2023-04-12 | Stop reason: HOSPADM

## 2023-04-06 RX ADMIN — GABAPENTIN 300 MG: 300 CAPSULE ORAL at 13:45

## 2023-04-06 RX ADMIN — CLOZAPINE 200 MG: 200 TABLET ORAL at 19:39

## 2023-04-06 RX ADMIN — METFORMIN HYDROCHLORIDE 1000 MG: 500 TABLET ORAL at 08:38

## 2023-04-06 RX ADMIN — FLUTICASONE PROPIONATE 2 SPRAY: 50 SPRAY, METERED NASAL at 08:54

## 2023-04-06 RX ADMIN — SENNOSIDES AND DOCUSATE SODIUM 1 TABLET: 50; 8.6 TABLET ORAL at 19:39

## 2023-04-06 RX ADMIN — NICOTINE 1 PATCH: 21 PATCH, EXTENDED RELEASE TRANSDERMAL at 08:54

## 2023-04-06 RX ADMIN — Medication 12.5 MG: at 08:36

## 2023-04-06 RX ADMIN — GABAPENTIN 300 MG: 300 CAPSULE ORAL at 08:38

## 2023-04-06 RX ADMIN — LORATADINE 10 MG: 10 TABLET ORAL at 08:38

## 2023-04-06 RX ADMIN — SERTRALINE HYDROCHLORIDE 200 MG: 100 TABLET ORAL at 08:41

## 2023-04-06 RX ADMIN — METFORMIN HYDROCHLORIDE 1000 MG: 500 TABLET ORAL at 17:58

## 2023-04-06 RX ADMIN — PANTOPRAZOLE SODIUM 40 MG: 40 TABLET, DELAYED RELEASE ORAL at 08:41

## 2023-04-06 RX ADMIN — GABAPENTIN 300 MG: 300 CAPSULE ORAL at 19:39

## 2023-04-06 RX ADMIN — SENNOSIDES AND DOCUSATE SODIUM 1 TABLET: 50; 8.6 TABLET ORAL at 08:41

## 2023-04-06 RX ADMIN — LITHIUM CARBONATE 900 MG: 450 TABLET, EXTENDED RELEASE ORAL at 19:39

## 2023-04-06 ASSESSMENT — ACTIVITIES OF DAILY LIVING (ADL)
ADLS_ACUITY_SCORE: 29
DRESS: SCRUBS (BEHAVIORAL HEALTH)
ADLS_ACUITY_SCORE: 29
ADLS_ACUITY_SCORE: 29
ORAL_HYGIENE: INDEPENDENT
ADLS_ACUITY_SCORE: 29
HYGIENE/GROOMING: INDEPENDENT
ADLS_ACUITY_SCORE: 29
HYGIENE/GROOMING: HANDWASHING;INDEPENDENT
ADLS_ACUITY_SCORE: 29
LAUNDRY: UNABLE TO COMPLETE
LAUNDRY: UNABLE TO COMPLETE
ORAL_HYGIENE: INDEPENDENT
ADLS_ACUITY_SCORE: 29
DRESS: INDEPENDENT

## 2023-04-06 NOTE — PLAN OF CARE
"Pt had an uneventful shift this day. Pt attended groups with appropriate interaction with staff and peers. Pt is hopeful about the possibility of starting therapy while still inpatient. Pt mood is \"good\", affect is flat but brightens upon approach. Appetite and fluid intake adequate.    Pt continues to endorse command auditory hallucinations telling him to complete suicide. Suicide risk assessed on a scale of 0-10, pt rated at 7. Nature of the AH haven't changed as the \"whispers\" tell him to get a gun and kill himself.    Pt continues to endorse depression and anxiety but is future oriented.    VS reviewed: /85   Pulse 96   Temp 98.6  F (37  C) (Temporal)   Resp 16   Ht 1.6 m (5' 3\")   Wt 79.5 kg (175 lb 4.8 oz)   SpO2 94%   BMI 31.05 kg/m   . Patient denies pain.    Length of stay: 69  "

## 2023-04-06 NOTE — PLAN OF CARE
Assessment/Intervention/Current Symtoms and Care Coordination  -Chart review     -Rounded with team, addressed patient needs/concerns: Medication titration in process.     -Contacted CTC supervisor again regarding possibility of getting therapy services on the unit for pt. Awaiting response.      Discharge Plan or Goal  Pending stabilization & development of a safe discharge plan.  Considerations include:  Lattitudes     Barriers to Discharge  Patient requires further psychiatric stabilization due to current symptomology     Referral Status  No referrals made this hospitaliZation     Legal Status  Voluntary

## 2023-04-06 NOTE — PROGRESS NOTES
04/05/23 1900   Group Therapy Session   Group Attendance attended group session   Time Session Began 1615   Time Session Ended 1705   Total Time (minutes) 45   Total # Attendees 4   Group Type recreation   Group Topic Covered leisure exploration/use of leisure time   Group Session Detail TR leisure group   Patient Response/Contribution cooperative with task   Patient Participation Detail Pt participated in Therapeutic Recreation group with focus on leisure participation, communication skills, and critical thinking. Engaged and focused in the group recreational activity via a group game.  Pt was a full participant throughout the entire duration of group. Pt was familiar with the activity, needing little assistance during his turns.

## 2023-04-06 NOTE — PLAN OF CARE
Problem: Sleep Disturbance  Goal: Adequate Sleep/Rest  Outcome: Adequate for Care Transition   Goal Outcome Evaluation:    Patient appeared to sleep 7 hours this night shift.  No prns or snacks given or requested.  No concerns were reported or noted.

## 2023-04-06 NOTE — PLAN OF CARE
Goal Outcome Evaluation:    Plan of Care Reviewed With: patient            Problem: Thought Process Alteration  Goal: Optimal Thought Clarity  Outcome: Not Progressing     Problem: Suicidal Behavior  Goal: Suicidal Behavior is Absent or Managed  Outcome: Not Progressing     Problem: Adult Behavioral Health Plan of Care  Goal: Adheres to Safety Considerations for Self and Others  Intervention: Develop and Maintain Individualized Safety Plan  Recent Flowsheet Documentation  Taken 4/5/2023 2113 by Gerber Metcalf RN  Safety Measures:   suicide check-in completed   safety plan reviewed   Patient remains isolative to room all evening only coming out with prompting for group and increased physical activity. Patient upon approach remains flat in affect, calm during verbal interactions but often appearing distracted. Patient reports continued anxiety 9/10 and depression 6/10 with ongoing auditory hallucinations and suicidal ideation. Patient reporting he feels safe while in the hospital with his suicidal thoughts related to discharge. Patient reports increased concern with discharge and fears for worsening of suicidal thoughts with discharge. Patient unable to identify any specific plan and states he would seek help to come back to the hospital if the thoughts worsened.    Patient early in the evening reporting generalized body aches 9/10. Patient refused PRN medication but was receptive of plan to increase physical activity. Patient was observed to do some walking in his room and for a short time in the [place] but reported no change in the pain. With reassessment patient was receptive of PRN Tylenol later reporting relief. Patient cooperative with vital sign assessments, initially having a low blood and slightly increased pulse but stabilized with RN reassessment. Patient was prompted for increased fluid intake which he demonstrated. Patient accepting of HS medications with no stated or observed side effects. Patient declined  HS scheduled sublingual atropine this evening reporting no hypersalivation. Patient diet appeared good eating 100% of dinner and snack. Patient prompted for completion of ADL's, he was independent with completing dental hygiene but declined to shower this evening.

## 2023-04-06 NOTE — PLAN OF CARE
04/06/23 1516   Group Therapy Session   Group Attendance attended group session   Total Time (minutes) 50   Total # Attendees 4   Group Type task skill   Group Topic Covered coping skills/lifestyle management;leisure exploration/use of leisure time;structured socialization   Group Session Detail OT clinic   Patient Response/Contribution cooperative with task     Congruent and engaged. Polite to writer and peers. Demonstrated consistent performance skills as observed on previous dates - independent with all simple tasks. Observed with calming effect while completing project.   Pt engaged in brief conversation with new peers, introduced self, commented that he may be returning to his treatment program next week.

## 2023-04-06 NOTE — PROGRESS NOTES
"[facility], [place]   Psychiatric Progress Note  Hospital Day: 69        Interim History:     The patient's care was discussed with the treatment team during the daily team meeting and/or staff's chart notes were reviewed.     Per Staff report: Slept 7 hrs overnight without concerns. Often isolates himself in room but will join groups when prompted. Affected is flat and appears distracted during interactions. Reporting 9/10 anxiety, 6/10 depression and auditory hallucinations. Endorses SI related to discharge but not while in hospital. Reported generalized body aches and with improvement after PRN acetaminophen. SL atropine was declined. Brushed teeth when prompted but declined shower.      Upon interview: Haseeb describes his mood as \"better, not completely there\".  He reports depression has improved while hospitalized but continues to endorse suicidal ideation upon discharge. He has difficulty explaining why he thinks this is so and states he would likely reach out for help or return to the hospital if feeling suicidal after discharge while also stating he would likely commit suicide when discharged. Haseeb described goals such as returning to work and paying off debts denied other personal goals. He reports feeling grateful for life, food and shelter and having these help reduce his desire for suicide. He reports medications are helpful. Sialorrhea is well managed with a towel on his pillow and is amenable to changing atropine drops to prn. He notes soreness in his back upon waking and declines a mattress topper. He reports ongoing constant command auditory hallucinations described as \"whispers\" that tell him to kill himself that have become more easy to ignore.   Homicidal ideation: denies current or recent homicidal ideation or behaviors.     Psychotic symptoms: CAH (\"whispers\") to shoot self that are easier to ignore. Denies VH    Medication side effects reported: Loose stools " "yesterday x4, 1 x BM today    Acute medical concerns: None    Other issues reported by patient: Patient had no further questions or concerns.            Medications:       atropine  2 drop Sublingual At Bedtime     cloZAPine  200 mg Oral At Bedtime     fluticasone  2 spray Both Nostrils Daily     gabapentin  300 mg Oral TID     lithium ER  900 mg Oral At Bedtime     loratadine  10 mg Oral Daily     metFORMIN  1,000 mg Oral BID w/meals     metoprolol succinate ER  12.5 mg Oral Daily     nicotine  1 patch Transdermal Daily     nicotine   Transdermal Q8H     pantoprazole  40 mg Oral QAM AC     senna-docusate  1 tablet Oral BID     sertraline  200 mg Oral Daily          Allergies:   No Known Allergies       Labs:     No results found for this or any previous visit (from the past 24 hour(s)).       Psychiatric Examination:     /76 (BP Location: Right arm, Patient Position: Sitting, Cuff Size: Adult Regular)   Pulse 91   Temp 98.6  F (37  C) (Temporal)   Resp 16   Ht 1.6 m (5' 3\")   Wt 79.7 kg (175 lb 9.6 oz)   SpO2 96%   BMI 31.11 kg/m    Weight is 175 lbs 9.6 oz  Body mass index is 31.11 kg/m .    Weight over time:  Vitals:    01/27/23 1718 03/07/23 0933 03/31/23 0745   Weight: 75.1 kg (165 lb 8 oz) 79 kg (174 lb 1.6 oz) 79.7 kg (175 lb 9.6 oz)       Orthostatic Vitals       None          Cardiometabolic risk assessment. 01/30/23    Reviewed patient profile for cardiometabolic risk factors    Date taken /Value  REFERENCE RANGE   Abdominal Obesity  (Waist Circumference)   See nursing flowsheet Women ?35 in (88 cm)   Men ?40 in (102 cm)      Triglycerides  Triglycerides   Date Value Ref Range Status   01/28/2023 192 (H) <150 mg/dL Final       ?150 mg/dL (1.7 mmol/L) or current treatment for elevated triglycerides   HDL cholesterol  Direct Measure HDL   Date Value Ref Range Status   01/28/2023 41 >=40 mg/dL Final   ]   Women <50 mg/dL (1.3 mmol/L) in women or current treatment for low HDL cholesterol  Men <40 " "mg/dL (1 mmol/L) in men or current treatment for low HDL cholesterol     Fasting plasma glucose (FPG) Lab Results   Component Value Date     01/28/2023      FPG ?100 mg/dL (5.6 mmol/L) or treatment for elevated blood glucose   Blood pressure  BP Readings from Last 3 Encounters:   04/05/23 107/76   01/27/23 118/74   07/20/22 112/79    Blood pressure ?130/85 mmHg or treatment for elevated blood pressure   Family History  See family history     Appearance: awake, alert, dressed in hospital scrubs and unkempt  Attitude:  cooperative, calm  Eye Contact: fair  Mood: \"better, no completely there\"  Affect:  mood congruent and intensity is blunted  Speech:  clear, coherent. Appropriate some response delay  Language: fluent and intact in English  Psychomotor, Gait, Musculoskeletal:  no evidence of tardive dyskinesia, dystonia, or tics  Thought Process:  Linear, ruminative, mild disorganization  Associations:  No evidence of loose associations  Thought Content:  SI w/plan and intent upon discharge, no plan or intent while in hospital. CAH present. Denies VH.   Insight:  fair  Judgement:  fair  Oriented to:  time, person, and place  Attention Span and Concentration:  fair  Recent and Remote Memory:  fair  Fund of Knowledge:  appropriate    Clinical Global Impressions  First:  Considering your total clinical experience with this particular patient population, how severe are the patient's symptoms at this time?: 7 (01/28/23 1341)    Most recent:  Compared to the patient's condition at the START of treatment, this patient's condition is: 4           Precautions:     Behavioral Orders   Procedures     Code 1 - Restrict to Unit     Code 2     For imaging     Electroconvulsive therapy     Series of up to 12 treatments. Begin Date: 2/27/23     Treating Psychiatrist providing ECT:  Dr. Kearns     Notified on:  2/23/23     Electroconvulsive therapy     For acute ECT series, MWF, up to 12 treatment.     Electroconvulsive therapy " "    Series of up to 12 treatments. Begin Date: 02/26/23     Treating Psychiatrist providing ECT: Clifton  Notified on: 02/24/23     Fall precautions     [place]     MMPI     Routine Programming     As clinically indicated     Status 15     Every 15 minutes.     Suicide precautions     Patients on Suicide Precautions should have a Combination Diet ordered that includes a Diet selection(s) AND a Behavioral Tray selection for Safe Tray - with utensils, or Safe Tray - NO utensils            Diagnoses:     Active suicidal ideation with intent outside of hospital setting  MDD, recurrent, severe with psychotic features vs Schizoaffective Disorder, depressive type  Polysubstance use  Unspecified mood disorder  ADHD, inattentive type per chart  History of idiopathic hypersomnolence per chart  Nicotine use disorder, dependence and withdrawal   Allergies- chronic urticaria and rhinitis per chart  HD per chart          Assessment & Plan:     Assessment and hospital summary:  This patient is a 32 year old male with history of mood disorder, ADHD and substance use who presented to [place] ED with SI on 1/26 in context of reported methamphetamine use and being kicked out of sober living. Medically cleared in ED, placed on 72HH and admitted to [facility]. Limited historian, giving inconsistent reports about substance use, UDS negative, very somnolent, endorsing ongoing SI and some psychosis symptoms. PTA medications continued with exception of finasteride as patient states he takes \"1/4 a pill\" and declined ordered dose, will defer to primary team to address. Will continue to evaluate safety and stabilization further as patient more able to engage in assessments. Inpatient psychiatric hospitalization is warranted at this time for safety, stabilization, and possible adjustment in medications.    Patient reports that he abruptly stopped Zoloft one week prior to his admission. He developed discontinuation syndrome symptoms following that. He " reports that he does not have a history of psychosis but is experiencing psychotic symptoms. He is amenable with plan to trial risperidone after R/B/A were discussed. Risperidone was initiated on 1/30 and titrated to a total of 3 mg daily on 2/1. Clonidine, used for ADHD, was reduced from a total of 0.3 mg daily to 0.2 mg daily on 2/3 due to concern for hypotension. 2/9: Cardiology consult placed. EKG- tachycardia 121 bpm, QTc 465 ms, Abnormal QRS angle and T wave abnormality. COVID negative and labs are unremarkable.  On 2/10, clozapine was held pending additional workup from IM and cardiology. Pericarditis was ruled out and metoprolol was started. Clozapine was restarted on 2/13 with plan to cautiously titrate to lowest effective dose. 2/23/23: Clozapine titration schedule increased 25 mg/day. ECT and IM consults for ECT clearance placed. 2/27/23: ECT initiated. Risperidone was discontinued on 2/28. Clozapine level obtained on 3/3 at corresponding dose of 300 mg and was within therapeutic range (1001). Metformin increased to 1000 mg BID on 3/6 to target increased appetite/wt gain. Minimal improvement noted since initiation of both clozapine and ECT. 3/16: Patient reports orthostasis symptoms have resolved and some subjective improvement in depression symptoms with ECT. IDS-SR depression screening given to patient. 3/26: reduced clozapine to 200 mg qhs per Dr. Lazo recommendation as may interfere with ECT response. Clozapine blood level was 614. 3/30: Rule 25 consult placed.     Psychiatric treatment/inteventions:  Medications:   -Reduced clozapine to 200 mg at bedtime on 3/23. Per Dr. Lazo, his current clozapine dose and blood levels, being so high, might be getting in the way of a strong inhibitory response to ECT (one mechanism of action for ECT is the seizure recruits inhibitory neurons (EDEL) and that helps with the therapeutic benefits).    -Continue PTA sertraline 200 mg daily for mood  -Continue PTA  gabapentin 300 mg TID for anxiety   -Continue Cogentin 1 mg at bedtime for possible EPSE     -PRN hydroxyzine 25 mg every 4 hour for anxiety  -PRN trazodone 50 mg at bedtime for sleep   -PRN olanzapine 10mg PO or IM TID for agitation/psychosis   -PRN atropine 1% SL drops, 2 drops q2h for sialorrhea    -Lithium CR 900mg at bedtime. Li level will be checked on Friday.     Will re-evaluate for increasing Clozapine    Spiritual services consult placed on 2/6. Pt is Yarsanism. Appreciate assistance.  Psychological consult completed and reviewed by writer. Please see Dr. Nation's note dated 3/22 for details.     Chemical dependency consult placed on 3/30/23.    ECT:  - Medically cleared on 2/24. See IM note for details.  - ECT consult placed on 2/23.  - ECT started on 2/27/23  - HOLD Gabapentin on nights prior to ECT and on ECT mornings until after ECT.   - ECT #14 is completed 3/29 with plan to complete a total of 15 treatments in the acute series.     ECT Recs 3/31 after Session 15:  - STOP ECT  Unless Dr Brown/inpatient team believes there is a tangible improvement with the last 3-4 ECT sessions (when clozapine dose was reduced)  - Continue current medications; Could increase clozapine if clinically necessary if stopping ECT.  We stopped ECT accordingly. Pt will no longer have ECT.     4/3/2023, CD consult completed:  Will send ARACELY to unit [facility] and make referral today.  Pt requesting to return to [facility]. Pt may have trouble finding SAL treatment due to ongoing SI and appears pt has not used substances in months. Pt did ask writer about meth prescription, stating he is isn't getting anything for his ADHD.  Explained to pt I am not a prescriber, will have to talk with mental health providers. Pt verbalized understanding.    Laboratory/Imaging: reviewed: Covid negative; UDS negative; CMP, CBC, TSH, Lipid panel, HgbA1c ordered to complete admission labs. Reviewed.     2/9/23: Troponin, CK, CBC, BMP: Unremarkable, CRP  elevated to 38.18, COVID: Negative  2/10/23: Add Influenza A/B given flu-like sx-negative  Patient will be treated in therapeutic milieu with appropriate individual and group therapies as described.     Medical treatment/interventions:  Medical concerns:   Nicotine replacement ordered, educate patient on benefits of cessation, pt unclear about finasteride dose, will hold until further information is obtained. PTA PRN zyrtec, azelastine, fluticasone, triamcinolone and Epipen ordered for allergies and PRN ammonia lactate, Eucerin for  dry skin.    Flatulence:   - simethicone prn    Neuroleptic induced wt gain:  - Monitor weights  - Continue metformin 1,000 mg BID with meals    Dyslipidemia: Will arrange follow up with PCP to address. Discussed importance of healthy eating habits and regular exercise. Will consider nutrition consult if patient amenable.     Orthostasis likely 2/2 clozapine: Pt is not hypotensive but reports orthostatic symptoms and previously restricted fluids. Now resolved.   - Continue to monitor vital signs closely  - Encourage fluids  - Discontinued clonidine and reduced clozapine dose    Sialorrhea 2/2 clozapine:  - Atropine 1% SL drops qhs  - Recommend towel on pillow when sleeping    Chest pain/tachycardia/EKG changes (2/9):  - Pain improved with PRN medications.   - Cardiology consult placed on 2/9. See note on that date for details.   - ECHO reviewed and unremarkable.  - Writer discussed care with both Dr. Streeter from [facility] and Christina from . Plan for now is to HOLD clozapine until further medical workup. Dr. Streeter explained that myocarditis has been ruled out as troponin was negative. Pericarditis remains on differential, but he does not have EKG findings or a pericardial effusion. IM will assess for pericardial friction rub and pleuritic chest pain. IM seen by patient and Metoprolol was started.     Update 2/13: Discussed care with Annette from  today on two occasions. Please see her note  on this date for details. She does not suspect that this is pericarditis. He does not have pleuritic pain, characteristic CP, EKG changes, or pericardial effusion on ECHO. Therefore, will cautiously restart clozapine while monitoring closely for side effects. May consider further increase in metoprolol if necessary. PPI was started due to suspected GERD.     Update 2/24 per IM note:  Medicine is following peripherally for concerns for pericarditis.  See detailed note from 2/13.  No pericardial friction rub noted while sitting up and leaning forward today.  Patient states that his chest discomfort is getting better after starting the Protonix yesterday.  It does appear that he has also been using the Maalox.  Patient does have Tums available as well.  Please be mindful for any constipation with overuse of the PRNs.     POC:  - Continue Protonix daily for 8 weeks, then taper off  - Recommend lifestyle changes including weight loss head of bed elevation avoidance of late-night eating and specific food elimination such as chocolate caffeine alcohol acidic and/or spicy food.  - Watch for constipation with PRNs for heartburn  - We will schedule senna 1 tab twice daily  - MiraLAX daily as needed added on     Medicine will sign off, please contact us for any acute changes.      Sore throat/cough/nasal congestion, resolved:   - COVID negative on 2/9. Repeat COVID test and Influenza A/B neg on 2/10.  - Cepacol lozenges added  - PRN Tylenol   - Consulted with IM. Appreciate assistance. See their notes for details.      Legal Status: VOLUNTARY. 72 hour hold discontinued. Pt agreed to sign in on voluntary basis and discharge directly to treatment once stable.      Safety Assessment:        Behavioral Orders   Procedures     Code 1 - Restrict to Unit     Routine Programming       As clinically indicated     Status 15       Every 15 minutes.     Suicide precautions       Patients on Suicide Precautions should have a Combination  Diet ordered that includes a Diet selection(s) AND a Behavioral Tray selection for Safe Tray - with utensils, or Safe Tray - NO utensils        Withdrawal precautions      Pt has not required locked seclusion or restraints in the past 24 hours to maintain safety, please refer to RN documentation for further details.    Discontinued SIO on 2/8 as patient is heriberto for safety. Monitor SI closely.     The risks, benefits, alternatives and side effects have been discussed and are understood by the patient.     Disposition: Pending clinical stabilization. Pt remains actively suicidal. Will likely discharge back to [facility] program once stable.

## 2023-04-06 NOTE — PLAN OF CARE
[facility] Occupational Therapy Group Intervention Note     04/06/23 1650   Group Therapy Session   Group Attendance attended group session   Time Session Began 1600   Time Session Ended 1645   Total Time (minutes) 45   Group Type task skill   Group Topic Covered leisure exploration/use of leisure time;structured socialization   Group Session Detail OT: Leisure exploration offered for increased intrinsic motivation to engage in social non-obligatory occupations, challenge new learning, sequencing, problem solving, and retention via a cognitive group game.    Patient Response/Contribution cooperative with task;organized;listened actively   Patient Participation Detail Somewhat restricted affect however intermittently brightens upon approach. Able to learn and follow novel game instructions. Observed somewhat delayed decision making, yet remained IND with all tasks.      Cari Ponce, OT on 4/6/2023 at 4:51 PM

## 2023-04-07 LAB
HOLD SPECIMEN: NORMAL
HOLD SPECIMEN: NORMAL
LITHIUM SERPL-SCNC: 0.9 MMOL/L (ref 0.6–1.2)

## 2023-04-07 PROCEDURE — 250N000013 HC RX MED GY IP 250 OP 250 PS 637: Performed by: PSYCHIATRY & NEUROLOGY

## 2023-04-07 PROCEDURE — 250N000013 HC RX MED GY IP 250 OP 250 PS 637

## 2023-04-07 PROCEDURE — 124N000002 HC R&B MH UMMC

## 2023-04-07 PROCEDURE — G0177 OPPS/PHP; TRAIN & EDUC SERV: HCPCS

## 2023-04-07 PROCEDURE — 80178 ASSAY OF LITHIUM: CPT | Performed by: PSYCHIATRY & NEUROLOGY

## 2023-04-07 PROCEDURE — 99232 SBSQ HOSP IP/OBS MODERATE 35: CPT | Performed by: PSYCHIATRY & NEUROLOGY

## 2023-04-07 PROCEDURE — 36415 COLL VENOUS BLD VENIPUNCTURE: CPT | Performed by: PSYCHIATRY & NEUROLOGY

## 2023-04-07 RX ADMIN — GABAPENTIN 300 MG: 300 CAPSULE ORAL at 20:46

## 2023-04-07 RX ADMIN — SENNOSIDES AND DOCUSATE SODIUM 1 TABLET: 50; 8.6 TABLET ORAL at 20:46

## 2023-04-07 RX ADMIN — SERTRALINE HYDROCHLORIDE 200 MG: 100 TABLET ORAL at 08:42

## 2023-04-07 RX ADMIN — METFORMIN HYDROCHLORIDE 1000 MG: 500 TABLET ORAL at 08:42

## 2023-04-07 RX ADMIN — METFORMIN HYDROCHLORIDE 1000 MG: 500 TABLET ORAL at 18:18

## 2023-04-07 RX ADMIN — GABAPENTIN 300 MG: 300 CAPSULE ORAL at 16:11

## 2023-04-07 RX ADMIN — PANTOPRAZOLE SODIUM 40 MG: 40 TABLET, DELAYED RELEASE ORAL at 08:43

## 2023-04-07 RX ADMIN — SENNOSIDES AND DOCUSATE SODIUM 1 TABLET: 50; 8.6 TABLET ORAL at 08:43

## 2023-04-07 RX ADMIN — LORATADINE 10 MG: 10 TABLET ORAL at 08:43

## 2023-04-07 RX ADMIN — Medication 12.5 MG: at 08:42

## 2023-04-07 RX ADMIN — CLOZAPINE 200 MG: 200 TABLET ORAL at 20:45

## 2023-04-07 RX ADMIN — NICOTINE 1 PATCH: 21 PATCH, EXTENDED RELEASE TRANSDERMAL at 08:43

## 2023-04-07 RX ADMIN — LITHIUM CARBONATE 900 MG: 450 TABLET, EXTENDED RELEASE ORAL at 20:45

## 2023-04-07 RX ADMIN — FLUTICASONE PROPIONATE 2 SPRAY: 50 SPRAY, METERED NASAL at 08:43

## 2023-04-07 RX ADMIN — GABAPENTIN 300 MG: 300 CAPSULE ORAL at 08:43

## 2023-04-07 RX ADMIN — OLANZAPINE 5 MG: 5 TABLET, ORALLY DISINTEGRATING ORAL at 16:11

## 2023-04-07 ASSESSMENT — ACTIVITIES OF DAILY LIVING (ADL)
ADLS_ACUITY_SCORE: 29
ORAL_HYGIENE: INDEPENDENT
ADLS_ACUITY_SCORE: 29
ADLS_ACUITY_SCORE: 29
LAUNDRY: UNABLE TO COMPLETE
ADLS_ACUITY_SCORE: 29
HYGIENE/GROOMING: INDEPENDENT
ADLS_ACUITY_SCORE: 29
DRESS: SCRUBS (BEHAVIORAL HEALTH)

## 2023-04-07 NOTE — PROGRESS NOTES
"[facility]   Psychiatric Progress Note  Hospital Day: 70        Interim History:     The patient's care was discussed with the treatment team during the daily team meeting and/or staff's chart notes were reviewed.     Per Staff report:   Urges to harm himself are lessening. Pt actively participating in groups.   Patient reporting improvement with anxiety 5/10 but still indicated depression as high /9/10. Patient reporting continued auditory hallucinations as \"whispers\" that tell him to kill himself, heriberto for safe behaviors while in the hospital and states plan to seek help after discharge if symptoms increase. Patient reporting increasing comfort with upcoming discharge after speaking with providers and anticipating plan for meeting with therapist. Patient diet appeared good eating 100% of dinner and snack. Patient cooperative with vital sign assessment which were stable. Patient accepting of HS medications with no stated or observed side effects. Patient prompted for increased activity and completion of ADL's but refused.    Upon interview: Haseeb reports that he is feeling \"good.\" Still is feeling exhausted, \"but that was how I felt before I got here [hospital].\" Notes overall improvement in mood since ECT and Lithium were started. He now says \"I feel like I want to be alive and that I won't kill myself. I just will ignore the whispers.\" He said that he would return to ED and/or notify [facility] staff if feeling unsafe, and would not act on SI thoughts. He shared that he is feeling more hopeful, and optimistic about his future. He also shares the concern for worsening depressive symptoms in context of extended length of stay where he is unable to engage in psychotherapy on consistent basis. He is comfortable with plan for discharge next week. Discussed Li level results. He denies any side effects and he does feel it has been helping him.     Homicidal ideation: denies " "current or recent homicidal ideation or behaviors.     Psychotic symptoms: CAH (\"whispers\") to shoot self that are easier to ignore. Denies VH    Medication side effects reported: None    Acute medical concerns: None    Other issues reported by patient: Patient had no further questions or concerns.            Medications:       cloZAPine  200 mg Oral At Bedtime     fluticasone  2 spray Both Nostrils Daily     gabapentin  300 mg Oral TID     lithium ER  900 mg Oral At Bedtime     loratadine  10 mg Oral Daily     metFORMIN  1,000 mg Oral BID w/meals     metoprolol succinate ER  12.5 mg Oral Daily     nicotine  1 patch Transdermal Daily     nicotine   Transdermal Q8H     pantoprazole  40 mg Oral QAM AC     senna-docusate  1 tablet Oral BID     sertraline  200 mg Oral Daily          Allergies:   No Known Allergies       Labs:     No results found for this or any previous visit (from the past 24 hour(s)).       Psychiatric Examination:     /62   Pulse 106   Temp 97  F (36.1  C) (Temporal)   Resp 16   Ht 1.6 m (5' 3\")   Wt 79.5 kg (175 lb 4.8 oz)   SpO2 95%   BMI 31.05 kg/m    Weight is 175 lbs 4.8 oz  Body mass index is 31.05 kg/m .    Weight over time:  Vitals:    01/27/23 1718 03/07/23 0933 03/31/23 0745 04/06/23 0900   Weight: 75.1 kg (165 lb 8 oz) 79 kg (174 lb 1.6 oz) 79.7 kg (175 lb 9.6 oz) 79.5 kg (175 lb 4.8 oz)       Orthostatic Vitals       None          Cardiometabolic risk assessment. 01/30/23    Reviewed patient profile for cardiometabolic risk factors    Date taken /Value  REFERENCE RANGE   Abdominal Obesity  (Waist Circumference)   See nursing flowsheet Women ?35 in (88 cm)   Men ?40 in (102 cm)      Triglycerides  Triglycerides   Date Value Ref Range Status   01/28/2023 192 (H) <150 mg/dL Final       ?150 mg/dL (1.7 mmol/L) or current treatment for elevated triglycerides   HDL cholesterol  Direct Measure HDL   Date Value Ref Range Status   01/28/2023 41 >=40 mg/dL Final   ]   Women <50 mg/dL " "(1.3 mmol/L) in women or current treatment for low HDL cholesterol  Men <40 mg/dL (1 mmol/L) in men or current treatment for low HDL cholesterol     Fasting plasma glucose (FPG) Lab Results   Component Value Date     01/28/2023      FPG ?100 mg/dL (5.6 mmol/L) or treatment for elevated blood glucose   Blood pressure  BP Readings from Last 3 Encounters:   04/06/23 100/62   01/27/23 118/74   07/20/22 112/79    Blood pressure ?130/85 mmHg or treatment for elevated blood pressure   Family History  See family history     Appearance: awake, alert, dressed in hospital scrubs and well groomed  Attitude:  cooperative, calm  Eye Contact: fair, much improved since admission  Mood: \"better\"  Affect:  mood congruent and intensity is blunted  Speech:  clear, coherent. Appropriate some response delay  Language: fluent and intact in English  Psychomotor, Gait, Musculoskeletal:  no evidence of tardive dyskinesia, dystonia, or tics  Thought Process:  Linear, ruminative, mild disorganization  Associations:  No evidence of loose associations  Thought Content:  SI with plan though denying intent and is future oriented. CAH present, but patient stating that he is now able to ignore them. Denies VH.   Insight:  fair  Judgement:  fair  Oriented to:  time, person, and place  Attention Span and Concentration:  fair  Recent and Remote Memory:  fair  Fund of Knowledge:  appropriate    Clinical Global Impressions  First:  Considering your total clinical experience with this particular patient population, how severe are the patient's symptoms at this time?: 7 (01/28/23 1341)    Most recent:  Compared to the patient's condition at the START of treatment, this patient's condition is: 4           Precautions:     Behavioral Orders   Procedures     Code 1 - Restrict to Unit     Code 2     For imaging     Electroconvulsive therapy     Series of up to 12 treatments. Begin Date: 2/27/23     Treating Psychiatrist providing ECT:  Dr. Kearns   " "  Notified on:  2/23/23     Electroconvulsive therapy     For acute ECT series, MWF, up to 12 treatment.     Electroconvulsive therapy     Series of up to 12 treatments. Begin Date: 02/26/23     Treating Psychiatrist providing ECT: Clifton  Notified on: 02/24/23     Fall precautions     [place]     MMPI     Routine Programming     As clinically indicated     Status 15     Every 15 minutes.     Suicide precautions     Patients on Suicide Precautions should have a Combination Diet ordered that includes a Diet selection(s) AND a Behavioral Tray selection for Safe Tray - with utensils, or Safe Tray - NO utensils            Diagnoses:     MDD, recurrent, severe with psychotic features vs Schizoaffective Disorder, depressive type  Active suicidal ideation with intent outside of hospital setting, resolved  Polysubstance use  Unspecified mood disorder  ADHD, inattentive type per chart  History of idiopathic hypersomnolence per chart  Nicotine use disorder, dependence and withdrawal   Allergies- chronic urticaria and rhinitis per chart  HD per chart          Assessment & Plan:     Assessment and hospital summary:  This patient is a 32 year old male with history of mood disorder, ADHD and substance use who presented to [place] ED with SI on 1/26 in context of reported methamphetamine use and being kicked out of sober living. Medically cleared in ED, placed on 72[facility] and admitted to 12[facility]. Limited historian, giving inconsistent reports about substance use, UDS negative, very somnolent, endorsing ongoing SI and some psychosis symptoms. PTA medications continued with exception of finasteride as patient states he takes \"1/4 a pill\" and declined ordered dose, will defer to primary team to address. Will continue to evaluate safety and stabilization further as patient more able to engage in assessments. Inpatient psychiatric hospitalization is warranted at this time for safety, stabilization, and possible adjustment in " medications.    Patient reports that he abruptly stopped Zoloft one week prior to his admission. He developed discontinuation syndrome symptoms following that. He reports that he does not have a history of psychosis but is experiencing psychotic symptoms. He is amenable with plan to trial risperidone after R/B/A were discussed. Risperidone was initiated on 1/30 and titrated to a total of 3 mg daily on 2/1. Clonidine, used for ADHD, was reduced from a total of 0.3 mg daily to 0.2 mg daily on 2/3 due to concern for hypotension. 2/9: Cardiology consult placed. EKG- tachycardia 121 bpm, QTc 465 ms, Abnormal QRS angle and T wave abnormality. COVID negative and labs are unremarkable.  On 2/10, clozapine was held pending additional workup from IM and cardiology. Pericarditis was ruled out and metoprolol was started. Clozapine was restarted on 2/13 with plan to cautiously titrate to lowest effective dose. 2/23/23: Clozapine titration schedule increased 25 mg/day. ECT and IM consults for ECT clearance placed. 2/27/23: ECT initiated. Risperidone was discontinued on 2/28. Clozapine level obtained on 3/3 at corresponding dose of 300 mg and was within therapeutic range (1001). Metformin increased to 1000 mg BID on 3/6 to target increased appetite/wt gain. Minimal improvement noted since initiation of both clozapine and ECT. 3/16: Patient reports orthostasis symptoms have resolved and some subjective improvement in depression symptoms with ECT. IDS-SR depression screening given to patient. 3/26: reduced clozapine to 200 mg qhs per Dr. Lazo recommendation as may interfere with ECT response. Clozapine blood level was 614. 3/30: Rule 25 consult placed. Due to ongoing severe suicidal ideation, Lithium was added on 4/3. Level therapeutic at 0.9 on 4/7. Overall significant improvement noted with regards to suicidal thinking. Depressive sx persist but are manageable. Patient now heriberto for safety outside of the hospital, and  remains future oriented.     Psychiatric treatment/inteventions:  Medications:   -Reduced clozapine to 200 mg at bedtime on 3/23. Per Dr. Lazo, his current clozapine dose and blood levels, being so high, might be getting in the way of a strong inhibitory response to ECT (one mechanism of action for ECT is the seizure recruits inhibitory neurons (EDEL) and that helps with the therapeutic benefits).    -Continue PTA sertraline 200 mg daily for mood  -Continue PTA gabapentin 300 mg TID for anxiety   -Continue Cogentin 1 mg at bedtime for possible EPSE     -PRN hydroxyzine 25 mg every 4 hour for anxiety  -PRN trazodone 50 mg at bedtime for sleep   -PRN olanzapine 10mg PO or IM TID for agitation/psychosis   -PRN atropine 1% SL drops, 2 drops q2h for sialorrhea    -Lithium CR 900mg at bedtime. Li level will be checked on Friday. Reviewed and is therapeutic at 0.9    Will re-evaluate for increasing Clozapine    Spiritual services consult placed on 2/6. Pt is [employer]. Appreciate assistance.  Psychological consult completed and reviewed by writer. Please see Dr. Nation's note dated 3/22 for details.     Chemical dependency consult placed on 3/30/23.    ECT:  - Medically cleared on 2/24. See IM note for details.  - ECT consult placed on 2/23.  - ECT started on 2/27/23  - HOLD Gabapentin on nights prior to ECT and on ECT mornings until after ECT.   - ECT #14 is completed 3/29 with plan to complete a total of 15 treatments in the acute series.     ECT Recs 3/31 after Session 15:  - STOP ECT  Unless Dr Brown/inpatient team believes there is a tangible improvement with the last 3-4 ECT sessions (when clozapine dose was reduced)  - Continue current medications; Could increase clozapine if clinically necessary if stopping ECT.  We stopped ECT accordingly. Pt will no longer have ECT.     4/3/2023, CD consult completed:  Will send ARACELY to unit [facility] and make referral today.  Pt requesting to return to [facility]. Pt may have trouble  finding SAL treatment due to ongoing SI and appears pt has not used substances in months. Pt did ask writer about meth prescription, stating he is isn't getting anything for his ADHD.  Explained to pt I am not a prescriber, will have to talk with mental health providers. Pt verbalized understanding.    Laboratory/Imaging: reviewed: Covid negative; UDS negative; CMP, CBC, TSH, Lipid panel, HgbA1c ordered to complete admission labs. Reviewed.     2/9/23: Troponin, CK, CBC, BMP: Unremarkable, CRP elevated to 38.18, COVID: Negative  2/10/23: Add Influenza A/B given flu-like sx-negative  Patient will be treated in therapeutic milieu with appropriate individual and group therapies as described.     Medical treatment/interventions:  Medical concerns:   Nicotine replacement ordered, educate patient on benefits of cessation, pt unclear about finasteride dose, will hold until further information is obtained. PTA PRN zyrtec, azelastine, fluticasone, triamcinolone and Epipen ordered for allergies and PRN ammonia lactate, Eucerin for  dry skin.    Flatulence:   - simethicone prn    Neuroleptic induced wt gain:  - Monitor weights  - Continue metformin 1,000 mg BID with meals    Dyslipidemia: Will arrange follow up with PCP to address. Discussed importance of healthy eating habits and regular exercise. Will consider nutrition consult if patient amenable.     Orthostasis likely 2/2 clozapine: Pt is not hypotensive but reports orthostatic symptoms and previously restricted fluids. Now resolved.   - Continue to monitor vital signs closely  - Encourage fluids  - Discontinued clonidine and reduced clozapine dose    Sialorrhea 2/2 clozapine:  - Atropine 1% SL drops qhs  - Recommend towel on pillow when sleeping    Chest pain/tachycardia/EKG changes (2/9):  - Pain improved with PRN medications.   - Cardiology consult placed on 2/9. See note on that date for details.   - ECHO reviewed and unremarkable.  - Writer discussed care with both  Dr. Streeter from [facility] and Christina from IM. Plan for now is to HOLD clozapine until further medical workup. Dr. Streeter explained that myocarditis has been ruled out as troponin was negative. Pericarditis remains on differential, but he does not have EKG findings or a pericardial effusion. IM will assess for pericardial friction rub and pleuritic chest pain. IM seen by patient and Metoprolol was started.     Update 2/13: Discussed care with Annette from  today on two occasions. Please see her note on this date for details. She does not suspect that this is pericarditis. He does not have pleuritic pain, characteristic CP, EKG changes, or pericardial effusion on ECHO. Therefore, will cautiously restart clozapine while monitoring closely for side effects. May consider further increase in metoprolol if necessary. PPI was started due to suspected GERD.     Update 2/24 per IM note:  Medicine is following peripherally for concerns for pericarditis.  See detailed note from 2/13.  No pericardial friction rub noted while sitting up and leaning forward today.  Patient states that his chest discomfort is getting better after starting the Protonix yesterday.  It does appear that he has also been using the Maalox.  Patient does have Tums available as well.  Please be mindful for any constipation with overuse of the PRNs.     POC:  - Continue Protonix daily for 8 weeks, then taper off  - Recommend lifestyle changes including weight loss head of bed elevation avoidance of late-night eating and specific food elimination such as chocolate caffeine alcohol acidic and/or spicy food.  - Watch for constipation with PRNs for heartburn  - We will schedule senna 1 tab twice daily  - MiraLAX daily as needed added on     Medicine will sign off, please contact us for any acute changes.      Sore throat/cough/nasal congestion, resolved:   - COVID negative on 2/9. Repeat COVID test and Influenza A/B neg on 2/10.  - Cepacol lozenges added  - PRN  Tylenol   - Consulted with IM. Appreciate assistance. See their notes for details.      Legal Status: VOLUNTARY. 72 hour hold discontinued. Pt agreed to sign in on voluntary basis and discharge directly to treatment once stable.      Safety Assessment:        Behavioral Orders   Procedures     Code 1 - Restrict to Unit     Routine Programming       As clinically indicated     Status 15       Every 15 minutes.     Suicide precautions       Patients on Suicide Precautions should have a Combination Diet ordered that includes a Diet selection(s) AND a Behavioral Tray selection for Safe Tray - with utensils, or Safe Tray - NO utensils        Withdrawal precautions      Pt has not required locked seclusion or restraints in the past 24 hours to maintain safety, please refer to RN documentation for further details.    Discontinued SIO on 2/8 as patient is heriberto for safety. Monitor SI closely.     The risks, benefits, alternatives and side effects have been discussed and are understood by the patient.     Disposition: Pending clinical stabilization. If ongoing improvement, [facility]

## 2023-04-07 NOTE — PROGRESS NOTES
"Patient reported to RN writer that he is having increased command auditory hallucinations telling him \"I need to do some meth or kill myself\". Patient reporting these voices are possibly related to anxiety  with upcoming discharge. Patient heriberto for safety that he can contact staff if the thoughts increase to where he thinks he will act. RN writer offered PRN Zyprexa 5 mg with plan for patient to use music, and group as additional coping/distractionary techniques. Will continue to monitor.  "

## 2023-04-07 NOTE — PLAN OF CARE
"Problem: Adult Behavioral Health Plan of Care  Goal: Plan of Care Review  Outcome: Progressing  Patient Agreement with Plan of Care: agrees    Patient is alert and oriented x3, calm and cooperative. Patient was present in the milieu intermittently and appeared minimally social and interactive with peers. Patient attended and actively participated in group this shift. Patient has a neutral affect, normal speech and some insight into his situation. Patient appears neat and well-groomed. Patient voices his needs appropriately. Patient endorses depression 7/10, anxiety 5/10, auditory hallucinations and SI thoughts. Patient stated that \"though I have suicidal thoughts I am at a point where I feel safe with myself.\" Patient is heriberto for safety. No acute behavioral concern. Patient is eating, hydrating, and sleeping adequately.     Patient is medication compliant with reminders. Medication side effects were not observed or reported this shift. Patient denies pain/ physical discomfort. No acute medical concern. VSS /78 (Patient Position: Sitting)   Pulse 95   Temp 97.3  F (36.3  C) (Temporal)   Resp 16   Ht 1.6 m (5' 3\")   Wt 79.5 kg (175 lb 4.8 oz)   SpO2 96%   BMI 31.05 kg/m        "

## 2023-04-07 NOTE — PLAN OF CARE
"  Problem: Thought Process Alteration  Goal: Optimal Thought Clarity  Outcome: Progressing     Problem: Psychotic Signs/Symptoms  Goal: Enhanced Social, Occupational or Functional Skills (Psychotic Signs/Symptoms)  Intervention: Promote Social, Occupational and Functional Ability  Recent Flowsheet Documentation  Taken 4/6/2023 2312 by Gerber Metcalf RN  Trust Relationship/Rapport:   care explained   choices provided   emotional support provided   empathic listening provided   questions answered   questions encouraged   reassurance provided   thoughts/feelings acknowledged   Goal Outcome Evaluation:    Plan of Care Reviewed With: patient        Patient remains isolative to room all evening only in the milieu with prompting to attend group. Patient upon approach flat in affect, calm and cooperative with verbal assessment. Patient reporting improvement with anxiety 5/10 but still indicated depression as high /9/10. Patient reporting continued auditory hallucinations as \"whispers\" that tell him to kill himself, heriberto for safe behaviors while in the hospital and states plan to seek help after discharge if symptoms increase. Patient reporting increasing comfort with upcoming discharge after speaking with providers and anticipating plan for meeting with therapist.    Patient diet appeared good eating 100% of dinner and snack. Patient cooperative with vital sign assessment which were stable. Patient accepting of HS medications with no stated or observed side effects. Patient prompted for increased activity and completion of ADL's but refused.           "

## 2023-04-07 NOTE — PLAN OF CARE
04/07/23 1527   Group Therapy Session   Group Attendance attended group session   Total Time (minutes) 130   Total # Attendees 3,3,4   Group Type task skill;psychoeducation;recreation   Group Topic Covered coping skills/lifestyle management;relapse prevention;leisure exploration/use of leisure time;structured socialization   Group Session Detail Topic group, OT clinic (30 minutes, not billed), leisure group   Patient Response/Contribution cooperative with task;listened actively     Topic group:  Pt attended the OT discussion group which involved reading a selected quote, discussing it's meaning, and relating it to personal life experience or situation.  Pt had some difficulty expanding much on the quotes he selected.  Writer offered fairly simple explanations, offered him a chance to share a life experience to relate to the quote, difficult to assess if he struggled making the connection or was apprehensive to share much about personal experiences.    OT Clinic: (due to code, group was only 30 minutes, not billed)  Pt spent most of the time quietly applying a clear coat to his wood project from yesterday.  Occasional socialization with writer and peers when conversation was initiated.    Leisure Group:  Pt was able to learn a new, slightly complex problem solving game.  Needed occasional assistance to play game pieces when he didn't think he could play.  Pt seemed to understand game at a basic level, struggled a bit to to use strategy.

## 2023-04-08 PROCEDURE — 250N000013 HC RX MED GY IP 250 OP 250 PS 637: Performed by: PSYCHIATRY & NEUROLOGY

## 2023-04-08 PROCEDURE — 250N000013 HC RX MED GY IP 250 OP 250 PS 637

## 2023-04-08 PROCEDURE — 124N000002 HC R&B MH UMMC

## 2023-04-08 RX ADMIN — NICOTINE 1 PATCH: 21 PATCH, EXTENDED RELEASE TRANSDERMAL at 07:57

## 2023-04-08 RX ADMIN — GABAPENTIN 300 MG: 300 CAPSULE ORAL at 07:56

## 2023-04-08 RX ADMIN — METFORMIN HYDROCHLORIDE 1000 MG: 500 TABLET ORAL at 19:09

## 2023-04-08 RX ADMIN — METFORMIN HYDROCHLORIDE 1000 MG: 500 TABLET ORAL at 07:56

## 2023-04-08 RX ADMIN — GABAPENTIN 300 MG: 300 CAPSULE ORAL at 14:28

## 2023-04-08 RX ADMIN — GABAPENTIN 300 MG: 300 CAPSULE ORAL at 20:24

## 2023-04-08 RX ADMIN — SERTRALINE HYDROCHLORIDE 200 MG: 100 TABLET ORAL at 07:56

## 2023-04-08 RX ADMIN — FLUTICASONE PROPIONATE 2 SPRAY: 50 SPRAY, METERED NASAL at 07:57

## 2023-04-08 RX ADMIN — CLOZAPINE 200 MG: 200 TABLET ORAL at 20:24

## 2023-04-08 RX ADMIN — SENNOSIDES AND DOCUSATE SODIUM 1 TABLET: 50; 8.6 TABLET ORAL at 07:57

## 2023-04-08 RX ADMIN — SENNOSIDES AND DOCUSATE SODIUM 1 TABLET: 50; 8.6 TABLET ORAL at 20:25

## 2023-04-08 RX ADMIN — PANTOPRAZOLE SODIUM 40 MG: 40 TABLET, DELAYED RELEASE ORAL at 07:57

## 2023-04-08 RX ADMIN — LITHIUM CARBONATE 900 MG: 450 TABLET, EXTENDED RELEASE ORAL at 20:24

## 2023-04-08 RX ADMIN — Medication 12.5 MG: at 07:56

## 2023-04-08 RX ADMIN — LORATADINE 10 MG: 10 TABLET ORAL at 07:56

## 2023-04-08 RX ADMIN — ACETAMINOPHEN 325MG 650 MG: 325 TABLET ORAL at 08:05

## 2023-04-08 ASSESSMENT — ACTIVITIES OF DAILY LIVING (ADL)
ADLS_ACUITY_SCORE: 29
ORAL_HYGIENE: INDEPENDENT
ADLS_ACUITY_SCORE: 29
LAUNDRY: UNABLE TO COMPLETE
ADLS_ACUITY_SCORE: 29
DRESS: INDEPENDENT
HYGIENE/GROOMING: INDEPENDENT
LAUNDRY: UNABLE TO COMPLETE
HYGIENE/GROOMING: HANDWASHING;INDEPENDENT
ADLS_ACUITY_SCORE: 29
ORAL_HYGIENE: INDEPENDENT
DRESS: SCRUBS (BEHAVIORAL HEALTH)
ADLS_ACUITY_SCORE: 29

## 2023-04-08 NOTE — PLAN OF CARE
"  Problem: Adult Behavioral Health Plan of Care  Goal: Plan of Care Review  Outcome: Not Progressing  Flowsheets  Taken 4/7/2023 2235  Plan of Care Reviewed With: patient  Overall Patient Progress: no change  Patient Agreement with Plan of Care: agrees  Taken 4/7/2023 2232  Patient Agreement with Plan of Care: agrees   Goal Outcome Evaluation:    Plan of Care Reviewed With: patient Plan of Care Reviewed With: patient    Overall Patient Progress: no changeOverall Patient Progress: no change       Patient remains isolative and withdrawn to his room all evening only in the milieu with prompting to attend group. Patient upon approach remains flat in affect, calm and cooperative during verbal assessment at times appearing distracted. Patient reporting increased auditory hallucinations telling him \"do some meth or else kill yourself\". Patient continues to contract for safety with plan to speak to staff if thoughts become active. Patient receptive of PRN Zyprexa and cooperative with plan to attend group, later reporting relief. Patient continues to report increased anxiety and depression that he reports remains high due to anticipation for upcoming discharge.    Patient diet appeared good eating 100% of dinner and snacks. Patient cooperative with vitals assessment which were stable. Patient accepting of HS medications with no stated or observed side effects. Patient prompted for increased activity in the milieu and completion of ADL's which he demonstrated, showering and shaving this evening.     "

## 2023-04-08 NOTE — PLAN OF CARE
Pt asleep at start of shift.     Breathing quiet and unlabored when sleeping.     Pt had no c/o pain or discomfort during the HS.     Appears to have slept 7 hours.     Goal Outcome Evaluation:  Problem: Adult Behavioral Health Plan of Care  Goal: Absence of New-Onset Illness or Injury  Outcome: Progressing

## 2023-04-08 NOTE — PLAN OF CARE
"Pt had an uneventful shift this day, mostly isolated to his room. Pt denies SIB and thoughts of hurting others. Pt endorses depression and anxiety as \"moderate\". Pt denies VH but endorses AH described as voices where he had described the AH as whispers. Pt states the voices tell him to use meth and kill himself. RN writer asked pt if he is craving meth or is it the voices telling him to use, he reported both. Pt does contract for safety while in the hospital.     tylenol given for c/o right arm pain rated 5/10. Pain reassessed an hour later rated 1/10.    Pt appetite and fluid intake adequate. No behavioral or medical concerns this day.    VS reviewed: /74 (BP Location: Left arm, Patient Position: Sitting, Cuff Size: Adult Regular)   Pulse 75   Temp 97.8  F (36.6  C) (Temporal)   Resp 16   Ht 1.6 m (5' 3\")   Wt 79.5 kg (175 lb 4.8 oz)   SpO2 94%   BMI 31.05 kg/m   . Patient denies pain.    Length of stay: 71  "

## 2023-04-09 PROCEDURE — 250N000013 HC RX MED GY IP 250 OP 250 PS 637

## 2023-04-09 PROCEDURE — 250N000013 HC RX MED GY IP 250 OP 250 PS 637: Performed by: PSYCHIATRY & NEUROLOGY

## 2023-04-09 PROCEDURE — 90853 GROUP PSYCHOTHERAPY: CPT

## 2023-04-09 PROCEDURE — 124N000002 HC R&B MH UMMC

## 2023-04-09 RX ADMIN — SERTRALINE HYDROCHLORIDE 200 MG: 100 TABLET ORAL at 08:22

## 2023-04-09 RX ADMIN — Medication 12.5 MG: at 08:22

## 2023-04-09 RX ADMIN — SENNOSIDES AND DOCUSATE SODIUM 1 TABLET: 50; 8.6 TABLET ORAL at 20:33

## 2023-04-09 RX ADMIN — ACETAMINOPHEN 325MG 650 MG: 325 TABLET ORAL at 08:29

## 2023-04-09 RX ADMIN — PANTOPRAZOLE SODIUM 40 MG: 40 TABLET, DELAYED RELEASE ORAL at 08:22

## 2023-04-09 RX ADMIN — METFORMIN HYDROCHLORIDE 1000 MG: 500 TABLET ORAL at 17:53

## 2023-04-09 RX ADMIN — GABAPENTIN 300 MG: 300 CAPSULE ORAL at 20:33

## 2023-04-09 RX ADMIN — CLOZAPINE 200 MG: 200 TABLET ORAL at 20:33

## 2023-04-09 RX ADMIN — GABAPENTIN 300 MG: 300 CAPSULE ORAL at 14:30

## 2023-04-09 RX ADMIN — GABAPENTIN 300 MG: 300 CAPSULE ORAL at 08:22

## 2023-04-09 RX ADMIN — SENNOSIDES AND DOCUSATE SODIUM 1 TABLET: 50; 8.6 TABLET ORAL at 08:22

## 2023-04-09 RX ADMIN — LORATADINE 10 MG: 10 TABLET ORAL at 08:22

## 2023-04-09 RX ADMIN — FLUTICASONE PROPIONATE 2 SPRAY: 50 SPRAY, METERED NASAL at 08:23

## 2023-04-09 RX ADMIN — NICOTINE 1 PATCH: 21 PATCH, EXTENDED RELEASE TRANSDERMAL at 08:22

## 2023-04-09 RX ADMIN — LITHIUM CARBONATE 900 MG: 450 TABLET, EXTENDED RELEASE ORAL at 20:33

## 2023-04-09 RX ADMIN — METFORMIN HYDROCHLORIDE 1000 MG: 500 TABLET ORAL at 08:22

## 2023-04-09 ASSESSMENT — ACTIVITIES OF DAILY LIVING (ADL)
ADLS_ACUITY_SCORE: 29
ADLS_ACUITY_SCORE: 29
DRESS: SCRUBS (BEHAVIORAL HEALTH)
ADLS_ACUITY_SCORE: 29
ADLS_ACUITY_SCORE: 29
DRESS: INDEPENDENT
ADLS_ACUITY_SCORE: 29
ADLS_ACUITY_SCORE: 29
HYGIENE/GROOMING: HANDWASHING;INDEPENDENT
ADLS_ACUITY_SCORE: 29
LAUNDRY: UNABLE TO COMPLETE
ADLS_ACUITY_SCORE: 29
LAUNDRY: UNABLE TO COMPLETE
HYGIENE/GROOMING: INDEPENDENT
ORAL_HYGIENE: INDEPENDENT
ADLS_ACUITY_SCORE: 29
ADLS_ACUITY_SCORE: 29
ORAL_HYGIENE: INDEPENDENT

## 2023-04-09 NOTE — PLAN OF CARE
"Pt had an uneventful shift this day. PRN tylenol given for c/o right arm pain, with stated relief. Pt spent the day isolated to room. Mood is \"ok\" affect flat and brightens on approach.    Pt brushed teeth this am without prompting. RN entered pt room to give scheduled 1400 medication, pt was watching [employer] Office on tv. Pt smiled and laughed while engaging in small talk regarding the show.    Pt denies VH and endorses AH described as voices that tell him to kill himself,  no mention of meth use this day. Pt endorses depression rated 6/10 and denies anxiety. Pt appeared more relaxed as the day progressed. Pt reports decrease in appetite stating it \"could be bc I don't do much here\". Pt is hoarding fruit cups in room for snack purposes.    VS reviewed: /86 (BP Location: Left arm, Patient Position: Sitting, Cuff Size: Adult Regular)   Pulse 92   Temp 97.9  F (36.6  C) (Temporal)   Resp 16   Ht 1.6 m (5' 3\")   Wt 79.5 kg (175 lb 4.8 oz)   SpO2 95%   BMI 31.05 kg/m   . Patient reports pain. PRN tylenol given with stated relief.    Length of stay: 72  "

## 2023-04-09 NOTE — PLAN OF CARE
Problem: Thought Process Alteration  Goal: Optimal Thought Clarity  Outcome: Not Progressing     Problem: Suicidal Behavior  Goal: Suicidal Behavior is Absent or Managed  Outcome: Not Progressing   Goal Outcome Evaluation:    Plan of Care Reviewed With: patient          Patient remains isolative and withdrawn to room all evening listening to headphones, watching television and meditation. Patient upon approach flat in affect, calm with verbal assessment often appearing distracted with poor eye contact. Patient reporting continued anxiety and depression related to auditory hallucinations telling him to use meth and commit suicide. Patient continues to contract for safety stating he will be able to talk to staff if the symptoms worsen. Patient identifying music and meditation as effective coping techniques for the hallucinations.     Patient diet appeared good eating 100% of dinner and snacks. Patient cooperative with vital sign assessment which were stable. Patient accepting of HS medications with no stated or observed side effects. Patient prompted for increased activity and completion of ADL's but refused this evening.

## 2023-04-09 NOTE — PLAN OF CARE
Pt asleep at start of shift.     Breathing quiet and unlabored when sleeping.     Pt had no c/o pain or discomfort during the HS.     Appears to have slept 5.25 hours.     Goal Outcome Evaluation:  Problem: Sleep Disturbance  Goal: Adequate Sleep/Rest  4/9/2023 0615 by Denise Foy, RN  Outcome: Progressing  4/9/2023 0615 by Denise Foy, RN  Reactivated

## 2023-04-10 PROCEDURE — H2032 ACTIVITY THERAPY, PER 15 MIN: HCPCS

## 2023-04-10 PROCEDURE — 124N000002 HC R&B MH UMMC

## 2023-04-10 PROCEDURE — 250N000013 HC RX MED GY IP 250 OP 250 PS 637

## 2023-04-10 PROCEDURE — G0177 OPPS/PHP; TRAIN & EDUC SERV: HCPCS

## 2023-04-10 PROCEDURE — 250N000013 HC RX MED GY IP 250 OP 250 PS 637: Performed by: PSYCHIATRY & NEUROLOGY

## 2023-04-10 PROCEDURE — 99232 SBSQ HOSP IP/OBS MODERATE 35: CPT | Performed by: PSYCHIATRY & NEUROLOGY

## 2023-04-10 RX ADMIN — NICOTINE 1 PATCH: 21 PATCH, EXTENDED RELEASE TRANSDERMAL at 08:46

## 2023-04-10 RX ADMIN — METFORMIN HYDROCHLORIDE 1000 MG: 500 TABLET ORAL at 08:40

## 2023-04-10 RX ADMIN — HYDROXYZINE HYDROCHLORIDE 100 MG: 50 TABLET, FILM COATED ORAL at 16:18

## 2023-04-10 RX ADMIN — Medication 12.5 MG: at 08:40

## 2023-04-10 RX ADMIN — PANTOPRAZOLE SODIUM 40 MG: 40 TABLET, DELAYED RELEASE ORAL at 08:11

## 2023-04-10 RX ADMIN — CLOZAPINE 200 MG: 200 TABLET ORAL at 19:44

## 2023-04-10 RX ADMIN — LORATADINE 10 MG: 10 TABLET ORAL at 08:40

## 2023-04-10 RX ADMIN — METFORMIN HYDROCHLORIDE 1000 MG: 500 TABLET ORAL at 18:11

## 2023-04-10 RX ADMIN — GABAPENTIN 300 MG: 300 CAPSULE ORAL at 08:40

## 2023-04-10 RX ADMIN — SERTRALINE HYDROCHLORIDE 200 MG: 100 TABLET ORAL at 08:40

## 2023-04-10 RX ADMIN — GABAPENTIN 300 MG: 300 CAPSULE ORAL at 19:44

## 2023-04-10 RX ADMIN — SENNOSIDES AND DOCUSATE SODIUM 1 TABLET: 50; 8.6 TABLET ORAL at 08:40

## 2023-04-10 RX ADMIN — LITHIUM CARBONATE 900 MG: 450 TABLET, EXTENDED RELEASE ORAL at 19:44

## 2023-04-10 RX ADMIN — SENNOSIDES AND DOCUSATE SODIUM 1 TABLET: 50; 8.6 TABLET ORAL at 19:44

## 2023-04-10 RX ADMIN — FLUTICASONE PROPIONATE 2 SPRAY: 50 SPRAY, METERED NASAL at 08:40

## 2023-04-10 RX ADMIN — GABAPENTIN 300 MG: 300 CAPSULE ORAL at 13:26

## 2023-04-10 ASSESSMENT — ACTIVITIES OF DAILY LIVING (ADL)
DRESS: INDEPENDENT
ADLS_ACUITY_SCORE: 29
ORAL_HYGIENE: INDEPENDENT
ADLS_ACUITY_SCORE: 29
ORAL_HYGIENE: INDEPENDENT
ADLS_ACUITY_SCORE: 29
HYGIENE/GROOMING: INDEPENDENT
ADLS_ACUITY_SCORE: 29
LAUNDRY: UNABLE TO COMPLETE
ADLS_ACUITY_SCORE: 29
DRESS: INDEPENDENT
LAUNDRY: UNABLE TO COMPLETE
ADLS_ACUITY_SCORE: 29
HYGIENE/GROOMING: INDEPENDENT
ADLS_ACUITY_SCORE: 29

## 2023-04-10 NOTE — PLAN OF CARE
Pt asleep at start of shift.     Breathing quiet and unlabored when sleeping.     Pt had no c/o pain or discomfort during the HS.     Appears to have slept 7 hours.     Goal Outcome Evaluation:  Problem: Sleep Disturbance  Goal: Adequate Sleep/Rest  Outcome: Progressing

## 2023-04-10 NOTE — PLAN OF CARE
Assessment/Intervention/Current Symtoms and Care Coordination  -Chart review    -Rounded with team, addressed patient needs/concerns    Current Symptoms include the following: Pleasant and cooperative; isolative to room    Discharge Plan or Goal  Pending stabilization & development of a safe discharge plan.  Considerations include:   Lattitudes    Barriers to Discharge  Patient requires further psychiatric stabilization due to current symptomology    Referral Status  No referrals made this hospitalization    Legal Status  Voluntary

## 2023-04-10 NOTE — PLAN OF CARE
"Mood and Affect: Patient describes mood as \"Ok.\" Blunted and flat affect.    LOC and Orientation: Alert. Oriented to person, place, time and situation; patient understands reason for admission and discharge planning.    Behavior and Interaction: Patient is mostly isolative to room. Patient encouraged to come out and socialize with peers.    Attended and participated in one unit group.    Patient is pleasant, calm and cooperative with nursing assessment.    Mental Health Symptoms: \"My depression is more than the voices.\"  Patient endorses command hallucinations with voices asking patient to kill himself.    Patient reports suicidal ideation related to command hallucinations; patient contracts for safety.    Patient denies all other mental health symptoms.    Medical Concerns: None reported.    Patient's Other Concerns: Patient asking for adderall.     Medication Compliant: Patient is compliant with all scheduled medication.    PRN: None given.    Medication Side Effects: None reported, none observed.    Food Intake: Poor appetite. 5% breakfast.    Elimination: Reports no problem. Last bowel movement, today.    Self Care: Independent. Well groomed.    Vital Signs: Denies pain.  /79 (BP Location: Left arm, Patient Position: Sitting, Cuff Size: Adult Regular)   Pulse 82   Temp 97  F (36.1  C) (Oral)   Resp 16   Ht 1.6 m (5' 3\")   Wt 79.5 kg (175 lb 4.8 oz)   SpO2 96%   BMI 31.05 kg/m        Problem: Depressive Symptoms  Goal: Depressive Symptoms  Description: Signs and symptoms of listed problems will be absent or manageable.  Outcome: Not Progressing     Problem: Psychotic Signs/Symptoms  Goal: Improved Behavioral Control (Psychotic Signs/Symptoms)  Outcome: Progressing   Goal Outcome Evaluation:                        "

## 2023-04-10 NOTE — PROGRESS NOTES
04/09/23 2200   Group Therapy Session   Group Attendance attended group session   Time Session Began 1615   Time Session Ended 1700   Total Time (minutes) 45   Total # Attendees 4   Group Type psychotherapeutic   Group Topic Covered leisure exploration/use of leisure time   Group Session Detail Paint/ Process   Patient Response/Contribution cooperative with task;discussed personal experience with topic     Pt talked about nature and city scapes both being healing for him aesthetically.  He is painting a landscape and would like to finish it ion OT tomorrow if possible.
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
## 2023-04-10 NOTE — PROGRESS NOTES
04/10/23 1500   Group Therapy Session   Time Session Began 1320   Time Session Ended 1400   Total Time (minutes) 38   Total # Attendees 4   Group Type expressive therapy   Group Topic Covered cognitive activities;structured socialization   Group Session Detail Toña Fill-in/Music Bingo   Patient Response/Contribution Cooperative with task    Patient Participation Detail Very calm and cooperative throughout session, engaging in following along with toña fill-in sheets and then Music Bingo intervention.  No redirections needed.  Subdued affect.  Requested a song by Karena for his Music Bingo prize.

## 2023-04-10 NOTE — PLAN OF CARE
Problem: Suicidal Behavior  Goal: Suicidal Behavior is Absent or Managed  Outcome: Not Progressing  Flowsheets (Taken 4/9/2023 2136)  Mutually Determined Action Steps (Suicidal Behavior Absent/Managed):   shares suicidal thoughts   verbalizes safety check rationale   identifies home safety strategy     Problem: Psychotic Signs/Symptoms  Goal: Improved Behavioral Control (Psychotic Signs/Symptoms)  Outcome: Not Progressing   Goal Outcome Evaluation:    Plan of Care Reviewed With: patient             Patient isolative to room all evening listening to music, meditating, and watching television. Patient upon approach flat in affect, cooperative during verbal assessment though often appearing distracted and at times delayed in responses. Patient continues to report high anxiety and depression due to auditory hallucinations telling him to commit suicide. Patient still identifies no means or plan to act in the hospital, heriberto with safety plan to seek out staff if symptoms worsen. Patient identified music, television, and meditation as coping techniques to drown out the voices.     Patient diet appeared good eating 100% of dinner and snacks. Patient cooperative with vital sign assessment which were stable. Patient accepting of HS medications with no stated or observed side effects. Patient prompted for increased activity and completion of ADL's but refused.

## 2023-04-10 NOTE — PLAN OF CARE
"   04/10/23 1639   Group Therapy Session   Group Attendance attended group session   Time Session Began 1415   Time Session Ended 1500   Total Time (minutes) 45   Total # Attendees 3   Group Type recreation   Group Topic Covered coping skills/lifestyle management;leisure exploration/use of leisure time;structured socialization   Group Session Detail leisure group   Patient Response/Contribution cooperative with task;able to recall/repeat info presented     Participated in afternoon leisure group.  Pt continues to appear more relaxed around others and more spontaneous with interaction.  Pt commented he thought  he might be leaving today, though found out it will likely be middle or end of the week, which writer acknowledged that is what I thought I heard, not today.  A peer questioned if he is excited, pt commented \"I guess, well, I'm not going home, just back to treatment, so I don't know\".  Writer reminded him it's a step in a positive direction, which he agreed.                      "

## 2023-04-11 PROCEDURE — 250N000013 HC RX MED GY IP 250 OP 250 PS 637

## 2023-04-11 PROCEDURE — 250N000013 HC RX MED GY IP 250 OP 250 PS 637: Performed by: PSYCHIATRY & NEUROLOGY

## 2023-04-11 PROCEDURE — 99232 SBSQ HOSP IP/OBS MODERATE 35: CPT | Performed by: PSYCHIATRY & NEUROLOGY

## 2023-04-11 PROCEDURE — 124N000002 HC R&B MH UMMC

## 2023-04-11 RX ORDER — SERTRALINE HYDROCHLORIDE 100 MG/1
200 TABLET, FILM COATED ORAL DAILY
Qty: 60 TABLET | Refills: 0 | Status: SHIPPED | OUTPATIENT
Start: 2023-04-12

## 2023-04-11 RX ORDER — TRAZODONE HYDROCHLORIDE 50 MG/1
50 TABLET, FILM COATED ORAL
Qty: 60 TABLET | Refills: 0 | Status: SHIPPED | OUTPATIENT
Start: 2023-04-11

## 2023-04-11 RX ORDER — HYDROXYZINE HYDROCHLORIDE 50 MG/1
100 TABLET, FILM COATED ORAL 3 TIMES DAILY PRN
Qty: 60 TABLET | Refills: 0 | Status: SHIPPED | OUTPATIENT
Start: 2023-04-11

## 2023-04-11 RX ORDER — AMOXICILLIN 250 MG
1 CAPSULE ORAL 2 TIMES DAILY
Qty: 30 TABLET | Refills: 0 | Status: SHIPPED | OUTPATIENT
Start: 2023-04-12 | End: 2023-04-26

## 2023-04-11 RX ORDER — AZELASTINE 1 MG/ML
2 SPRAY, METERED NASAL 2 TIMES DAILY PRN
Qty: 30 ML | Refills: 0 | Status: SHIPPED | OUTPATIENT
Start: 2023-04-11

## 2023-04-11 RX ORDER — CALCIUM CARBONATE 500 MG/1
1 TABLET, CHEWABLE ORAL 2 TIMES DAILY PRN
Qty: 60 TABLET | Refills: 0 | Status: SHIPPED | OUTPATIENT
Start: 2023-04-11 | End: 2024-05-14

## 2023-04-11 RX ORDER — POLYETHYLENE GLYCOL 3350 17 G/17G
17 POWDER, FOR SOLUTION ORAL DAILY PRN
Qty: 30 EACH | Refills: 0 | Status: SHIPPED | OUTPATIENT
Start: 2023-04-11 | End: 2023-04-26

## 2023-04-11 RX ORDER — AMMONIUM LACTATE 12 G/100G
LOTION TOPICAL DAILY PRN
Qty: 222 ML | Refills: 0 | Status: SHIPPED | OUTPATIENT
Start: 2023-04-11 | End: 2024-01-09

## 2023-04-11 RX ORDER — SIMETHICONE 80 MG
80 TABLET,CHEWABLE ORAL EVERY 6 HOURS PRN
Qty: 30 TABLET | Refills: 0 | Status: SHIPPED | OUTPATIENT
Start: 2023-04-11 | End: 2024-05-14

## 2023-04-11 RX ORDER — FLUTICASONE PROPIONATE 50 MCG
2 SPRAY, SUSPENSION (ML) NASAL DAILY PRN
Qty: 9.9 ML | Refills: 0 | Status: SHIPPED | OUTPATIENT
Start: 2023-04-11

## 2023-04-11 RX ORDER — PANTOPRAZOLE SODIUM 40 MG/1
40 TABLET, DELAYED RELEASE ORAL
Qty: 30 TABLET | Refills: 0 | Status: SHIPPED | OUTPATIENT
Start: 2023-04-12 | End: 2023-07-03

## 2023-04-11 RX ORDER — CLOZAPINE 200 MG/1
200 TABLET ORAL AT BEDTIME
Qty: 7 TABLET | Refills: 0 | Status: SHIPPED | OUTPATIENT
Start: 2023-04-12 | End: 2023-04-19

## 2023-04-11 RX ORDER — DIPHENHYDRAMINE HCL 50 MG
50 CAPSULE ORAL EVERY 6 HOURS PRN
Qty: 30 CAPSULE | Refills: 0 | Status: SHIPPED | OUTPATIENT
Start: 2023-04-11 | End: 2024-05-14 | Stop reason: ALTCHOICE

## 2023-04-11 RX ORDER — METOPROLOL SUCCINATE 25 MG/1
12.5 TABLET, EXTENDED RELEASE ORAL DAILY
Qty: 15 TABLET | Refills: 0 | Status: SHIPPED | OUTPATIENT
Start: 2023-04-12 | End: 2023-07-03

## 2023-04-11 RX ORDER — GABAPENTIN 300 MG/1
300 CAPSULE ORAL 3 TIMES DAILY
Qty: 90 CAPSULE | Refills: 0 | Status: SHIPPED | OUTPATIENT
Start: 2023-04-11 | End: 2024-05-14

## 2023-04-11 RX ORDER — LORATADINE 10 MG/1
10 TABLET ORAL DAILY
Qty: 30 TABLET | Refills: 0 | Status: SHIPPED | OUTPATIENT
Start: 2023-04-12 | End: 2023-07-05

## 2023-04-11 RX ORDER — LITHIUM CARBONATE 450 MG
900 TABLET, EXTENDED RELEASE ORAL AT BEDTIME
Qty: 60 TABLET | Refills: 0 | Status: SHIPPED | OUTPATIENT
Start: 2023-04-12

## 2023-04-11 RX ADMIN — SERTRALINE HYDROCHLORIDE 200 MG: 100 TABLET ORAL at 07:57

## 2023-04-11 RX ADMIN — METFORMIN HYDROCHLORIDE 1000 MG: 500 TABLET ORAL at 18:37

## 2023-04-11 RX ADMIN — ACETAMINOPHEN 325MG 650 MG: 325 TABLET ORAL at 07:58

## 2023-04-11 RX ADMIN — Medication 12.5 MG: at 07:57

## 2023-04-11 RX ADMIN — METFORMIN HYDROCHLORIDE 1000 MG: 500 TABLET ORAL at 07:57

## 2023-04-11 RX ADMIN — HYDROXYZINE HYDROCHLORIDE 100 MG: 50 TABLET, FILM COATED ORAL at 16:10

## 2023-04-11 RX ADMIN — GABAPENTIN 300 MG: 300 CAPSULE ORAL at 20:54

## 2023-04-11 RX ADMIN — LITHIUM CARBONATE 900 MG: 450 TABLET, EXTENDED RELEASE ORAL at 20:54

## 2023-04-11 RX ADMIN — PANTOPRAZOLE SODIUM 40 MG: 40 TABLET, DELAYED RELEASE ORAL at 07:57

## 2023-04-11 RX ADMIN — SENNOSIDES AND DOCUSATE SODIUM 1 TABLET: 50; 8.6 TABLET ORAL at 20:54

## 2023-04-11 RX ADMIN — GABAPENTIN 300 MG: 300 CAPSULE ORAL at 12:59

## 2023-04-11 RX ADMIN — GABAPENTIN 300 MG: 300 CAPSULE ORAL at 07:57

## 2023-04-11 RX ADMIN — SENNOSIDES AND DOCUSATE SODIUM 1 TABLET: 50; 8.6 TABLET ORAL at 07:57

## 2023-04-11 RX ADMIN — CLOZAPINE 200 MG: 200 TABLET ORAL at 20:54

## 2023-04-11 RX ADMIN — LORATADINE 10 MG: 10 TABLET ORAL at 07:57

## 2023-04-11 RX ADMIN — NICOTINE 1 PATCH: 21 PATCH, EXTENDED RELEASE TRANSDERMAL at 07:57

## 2023-04-11 RX ADMIN — VORTIOXETINE 5 MG: 5 TABLET, FILM COATED ORAL at 09:00

## 2023-04-11 RX ADMIN — FLUTICASONE PROPIONATE 2 SPRAY: 50 SPRAY, METERED NASAL at 07:59

## 2023-04-11 ASSESSMENT — ACTIVITIES OF DAILY LIVING (ADL)
ADLS_ACUITY_SCORE: 29
ORAL_HYGIENE: INDEPENDENT
ADLS_ACUITY_SCORE: 29
DRESS: INDEPENDENT
ADLS_ACUITY_SCORE: 29
ADLS_ACUITY_SCORE: 29
HYGIENE/GROOMING: INDEPENDENT
ADLS_ACUITY_SCORE: 29

## 2023-04-11 NOTE — PLAN OF CARE
Problem: Adult Behavioral Health Plan of Care  Goal: Plan of Care Review  Outcome: Progressing     Problem: Sleep Disturbance  Goal: Adequate Sleep/Rest  Outcome: Progressing   Goal Outcome Evaluation:       Pt appears to be sleeping comfortably during all safety checks. No signs of pain or discomfort noted. Pt slept for a total 7 hours. No concerns noted or verbalized. Will continue with same plan of care.

## 2023-04-11 NOTE — PLAN OF CARE
He is isolative and has some delay in speech, cognition in interactions.  He complained of bilateral body aches and PRN tylenol was given with morning medications.  He states has anxiety, depression, yet denied suicidal ideation when asked.  He denied hallucinations when asked.

## 2023-04-11 NOTE — PLAN OF CARE
Problem: Psychotic Signs/Symptoms  Goal: Improved Behavioral Control (Psychotic Signs/Symptoms)  Outcome: Progressing   Goal Outcome Evaluation:    Pt was isolative and withdrawn to his room. He refused to come out to socialize with peers and staff. Pt is alert and oriented to name, place, and time with some insight into his current mental health. Pt endorsed anxiety rated at 10/10 and requested PRN medication. Pt endorsed depression rated at 8/10, and auditory hallucination. Pt took scheduled medication without a problem and denied the SE's of his med. His appetite is good, and he is drinking adequately. Vital signs stable.

## 2023-04-11 NOTE — PLAN OF CARE
Assessment/Intervention/Current Symtoms and Care Coordination  -Chart review    -Rounded with team, addressed patient needs/concerns    Current Symptoms include the following: Isolative to room all day but brightens when approached.     Discharge Plan or Goal  Pending stabilization & development of a safe discharge plan.  Considerations include: Lattitudes    Barriers to Discharge  Patient requires further psychiatric stabilization due to current symptomology    Referral Status  No referrals made this hospitalization    Legal Status  Voluntary

## 2023-04-12 VITALS
DIASTOLIC BLOOD PRESSURE: 73 MMHG | HEIGHT: 63 IN | RESPIRATION RATE: 16 BRPM | WEIGHT: 175.3 LBS | TEMPERATURE: 97.8 F | HEART RATE: 96 BPM | SYSTOLIC BLOOD PRESSURE: 108 MMHG | OXYGEN SATURATION: 95 % | BODY MASS INDEX: 31.06 KG/M2

## 2023-04-12 LAB
ANION GAP SERPL CALCULATED.3IONS-SCNC: 14 MMOL/L (ref 7–15)
BASOPHILS # BLD AUTO: 0 10E3/UL (ref 0–0.2)
BASOPHILS NFR BLD AUTO: 0 %
BUN SERPL-MCNC: 12.8 MG/DL (ref 6–20)
CALCIUM SERPL-MCNC: 10.1 MG/DL (ref 8.6–10)
CHLORIDE SERPL-SCNC: 102 MMOL/L (ref 98–107)
CREAT SERPL-MCNC: 0.76 MG/DL (ref 0.67–1.17)
DEPRECATED HCO3 PLAS-SCNC: 24 MMOL/L (ref 22–29)
EOSINOPHIL # BLD AUTO: 0.3 10E3/UL (ref 0–0.7)
EOSINOPHIL NFR BLD AUTO: 2 %
GFR SERPL CREATININE-BSD FRML MDRD: >90 ML/MIN/1.73M2
GLUCOSE SERPL-MCNC: 98 MG/DL (ref 70–99)
HOLD SPECIMEN: NORMAL
HOLD SPECIMEN: NORMAL
IMM GRANULOCYTES # BLD: 0.1 10E3/UL
IMM GRANULOCYTES NFR BLD: 1 %
LITHIUM SERPL-SCNC: 0.9 MMOL/L (ref 0.6–1.2)
LYMPHOCYTES # BLD AUTO: 1.9 10E3/UL (ref 0.8–5.3)
LYMPHOCYTES NFR BLD AUTO: 15 %
MONOCYTES # BLD AUTO: 0.8 10E3/UL (ref 0–1.3)
MONOCYTES NFR BLD AUTO: 7 %
NEUTROPHILS # BLD AUTO: 9.7 10E3/UL (ref 1.6–8.3)
NEUTROPHILS NFR BLD AUTO: 75 %
NRBC # BLD AUTO: 0 10E3/UL
NRBC BLD AUTO-RTO: 0 /100
POTASSIUM SERPL-SCNC: 3.9 MMOL/L (ref 3.4–5.3)
SODIUM SERPL-SCNC: 140 MMOL/L (ref 136–145)
WBC # BLD AUTO: 12.9 10E3/UL (ref 4–11)

## 2023-04-12 PROCEDURE — 250N000013 HC RX MED GY IP 250 OP 250 PS 637: Performed by: PSYCHIATRY & NEUROLOGY

## 2023-04-12 PROCEDURE — 99239 HOSP IP/OBS DSCHRG MGMT >30: CPT | Performed by: PSYCHIATRY & NEUROLOGY

## 2023-04-12 PROCEDURE — 250N000013 HC RX MED GY IP 250 OP 250 PS 637

## 2023-04-12 PROCEDURE — 82310 ASSAY OF CALCIUM: CPT | Performed by: PSYCHIATRY & NEUROLOGY

## 2023-04-12 PROCEDURE — 85004 AUTOMATED DIFF WBC COUNT: CPT | Performed by: PSYCHIATRY & NEUROLOGY

## 2023-04-12 PROCEDURE — 80178 ASSAY OF LITHIUM: CPT | Performed by: PSYCHIATRY & NEUROLOGY

## 2023-04-12 PROCEDURE — 36415 COLL VENOUS BLD VENIPUNCTURE: CPT | Performed by: PSYCHIATRY & NEUROLOGY

## 2023-04-12 RX ADMIN — Medication 12.5 MG: at 09:13

## 2023-04-12 RX ADMIN — SERTRALINE HYDROCHLORIDE 200 MG: 100 TABLET ORAL at 09:13

## 2023-04-12 RX ADMIN — METFORMIN HYDROCHLORIDE 1000 MG: 500 TABLET ORAL at 09:13

## 2023-04-12 RX ADMIN — LORATADINE 10 MG: 10 TABLET ORAL at 09:13

## 2023-04-12 RX ADMIN — SENNOSIDES AND DOCUSATE SODIUM 1 TABLET: 50; 8.6 TABLET ORAL at 09:13

## 2023-04-12 RX ADMIN — NICOTINE 1 PATCH: 21 PATCH, EXTENDED RELEASE TRANSDERMAL at 09:13

## 2023-04-12 RX ADMIN — PANTOPRAZOLE SODIUM 40 MG: 40 TABLET, DELAYED RELEASE ORAL at 09:13

## 2023-04-12 RX ADMIN — FLUTICASONE PROPIONATE 2 SPRAY: 50 SPRAY, METERED NASAL at 09:14

## 2023-04-12 RX ADMIN — GABAPENTIN 300 MG: 300 CAPSULE ORAL at 09:13

## 2023-04-12 ASSESSMENT — ACTIVITIES OF DAILY LIVING (ADL)
ADLS_ACUITY_SCORE: 29
ADLS_ACUITY_SCORE: 29
DRESS: INDEPENDENT
ADLS_ACUITY_SCORE: 29
ADLS_ACUITY_SCORE: 29
HYGIENE/GROOMING: INDEPENDENT
ORAL_HYGIENE: INDEPENDENT
LAUNDRY: UNABLE TO COMPLETE
ADLS_ACUITY_SCORE: 29
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
## 2023-04-12 NOTE — PLAN OF CARE
Discharge Note:    Patient discharged at 1350 to group home by cab. Writer walked him out to the [facility] entrance where he was visualized being picked up by cab. Writer discussed his AVS and medications with him. Patient voiced having an understanding of his AVS, no other questions. Staff went over patient's belongings with him, all items were present. Patient left in his personal clothing, it was winter wear for summer conditions, patient said he was fine wearing two coats. He denied SI/HI/SIB. Voiced having mild anxiety that has improved since being admitted.

## 2023-04-12 NOTE — PLAN OF CARE
"  Problem: Adult Behavioral Health Plan of Care  Goal: Plan of Care Review  Outcome: Progressing  Flowsheets  Taken 4/11/2023 2323  Overall Patient Progress: improving  Taken 4/11/2023 1916  Patient Agreement with Plan of Care: agrees     Problem: Suicidal Behavior  Goal: Suicidal Behavior is Absent or Managed  Outcome: Progressing   Goal Outcome Evaluation:    Plan of Care Reviewed With: patient      Overall Patient Progress: improvingOverall Patient Progress: improving    Patient remains isolative and withdrawn to room all evening, only in the milieu to use restroom and retreive meal tray. Patient upon approach flat in affect, calm during verbal assessment with averted eye contact at times. Patient reporting increased anxiety this evening with thoughts of upcoming discharge. Patient early in the evening requesting PRN Hydroxyzine and later reporting relief. Patient declined to attend group due to increased anxiety. Patient reporting no auditory hallucinations or thoughts of SI this evening, identifying that he feels he is \"zoning them out\" with the use of music and television.     Patient diet appeared good eating 100% of dinner and snacks. Patient cooperative with vital sign assessments and were stable. Patient accepting of HS medications with no stated or observed side effects. Patient prompted for increased activity and completion of ADL's but refused this evening.         "

## 2023-04-12 NOTE — PLAN OF CARE
Problem: Sleep Disturbance  Goal: Adequate Sleep/Rest  Outcome: Progressing     Problem: Sleep Disturbance  Goal: Adequate Sleep/Rest  Outcome: Progressing     Problem: Sleep Disturbance  Goal: Adequate Sleep/Rest  Outcome: Progressing   Goal Outcome Evaluation:    Patient appeared to sleep 7 hours this night shift.  No prns or snacks given or requested.  No concerns were reported or noted.  Due to discharge today.

## 2023-04-13 NOTE — DISCHARGE SUMMARY
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  Lainey Morin MD:  Address:  [facility], [address] [place] [address]  Phone: [phone]     Targeted Mental Health : Anuradha Silva at [phone]     Attend all scheduled appointments with your outpatient providers. Call at least 24 hours in advance if you need to reschedule an appointment to ensure continued access to your outpatient providers.      Major Treatments, Procedures and Findings:  You were provided with: a psychiatric assessment, assessed for medical stability, medication evaluation and/or management, and milieu management.  You  "completed 15 ECT treatment sessions.      Symptoms to Report: feeling more aggressive, increased confusion, losing more sleep, mood getting worse, or thoughts of suicide     Early warning signs can include: increased depression or anxiety sleep disturbances increased thoughts or behaviors of suicide or self-harm  increased unusual thinking, such as paranoia or hearing voices     Safety and Wellness:  Take all medicines as directed.  Make no changes unless your doctor suggests them.      Follow treatment recommendations.  Refrain from alcohol and non-prescribed drugs.  If there is a concern for safety, call 911.     Resources:   Crisis Intervention: 799.617.3610 or 236-389-1989 (TTY: 495.561.2056).  Call anytime for help.  National [place] on Mental Illness (www.mn.cherie.org): 324.525.7868 or 967-970-4052.  Suicide Awareness Voices of Education (SAVE) (www.save.org): 276-554-GXYC (2251)  National Suicide Prevention Line (www.mentalhealthmn.org): 863-444-MEAO (8588)  Mental Health Association of [place] (www.mentalhealth.org): 943.824.8859 or 344-165-4164  Self- Management and Recovery Training., Breather-- Toll free: 639.304.6865  www.Emailage.org  [facility] Crisis Response - Adult 423 748-7669  Text 4 Life: txt \"LIFE\" to 32089 for immediate support and crisis intervention  Crisis text line: Text \"[place]\" to 248923. Free, confidential, 24/7.  Crisis Intervention: 226.924.8804 or 626-563-6933. Call anytime for help.      General Medication Instructions:   See your medication sheet(s) for instructions.   Take all medicines as directed.  Make no changes unless your doctor suggests them.   Go to all your doctor visits.  Be sure to have all your required lab tests. This way, your medicines can be refilled on time.  Do not use any drugs not prescribed by your doctor.  Avoid alcohol.     Advance Directives:   Scanned document on file with [place]? No scanned doc  Is document scanned? Pt states no documents  Honoring Choices " Your Rights Handout: Informed and given  Was more information offered? Pt declined     The Treatment team has appreciated the opportunity to work with you. If you have any questions or concerns about your recent admission, you can contact the unit which can receive your call 24 hours a day, 7 days a week. They will be able to get in touch with a Provider if needed. The unit number is [phone].    Attestation:  The patient has been seen and evaluated by me,  Nelly Brown MD     > 60 minutes total time that was spent and over 50% of this time was spent in counseling and coordination of care with staff, reviewing medical record, educating patient about treatment options, side effects and benefits and alternative treatments for medications, providing supportive therapy and redirection regarding above symptoms.     This document is created with the help of Dragon dictation system.  All grammatical/typing errors or context distortion are unintentional and inherent to software.

## 2023-04-14 ENCOUNTER — TELEPHONE (OUTPATIENT)
Dept: FAMILY MEDICINE | Facility: CLINIC | Age: 33
End: 2023-04-14
Payer: COMMERCIAL

## 2023-04-14 ENCOUNTER — NURSE TRIAGE (OUTPATIENT)
Dept: NURSING | Facility: CLINIC | Age: 33
End: 2023-04-14

## 2023-04-14 ENCOUNTER — LAB (OUTPATIENT)
Dept: LAB | Facility: CLINIC | Age: 33
End: 2023-04-14
Payer: COMMERCIAL

## 2023-04-14 DIAGNOSIS — Z79.899 MEDICATION MANAGEMENT: Primary | ICD-10-CM

## 2023-04-14 DIAGNOSIS — F25.1 SCHIZOAFFECTIVE DISORDER, DEPRESSIVE TYPE (H): Primary | ICD-10-CM

## 2023-04-14 DIAGNOSIS — Z79.899 MEDICATION MANAGEMENT: ICD-10-CM

## 2023-04-14 PROCEDURE — 36415 COLL VENOUS BLD VENIPUNCTURE: CPT

## 2023-04-14 PROCEDURE — 80178 ASSAY OF LITHIUM: CPT

## 2023-04-14 NOTE — TELEPHONE ENCOUNTER
Kaylyn the Care coordinator called back to see if orders were put in for this. Patient isn't feeling well, and she doesn't want him to wait to get the lab done. Can Dr Pena assist with this?

## 2023-04-14 NOTE — TELEPHONE ENCOUNTER
Order/Referral Request    Who is requesting: Care Coordinator    Orders being requested: Lithium Lab order    Reason service is needed/diagnosis: Medication    When are orders needed by: ASAP if possible    Has this been discussed with Provider: No    Does patient have a preference on a Group/Provider/Facility? Within [facility], Care coordinator requesting for a lithium lab order test for medication intake. Please call and advise one completed.    Does patient have an appointment scheduled?: No    Where to send orders: Place orders within Epic    Could we send this information to you in VoteIt or would you prefer to receive a phone call?:   Patient would prefer a phone call   Okay to leave a detailed message?: Yes at Other phone number:  [phone]

## 2023-04-14 NOTE — TELEPHONE ENCOUNTER
MTM referral from: Transitions of Care (recent hospital discharge or ED visit)    MTM referral outreach attempt #2 on April 14, 2023 at 10:58 AM      Outcome: Patient not reachable after several attempts, will route to [facility] Pharmacist/Provider as an FYI.  [facility] scheduling number is [phone].  Thank you for the referral.    Favian Colin [facility]

## 2023-04-14 NOTE — TELEPHONE ENCOUNTER
Kaylyn calling from [facility] with concerns about;    Taking Lithium for approx 1 week  Today, Not feeling well;    Very shaky  Nauseated/vomiting  Sweaty, clammy, pale  99.7     Caller is wondering if Pt can go somewhere to have blood drawn for Lithium levels or if Pt should be seen today.    Msg routed to PCP/Care Team to advise Kaylyn within 1 hour at;    [phone]     Destiny Witt RN, Nurse Advisor 12:29 PM 4/14/2023

## 2023-04-15 LAB — LITHIUM SERPL-SCNC: 0.6 MMOL/L (ref 0.6–1.2)

## 2023-04-17 NOTE — TELEPHONE ENCOUNTER
I think this Patient had a psychiatric admission recently and I have not seen him for a follow up.  I can put in an order for a lithium level and a basic metabolic profile but efforts should be made to contact the discharging physician about what to do not me at this point- or else whomever he has a psyche follow-up with

## 2023-04-18 NOTE — TELEPHONE ENCOUNTER
Unable to LM at [phone] due to no vm, not a working ph# . Sending letter out and postponing task out to 2 weeks and will try again if an appointment hasn't been made.

## 2023-04-19 ENCOUNTER — HOSPITAL ENCOUNTER (EMERGENCY)
Facility: CLINIC | Age: 33
Discharge: HOME | End: 2023-04-19
Attending: PSYCHIATRY & NEUROLOGY
Payer: COMMERCIAL

## 2023-04-19 VITALS
BODY MASS INDEX: 31.67 KG/M2 | DIASTOLIC BLOOD PRESSURE: 71 MMHG | HEART RATE: 105 BPM | TEMPERATURE: 98.3 F | WEIGHT: 178.8 LBS | OXYGEN SATURATION: 96 % | RESPIRATION RATE: 16 BRPM | SYSTOLIC BLOOD PRESSURE: 116 MMHG

## 2023-04-19 DIAGNOSIS — Z53.21 PATIENT LEFT WITHOUT BEING SEEN: ICD-10-CM

## 2023-04-19 RX ORDER — CLOZAPINE 200 MG/1
200 TABLET ORAL AT BEDTIME
Qty: 7 TABLET | Refills: 4 | Status: SHIPPED | OUTPATIENT
Start: 2023-04-19 | End: 2024-05-14

## 2023-04-19 NOTE — ED PROVIDER NOTES
ED Provider Note  [facility]      History     Chief Complaint   Patient presents with     Medication Refill     Staff person states they got the problem remedied and do not need to be seen.  Pt denies SI or HI     HPI  Nikolay oLra is a 32 year old male who came in seeking lab work and dose of clozapine.      Physical Exam      Physical Exam      ED Course, Procedures, & Data      Procedures            No results found for any visits on 04/19/23.  Medications - No data to display  Labs Ordered and Resulted from Time of ED Arrival to Time of ED Departure - No data to display  No orders to display         Assessment & Plan    Patient's worker was able arrange for his lab and clozapine dose in the community and declined an Ed evaluation.    I have reviewed the nursing notes. I have reviewed the findings, diagnosis, plan and need for follow up with the patient.    Discharge Medication List as of 4/19/2023  2:01 PM          Final diagnoses:   Patient left without being seen       [facility] EMERGENCY DEPARTMENT  4/19/2023     Ariel Lim MD  04/19/23 3116

## 2023-04-26 ENCOUNTER — MEDICAL CORRESPONDENCE (OUTPATIENT)
Dept: HEALTH INFORMATION MANAGEMENT | Facility: CLINIC | Age: 33
End: 2023-04-26

## 2023-04-26 ENCOUNTER — OFFICE VISIT (OUTPATIENT)
Dept: FAMILY MEDICINE | Facility: CLINIC | Age: 33
End: 2023-04-26
Payer: COMMERCIAL

## 2023-04-26 VITALS
HEART RATE: 116 BPM | BODY MASS INDEX: 30.48 KG/M2 | RESPIRATION RATE: 24 BRPM | HEIGHT: 64 IN | DIASTOLIC BLOOD PRESSURE: 75 MMHG | WEIGHT: 178.5 LBS | SYSTOLIC BLOOD PRESSURE: 109 MMHG | OXYGEN SATURATION: 94 % | TEMPERATURE: 98.2 F

## 2023-04-26 DIAGNOSIS — R25.1 TREMOR: ICD-10-CM

## 2023-04-26 DIAGNOSIS — F25.1 SCHIZOAFFECTIVE DISORDER, DEPRESSIVE TYPE (H): ICD-10-CM

## 2023-04-26 DIAGNOSIS — Z09 HOSPITAL DISCHARGE FOLLOW-UP: Primary | ICD-10-CM

## 2023-04-26 DIAGNOSIS — R19.7 DIARRHEA, UNSPECIFIED TYPE: ICD-10-CM

## 2023-04-26 DIAGNOSIS — R26.9 GAIT DISTURBANCE: ICD-10-CM

## 2023-04-26 DIAGNOSIS — F19.90 POLYSUBSTANCE USE DISORDER: ICD-10-CM

## 2023-04-26 PROBLEM — R45.851 SUICIDAL IDEATION: Status: RESOLVED | Noted: 2023-02-20 | Resolved: 2023-04-26

## 2023-04-26 PROBLEM — F39 MOOD DISORDER (H): Status: RESOLVED | Noted: 2021-03-10 | Resolved: 2023-04-26

## 2023-04-26 PROBLEM — L29.0 PRURITUS ANI: Status: RESOLVED | Noted: 2018-04-18 | Resolved: 2023-04-26

## 2023-04-26 PROBLEM — R46.89 COMPULSIVE BEHAVIORS: Status: RESOLVED | Noted: 2022-03-24 | Resolved: 2023-04-26

## 2023-04-26 PROBLEM — R21 RASH: Status: RESOLVED | Noted: 2022-01-18 | Resolved: 2023-04-26

## 2023-04-26 PROCEDURE — 99214 OFFICE O/P EST MOD 30 MIN: CPT | Performed by: FAMILY MEDICINE
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
## 2023-05-02 ENCOUNTER — TELEPHONE (OUTPATIENT)
Dept: FAMILY MEDICINE | Facility: CLINIC | Age: 33
End: 2023-05-02
Payer: COMMERCIAL

## 2023-05-02 NOTE — TELEPHONE ENCOUNTER
All of these are questions for his mental health provider- [facility] (sp?)  They are are now managing his mental health medications

## 2023-05-02 NOTE — TELEPHONE ENCOUNTER
Medication Question or Refill        What medication are you calling about (include dose and sig)?: lithium (ESKALITH CR/LITHOBID) 450 MG CR tablet    Preferred Pharmacy:   WRITTEN PRESCRIPTION REQUESTED  No address on file        Controlled Substance Agreement on file:   CSA -- Patient Level:    CSA: None found at the patient level.       Who prescribed the medication?: Dr. Filiberto Garber    Do you need a refill? Yes, No    When did you use the medication last? NA    Patient offered an appointment? No    Do you have any questions or concerns?  Yes: Kaylyn, care coordinator from [facility] was calling to see if patient should be coming in to do lab work more often because he is on lithium. If so, how often should we be scheduling the appointments.      Could we send this information to you in [place] or would you prefer to receive a phone call?:   Patient would prefer a phone call   Okay to leave a detailed message?: Yes at Cell number on file:    Telephone Information:   Mobile [phone]    or Other phone number:  [facility] [phone]

## 2023-05-02 NOTE — TELEPHONE ENCOUNTER
[facility] #[phone]. Postponing task to tomorrow to try again. If patient returns call back, please help patient schedule an appointment per message below. Thanks!

## 2023-05-05 ENCOUNTER — NURSE TRIAGE (OUTPATIENT)
Dept: NURSING | Facility: CLINIC | Age: 33
End: 2023-05-05

## 2023-05-05 NOTE — TELEPHONE ENCOUNTER
RN attempted to [facility] , but no answer. Left message on patient's voicemail to call clinic back.    If group home calls back, please relay message below. Thanks    Marta Brown RN  [facility]    I reviewed Dr Ybarra's last note and the symptoms described by the home sound similar to what was occurring when he was in clinic end of April.  If the home feels that his symptoms have worsened than he should be seen in urgent care or ED.  If they have not changed and simply persisting, ok to wait until 5/11/2023.   Laura Pena PA-C on 5/5/2023 at 12:56 PM

## 2023-05-05 NOTE — TELEPHONE ENCOUNTER
Nurse Triage SBAR    Please call [facility] .  Disposition to see provider with in 3 days. Please advise on working in with PCP.    Is this a 2nd Level Triage? NO    Situation:     [facility] Nursing Assistant calling from [facility] ph. [phone] (patient is present and provided verbal consent to communicate).  [facility] reporting patient has been having ongoing tremors, twitching and difficulty speaking.    Patient seen on 4/26/23 by PCP, Dr Ybarra for same symptoms.  Dr Ybarra out of clinic today, 5/5/23.  Message sent to Laura WATKINS for review / plan.  Patient has an appointment scheduled on 5/11/23, ok to wait till seen?    Marta Brown RN  [facility]

## 2023-05-05 NOTE — TELEPHONE ENCOUNTER
"Nurse Triage SBAR    Please call [facility] .  Disposition to see provider with in 3 days. Please advise on working in with PCP.    Is this a 2nd Level Triage? NO    Situation:     [place] Nursing Assistant calling from [facility] ph. [phone] (patient is present and provided verbal consent to communicate).  Danisha reporting patient has been having ongoing tremors, twitching and difficulty speaking.    Background:     Hospitalized 1/27-4/12 psychiatric at [facility].  See office visit 4/26/23.    Assessment:     Danihsa reporting patient has ongoing tremors, difficulty speaking \"stuttering\" and \"zoning out\" when talking.  Denies any seizure like activity.  Patient stating he \"twitches all over.\"   Stating he feels depressed \"I am not suicidal.\"  Symptoms are ongoing since 4/12/23.  Questioning if they could be related to ECT treatment 3 weeks ago?.  Reporting episodes of \"falling over in the shower.\" Denies change in symptoms today.    Protocol Recommended Disposition:   See in Office Within 3 Days    Recommendation:     Please advise working patient into appointment or where you prefer patient to be seen.     Routed to provider    Does the patient meet one of the following criteria for ADS visit consideration? 16+ years old, with an [facility] PCP     TIP  Providers, please consider if this condition is appropriate for management at one of our Acute and Diagnostic Services sites.     If patient is a good candidate, please use dotphrase <dot>triageresponse and select Refer to ADS to document.ECT   Reason for Disposition    Started on anti-anxiety medication and no relief    Additional Information    Negative: SEVERE difficulty breathing (e.g., struggling for each breath, speaks in single words)    Negative: Bluish (or gray) lips or face    Negative: Difficult to awaken or acting confused (e.g., disoriented, slurred speech)    Negative: Hysterical or combative " behavior    Negative: Sounds like a life-threatening emergency to the triager    Negative: Chest pain    Negative: Palpitations, skipped heart beat, or rapid heart beat    Negative: Cough is main symptom    Negative: Suicide thoughts, threats, attempts, or questions    Negative: Depression is main problem or symptom (e.g., feelings of sadness or hopelessness)    Negative: Difficulty breathing and persists > 10 minutes and not relieved by reassurance provided by triager    Negative: Lightheadedness or dizziness and persists > 10 minutes and not relieved by reassurance provided by triager    Negative: Substance use (drug use) or unhealthy alcohol use, known or suspected, and feeling very shaky (i.e., visible tremors of hands)    Negative: Patient sounds very sick or weak to the triager    Negative: Patient sounds very upset or troubled to the triager    Negative: Symptoms interfere with work or school    Negative: Symptoms of anxiety or panic and has not been evaluated for this by physician    Protocols used: ANXIETY AND PANIC ATTACK-A-OH

## 2023-05-05 NOTE — TELEPHONE ENCOUNTER
I reviewed Dr Ybarra's last note and the symptoms described by the home sound similar to what was occurring when he was in clinic end of April.  If the home feels that his symptoms have worsened than he should be seen in urgent care or ED.  If they have not changed and simply persisting, ok to wait until 5/11/2023.   Laura Pena PA-C on 5/5/2023 at 12:56 PM

## 2023-05-08 NOTE — TELEPHONE ENCOUNTER
I think okay to wait for appt 5/11- these are likely side effects of psyche meds/ ECT combo and he sees [facility] for psyche- I could have him see neuro- but it will be a few months- will consider that when I follow up with him

## 2023-05-08 NOTE — TELEPHONE ENCOUNTER
Contacted Ronda at [facility] and relayed message below from Dr Ybarra  Patient has an appointment scheduled today with [facility].  Also appointments on 5/11/23 and 6/1/23 with PCP, Dr Ybarra.  No further action needed.  Message sent to Dr Ybarra for FYI.    Marta Brown RN  [facility]

## 2023-05-11 ENCOUNTER — OFFICE VISIT (OUTPATIENT)
Dept: FAMILY MEDICINE | Facility: CLINIC | Age: 33
End: 2023-05-11
Payer: COMMERCIAL

## 2023-05-11 VITALS
HEIGHT: 64 IN | DIASTOLIC BLOOD PRESSURE: 68 MMHG | WEIGHT: 177 LBS | OXYGEN SATURATION: 97 % | RESPIRATION RATE: 16 BRPM | SYSTOLIC BLOOD PRESSURE: 101 MMHG | BODY MASS INDEX: 30.22 KG/M2 | HEART RATE: 88 BPM

## 2023-05-11 DIAGNOSIS — F25.0 SCHIZOAFFECTIVE DISORDER, BIPOLAR TYPE (H): Primary | ICD-10-CM

## 2023-05-11 PROCEDURE — 99214 OFFICE O/P EST MOD 30 MIN: CPT | Performed by: FAMILY MEDICINE

## 2023-05-11 ASSESSMENT — PAIN SCALES - GENERAL: PAINLEVEL: SEVERE PAIN (6)
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
## 2023-05-12 ENCOUNTER — TELEPHONE (OUTPATIENT)
Dept: BEHAVIORAL HEALTH | Facility: CLINIC | Age: 33
End: 2023-05-12

## 2023-05-12 NOTE — TELEPHONE ENCOUNTER
Writer received a message patient is in need of a psychiatric provider. Writer will f/u with the patient.

## 2023-05-15 ENCOUNTER — CARE COORDINATION (OUTPATIENT)
Dept: PSYCHIATRY | Facility: CLINIC | Age: 33
End: 2023-05-15

## 2023-05-16 ENCOUNTER — MEDICAL CORRESPONDENCE (OUTPATIENT)
Dept: HEALTH INFORMATION MANAGEMENT | Facility: CLINIC | Age: 33
End: 2023-05-16

## 2023-05-20 ENCOUNTER — HEALTH MAINTENANCE LETTER (OUTPATIENT)
Age: 33
End: 2023-05-20

## 2023-06-14 ENCOUNTER — ANCILLARY PROCEDURE (OUTPATIENT)
Dept: GENERAL RADIOLOGY | Facility: CLINIC | Age: 33
End: 2023-06-14
Attending: FAMILY MEDICINE
Payer: COMMERCIAL

## 2023-06-14 ENCOUNTER — OFFICE VISIT (OUTPATIENT)
Dept: FAMILY MEDICINE | Facility: CLINIC | Age: 33
End: 2023-06-14
Payer: COMMERCIAL

## 2023-06-14 VITALS
RESPIRATION RATE: 20 BRPM | WEIGHT: 169.75 LBS | HEART RATE: 84 BPM | DIASTOLIC BLOOD PRESSURE: 68 MMHG | BODY MASS INDEX: 28.98 KG/M2 | SYSTOLIC BLOOD PRESSURE: 94 MMHG | OXYGEN SATURATION: 96 % | TEMPERATURE: 97.2 F | HEIGHT: 64 IN

## 2023-06-14 DIAGNOSIS — M54.50 ACUTE BILATERAL LOW BACK PAIN WITHOUT SCIATICA: ICD-10-CM

## 2023-06-14 DIAGNOSIS — M54.50 ACUTE BILATERAL LOW BACK PAIN WITHOUT SCIATICA: Primary | ICD-10-CM

## 2023-06-14 DIAGNOSIS — F25.1 SCHIZOAFFECTIVE DISORDER, DEPRESSIVE TYPE (H): ICD-10-CM

## 2023-06-14 LAB
ERYTHROCYTE [DISTWIDTH] IN BLOOD BY AUTOMATED COUNT: 12.8 % (ref 10–15)
ERYTHROCYTE [SEDIMENTATION RATE] IN BLOOD BY WESTERGREN METHOD: 15 MM/HR (ref 0–15)
HCT VFR BLD AUTO: 43.1 % (ref 40–53)
HGB BLD-MCNC: 13.5 G/DL (ref 13.3–17.7)
MCH RBC QN AUTO: 25.1 PG (ref 26.5–33)
MCHC RBC AUTO-ENTMCNC: 31.3 G/DL (ref 31.5–36.5)
MCV RBC AUTO: 80 FL (ref 78–100)
PLATELET # BLD AUTO: 322 10E3/UL (ref 150–450)
RBC # BLD AUTO: 5.38 10E6/UL (ref 4.4–5.9)
WBC # BLD AUTO: 15.5 10E3/UL (ref 4–11)

## 2023-06-14 PROCEDURE — 99214 OFFICE O/P EST MOD 30 MIN: CPT | Performed by: FAMILY MEDICINE

## 2023-06-14 PROCEDURE — 85652 RBC SED RATE AUTOMATED: CPT | Performed by: FAMILY MEDICINE

## 2023-06-14 PROCEDURE — 72100 X-RAY EXAM L-S SPINE 2/3 VWS: CPT | Mod: TC | Performed by: RADIOLOGY

## 2023-06-14 PROCEDURE — 36415 COLL VENOUS BLD VENIPUNCTURE: CPT | Performed by: FAMILY MEDICINE

## 2023-06-14 PROCEDURE — 85027 COMPLETE CBC AUTOMATED: CPT | Performed by: FAMILY MEDICINE

## 2023-06-14 RX ORDER — BENZTROPINE MESYLATE 0.5 MG/1
0.5 TABLET ORAL 2 TIMES DAILY
Qty: 180 TABLET | Refills: 3 | Status: SHIPPED | OUTPATIENT
Start: 2023-06-14

## 2023-06-14 ASSESSMENT — PATIENT HEALTH QUESTIONNAIRE - PHQ9
10. IF YOU CHECKED OFF ANY PROBLEMS, HOW DIFFICULT HAVE THESE PROBLEMS MADE IT FOR YOU TO DO YOUR WORK, TAKE CARE OF THINGS AT HOME, OR GET ALONG WITH OTHER PEOPLE: EXTREMELY DIFFICULT
SUM OF ALL RESPONSES TO PHQ QUESTIONS 1-9: 24
SUM OF ALL RESPONSES TO PHQ QUESTIONS 1-9: 24
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
## 2023-07-03 DIAGNOSIS — I10 HYPERTENSION, UNSPECIFIED TYPE: ICD-10-CM

## 2023-07-03 DIAGNOSIS — E11.8 TYPE 2 DIABETES MELLITUS WITH UNSPECIFIED COMPLICATIONS (H): ICD-10-CM

## 2023-07-03 DIAGNOSIS — R12 HEARTBURN: ICD-10-CM

## 2023-07-03 RX ORDER — METOPROLOL SUCCINATE 25 MG/1
TABLET, EXTENDED RELEASE ORAL
Qty: 45 TABLET | Refills: 3 | Status: SHIPPED | OUTPATIENT
Start: 2023-07-03 | End: 2024-06-25

## 2023-07-03 RX ORDER — PANTOPRAZOLE SODIUM 40 MG/1
40 TABLET, DELAYED RELEASE ORAL DAILY
Qty: 90 TABLET | Refills: 3 | Status: SHIPPED | OUTPATIENT
Start: 2023-07-03 | End: 2024-06-25

## 2023-07-03 NOTE — TELEPHONE ENCOUNTER
"Last Written Prescription Date:  4/11/23  Last Fill Quantity: 15/30/60,  # refills: 0   Last office visit provider:  6/14/23     Requested Prescriptions   Pending Prescriptions Disp Refills     metoprolol succinate ER (TOPROL XL) 25 MG 24 hr tablet [Pharmacy Med Name: Metoprolol Succinate ER 25MG TB24] 15 tablet 0     Sig: TAKE 1/2 TABLET BY MOUTH DAILY       Beta-Blockers Protocol Passed - 7/3/2023  4:00 PM        Passed - Blood pressure under 140/90 in past 12 months     BP Readings from Last 3 Encounters:   06/14/23 94/68   05/11/23 101/68   04/26/23 109/75                 Passed - Patient is age 6 or older        Passed - Recent (12 mo) or future (30 days) visit within the authorizing provider's specialty     Patient has had an office visit with the authorizing provider or a provider within the authorizing providers department within the previous 12 mos or has a future within next 30 days. See \"Patient Info\" tab in [employer], or \"Choose Columns\" in Meds & Orders section of the refill encounter.              Passed - Medication is active on med list           pantoprazole (PROTONIX) 40 MG EC tablet [Pharmacy Med Name: Pantoprazole Sodium 40MG TBEC] 30 tablet 0     Sig: TAKE 1 TABLET BY MOUTH DAILY       PPI Protocol Passed - 7/3/2023 11:47 AM        Passed - Not on Clopidogrel (unless Pantoprazole ordered)        Passed - No diagnosis of osteoporosis on record        Passed - Recent (12 mo) or future (30 days) visit within the authorizing provider's specialty     Patient has had an office visit with the authorizing provider or a provider within the authorizing providers department within the previous 12 mos or has a future within next 30 days. See \"Patient Info\" tab in [employer], or \"Choose Columns\" in Meds & Orders section of the refill encounter.              Passed - Medication is active on med list        Passed - Patient is age 18 or older           metFORMIN (GLUCOPHAGE) 1000 MG tablet [Pharmacy Med Name: " "metFORMIN HCl 1000MG TABS*] 60 tablet 0     Sig: TAKE 1 TABLET BY MOUTH TWICE A DAY       Biguanide Agents Passed - 7/3/2023 11:47 AM        Passed - Patient is age 10 or older        Passed - Patient has documented A1c within the specified period of time.     If HgbA1C is 8 or greater, it needs to be on file within the past 3 months.  If less than 8, must be on file within the past 6 months.     Recent Labs   Lab Test 01/28/23  0854   A1C 5.4             Passed - Patient's CR is NOT>1.4 OR Patient's EGFR is NOT<45 within past 12 mos.     Recent Labs   Lab Test 04/12/23  0121   GFRESTIMATED >90       Recent Labs   Lab Test 04/12/23  0121   CR 0.76             Passed - Patient does NOT have a diagnosis of CHF.        Passed - Medication is active on med list        Passed - Recent (6 mo) or future (30 days) visit within the authorizing provider's specialty     Patient had office visit in the last 6 months or has a visit in the next 30 days with authorizing provider or within the authorizing provider's specialty.  See \"Patient Info\" tab in inbasket, or \"Choose Columns\" in Meds & Orders section of the refill encounter.                 Babatunde Cantu RN 07/03/23 4:01 PM  "

## 2023-07-05 DIAGNOSIS — J30.1 ALLERGIC RHINITIS DUE TO POLLEN, UNSPECIFIED SEASONALITY: ICD-10-CM

## 2023-07-05 RX ORDER — LORATADINE 10 MG/1
1 TABLET ORAL DAILY
Qty: 90 TABLET | Refills: 3 | Status: SHIPPED | OUTPATIENT
Start: 2023-07-05 | End: 2024-06-25

## 2023-07-05 NOTE — TELEPHONE ENCOUNTER
"Last Written Prescription Date:  4/11/23  Last Fill Quantity: 30,  # refills: 0   Last office visit provider:  6/14/23     Requested Prescriptions   Pending Prescriptions Disp Refills     loratadine (CLARITIN) 10 MG tablet [Pharmacy Med Name: Loratadine 10MG TABS] 30 tablet 0     Sig: TAKE 1 TABLET BY MOUTH DAILY       Antihistamines Protocol Passed - 7/5/2023  3:57 PM        Passed - Patient is 3-64 years of age     Apply weight-based dosing for peds patients age 3 - 12 years of age.    Forward request to provider for patients under the age of 3 or over the age of 64.          Passed - Recent (12 mo) or future (30 days) visit within the authorizing provider's specialty     Patient has had an office visit with the authorizing provider or a provider within the authorizing providers department within the previous 12 mos or has a future within next 30 days. See \"Patient Info\" tab in inbasket, or \"Choose Columns\" in Meds & Orders section of the refill encounter.              Passed - Medication is active on med list             Babatunde Cantu RN 07/05/23 3:58 PM  "

## 2023-08-07 ENCOUNTER — TELEPHONE (OUTPATIENT)
Dept: FAMILY MEDICINE | Facility: CLINIC | Age: 33
End: 2023-08-07

## 2023-08-08 NOTE — TELEPHONE ENCOUNTER
"Attempted to return call to nurses Babatunde or Huma for more information about situation. Call was transferred to nurse Natasha, as staff say there is no Huma at the facility. Natasha was on another call and will return call to RN team.    If Natasha calls back, please clarify what \"constant symptoms\" are occurring and route to Dr. Ybarra.  "

## 2023-08-08 NOTE — TELEPHONE ENCOUNTER
FYI - Status Update    Who is Calling: Babatunde lopes    Update: Babatunde called to ask PCP for a referral for PT or Speech therapy as patients constant symptoms are still occurring. Please call back to discuss further.

## 2023-08-09 NOTE — TELEPHONE ENCOUNTER
Most likely, these symptoms are side effects of the psychiatric medication he takes and should be reviewed with psychiatrist.    I would be happy to see him later this month in the office to review things.  Please set up appointment.  In the meantime, if things are very severe, he should go to the emergency room.

## 2023-08-09 NOTE — TELEPHONE ENCOUNTER
"RN made 2nd attempt to return call to nurses Babatunde.  If nurse calls back, please clarify what \"constant symptoms\" are occurring and route to Dr. Ybarra.    Marta Brown RN  [facility]      Update: Babatunde LINDO called to ask PCP for a referral for PT or Speech therapy as patients constant symptoms are still occurring. Please call back to discuss further.     "

## 2023-08-09 NOTE — TELEPHONE ENCOUNTER
S-(situation):   See message below.  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 Okay going up the stairs.  Patient goes on the treadmill for at least 10 minutes a day. Also walks in a group  3 - 5 days a week for 20 - 30 minutes and can walk on his own    Patient having a hard time keeping up with conversations and finishing sentences    Patient has flinching of his body and it will wake him up when sleeping  Also hand will shake on and off.  Patient has had symptoms since having ECT and wondering if this could be the cause?    Patient was seen by his psychiatrist, Dr Wallace and started back on Adderral ER 25 mg today for his ADHD    R-(recommendations):   Appointment scheduled on 8/24/23 with Dr Ybarra and offered sooner appointment with another provider, but patient declined  Babatunde, mental health staff was requesting speech and PT referral.    Message routed to Dr Ybarra for review / plan    Marta Brown RN  [facility]

## 2023-08-10 NOTE — TELEPHONE ENCOUNTER
RN attempted to contact nurse at mental health facility but no answer. Left message on [employer] [facility]'s nurses voicemail to call clinic back.  Patient has an appointment scheduled on 8/24/23 with Dr Ybarra    If nurse calls back, please relay message below from Dr Ybarra.    Marta Brown RN  [facility]    Dr Ybarra  Most likely, these symptoms are side effects of the psychiatric medication he takes and should be reviewed with psychiatrist.    I would be happy to see him later this month in the office to review things.  Please set up appointment.  In the meantime, if things are very severe, he should go to the emergency room.

## 2023-08-10 NOTE — TELEPHONE ENCOUNTER
Natasha LINDO calling back and relayed message below from Dr Ybarra.  Already scheduled an appointment with PCP, Dr Ybarra on 8/24/23.  Natasha LINDO will discuss patient's symptoms with his  psychiatrist, Dr Wallace.  No further action needed    Marta Brown RN  [facility]    Dr Ybarra  Most likely, these symptoms are side effects of the psychiatric medication he takes and should be reviewed with psychiatrist.    I would be happy to see him later this month in the office to review things.  Please set up appointment.  In the meantime, if things are very severe, he should go to the emergency room.

## 2023-08-24 ENCOUNTER — OFFICE VISIT (OUTPATIENT)
Dept: FAMILY MEDICINE | Facility: CLINIC | Age: 33
End: 2023-08-24
Payer: COMMERCIAL

## 2023-08-24 VITALS
SYSTOLIC BLOOD PRESSURE: 102 MMHG | TEMPERATURE: 97.7 F | RESPIRATION RATE: 16 BRPM | HEART RATE: 73 BPM | WEIGHT: 157 LBS | DIASTOLIC BLOOD PRESSURE: 67 MMHG | HEIGHT: 63 IN | BODY MASS INDEX: 27.82 KG/M2 | OXYGEN SATURATION: 97 %

## 2023-08-24 DIAGNOSIS — F29 PSYCHOSIS, UNSPECIFIED PSYCHOSIS TYPE (H): ICD-10-CM

## 2023-08-24 DIAGNOSIS — R26.9 GAIT DISTURBANCE: ICD-10-CM

## 2023-08-24 DIAGNOSIS — E11.8 TYPE 2 DIABETES MELLITUS WITH UNSPECIFIED COMPLICATIONS (H): ICD-10-CM

## 2023-08-24 DIAGNOSIS — R42 DIZZINESS: Primary | ICD-10-CM

## 2023-08-24 DIAGNOSIS — R47.89 SPEECH DYSFLUENCY: ICD-10-CM

## 2023-08-24 DIAGNOSIS — Z00.00 HEALTHCARE MAINTENANCE: ICD-10-CM

## 2023-08-24 PROCEDURE — 90471 IMMUNIZATION ADMIN: CPT | Performed by: FAMILY MEDICINE

## 2023-08-24 PROCEDURE — 90746 HEPB VACCINE 3 DOSE ADULT IM: CPT | Performed by: FAMILY MEDICINE

## 2023-08-24 PROCEDURE — 99214 OFFICE O/P EST MOD 30 MIN: CPT | Mod: 25 | Performed by: FAMILY MEDICINE

## 2023-08-24 RX ORDER — DEXTROAMPHETAMINE SACCHARATE, AMPHETAMINE ASPARTATE MONOHYDRATE, DEXTROAMPHETAMINE SULFATE AND AMPHETAMINE SULFATE 6.25; 6.25; 6.25; 6.25 MG/1; MG/1; MG/1; MG/1
CAPSULE, EXTENDED RELEASE ORAL
COMMUNITY
Start: 2023-08-08

## 2023-08-24 RX ORDER — CLONIDINE HYDROCHLORIDE 0.1 MG/1
TABLET ORAL
COMMUNITY
Start: 2023-06-28

## 2023-08-24 RX ORDER — OLANZAPINE 10 MG/1
TABLET ORAL
COMMUNITY
Start: 2023-08-23

## 2023-08-24 ASSESSMENT — PAIN SCALES - GENERAL: PAINLEVEL: SEVERE PAIN (6)

## 2023-08-24 ASSESSMENT — PATIENT HEALTH QUESTIONNAIRE - PHQ9
SUM OF ALL RESPONSES TO PHQ QUESTIONS 1-9: 19
SUM OF ALL RESPONSES TO PHQ QUESTIONS 1-9: 19
10. IF YOU CHECKED OFF ANY PROBLEMS, HOW DIFFICULT HAVE THESE PROBLEMS MADE IT FOR YOU TO DO YOUR WORK, TAKE CARE OF THINGS AT HOME, OR GET ALONG WITH OTHER PEOPLE: EXTREMELY DIFFICULT

## 2023-08-24 NOTE — ASSESSMENT & PLAN NOTE
Cited today as indication for metformin.  She does not have diabetes though he has had some mild hypoglycemia.  Metformin use to prevent.
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
## 2023-08-24 NOTE — ASSESSMENT & PLAN NOTE
With standing, no orthostatic changes but patient is on a couple of psychiatric medications and pulse does go up significantly with standing.  He already drinks a fair amount of water.  Discussed counter maneuvers/isometric exercises for presyncope/lightheadedness.  Follow-up if ongoing problems.

## 2023-08-24 NOTE — ASSESSMENT & PLAN NOTE
Suspect this is related to medication plus previous ECT.  No falls.  But patient hangs onto rails to go up and down stairs, Kasi to steady self.  Referral to physical therapy

## 2023-08-24 NOTE — ASSESSMENT & PLAN NOTE
Lifelong stuttering but mild, significantly worse since ECT, now on medications.  Referral to speech therapy which patient requested.

## 2023-08-29 ENCOUNTER — THERAPY VISIT (OUTPATIENT)
Dept: PHYSICAL THERAPY | Facility: CLINIC | Age: 33
End: 2023-08-29
Attending: FAMILY MEDICINE
Payer: COMMERCIAL

## 2023-08-29 DIAGNOSIS — R26.9 GAIT DISTURBANCE: ICD-10-CM

## 2023-08-29 PROCEDURE — 97110 THERAPEUTIC EXERCISES: CPT | Mod: GP | Performed by: PHYSICAL THERAPIST

## 2023-08-29 PROCEDURE — 97162 PT EVAL MOD COMPLEX 30 MIN: CPT | Mod: GP | Performed by: PHYSICAL THERAPIST

## 2023-08-29 PROCEDURE — 97112 NEUROMUSCULAR REEDUCATION: CPT | Mod: GP | Performed by: PHYSICAL THERAPIST

## 2023-08-29 NOTE — PROGRESS NOTES
PHYSICAL THERAPY EVALUATION  Type of Visit: Evaluation    See electronic medical record for Abuse and Falls Screening details.    Subjective Since ECT treatment he has been struggling with walking and going up/down stairs.  Fell once getting out of bed.  Stairs going down is worse than going up.  Does not note issues with quick head turns      Presenting condition or subjective complaint: Fidgeting,feels shakey often and stutteringm, walking down stairs  Date of onset: 05/01/23    Relevant medical history: Concussions; Depression; Diabetes; Dizziness; Hepatitis; High blood pressure; Mental Illness; Overweight; Pain at night or rest; Smoking; Substance use disorder   Dates & types of surgery:      Prior diagnostic imaging/testing results: Other 15 rounds of ECT   Prior therapy history for the same diagnosis, illness or injury: No      Prior Level of Function  Transfers: Independent  Ambulation: Independent  ADL: Independent, Assistive person, Currently living in a group home sober house environment  IADL:  Due to mental health dx etc,  as above. Has some help.    Living Environment  Social support: With a caregiver/helper   Type of home: Group home, sober house    Stairs to enter the home: Yes 10 Is there a railing: Yes   Ramp: No   Stairs inside the home: Yes 30 Is there a railing: Yes   Help at home: None  Equipment owned:       Employment: No    Hobbies/Interests: meditation, take walks, play games    Patient goals for therapy: Have full control of my body so that I can walk    Pain assessment: Pain denied     Objective   Cognitive Status Examination  Orientation: Oriented to person, place and time   Level of Consciousness: Alert, slower to respond  Follows Commands and Answers Questions: 100% of the time  Personal Safety and Judgement:  appears grossly in tact, will continue to assess.  Memory:  appears in tact    OBSERVATION: NAD,   INTEGUMENTARY: Intact  POSTURE:  Head down, poor eye contact. Able to correct  with cues.  Able to assume WFL posture  PALPATION: NT  RANGE OF MOTION: LE ROM WFL  UE ROM WFL  STRENGTH:  definitely diminished for his age, see below.  Functionally his LE strength is impaired.     BED MOBILITY: Independent    TRANSFERS: Independent    GAIT:   Level of [place]: Independent, Slow  Assistive Device(s): None  Gait Deviations: Stride length decreased  Sharon decreased  Occasional small LOBs/ side steps which pt is able to correct  Gait Distance: as below  Stairs: able to go up reciprocally no rail, descends step to pattern with rail.     BALANCE:  sitting balance is indep.  Standing and dynamic balance are diminished. See below.     SPECIAL TESTS  Functional Gait Assessment (FGA)      10 Meter Walk Test (Comfortable)     10 Meter Walk Test (Fast)     6 Minute Walk Test (6MWT)   .56m/s  671' 204.52m      Did not require standing rest break   Karimi Balance Scale (BBS)     5 Times Sit-to-Stand (5TSTS)  13:30 sec     Dynamic Gait Index (DGI)     Timed Up and Go (TUG) - sec    Single Leg Stance Right (sec) 15:59   Single Leg Stance Left (sec) 9.75   Modified CTSIB Conditions (sec) Cond 1: 30  Cond 2: 30  Cond 4: 30  Cond 5 : 12   Romberg  (sec)    Sharpened Romberg (sec)    30 Second Sit to Stand (reps/height) 11 reps           SENSATION:  NT    REFLEXES: NT  COORDINATION: WFL  Slowed?    MUSCLE TONE: WNL      Assessment & Plan   CLINICAL IMPRESSIONS  Medical Diagnosis: Gait disturbance (R26.9)    Treatment Diagnosis: force production deficit   Impression/Assessment: Patient is a 33 year old male with weakness,  impaired balance poor activity tolerance complaints.  The following significant findings have been identified: Decreased strength, Impaired balance, Decreased proprioception, Impaired gait, Decreased activity tolerance, Impaired posture, and Disequilibrium . These impairments interfere with their ability to perform self care tasks, work tasks, recreational activities, household chores,  household mobility, and community mobility as compared to previous level of function.     Clinical Decision Making (Complexity):  Clinical Presentation: Evolving/Changing  Clinical Presentation Rationale: based on medical and personal factors listed in PT evaluation  Clinical Decision Making (Complexity): Moderate complexity    PLAN OF CARE  Treatment Interventions:  Interventions: Gait Training, Manual Therapy, Neuromuscular Re-education, Therapeutic Activity, Therapeutic Exercise    Long Term Goals     PT Goal 1  Goal Identifier: Walking  Goal Description: pt able to complete 6MWT with a gait speed of better than 1m/s to indicate improved strength and endurance  Goal Progress: at eval: 0.56m/s  671'= 204.52m  Target Date: 11/27/23  PT Goal 2  Goal Identifier: Stairs  Goal Description: Pt able to climb 12 stairs reciprocally with light use of hand rail for balance without undue fatigue or feeling as if he will fall  Target Date: 11/27/23  PT Goal 3  Goal Identifier: Balance  Goal Description: Pt able to maintain balance for 30 sec in all 4 levels of the mCTSIB,  pt will score at least 23/30 on the FGA in order to demonstrate average level of risk for fallls and improved dynamic balance  Target Date: 11/27/23  PT Goal 4  Goal Identifier: strength  Goal Description: pt will demo ability to perform at least 13 reps of sit to  30 sec and perform 5xSTS in less than 12 sec to demo improved strength and endurance in LEs and decreased fall risk.  Target Date: 11/27/23      Frequency of Treatment: 1x per week  Duration of Treatment: 90 days    Recommended Referrals to Other Professionals: Occupational Therapy, might  also be beneficial  Education Assessment:   Learner/Method: Patient  Education Comments: hand outs for homework    Risks and benefits of evaluation/treatment have been explained.   Patient/Family/caregiver agrees with Plan of Care.     Evaluation Time:     PT Milady, Moderate Complexity Minutes (03538):  30     Signing Clinician: Deanna Curiel, PT      [facility]                                                                                   OUTPATIENT PHYSICAL THERAPY      PLAN OF TREATMENT FOR OUTPATIENT REHABILITATION   Patient's Last Name, First Name, Nikolay Cardona    YOB: 1990   Provider's Name   [facility]   Medical Record No.  [id number]     Onset Date: 05/01/23  Start of Care Date: 08/29/23     Medical Diagnosis:  Gait disturbance (R26.9)      PT Treatment Diagnosis:  force production deficit Plan of Treatment  Frequency/Duration: 1x per week/ 90 days    Certification date from 08/29/23 to 11/27/23         See note for plan of treatment details and functional goals     Deanna Curiel, PT                         I CERTIFY THE NEED FOR THESE SERVICES FURNISHED UNDER        THIS PLAN OF TREATMENT AND WHILE UNDER MY CARE     (Physician attestation of this document indicates review and certification of the therapy plan).                Referring Provider:  Mich Ybarra      Initial Assessment  See Epic Evaluation- Start of Care Date: 08/29/23

## 2023-09-15 ENCOUNTER — THERAPY VISIT (OUTPATIENT)
Dept: PHYSICAL THERAPY | Facility: CLINIC | Age: 33
End: 2023-09-15
Attending: FAMILY MEDICINE
Payer: COMMERCIAL

## 2023-09-15 DIAGNOSIS — R26.9 GAIT DISTURBANCE: Primary | ICD-10-CM

## 2023-09-15 PROCEDURE — 97530 THERAPEUTIC ACTIVITIES: CPT | Mod: GP | Performed by: PHYSICAL THERAPIST

## 2023-09-15 PROCEDURE — 97116 GAIT TRAINING THERAPY: CPT | Mod: GP | Performed by: PHYSICAL THERAPIST

## 2023-09-15 NOTE — PATIENT INSTRUCTIONS
9/15/23      Things to help balance:  Stable shoes.  Ask mom to bring Nike's and others - just no flip flops.   Try to wear non - flip flops and see if that helps your balance.  Breathe and COUNT OUT LOUD - as in when walking on steps/ stairs, curbs.  Use stable visual targets to look at for stability like we did in PT.  Remember you have a flashlight on phone in case you need light.       5 x  sit to stand  - count out loud.   This will strengthen your legs.     Good job.

## 2023-09-21 ENCOUNTER — OFFICE VISIT (OUTPATIENT)
Dept: OPHTHALMOLOGY | Facility: CLINIC | Age: 33
End: 2023-09-21
Attending: OPTOMETRIST
Payer: COMMERCIAL

## 2023-09-21 DIAGNOSIS — Z13.5 SCREENING FOR EYE CONDITION: Primary | ICD-10-CM

## 2023-09-21 DIAGNOSIS — H04.123 BILATERAL DRY EYES: ICD-10-CM

## 2023-09-21 DIAGNOSIS — F25.1 SCHIZOAFFECTIVE DISORDER, DEPRESSIVE TYPE (H): ICD-10-CM

## 2023-09-21 DIAGNOSIS — H00.15 CHALAZION OF LEFT LOWER EYELID: ICD-10-CM

## 2023-09-21 DIAGNOSIS — H02.88B MEIBOMIAN GLAND DYSFUNCTION (MGD), BILATERAL, BOTH UPPER AND LOWER LIDS: ICD-10-CM

## 2023-09-21 DIAGNOSIS — H02.88A MEIBOMIAN GLAND DYSFUNCTION (MGD), BILATERAL, BOTH UPPER AND LOWER LIDS: ICD-10-CM

## 2023-09-21 PROCEDURE — 92004 COMPRE OPH EXAM NEW PT 1/>: CPT | Performed by: OPHTHALMOLOGY

## 2023-09-21 PROCEDURE — G0463 HOSPITAL OUTPT CLINIC VISIT: HCPCS | Performed by: OPHTHALMOLOGY

## 2023-09-21 PROCEDURE — 92015 DETERMINE REFRACTIVE STATE: CPT

## 2023-09-21 ASSESSMENT — TONOMETRY
IOP_METHOD: TONOPEN
OS_IOP_MMHG: 16
OD_IOP_MMHG: 19

## 2023-09-21 ASSESSMENT — REFRACTION_MANIFEST
OS_SPHERE: PLANO
OD_SPHERE: -0.75
OS_CYLINDER: SPHERE
OD_AXIS: 110
OD_CYLINDER: +0.50

## 2023-09-21 ASSESSMENT — SLIT LAMP EXAM - LIDS: COMMENTS: MGD, TR BLEPH

## 2023-09-21 ASSESSMENT — CONF VISUAL FIELD
OS_NORMAL: 1
OD_INFERIOR_NASAL_RESTRICTION: 0
OD_SUPERIOR_NASAL_RESTRICTION: 0
OS_INFERIOR_NASAL_RESTRICTION: 0
OS_SUPERIOR_TEMPORAL_RESTRICTION: 0
OS_SUPERIOR_NASAL_RESTRICTION: 0
METHOD: COUNTING FINGERS
OS_INFERIOR_TEMPORAL_RESTRICTION: 0
OD_NORMAL: 1
OD_SUPERIOR_TEMPORAL_RESTRICTION: 0
OD_INFERIOR_TEMPORAL_RESTRICTION: 0

## 2023-09-21 ASSESSMENT — CUP TO DISC RATIO
OS_RATIO: 0.2
OD_RATIO: 0.2

## 2023-09-21 ASSESSMENT — VISUAL ACUITY
OS_SC: 20/20
OS_SC+: -3
OD_SC: 20/50
METHOD: SNELLEN - LINEAR

## 2023-09-21 NOTE — PROGRESS NOTES
"Chief Complaint(s) and History of Present Illness(es) : blurriness and double vision     HPI: Mr. Lora is a 33 year old male with a past medical history of polysubstance use disorder, depression, anxiety, homelessness, and schizoaffective disorder who presents for the first time to the ophthalmology clinic with complaints of blurriness, double vision and inability to focus on text. Since June, he has had increasing blurriness and double vision, especially in the afternoons. Has difficulty reading because text appears \"jumbled\" on the page. Pt. States that his symptoms correspond additional neurological symptoms that began following initiation of electroconvulsive therapy in February . He has been experiencing difficulty with coordination and balance, and feel a general sense of \"heaviness\", especially while jumping or climibng stairs.        COMPREHENSIVE EYE EXAM            Laterality: both eyes    Quality: fluctuating    Associated symptoms: Negative for eye pain, flashes and floaters    Treatments tried: no treatments          Comments    Nikolay Lroa is a(n) 33 year old male who presents for a comprehensive exam.   Last eye exam was 5 year(s) ago. Since exam, vision has been fluctuating.   No lubricating drops. No flashes and floaters. No eye pain.     Lab Results       Component                Value               Date                       A1C                      5.4                 01/28/2023              Lamberto Roxanne COT 7:41 AM September 21, 2023             Review of systems for the eyes was negative other than the pertinent positives/negatives listed in the HPI.      Assessment & Plan      Nikolay Lora is a 33 year old male with the following diagnoses:   1. Screening for eye condition    2. Bilateral dry eyes    3. Meibomian gland dysfunction (MGD), bilateral, both upper and lower lids    4. Schizoaffective disorder, depressive type (H)           Notes vision changes since starting ECT  Intermittent blur, " scrambled letters and diplopia  Unsure if monocular or binocular   EOMS full today.  Ortho in primary  Discussed occlusion with symptoms to discern if binocular in origin  Likely secondary to tear dysfunction    Discussed contributing factors  Recommend lid hygiene and warm compresses  Recommend artifical tears frequently   Return precautions reviewed     Refractive options reviewed  Refraction given     Patient disposition:   Return in about 1 year (around 9/21/2024) for DFE.         Attending Physician Attestation:  Complete documentation of historical and exam elements from today's encounter can be found in the full encounter summary report (not reduplicated in this progress note).  I personally obtained the chief complaint(s) and history of present illness.  I confirmed and edited as necessary the review of systems, past medical/surgical history, family history, social history, and examination findings as documented by others; and I examined the patient myself.  I personally reviewed the relevant tests, images, and reports as documented above.  I formulated and edited as necessary the assessment and plan and discussed the findings and management plan with the patient and family. . - Heriberto Friedman MD

## 2023-09-21 NOTE — NURSING NOTE
Chief Complaints and History of Present Illnesses   Patient presents with    COMPREHENSIVE EYE EXAM     Chief Complaint(s) and History of Present Illness(es)       COMPREHENSIVE EYE EXAM              Laterality: both eyes    Quality: fluctuating    Associated symptoms: Negative for eye pain, flashes and floaters    Treatments tried: no treatments              Comments    Nikolay Lora is a(n) 33 year old male who presents for a comprehensive exam. Last eye exam was 5 year(s) ago. Since exam, vision has been fluctuating. No lubricating drops. No flashes and floaters. No eye pain.     Lab Results       Component                Value               Date                       A1C                      5.4                 01/28/2023              Lamberto Oliveira COT 7:41 AM September 21, 2023

## 2023-09-22 ENCOUNTER — THERAPY VISIT (OUTPATIENT)
Dept: PHYSICAL THERAPY | Facility: CLINIC | Age: 33
End: 2023-09-22
Attending: FAMILY MEDICINE
Payer: COMMERCIAL

## 2023-09-22 DIAGNOSIS — R26.9 GAIT DISTURBANCE: Primary | ICD-10-CM

## 2023-09-22 PROCEDURE — 97116 GAIT TRAINING THERAPY: CPT | Mod: GP | Performed by: PHYSICAL THERAPIST

## 2023-09-22 PROCEDURE — 97110 THERAPEUTIC EXERCISES: CPT | Mod: GP | Performed by: PHYSICAL THERAPIST

## 2023-09-27 DIAGNOSIS — F29 PSYCHOSIS, UNSPECIFIED PSYCHOSIS TYPE (H): ICD-10-CM

## 2023-10-06 ENCOUNTER — THERAPY VISIT (OUTPATIENT)
Dept: PHYSICAL THERAPY | Facility: CLINIC | Age: 33
End: 2023-10-06
Attending: FAMILY MEDICINE
Payer: COMMERCIAL

## 2023-10-06 DIAGNOSIS — R26.9 GAIT DISTURBANCE: Primary | ICD-10-CM

## 2023-10-06 PROCEDURE — 97116 GAIT TRAINING THERAPY: CPT | Mod: GP | Performed by: PHYSICAL THERAPIST

## 2023-10-06 PROCEDURE — 97112 NEUROMUSCULAR REEDUCATION: CPT | Mod: GP | Performed by: PHYSICAL THERAPIST

## 2023-10-06 NOTE — PATIENT INSTRUCTIONS
10/6/23      Count out loud when doing steps.  Do at a normal pace if able.       Staggered stance - ball toss or just weight shifts.  (like volleyball)     Getting up from floor - stretch legs out - move legs.  Get to hands and knees position - 3 deep breaths and then get up.    For hands - shaky--ideas:  fold hands, hands on table and hands on legs or arms or chairs.    Take care  Jen  PT

## 2023-11-08 ENCOUNTER — TELEPHONE (OUTPATIENT)
Dept: PHYSICAL THERAPY | Facility: CLINIC | Age: 33
End: 2023-11-08

## 2023-11-10 ENCOUNTER — MYC MEDICAL ADVICE (OUTPATIENT)
Dept: FAMILY MEDICINE | Facility: CLINIC | Age: 33
End: 2023-11-10

## 2023-11-10 DIAGNOSIS — L30.9 CHRONIC DERMATITIS: ICD-10-CM

## 2023-11-13 RX ORDER — TRIAMCINOLONE ACETONIDE 1 MG/G
CREAM TOPICAL
Qty: 45 G | Refills: 1 | Status: SHIPPED | OUTPATIENT
Start: 2023-11-13 | End: 2024-09-19

## 2023-11-13 NOTE — TELEPHONE ENCOUNTER
Patient requests refill.  Order pended and pharm noted.    Oralia Jay, RN, BSN, PHN  [facility]  [phone]

## 2023-11-16 ENCOUNTER — THERAPY VISIT (OUTPATIENT)
Dept: SPEECH THERAPY | Facility: REHABILITATION | Age: 33
End: 2023-11-16
Attending: FAMILY MEDICINE
Payer: COMMERCIAL

## 2023-11-16 DIAGNOSIS — R47.89 FLUENCY DISORDER: ICD-10-CM

## 2023-11-16 DIAGNOSIS — R41.841 COGNITIVE COMMUNICATION DEFICIT: ICD-10-CM

## 2023-11-16 DIAGNOSIS — R47.89 SPEECH DYSFLUENCY: Primary | ICD-10-CM

## 2023-11-16 PROCEDURE — 92521 EVALUATION OF SPEECH FLUENCY: CPT | Mod: GN | Performed by: SPEECH-LANGUAGE PATHOLOGIST

## 2023-11-16 PROCEDURE — 92507 TX SP LANG VOICE COMM INDIV: CPT | Mod: GN | Performed by: SPEECH-LANGUAGE PATHOLOGIST

## 2023-11-16 NOTE — PROGRESS NOTES
SPEECH LANGUAGE PATHOLOGY EVALUATION    See electronic medical record for Abuse and Falls Screening details.    Subjective      Presenting condition or subjective complaint:  Patient is a 33 year old male who presents to evaluation with speech and language concerns.  Per patient, he has always had a mild stutter; however, since completing ECT treatment this is exacerbated.  He expressed that he feels as if  my thoughts are very disorganized , is oral reading incorrect,  my voice was taken away from me , and at times  nothing comes out .  He described the sensation as if someone  put a really tight baseball hat on  and he is unable to process and organize his language as previously.  He reported tension with speech, as well as increased phlegm in the morning.  Date of onset: 08/24/23    Relevant medical history: Concussions; Depression; Diabetes; Dizziness; Hepatitis; High blood pressure; Mental Illness; Overweight; Pain at night or rest; Smoking; Substance use disorder   Dates & types of surgery:  Not listed    Prior diagnostic imaging/testing results: Other 15 rounds of ECT   Prior therapy history for the same diagnosis, illness or injury: No      Living Environment  Social support: With a caregiver/helper   Type of home: Group home, sober house    Equipment owned:   NA    Employment: No    Hobbies/Interests: meditation, take walks, play games    Patient goals for therapy:  Improve communication    Pain assessment:  Specific pain not reported on today's date.     Objective     FLUENCY  ORAL MOTOR FUNCTION  generally intact  Dentition: natural dentition  Secretion management: WFL, Patient endorsed increased fluid/phelgm in his mouth/throat in the morning  Mucosal quality: good  Mandibular function: intact  Oral labial function: WFL  Lingual function: WFL  Velar function: intact   Laryngeal function: impaired vocal quality: tight/strained intermittently    REPETITION  Sentence Repetition:     Errors included:  10/10  "conversational sentences, 9/10 with x1 mild block for 5-7 word sentences     Secondary Behaviors: no secondary behaviors observed    CONVERSATION (5 minutes):  Number of words: 100 / Total stuttered words: 11 = 11 % fluency- 9 of which were interjections and x2 word repetitions  Patient was able to explain/describe with mild-moderate hesitations/revisions and word finding deficits.  Variable comprehension- x3 noted to respond to incorrect question asked.    ORAL READING:  Sentence reading: no disfluencies observed  The Gaffney passage: whole word, phrase repetitions x5 with observed tension, patient expressed feeling as though he was \"holding my breath\".    VOICE:  -Sustained 'ah': 6-8 seconds with noted laryngeal tension  -Sustained /s/: 5-6 secondswith noted laryngeal and diaphragmatic tension  -Sustained /z/: 5-6 seconds with noted laryngeal and diaphragmatic tension  -S/Z ratio: 1- WNL    Assessment & Plan   CLINICAL IMPRESSIONS   Medical Diagnosis: Speecy dysfluency R47.89    Treatment Diagnosis: Fluency disorder, cognitive communication deficit   Impression/Assessment: Pt is a 33 year old male with speech and language complaints. The following significant findings have been identified: dysfluencies and impaired communication, characterized by dysfluent speech including interjections, word, and phrase repetitions, presence of laryngeal tension, and word finding/expressive language deficits post ECT treatment. Identified deficits interfere with their ability to communicate wants and needs and communicate within the home or community as compared to previous level of function.    PLAN OF CARE  Treatment Interventions: Speech, Language , Voice    Prognosis to achieve stated therapy goals is fair   Rehab potential is impacted by: current level of function, family/caregiver support, patient awareness of deficits, patient motivation, pending medical intervention, prior level of function    Long Term Goals   SLP Goal " 1  Goal Identifier: 1. Diaphragmatic Breathing  Goal Description: Patient will learn diaphragmatic breathing to sustain 'ah' for 10 seconds.  Rationale: To maximize functional communication within the home or community;To maximize the ability to communicate wants and needs within the home or community  Target Date: 02/16/24  SLP Goal 2  Goal Identifier: 2. Voice  Goal Description: Patient will trial SOVT exercises to improve vocal function and minimize laryngeal tension for speech.  Rationale: To maximize functional communication within the home or community;To maximize the ability to communicate wants and needs within the home or community  Target Date: 02/16/24  SLP Goal 3  Goal Identifier: 3. Fluency Strategies  Goal Description: Patient will learn and demonstrate use of fluency enhancing strategies to be <11% dysfluent during 5 minutes of structured conversation.  Rationale: To maximize functional communication within the home or community;To maximize the ability to communicate wants and needs within the home or community  Target Date: 02/16/24  SLP Goal 4  Goal Identifier: 4. Multisentence level  Goal Description: Patient will complete multisentence level functional expression tasks with >90% accuracy with strategy use.  Rationale: To maximize functional communication within the home or community;To maximize the ability to communicate wants and needs within the home or community  Target Date: 02/16/24      Frequency of Treatment: weekly fading to every other week  Duration of Treatment: 8 sessions     Recommended Referrals to Other Professionals: Physical Therapy, Psychology/Psychiatry  Education Assessment:   Learner/Method: Patient;Listening  Education Comments: Fluency types, strategies, language/speech relationship    Risks and benefits of evaluation/treatment have been explained.   Patient/Family/caregiver agrees with Plan of Care.     Evaluation Time:    Fluency/Stuttering/Cluttering Minutes (83538):  30  Signing Clinician: Gianna Lyles, SLP    [facility]                                                                                   OUTPATIENT SPEECH LANGUAGE PATHOLOGY      PLAN OF TREATMENT FOR OUTPATIENT REHABILITATION   Patient's Last Name, First Name, Nikolay Cardona    YOB: 1990   Provider's Name   [facility]   Medical Record No.  [id number]     Onset Date: 08/24/23 Start of Care Date: 11/16/23     Medical Diagnosis:  Speecy dysfluency R47.89      SLP Treatment Diagnosis: Fluency disorder, cognitive communication deficit  Plan of Treatment  Frequency/Duration: weekly fading to every other week  / 8 sessions     Certification date from 11/16/23   To 02/16/24          See note for plan of treatment details and functional goals     Gianna Lyles, SLP                         I CERTIFY THE NEED FOR THESE SERVICES FURNISHED UNDER        THIS PLAN OF TREATMENT AND WHILE UNDER MY CARE     (Physician attestation of this document indicates review and certification of the therapy plan).              Referring Provider:  Mich Ybarra    Initial Assessment  See Epic Evaluation- 11/16/23

## 2023-11-30 ENCOUNTER — THERAPY VISIT (OUTPATIENT)
Dept: SPEECH THERAPY | Facility: REHABILITATION | Age: 33
End: 2023-11-30
Payer: COMMERCIAL

## 2023-11-30 DIAGNOSIS — R47.89 SPEECH DYSFLUENCY: Primary | ICD-10-CM

## 2023-11-30 DIAGNOSIS — R41.841 COGNITIVE COMMUNICATION DEFICIT: ICD-10-CM

## 2023-11-30 PROCEDURE — 92507 TX SP LANG VOICE COMM INDIV: CPT | Mod: GN | Performed by: SPEECH-LANGUAGE PATHOLOGIST

## 2023-12-19 ENCOUNTER — IMMUNIZATION (OUTPATIENT)
Dept: NURSING | Facility: CLINIC | Age: 33
End: 2023-12-19
Payer: COMMERCIAL

## 2023-12-19 ENCOUNTER — THERAPY VISIT (OUTPATIENT)
Dept: SPEECH THERAPY | Facility: REHABILITATION | Age: 33
End: 2023-12-19
Payer: COMMERCIAL

## 2023-12-19 DIAGNOSIS — R41.841 COGNITIVE COMMUNICATION DEFICIT: ICD-10-CM

## 2023-12-19 DIAGNOSIS — R47.89 SPEECH DYSFLUENCY: Primary | ICD-10-CM

## 2023-12-19 PROCEDURE — 90480 ADMN SARSCOV2 VAC 1/ONLY CMP: CPT

## 2023-12-19 PROCEDURE — 91320 SARSCV2 VAC 30MCG TRS-SUC IM: CPT

## 2023-12-19 PROCEDURE — 92507 TX SP LANG VOICE COMM INDIV: CPT | Mod: GN | Performed by: SPEECH-LANGUAGE PATHOLOGIST

## 2023-12-27 ENCOUNTER — E-VISIT (OUTPATIENT)
Dept: URGENT CARE | Facility: CLINIC | Age: 33
End: 2023-12-27
Payer: COMMERCIAL

## 2023-12-27 DIAGNOSIS — B85.2 LICE: Primary | ICD-10-CM

## 2023-12-27 PROCEDURE — 99207 PR NON-BILLABLE SERV PER CHARTING: CPT | Performed by: NURSE PRACTITIONER

## 2023-12-28 NOTE — PATIENT INSTRUCTIONS
Dear Nikolay Lora    This could be lice. Nits are the eggs of lice and they are seen on the hair shaft. Sometimes you can see the lice (like a spider) itself.   I sent in an Rx for treatment. The whole household should be checked.     Thanks for choosing us as your health care partner,    Tarsha Oconnell, CNP

## 2024-01-04 ENCOUNTER — TELEPHONE (OUTPATIENT)
Dept: FAMILY MEDICINE | Facility: CLINIC | Age: 34
End: 2024-01-04
Payer: COMMERCIAL

## 2024-01-04 DIAGNOSIS — B85.2 LICE: ICD-10-CM

## 2024-01-04 NOTE — TELEPHONE ENCOUNTER
"Pt called, stating he was referred to ophthalmology (Dr. Friedman)  whom he saw on 9/21/23 for blurriness and double vision. Pt states Dr Friedman was going to prescribed eye drops but it doesn't look like the drops prescriptions were ever called in. Pt continues to experience dry eyes and have \"floaters\" in his vision.     RN review Ophthalmologist OV Assessment & Plan and relayed plan to pt.  Pt will follow-up with ophthalmology.   Assessment & Plan   Nikolay Lora is a 33 year old male with the following diagnoses:   1. Screening for eye condition    2. Bilateral dry eyes    3. Meibomian gland dysfunction (MGD), bilateral, both upper and lower lids    4. Schizoaffective disorder, depressive type (H)             Notes vision changes since starting ECT  Intermittent blur, scrambled letters and diplopia  Unsure if monocular or binocular   EOMS full today.  Ortho in primary  Discussed occlusion with symptoms to discern if binocular in origin  Likely secondary to tear dysfunction     Discussed contributing factors  Recommend lid hygiene and warm compresses  Recommend artifical tears frequently   Return precautions reviewed      Refractive options reviewed  Refraction given      Patient disposition:   Return in about 1 year (around 9/21/2024) for DFE.    Aretha HOLCOMB RN  [facility]    "

## 2024-01-04 NOTE — TELEPHONE ENCOUNTER
Medication Question or Refill    Contacts         Type Contact Phone/Fax    01/04/2024 02:25 PM CST Phone (Incoming) Haseeb Lora (Self) [phone] (M)            What medication are you calling about (include dose and sig)?: permethrin (NIX) 1 % external liquid     Preferred Pharmacy:  [facility] [place] [id number] - [place], [place] - [address] [place] [address]  Phone: [phone] Fax: [phone]      Controlled Substance Agreement on file:   CSA -- Patient Level:    CSA: None found at the patient level.       Who prescribed the medication?: Flaskerud    Do you need a refill? Yes    When did you use the medication last? N/A    Patient offered an appointment? No    Do you have any questions or concerns?  Yes: Patient stated that the medication listed above, took away the lice, but he can see that the nix is still there, and asked if he could get another refill       Could we send this information to you in [place] or would you prefer to receive a phone call?:   Patient would prefer a phone call   Okay to leave a detailed message?: Yes at Home number on file [phone] (home)

## 2024-01-09 ENCOUNTER — OFFICE VISIT (OUTPATIENT)
Dept: FAMILY MEDICINE | Facility: CLINIC | Age: 34
End: 2024-01-09
Payer: COMMERCIAL

## 2024-01-09 ENCOUNTER — TELEPHONE (OUTPATIENT)
Dept: FAMILY MEDICINE | Facility: CLINIC | Age: 34
End: 2024-01-09

## 2024-01-09 VITALS
OXYGEN SATURATION: 98 % | TEMPERATURE: 98 F | WEIGHT: 162.8 LBS | RESPIRATION RATE: 18 BRPM | HEART RATE: 98 BPM | SYSTOLIC BLOOD PRESSURE: 118 MMHG | HEIGHT: 63 IN | BODY MASS INDEX: 28.84 KG/M2 | DIASTOLIC BLOOD PRESSURE: 70 MMHG

## 2024-01-09 DIAGNOSIS — L21.9 SEBORRHEIC DERMATITIS: Primary | ICD-10-CM

## 2024-01-09 DIAGNOSIS — R11.0 NAUSEA: ICD-10-CM

## 2024-01-09 DIAGNOSIS — Z23 NEED FOR VACCINATION: ICD-10-CM

## 2024-01-09 PROCEDURE — 90471 IMMUNIZATION ADMIN: CPT | Performed by: FAMILY MEDICINE

## 2024-01-09 PROCEDURE — 90686 IIV4 VACC NO PRSV 0.5 ML IM: CPT | Performed by: FAMILY MEDICINE

## 2024-01-09 PROCEDURE — 99213 OFFICE O/P EST LOW 20 MIN: CPT | Mod: 25 | Performed by: FAMILY MEDICINE

## 2024-01-09 RX ORDER — KETOCONAZOLE 20 MG/ML
SHAMPOO TOPICAL
Qty: 300 ML | Refills: 1 | Status: SHIPPED | OUTPATIENT
Start: 2024-01-09 | End: 2024-05-14

## 2024-01-09 ASSESSMENT — PATIENT HEALTH QUESTIONNAIRE - PHQ9
SUM OF ALL RESPONSES TO PHQ QUESTIONS 1-9: 0
SUM OF ALL RESPONSES TO PHQ QUESTIONS 1-9: 0

## 2024-01-09 NOTE — COMMUNITY RESOURCES LIST (ENGLISH)
01/09/2024   [facility]  N/A  For additional resource needs, please contact your health insurance member services or your primary care team.  Phone: [phone]   Email: N/A   Address: [facility] [place] [address]   Hours: N/A        Food and Nutrition       Food pantry  1  [employer] Distance: 2.95 miles      In-Person, Delivery, Pickup   [address] [place] [address]  Language: English, Lebanese  Hours: Mon - Fri 8:45 AM - 11:30 AM , Mon - Fri 1:00 PM - 3:30 PM  Fees: Free   Phone: (278) 728-8253 Email: info@[facility].xPeerient Website: http://www.[facility].org     2  [facility] - Food Distribution Program Distance: 3.43 miles      In-Person   [address] [place] [address]  Language: English, Hmong, Lebanese  Hours: Tue 3:00 PM - 5:00 PM  Fees: Free   Phone: (562) 616-9120 Email: info@[facility] Center[facility].org Website: http://theD[facility] CenterInvitedHomeation.org/programs/sryqug-elumrkipe-qykwxj/     SNAP application assistance  3  [employer] [place] Distance: 6.82 miles      Phone/Virtual   [address] [address]  Language: English, Hmong, Kuwaiti, Icelandic, Lebanese  Hours: Mon - Fri 8:30 AM - 4:30 PM  Fees: Free   Phone: (806) 389-2369 Email: helpline@[employer].org Website: https://www.[employer].org/programs/[place]-food-helpline/     4  [facility] Economic Support [facility] Distance: 2.35 miles      In-Person, Phone/Virtual   [address] [place] [address]  Language: English  Hours: Mon - Fri 8:00 AM - 4:30 PM  Fees: Free   Phone: (127) 683-6115 Email: jose@[facility].mn. Website: https://www.[facility].mn.us/787/Economic-Support     Soup kitchen or free meals  5  [facility] - [employer] Distance: 3.18 miles      Pickup   [address] [place] [address]  Language: English  Hours: Mon 3:00 PM -  8:00 PM , Tue - Fri 5:00 AM - 8:00 PM , Sat 7:00 AM - 2:00 PM  Fees: Free   Phone: (764) 480-9170 Website: https://communityed.[employer].Webcollage/     6  [facility] - [employer] Distance: 4.61 miles      In-Person, Pickup   [address] [place] [address]  Language: English  Hours: Mon - Thu 5:00 PM - 6:30 PM  Fees: Free   Phone: (874) 227-2415 Email: office@[employer] Website: http://[employer]/          Important Numbers & Websites       [place] [address] [address].org  Poison Control   (859) 633-2410 [place]  Suicide and Crisis Lifeline   [address] [place], [place] [address]line.org  Childhelp [place] Child Abuse Hotline   941.581.7832 Childhelphotline.org  [place] Sexual Assault Hotline   (357) 312-9438 (HOPE) [facility] Runaway Safeline   (173) 660-4642 (RUNAWAY) [facility].org  Pregnancy & Postpartum Support [place]   Call/text 878-335-4257 Ppsupport[place].org  Substance Abuse National Helpline ([facility]   665-482-HELP (2306) Findtreatment.gov  Emergency Services   918

## 2024-01-09 NOTE — PATIENT INSTRUCTIONS
-Thank you for choosing the [facility].  -It was a pleasure to see you today.  -Please take a look at the information below for more specific details regarding the treatment plan and recommendations.  -In this after visit summary is a list of your medications and specific instructions.  Please review this carefully as there may be changes made to your medication list.  -If there are any particular questions or concerns, please feel free to reach out to Dr. Lora.  -If any labs have been completed, we will reach out to you about results.  If the results are normal or not concerning, a letter or [employer] message will be sent to you.  If any follow-up is needed, either Dr. Lora or the nurse will give you a call.  If you have not heard regarding results after 2 weeks, please reach out to the clinic.    Patient Instructions:    -You do not have head lice.  -Use the medicated shampoo as prescribed.   -Start using head and shoulder shampoo (for dandruff)    Please seek immediate medical attention (go to the emergency room or urgent care) for the following reasons: worsening symptoms, or any concerning changes.      --------------------------------------------------------------------------------------------------------------------    -We are always looking for ways to improve.  You may be selected to receive a survey regarding your visit today.  We encourage you to complete the survey and provide specific, constructive feedback to help us improve our processes.  Thank you for your time!  -Please review the contact information listed on the after visit summary and in the electronic chart.  Below is the phone number that we have on file.  If there are any changes that are needed to be made, please reach out to the clinic.  [phone] (home) [phone] (work)
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
## 2024-01-09 NOTE — LETTER
January 9, 2024      Nikolay Lora  [address] [facility] [address]        To Whom It May Concern:    Nikolay Lora was seen in our clinic. He does not have lice at the time of evaluation on 01/09/2024.      Sincerely,          Jacobo Lora MD  [facility] [place] [address]  Phone: [phone]  Fax: [phone]    1/9/2024  5:29 PM

## 2024-01-23 ENCOUNTER — THERAPY VISIT (OUTPATIENT)
Dept: SPEECH THERAPY | Facility: REHABILITATION | Age: 34
End: 2024-01-23
Payer: COMMERCIAL

## 2024-01-23 DIAGNOSIS — R47.89 FLUENCY DISORDER: ICD-10-CM

## 2024-01-23 DIAGNOSIS — R47.89 SPEECH DYSFLUENCY: Primary | ICD-10-CM

## 2024-01-23 DIAGNOSIS — R41.841 COGNITIVE COMMUNICATION DEFICIT: ICD-10-CM

## 2024-01-23 PROCEDURE — 92507 TX SP LANG VOICE COMM INDIV: CPT | Mod: GN | Performed by: SPEECH-LANGUAGE PATHOLOGIST

## 2024-01-24 ENCOUNTER — MYC MEDICAL ADVICE (OUTPATIENT)
Dept: FAMILY MEDICINE | Facility: CLINIC | Age: 34
End: 2024-01-24
Payer: COMMERCIAL

## 2024-01-31 NOTE — PLAN OF CARE
"\"I'm still depressed and I'm still suicidal and want to kill myself by shooting myself in the head with a gun if I wasn't here.\"  \"I don't have any hallucinations, not for the past 4 days or so, I can't keep track.\"    Answered:\" Yeah, mostly. They just want me to be happy and start a family. I don't know if that's a good idea.\" In response to being asked if he feels accepted as himself by his family ( thinking of his chemical dependency and his sexual orientation.)    Denied cravings to use.    No dyskinesias observed    Mostly isolative on his bed. Stated he had a shower the previous day: continues to appear very disheveled.    Sister Farheen Brewer stated \"He is normally a social butterfly. He works out every day and it helps him deal with anxiety.\" She is requesting SW to call her 2/21.    Plan: Monitor and document mood and behaviour, thought process and content. Establish and maintain therapeutic relationship. Educate about diagnoses, medications, treatment, legal status, plan of care. Address preexisting and concurrent medical concerns.      P: Unstable mood  G: Stable mood  O: Progressing  " Yes

## 2024-02-04 DIAGNOSIS — F29 PSYCHOSIS, UNSPECIFIED PSYCHOSIS TYPE (H): ICD-10-CM

## 2024-02-05 ENCOUNTER — OFFICE VISIT (OUTPATIENT)
Dept: PEDIATRICS | Facility: CLINIC | Age: 34
End: 2024-02-05
Payer: COMMERCIAL

## 2024-02-05 VITALS
OXYGEN SATURATION: 98 % | SYSTOLIC BLOOD PRESSURE: 105 MMHG | DIASTOLIC BLOOD PRESSURE: 75 MMHG | RESPIRATION RATE: 16 BRPM | HEART RATE: 82 BPM | BODY MASS INDEX: 29.93 KG/M2 | WEIGHT: 170 LBS | TEMPERATURE: 97.3 F

## 2024-02-05 DIAGNOSIS — L21.0 FLAKY SCALP: Primary | ICD-10-CM

## 2024-02-05 PROCEDURE — 99213 OFFICE O/P EST LOW 20 MIN: CPT | Performed by: STUDENT IN AN ORGANIZED HEALTH CARE EDUCATION/TRAINING PROGRAM

## 2024-02-05 ASSESSMENT — PAIN SCALES - GENERAL: PAINLEVEL: NO PAIN (0)

## 2024-02-05 NOTE — LETTER
February 5, 2024      Nikolay Lora  [address] [facility] [address]        To Whom It May Concern:    Nikolay Lora was seen on 02/05/2024.  Please excuse his absence due to doctor's visit.        Sincerely,        Yue Reed MD

## 2024-02-05 NOTE — PROGRESS NOTES
"  Assessment & Plan     Flaky scalp  Clinical picture most consistent with seborrheic dermatitis. Recommend continue 2x weekly ketoconazole 2%, add Tgel shampoo. Given patient concerns for head lice, offered treatment with permethrin but did not see any head lice during exam.  - permethrin (NIX) 1 % external liquid; Apply to clean, towel-dried hair, saturate hair and scalp, wash off after 10 min.      Drake Membreno is a 33 year old, presenting for the following health issues:  Hair/Scalp Problem        2/5/2024     3:07 PM   Additional Questions   Roomed by S   Accompanied by self         2/5/2024     3:07 PM   Patient Reported Additional Medications   Patient reports taking the following new medications none     HPI       Concern - lice concern   Onset: about 2 weeks   Description: was told he had white \"stuff\" in his hair, had OV on 01/09/24 for this  Intensity: mild  Progression of Symptoms:  same  Accompanying Signs & Symptoms: mild intermittent itching   Previous history of similar problem: none   Precipitating factors:        Worsened by: none   Alleviating factors:        Improved by: Shampoo  Therapies tried and outcome: ketoconazole (NIZORAL) 2 % external shampoo- last used it last week             Objective    /75 (BP Location: Right arm, Patient Position: Sitting, Cuff Size: Adult Large)   Pulse 82   Temp 97.3  F (36.3  C) (Temporal)   Resp 16   Wt 77.1 kg (170 lb)   SpO2 98%   BMI 29.93 kg/m    Body mass index is 29.93 kg/m .  Physical Exam   GENERAL: alert and no distress  SKIN: rare pink/red papules and pustules on scalp (less than a handful on frontal scalp) and dry flaking skin        Signed Electronically by: Yue Reed MD    "

## 2024-03-05 ENCOUNTER — THERAPY VISIT (OUTPATIENT)
Dept: SPEECH THERAPY | Facility: REHABILITATION | Age: 34
End: 2024-03-05
Payer: COMMERCIAL

## 2024-03-05 DIAGNOSIS — R41.841 COGNITIVE COMMUNICATION DEFICIT: ICD-10-CM

## 2024-03-05 DIAGNOSIS — R47.89 FLUENCY DISORDER: ICD-10-CM

## 2024-03-05 DIAGNOSIS — R47.89 SPEECH DYSFLUENCY: Primary | ICD-10-CM

## 2024-03-05 PROCEDURE — 92507 TX SP LANG VOICE COMM INDIV: CPT | Mod: GN | Performed by: SPEECH-LANGUAGE PATHOLOGIST

## 2024-03-06 NOTE — PROGRESS NOTES
[facility]                                                                                   OUTPATIENT SPEECH LANGUAGE PATHOLOGY    PLAN OF TREATMENT FOR OUTPATIENT REHABILITATION   Patient's Last Name, First Name, Nikolay Cardona    YOB: 1990   Provider's Name   [facility]   Medical Record No.  [id number]     Onset Date: 08/24/23 Start of Care Date: 11/16/23     Medical Diagnosis:  Speecy dysfluency R47.89      SLP Treatment Diagnosis: Fluency disorder, cognitive communication deficit  Plan of Treatment  Frequency/Duration: every other week  / 4 sessions     Certification date from 02/17/24   To 04/13/24          See note for plan of treatment details and functional goals     Gianna Lyles, SLP                         I CERTIFY THE NEED FOR THESE SERVICES FURNISHED UNDER        THIS PLAN OF TREATMENT AND WHILE UNDER MY CARE     (Physician attestation of this document indicates review and certification of the therapy plan).              Referring Provider:  Mich Ybarra    Initial Assessment  See Epic Evaluation- 11/16/23 03/05/24 0500   Appointment Info   Treating Provider Gianna Lyles M.S. CCC-SLP   Total/Authorized Visits 8   Visits Used 5/8   Medical Diagnosis Speecy dysfluency R47.89   SLP Tx Diagnosis Fluency disorder, cognitive communication deficit   Quick Adds Certification   Progress Note/Certification   Start Of Care Date 11/16/23   Onset Of Illness/injury Or Date Of Surgery 08/24/23   Therapy Frequency every other week   Predicted Duration 4 sessions   Certification date from 02/17/24   Certification date to 04/13/24   KX Modifier Statement I certify the need for these services furnished under this plan of treatment and while under my care.  (Physician co-signature of this document indicates review and certification of the therapy plan)   Subjective Report   Subjective Report Patient arrived to therapy in good  "spirits.  He expressed that he feels his communication is \"as strong as it can be\".  He finds he continues to need to \"warm up\" in the morning, will speak fluently during the day, and that around 3 he feels like \"I'm running out of words.\"  He endorsed ongoing laryngeal tension, which he finds breathing in rapidly will clear his throat and help.   SLP Goals   SLP Goals 2;1;3;4   SLP Goal 1   Goal Identifier 1. Diaphragmatic Breathing   Goal Description Patient will learn diaphragmatic breathing to sustain 'ah' for 10 seconds.   Rationale To maximize functional communication within the home or community;To maximize the ability to communicate wants and needs within the home or community   Goal Progress 3/5/24: Goal not met.  Patient able to sustain 'ah' for up to 8 seconds with observed tension and vocal flutter.  Continue goal.   Target Date 04/13/24   SLP Goal 2   Goal Identifier 2. Voice   Goal Description Patient will trial SOVT exercises to improve vocal function and minimize laryngeal tension for speech.   Rationale To maximize functional communication within the home or community;To maximize the ability to communicate wants and needs within the home or community   Goal Progress 3/5/24: Goal initiated on today's date, trained on SOVT exercises and recommended home program.  Continue goal.   Target Date 04/13/24   SLP Goal 3   Goal Identifier 3. Fluency Strategies   Goal Description New Goal: Patient will learn and demonstrate use of fluency enhancing strategies to be <7% dysfluent during 5 minutes of structured conversation.  Old Goal: Patient will learn and demonstrate use of fluency enhancing strategies to be <11% dysfluent during 5 minutes of structured conversation.   Rationale To maximize functional communication within the home or community;To maximize the ability to communicate wants and needs within the home or community   Goal Progress 3/5/24: Goal met with 7% dysfluent speech per last measure.  See " updated goal.   Target Date 04/13/24   SLP Goal 4   Goal Identifier 4. Multisentence level   Goal Description Patient will complete multisentence level functional expression tasks with >90% accuracy with strategy use.   Rationale To maximize functional communication within the home or community;To maximize the ability to communicate wants and needs within the home or community   Goal Progress 3/5/24: Patient trained on expressive langauge/organizational strategies and script training concepts.  Successfully able to apply in session given cues, not yet generalized to outside environments/communication partners and tasks consistently.  Continue.   Target Date 04/13/24   Treatment Interventions (SLP)   Treatment Interventions Treatment Speech/Lang/Voice   Treatment Speech/Lang/Voice   Treatment of Speech, Language, Voice Communication&/or Auditory Processing (71540) 40 Minutes   Speech/Lang/Voice 1 - Details Home Practice: Patient reported he has not yet trialed strategies of taking notes in class for recall and langauge organization.  He expressed that he forgot his pen and needed to buy a journal as barriers.  He agreed in ongoing recommendation and understanding of benefit.  Sustained 'ah': Trial 1- 3 seconds, Trial 2- 5 seconds, and Trial 3- 6 seconds all with laryngeal tension and vocal flutter at the end. Given concerns with laryngeal tension, trained patient in SOVT exercises.  Patient able to complete x3 silent cup/bubble blows for 12-13 seconds, x3 phonation blows for 5-8 seconds, and x1 glide up, x1 glide down.  Patient endorsed decreased tension during tasks; however, difficulty with coordinating phonation.  Post completion, sustained 'ah' ranged from 7-8 seconds.   Skilled Intervention Provided written and verbal information on.;Modeled compensatory strategies;Provided feedback on performance of tasks   Patient Response/Progress Patient reporting decreased laryngeal tension with SOVT exercises on today's  date.   Education   Learner/Method Patient;Listening   Education Comments SOVT   Plan   Home program visual organization strategies in class during check in questions, SOVT for x2 weeks   Updates to plan of care Plan to extend POC for x4 sessions, every other week (2/17/24-4/13/24)   Plan for next session f/u communication practice & SOVT   Total Session Time   Total Treatment Time (sum of timed and untimed services) 40

## 2024-03-12 ENCOUNTER — THERAPY VISIT (OUTPATIENT)
Dept: SPEECH THERAPY | Facility: REHABILITATION | Age: 34
End: 2024-03-12
Payer: COMMERCIAL

## 2024-03-12 DIAGNOSIS — R47.89 SPEECH DYSFLUENCY: Primary | ICD-10-CM

## 2024-03-12 DIAGNOSIS — R47.89 FLUENCY DISORDER: ICD-10-CM

## 2024-03-12 PROCEDURE — 92507 TX SP LANG VOICE COMM INDIV: CPT | Mod: GN | Performed by: SPEECH-LANGUAGE PATHOLOGIST

## 2024-04-18 ENCOUNTER — LAB (OUTPATIENT)
Dept: LAB | Facility: CLINIC | Age: 34
End: 2024-04-18
Payer: COMMERCIAL

## 2024-04-18 DIAGNOSIS — F31.9 BIPOLAR DISORDER, UNSPECIFIED (H): Primary | ICD-10-CM

## 2024-04-18 DIAGNOSIS — E78.5 HYPERLIPIDEMIA, UNSPECIFIED HYPERLIPIDEMIA TYPE: ICD-10-CM

## 2024-04-18 LAB — LITHIUM SERPL-SCNC: 1.2 MMOL/L (ref 0.6–1.2)

## 2024-04-18 PROCEDURE — 82565 ASSAY OF CREATININE: CPT

## 2024-04-18 PROCEDURE — 84520 ASSAY OF UREA NITROGEN: CPT

## 2024-04-18 PROCEDURE — 84436 ASSAY OF TOTAL THYROXINE: CPT

## 2024-04-18 PROCEDURE — 80061 LIPID PANEL: CPT

## 2024-04-18 PROCEDURE — 36415 COLL VENOUS BLD VENIPUNCTURE: CPT

## 2024-04-18 PROCEDURE — 80178 ASSAY OF LITHIUM: CPT

## 2024-04-18 PROCEDURE — 84443 ASSAY THYROID STIM HORMONE: CPT

## 2024-04-19 LAB
BUN SERPL-MCNC: 11.8 MG/DL (ref 6–20)
CHOLEST SERPL-MCNC: 266 MG/DL
CREAT SERPL-MCNC: 0.93 MG/DL (ref 0.67–1.17)
EGFRCR SERPLBLD CKD-EPI 2021: >90 ML/MIN/1.73M2
FASTING STATUS PATIENT QL REPORTED: YES
HDLC SERPL-MCNC: 40 MG/DL
LDLC SERPL CALC-MCNC: 200 MG/DL
NONHDLC SERPL-MCNC: 226 MG/DL
T4 SERPL-MCNC: 4.3 UG/DL (ref 4.5–11.7)
TRIGL SERPL-MCNC: 132 MG/DL
TSH SERPL DL<=0.005 MIU/L-ACNC: 5.97 UIU/ML (ref 0.3–4.2)

## 2024-04-25 ENCOUNTER — DOCUMENTATION ONLY (OUTPATIENT)
Dept: ALLERGY | Facility: CLINIC | Age: 34
End: 2024-04-25

## 2024-04-25 ENCOUNTER — OFFICE VISIT (OUTPATIENT)
Dept: ALLERGY | Facility: CLINIC | Age: 34
End: 2024-04-25
Payer: COMMERCIAL

## 2024-04-25 VITALS — HEART RATE: 78 BPM | WEIGHT: 159.2 LBS | BODY MASS INDEX: 28.03 KG/M2 | OXYGEN SATURATION: 97 %

## 2024-04-25 DIAGNOSIS — L50.8 CHRONIC AUTOIMMUNE URTICARIA: Primary | ICD-10-CM

## 2024-04-25 PROCEDURE — 99214 OFFICE O/P EST MOD 30 MIN: CPT | Performed by: ALLERGY & IMMUNOLOGY

## 2024-04-25 RX ORDER — EPINEPHRINE 0.3 MG/.3ML
INJECTION SUBCUTANEOUS
Qty: 2 EACH | Refills: 0 | Status: SHIPPED | OUTPATIENT
Start: 2024-04-25

## 2024-04-25 NOTE — LETTER
4/25/2024         RE: Nikolay Lora  [address] [facility] [address]        Dear Colleague,    Thank you for referring your patient, Nikolay Lora, to the [facility]. Please see a copy of my visit note below.          Drake Membreno is a 33 year old, presenting for the following health issues:  RECHECK (Talk about allergy shots )    HPI     Chief complaint: Hives    History of present illness: This is a pleasant 33-year-old gentleman I last saw in July 2022 here today to discuss chronic hives.  He has a history of allergic rhinitis and chronic idiopathic urticaria previously on Xolair 300 mg monthly.  He did well with Xolair and adequately controlled his hives.  He states over the last year or so has been dealing with mental illness in and out of treatment facilities.  For this reason he has not been able to continue his Xolair but would like to restart as he states he is itchy all the time and has rash.  He is currently using Claritin he has used more than 1 tablet a day which has been ineffective.  He is not a candidate for montelukast given his mental illness.  Pepcid was ineffective for him previously.  He states that the rash does not bruise.  No swelling of his lips or eyes.  He is not taking over-the-counter medication.  He denies any belly pain or joint pain associated with the itching.  Urticaria activity score is 42.          Objective   Pulse 78   Wt 72.2 kg (159 lb 3.2 oz)   SpO2 97%   BMI 28.03 kg/m    Body mass index is 28.03 kg/m .  Physical Exam     Gen: Pleasant male not in acute distress  HEENT: Eyes no erythema of the bulbar or palpebral conjunctiva, no edema.   Skin: No rashes or lesions  Psych: Alert and appropriate for age    Impression report and plan:  1.  Chronic urticaria    Recommend the patient restart Xolair as this worked well for him previously.  He should be on 300 mg monthly understands he must receive the dose in the office and carry an  epinephrine device.  Anaphylaxis risk discussed.  This was prescribed for him today.  He will follow-up 3 months after restarting the Xolair. Increase loratidine to 2 tabs twice daily.  Patient will continue with 20 mg loratidine twice daily.  Glucagon kit prescribed as patient is also on metoprolol.        Signed Electronically by: Mayte CARTER MD      Again, thank you for allowing me to participate in the care of your patient.        Sincerely,        Mayte CARTER MD

## 2024-04-25 NOTE — PATIENT INSTRUCTIONS
Xolair 300 mg monthly     Loratidine up to tabs twice daily     Follow in 3 months    Glucagon/Epinephrine

## 2024-04-25 NOTE — PROGRESS NOTES
Dr. Bullock submitted this patient for Xolair. Message sent to CAM finance team with request to escalate with insurance.

## 2024-04-25 NOTE — PROGRESS NOTES
Drake Membreno is a 33 year old, presenting for the following health issues:  RECHECK (Talk about allergy shots )    HPI     Chief complaint: Hives    History of present illness: This is a pleasant 33-year-old gentleman I last saw in July 2022 here today to discuss chronic hives.  He has a history of allergic rhinitis and chronic idiopathic urticaria previously on Xolair 300 mg monthly.  He did well with Xolair and adequately controlled his hives.  He states over the last year or so has been dealing with mental illness in and out of treatment facilities.  For this reason he has not been able to continue his Xolair but would like to restart as he states he is itchy all the time and has rash.  He is currently using Claritin he has used more than 1 tablet a day which has been ineffective.  He is not a candidate for montelukast given his mental illness.  Pepcid was ineffective for him previously.  He states that the rash does not bruise.  No swelling of his lips or eyes.  He is not taking over-the-counter medication.  He denies any belly pain or joint pain associated with the itching.  Urticaria activity score is 42.          Objective    Pulse 78   Wt 72.2 kg (159 lb 3.2 oz)   SpO2 97%   BMI 28.03 kg/m    Body mass index is 28.03 kg/m .  Physical Exam     Gen: Pleasant male not in acute distress  HEENT: Eyes no erythema of the bulbar or palpebral conjunctiva, no edema.   Skin: No rashes or lesions  Psych: Alert and appropriate for age    Impression report and plan:  1.  Chronic urticaria    Recommend the patient restart Xolair as this worked well for him previously.  He should be on 300 mg monthly understands he must receive the dose in the office and carry an epinephrine device.  Anaphylaxis risk discussed.  This was prescribed for him today.  He will follow-up 3 months after restarting the Xolair. Increase loratidine to 2 tabs twice daily.  Patient will continue with 20 mg loratidine twice daily.   Glucagon kit prescribed as patient is also on metoprolol.        Signed Electronically by: Mayte CARTER MD

## 2024-05-02 ENCOUNTER — TELEPHONE (OUTPATIENT)
Dept: ALLERGY | Facility: CLINIC | Age: 34
End: 2024-05-02
Payer: COMMERCIAL

## 2024-05-02 NOTE — TELEPHONE ENCOUNTER
----- Message from Mayte Bullock MD sent at 5/2/2024  7:04 AM CDT -----  Regarding: FW: Xolair PA Update    ----- Message -----  From: Sohail Null  Sent: 5/1/2024  11:46 AM CDT  To: Mayte Bullock MD  Subject: FW: Xolair PA Update                             Hello,    Xolair order approved;    PA approved 04.29.24 - 08.26.24 for 300mg every 28 days for a total of 5 NDC units under provider Mayte Bullock at [facility] with DX L50.8. Auth#[id number]    Patient is okay to proceed.    Thanks,    Sohail  ----- Message -----  From: Sohail Null  Sent: 4/30/2024   9:19 AM CDT  To: Mayte Bullock MD  Subject: RE: Xolair PA Update                             Thanks! No, this should do. I will let insurance know. Appreciate it!    -Sohail  ----- Message -----  From: Mayte Bullock MD  Sent: 4/30/2024   9:03 AM CDT  To: Sohail Null  Subject: RE: Xolair PA Update                             Yes, the patient has had hives for 3 years, symptoms daily and taking Loratidine 20 gm (2 tabs) twice daily.  Urticaria activity score is 42, which encompasses this as well    Do I need to document this somewhere in the record for them? Happy to do so  ----- Message -----  From: Sohail Null  Sent: 4/30/2024   8:59 AM CDT  To: Mayte Bullock MD  Subject: Xolair PA Update                                 Hi Dr. Bullock,    I initiated Xolair PA, and insurance needs more information.    -Has patient had urticaria for more than 6 weeks (prior to treatment with Xolair) with symptoms present more than 3 days per week despite non-sedating H1-antihistamine therapy with doses that have been titrated up to a maximum of four times the standard FDA-approved dose?    I've uploaded the requested info letter by insurance onto patient's chart if more information is needed.    Thanks,    Sohail

## 2024-05-02 NOTE — TELEPHONE ENCOUNTER
Left message requesting call back, Xolair is approved, provided call back number, when calls back can be scheduled for Xolair injections at [facility] once a month, does not need to speak with allergy staff. Let pt know needs to bring epinephrine device and first 3 injections are a 2 hour wait.

## 2024-05-09 ENCOUNTER — DOCUMENTATION ONLY (OUTPATIENT)
Dept: ALLERGY | Facility: CLINIC | Age: 34
End: 2024-05-09

## 2024-05-09 NOTE — PROGRESS NOTES
Pt presented to clinic today for Xolair injections. Pt's epinephrine device  2023. Pt states he gets a ride so unable to go get it and come back. No shot given today due to  epinephrine. Pt states he does have epinephrine device at home that is not . Pt states he will reschedule on MyChart or call us once he knows what time he can come next week.     Noelle Munoz RN

## 2024-05-14 ENCOUNTER — OFFICE VISIT (OUTPATIENT)
Dept: FAMILY MEDICINE | Facility: CLINIC | Age: 34
End: 2024-05-14
Payer: COMMERCIAL

## 2024-05-14 VITALS
HEART RATE: 82 BPM | BODY MASS INDEX: 28.53 KG/M2 | TEMPERATURE: 97.7 F | OXYGEN SATURATION: 96 % | DIASTOLIC BLOOD PRESSURE: 64 MMHG | RESPIRATION RATE: 18 BRPM | HEIGHT: 63 IN | SYSTOLIC BLOOD PRESSURE: 100 MMHG | WEIGHT: 161 LBS

## 2024-05-14 DIAGNOSIS — F90.0 ADHD (ATTENTION DEFICIT HYPERACTIVITY DISORDER), INATTENTIVE TYPE: ICD-10-CM

## 2024-05-14 DIAGNOSIS — Z13.9 ENCOUNTER FOR SCREENING INVOLVING SOCIAL DETERMINANTS OF HEALTH (SDOH): ICD-10-CM

## 2024-05-14 DIAGNOSIS — E11.8 TYPE 2 DIABETES MELLITUS WITH UNSPECIFIED COMPLICATIONS (H): ICD-10-CM

## 2024-05-14 DIAGNOSIS — Z00.00 ADULT GENERAL MEDICAL EXAM: Primary | ICD-10-CM

## 2024-05-14 DIAGNOSIS — F25.1 SCHIZOAFFECTIVE DISORDER, DEPRESSIVE TYPE (H): ICD-10-CM

## 2024-05-14 LAB — HBA1C MFR BLD: 5.5 % (ref 0–5.6)

## 2024-05-14 PROCEDURE — 99395 PREV VISIT EST AGE 18-39: CPT | Performed by: FAMILY MEDICINE

## 2024-05-14 PROCEDURE — 83036 HEMOGLOBIN GLYCOSYLATED A1C: CPT | Performed by: FAMILY MEDICINE

## 2024-05-14 PROCEDURE — 80061 LIPID PANEL: CPT | Performed by: FAMILY MEDICINE

## 2024-05-14 PROCEDURE — 36415 COLL VENOUS BLD VENIPUNCTURE: CPT | Performed by: FAMILY MEDICINE

## 2024-05-14 PROCEDURE — 80053 COMPREHEN METABOLIC PANEL: CPT | Performed by: FAMILY MEDICINE

## 2024-05-14 RX ORDER — DEXTROAMPHETAMINE SACCHARATE, AMPHETAMINE ASPARTATE, DEXTROAMPHETAMINE SULFATE AND AMPHETAMINE SULFATE 5; 5; 5; 5 MG/1; MG/1; MG/1; MG/1
20 TABLET ORAL DAILY
COMMUNITY
Start: 2024-04-30

## 2024-05-14 SDOH — HEALTH STABILITY: PHYSICAL HEALTH: ON AVERAGE, HOW MANY DAYS PER WEEK DO YOU ENGAGE IN MODERATE TO STRENUOUS EXERCISE (LIKE A BRISK WALK)?: 1 DAY

## 2024-05-14 ASSESSMENT — ENCOUNTER SYMPTOMS
FEVER: 0
SORE THROAT: 0
COUGH: 1
SHORTNESS OF BREATH: 0
BRUISES/BLEEDS EASILY: 0
VOMITING: 0
CONSTIPATION: 0
NAUSEA: 0
CHILLS: 0
ABDOMINAL PAIN: 0
SPEECH DIFFICULTY: 1
DIARRHEA: 0
BACK PAIN: 1

## 2024-05-14 ASSESSMENT — SOCIAL DETERMINANTS OF HEALTH (SDOH): HOW OFTEN DO YOU GET TOGETHER WITH FRIENDS OR RELATIVES?: ONCE A WEEK

## 2024-05-14 NOTE — COMMUNITY RESOURCES LIST (ENGLISH)
May 14, 2024           YOUR PERSONALIZED LIST OF SERVICES & PROGRAMS           NAVIGATION    Eligibility Screening      [facility] Health Insurance Application Program  [address] [place] [address] (Distance: 2.4 miles)  Language: English  Fee: Free  Accessibility: Translation services      [facility] Temporary cash assistance for families  [address] [place] [address] (Distance: 2.4 miles)  Language: English  Fee: Free  Accessibility: Translation services      [employer] [place] - SNAP (formerly food stamps) Screening and Application help  Phone: (898) 189-8489  Website: https://www.[employer].org/programs/[place]-food-helpline/  Language: English  Hours: Mon 10:00 AM - 5:00 PM Tue 10:00 AM - 5:00 PM Wed 10:00 AM - 5:00 PM Thu 10:00 AM - 5:00 PM Fri 10:00 AM - 5:00 PM  Fee: Free  Accessibility: [place] accessible, Blind accommodation, Deaf or hard of hearing, Translation services        ASSISTANCE    Nutrition Benefits      [facility] SNAP application assistance  [address] [place] [address] (Distance: 2.4 miles)  Language: English  Fee: Free  Accessibility: Translation services      [facility] - [place], [place] application assistance - [facility]  [address] [place] [address] (Distance: 3.6 miles)  Phone: [phone]) [phone]  Language: English, Belarusian  Fee: Free      [employer] [place] - SNAP (formerly food stamps) Screening and Application help  Phone: (836) 318-8095  Website: https://www.[employer].org/programs/[place]-food-helpline/  Language: English  Hours: Mon 10:00 AM - 5:00 PM Tue 10:00 AM - 5:00 PM Wed 10:00 AM - 5:00 PM Thu 10:00 AM - 5:00 PM Fri 10:00 AM - 5:00 PM  Fee: Free  Accessibility: [place] accessible, Blind accommodation, Deaf or hard of hearing, Translation services    Pantry      [employer] - [employer]  [address] [place] [address] (Distance: 24.8  miles)  Phone: (220) 736-3329  Website: https://www.familypathways.org/our-work/  Language: English  Fee: Free  Accessibility: [place] accessible, Deaf or hard of hearing, Translation services      [facility] the [place] - Food in the [place] at [facility]  [address] [place] [address] (Distance: 14.1 miles)  Phone: (352) 360-6385  Website: https://www.[facility].org/our-programs/feeding-the-future/food-in-the-tobias/  Language: English  Fee: Free  Accessibility: [place] accessible      [employer] - [place] [employer]  Phone: (344) 750-9918  Website: www.boTorrent LoadingSystemsetimmoture.be.org  Language: English, Welsh  Hours: Mon 9:00 AM - 3:30 PM Tue 9:00 AM - 6:30 PM Wed 9:00 AM - 3:30 PM Thu 9:00 AM - 12:30 PM Fri 9:00 AM - 12:30 PM Sat 9:00 AM - 12:00 PM  Fee: Free        & SHELTER    Case Management      [facility]  [address] [place] [address] (Distance: 1.7 miles)  Phone: [phone]) [phone]  Language: English  Fee: Free  Accessibility: Translation services      [facility] Homeless Resources and Housing Information - Housing search assistance  [address] [place] [address] (Distance: 7.4 miles)  Language: English  Fee: Free  Accessibility: Translation services      [employer] - Housing Stabilization Services  Phone: (146) 790-5040  Website: https://homebasemn.com/  Language: English  Hours: Mon 8:00 AM - 4:00 PM Tue 8:00 AM - 4:00 PM Wed 8:00 AM - 4:00 PM Thu 8:00 AM - 4:00 PM Fri 8:00 AM - 4:00 PM  Fee: Free  Accessibility: Blind accommodation, Deaf or hard of hearing  Transportation Options: Free transportation    Payment Assistance      [facility] - Rent payment assistance - [facility]  [address] [place] [address] (Distance: 1.5 miles)  Phone: (164) 246-5885  Language: English  Fee: Free  Accessibility: Ada  [facility] Housing Choice Voucher Program  [address] [place] [address] (Distance: 1.7 miles)  Language: English  Fee: Free  Accessibility: Translation services      [employer] - [employer]  Phone: (969) 487-1831  Website: https://www.mbe75-kdshjyubjotbxl.org/programs.html  Language: English  Hours: Mon 7:00 AM - 7:00 PM Tue 7:00 AM - 7:00 PM Wed 7:00 AM - 7:00 PM Thu 7:00 AM - 7:00 PM Fri 7:00 AM - 7:00 PM  Fee: Free    Mediation & Eviction Prevention      Living - Housing Stabilization Services  [address] [place] [address] (Distance: 15.0 miles)  Phone: (539) 867-5580  Website: https://Yodlee  Language: Vietnamese, English, Gabonese  Fee: Insurance, Self pay      [facility] - Mortgage Foreclosure Prevention  [address] [place] [address] (Distance: 10.3 miles)  Phone: (645) 978-4620  Language: English  Fee: Free  Accessibility: Ada accessible      Line - Tenant Rights / Eviction Prevention  Website: https://Schneiderlinemn.org/g-edkj-vo-/  Language: English, Moldovan               IMPORTANT NUMBERS & WEBSITES        Emergency Services  911  .   United Way  211 http://211unitedway.org  .   Poison Control  (607) 780-1941 http://[place]poison.org http://wisconsinpoison.org  .     Suicide and Crisis Lifeline  988 http://988lifeline.org  .   Childhelp National Child Abuse Hotline  758.680.2252 http://Childhelphotline.org   .   National Sexual Assault Hotline  (821) 181-8805 (HOPE) http://Rainn.org   .     National Runaway Safeline  (796) 225-6492 (RUNAWAY) http://1800runaway.org  .   Pregnancy & Postpartum Support  Call/text 197-596-6506  [place]: http://ppsupportmn.org  [place]: http://psichapters.com/wi  .   Substance Abuse National Helpline ([facility])  026-225-HELP (4357) http://Findtreatment.gov   .                DISCLAIMER: These resources have been generated via the [employer] Platform. [employer] does not endorse any  service providers mentioned in this resource list. [employer] does not guarantee that the services mentioned in this resource list will be available to you or will improve your health or wellness.    [facility]
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
## 2024-05-15 LAB
ALBUMIN SERPL BCG-MCNC: 4.6 G/DL (ref 3.5–5.2)
ALP SERPL-CCNC: 85 U/L (ref 40–150)
ALT SERPL W P-5'-P-CCNC: 84 U/L (ref 0–70)
ANION GAP SERPL CALCULATED.3IONS-SCNC: 12 MMOL/L (ref 7–15)
AST SERPL W P-5'-P-CCNC: 48 U/L (ref 0–45)
BILIRUB SERPL-MCNC: 0.3 MG/DL
BUN SERPL-MCNC: 8.5 MG/DL (ref 6–20)
CALCIUM SERPL-MCNC: 9 MG/DL (ref 8.6–10)
CHLORIDE SERPL-SCNC: 104 MMOL/L (ref 98–107)
CHOLEST SERPL-MCNC: 267 MG/DL
CREAT SERPL-MCNC: 0.92 MG/DL (ref 0.67–1.17)
DEPRECATED HCO3 PLAS-SCNC: 24 MMOL/L (ref 22–29)
EGFRCR SERPLBLD CKD-EPI 2021: >90 ML/MIN/1.73M2
FASTING STATUS PATIENT QL REPORTED: NO
FASTING STATUS PATIENT QL REPORTED: NO
GLUCOSE SERPL-MCNC: 106 MG/DL (ref 70–99)
HDLC SERPL-MCNC: 47 MG/DL
LDLC SERPL CALC-MCNC: 182 MG/DL
NONHDLC SERPL-MCNC: 220 MG/DL
POTASSIUM SERPL-SCNC: 4 MMOL/L (ref 3.4–5.3)
PROT SERPL-MCNC: 6.9 G/DL (ref 6.4–8.3)
SODIUM SERPL-SCNC: 140 MMOL/L (ref 135–145)
TRIGL SERPL-MCNC: 188 MG/DL

## 2024-05-16 ENCOUNTER — ALLIED HEALTH/NURSE VISIT (OUTPATIENT)
Dept: ALLERGY | Facility: CLINIC | Age: 34
End: 2024-05-16
Payer: COMMERCIAL

## 2024-05-16 DIAGNOSIS — L50.8 CHRONIC AUTOIMMUNE URTICARIA: Primary | ICD-10-CM

## 2024-05-16 PROCEDURE — 96372 THER/PROPH/DIAG INJ SC/IM: CPT | Performed by: ALLERGY & IMMUNOLOGY

## 2024-05-16 NOTE — PROGRESS NOTES
Nikolay Lora presents to clinic today at the request of Mayte Bullock MD (ordering provider) for Xolair (omalizumab) injection(s).       This service provided today was under the care of Mayte Bullock MD; the supervising provider of the day; who was available if needed.      Patient presented after waiting 30 minutes with no reaction to  injections. Discharged from clinic.    Reena Chandra RN

## 2024-05-16 NOTE — PROGRESS NOTES
Clinic Administered Medication Documentation      Injectable Medication Documentation    Is there an active order (written within the past 365 days, with administrations remaining, not ) in the chart? Yes.     Patient was given  Xolair 300 mg . Prior to medication administration, verified patient's identity using patient s name and date of birth. Please see MAR and medication order for additional information. Patient instructed to report any adverse reaction to staff immediately and remain in clinic for 120 minutes, has epinephrine .    Vial/Syringe: Syringe  Was this medication supplied by the patient? No  Is this a medication the patient will need to receive again? Yes. Verified that the patient has refills remaining in their prescription.

## 2024-05-28 DIAGNOSIS — R12 HEARTBURN: ICD-10-CM

## 2024-05-28 DIAGNOSIS — F29 PSYCHOSIS, UNSPECIFIED PSYCHOSIS TYPE (H): ICD-10-CM

## 2024-05-28 RX ORDER — PANTOPRAZOLE SODIUM 40 MG/1
40 TABLET, DELAYED RELEASE ORAL DAILY
Qty: 30 TABLET | OUTPATIENT
Start: 2024-05-28

## 2024-06-24 DIAGNOSIS — I10 HYPERTENSION, UNSPECIFIED TYPE: ICD-10-CM

## 2024-06-24 DIAGNOSIS — J30.1 ALLERGIC RHINITIS DUE TO POLLEN, UNSPECIFIED SEASONALITY: ICD-10-CM

## 2024-06-24 DIAGNOSIS — R12 HEARTBURN: ICD-10-CM

## 2024-06-25 RX ORDER — LORATADINE 10 MG/1
1 TABLET ORAL DAILY
Qty: 90 TABLET | Refills: 2 | Status: SHIPPED | OUTPATIENT
Start: 2024-06-25

## 2024-06-25 RX ORDER — METOPROLOL SUCCINATE 25 MG/1
TABLET, EXTENDED RELEASE ORAL
Qty: 45 TABLET | Refills: 2 | Status: SHIPPED | OUTPATIENT
Start: 2024-06-25

## 2024-06-25 RX ORDER — PANTOPRAZOLE SODIUM 40 MG/1
40 TABLET, DELAYED RELEASE ORAL DAILY
Qty: 90 TABLET | Refills: 2 | Status: SHIPPED | OUTPATIENT
Start: 2024-06-25

## 2024-06-27 ENCOUNTER — OFFICE VISIT (OUTPATIENT)
Dept: FAMILY MEDICINE | Facility: CLINIC | Age: 34
End: 2024-06-27
Payer: COMMERCIAL

## 2024-06-27 VITALS
BODY MASS INDEX: 30.73 KG/M2 | OXYGEN SATURATION: 98 % | TEMPERATURE: 97.3 F | SYSTOLIC BLOOD PRESSURE: 116 MMHG | WEIGHT: 167 LBS | HEIGHT: 62 IN | HEART RATE: 84 BPM | DIASTOLIC BLOOD PRESSURE: 78 MMHG | RESPIRATION RATE: 16 BRPM

## 2024-06-27 DIAGNOSIS — E03.8 SUBCLINICAL HYPOTHYROIDISM: ICD-10-CM

## 2024-06-27 DIAGNOSIS — Z00.00 HEALTHCARE MAINTENANCE: ICD-10-CM

## 2024-06-27 DIAGNOSIS — M54.50 ACUTE BILATERAL LOW BACK PAIN WITHOUT SCIATICA: ICD-10-CM

## 2024-06-27 DIAGNOSIS — R74.01 NONSPECIFIC ELEVATION OF LEVELS OF TRANSAMINASE AND LACTIC ACID DEHYDROGENASE (LDH): ICD-10-CM

## 2024-06-27 DIAGNOSIS — R79.89 ABNORMAL TSH: ICD-10-CM

## 2024-06-27 DIAGNOSIS — R74.02 NONSPECIFIC ELEVATION OF LEVELS OF TRANSAMINASE AND LACTIC ACID DEHYDROGENASE (LDH): ICD-10-CM

## 2024-06-27 DIAGNOSIS — F29 PSYCHOSIS, UNSPECIFIED PSYCHOSIS TYPE (H): Primary | ICD-10-CM

## 2024-06-27 LAB
ALBUMIN SERPL BCG-MCNC: 4.5 G/DL (ref 3.5–5.2)
ALP SERPL-CCNC: 112 U/L (ref 40–150)
ALT SERPL W P-5'-P-CCNC: 76 U/L (ref 0–70)
AST SERPL W P-5'-P-CCNC: 42 U/L (ref 0–45)
BILIRUB DIRECT SERPL-MCNC: <0.2 MG/DL (ref 0–0.3)
BILIRUB SERPL-MCNC: 0.3 MG/DL
HBV CORE AB SERPL QL IA: NONREACTIVE
HBV SURFACE AB SERPL IA-ACNC: 73.8 M[IU]/ML
HBV SURFACE AB SERPL IA-ACNC: REACTIVE M[IU]/ML
HBV SURFACE AG SERPL QL IA: NONREACTIVE
HCV AB SERPL QL IA: NONREACTIVE
PROT SERPL-MCNC: 7.1 G/DL (ref 6.4–8.3)
T4 FREE SERPL-MCNC: 1.07 NG/DL (ref 0.9–1.7)
TSH SERPL DL<=0.005 MIU/L-ACNC: 9.75 UIU/ML (ref 0.3–4.2)

## 2024-06-27 PROCEDURE — 86803 HEPATITIS C AB TEST: CPT | Performed by: FAMILY MEDICINE

## 2024-06-27 PROCEDURE — 36415 COLL VENOUS BLD VENIPUNCTURE: CPT | Performed by: FAMILY MEDICINE

## 2024-06-27 PROCEDURE — 84439 ASSAY OF FREE THYROXINE: CPT | Performed by: FAMILY MEDICINE

## 2024-06-27 PROCEDURE — 86704 HEP B CORE ANTIBODY TOTAL: CPT | Performed by: FAMILY MEDICINE

## 2024-06-27 PROCEDURE — 86706 HEP B SURFACE ANTIBODY: CPT | Performed by: FAMILY MEDICINE

## 2024-06-27 PROCEDURE — 80076 HEPATIC FUNCTION PANEL: CPT | Performed by: FAMILY MEDICINE

## 2024-06-27 PROCEDURE — 84443 ASSAY THYROID STIM HORMONE: CPT | Performed by: FAMILY MEDICINE

## 2024-06-27 PROCEDURE — 87340 HEPATITIS B SURFACE AG IA: CPT | Performed by: FAMILY MEDICINE

## 2024-06-27 PROCEDURE — 99214 OFFICE O/P EST MOD 30 MIN: CPT | Performed by: FAMILY MEDICINE

## 2024-06-27 NOTE — ASSESSMENT & PLAN NOTE
Follows with psychology/psychiatry, MD program.  Thoughts somewhat disorganized today, but reports feeling better most of the time.  Bothered by marijuana use around him that he is not using.

## 2024-06-27 NOTE — ASSESSMENT & PLAN NOTE
"Difficult historian, it is not sure how long this has been going on.  Wakes up in the morning with bilateral low back pain that warms up throughout the day, does not wake him up at night.  No fever, no chills.  Reproducible tenderness in paraspinous muscles, negative straight leg raises.  Seems to have intact strength in the lower extremities.  Very sedentary, \"walks around house\" otherwise no exercise.  Recommend walking daily if he can do this, may need to be supervised.  Referral for physical therapy.  Continue use of Tylenol  "
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
## 2024-06-27 NOTE — ASSESSMENT & PLAN NOTE
Last month ALT 84, remainder of CMP was okay.  Will check hepatitis serologies today.  Recheck hepatic profile.  Risk factors for fatty liver with slightly elevated BMI, glucose intolerance.

## 2024-06-28 RX ORDER — LEVOTHYROXINE SODIUM 50 UG/1
50 TABLET ORAL DAILY
Qty: 90 TABLET | Refills: 2 | Status: SHIPPED | OUTPATIENT
Start: 2024-06-28

## 2024-07-02 NOTE — PROGRESS NOTES
DISCHARGE  Reason for Discharge: Patient has met or progressed towards all goals and appropriate for ongoing home management.    Equipment Issued: NA    Discharge Plan: Patient to continue home program.    Referring Provider:  Mich Ybarra       03/12/24 0500   Appointment Info   Treating Provider Gianna Lyles M.S. CCC-SLP   Total/Authorized Visits 8   Visits Used 6/8   Medical Diagnosis Speecy dysfluency R47.89   SLP Tx Diagnosis Fluency disorder, cognitive communication deficit   Quick Adds Certification   Progress Note/Certification   Start Of Care Date 11/16/23   Onset Of Illness/injury Or Date Of Surgery 08/24/23   Therapy Frequency every other week   Predicted Duration 4 sessions   Certification date from 02/17/24   Certification date to 04/13/24   KX Modifier Statement I certify the need for these services furnished under this plan of treatment and while under my care.  (Physician co-signature of this document indicates review and certification of the therapy plan)   Subjective Report   Subjective Report Patient arrived to therapy in good spirits.  He reported he was unable to complete vocal tract exercises from past session; however, has high motivation for ongoing use.   SLP Goals   SLP Goals 2;1;3;4   SLP Goal 1   Goal Identifier 1. Diaphragmatic Breathing   Goal Description Patient will learn diaphragmatic breathing to sustain 'ah' for 10 seconds.   Rationale To maximize functional communication within the home or community;To maximize the ability to communicate wants and needs within the home or community   Goal Progress 3/5/24: Goal not met.  Patient able to sustain 'ah' for up to 8 seconds with observed tension and vocal flutter.  Continue goal.   Target Date 04/13/24   SLP Goal 2   Goal Identifier 2. Voice   Goal Description Patient will trial SOVT exercises to improve vocal function and minimize laryngeal tension for speech.   Rationale To maximize functional communication within the home or  community;To maximize the ability to communicate wants and needs within the home or community   Goal Progress 3/5/24: Goal initiated on today's date, trained on SOVT exercises and recommended home program.  Continue goal.   Target Date 04/13/24   SLP Goal 3   Goal Identifier 3. Fluency Strategies   Goal Description New Goal: Patient will learn and demonstrate use of fluency enhancing strategies to be <7% dysfluent during 5 minutes of structured conversation.  Old Goal: Patient will learn and demonstrate use of fluency enhancing strategies to be <11% dysfluent during 5 minutes of structured conversation.   Rationale To maximize functional communication within the home or community;To maximize the ability to communicate wants and needs within the home or community   Goal Progress 3/5/24: Goal met with 7% dysfluent speech per last measure.  See updated goal.   Target Date 04/13/24   SLP Goal 4   Goal Identifier 4. Multisentence level   Goal Description Patient will complete multisentence level functional expression tasks with >90% accuracy with strategy use.   Rationale To maximize functional communication within the home or community;To maximize the ability to communicate wants and needs within the home or community   Goal Progress 3/5/24: Patient trained on expressive langauge/organizational strategies and script training concepts.  Successfully able to apply in session given cues, not yet generalized to outside environments/communication partners and tasks consistently.  Continue.   Target Date 04/13/24   Treatment Interventions (SLP)   Treatment Interventions Treatment Speech/Lang/Voice   Treatment Speech/Lang/Voice   Treatment of Speech, Language, Voice Communication&/or Auditory Processing (13313) 35 Minutes   Speech/Lang/Voice 1 - Details Completed SOVT exercises together on today s date include x4 sets of continuous airflow (9-18 seconds), x4 sets of phonation (2/4 with continuous phonation), x5 glides up, and  x3 glides down.  Patient reported decreased laryngeal tension during and lower voice after completing.  Trained patient on laryngeal massage- able to complete x5 on today s date.   Skilled Intervention Provided written and verbal information on.;Modeled compensatory strategies;Provided feedback on performance of tasks   Patient Response/Progress Patient reporting decreased laryngeal tension with SOVT exercises and laryngeal massage on today's date.   Education   Learner/Method Patient;Listening   Education Comments SOVT, laryngeal massage   Plan   Home program SOVT, laryngeal massage   Updates to plan of care Patient to continue via home program for x4 weeks, follow-up at that time.   Plan for next session f/u communication practice & SOVT, laryngeal massage/tension   Total Session Time   Total Treatment Time (sum of timed and untimed services) 35

## 2024-09-12 DIAGNOSIS — F29 PSYCHOSIS, UNSPECIFIED PSYCHOSIS TYPE (H): ICD-10-CM

## 2024-09-19 ENCOUNTER — MYC REFILL (OUTPATIENT)
Dept: FAMILY MEDICINE | Facility: CLINIC | Age: 34
End: 2024-09-19
Payer: COMMERCIAL

## 2024-09-19 DIAGNOSIS — L30.9 CHRONIC DERMATITIS: ICD-10-CM

## 2024-09-19 RX ORDER — TRIAMCINOLONE ACETONIDE 1 MG/G
CREAM TOPICAL
Qty: 45 G | Refills: 1 | Status: SHIPPED | OUTPATIENT
Start: 2024-09-19

## 2024-11-30 NOTE — NURSING NOTE
Clinic Administered Medication Documentation    Administrations This Visit     omalizumab (XOLAIR) injection 300 mg     Admin Date  01/06/2022 Action  Given Dose  300 mg Route  Subcutaneous Site   Administered By  Angelina Diaz RN    Ordering Provider: Mayte Bullock MD    Patient Supplied?: No    Comments: L arm 150mg  R arm 150mg                 Pt with elevated electrolytes likely in setting of [place]- RESOLVED  - K improved s/p Lokelma x2 over course of hospitalization  - Mag/Phos remain elevated  - HD MWF  - Nutritional eval  - Renal diet Pt vomiting today -Likely GERD  Protonix 40 mg IV q 12  hrs  Carafate 1 gm 4x day   Abdominal X Ray   Plan for CT A/P with po contrast  PRN Zofran & Reglan

## 2024-12-02 ENCOUNTER — HOSPITAL ENCOUNTER (EMERGENCY)
Facility: HOSPITAL | Age: 34
Discharge: HOME | End: 2024-12-03
Attending: EMERGENCY MEDICINE | Admitting: EMERGENCY MEDICINE
Payer: COMMERCIAL

## 2024-12-02 DIAGNOSIS — R45.851 SUICIDAL IDEATION: ICD-10-CM

## 2024-12-02 PROBLEM — F32.A DEPRESSION, UNSPECIFIED DEPRESSION TYPE: Status: ACTIVE | Noted: 2024-12-02

## 2024-12-02 PROCEDURE — 250N000013 HC RX MED GY IP 250 OP 250 PS 637: Performed by: EMERGENCY MEDICINE

## 2024-12-02 PROCEDURE — 99283 EMERGENCY DEPT VISIT LOW MDM: CPT

## 2024-12-02 RX ORDER — TRIAMCINOLONE ACETONIDE 1 MG/G
CREAM TOPICAL 2 TIMES DAILY
Status: DISCONTINUED | OUTPATIENT
Start: 2024-12-02 | End: 2024-12-03 | Stop reason: HOSPADM

## 2024-12-02 RX ORDER — BENZTROPINE MESYLATE 0.5 MG/1
0.5 TABLET ORAL 2 TIMES DAILY
Status: DISCONTINUED | OUTPATIENT
Start: 2024-12-02 | End: 2024-12-03 | Stop reason: HOSPADM

## 2024-12-02 RX ORDER — SERTRALINE HYDROCHLORIDE 100 MG/1
200 TABLET, FILM COATED ORAL DAILY
Status: DISCONTINUED | OUTPATIENT
Start: 2024-12-03 | End: 2024-12-03 | Stop reason: HOSPADM

## 2024-12-02 RX ORDER — HYDROXYZINE HYDROCHLORIDE 25 MG/1
25 TABLET, FILM COATED ORAL EVERY 6 HOURS PRN
Status: DISCONTINUED | OUTPATIENT
Start: 2024-12-02 | End: 2024-12-03 | Stop reason: HOSPADM

## 2024-12-02 RX ORDER — PANTOPRAZOLE SODIUM 40 MG/1
40 TABLET, DELAYED RELEASE ORAL ONCE
Status: COMPLETED | OUTPATIENT
Start: 2024-12-02 | End: 2024-12-02

## 2024-12-02 RX ORDER — HYDROXYZINE HYDROCHLORIDE 50 MG/1
50 TABLET, FILM COATED ORAL EVERY 6 HOURS PRN
Status: DISCONTINUED | OUTPATIENT
Start: 2024-12-02 | End: 2024-12-03 | Stop reason: HOSPADM

## 2024-12-02 RX ORDER — LEVOTHYROXINE SODIUM 25 UG/1
50 TABLET ORAL
Status: DISCONTINUED | OUTPATIENT
Start: 2024-12-03 | End: 2024-12-03 | Stop reason: HOSPADM

## 2024-12-02 RX ORDER — LORATADINE 10 MG/1
10 TABLET ORAL DAILY
Status: DISCONTINUED | OUTPATIENT
Start: 2024-12-03 | End: 2024-12-03 | Stop reason: HOSPADM

## 2024-12-02 RX ORDER — ACETAMINOPHEN 325 MG/1
975 TABLET ORAL ONCE
Status: COMPLETED | OUTPATIENT
Start: 2024-12-02 | End: 2024-12-02

## 2024-12-02 RX ORDER — TRAZODONE HYDROCHLORIDE 50 MG/1
50 TABLET, FILM COATED ORAL AT BEDTIME
Status: DISCONTINUED | OUTPATIENT
Start: 2024-12-02 | End: 2024-12-03 | Stop reason: HOSPADM

## 2024-12-02 RX ORDER — LITHIUM CARBONATE 450 MG
900 TABLET, EXTENDED RELEASE ORAL AT BEDTIME
Status: DISCONTINUED | OUTPATIENT
Start: 2024-12-02 | End: 2024-12-03 | Stop reason: HOSPADM

## 2024-12-02 RX ADMIN — ACETAMINOPHEN 975 MG: 325 TABLET ORAL at 16:24

## 2024-12-02 RX ADMIN — LITHIUM CARBONATE 900 MG: 450 TABLET, EXTENDED RELEASE ORAL at 21:37

## 2024-12-02 RX ADMIN — BENZTROPINE MESYLATE 0.5 MG: 0.5 TABLET ORAL at 20:01

## 2024-12-02 RX ADMIN — TRAZODONE HYDROCHLORIDE 25 MG: 50 TABLET ORAL at 21:37

## 2024-12-02 RX ADMIN — TRIAMCINOLONE ACETONIDE: 1 CREAM TOPICAL at 20:02

## 2024-12-02 RX ADMIN — PANTOPRAZOLE SODIUM 40 MG: 40 TABLET, DELAYED RELEASE ORAL at 18:37

## 2024-12-02 ASSESSMENT — COLUMBIA-SUICIDE SEVERITY RATING SCALE - C-SSRS
4. HAVE YOU HAD THESE THOUGHTS AND HAD SOME INTENTION OF ACTING ON THEM?: NO
5. HAVE YOU STARTED TO WORK OUT OR WORKED OUT THE DETAILS OF HOW TO KILL YOURSELF? DO YOU INTEND TO CARRY OUT THIS PLAN?: YES
2. HAVE YOU ACTUALLY HAD ANY THOUGHTS OF KILLING YOURSELF IN THE PAST MONTH?: YES
1. IN THE PAST MONTH, HAVE YOU WISHED YOU WERE DEAD OR WISHED YOU COULD GO TO SLEEP AND NOT WAKE UP?: YES
3. HAVE YOU BEEN THINKING ABOUT HOW YOU MIGHT KILL YOURSELF?: YES

## 2024-12-02 ASSESSMENT — ACTIVITIES OF DAILY LIVING (ADL)
ADLS_ACUITY_SCORE: 48

## 2024-12-02 NOTE — ED PROVIDER NOTES
EMERGENCY DEPARTMENT ENCOUNTER      NAME: Nikolay Lora  AGE: 34 year old male  YOB: 1990  MRN: [id number]  EVALUATION DATE & TIME: 2024  2:41 PM    PCP: Mich Ybarra    ED PROVIDER: Christine Damon MD      Chief Complaint   Patient presents with    Suicidal         FINAL IMPRESSION:  1. Suicidal ideation          ED COURSE & MEDICAL DECISION MAKING:    Pertinent Labs & Imaging studies reviewed. (See chart for details)  34 year old male presents to the Emergency Department for evaluation of suicidal ideation.  Patient with a history of psychosis, schizoaffective disorder, depressive type, presenting with suicidal ideation with thoughts of cutting or walking into the highway.  Patient reports that he awoke this morning with the epiphany that he should be on methamphetamines as he feels as if his symptoms of isolation have progressively worsened, if he does not receive methamphetamines, he feels he would kill himself.  He communicated this to his RN, psychiatrist and therapist.  It was their recommendation that he come to the hospital to be hospitalized for mental health stabilization.  Patient lives with his sister who he reports is a support in his life.  He states he has a history of polysubstance abuse but has not used since  when his significant other  from drug abuse.  His sister confirms that his drug of choice was alcohol and he has not used since .  Plan for DEC assessment.    2:49 PM I met with the patient, obtained history, performed an initial exam, and discussed options and plan for diagnostics and treatment here in the ED.   5:14 PM I spoke with the DEC , Priscilla. Patient with perseveration of needing Ritalin otherwise he would kill himself. Once this was dissected by the DEC , the patient does not feel safe to go home tonight, but he does admit he has a great mental health team and just feels his medications need to be re-evaluated. He does have an  appointment with his mental care team tomorrow afternoon. Plan to have the patient board overnight with re-assessment in the morning by DEC. Patient is voluntary, but should he want to leave, would recommend re-assessment by DEC. Patient will remain on 1:1, and DEC will have the patient re-assessed in the morning.     At the conclusion of the encounter I discussed the results of all of the tests and the disposition. The questions were answered. The patient or family acknowledged understanding and was agreeable with the care plan.     Medical Decision Making  Obtained supplemental history:Supplemental history obtained?: No  Reviewed external records: External records reviewed?: No  Care impacted by chronic illness:Mental Health  Did you consider but not order tests?: Work up considered but not performed and documented in chart, if applicable  Did you interpret images independently?: Independent interpretation of ECG and images noted in documentation, when applicable.  Consultation discussion with other provider:Did you involve another provider (consultant, MH, pharmacy, etc.)?: I discussed the care with another health care provider, see documentation for details.  Patient boarding in the ED pending re-assessment by DEC in the morning    MIPS: Not Applicable        MEDICATIONS GIVEN IN THE EMERGENCY:  Medications   acetaminophen (TYLENOL) tablet 975 mg (975 mg Oral $Given 12/2/24 2944)       NEW PRESCRIPTIONS STARTED AT TODAY'S ER VISIT  New Prescriptions    No medications on file          =================================================================    HPI    Patient information was obtained from: Patient and sister    Use of : N/A         Nikolay Lora is a 34 year old male with a pertinent history of LICO, schizoaffective disorder, ADHD, who presents to this ED via walk-in for evaluation of suicidal ideation.    Patient reports that this morning, he had the 'epiphany' that he needs methamphetamines to  "help him with his symptoms.  He is feeling very isolated, noting that it feels as though his loved ones are pulling away.  Patient's sister is at bedside and he notes that she has been a huge support to him.  This morning, he messaged his nurse, therapist, and psychiatrist that he intends to kill himself if he is unable to obtain methamphetamines.  He notes that he \"does not want to break his own arm,\" but feels as though his current medications are not enough to manage his symptoms.  Patient currently takes Adderall, with no recent changes in dosage.     Denies new symptoms, including fever, chills, cough, congestion, or any other concerns at this time.  Patient mentions that he used to use alcohol regularly, but in 2021, he stopped using any drugs or alcohol after the death of his significant other.      PAST MEDICAL HISTORY:  Past Medical History:   Diagnosis Date    Brugada syndrome     Chronic idiopathic urticaria     Concussion with loss of consciousness     Mixed hyperlipidemia     Polysubstance abuse (H)     Schizotypal personality disorder (H)        PAST SURGICAL HISTORY:  No past surgical history on file.        CURRENT MEDICATIONS:    amphetamine-dextroamphetamine (ADDERALL XR) 25 MG 24 hr capsule  amphetamine-dextroamphetamine (ADDERALL) 20 MG tablet  azelastine (ASTELIN) 0.1 % nasal spray  benztropine (COGENTIN) 0.5 MG tablet  cloNIDine (CATAPRES) 0.1 MG tablet  EPINEPHrine (ANY BX GENERIC EQUIV) 0.3 MG/0.3ML injection 2-pack  fluticasone (FLONASE) 50 MCG/ACT nasal spray  Glucagon (GVOKE HYPOPEN) 1 MG/0.2ML pen  hydrOXYzine (ATARAX) 50 MG tablet  levothyroxine (SYNTHROID/LEVOTHROID) 50 MCG tablet  lithium (ESKALITH CR/LITHOBID) 450 MG CR tablet  loratadine (ALLERGY RELIEF) 10 MG tablet  metFORMIN (GLUCOPHAGE) 1000 MG tablet  metoprolol succinate ER (TOPROL XL) 25 MG 24 hr tablet  OLANZapine (ZYPREXA) 10 MG tablet  ORDER FOR ALLERGEN IMMUNOTHERAPY  pantoprazole (PROTONIX) 40 MG EC tablet  sertraline " (ZOLOFT) 100 MG tablet  traZODone (DESYREL) 50 MG tablet  triamcinolone (KENALOG) 0.1 % external cream        ALLERGIES:  No Known Allergies    FAMILY HISTORY:  No family history on file.    SOCIAL HISTORY:   Social History     Socioeconomic History    Marital status: Single   Tobacco Use    Smoking status: Former     Passive exposure: Never    Smokeless tobacco: Never    Tobacco comments:     1-2 cigs per day   Vaping Use    Vaping status: Some Days    Substances: Flavoring    Devices: Disposable, Refillable tank   Substance and Sexual Activity    Alcohol use: Yes    Drug use: Not Currently     Social Drivers of Health     Financial Resource Strain: Low Risk  (5/14/2024)    Financial Resource Strain     Within the past 12 months, have you or your family members you live with been unable to get utilities (heat, electricity) when it was really needed?: No   Food Insecurity: High Risk (5/14/2024)    Food Insecurity     Within the past 12 months, did you worry that your food would run out before you got money to buy more?: Yes     Within the past 12 months, did the food you bought just not last and you didn t have money to get more?: Yes   Transportation Needs: Low Risk  (5/14/2024)    Transportation Needs     Within the past 12 months, has lack of transportation kept you from medical appointments, getting your medicines, non-medical meetings or appointments, work, or from getting things that you need?: No   Physical Activity: Unknown (5/14/2024)    Exercise Vital Sign     Days of Exercise per Week: 1 day   Stress: Stress Concern Present (5/14/2024)    [employer] [place] [employer] - Occupational Stress Questionnaire     Feeling of Stress : To some extent   Social Connections: Unknown (5/14/2024)    Social Connection and Isolation Panel [NHANES]     Frequency of Social Gatherings with Friends and Family: Once a week   Interpersonal Safety: Low Risk  (5/14/2024)    Interpersonal Safety     Do you feel  physically and emotionally safe where you currently live?: Yes     Within the past 12 months, have you been hit, slapped, kicked or otherwise physically hurt by someone?: No     Within the past 12 months, have you been humiliated or emotionally abused in other ways by your partner or ex-partner?: No   Housing Stability: High Risk (5/14/2024)    Housing Stability     Do you have housing? : Yes     Are you worried about losing your housing?: Yes       VITALS:  /75   Pulse 100   Temp 98.1  F (36.7  C) (Oral)   Resp 16   Wt 78.9 kg (174 lb)   SpO2 96%   BMI 31.83 kg/m      PHYSICAL EXAM    Constitutional: Well developed, Well nourished, NAD  HENT: Normocephalic, Atraumatic, Bilateral external ears normal, Oropharynx normal, mucous membranes moist, Nose normal.   Neck- Normal range of motion, No tenderness, Supple, No stridor.  Eyes: PERRL, EOMI, Conjunctiva normal, No discharge.   Respiratory: Normal breath sounds, No respiratory distress  Cardiovascular: Normal heart rate, Regular rhythm  GI: Bowel sounds normal, Soft, No tenderness,   Musculoskeletal: No edema. Good range of motion in all major joints. No tenderness to palpation or major deformities noted.   Integument: Warm, Dry, No erythema, No rash  Neurologic: Alert & oriented x 3, Normal motor function, Normal sensory function, No focal deficits noted. Normal gait.   Psychiatric: Flat affect, Poor judgement     LAB:  All pertinent labs reviewed and interpreted.       RADIOLOGY:  Reviewed all pertinent imaging. Please see official radiology report.  No orders to display       I, Helene Javier, am serving as a scribe to document services personally performed by Christine Damon MD, based on my observation and the provider's statements to me. I, Christine Damon MD, attest that Helene Javier is acting in a scribe capacity, has observed my performance of the services and has documented them in accordance with my direction.    Christine Damon  MD  Emergency Medicine  [facility] EMERGENCY DEPARTMENT  [address] [place] [id number]  [phone]       Christine Damon MD  12/02/24 8945

## 2024-12-02 NOTE — ED NOTES
Bed: JNED-08  Expected date: 12/2/24  Expected time: 2:29 PM  Means of arrival:   Comments:  WR: ARLETTE

## 2024-12-02 NOTE — PROGRESS NOTES
Pt requested for tylenol . Notified to nurse covering. Pt now talking with mental health services.

## 2024-12-02 NOTE — ED TRIAGE NOTES
"Pt's sister brought patient in due to patient expressing suicidal thoughts with intent and plan to his therapist today. Pt emailed his therapist and psychiatrist and said that if he doesn't get a prescription for \"methamphetamine\" that he is going to kill himself. The patient takes adderall for ADHD but he feels that he needs something more to supplement this. Pt states that he will kill himself by \"walking onto the highway or slicing all the veins on the back of his hand.\" Pt has had suicide attempts in the past.      Triage Assessment (Adult)       Row Name 12/02/24 1421          Triage Assessment    Airway WDL WDL        Respiratory WDL    Respiratory WDL WDL        Skin Circulation/Temperature WDL    Skin Circulation/Temperature WDL WDL        Cardiac WDL    Cardiac WDL WDL        Peripheral/Neurovascular WDL    Peripheral Neurovascular WDL WDL        Cognitive/Neuro/Behavioral WDL    Cognitive/Neuro/Behavioral WDL X;mood/behavior        Chiara Coma Scale    Best Eye Response 4-->(E4) spontaneous     Best Motor Response 6-->(M6) obeys commands     Best Verbal Response 5-->(V5) oriented     Chiara Coma Scale Score 15                     "

## 2024-12-02 NOTE — ED PROVIDER NOTES
[facility] EMERGENCY DEPARTMENT   ED Mental Health Observation - Initiation Note    Nikolay Lora was placed into observation at 5:24 PM on 12/2/2024 for Mental health crisis.   Patient is expected to be under observation status for a minimum of eight hours.    MD Nelson Ariza Cindy Lin, MD  12/02/24 4170

## 2024-12-02 NOTE — ED NOTES
Asking for methamphetamines to help him get better. MD is aware of pt request. Changed into behavioral scrubs and belongings being locked up.

## 2024-12-03 ENCOUNTER — TELEPHONE (OUTPATIENT)
Dept: BEHAVIORAL HEALTH | Facility: CLINIC | Age: 34
End: 2024-12-03
Payer: COMMERCIAL

## 2024-12-03 VITALS
BODY MASS INDEX: 30.12 KG/M2 | HEIGHT: 63 IN | OXYGEN SATURATION: 96 % | TEMPERATURE: 97.5 F | SYSTOLIC BLOOD PRESSURE: 110 MMHG | HEART RATE: 84 BPM | RESPIRATION RATE: 16 BRPM | WEIGHT: 170 LBS | DIASTOLIC BLOOD PRESSURE: 73 MMHG

## 2024-12-03 PROBLEM — F41.9 ANXIETY: Status: ACTIVE | Noted: 2024-12-03

## 2024-12-03 PROCEDURE — 250N000013 HC RX MED GY IP 250 OP 250 PS 637: Performed by: EMERGENCY MEDICINE

## 2024-12-03 PROCEDURE — 99244 OFF/OP CNSLTJ NEW/EST MOD 40: CPT | Performed by: REGISTERED NURSE

## 2024-12-03 RX ADMIN — LORATADINE 10 MG: 10 TABLET ORAL at 08:01

## 2024-12-03 RX ADMIN — SERTRALINE HYDROCHLORIDE 200 MG: 100 TABLET ORAL at 08:03

## 2024-12-03 RX ADMIN — BENZTROPINE MESYLATE 0.5 MG: 0.5 TABLET ORAL at 08:02

## 2024-12-03 RX ADMIN — LEVOTHYROXINE SODIUM 50 MCG: 0.03 TABLET ORAL at 06:30

## 2024-12-03 RX ADMIN — TRIAMCINOLONE ACETONIDE: 1 CREAM TOPICAL at 08:03

## 2024-12-03 RX ADMIN — METFORMIN HYDROCHLORIDE 1000 MG: 500 TABLET, FILM COATED ORAL at 08:01

## 2024-12-03 RX ADMIN — METOPROLOL SUCCINATE 12.5 MG: 25 TABLET, EXTENDED RELEASE ORAL at 08:02

## 2024-12-03 ASSESSMENT — ACTIVITIES OF DAILY LIVING (ADL)
ADLS_ACUITY_SCORE: 48

## 2024-12-03 ASSESSMENT — COLUMBIA-SUICIDE SEVERITY RATING SCALE - C-SSRS
TOTAL  NUMBER OF INTERRUPTED ATTEMPTS SINCE LAST CONTACT: NO
SUICIDE, SINCE LAST CONTACT: NO
2. HAVE YOU ACTUALLY HAD ANY THOUGHTS OF KILLING YOURSELF?: NO
6. HAVE YOU EVER DONE ANYTHING, STARTED TO DO ANYTHING, OR PREPARED TO DO ANYTHING TO END YOUR LIFE?: NO
1. SINCE LAST CONTACT, HAVE YOU WISHED YOU WERE DEAD OR WISHED YOU COULD GO TO SLEEP AND NOT WAKE UP?: NO
ATTEMPT SINCE LAST CONTACT: NO
TOTAL  NUMBER OF ABORTED OR SELF INTERRUPTED ATTEMPTS SINCE LAST CONTACT: NO
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
## 2024-12-03 NOTE — ED PROVIDER NOTES
Patient remains in the emergency department overnight awaiting reevaluation by DEC in the morning.      At this time patient is voluntary though would be considered holdable until they could have a reevaluation by DEC should the patient decide to leave.    Pt will be signed out to morning shift awaiting DEC re-evaluation and dispo planning.     Denise Anderson MD  12/03/24 8669
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
## 2024-12-03 NOTE — ED PROVIDER NOTES
"[facility] EMERGENCY DEPARTMENT   ED Mental Health Observation - Daily Note for 12/3/2024    Nikolay Lora is a 34 year old male currently boarding in the ED while awaiting placement for Mental health crisis.  Please see the initial H&P for this patient's presentation, workup, and disposition plan.     Hold Status:  Patient is Voluntary, but holdable    Plan:  In brief, the patient's presentation is notable for suicidal ideation and methamphetamine use. He arrived to the ED yesterday with an 'epiphany' that he needs methamphetamines to help him with his symptoms.  He is feeling very isolated, noting that it feels as though his loved ones are pulling away. Yesterday morning he messaged his nurse, therapist, and psychiatrist that he intends to kill himself if he is unable to obtain methamphetamines.     Patient is awaiting DEC assessment / recommendations    Interim History:  There were no significant events since last note.    Physical Exam:  /73 (BP Location: Left arm)   Pulse 84   Temp 97.5  F (36.4  C) (Oral)   Resp 16   Ht 1.6 m (5' 3\")   Wt 77.1 kg (170 lb)   SpO2 96%   BMI 30.11 kg/m    No respiratory distress, on room air   Well perfused  Behavior appropriate    Medications provided prior to my care:  Medications   triamcinolone (KENALOG) 0.1 % cream ( Topical $Given 12/3/24 0803)   traZODone (DESYREL) half-tab 25 mg (25 mg Oral $Given 12/2/24 2137)     Or   traZODone (DESYREL) tablet 50 mg ( Oral See Alternative 12/2/24 2137)   sertraline (ZOLOFT) tablet 200 mg (200 mg Oral $Given 12/3/24 0803)   metoprolol succinate ER (TOPROL-XL) 24 hr half-tab 12.5 mg (12.5 mg Oral $Given 12/3/24 0802)   metFORMIN (GLUCOPHAGE) tablet 1,000 mg (1,000 mg Oral $Given 12/3/24 0801)   loratadine (CLARITIN) tablet 10 mg (10 mg Oral $Given 12/3/24 0801)   lithium (ESKALITH CR/LITHOBID) CR tablet 900 mg (900 mg Oral $Given 12/2/24 2137)   levothyroxine (SYNTHROID/LEVOTHROID) tablet 50 mcg (50 " mcg Oral $Given 12/3/24 0630)   hydrOXYzine HCl (ATARAX) tablet 25 mg (has no administration in time range)     Or   hydrOXYzine HCl (ATARAX) tablet 50 mg (has no administration in time range)   benztropine (COGENTIN) tablet 0.5 mg (0.5 mg Oral $Given 12/3/24 0802)   acetaminophen (TYLENOL) tablet 975 mg (975 mg Oral $Given 12/2/24 1624)   pantoprazole (PROTONIX) EC tablet 40 mg (40 mg Oral $Given 12/2/24 1837)       Laboratory (reviewed and interpreted):  Labs Ordered and Resulted from Time of ED Arrival to Time of ED Departure - No data to display    ED Course:  11:35 AM Spoke with the DEC , Shannon Snyder   12:11 PM I met with the patient and discussed plan with care team.     Extended care  perform repeat exam this morning.  Per his report patient is safe for discharge and they will place outpatient contact information and follow-up instructions.  On my exam the patient appears quite well and comfortable.  He is currently denying any suicidal ideation whatsoever.  Feel he is safe for discharge with close follow-up per the recommendations of the crisis .    Impression/Plan:  1. Suicidal ideation        Edmund Wren MD      12/3/2024       Edmund Wren MD  12/03/24 [id number]

## 2024-12-03 NOTE — TELEPHONE ENCOUNTER
Pt sister called and requested that the team refer pt to an IRTS facility as recommended by pts OP therapy provider. Pts sister states pt has been discharged and recently arrived at home. Writer unable to speak w/ pts sister due to no ARACELY on file. Writer did notify that since pt has been discharged all referrals should be done through OP setting and providers. Pts sister voiced understanding.     HENRIQUE Limon  [facility]  [phone]

## 2024-12-03 NOTE — ED NOTES
Pt is calm and cooperative. Pt states that he is still suicidal but does not have a plan at this time. Schedule medication administered and given a fresh water. Pt has no requests at this time.

## 2024-12-03 NOTE — ED PROVIDER NOTES
[facility] EMERGENCY DEPARTMENT   ED Mental Health Observation - Discharge Note (Brief)    Nikolay Lora is boarding in the ED after undergoing evaluation for Mental health crisis  Please see the daily progress note for this patient's presentation, physical examination, and work up.    Upon reevaluation and discussion with DEC , we believe patient has shown sufficient improvement and thus will be Discharged.    EMERGENCY DEPARTMENT OBSERVATION status ended at 12:24 PM 12/3/2024    Discharge Management Time: < 30 minutes    1. Suicidal ideation        MD Siena COWAN Derrick, MD  12/03/24 3457

## 2024-12-03 NOTE — PLAN OF CARE
"Nikolay Moss  December 2, 2024  Plan of Care Hand-off Note     Patient Care Path: observation    Plan for Care:   Nikolay \"Haseeb\" presents to the ED seeking methylphenidate as he believes it will mitigate depressive sx. Pt's sister brought him to the ED due to pt making statements about killing himself if he is unable to receive a prescription for methylphenidate. Pt has thought of methods including running into traffic, shooting himself with a gun, or holding his breath. Pt describes his SI as mostly circumstantial in regards to the prescription, though he notes feelings of worthlessness and loneliness as contributing factors. Pt has a hx notable of Schizoaffective disorder, depressive type. Pt's thinking appears to be somewhat grandiose and disorganized, though he does show insight into his sx and is forward thinking. Pt has strong protective factors of living with supportive family members and strong treatment engagement with outpatient therapist, psychiatrist, and .  Pt is unable to contract for safety in regards to his SI currently, making it difficult to safety plan for discharge. [facility] admission may not be the least restrictive level of care, given pt's circumstantial nature to the SI and his desire to work closely with his outpatient providers. Patient is not currently on the inpatient worklist. Extended Care will follow, providing reassessment of risk. If pt is deemed appropriate for discharge, he may resume care with his OP providers and discharge home with his sister.    Identified Goals and Safety Issues: Safety and stabilization    Overview:  HOMER MOSS (Sister)  [phone]            Legal Status: Legal Status at Admission: Voluntary/Patient has signed consent for treatment    Psychiatry Consult: Ordered       Updated MD and RN regarding plan of care.           Priscilla Romero, ANU       "

## 2024-12-03 NOTE — PHARMACY-ADMISSION MEDICATION HISTORY
Pharmacist Admission Medication History    Admission medication history is complete. The information provided in this note is only as accurate as the sources available at the time of the update.    Information Source(s): Patient, Patient's pharmacy, and CareEverywhere/SureScripts via in-person    Pertinent Information: Reports he ran out of Adderall at some point in the past week.    Changes made to PTA medication list:  Added: None  Deleted: None  Changed: added instructions to Adderall XR, clonidine, and Zyprexa    Allergies reviewed with patient and updates made in EHR: yes    Medication History Completed By: Denise Brush [facility] 12/2/2024 6:17 PM    Current Facility-Administered Medications for the 12/2/24 encounter (Hospital Encounter)   Medication    omalizumab (XOLAIR) injection 300 mg     PTA Med List   Medication Sig Note Last Dose/Taking    amphetamine-dextroamphetamine (ADDERALL XR) 25 MG 24 hr capsule Take 25 mg by mouth every morning. 12/2/2024: Patient reports he ran out, cannot recall exactly when. Past Week    amphetamine-dextroamphetamine (ADDERALL) 20 MG tablet Take 20 mg by mouth daily (with lunch). 12/2/2024: Patient reports he ran out, cannot recall exactly when.   Past Week    benztropine (COGENTIN) 0.5 MG tablet Take 1 tablet (0.5 mg) by mouth 2 times daily  12/2/2024 Morning    cloNIDine (CATAPRES) 0.1 MG tablet Take 0.1 mg by mouth 3 times daily as needed.  Taking As Needed    EPINEPHrine (ANY BX GENERIC EQUIV) 0.3 MG/0.3ML injection 2-pack Inject into outer thigh for allergic reaction  Taking    fluticasone (FLONASE) 50 MCG/ACT nasal spray Spray 2 sprays into both nostrils daily as needed for rhinitis or allergies  Taking As Needed    Glucagon (GVOKE HYPOPEN) 1 MG/0.2ML pen Inject the contents of 1 device under the skin into lower abdomen, outer thigh, or outer upper arm prior to epinephrine  Taking    hydrOXYzine (ATARAX) 50 MG tablet Take 2 tablets (100 mg) by mouth 3 times daily as  needed for itching (anxiety)  Taking As Needed    levothyroxine (SYNTHROID/LEVOTHROID) 50 MCG tablet Take 1 tablet (50 mcg) by mouth daily  12/2/2024    lithium (ESKALITH CR/LITHOBID) 450 MG CR tablet Take 2 tablets (900 mg) by mouth At Bedtime  12/1/2024 Bedtime    loratadine (ALLERGY RELIEF) 10 MG tablet TAKE 1 TABLET BY MOUTH DAILY  12/2/2024    metFORMIN (GLUCOPHAGE) 1000 MG tablet TAKE 1 TABLET BY MOUTH TWICE A DAY 12/2/2024: After breakfast & lunch. 12/2/2024 Morning    metoprolol succinate ER (TOPROL XL) 25 MG 24 hr tablet TAKE 1/2 TABLET BY MOUTH DAILY  12/2/2024    OLANZapine (ZYPREXA) 10 MG tablet Take 10 mg by mouth at bedtime.  12/1/2024 Bedtime    pantoprazole (PROTONIX) 40 MG EC tablet TAKE 1 TABLET BY MOUTH DAILY  12/2/2024    sertraline (ZOLOFT) 100 MG tablet Take 2 tablets (200 mg) by mouth daily  12/2/2024    traZODone (DESYREL) 50 MG tablet Take 1 tablet (50 mg) by mouth nightly as needed for sleep (may repeat after 60 minutes)  Past Week    triamcinolone (KENALOG) 0.1 % external cream [TRIAMCINOLONE (KENALOG) 0.1 % CREAM] Applied to rash twice a day for 2-3 weeks.  Do not use longer than 3 weeks without 1 month break 12/2/2024: Uses on neck. 12/2/2024

## 2024-12-03 NOTE — CONSULTS
"      Initial Psychiatric Consult   Consult date: December 3, 2024         Reason for Consult, requesting source:    Observation for SI    Requesting source: Edmund Monkzeb    Labs and imaging reviewed.         HPI:   Observation for SI.    Link \"Haseeb\" presents to the ED seeking methylphenidate as he believes it will mitigate depressive sx. Pt's sister brought him to the ED due to pt making statements about killing himself if he is unable to receive a prescription for methylphenidate. Pt has thought of methods including running into traffic, shooting himself with a gun, or holding his breath. Pt describes his SI as mostly circumstantial in regards to the prescription, though he notes feelings of worthlessness and loneliness as contributing factors. Pt has a hx notable of Schizoaffective disorder, depressive type. Pt's thinking appears to be somewhat grandiose and disorganized, though he does show insight into his sx and is forward thinking. Pt has strong protective factors of living with supportive family members and strong treatment engagement with outpatient therapist, psychiatrist, and . Pt is unable to contract for safety in regards to his SI currently, making it difficult to safety plan for discharge. IP admission may not be the least restrictive level of care, given pt's circumstantial nature to the SI and his desire to work closely with his outpatient providers. Patient is not currently on the inpatient worklist. Extended Care will follow, providing reassessment of risk. If pt is deemed appropriate for discharge, he may resume care with his OP providers and discharge home with his sister.     Today, patient reports that he would like methylphenidate added as, \"my battery drains quickly and I want to be able to participate in my job.\"  Patient reports that he does not experience hallucinations outside the context of drinking or substance use.  Patient shares that he lives with his sister and " "attends therapy at [facility].  He explains the statements he made to his outpatient provider regarding wanting to kill himself as, \"I more so meant that I feel like I'm dying without methylphenidate.\" Patient denies SI and SIB as well as other safety concerns.           Past Psychiatric History:   Patient received care through  psychiatry from  1/27/2023 to 4/12/2023, which included ECT.     Per consultation on 3/22/2023 from Saulo Nation PhD during psychiatric hospitalization listed above: Overall, I have significant concerns about this patient's level of emotional distress and disorganized behaviors.  This is now the second hospitalization based on psychotic symptoms.  Initially, there was concern in 2021 that the switch from Adderall to Concerta contributed to psychotic symptoms and his hospitalization and the fentanyl use most recently contributed to his hospitalization.  Yet there seems to be a pattern of consistent psychotic symptomatology over the course of the past couple of years potentially independent of the substance use.  He seems to lack insight and judgment and has shown deterioration in functioning.  The substance abuse likely has exacerbated his symptom picture, but this seems to be more related to an isolated psychotic disorder rather than a substance-induced psychotic disorder.  He does appear to lack effective coping strategies to deal with the various stressors in his life.  He has poor resilience.  His support system is also limited.  He is likely at a high risk for continued suicidality and substance use based on his level of impulsivity and history.         Substance Use and History:     Tobacco Use    Smoking status: Former     Passive exposure: Never    Smokeless tobacco: Never    Tobacco comments:     1-2 cigs per day   Substance Use Topics    Alcohol use: Yes           Past Medical History:   PAST MEDICAL HISTORY:   Past Medical History:   Diagnosis Date    Brugada syndrome     " Chronic idiopathic urticaria     Concussion with loss of consciousness     Mixed hyperlipidemia     Polysubstance abuse (H)     Schizotypal personality disorder (H)        PAST SURGICAL HISTORY: No past surgical history on file.          Family History:   FAMILY HISTORY: No family history on file.        Social History:   Patient lives with his sister.         Physical ROS:   The 10 point Review of Systems is negative other than noted in the HPI or here.           Medications:     Current Facility-Administered Medications   Medication Dose Route Frequency Provider Last Rate Last Admin    benztropine (COGENTIN) tablet 0.5 mg  0.5 mg Oral BID Christine Damon MD   0.5 mg at 12/03/24 0802    levothyroxine (SYNTHROID/LEVOTHROID) tablet 50 mcg  50 mcg Oral QAM AC Christine Damon MD   50 mcg at 12/03/24 0630    lithium (ESKALITH CR/LITHOBID) CR tablet 900 mg  900 mg Oral At Bedtime Christine Damon MD   900 mg at 12/02/24 2137    loratadine (CLARITIN) tablet 10 mg  10 mg Oral Daily Christine Damon MD   10 mg at 12/03/24 0801    metFORMIN (GLUCOPHAGE) tablet 1,000 mg  1,000 mg Oral BID w/meals Christine Damon MD   1,000 mg at 12/03/24 0801    metoprolol succinate ER (TOPROL-XL) 24 hr half-tab 12.5 mg  12.5 mg Oral Daily Christine Damon MD   12.5 mg at 12/03/24 0802    omalizumab (XOLAIR) injection 300 mg  300 mg Subcutaneous Q28 Days    300 mg at 05/16/24 0833    sertraline (ZOLOFT) tablet 200 mg  200 mg Oral Daily Christine Damon MD   200 mg at 12/03/24 0803    traZODone (DESYREL) half-tab 25 mg  25 mg Oral At Bedtime Christine Damon MD   25 mg at 12/02/24 2137    Or    traZODone (DESYREL) tablet 50 mg  50 mg Oral At Bedtime Christine Damon MD        triamcinolone (KENALOG) 0.1 % cream   Topical BID Christine Damon MD   Given at 12/03/24 0803              Allergies:   No Known Allergies       Labs:   No results found for this or any previous  "visit (from the past 48 hours).       Physical and Psychiatric Examination:     /73 (BP Location: Left arm)   Pulse 84   Temp 97.5  F (36.4  C) (Oral)   Resp 16   Ht 1.6 m (5' 3\")   Wt 77.1 kg (170 lb)   SpO2 96%   BMI 30.11 kg/m    Weight is 170 lbs 0 oz  Body mass index is 30.11 kg/m .    Physical Exam:  I have reviewed the physical exam as documented by by the medical team and agree with findings and assessment and have no additional findings to add at this time.         MSE:   Calm and cooperative. Answers questions within context. Denies SI/SIB, as well as AH/VH. Partial insight, judgement appears impulsive at times, but intact during meeting.              DSM-5 Diagnosis:     MDD, recurrent  Bipolar affective disorder vs Schizoaffective disorder, bipolar type  Polysubstance use (H)  Unspecified mood disorder  ADHD, inattentive type per chart          Assessment:   Psychiatry seeing patient today regarding observation of SI. Patient is able historical diagnoses and receives psychiatric care at [facility].     Patient denies SI and SIB and presentation appears to be related to a request for an addition of a methylphenidate prescription.  Patient is currently taking Adderall and this was filled on 11/11/2024 for 30 days.  Discussed medications with patient, he is agreeable to following up with his outpatient provider.    Given that patient plans to follow up with outpatient psychiatry, is taking his medications, and denies SI and SIB, I believe that he would more so benefit from outpatient psychiatry services, as opposed to inpatient psychiatry.          Summary of Recommendations:   1) Continue PTA medications    2) Follow up with OP psychiatry provider therapist    3) Today Haseeb does not show any symptoms of psychosis, jovanny, nor suicidal or homicidal ideations. Therefore, based on all available evidence including the factors cited above, he does not appear to be at imminent risk for self-harm, " "does not meet criteria for a 72-hr hold, and therefore remains appropriate for ongoing outpatient level of care.     Additional steps taken to minimize risk include: Patient has established OP psychiatry services.      Resources:   Crisis Intervention: 332.688.9556 or 245-132-5493 (TTY: 342.289.7062).  Call anytime for help.  [place] on Mental Illness (www.[place].cherie.org): 815.111.3505 or 145-234-0791.  Suicide Awareness Voices of Education (SAVE) (www.save.org): 929-476-TSIB (5411)  National Suicide Prevention Line (www.mentalhealthmn.org): 413-768-CFGX (5982)  Mental Health Consumer/Survivor Network of [place] (www.mhcsn.net): 410.182.7491 or 763-119-6903  [facility] Crisis Response 878 391-1350  Text 4 Life: txt \"LIFE\" to 16981 for immediate support and crisis intervention  Crisis text line: Text \"[place]\" to 852728. Free, confidential, 24/7.        [facility] ([place] on Mental Illness) improves the lives of children and adults with mental illnesses and their families by providing free classes on mental illnesses and support groups for adults with mental illnesses, parents and family members. For more information: Phone: 844.694.9576 Toll free: 9-779-VZRT-HELPS Website: [email or web address]              Page me or re-consult psychiatry as needed.       Teresa Das, MIGDALIA, APRN  Consult/Liaison Psychiatry  [facility]   Contact information available via [facility] Paging/Directory.  If I am not available, please call [facility] intake ([phone])              "

## 2024-12-03 NOTE — DISCHARGE INSTRUCTIONS
"    Coordinators from [facility] ([facility]) will be calling you in the next 24-48 hours to ensure that you have the resources you need. For additonal need of mental health services, you can also contact P coordinators directly at [phone].    Recommendations:    1)  Take all medications as prescribed by psychiatry  2)  Continue to follow up with established providers at [facility]  3)  Engage in frequent self-care activities    [facility]  [address] [place] [address]  [phone]    If I am feeling unsafe or I am in a crisis, I will:  - Contact my established care providers   - Call the National Suicide Prevention Lifeline: 452.954.9356   - Por espanol, texto  PRUDENCIO a 946149 o texto a 442-AYUDAME en WhatsApp  - [place] CRISIS TEXT Line 122824  - Go to the nearest emergency room   - Call 911 or 988 or 211    Below is a list of FREE Mental Health Options in the [facility] Area:  [facility] ([facility] [place] [facility] ([facility]) provides emergency services, 24 hours a day 7 days a week, to people experiencing mental health crises, including psychosis, depression, violence or suicide, and other crisis situations. [address], [place].  Suicidal: 653.618.3350 Consultation: [phone]     [facility] (Free Services)   [address] [place] [address]  [phone]  [email or web address]://walkin.org/    [facility]  [address] [place] [address]  [phone]    Peer Support  Call the DILSHAD Helpline at  599.505.1479  Or text \"HelpLine\" to 93013               "

## 2024-12-04 ENCOUNTER — TELEPHONE (OUTPATIENT)
Dept: BEHAVIORAL HEALTH | Facility: CLINIC | Age: 34
End: 2024-12-04
Payer: COMMERCIAL

## 2024-12-04 NOTE — TELEPHONE ENCOUNTER
Triage and Transition Services- Patient Follow Up Call  Service Line Making Phone Call: Extended Care    Who did Writer Talk to: Patient    Details of Call: Spoke w/ pt re: follow up. Pt has therapy and med management scheduled with outpatient providers. Writer provided contact number for pts Primary Care Provider as requested. No needs at this time, confirmed pt has discharge paperwork w/ call back info.     Renita Jain 12/4/2024 10:19 AM

## 2024-12-30 ENCOUNTER — TELEPHONE (OUTPATIENT)
Dept: BEHAVIORAL HEALTH | Facility: CLINIC | Age: 34
End: 2024-12-30

## 2024-12-30 DIAGNOSIS — F29 PSYCHOSIS, UNSPECIFIED PSYCHOSIS TYPE (H): ICD-10-CM

## 2024-12-30 NOTE — TELEPHONE ENCOUNTER
"Pt is a(n) adult (18+ out of HS) Seeking as eval for Adult Mental Health DA for Programmatic Care - Program Preference? Yes: PHP .  Appointment scheduled by:  Other  (no cost estimate)  Caller name:  sister Zhong    Caller phone #: [phone], use this number pt does not currently have a phone  Legal Guardianship Reviewed?  No  Honoring Choices Notified?  No  Brief reason for appt:   eval     needed?  NO    Contact information verified/updated: Yes    If appt is for adult SAL program location, confirm you have verified the location and address with the patient referring to the template header.  Yes    Denise Gillespie    \"We have scheduled your evaluation. In the event that your insurance coverage comes back as out of network, you may receive a call to cancel your appointment and direct you to your insurance company for in-network coverage.\"    Disclaimer regarding insurance read to patient?  Yes  Informed patient Patino are for programming that is in person in the [place] area?  Yes - proceed with scheduling   "

## 2025-01-29 ENCOUNTER — TELEPHONE (OUTPATIENT)
Dept: BEHAVIORAL HEALTH | Facility: CLINIC | Age: 35
End: 2025-01-29

## 2025-01-29 ENCOUNTER — HOSPITAL ENCOUNTER (OUTPATIENT)
Dept: BEHAVIORAL HEALTH | Facility: CLINIC | Age: 35
Discharge: HOME | End: 2025-01-29
Attending: PSYCHIATRY & NEUROLOGY | Admitting: PSYCHIATRY & NEUROLOGY
Payer: COMMERCIAL

## 2025-01-29 PROCEDURE — 90791 PSYCH DIAGNOSTIC EVALUATION: CPT

## 2025-01-29 ASSESSMENT — COLUMBIA-SUICIDE SEVERITY RATING SCALE - C-SSRS
1. HAVE YOU WISHED YOU WERE DEAD OR WISHED YOU COULD GO TO SLEEP AND NOT WAKE UP?: YES
1. IN THE PAST MONTH, HAVE YOU WISHED YOU WERE DEAD OR WISHED YOU COULD GO TO SLEEP AND NOT WAKE UP?: YES
2. HAVE YOU ACTUALLY HAD ANY THOUGHTS OF KILLING YOURSELF?: NO

## 2025-01-29 ASSESSMENT — ANXIETY QUESTIONNAIRES
4. TROUBLE RELAXING: MORE THAN HALF THE DAYS
IF YOU CHECKED OFF ANY PROBLEMS ON THIS QUESTIONNAIRE, HOW DIFFICULT HAVE THESE PROBLEMS MADE IT FOR YOU TO DO YOUR WORK, TAKE CARE OF THINGS AT HOME, OR GET ALONG WITH OTHER PEOPLE: VERY DIFFICULT
3. WORRYING TOO MUCH ABOUT DIFFERENT THINGS: MORE THAN HALF THE DAYS
6. BECOMING EASILY ANNOYED OR IRRITABLE: MORE THAN HALF THE DAYS
8. IF YOU CHECKED OFF ANY PROBLEMS, HOW DIFFICULT HAVE THESE MADE IT FOR YOU TO DO YOUR WORK, TAKE CARE OF THINGS AT HOME, OR GET ALONG WITH OTHER PEOPLE?: VERY DIFFICULT
GAD7 TOTAL SCORE: 14
GAD7 TOTAL SCORE: 14
5. BEING SO RESTLESS THAT IT IS HARD TO SIT STILL: MORE THAN HALF THE DAYS
7. FEELING AFRAID AS IF SOMETHING AWFUL MIGHT HAPPEN: MORE THAN HALF THE DAYS
2. NOT BEING ABLE TO STOP OR CONTROL WORRYING: MORE THAN HALF THE DAYS
1. FEELING NERVOUS, ANXIOUS, OR ON EDGE: MORE THAN HALF THE DAYS
GAD7 TOTAL SCORE: 14
7. FEELING AFRAID AS IF SOMETHING AWFUL MIGHT HAPPEN: MORE THAN HALF THE DAYS

## 2025-01-29 ASSESSMENT — PATIENT HEALTH QUESTIONNAIRE - PHQ9
10. IF YOU CHECKED OFF ANY PROBLEMS, HOW DIFFICULT HAVE THESE PROBLEMS MADE IT FOR YOU TO DO YOUR WORK, TAKE CARE OF THINGS AT HOME, OR GET ALONG WITH OTHER PEOPLE: VERY DIFFICULT
SUM OF ALL RESPONSES TO PHQ QUESTIONS 1-9: 19

## 2025-01-29 ASSESSMENT — PAIN SCALES - GENERAL: PAINLEVEL_OUTOF10: MODERATE PAIN (6)
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
## 2025-01-29 NOTE — PROGRESS NOTES
LOCUS Worksheet     Name: Nikolay Lora MRN: [id number]    : 1990      Gender:  male    PMI:  NA   Provider Name: [facility]   Provider NPI:  [id number]    Actual level of Care Provided:  Assessment and Referral     Service(s) receiving or referred to:  [facility] program at [facility]    Reason for Variance: Anxiety and Depression      Rating completed by: Raghu Samuel PhD, LPCC/LADC      I. Risk of Harm:   2      Low Risk of Harm    II. Functional Status:   2      Mild Impairment    III. Co-Morbidity:   2      Minor Co-Morbidity    IV - A. Recovery Environment - Level of Stress:   4      Highly Stress Environment    IV - B. Recovery Environment - Level of Support:   4      Minimal Support in Environment    V. Treatment and Recovery History:   3      Moderate to Equivocal Response to Treatment and Recovery Management    VI. Engagement and Recovery Project:   3      Limited Engagement and Recovery       20 Composite Score    Level of Care Recommendation:   20 to 22       Medically Monitored Non-Residential Services

## 2025-01-29 NOTE — TELEPHONE ENCOUNTER
----- Message from Evelia MCGRATH sent at 1/29/2025 12:43 PM CST -----  Regarding: Referral for PHP; [facility]  Adult Mental Health Programmatic Care Schedule Request    Patient Name: Nikolay Lora  Location of programming: [facility]  Start Date: 2/3/2025    Adult Program Group: Adult Program Group: PHP 1 Track [[id number]]?   Schedule:  M-F 9am-3pm  Number of visits to be scheduled: 15 days    Attending Provider (MD):  Dr. Luis Gar ([place])  Visit Type:  In-Person    Accommodations Needed: No  Alerts Identified/Substantiation: No  Consulted with Supervisor: No

## 2025-01-29 NOTE — TELEPHONE ENCOUNTER
**Patient Navigator Follow Up**    1/29/2025;    Referral for PHP    What program is this referral for? PHP program at [facility]. The patient sister Farheen Lora @ [phone] is the main contact for this referral. Permission to communicate with and set up the referral for the patent obtained.       I called and left  for sister, Farheen as requested.  I provided my direct call back phone#.        Farheen returned my call. I discussed programming with her.  She said he wants to start Monday, 2/3 as they need to get transportation scheduled prior.  I added him to the census and sent in basket message to the program as well.    I sent him program welcome letter through [employer].      Thank you,    Evelia MCGRATH.  Patient Navigator

## 2025-01-29 NOTE — TELEPHONE ENCOUNTER
Summary of Patient Care Communication Handoff to Patient Navigator Coordinator    PATIENT'S NAME: Nikolay Lora  MRN:   [id number]  :   1990    DATE OF SERVICE: 25    Referral Needed: Yes    Is the patient coming from an inpatient unit? No    What program is this referral for? [facility] program at [facility]. The patient sister Farheen Lora @ [phone] is the main contact for this referral. Permission to communicate with and set up the referral for the patent obtained.

## 2025-01-30 ASSESSMENT — PATIENT HEALTH QUESTIONNAIRE - PHQ9: SUM OF ALL RESPONSES TO PHQ QUESTIONS 1-9: 20

## 2025-02-03 ENCOUNTER — TELEPHONE (OUTPATIENT)
Dept: BEHAVIORAL HEALTH | Facility: CLINIC | Age: 35
End: 2025-02-03
Payer: COMMERCIAL

## 2025-02-03 NOTE — TELEPHONE ENCOUNTER
Writer called patient regarding absence on 2/3/2025 from PHP. Patient shared he is experiencing extreme back pain and was unable to come to group. Writer and patient talked about consulting with PCP if pain continues. Patient is going to rest today and try to come into PHP tomorrow (2/4/2025). Writer gave patient the  number to call if he is unable to make it. Patient presents motivated to engage in programming.    Mariajose Beard OTR/L

## 2025-02-04 ENCOUNTER — TELEPHONE (OUTPATIENT)
Dept: BEHAVIORAL HEALTH | Facility: CLINIC | Age: 35
End: 2025-02-04
Payer: COMMERCIAL

## 2025-02-04 NOTE — TELEPHONE ENCOUNTER
"Pt called in to PHP d/t physical pain on 2/4. Writer attempted to reach out to pt to assess symptoms. Writer called pt X2. Pt's phone rings to voicemail and the voicemailbox has not been set up, so writer was unable to LVM. Writer sent pt the follow message via [employer]\"    \"Hello Madison!     My name is Iman and I am the RN working at [facility] Partial Hospitalization Program (PHP) you spoke with over the phone 1/31/25. You had been scheduled to start PHP 2/3. We received your communication yesterday and today about physical pain preventing your start at [facility]. I tried calling your phone number a few times today to assess symptoms and provide support, but was unable to reach you. I was unable to leave a voicemail. If it is easier for you, you can certainly respond by [employer] message.    I wanted to provide you with the RN triage line through [employer] in case your physical symptoms continue or worsen. This is a line you can call, relay your symptoms, and get recommendations on how best to get medical concerns addressed. The [facility] RN Triage line is [phone] ([phone]).    We are hopeful you will be able to get some relief from your pain and be able to join us tomorrow for programming. If you are unable to start tomorrow (2/5/2025), your PHP start will be canceled. If that is the case, when you are feeling physically able, you can call the Navigation Hub to have your start re-scheduled. The Navigation Hub's number is [phone].     Please give our program line a call at [phone] or respond to this [employer] message to communicate your plan for participation in programming with us. Thank you!    -BELGICA Valerio\"  "

## 2025-02-05 ENCOUNTER — TELEPHONE (OUTPATIENT)
Dept: BEHAVIORAL HEALTH | Facility: CLINIC | Age: 35
End: 2025-02-05
Payer: COMMERCIAL

## 2025-02-05 NOTE — TELEPHONE ENCOUNTER
----- Message from Loree Jeong sent at 2/5/2025  9:36 AM CST -----  Regarding: RE: Referral for PHP; [facility]  Please cancel this start.    Thank you!  ----- Message -----  From: Evelia Patricio  Sent: 1/29/2025  12:45 PM CST  To: Amena Salinas [facility]; #  Subject: Referral for PHP; [facility]                           Adult Mental Health Programmatic Care Schedule Request    Patient Name: Nikolay Lora  Location of programming: [facility]  Start Date: 2/3/2025    Adult Program Group: Adult Program Group: PHP 1 Track [[id number]]?   Schedule:  M-F 9am-3pm  Number of visits to be scheduled: 15 days    Attending Provider (MD):  Dr. Luis Gar ([facility])  Visit Type:  In-Person    Accommodations Needed: No  Alerts Identified/Substantiation: No  Consulted with Supervisor: No

## 2025-03-05 DIAGNOSIS — R12 HEARTBURN: ICD-10-CM

## 2025-03-05 DIAGNOSIS — I10 HYPERTENSION, UNSPECIFIED TYPE: ICD-10-CM

## 2025-03-05 DIAGNOSIS — J30.1 ALLERGIC RHINITIS DUE TO POLLEN, UNSPECIFIED SEASONALITY: ICD-10-CM

## 2025-03-05 RX ORDER — PANTOPRAZOLE SODIUM 40 MG/1
40 TABLET, DELAYED RELEASE ORAL DAILY
Qty: 30 TABLET | Refills: 2 | Status: SHIPPED | OUTPATIENT
Start: 2025-03-05

## 2025-03-05 RX ORDER — METOPROLOL SUCCINATE 25 MG/1
TABLET, EXTENDED RELEASE ORAL
Qty: 15 TABLET | Refills: 2 | Status: SHIPPED | OUTPATIENT
Start: 2025-03-05

## 2025-03-05 RX ORDER — LORATADINE 10 MG/1
1 TABLET ORAL DAILY
Qty: 30 TABLET | Refills: 2 | Status: SHIPPED | OUTPATIENT
Start: 2025-03-05

## 2025-04-01 ENCOUNTER — MYC REFILL (OUTPATIENT)
Dept: FAMILY MEDICINE | Facility: CLINIC | Age: 35
End: 2025-04-01
Payer: COMMERCIAL

## 2025-04-01 DIAGNOSIS — L30.9 CHRONIC DERMATITIS: ICD-10-CM

## 2025-04-01 DIAGNOSIS — E03.8 SUBCLINICAL HYPOTHYROIDISM: ICD-10-CM

## 2025-04-01 RX ORDER — TRIAMCINOLONE ACETONIDE 1 MG/G
CREAM TOPICAL
Qty: 45 G | Refills: 0 | Status: SHIPPED | OUTPATIENT
Start: 2025-04-01

## 2025-04-01 RX ORDER — LEVOTHYROXINE SODIUM 50 UG/1
50 TABLET ORAL DAILY
Qty: 90 TABLET | Refills: 2 | Status: SHIPPED | OUTPATIENT
Start: 2025-04-01

## 2025-04-10 ENCOUNTER — TELEPHONE (OUTPATIENT)
Dept: FAMILY MEDICINE | Facility: CLINIC | Age: 35
End: 2025-04-10

## 2025-04-10 NOTE — TELEPHONE ENCOUNTER
Left message for patient to call back. Patient has appt scheduled for 4.10.25 as a ED F/U but in the comments it states ADHD medications .     Please clarify which hospital he was at and if it was within the past 30 days. If it has not been in within the past 30 days this needs to be changed to a office visit.     Also patient has a PCP of Dr. Ybarra who appears to be managing his medications and proving refills. Patient should schedule with him especially if wanting to discuss ADHD as if prescribed medication follow ups will need to be made and scheduled with prescribing provider.     If patient is looking at changing providers this the visit should possible be an Est. Care depending on if he was seen in the ED/hosp. In the past 30 days.

## 2025-04-22 DIAGNOSIS — L30.9 CHRONIC DERMATITIS: ICD-10-CM

## 2025-04-22 NOTE — TELEPHONE ENCOUNTER
Clinic RN: Please investigate patient's chart or contact patient if the information cannot be found because this medication was prescribed for an acute condition. Confirm current symptoms/need for medication and possible need for appointment. If necessary, document reason and route refill encounter to provider for approval or denial.    Matt CODY, BELGICA  [facility]

## 2025-04-23 DIAGNOSIS — E11.8 TYPE 2 DIABETES MELLITUS WITH UNSPECIFIED COMPLICATIONS (H): Primary | ICD-10-CM

## 2025-04-23 DIAGNOSIS — F29 PSYCHOSIS, UNSPECIFIED PSYCHOSIS TYPE (H): ICD-10-CM

## 2025-04-23 NOTE — TELEPHONE ENCOUNTER
"1st call attempt. LM on  regarding refill for triamcinolone ( KENALOG) 0.1% external cream.  It appears that medication was prescribed for acute condition. Instructions says \"Applied to rash twice a day for 2-3 weeks. Do not use longer than 3 weeks without 1 month break \"    If pt return call, triage symptoms and pend to clinician. If appropriate, schedule appt for follow-up.     Aretha HOLCOMB RN  [facility]    "

## 2025-04-28 NOTE — TELEPHONE ENCOUNTER
Attempt #2. No answer. LVM to call clinic and ask to speak to a nurse.   It appears that medication was prescribed for acute condition.    If pt return call, triage symptoms and pend to clinician. If appropriate, schedule appt for follow-up.     Matt CODY RN  [facility]

## 2025-04-29 ENCOUNTER — TELEPHONE (OUTPATIENT)
Dept: ALLERGY | Facility: CLINIC | Age: 35
End: 2025-04-29
Payer: COMMERCIAL

## 2025-04-29 DIAGNOSIS — J30.1 ALLERGIC RHINITIS DUE TO POLLEN, UNSPECIFIED SEASONALITY: ICD-10-CM

## 2025-04-29 RX ORDER — LORATADINE 10 MG/1
2 TABLET ORAL DAILY
Qty: 90 TABLET | Refills: 1 | Status: SHIPPED | OUTPATIENT
Start: 2025-04-29

## 2025-04-29 RX ORDER — TRIAMCINOLONE ACETONIDE 1 MG/G
CREAM TOPICAL
Qty: 45 G | Refills: 0 | Status: SHIPPED | OUTPATIENT
Start: 2025-04-29

## 2025-04-29 NOTE — TELEPHONE ENCOUNTER
Health Call Center    Phone Message    May a detailed message be left on voicemail: yes     Reason for Call: Medication Question or concern regarding medication   Prescription Clarification  Name of Medication: loratadine (CLARITIN) 10 MG tablet   Prescribing Provider: Currently prescribed by Dr. Mich Ybarra, but Pt states Dr. Bullock was going to increase the dose.    Pharmacy: [id number] - [place], [place] - [address]    What on the order needs clarification? Pt states the pharmacy has not received a new Rx to increase the dose to twice a day instead of once a day.       Action Taken: Other: [facility] Allergy    Travel Screening: Not Applicable     Date of Service:

## 2025-04-29 NOTE — TELEPHONE ENCOUNTER
Writer called and spoke to patient. Pt reported he has a rash on neck and buttocks. Declined triage/appt. Requesting a refill.    Routed to Dr. Ybarra to review.    Matt CODY RN  [facility]

## 2025-05-08 ENCOUNTER — PATIENT OUTREACH (OUTPATIENT)
Dept: CARE COORDINATION | Facility: CLINIC | Age: 35
End: 2025-05-08
Payer: COMMERCIAL

## 2025-05-19 DIAGNOSIS — R12 HEARTBURN: ICD-10-CM

## 2025-05-19 RX ORDER — PANTOPRAZOLE SODIUM 40 MG/1
40 TABLET, DELAYED RELEASE ORAL DAILY
Qty: 30 TABLET | Refills: 10 | Status: SHIPPED | OUTPATIENT
Start: 2025-05-19

## 2025-06-11 DIAGNOSIS — I10 HYPERTENSION, UNSPECIFIED TYPE: ICD-10-CM

## 2025-06-11 RX ORDER — METOPROLOL SUCCINATE 25 MG/1
12.5 TABLET, EXTENDED RELEASE ORAL DAILY
Qty: 15 TABLET | Refills: 2 | Status: SHIPPED | OUTPATIENT
Start: 2025-06-11

## 2025-06-29 ENCOUNTER — HEALTH MAINTENANCE LETTER (OUTPATIENT)
Age: 35
End: 2025-06-29

## 2025-08-26 DIAGNOSIS — J30.1 ALLERGIC RHINITIS DUE TO POLLEN, UNSPECIFIED SEASONALITY: ICD-10-CM

## 2025-08-26 RX ORDER — LORATADINE 10 MG/1
2 TABLET ORAL DAILY
Qty: 30 TABLET | Refills: 0 | Status: SHIPPED | OUTPATIENT
Start: 2025-08-26

## (undated) RX ORDER — NICARDIPINE HCL-0.9% SOD CHLOR 1 MG/10 ML
SYRINGE (ML) INTRAVENOUS
Status: DISPENSED
Start: 2023-03-13

## (undated) RX ORDER — ESMOLOL HYDROCHLORIDE 10 MG/ML
INJECTION INTRAVENOUS
Status: DISPENSED
Start: 2023-03-24

## (undated) RX ORDER — NICARDIPINE HCL-0.9% SOD CHLOR 1 MG/10 ML
SYRINGE (ML) INTRAVENOUS
Status: DISPENSED
Start: 2023-03-15

## (undated) RX ORDER — LABETALOL HYDROCHLORIDE 5 MG/ML
INJECTION, SOLUTION INTRAVENOUS
Status: DISPENSED
Start: 2023-03-03

## (undated) RX ORDER — LABETALOL HYDROCHLORIDE 5 MG/ML
INJECTION, SOLUTION INTRAVENOUS
Status: DISPENSED
Start: 2023-03-22

## (undated) RX ORDER — ESMOLOL HYDROCHLORIDE 10 MG/ML
INJECTION INTRAVENOUS
Status: DISPENSED
Start: 2023-02-27

## (undated) RX ORDER — LABETALOL HYDROCHLORIDE 5 MG/ML
INJECTION, SOLUTION INTRAVENOUS
Status: DISPENSED
Start: 2023-03-31

## (undated) RX ORDER — ESMOLOL HYDROCHLORIDE 10 MG/ML
INJECTION INTRAVENOUS
Status: DISPENSED
Start: 2023-03-15

## (undated) RX ORDER — LABETALOL HYDROCHLORIDE 5 MG/ML
INJECTION, SOLUTION INTRAVENOUS
Status: DISPENSED
Start: 2023-03-01

## (undated) RX ORDER — ESMOLOL HYDROCHLORIDE 10 MG/ML
INJECTION INTRAVENOUS
Status: DISPENSED
Start: 2023-03-13

## (undated) RX ORDER — NICARDIPINE HCL-0.9% SOD CHLOR 1 MG/10 ML
SYRINGE (ML) INTRAVENOUS
Status: DISPENSED
Start: 2023-02-27

## (undated) RX ORDER — NICARDIPINE HCL-0.9% SOD CHLOR 1 MG/10 ML
SYRINGE (ML) INTRAVENOUS
Status: DISPENSED
Start: 2023-03-10

## (undated) RX ORDER — LABETALOL HYDROCHLORIDE 5 MG/ML
INJECTION, SOLUTION INTRAVENOUS
Status: DISPENSED
Start: 2023-03-27

## (undated) RX ORDER — KETOROLAC TROMETHAMINE 30 MG/ML
INJECTION, SOLUTION INTRAMUSCULAR; INTRAVENOUS
Status: DISPENSED
Start: 2023-02-27

## (undated) RX ORDER — ESMOLOL HYDROCHLORIDE 10 MG/ML
INJECTION INTRAVENOUS
Status: DISPENSED
Start: 2023-03-20